# Patient Record
Sex: FEMALE | Race: WHITE | NOT HISPANIC OR LATINO | Employment: OTHER | URBAN - METROPOLITAN AREA
[De-identification: names, ages, dates, MRNs, and addresses within clinical notes are randomized per-mention and may not be internally consistent; named-entity substitution may affect disease eponyms.]

---

## 2017-01-01 ENCOUNTER — RESULTS ENCOUNTER (OUTPATIENT)
Dept: FAMILY MEDICINE CLINIC | Facility: CLINIC | Age: 82
End: 2017-01-01

## 2017-01-01 DIAGNOSIS — E03.9 HYPOTHYROIDISM, ACQUIRED: ICD-10-CM

## 2017-01-26 LAB
FT4I SERPL CALC-MCNC: 2.2 (ref 1.2–4.9)
T3RU NFR SERPL: 33 % (ref 24–39)
T4 SERPL-MCNC: 6.8 UG/DL (ref 4.5–12)
TSH SERPL DL<=0.005 MIU/L-ACNC: 1.04 UIU/ML (ref 0.45–4.5)

## 2017-01-30 ENCOUNTER — OFFICE VISIT (OUTPATIENT)
Dept: FAMILY MEDICINE CLINIC | Facility: CLINIC | Age: 82
End: 2017-01-30

## 2017-01-30 VITALS
SYSTOLIC BLOOD PRESSURE: 150 MMHG | DIASTOLIC BLOOD PRESSURE: 68 MMHG | HEIGHT: 62 IN | WEIGHT: 186 LBS | HEART RATE: 76 BPM | TEMPERATURE: 97.2 F | RESPIRATION RATE: 16 BRPM | OXYGEN SATURATION: 95 % | BODY MASS INDEX: 34.23 KG/M2

## 2017-01-30 DIAGNOSIS — I10 ESSENTIAL HYPERTENSION: ICD-10-CM

## 2017-01-30 DIAGNOSIS — E03.9 HYPOTHYROIDISM, ACQUIRED: Primary | ICD-10-CM

## 2017-01-30 PROCEDURE — 99214 OFFICE O/P EST MOD 30 MIN: CPT | Performed by: FAMILY MEDICINE

## 2017-01-30 RX ORDER — LOSARTAN POTASSIUM AND HYDROCHLOROTHIAZIDE 25; 100 MG/1; MG/1
1 TABLET ORAL DAILY
Qty: 90 TABLET | Refills: 0 | Status: SHIPPED | OUTPATIENT
Start: 2017-01-30 | End: 2017-05-31 | Stop reason: SDUPTHER

## 2017-01-30 RX ORDER — LEVOTHYROXINE SODIUM 88 UG/1
88 TABLET ORAL DAILY
Qty: 90 TABLET | Refills: 1 | Status: SHIPPED | OUTPATIENT
Start: 2017-01-30 | End: 2017-05-31 | Stop reason: SDUPTHER

## 2017-01-30 NOTE — MR AVS SNAPSHOT
Roland Russell   1/30/2017 11:00 AM   Office Visit    Provider:  Davon Marie MD   Department:  [facility]   Dept Phone:  [phone]                Your Full Care Plan              Where to Get Your Medications      These medications were sent to [facility] - [place], [place] - [address] - [phone]  - [phone] FX  [address], [place] [address]     Phone:  [phone]     levothyroxine 88 MCG tablet    losartan-hydrochlorothiazide 100-25 MG per tablet            Your Updated Medication List          This list is accurate as of: 1/30/17 12:15 PM.  Always use your most recent med list.                CALCIUM SOFT CHEWS PO       Insulin Lispro Prot & Lispro (50-50) 100 UNIT/ML suspension pen-injector       insulin  UNIT/ML injection   Commonly known as:  humuLIN N,novoLIN N       levothyroxine 88 MCG tablet   Commonly known as:  SYNTHROID, LEVOTHROID   Take 1 tablet by mouth Daily.       losartan-hydrochlorothiazide 100-25 MG per tablet   Commonly known as:  HYZAAR   Take 1 tablet by mouth Daily.       Vitamin D (Cholecalciferol) 400 UNITS capsule               You Were Diagnosed With        Codes Comments    Hypothyroidism, acquired    -  Primary ICD-10-CM: E03.9  ICD-9-CM: 244.9     Essential hypertension     ICD-10-CM: I10  ICD-9-CM: 401.9       Medications to be Given to You by a Medical Professional     Due       Frequency    5/17/2016 denosumab (PROLIA) syringe 60 mg  Once      Instructions    Exercise 30 minutes most days of the week  Sleep 6-8 hours each night if possible  Low fat, low cholesterol diet   we discussed prescribed medications and how to take them   make sure you get results of any labs/studies ordered today  Low glycemic index diet   See me 6 months         Patient Instructions History      MyChart Signup     [facility] allows you to send messages to your doctor, view your test  "results, renew your prescriptions, schedule appointments, and more. To sign up, go to [employer] and click on the Sign Up Now link in the New User? box. Enter your [email or web address] Activation Code exactly as it appears below along with the last four digits of your Social Security Number and your Date of Birth () to complete the sign-up process. If you do not sign up before the expiration date, you must request a new code.    [email or web address] Activation Code: [email or web address]  Expires: 2017 12:13 PM    If you have questions, you can email [email or web address] or call [phone] to talk to our [email or web address] staff. Remember, [email or web address] is NOT to be used for urgent needs. For medical emergencies, dial 911.               Other Info from Your Visit           Your Appointments     2017 10:30 AM EST   Injection with ROOM 7 [facility])    [address] [address] [phone]            2017  9:00 AM EDT   Office Visit with Davon Marie MD   [facility] (--)    [address] [place] [id number] [phone]           Arrive 15 minutes prior to appointment.              Allergies     Latex      Propoxyphene  Itching      Reason for Visit     Hypothyroidism     Hypertension           Vital Signs     Blood Pressure Pulse Temperature Respirations Height Weight    150/68 76 97.2 °F (36.2 °C) (Oral) 16 61.5\" (156.2 cm) 186 lb (84.4 kg)    Oxygen Saturation Body Mass Index Smoking Status             95% 34.58 kg/m2 Never Smoker         Problems and Diagnoses Noted     High blood pressure    Acquired underactive thyroid      "

## 2017-01-30 NOTE — PATIENT INSTRUCTIONS
Exercise 30 minutes most days of the week  Sleep 6-8 hours each night if possible  Low fat, low cholesterol diet   we discussed prescribed medications and how to take them   make sure you get results of any labs/studies ordered today  Low glycemic index diet   See me 6 months

## 2017-01-30 NOTE — PROGRESS NOTES
"Subjective   Roland Russell is a 82 y.o. female.     History of Present Illness   Chief Complaint:   Chief Complaint   Patient presents with   • Diabetes   • Hyperlipidemia   • Hypertension   • Hypothyroidism       Roland Russell 82 y.o. female who presents today for a 3 month follow up for hypothyroidism and hypertension. I refilled her thyroid medication. I reviewed her lab results. Her blood pressure was elevated this morning. Ranges were 191/73 and 189/75. bp now 150/68.   she has a history of   Patient Active Problem List   Diagnosis   • Legal blindness   • Type 2 diabetes mellitus, uncontrolled   • GERD (gastroesophageal reflux disease)   • HLD (hyperlipidemia)   • Hypothyroidism, acquired   • Osteoporosis   • Osteoarthritis, multiple sites   • Essential hypertension   • Senile osteoporosis   .  Since the last visit, she has overall felt well.  she has been compliant with   Current Outpatient Prescriptions:   •  Calcium-Vitamin D-Vitamin K (CALCIUM SOFT CHEWS PO), Take 1,000 mg by mouth daily., Disp: , Rfl:   •  Insulin Lispro Prot & Lispro (50-50) 100 UNIT/ML suspension pen-injector, Inject 20 Units under the skin nightly., Disp: , Rfl:   •  insulin NPH (HumuLIN,NovoLIN) 100 UNIT/ML injection, Inject under the skin 2 (two) times a day before meals., Disp: , Rfl:   •  levothyroxine (SYNTHROID, LEVOTHROID) 88 MCG tablet, Take 1 tablet by mouth Daily., Disp: 90 tablet, Rfl: 0  •  losartan-hydrochlorothiazide (HYZAAR) 100-25 MG per tablet, Take 1 tablet by mouth Daily., Disp: 90 tablet, Rfl: 1  •  Vitamin D, Cholecalciferol, 400 UNITS capsule, Take  by mouth., Disp: , Rfl:     Current Facility-Administered Medications:   •  denosumab (PROLIA) syringe 60 mg, 60 mg, Subcutaneous, Once, Davon Marie MD.  she denies medication side effects.    All of the chronic condition(s) listed above are stable w/o issues.    Visit Vitals   • BP (!) 185/75   • Pulse 76   • Temp 97.2 °F (36.2 °C) (Oral)   • Resp 16   • Ht 61.5\" " (156.2 cm)   • Wt 186 lb (84.4 kg)   • SpO2 95%   • BMI 34.58 kg/m2       Results for orders placed or performed in visit on 01/01/17   Thyroid Panel With TSH   Result Value Ref Range    TSH 1.040 0.450 - 4.500 uIU/mL    T4, Total 6.8 4.5 - 12.0 ug/dL    T3 Uptake 33 24 - 39 %    Free Thyroxine Index 2.2 1.2 - 4.9         The following portions of the patient's history were reviewed and updated as appropriate: allergies, current medications, past family history, past medical history, past social history, past surgical history and problem list.    Review of Systems   Constitutional: Negative for activity change, appetite change and unexpected weight change.   Eyes: Negative for visual disturbance.   Respiratory: Negative for chest tightness and shortness of breath.    Cardiovascular: Negative for chest pain and palpitations.   Skin: Negative for color change.   Neurological: Negative for syncope and speech difficulty.   Psychiatric/Behavioral: Negative for confusion and decreased concentration.       Objective   Physical Exam   Constitutional: She appears well-developed and well-nourished.   HENT:   Head: Normocephalic.   Neck: No thyromegaly present.   Cardiovascular: Normal rate and regular rhythm.    Pulmonary/Chest: Effort normal and breath sounds normal.   Lymphadenopathy:     She has no cervical adenopathy.   Neurological: She is alert.   Psychiatric: She has a normal mood and affect. Her behavior is normal.   Nursing note and vitals reviewed.      Assessment/Plan   Roland was seen today for hypothyroidism and hypertension.    Diagnoses and all orders for this visit:    Hypothyroidism, acquired  -     levothyroxine (SYNTHROID, LEVOTHROID) 88 MCG tablet; Take 1 tablet by mouth Daily.    Essential hypertension  -     losartan-hydrochlorothiazide (HYZAAR) 100-25 MG per tablet; Take 1 tablet by mouth Daily.

## 2017-02-01 LAB
BUN SERPL-MCNC: 30 MG/DL (ref 8–27)
BUN/CREAT SERPL: 28 (ref 11–26)
CALCIUM SERPL-MCNC: 9.2 MG/DL (ref 8.7–10.3)
CHLORIDE SERPL-SCNC: 99 MMOL/L (ref 96–106)
CO2 SERPL-SCNC: 23 MMOL/L (ref 18–29)
CREAT SERPL-MCNC: 1.08 MG/DL (ref 0.57–1)
GLUCOSE SERPL-MCNC: 181 MG/DL (ref 65–99)
POTASSIUM SERPL-SCNC: 4.6 MMOL/L (ref 3.5–5.2)
SODIUM SERPL-SCNC: 143 MMOL/L (ref 134–144)
WRITTEN AUTHORIZATION: NORMAL

## 2017-02-10 DIAGNOSIS — M81.0 OSTEOPOROSIS: Primary | ICD-10-CM

## 2017-02-13 ENCOUNTER — HOSPITAL ENCOUNTER (OUTPATIENT)
Dept: INFUSION THERAPY | Facility: HOSPITAL | Age: 82
Discharge: HOME | End: 2017-02-13
Admitting: FAMILY MEDICINE

## 2017-02-13 VITALS
OXYGEN SATURATION: 97 % | HEIGHT: 61 IN | SYSTOLIC BLOOD PRESSURE: 186 MMHG | HEART RATE: 69 BPM | DIASTOLIC BLOOD PRESSURE: 83 MMHG | RESPIRATION RATE: 20 BRPM | BODY MASS INDEX: 35.12 KG/M2 | TEMPERATURE: 97.8 F | WEIGHT: 186 LBS

## 2017-02-13 DIAGNOSIS — M81.0 SENILE OSTEOPOROSIS: ICD-10-CM

## 2017-02-13 PROCEDURE — 25010000002 DENOSUMAB 60 MG/ML SOLUTION: Performed by: FAMILY MEDICINE

## 2017-02-13 PROCEDURE — 96401 CHEMO ANTI-NEOPL SQ/IM: CPT

## 2017-02-13 RX ADMIN — DENOSUMAB 60 MG: 60 INJECTION SUBCUTANEOUS at 10:47

## 2017-04-24 ENCOUNTER — CLINICAL SUPPORT (OUTPATIENT)
Dept: FAMILY MEDICINE CLINIC | Facility: CLINIC | Age: 82
End: 2017-04-24

## 2017-04-24 DIAGNOSIS — M81.0 OSTEOPOROSIS: Primary | ICD-10-CM

## 2017-04-24 DIAGNOSIS — Z78.0 POST-MENOPAUSAL: ICD-10-CM

## 2017-04-24 PROCEDURE — 77080 DXA BONE DENSITY AXIAL: CPT | Performed by: FAMILY MEDICINE

## 2017-05-03 ENCOUNTER — OFFICE VISIT (OUTPATIENT)
Dept: FAMILY MEDICINE CLINIC | Facility: CLINIC | Age: 82
End: 2017-05-03

## 2017-05-03 VITALS
SYSTOLIC BLOOD PRESSURE: 128 MMHG | HEART RATE: 78 BPM | RESPIRATION RATE: 16 BRPM | OXYGEN SATURATION: 95 % | WEIGHT: 183 LBS | DIASTOLIC BLOOD PRESSURE: 60 MMHG | HEIGHT: 61 IN | TEMPERATURE: 97.6 F | BODY MASS INDEX: 34.55 KG/M2

## 2017-05-03 DIAGNOSIS — I10 ESSENTIAL HYPERTENSION: ICD-10-CM

## 2017-05-03 DIAGNOSIS — E03.9 HYPOTHYROIDISM, ACQUIRED: ICD-10-CM

## 2017-05-03 DIAGNOSIS — M79.10 MYALGIA: ICD-10-CM

## 2017-05-03 DIAGNOSIS — E78.2 MIXED HYPERLIPIDEMIA: ICD-10-CM

## 2017-05-03 DIAGNOSIS — IMO0001 UNCONTROLLED TYPE 2 DIABETES MELLITUS WITHOUT COMPLICATION, WITH LONG-TERM CURRENT USE OF INSULIN: ICD-10-CM

## 2017-05-03 DIAGNOSIS — M81.0 OSTEOPOROSIS: Primary | ICD-10-CM

## 2017-05-03 PROCEDURE — 99214 OFFICE O/P EST MOD 30 MIN: CPT | Performed by: FAMILY MEDICINE

## 2017-05-04 LAB
ALBUMIN SERPL-MCNC: 4.1 G/DL (ref 3.5–5.2)
ALBUMIN/GLOB SERPL: 1.2 G/DL
ALP SERPL-CCNC: 109 U/L (ref 39–117)
ALT SERPL-CCNC: 16 U/L (ref 1–33)
AST SERPL-CCNC: 17 U/L (ref 1–32)
BASOPHILS # BLD AUTO: 0.03 10*3/MM3 (ref 0–0.2)
BASOPHILS NFR BLD AUTO: 0.3 % (ref 0–1.5)
BILIRUB SERPL-MCNC: 0.5 MG/DL (ref 0.1–1.2)
BUN SERPL-MCNC: 30 MG/DL (ref 8–23)
BUN/CREAT SERPL: 27.5 (ref 7–25)
CALCIUM SERPL-MCNC: 10.8 MG/DL (ref 8.6–10.5)
CHLORIDE SERPL-SCNC: 96 MMOL/L (ref 98–107)
CHOLEST SERPL-MCNC: 222 MG/DL (ref 0–200)
CK SERPL-CCNC: 165 U/L (ref 20–180)
CO2 SERPL-SCNC: 26.9 MMOL/L (ref 22–29)
CREAT SERPL-MCNC: 1.09 MG/DL (ref 0.57–1)
EOSINOPHIL # BLD AUTO: 0.16 10*3/MM3 (ref 0–0.7)
EOSINOPHIL NFR BLD AUTO: 1.5 % (ref 0.3–6.2)
ERYTHROCYTE [DISTWIDTH] IN BLOOD BY AUTOMATED COUNT: 14.4 % (ref 11.7–13)
ERYTHROCYTE [SEDIMENTATION RATE] IN BLOOD BY WESTERGREN METHOD: 8 MM/HR (ref 0–30)
GLOBULIN SER CALC-MCNC: 3.3 GM/DL
GLUCOSE SERPL-MCNC: 273 MG/DL (ref 65–99)
HBA1C MFR BLD: 8.2 % (ref 4.8–5.6)
HCT VFR BLD AUTO: 47.2 % (ref 35.6–45.5)
HDLC SERPL-MCNC: 41 MG/DL (ref 40–60)
HGB BLD-MCNC: 15.1 G/DL (ref 11.9–15.5)
IMM GRANULOCYTES # BLD: 0 10*3/MM3 (ref 0–0.03)
IMM GRANULOCYTES NFR BLD: 0 % (ref 0–0.5)
LDLC SERPL CALC-MCNC: 129 MG/DL (ref 0–100)
LYMPHOCYTES # BLD AUTO: 3.23 10*3/MM3 (ref 0.9–4.8)
LYMPHOCYTES NFR BLD AUTO: 30.2 % (ref 19.6–45.3)
MCH RBC QN AUTO: 28.5 PG (ref 26.9–32)
MCHC RBC AUTO-ENTMCNC: 32 G/DL (ref 32.4–36.3)
MCV RBC AUTO: 89.1 FL (ref 80.5–98.2)
MONOCYTES # BLD AUTO: 0.52 10*3/MM3 (ref 0.2–1.2)
MONOCYTES NFR BLD AUTO: 4.9 % (ref 5–12)
NEUTROPHILS # BLD AUTO: 6.77 10*3/MM3 (ref 1.9–8.1)
NEUTROPHILS NFR BLD AUTO: 63.1 % (ref 42.7–76)
PLATELET # BLD AUTO: 226 10*3/MM3 (ref 140–500)
POTASSIUM SERPL-SCNC: 4.5 MMOL/L (ref 3.5–5.2)
PROT SERPL-MCNC: 7.4 G/DL (ref 6–8.5)
RBC # BLD AUTO: 5.3 10*6/MM3 (ref 3.9–5.2)
SODIUM SERPL-SCNC: 139 MMOL/L (ref 136–145)
TRIGL SERPL-MCNC: 261 MG/DL (ref 0–150)
TSH SERPL DL<=0.005 MIU/L-ACNC: 1.23 MIU/ML (ref 0.27–4.2)
VLDLC SERPL CALC-MCNC: 52.2 MG/DL (ref 5–40)
WBC # BLD AUTO: 10.71 10*3/MM3 (ref 4.5–10.7)

## 2017-05-31 ENCOUNTER — OFFICE VISIT (OUTPATIENT)
Dept: FAMILY MEDICINE CLINIC | Facility: CLINIC | Age: 82
End: 2017-05-31

## 2017-05-31 VITALS
DIASTOLIC BLOOD PRESSURE: 60 MMHG | SYSTOLIC BLOOD PRESSURE: 136 MMHG | RESPIRATION RATE: 16 BRPM | TEMPERATURE: 98.4 F | BODY MASS INDEX: 34.55 KG/M2 | WEIGHT: 183 LBS | HEIGHT: 61 IN | HEART RATE: 80 BPM | OXYGEN SATURATION: 93 %

## 2017-05-31 DIAGNOSIS — E03.9 HYPOTHYROIDISM, ACQUIRED: ICD-10-CM

## 2017-05-31 DIAGNOSIS — H54.8 LEGAL BLINDNESS: ICD-10-CM

## 2017-05-31 DIAGNOSIS — E78.2 MIXED HYPERLIPIDEMIA: Primary | ICD-10-CM

## 2017-05-31 DIAGNOSIS — M79.18 MUSCLE ACHE OF EXTREMITY: ICD-10-CM

## 2017-05-31 DIAGNOSIS — I10 ESSENTIAL HYPERTENSION: ICD-10-CM

## 2017-05-31 DIAGNOSIS — M15.9 OSTEOARTHRITIS OF MULTIPLE JOINTS, UNSPECIFIED OSTEOARTHRITIS TYPE: ICD-10-CM

## 2017-05-31 PROCEDURE — 99214 OFFICE O/P EST MOD 30 MIN: CPT | Performed by: FAMILY MEDICINE

## 2017-05-31 RX ORDER — LOSARTAN POTASSIUM AND HYDROCHLOROTHIAZIDE 25; 100 MG/1; MG/1
1 TABLET ORAL DAILY
Qty: 90 TABLET | Refills: 1 | Status: SHIPPED | OUTPATIENT
Start: 2017-05-31 | End: 2017-07-25 | Stop reason: SDUPTHER

## 2017-05-31 RX ORDER — LEVOTHYROXINE SODIUM 88 UG/1
88 TABLET ORAL DAILY
Qty: 90 TABLET | Refills: 1 | Status: SHIPPED | OUTPATIENT
Start: 2017-05-31 | End: 2017-07-25 | Stop reason: SDUPTHER

## 2017-07-25 ENCOUNTER — OFFICE VISIT (OUTPATIENT)
Dept: FAMILY MEDICINE CLINIC | Facility: CLINIC | Age: 82
End: 2017-07-25

## 2017-07-25 VITALS
HEART RATE: 76 BPM | WEIGHT: 188 LBS | DIASTOLIC BLOOD PRESSURE: 64 MMHG | RESPIRATION RATE: 16 BRPM | HEIGHT: 61 IN | BODY MASS INDEX: 35.5 KG/M2 | SYSTOLIC BLOOD PRESSURE: 131 MMHG | TEMPERATURE: 97.3 F | OXYGEN SATURATION: 94 %

## 2017-07-25 DIAGNOSIS — I10 ESSENTIAL HYPERTENSION: ICD-10-CM

## 2017-07-25 DIAGNOSIS — M19.90 ARTHRITIS: ICD-10-CM

## 2017-07-25 DIAGNOSIS — IMO0001 UNCONTROLLED TYPE 2 DIABETES MELLITUS WITHOUT COMPLICATION, WITH LONG-TERM CURRENT USE OF INSULIN: Primary | ICD-10-CM

## 2017-07-25 DIAGNOSIS — E03.9 HYPOTHYROIDISM, ACQUIRED: ICD-10-CM

## 2017-07-25 PROCEDURE — 99214 OFFICE O/P EST MOD 30 MIN: CPT | Performed by: FAMILY MEDICINE

## 2017-07-25 RX ORDER — LOSARTAN POTASSIUM AND HYDROCHLOROTHIAZIDE 25; 100 MG/1; MG/1
1 TABLET ORAL DAILY
Qty: 90 TABLET | Refills: 1 | Status: SHIPPED | OUTPATIENT
Start: 2017-07-25 | End: 2017-08-29 | Stop reason: SDUPTHER

## 2017-07-25 RX ORDER — LEVOTHYROXINE SODIUM 88 UG/1
88 TABLET ORAL DAILY
Qty: 90 TABLET | Refills: 1 | Status: SHIPPED | OUTPATIENT
Start: 2017-07-25 | End: 2017-08-29 | Stop reason: SDUPTHER

## 2017-07-25 NOTE — PATIENT INSTRUCTIONS
Exercise 30 minutes most days of the week  Sleep 6-8 hours each night if possible  Low fat, low cholesterol diet   we discussed prescribed medications and how to take them   make sure you get results of any labs/studies ordered today  Low glycemic index diet     See dr antoine and dr torres

## 2017-07-25 NOTE — PROGRESS NOTES
"Subjective   Roland Russell is a 82 y.o. female.     History of Present Illness   Chief Complaint:   Chief Complaint   Patient presents with   • Hypertension   • Hyperlipidemia   • Diabetes       Roland Russell 82 y.o. female who presents today for Medical Management of the below listed issues and medication refills. She needs to see dr antoine because she is losing her diabetic doctor, see labs in may. Bone densometer showed osteoporosis and she had a fx this year and  Had to stop prolia because of muscle aches. ? Go to fosomax weekly.?????  she has a history of   Patient Active Problem List   Diagnosis   • Legal blindness   • Type 2 diabetes mellitus, uncontrolled   • GERD (gastroesophageal reflux disease)   • HLD (hyperlipidemia)   • Hypothyroidism, acquired   • Osteoporosis   • Osteoarthritis, multiple sites   • Essential hypertension   • Senile osteoporosis   .  Since the last visit, she has overall felt well.  she has been compliant with   Current Outpatient Prescriptions:   •  Calcium-Vitamin D-Vitamin K (CALCIUM SOFT CHEWS PO), Take 1,000 mg by mouth daily., Disp: , Rfl:   •  Insulin Lispro Prot & Lispro (50-50) 100 UNIT/ML suspension pen-injector, Inject 40 Units under the skin 3 (Three) Times a Day. 40 WITH BREAKFAST 20 WITH LUNCH AND 40 WITH SUPPER, Disp: , Rfl:   •  insulin NPH (HumuLIN,NovoLIN) 100 UNIT/ML injection, Inject 25 Units under the skin Every Night., Disp: , Rfl:   •  levothyroxine (SYNTHROID, LEVOTHROID) 88 MCG tablet, Take 1 tablet by mouth Daily., Disp: 90 tablet, Rfl: 1  •  losartan-hydrochlorothiazide (HYZAAR) 100-25 MG per tablet, Take 1 tablet by mouth Daily., Disp: 90 tablet, Rfl: 1  •  Vitamin D, Cholecalciferol, 400 UNITS capsule, Take 1,000 mg by mouth., Disp: , Rfl: .  she denies medication side effects.    All of the chronic condition(s) listed above are stable w/o issues.    /64  Pulse 76  Temp 97.3 °F (36.3 °C) (Oral)   Resp 16  Ht 61\" (154.9 cm)  Wt 188 lb (85.3 kg)  " SpO2 94%  BMI 35.52 kg/m2    The following portions of the patient's history were reviewed and updated as appropriate: allergies, current medications, past family history, past medical history, past social history, past surgical history and problem list.    Review of Systems   Constitutional: Negative for activity change, appetite change and unexpected weight change.   Eyes: Positive for visual disturbance.   Respiratory: Negative for chest tightness and shortness of breath.    Cardiovascular: Negative for chest pain and palpitations.   Skin: Negative for color change.   Neurological: Negative for syncope and speech difficulty.   Psychiatric/Behavioral: Negative for confusion and decreased concentration.       Objective   Physical Exam   Constitutional: She is oriented to person, place, and time. She appears well-developed and well-nourished.   Eyes: Pupils are equal, round, and reactive to light.   Neck: Normal range of motion. Neck supple.   Cardiovascular: Normal rate.    Pulmonary/Chest: Effort normal and breath sounds normal.   Abdominal: Soft.    Roland had a diabetic foot exam performed today.   During the foot exam she had a monofilament test performed.  Lymphadenopathy:     She has no cervical adenopathy.   Neurological: She is alert and oriented to person, place, and time.   Legally blind   Skin: Skin is warm and dry.   Psychiatric: She has a normal mood and affect. Her behavior is normal.   Nursing note and vitals reviewed.      Assessment/Plan   Roland was seen today for hypertension, hyperlipidemia and diabetes.    Diagnoses and all orders for this visit:    Uncontrolled type 2 diabetes mellitus without complication, with long-term current use of insulin  -     Ambulatory Referral to Endocrinology    Hypothyroidism, acquired  -     levothyroxine (SYNTHROID, LEVOTHROID) 88 MCG tablet; Take 1 tablet by mouth Daily.    Essential hypertension  -     losartan-hydrochlorothiazide (HYZAAR) 100-25 MG per tablet;  Take 1 tablet by mouth Daily.    Arthritis  -     Ambulatory Referral to Rheumatology    Other orders  -     Cancel: Insulin Lispro Prot & Lispro (50-50) 100 UNIT/ML suspension pen-injector; Inject 40 Units under the skin 3 (Three) Times a Day. 40 WITH BREAKFAST 20 WITH LUNCH AND 40 WITH SUPPER  -     Cancel: insulin NPH (humuLIN N,novoLIN N) 100 UNIT/ML injection; Inject 25 Units under the skin Every Night.

## 2017-08-29 ENCOUNTER — OFFICE VISIT (OUTPATIENT)
Dept: ENDOCRINOLOGY | Age: 82
End: 2017-08-29

## 2017-08-29 VITALS
BODY MASS INDEX: 34.66 KG/M2 | WEIGHT: 183.6 LBS | DIASTOLIC BLOOD PRESSURE: 66 MMHG | SYSTOLIC BLOOD PRESSURE: 132 MMHG | HEIGHT: 61 IN

## 2017-08-29 DIAGNOSIS — IMO0002 UNCONTROLLED TYPE 2 DIABETES MELLITUS WITH COMPLICATION, WITH LONG-TERM CURRENT USE OF INSULIN: Primary | ICD-10-CM

## 2017-08-29 DIAGNOSIS — I10 ESSENTIAL HYPERTENSION: ICD-10-CM

## 2017-08-29 DIAGNOSIS — E55.9 VITAMIN D DEFICIENCY: ICD-10-CM

## 2017-08-29 DIAGNOSIS — E78.2 MIXED HYPERLIPIDEMIA: ICD-10-CM

## 2017-08-29 DIAGNOSIS — E03.9 HYPOTHYROIDISM, ACQUIRED: ICD-10-CM

## 2017-08-29 DIAGNOSIS — N28.9 RENAL INSUFFICIENCY: ICD-10-CM

## 2017-08-29 PROCEDURE — 99214 OFFICE O/P EST MOD 30 MIN: CPT | Performed by: NURSE PRACTITIONER

## 2017-08-29 RX ORDER — LEVOTHYROXINE SODIUM 88 UG/1
88 TABLET ORAL DAILY
Qty: 90 TABLET | Refills: 1 | Status: SHIPPED | OUTPATIENT
Start: 2017-08-29 | End: 2017-08-29 | Stop reason: SDUPTHER

## 2017-08-29 RX ORDER — LEVOTHYROXINE SODIUM 88 UG/1
88 TABLET ORAL DAILY
Qty: 90 TABLET | Refills: 1 | Status: SHIPPED | OUTPATIENT
Start: 2017-08-29 | End: 2018-01-04 | Stop reason: SDUPTHER

## 2017-08-29 RX ORDER — BLOOD-GLUCOSE METER
EACH MISCELLANEOUS
Qty: 1 EACH | Refills: 1 | Status: SHIPPED | OUTPATIENT
Start: 2017-08-29 | End: 2017-09-05 | Stop reason: SDUPTHER

## 2017-08-29 RX ORDER — LOSARTAN POTASSIUM AND HYDROCHLOROTHIAZIDE 25; 100 MG/1; MG/1
1 TABLET ORAL DAILY
Qty: 90 TABLET | Refills: 1 | Status: SHIPPED | OUTPATIENT
Start: 2017-08-29 | End: 2018-01-04 | Stop reason: SDUPTHER

## 2017-08-29 RX ORDER — BLOOD-GLUCOSE METER
EACH MISCELLANEOUS
Qty: 1 EACH | Refills: 1 | OUTPATIENT
Start: 2017-08-29 | End: 2017-08-29 | Stop reason: SDUPTHER

## 2017-08-29 NOTE — PROGRESS NOTES
"Terri Russell is a 82 y.o. female is being seen for consultation today at the request of Davon Marie MD  Chief Complaint   Patient presents with   • Diabetes     NPP, pt is testing BG 4 times daily, pt brought meter     /66  Ht 61\" (154.9 cm)  Wt 183 lb 9.6 oz (83.3 kg)  BMI 34.69 kg/m2  Current Outpatient Prescriptions on File Prior to Visit   Medication Sig   • Calcium-Vitamin D-Vitamin K (CALCIUM SOFT CHEWS PO) Take 1,000 mg by mouth daily.   • Insulin Lispro Prot & Lispro (50-50) 100 UNIT/ML suspension pen-injector Inject 40 Units under the skin 3 (Three) Times a Day. 40 WITH BREAKFAST 20 WITH LUNCH AND 40 WITH SUPPER   • insulin NPH (HumuLIN,NovoLIN) 100 UNIT/ML injection Inject 25 Units under the skin Every Night.   • levothyroxine (SYNTHROID, LEVOTHROID) 88 MCG tablet Take 1 tablet by mouth Daily.   • losartan-hydrochlorothiazide (HYZAAR) 100-25 MG per tablet Take 1 tablet by mouth Daily.   • Vitamin D, Cholecalciferol, 400 UNITS capsule Take 1,000 mg by mouth.     No current facility-administered medications on file prior to visit.      Family History   Problem Relation Age of Onset   • Cancer Father    • Glaucoma Father      angular glycoma   • Heart disease Father    • Asthma Sister    • Stroke Maternal Grandmother    • Heart failure Other    • Diabetes Other    • Hypertension Other    • Stroke Other      aunt   • Thyroid disease Other      Social History   Substance Use Topics   • Smoking status: Never Smoker   • Smokeless tobacco: None   • Alcohol use No     Allergies   Allergen Reactions   • Latex    • Propoxyphene Itching         History of Present Illness   Encounter Diagnoses   Name Primary?   • Uncontrolled type 2 diabetes mellitus with complication, with long-term current use of insulin Yes   • Mixed hyperlipidemia    • Essential hypertension    • Hypothyroidism, acquired    This is an 82-year-old female patient here today as a new patient referral for management of type 2 " diabetes.  She states she was diagnosed with diabetes in .  She is accompanied today by her daughter.  She has vision problems and can only see peripherally.  She states her   last year and she has recently moved back to [place] from [facility].  She does live alone but she does have family checks on her.  She states she has had 2 low blood sugars this past week that was so low that she cannot even check them.  She states as a result of sometimes being afraid of going to low she will withhold her evening dose of insulin.  She also does adjust her mealtime insulin based on blood sugar readings.  She has problems seeing and the blood glucose meter and states her insurance company is wanting 200 dollars for her to get a prodigy meter which is audio.  She has an appointment to see Dr. huang in the near future for problems with osteoporosis and arthritis.  She does feels that she has noted that she is getting more exercise which could also contribute to her low blood sugars.  We discussed the risk of hypoglycemia and how to prevent this from occurring.  We also discussed adjusting insulin based on blood sugar patterns.  She does not want to attend any diabetes classes at this time.  She used to be on oral medications for her diabetes and was eventually changed to insulin altogether.        The following portions of the patient's history were reviewed and updated as appropriate: allergies, current medications, past family history, past medical history, past social history, past surgical history and problem list.    Review of Systems   Constitutional: Negative for fatigue.   HENT: Negative for trouble swallowing.    Eyes: Negative for visual disturbance.   Respiratory: Negative for shortness of breath.    Cardiovascular: Negative for leg swelling.   Endocrine: Negative for polyuria.   Skin: Negative for wound.   Neurological: Negative for numbness.       Objective   Physical Exam   Constitutional:  She is oriented to person, place, and time. She appears well-developed and well-nourished. No distress.   HENT:   Head: Normocephalic and atraumatic.   Right Ear: External ear normal.   Left Ear: External ear normal.   Nose: Nose normal.   Mouth/Throat: Oropharynx is clear and moist. No oropharyngeal exudate.   Eyes: EOM are normal. Pupils are equal, round, and reactive to light. Right eye exhibits no discharge. Left eye exhibits no discharge.   Neck: Trachea normal, normal range of motion and full passive range of motion without pain. Neck supple. No tracheal tenderness present. Carotid bruit is not present. No tracheal deviation, no edema and no erythema present. No thyroid mass and no thyromegaly present.   Cardiovascular: Normal rate, regular rhythm, normal heart sounds and intact distal pulses.  Exam reveals no gallop and no friction rub.    No murmur heard.  Pulmonary/Chest: Effort normal and breath sounds normal. No stridor. No respiratory distress. She has no wheezes. She has no rales.   Abdominal: Soft. Bowel sounds are normal. She exhibits no distension.   Musculoskeletal: Normal range of motion. She exhibits no edema or deformity.   Lymphadenopathy:     She has no cervical adenopathy.   Neurological: She is alert and oriented to person, place, and time.   Skin: Skin is warm and dry. No rash noted. She is not diaphoretic. No erythema. No pallor.   Psychiatric: She has a normal mood and affect. Her behavior is normal. Judgment and thought content normal.   Nursing note and vitals reviewed.    Office Visit on 05/03/2017   Component Date Value Ref Range Status   • Glucose 05/03/2017 273* 65 - 99 mg/dL Final   • BUN 05/03/2017 30* 8 - 23 mg/dL Final   • Creatinine 05/03/2017 1.09* 0.57 - 1.00 mg/dL Final   • eGFR Non African Am 05/03/2017 48* >60 mL/min/1.73 Final    Comment: The MDRD GFR formula is only valid for adults with stable  renal function between ages 18 and 70.     • eGFR  Am 05/03/2017 58*  >60 mL/min/1.73 Final   • BUN/Creatinine Ratio 05/03/2017 27.5* 7.0 - 25.0 Final   • Sodium 05/03/2017 139  136 - 145 mmol/L Final   • Potassium 05/03/2017 4.5  3.5 - 5.2 mmol/L Final   • Chloride 05/03/2017 96* 98 - 107 mmol/L Final   • Total CO2 05/03/2017 26.9  22.0 - 29.0 mmol/L Final   • Calcium 05/03/2017 10.8* 8.6 - 10.5 mg/dL Final   • Total Protein 05/03/2017 7.4  6.0 - 8.5 g/dL Final   • Albumin 05/03/2017 4.10  3.50 - 5.20 g/dL Final   • Globulin 05/03/2017 3.3  gm/dL Final   • A/G Ratio 05/03/2017 1.2  g/dL Final   • Total Bilirubin 05/03/2017 0.5  0.1 - 1.2 mg/dL Final   • Alkaline Phosphatase 05/03/2017 109  39 - 117 U/L Final   • AST (SGOT) 05/03/2017 17  1 - 32 U/L Final   • ALT (SGPT) 05/03/2017 16  1 - 33 U/L Final   • Total Cholesterol 05/03/2017 222* 0 - 200 mg/dL Final   • Triglycerides 05/03/2017 261* 0 - 150 mg/dL Final   • HDL Cholesterol 05/03/2017 41  40 - 60 mg/dL Final   • VLDL Cholesterol 05/03/2017 52.2* 5 - 40 mg/dL Final   • LDL Cholesterol  05/03/2017 129* 0 - 100 mg/dL Final   • WBC 05/03/2017 10.71* 4.50 - 10.70 10*3/mm3 Final   • RBC 05/03/2017 5.30* 3.90 - 5.20 10*6/mm3 Final   • Hemoglobin 05/03/2017 15.1  11.9 - 15.5 g/dL Final   • Hematocrit 05/03/2017 47.2* 35.6 - 45.5 % Final   • MCV 05/03/2017 89.1  80.5 - 98.2 fL Final   • MCH 05/03/2017 28.5  26.9 - 32.0 pg Final   • MCHC 05/03/2017 32.0* 32.4 - 36.3 g/dL Final   • RDW 05/03/2017 14.4* 11.7 - 13.0 % Final   • Platelets 05/03/2017 226  140 - 500 10*3/mm3 Final   • Neutrophil Rel % 05/03/2017 63.1  42.7 - 76.0 % Final   • Lymphocyte Rel % 05/03/2017 30.2  19.6 - 45.3 % Final   • Monocyte Rel % 05/03/2017 4.9* 5.0 - 12.0 % Final   • Eosinophil Rel % 05/03/2017 1.5  0.3 - 6.2 % Final   • Basophil Rel % 05/03/2017 0.3  0.0 - 1.5 % Final   • Neutrophils Absolute 05/03/2017 6.77  1.90 - 8.10 10*3/mm3 Final   • Lymphocytes Absolute 05/03/2017 3.23  0.90 - 4.80 10*3/mm3 Final   • Monocytes Absolute 05/03/2017 0.52  0.20 - 1.20  10*3/mm3 Final   • Eosinophils Absolute 05/03/2017 0.16  0.00 - 0.70 10*3/mm3 Final   • Basophils Absolute 05/03/2017 0.03  0.00 - 0.20 10*3/mm3 Final   • Immature Granulocyte Rel % 05/03/2017 0.0  0.0 - 0.5 % Final   • Immature Grans Absolute 05/03/2017 0.00  0.00 - 0.03 10*3/mm3 Final   • TSH 05/03/2017 1.230  0.270 - 4.200 mIU/mL Final   • Hemoglobin A1C 05/03/2017 8.20* 4.80 - 5.60 % Final    Comment: Hemoglobin A1C Ranges:  Increased Risk for Diabetes  5.7% to 6.4%  Diabetes                     >= 6.5%  Diabetic Goal                < 7.0%     • Creatine Kinase 05/03/2017 165  20 - 180 U/L Final   • Sed Rate 05/03/2017 8  0 - 30 mm/hr Final         Assessment/Plan   Problems Addressed this Visit        Cardiovascular and Mediastinum    HLD (hyperlipidemia)    Essential hypertension       Endocrine    Type 2 diabetes mellitus, uncontrolled - Primary    Hypothyroidism, acquired          In summary, patient was seen and examined.  She will have extensive laboratory evaluation done at today's visit will be notified of the results along with any further recommendations.  Her last labs were reviewed.  Her hemoglobin A1c of 8.2 according to patient was in good range for her.  She states typically she is closer to a 0.9 on her A1c.  Her blood pressure is satisfactory.  Her prescriptions were provided for her per her request at today's visit.  We will try to obtain her a prodigy meter due to her vision problems.  She is reluctant to change any medications.  We did discuss today how to adjust insulin based on patterns and prevention of hypoglycemia.  She seems very content with what she is currently doing.  We discussed her goal for her A1c to be between 7.5 and 8.  She is to follow-up in 4 months with labs.

## 2017-09-05 LAB
25(OH)D3+25(OH)D2 SERPL-MCNC: 36.6 NG/ML (ref 30–100)
ALBUMIN SERPL-MCNC: 4.4 G/DL (ref 3.5–5.2)
ALBUMIN/GLOB SERPL: 1.4 G/DL
ALP SERPL-CCNC: 106 U/L (ref 39–117)
ALT SERPL-CCNC: 14 U/L (ref 1–33)
AST SERPL-CCNC: 18 U/L (ref 1–32)
BILIRUB SERPL-MCNC: 0.5 MG/DL (ref 0.1–1.2)
BUN SERPL-MCNC: 21 MG/DL (ref 8–23)
BUN/CREAT SERPL: 21.4 (ref 7–25)
C PEPTIDE SERPL-MCNC: 0.3 NG/ML (ref 1.1–4.4)
CALCIUM SERPL-MCNC: 10.3 MG/DL (ref 8.6–10.5)
CHLORIDE SERPL-SCNC: 98 MMOL/L (ref 98–107)
CHOLEST SERPL-MCNC: 183 MG/DL (ref 0–200)
CO2 SERPL-SCNC: 28.9 MMOL/L (ref 22–29)
CREAT SERPL-MCNC: 0.98 MG/DL (ref 0.57–1)
FT4I SERPL CALC-MCNC: 2.3 (ref 1.2–4.9)
GLOBULIN SER CALC-MCNC: 3.1 GM/DL
GLUCOSE SERPL-MCNC: 75 MG/DL (ref 65–99)
HBA1C MFR BLD: 8.08 % (ref 4.8–5.6)
HDLC SERPL-MCNC: 39 MG/DL (ref 40–60)
LDLC SERPL CALC-MCNC: 105 MG/DL (ref 0–100)
POTASSIUM SERPL-SCNC: 3.9 MMOL/L (ref 3.5–5.2)
PROT SERPL-MCNC: 7.5 G/DL (ref 6–8.5)
SODIUM SERPL-SCNC: 142 MMOL/L (ref 136–145)
T3FREE SERPL-MCNC: 2.5 PG/ML (ref 2–4.4)
T3RU NFR SERPL: 31 % (ref 24–39)
T4 FREE SERPL-MCNC: 1.44 NG/DL (ref 0.93–1.7)
T4 SERPL-MCNC: 7.4 UG/DL (ref 4.5–12)
THYROGLOB AB SERPL-ACNC: 8.5 IU/ML
THYROGLOB SERPL-MCNC: 13 NG/ML
THYROGLOB SERPL-MCNC: ABNORMAL NG/ML
TRIGL SERPL-MCNC: 197 MG/DL (ref 0–150)
TSH SERPL DL<=0.005 MIU/L-ACNC: 1.02 UIU/ML (ref 0.45–4.5)
UNABLE TO VOID: NORMAL
VLDLC SERPL CALC-MCNC: 39.4 MG/DL (ref 5–40)

## 2017-09-05 RX ORDER — BLOOD-GLUCOSE METER
EACH MISCELLANEOUS
Qty: 1 EACH | Refills: 1 | Status: SHIPPED | OUTPATIENT
Start: 2017-09-05 | End: 2017-12-26 | Stop reason: SDUPTHER

## 2017-09-26 ENCOUNTER — HOSPITAL ENCOUNTER (OUTPATIENT)
Dept: GENERAL RADIOLOGY | Facility: HOSPITAL | Age: 82
Discharge: HOME | End: 2017-09-26
Attending: INTERNAL MEDICINE | Admitting: INTERNAL MEDICINE

## 2017-09-26 DIAGNOSIS — M54.2 CERVICALGIA: ICD-10-CM

## 2017-09-26 PROCEDURE — 72052 X-RAY EXAM NECK SPINE 6/>VWS: CPT

## 2017-12-19 ENCOUNTER — LAB (OUTPATIENT)
Dept: ENDOCRINOLOGY | Age: 82
End: 2017-12-19

## 2017-12-19 DIAGNOSIS — E03.9 HYPOTHYROIDISM, ACQUIRED: ICD-10-CM

## 2017-12-19 DIAGNOSIS — E55.9 VITAMIN D DEFICIENCY: ICD-10-CM

## 2017-12-19 DIAGNOSIS — IMO0002 UNCONTROLLED TYPE 2 DIABETES MELLITUS WITH COMPLICATION, WITH LONG-TERM CURRENT USE OF INSULIN: Primary | ICD-10-CM

## 2017-12-19 DIAGNOSIS — IMO0002 UNCONTROLLED TYPE 2 DIABETES MELLITUS WITH COMPLICATION, WITH LONG-TERM CURRENT USE OF INSULIN: ICD-10-CM

## 2017-12-26 ENCOUNTER — OFFICE VISIT (OUTPATIENT)
Dept: ENDOCRINOLOGY | Age: 82
End: 2017-12-26

## 2017-12-26 VITALS
HEIGHT: 61 IN | SYSTOLIC BLOOD PRESSURE: 140 MMHG | DIASTOLIC BLOOD PRESSURE: 84 MMHG | BODY MASS INDEX: 35.02 KG/M2 | WEIGHT: 185.5 LBS

## 2017-12-26 DIAGNOSIS — E03.9 HYPOTHYROIDISM, ACQUIRED: ICD-10-CM

## 2017-12-26 DIAGNOSIS — E78.2 MIXED HYPERLIPIDEMIA: ICD-10-CM

## 2017-12-26 DIAGNOSIS — IMO0002 UNCONTROLLED TYPE 2 DIABETES MELLITUS WITH COMPLICATION, WITH LONG-TERM CURRENT USE OF INSULIN: Primary | ICD-10-CM

## 2017-12-26 DIAGNOSIS — N28.9 RENAL INSUFFICIENCY: ICD-10-CM

## 2017-12-26 DIAGNOSIS — I10 ESSENTIAL HYPERTENSION: ICD-10-CM

## 2017-12-26 LAB
25(OH)D3+25(OH)D2 SERPL-MCNC: 39.9 NG/ML (ref 30–100)
ALBUMIN SERPL-MCNC: 4.1 G/DL (ref 3.5–5.2)
ALBUMIN/GLOB SERPL: 1.4 G/DL
ALP SERPL-CCNC: 110 U/L (ref 39–117)
ALT SERPL-CCNC: 13 U/L (ref 1–33)
AST SERPL-CCNC: 14 U/L (ref 1–32)
BILIRUB SERPL-MCNC: 0.6 MG/DL (ref 0.1–1.2)
BUN SERPL-MCNC: 26 MG/DL (ref 8–23)
BUN/CREAT SERPL: 25.2 (ref 7–25)
C PEPTIDE SERPL-MCNC: 0.4 NG/ML (ref 1.1–4.4)
CALCIUM SERPL-MCNC: 9.4 MG/DL (ref 8.6–10.5)
CHLORIDE SERPL-SCNC: 101 MMOL/L (ref 98–107)
CHOLEST SERPL-MCNC: 187 MG/DL (ref 0–200)
CO2 SERPL-SCNC: 30.3 MMOL/L (ref 22–29)
CREAT SERPL-MCNC: 1.03 MG/DL (ref 0.57–1)
FT4I SERPL CALC-MCNC: 2.2 (ref 1.2–4.9)
GLOBULIN SER CALC-MCNC: 2.9 GM/DL
GLUCOSE SERPL-MCNC: 114 MG/DL (ref 65–99)
HBA1C MFR BLD: 7.41 % (ref 4.8–5.6)
HDLC SERPL-MCNC: 42 MG/DL (ref 40–60)
INTERPRETATION: NORMAL
LDLC SERPL CALC-MCNC: 114 MG/DL (ref 0–100)
Lab: NORMAL
MICROALBUMIN UR-MCNC: 5.7 UG/ML
POTASSIUM SERPL-SCNC: 3.9 MMOL/L (ref 3.5–5.2)
PROT SERPL-MCNC: 7 G/DL (ref 6–8.5)
SODIUM SERPL-SCNC: 143 MMOL/L (ref 136–145)
T3FREE SERPL-MCNC: 2.5 PG/ML (ref 2–4.4)
T3RU NFR SERPL: 31 % (ref 24–39)
T4 FREE SERPL-MCNC: 1.37 NG/DL (ref 0.93–1.7)
T4 SERPL-MCNC: 7 UG/DL (ref 4.5–12)
THYROGLOB AB SERPL-ACNC: 6.2 IU/ML
THYROGLOB SERPL-MCNC: 15 NG/ML
THYROGLOB SERPL-MCNC: ABNORMAL NG/ML
TRIGL SERPL-MCNC: 154 MG/DL (ref 0–150)
TSH SERPL DL<=0.005 MIU/L-ACNC: 1.8 UIU/ML (ref 0.45–4.5)
VLDLC SERPL CALC-MCNC: 30.8 MG/DL (ref 5–40)

## 2017-12-26 PROCEDURE — 99214 OFFICE O/P EST MOD 30 MIN: CPT | Performed by: NURSE PRACTITIONER

## 2017-12-26 RX ORDER — BLOOD-GLUCOSE METER
EACH MISCELLANEOUS
Qty: 1 EACH | Refills: 1 | Status: SHIPPED | OUTPATIENT
Start: 2017-12-26 | End: 2019-03-28 | Stop reason: SDUPTHER

## 2017-12-26 NOTE — PROGRESS NOTES
"Subjective   Roland Russell is a 82 y.o. female is here today for follow-up.  Chief Complaint   Patient presents with   • Diabetes     recent labs, pt test 4x daily, pt brought meter   • Hyperlipidemia     pt is concerned about pain in arms and legs starting about 2 weeks ago   • Hypertension   • Hypothyroidism   • Vitamin D Deficiency   • renal insufficiency     /84  Ht 154.9 cm (61\")  Wt 84.1 kg (185 lb 8 oz)  BMI 35.05 kg/m2  Current Outpatient Prescriptions on File Prior to Visit   Medication Sig   • Blood Glucose Monitoring Suppl (PRODIGY AUTOCODE BLOOD GLUCOSE) w/Device kit Use as directed  Indications: Blindness   • Calcium-Vitamin D-Vitamin K (CALCIUM SOFT CHEWS PO) Take 1,000 mg by mouth daily.   • Insulin Lispro Prot & Lispro (HUMALOG MIX 50/50 KWIKPEN) (50-50) 100 UNIT/ML suspension pen-injector Inject 40 ac breakfast 20 ac lunch and 40 ac dinner   • Insulin NPH, Human,, Isophane, 100 UNIT/ML suspension pen-injector Inject 25 Units under the skin Daily.   • Insulin Pen Needle (BD PEN NEEDLE SYED U/F) 32G X 4 MM misc Use as directed   • levothyroxine (SYNTHROID, LEVOTHROID) 88 MCG tablet Take 1 tablet by mouth Daily.   • losartan-hydrochlorothiazide (HYZAAR) 100-25 MG per tablet Take 1 tablet by mouth Daily.   • Vitamin D, Cholecalciferol, 400 UNITS capsule Take 1,000 mg by mouth.   • [DISCONTINUED] Insulin Lispro Prot & Lispro (HUMALOG MIX 50/50 KWIKPEN SC) Inject 40 units ac breakfast 20 units ac lunch and 40 units ac dinner      No current facility-administered medications on file prior to visit.      Family History   Problem Relation Age of Onset   • Cancer Father    • Glaucoma Father      angular glycoma   • Heart disease Father    • Asthma Sister    • Stroke Maternal Grandmother    • Heart failure Other    • Diabetes Other    • Hypertension Other    • Stroke Other      aunt   • Thyroid disease Other      Social History   Substance Use Topics   • Smoking status: Never Smoker   • Smokeless " tobacco: None   • Alcohol use No     Allergies   Allergen Reactions   • Latex    • Propoxyphene Itching         History of Present Illness  Encounter Diagnoses   Name Primary?   • Uncontrolled type 2 diabetes mellitus with complication, with long-term current use of insulin Yes   • Essential hypertension    • Mixed hyperlipidemia    • Hypothyroidism, acquired    • Renal insufficiency    82-year-old male patient here today for routine follow-up visit for the above-mentioned problems.  She is accompanied by her daughter.  She states her lowest blood sugar reading has been 48 at her highest has been 490 mg/dL.  She does not know what caused each of her blood sugars to be so high but she suspects that she was more active than usual for the low blood sugar and went out to eat and splurged what her blood sugar was over 400.  She is a poor historian regarding her medications.  She is complaining of leg and arm pain and states she has trouble getting up and initiating walking.  She denies numbness and tingling and does not want any medication for neuropathy.  She will follow-up with her primary care provider for further evaluation and treatment if needed.  She also sees Dr. Rowley rheumatology and has an appointment for follow-up.  She does not know if she needs any medication refills at today's visit.  She had recent labs which were reviewed.  She has recently moved from her home in [place] to [place] and states has been more active than usual as result of packing    The following portions of the patient's history were reviewed and updated as appropriate: allergies, current medications, past family history, past medical history, past social history, past surgical history and problem list.    Review of Systems   Constitutional: Negative for fatigue.   HENT: Negative for trouble swallowing.    Eyes: Negative for visual disturbance.   Respiratory: Negative for shortness of breath.    Cardiovascular: Negative for leg  swelling.   Endocrine: Negative for polyuria.   Skin: Negative for wound.   Neurological: Positive for numbness ( in arms).       Objective   Physical Exam   Constitutional: She is oriented to person, place, and time. She appears well-developed and well-nourished. No distress.   HENT:   Head: Normocephalic and atraumatic.   Right Ear: External ear normal.   Left Ear: External ear normal.   Nose: Nose normal.   Mouth/Throat: Oropharynx is clear and moist. No oropharyngeal exudate.   Eyes: EOM are normal. Pupils are equal, round, and reactive to light. Right eye exhibits no discharge. Left eye exhibits no discharge.   Neck: Trachea normal, normal range of motion and full passive range of motion without pain. Neck supple. No tracheal tenderness present. Carotid bruit is not present. No tracheal deviation, no edema and no erythema present. No thyroid mass and no thyromegaly present.   Cardiovascular: Normal rate, regular rhythm, normal heart sounds and intact distal pulses.  Exam reveals no gallop and no friction rub.    No murmur heard.  Pulmonary/Chest: Effort normal and breath sounds normal. No stridor. No respiratory distress. She has no wheezes. She has no rales.   Abdominal: Soft. Bowel sounds are normal. She exhibits no distension.   Musculoskeletal: Normal range of motion. She exhibits no edema or deformity.   Lymphadenopathy:     She has no cervical adenopathy.   Neurological: She is alert and oriented to person, place, and time.   Skin: Skin is warm and dry. No rash noted. She is not diaphoretic. No erythema. No pallor.   Psychiatric: She has a normal mood and affect. Her behavior is normal. Judgment and thought content normal.   Nursing note and vitals reviewed.    Results for orders placed or performed in visit on 12/19/17   Comprehensive Metabolic Panel   Result Value Ref Range    Glucose 114 (H) 65 - 99 mg/dL    BUN 26 (H) 8 - 23 mg/dL    Creatinine 1.03 (H) 0.57 - 1.00 mg/dL    eGFR Non African Am 51 (L)  >60 mL/min/1.73    eGFR African Am 62 >60 mL/min/1.73    BUN/Creatinine Ratio 25.2 (H) 7.0 - 25.0    Sodium 143 136 - 145 mmol/L    Potassium 3.9 3.5 - 5.2 mmol/L    Chloride 101 98 - 107 mmol/L    Total CO2 30.3 (H) 22.0 - 29.0 mmol/L    Calcium 9.4 8.6 - 10.5 mg/dL    Total Protein 7.0 6.0 - 8.5 g/dL    Albumin 4.10 3.50 - 5.20 g/dL    Globulin 2.9 gm/dL    A/G Ratio 1.4 g/dL    Total Bilirubin 0.6 0.1 - 1.2 mg/dL    Alkaline Phosphatase 110 39 - 117 U/L    AST (SGOT) 14 1 - 32 U/L    ALT (SGPT) 13 1 - 33 U/L   Comprehensive Thyroglobulin   Result Value Ref Range    Thyroglobulin Ab 6.2 (H) IU/mL    Thyroglobulin Comment ng/mL    Thyroglobulin (TG-MARNIE) 15 ng/mL   C-Peptide   Result Value Ref Range    C-Peptide 0.4 (L) 1.1 - 4.4 ng/mL   Hemoglobin A1c   Result Value Ref Range    Hemoglobin A1C 7.41 (H) 4.80 - 5.60 %   Lipid Panel   Result Value Ref Range    Total Cholesterol 187 0 - 200 mg/dL    Triglycerides 154 (H) 0 - 150 mg/dL    HDL Cholesterol 42 40 - 60 mg/dL    VLDL Cholesterol 30.8 5 - 40 mg/dL    LDL Cholesterol  114 (H) 0 - 100 mg/dL   T3, Free   Result Value Ref Range    T3, Free 2.5 2.0 - 4.4 pg/mL   T4, Free   Result Value Ref Range    Free T4 1.37 0.93 - 1.70 ng/dL   Thyroid Panel With TSH   Result Value Ref Range    TSH 1.800 0.450 - 4.500 uIU/mL    T4, Total 7.0 4.5 - 12.0 ug/dL    T3 Uptake 31 24 - 39 %    Free Thyroxine Index 2.2 1.2 - 4.9   Vitamin D 25 Hydroxy   Result Value Ref Range    25 Hydroxy, Vitamin D 39.9 30.0 - 100.0 ng/mL   MicroAlbumin, Urine, Random   Result Value Ref Range    Microalbumin, Urine 5.7 Not Estab. ug/mL   Cardiovascular Risk Assessment   Result Value Ref Range    Interpretation Note    Diabetes Patient Education   Result Value Ref Range    PDF Image Not applicable          Assessment/Plan   Problems Addressed this Visit        Cardiovascular and Mediastinum    HLD (hyperlipidemia)    Essential hypertension       Endocrine    Type 2 diabetes mellitus, uncontrolled -  Primary    Hypothyroidism, acquired       Other    Renal insufficiency        In summary, patient was seen and examined.  She will continue all her current medications as prescribed.  She is varying her doses of insulin and states if she takes more insulin at lunch time she will take less at dinner.  Her blood sugars do      fluctuate.  It is difficult trying to determine what her high blood sugars are result of an what is causing her low blood sugars.  Patient is a poor historian and is varying the amount of insulin that she takes.  She is needing a new prodigy meter and is visually impaired.  A prescription will be sent again to [facility] we will also call and follow up why she did not receive one when last ordered in September. She will follow up with dr antoine her next visit. Her a1c has improved. She has been cautioned to monitor blood sugars closely.  Her daughter states also that she does not feel that she eats well enough and will consider tomatoes a meal.  Patient has been advised that she needs to eat more carbohydrates with her meals

## 2018-01-03 DIAGNOSIS — E03.9 HYPOTHYROIDISM, ACQUIRED: ICD-10-CM

## 2018-01-04 DIAGNOSIS — E03.9 HYPOTHYROIDISM, ACQUIRED: ICD-10-CM

## 2018-01-04 DIAGNOSIS — I10 ESSENTIAL HYPERTENSION: ICD-10-CM

## 2018-01-04 RX ORDER — LEVOTHYROXINE SODIUM 88 UG/1
88 TABLET ORAL DAILY
Qty: 90 TABLET | Refills: 1 | Status: SHIPPED | OUTPATIENT
Start: 2018-01-04 | End: 2018-01-04 | Stop reason: SDUPTHER

## 2018-01-04 RX ORDER — LOSARTAN POTASSIUM AND HYDROCHLOROTHIAZIDE 25; 100 MG/1; MG/1
1 TABLET ORAL DAILY
Qty: 90 TABLET | Refills: 1 | Status: SHIPPED | OUTPATIENT
Start: 2018-01-04 | End: 2018-01-04 | Stop reason: SDUPTHER

## 2018-01-04 RX ORDER — LOSARTAN POTASSIUM AND HYDROCHLOROTHIAZIDE 25; 100 MG/1; MG/1
1 TABLET ORAL DAILY
Qty: 30 TABLET | Refills: 0 | Status: SHIPPED | OUTPATIENT
Start: 2018-01-04 | End: 2018-01-04 | Stop reason: SDUPTHER

## 2018-01-04 RX ORDER — LOSARTAN POTASSIUM AND HYDROCHLOROTHIAZIDE 25; 100 MG/1; MG/1
1 TABLET ORAL DAILY
Qty: 30 TABLET | Refills: 0 | Status: SHIPPED | OUTPATIENT
Start: 2018-01-04 | End: 2018-02-04 | Stop reason: SDUPTHER

## 2018-01-04 RX ORDER — LEVOTHYROXINE SODIUM 88 UG/1
88 TABLET ORAL DAILY
Qty: 30 TABLET | Refills: 0 | Status: SHIPPED | OUTPATIENT
Start: 2018-01-04 | End: 2018-01-31 | Stop reason: SDUPTHER

## 2018-01-04 RX ORDER — LEVOTHYROXINE SODIUM 88 UG/1
TABLET ORAL
Qty: 90 TABLET | Refills: 0 | OUTPATIENT
Start: 2018-01-04

## 2018-01-04 RX ORDER — LEVOTHYROXINE SODIUM 88 UG/1
88 TABLET ORAL DAILY
Qty: 30 TABLET | Refills: 0 | Status: SHIPPED | OUTPATIENT
Start: 2018-01-04 | End: 2018-01-04 | Stop reason: SDUPTHER

## 2018-01-17 ENCOUNTER — TELEPHONE (OUTPATIENT)
Dept: FAMILY MEDICINE CLINIC | Facility: CLINIC | Age: 83
End: 2018-01-17

## 2018-01-17 NOTE — TELEPHONE ENCOUNTER
Pt is needing a letter stating that she is legal blind so she can take it to the post office. The post office has to have a letter to deliver her mail to her door.

## 2018-01-24 ENCOUNTER — OFFICE VISIT (OUTPATIENT)
Dept: FAMILY MEDICINE CLINIC | Facility: CLINIC | Age: 83
End: 2018-01-24

## 2018-01-24 VITALS
HEIGHT: 61 IN | HEART RATE: 72 BPM | WEIGHT: 185 LBS | RESPIRATION RATE: 16 BRPM | OXYGEN SATURATION: 100 % | BODY MASS INDEX: 34.93 KG/M2 | DIASTOLIC BLOOD PRESSURE: 80 MMHG | TEMPERATURE: 97.6 F | SYSTOLIC BLOOD PRESSURE: 150 MMHG

## 2018-01-24 DIAGNOSIS — K21.9 GASTROESOPHAGEAL REFLUX DISEASE, ESOPHAGITIS PRESENCE NOT SPECIFIED: ICD-10-CM

## 2018-01-24 DIAGNOSIS — M54.41 RIGHT-SIDED LOW BACK PAIN WITH RIGHT-SIDED SCIATICA, UNSPECIFIED CHRONICITY: Primary | ICD-10-CM

## 2018-01-24 DIAGNOSIS — R20.0 LEFT LEG NUMBNESS: ICD-10-CM

## 2018-01-24 DIAGNOSIS — IMO0002 UNCONTROLLED TYPE 2 DIABETES MELLITUS WITH COMPLICATION, WITH LONG-TERM CURRENT USE OF INSULIN: ICD-10-CM

## 2018-01-24 DIAGNOSIS — H54.8 LEGAL BLINDNESS: ICD-10-CM

## 2018-01-24 DIAGNOSIS — I10 ESSENTIAL HYPERTENSION: ICD-10-CM

## 2018-01-24 PROCEDURE — 99213 OFFICE O/P EST LOW 20 MIN: CPT | Performed by: FAMILY MEDICINE

## 2018-01-24 RX ORDER — LEVOTHYROXINE SODIUM 88 UG/1
88 TABLET ORAL DAILY
Qty: 90 TABLET | Refills: 1 | Status: CANCELLED | OUTPATIENT
Start: 2018-01-24

## 2018-01-24 RX ORDER — LOSARTAN POTASSIUM AND HYDROCHLOROTHIAZIDE 25; 100 MG/1; MG/1
1 TABLET ORAL DAILY
Qty: 90 TABLET | Refills: 1 | Status: CANCELLED | OUTPATIENT
Start: 2018-01-24

## 2018-01-24 NOTE — PROGRESS NOTES
Subjective   Roland Russell is a 83 y.o. female.     History of Present Illness   Chief Complaint:   Chief Complaint   Patient presents with   • Sciatica   • numbness of left leg   • Back Pain       Roland Russell 83 y.o. female who presents today for Medical Management of the below listed issues She complains of back pain and sciatic pain of the right side. She also stated that she had an episode were she was unable to feel her left leg.   Area left femur  And lasted 1 day. I reviewed her lab results.   Pain from arthritis right hip  I reviewed labs.  she has a problem list of   Patient Active Problem List   Diagnosis   • Legal blindness   • Type 2 diabetes mellitus, uncontrolled   • GERD (gastroesophageal reflux disease)   • HLD (hyperlipidemia)   • Hypothyroidism, acquired   • Osteoporosis   • Osteoarthritis, multiple sites   • Essential hypertension   • Senile osteoporosis   • Renal insufficiency   .  Since the last visit, she has overall felt well.  she has been compliant with   Current Outpatient Prescriptions:   •  Blood Glucose Monitoring Suppl (PRODIGY AUTOCODE BLOOD GLUCOSE) w/Device kit, Use as directed  Indications: Blindness, Disp: 1 each, Rfl: 1  •  CALCIUM PO, Take 1,000 Units by mouth Daily., Disp: , Rfl:   •  Calcium-Vitamin D-Vitamin K (CALCIUM SOFT CHEWS PO), Take 1,000 mg by mouth daily., Disp: , Rfl:   •  denosumab (PROLIA) 60 MG/ML solution syringe, Inject 60 mg under the skin., Disp: , Rfl:   •  Insulin Lispro Prot & Lispro (HUMALOG MIX 50/50 KWIKPEN) (50-50) 100 UNIT/ML suspension pen-injector, Inject 40 ac breakfast 20 ac lunch and 40 ac dinner, Disp: 90 mL, Rfl: 1  •  Insulin NPH, Human,, Isophane, 100 UNIT/ML suspension pen-injector, Inject 25 Units under the skin Every Night., Disp: 30 mL, Rfl: 1  •  Insulin Pen Needle (BD PEN NEEDLE SYED U/F) 32G X 4 MM misc, Use to inject insulin 4 times daily, Disp: 400 each, Rfl: 1  •  levothyroxine (SYNTHROID, LEVOTHROID) 88 MCG tablet, Take 1 tablet  "by mouth Daily., Disp: 30 tablet, Rfl: 0  •  losartan-hydrochlorothiazide (HYZAAR) 100-25 MG per tablet, Take 1 tablet by mouth Daily., Disp: 30 tablet, Rfl: 0  •  Vitamin D, Cholecalciferol, 400 UNITS capsule, Take 1,000 mg by mouth., Disp: , Rfl: .  she denies medication side effects.    All of the chronic condition(s) listed above are stable w/o issues.    /80  Pulse 72  Temp 97.6 °F (36.4 °C) (Oral)   Resp 16  Ht 154.9 cm (61\")  Wt 83.9 kg (185 lb)  SpO2 100%  BMI 34.96 kg/m2    Results for orders placed or performed in visit on 12/19/17   Comprehensive Metabolic Panel   Result Value Ref Range    Glucose 114 (H) 65 - 99 mg/dL    BUN 26 (H) 8 - 23 mg/dL    Creatinine 1.03 (H) 0.57 - 1.00 mg/dL    eGFR Non African Am 51 (L) >60 mL/min/1.73    eGFR African Am 62 >60 mL/min/1.73    BUN/Creatinine Ratio 25.2 (H) 7.0 - 25.0    Sodium 143 136 - 145 mmol/L    Potassium 3.9 3.5 - 5.2 mmol/L    Chloride 101 98 - 107 mmol/L    Total CO2 30.3 (H) 22.0 - 29.0 mmol/L    Calcium 9.4 8.6 - 10.5 mg/dL    Total Protein 7.0 6.0 - 8.5 g/dL    Albumin 4.10 3.50 - 5.20 g/dL    Globulin 2.9 gm/dL    A/G Ratio 1.4 g/dL    Total Bilirubin 0.6 0.1 - 1.2 mg/dL    Alkaline Phosphatase 110 39 - 117 U/L    AST (SGOT) 14 1 - 32 U/L    ALT (SGPT) 13 1 - 33 U/L   Comprehensive Thyroglobulin   Result Value Ref Range    Thyroglobulin Ab 6.2 (H) IU/mL    Thyroglobulin Comment ng/mL    Thyroglobulin (TG-MARNIE) 15 ng/mL   C-Peptide   Result Value Ref Range    C-Peptide 0.4 (L) 1.1 - 4.4 ng/mL   Hemoglobin A1c   Result Value Ref Range    Hemoglobin A1C 7.41 (H) 4.80 - 5.60 %   Lipid Panel   Result Value Ref Range    Total Cholesterol 187 0 - 200 mg/dL    Triglycerides 154 (H) 0 - 150 mg/dL    HDL Cholesterol 42 40 - 60 mg/dL    VLDL Cholesterol 30.8 5 - 40 mg/dL    LDL Cholesterol  114 (H) 0 - 100 mg/dL   T3, Free   Result Value Ref Range    T3, Free 2.5 2.0 - 4.4 pg/mL   T4, Free   Result Value Ref Range    Free T4 1.37 0.93 - 1.70 ng/dL "   Thyroid Panel With TSH   Result Value Ref Range    TSH 1.800 0.450 - 4.500 uIU/mL    T4, Total 7.0 4.5 - 12.0 ug/dL    T3 Uptake 31 24 - 39 %    Free Thyroxine Index 2.2 1.2 - 4.9   Vitamin D 25 Hydroxy   Result Value Ref Range    25 Hydroxy, Vitamin D 39.9 30.0 - 100.0 ng/mL   MicroAlbumin, Urine, Random   Result Value Ref Range    Microalbumin, Urine 5.7 Not Estab. ug/mL   Cardiovascular Risk Assessment   Result Value Ref Range    Interpretation Note    Diabetes Patient Education   Result Value Ref Range    PDF Image Not applicable            The following portions of the patient's history were reviewed and updated as appropriate: allergies, current medications, past family history, past medical history, past social history, past surgical history and problem list.    Review of Systems   Constitutional: Negative for activity change, appetite change, chills, fatigue, fever and unexpected weight change.   HENT: Negative for congestion, ear pain, hearing loss, nosebleeds, rhinorrhea and sore throat.    Eyes: Positive for visual disturbance. Negative for pain and redness.        Blind   Respiratory: Negative for cough, shortness of breath and wheezing.    Cardiovascular: Negative for chest pain, palpitations and leg swelling.   Gastrointestinal: Positive for diarrhea. Negative for abdominal pain, blood in stool, constipation, nausea and vomiting.   Endocrine: Negative for cold intolerance and heat intolerance.   Genitourinary: Negative for difficulty urinating, dysuria, frequency, hematuria, pelvic pain, urgency and vaginal discharge.   Musculoskeletal: Negative for arthralgias, back pain and joint swelling.   Skin: Negative for rash and wound.   Neurological: Negative for dizziness, weakness, numbness and headaches.   Hematological: Does not bruise/bleed easily.   Psychiatric/Behavioral: Negative for dysphoric mood, sleep disturbance and suicidal ideas. The patient is not nervous/anxious.        Objective   Physical  Exam   Eyes:   Legally blind   Neck:   arthritis   Cardiovascular: Normal rate and regular rhythm.    Pulmonary/Chest: Effort normal and breath sounds normal.   Abdominal: Soft.   Musculoskeletal:   arthritis   Neurological: She is alert.   Psychiatric: She has a normal mood and affect. Her behavior is normal.       Assessment/Plan   Roland was seen today for sciatica, numbness of left leg and back pain.    Diagnoses and all orders for this visit:    Right-sided low back pain with right-sided sciatica, unspecified chronicity    Left leg numbness    Essential hypertension    Gastroesophageal reflux disease, esophagitis presence not specified    Legal blindness    Uncontrolled type 2 diabetes mellitus with complication, with long-term current use of insulin    Other orders  -     Cancel: levothyroxine (SYNTHROID, LEVOTHROID) 88 MCG tablet; Take 1 tablet by mouth Daily.  -     Cancel: losartan-hydrochlorothiazide (HYZAAR) 100-25 MG per tablet; Take 1 tablet by mouth Daily.

## 2018-01-31 DIAGNOSIS — E03.9 HYPOTHYROIDISM, ACQUIRED: ICD-10-CM

## 2018-01-31 RX ORDER — LEVOTHYROXINE SODIUM 88 UG/1
TABLET ORAL
Qty: 30 TABLET | Refills: 2 | Status: SHIPPED | OUTPATIENT
Start: 2018-01-31 | End: 2018-05-07 | Stop reason: SDUPTHER

## 2018-02-04 DIAGNOSIS — I10 ESSENTIAL HYPERTENSION: ICD-10-CM

## 2018-02-05 RX ORDER — LOSARTAN POTASSIUM AND HYDROCHLOROTHIAZIDE 25; 100 MG/1; MG/1
TABLET ORAL
Qty: 30 TABLET | Refills: 2 | Status: SHIPPED | OUTPATIENT
Start: 2018-02-05 | End: 2018-05-07 | Stop reason: SDUPTHER

## 2018-04-11 DIAGNOSIS — IMO0002 UNCONTROLLED TYPE 2 DIABETES MELLITUS WITH COMPLICATION, WITH LONG-TERM CURRENT USE OF INSULIN: ICD-10-CM

## 2018-04-11 DIAGNOSIS — E78.2 MIXED HYPERLIPIDEMIA: ICD-10-CM

## 2018-04-11 DIAGNOSIS — E55.9 VITAMIN D DEFICIENCY: ICD-10-CM

## 2018-04-11 DIAGNOSIS — E03.9 HYPOTHYROIDISM, ACQUIRED: Primary | ICD-10-CM

## 2018-04-23 ENCOUNTER — LAB (OUTPATIENT)
Dept: ENDOCRINOLOGY | Age: 83
End: 2018-04-23

## 2018-04-23 DIAGNOSIS — E03.9 HYPOTHYROIDISM, ACQUIRED: ICD-10-CM

## 2018-04-23 DIAGNOSIS — E78.2 MIXED HYPERLIPIDEMIA: ICD-10-CM

## 2018-04-23 DIAGNOSIS — IMO0002 UNCONTROLLED TYPE 2 DIABETES MELLITUS WITH COMPLICATION, WITH LONG-TERM CURRENT USE OF INSULIN: ICD-10-CM

## 2018-04-23 DIAGNOSIS — E55.9 VITAMIN D DEFICIENCY: ICD-10-CM

## 2018-04-30 LAB
25(OH)D3+25(OH)D2 SERPL-MCNC: 54.1 NG/ML (ref 30–100)
ALBUMIN SERPL-MCNC: 4 G/DL (ref 3.5–5.2)
ALBUMIN/GLOB SERPL: 1.5 G/DL
ALP SERPL-CCNC: 99 U/L (ref 39–117)
ALT SERPL-CCNC: 13 U/L (ref 1–33)
AST SERPL-CCNC: 15 U/L (ref 1–32)
BASOPHILS # BLD AUTO: 0.04 10*3/MM3 (ref 0–0.2)
BASOPHILS NFR BLD AUTO: 0.5 % (ref 0–1.5)
BILIRUB SERPL-MCNC: 0.7 MG/DL (ref 0.1–1.2)
BUN SERPL-MCNC: 21 MG/DL (ref 8–23)
BUN/CREAT SERPL: 22.8 (ref 7–25)
C PEPTIDE SERPL-MCNC: 0.9 NG/ML (ref 1.1–4.4)
CALCIUM SERPL-MCNC: 9.9 MG/DL (ref 8.6–10.5)
CHLORIDE SERPL-SCNC: 99 MMOL/L (ref 98–107)
CHOLEST SERPL-MCNC: 192 MG/DL (ref 0–200)
CO2 SERPL-SCNC: 28.5 MMOL/L (ref 22–29)
CREAT SERPL-MCNC: 0.92 MG/DL (ref 0.57–1)
EOSINOPHIL # BLD AUTO: 0.18 10*3/MM3 (ref 0–0.7)
EOSINOPHIL NFR BLD AUTO: 2.1 % (ref 0.3–6.2)
ERYTHROCYTE [DISTWIDTH] IN BLOOD BY AUTOMATED COUNT: 14.3 % (ref 11.7–13)
GFR SERPLBLD CREATININE-BSD FMLA CKD-EPI: 58 ML/MIN/1.73
GFR SERPLBLD CREATININE-BSD FMLA CKD-EPI: 71 ML/MIN/1.73
GLOBULIN SER CALC-MCNC: 2.7 GM/DL
GLUCOSE SERPL-MCNC: 229 MG/DL (ref 65–99)
HBA1C MFR BLD: 7.9 % (ref 4.8–5.6)
HCT VFR BLD AUTO: 45.5 % (ref 35.6–45.5)
HDLC SERPL-MCNC: 39 MG/DL (ref 40–60)
HGB BLD-MCNC: 14.6 G/DL (ref 11.9–15.5)
IMM GRANULOCYTES # BLD: 0.01 10*3/MM3 (ref 0–0.03)
IMM GRANULOCYTES NFR BLD: 0.1 % (ref 0–0.5)
INTERPRETATION: NORMAL
LDLC SERPL CALC-MCNC: 116 MG/DL (ref 0–100)
LYMPHOCYTES # BLD AUTO: 2.54 10*3/MM3 (ref 0.9–4.8)
LYMPHOCYTES NFR BLD AUTO: 29.6 % (ref 19.6–45.3)
Lab: NORMAL
MCH RBC QN AUTO: 28.9 PG (ref 26.9–32)
MCHC RBC AUTO-ENTMCNC: 32.1 G/DL (ref 32.4–36.3)
MCV RBC AUTO: 89.9 FL (ref 80.5–98.2)
MICROALBUMIN UR-MCNC: 11.8 UG/ML
MONOCYTES # BLD AUTO: 0.53 10*3/MM3 (ref 0.2–1.2)
MONOCYTES NFR BLD AUTO: 6.2 % (ref 5–12)
NEUTROPHILS # BLD AUTO: 5.3 10*3/MM3 (ref 1.9–8.1)
NEUTROPHILS NFR BLD AUTO: 61.6 % (ref 42.7–76)
PLATELET # BLD AUTO: 235 10*3/MM3 (ref 140–500)
POTASSIUM SERPL-SCNC: 4.3 MMOL/L (ref 3.5–5.2)
PROT SERPL-MCNC: 6.7 G/DL (ref 6–8.5)
RBC # BLD AUTO: 5.06 10*6/MM3 (ref 3.9–5.2)
SODIUM SERPL-SCNC: 142 MMOL/L (ref 136–145)
T3FREE SERPL-MCNC: 2.6 PG/ML (ref 2–4.4)
T4 FREE SERPL-MCNC: 1.68 NG/DL (ref 0.93–1.7)
T4 SERPL-MCNC: 9.99 MCG/DL (ref 4.5–11.7)
THYROGLOB AB SERPL-ACNC: 5.4 IU/ML
THYROGLOB SERPL-MCNC: 11 NG/ML
THYROGLOB SERPL-MCNC: ABNORMAL NG/ML
TRIGL SERPL-MCNC: 187 MG/DL (ref 0–150)
TSH SERPL DL<=0.005 MIU/L-ACNC: 0.74 MIU/ML (ref 0.27–4.2)
URATE SERPL-MCNC: 3.9 MG/DL (ref 2.4–5.7)
VLDLC SERPL CALC-MCNC: 37.4 MG/DL (ref 5–40)
WBC # BLD AUTO: 8.59 10*3/MM3 (ref 4.5–10.7)

## 2018-05-07 ENCOUNTER — OFFICE VISIT (OUTPATIENT)
Dept: ENDOCRINOLOGY | Age: 83
End: 2018-05-07

## 2018-05-07 VITALS
SYSTOLIC BLOOD PRESSURE: 136 MMHG | WEIGHT: 180 LBS | HEIGHT: 61 IN | DIASTOLIC BLOOD PRESSURE: 60 MMHG | RESPIRATION RATE: 16 BRPM | BODY MASS INDEX: 33.99 KG/M2

## 2018-05-07 DIAGNOSIS — E03.9 HYPOTHYROIDISM, ACQUIRED: ICD-10-CM

## 2018-05-07 DIAGNOSIS — E78.2 MIXED HYPERLIPIDEMIA: ICD-10-CM

## 2018-05-07 DIAGNOSIS — IMO0002 UNCONTROLLED TYPE 2 DIABETES MELLITUS WITH COMPLICATION, WITH LONG-TERM CURRENT USE OF INSULIN: Primary | ICD-10-CM

## 2018-05-07 DIAGNOSIS — E55.9 VITAMIN D DEFICIENCY: ICD-10-CM

## 2018-05-07 DIAGNOSIS — M81.0 OSTEOPOROSIS, UNSPECIFIED OSTEOPOROSIS TYPE, UNSPECIFIED PATHOLOGICAL FRACTURE PRESENCE: ICD-10-CM

## 2018-05-07 DIAGNOSIS — I10 ESSENTIAL HYPERTENSION: ICD-10-CM

## 2018-05-07 PROCEDURE — 99214 OFFICE O/P EST MOD 30 MIN: CPT | Performed by: INTERNAL MEDICINE

## 2018-05-07 RX ORDER — LEVOTHYROXINE SODIUM 88 UG/1
88 TABLET ORAL DAILY
Qty: 90 TABLET | Refills: 3 | Status: SHIPPED | OUTPATIENT
Start: 2018-05-07 | End: 2019-06-19 | Stop reason: SDUPTHER

## 2018-05-07 RX ORDER — LOSARTAN POTASSIUM AND HYDROCHLOROTHIAZIDE 25; 100 MG/1; MG/1
1 TABLET ORAL DAILY
Qty: 90 TABLET | Refills: 3 | Status: SHIPPED | OUTPATIENT
Start: 2018-05-07 | End: 2019-01-15 | Stop reason: SDUPTHER

## 2018-05-07 NOTE — PROGRESS NOTES
"Subjective   Roland Russell is a 83 y.o. female seen for follow up for DM2, hyperlipidemia, hypothyroidism, osteoporosis, lab review. Patient is checking BG 4 times a day. She had Prolia injection last week. She is having aches and pains in her shoulder and states that her vision is worse.     History of Present Illness is an 83-year-old female known patient with type II diabetes hypertension and dyslipidemia as well as hypothyroidism and vitamin D deficiency with osteoporosis.  Over the course of last 6 months she has had no significant health problems for which to go to the emergency room or hospital.    /60   Resp 16   Ht 154.9 cm (61\")   Wt 81.6 kg (180 lb)   BMI 34.01 kg/m²      Allergies   Allergen Reactions   • Latex    • Propoxyphene Itching       Current Outpatient Prescriptions:   •  Blood Glucose Monitoring Suppl (PRODIGY AUTOCODE BLOOD GLUCOSE) w/Device kit, Use as directed  Indications: Blindness, Disp: 1 each, Rfl: 1  •  CALCIUM PO, Take 1,000 Units by mouth Daily., Disp: , Rfl:   •  Calcium-Vitamin D-Vitamin K (CALCIUM SOFT CHEWS PO), Take 1,000 mg by mouth daily., Disp: , Rfl:   •  denosumab (PROLIA) 60 MG/ML solution syringe, Inject 60 mg under the skin., Disp: , Rfl:   •  Insulin Lispro Prot & Lispro (HUMALOG MIX 50/50 KWIKPEN) (50-50) 100 UNIT/ML suspension pen-injector, Inject 40 ac breakfast 20 ac lunch and 40 ac dinner, Disp: 90 mL, Rfl: 1  •  Insulin NPH, Human,, Isophane, 100 UNIT/ML suspension pen-injector, Inject 25 Units under the skin Every Night., Disp: 30 mL, Rfl: 1  •  Insulin Pen Needle (BD PEN NEEDLE SYED U/F) 32G X 4 MM misc, Use to inject insulin 4 times daily, Disp: 400 each, Rfl: 1  •  levothyroxine (SYNTHROID, LEVOTHROID) 88 MCG tablet, TAKE 1 TABLET BY MOUTH DAILY, Disp: 30 tablet, Rfl: 2  •  losartan-hydrochlorothiazide (HYZAAR) 100-25 MG per tablet, TAKE 1 TABLET BY MOUTH DAILY, Disp: 30 tablet, Rfl: 2  •  Vitamin D, Cholecalciferol, 400 UNITS capsule, Take 1,000 mg " by mouth., Disp: , Rfl:       The following portions of the patient's history were reviewed and updated as appropriate: allergies, current medications, past family history, past medical history, past social history, past surgical history and problem list.    Review of Systems   Constitutional: Negative.    HENT: Negative.    Eyes: Positive for visual disturbance.   Respiratory: Negative.    Cardiovascular: Negative.    Gastrointestinal: Negative.    Endocrine: Negative.    Genitourinary: Negative.    Musculoskeletal: Negative.    Skin: Negative.    Allergic/Immunologic: Negative.    Neurological: Negative.    Hematological: Negative.    Psychiatric/Behavioral: Negative.        Objective   Physical Exam   Constitutional: She is oriented to person, place, and time. She appears well-developed and well-nourished. No distress.   HENT:   Head: Normocephalic and atraumatic.   Right Ear: External ear normal.   Left Ear: External ear normal.   Nose: Nose normal.   Mouth/Throat: Oropharynx is clear and moist. No oropharyngeal exudate.   Eyes: Conjunctivae and EOM are normal. Pupils are equal, round, and reactive to light. Right eye exhibits no discharge. Left eye exhibits no discharge. No scleral icterus.   Neck: Trachea normal, normal range of motion and full passive range of motion without pain. Neck supple. No JVD present. No tracheal tenderness present. Carotid bruit is not present. No tracheal deviation, no edema and no erythema present. No thyroid mass and no thyromegaly present.   Cardiovascular: Normal rate, regular rhythm, normal heart sounds and intact distal pulses.  Exam reveals no gallop and no friction rub.    No murmur heard.  Pulmonary/Chest: Effort normal and breath sounds normal. No stridor. No respiratory distress. She has no wheezes. She has no rales. She exhibits no tenderness.   Abdominal: Soft. Bowel sounds are normal. She exhibits no distension and no mass. There is no tenderness. There is no rebound  and no guarding. No hernia.   Musculoskeletal: Normal range of motion. She exhibits no edema, tenderness or deformity.   Lymphadenopathy:     She has no cervical adenopathy.   Neurological: She is alert and oriented to person, place, and time. She has normal reflexes. She displays normal reflexes. No cranial nerve deficit. She exhibits normal muscle tone. Coordination normal.   Skin: Skin is warm and dry. No rash noted. She is not diaphoretic. No erythema. No pallor.   Psychiatric: She has a normal mood and affect. Her behavior is normal. Judgment and thought content normal.   Nursing note and vitals reviewed.    Results for orders placed or performed in visit on 04/23/18   Comprehensive Metabolic Panel   Result Value Ref Range    Glucose 229 (H) 65 - 99 mg/dL    BUN 21 8 - 23 mg/dL    Creatinine 0.92 0.57 - 1.00 mg/dL    eGFR Non African Am 58 (L) >60 mL/min/1.73    eGFR African Am 71 >60 mL/min/1.73    BUN/Creatinine Ratio 22.8 7.0 - 25.0    Sodium 142 136 - 145 mmol/L    Potassium 4.3 3.5 - 5.2 mmol/L    Chloride 99 98 - 107 mmol/L    Total CO2 28.5 22.0 - 29.0 mmol/L    Calcium 9.9 8.6 - 10.5 mg/dL    Total Protein 6.7 6.0 - 8.5 g/dL    Albumin 4.00 3.50 - 5.20 g/dL    Globulin 2.7 gm/dL    A/G Ratio 1.5 g/dL    Total Bilirubin 0.7 0.1 - 1.2 mg/dL    Alkaline Phosphatase 99 39 - 117 U/L    AST (SGOT) 15 1 - 32 U/L    ALT (SGPT) 13 1 - 33 U/L   Comprehensive Thyroglobulin   Result Value Ref Range    Thyroglobulin Ab 5.4 (H) IU/mL    Thyroglobulin Comment ng/mL    Thyroglobulin (TG-MARNIE) 11 ng/mL   C-Peptide   Result Value Ref Range    C-Peptide 0.9 (L) 1.1 - 4.4 ng/mL   Hemoglobin A1c   Result Value Ref Range    Hemoglobin A1C 7.90 (H) 4.80 - 5.60 %   Lipid Panel   Result Value Ref Range    Total Cholesterol 192 0 - 200 mg/dL    Triglycerides 187 (H) 0 - 150 mg/dL    HDL Cholesterol 39 (L) 40 - 60 mg/dL    VLDL Cholesterol 37.4 5 - 40 mg/dL    LDL Cholesterol  116 (H) 0 - 100 mg/dL   T3, Free   Result Value Ref  Range    T3, Free 2.6 2.0 - 4.4 pg/mL   T4 & TSH ([facility])   Result Value Ref Range    TSH 0.742 0.270 - 4.200 mIU/mL    T4, Total 9.99 4.50 - 11.70 mcg/dL   T4, Free   Result Value Ref Range    Free T4 1.68 0.93 - 1.70 ng/dL   Uric Acid   Result Value Ref Range    Uric Acid 3.9 2.4 - 5.7 mg/dL   Vitamin D 25 Hydroxy   Result Value Ref Range    25 Hydroxy, Vitamin D 54.1 30.0 - 100.0 ng/ml   MicroAlbumin, Urine, Random   Result Value Ref Range    Microalbumin, Urine 11.8 Not Estab. ug/mL   Cardiovascular Risk Assessment   Result Value Ref Range    Interpretation Note    Diabetes Patient Education   Result Value Ref Range    PDF Image Not applicable    CBC & Differential   Result Value Ref Range    WBC 8.59 4.50 - 10.70 10*3/mm3    RBC 5.06 3.90 - 5.20 10*6/mm3    Hemoglobin 14.6 11.9 - 15.5 g/dL    Hematocrit 45.5 35.6 - 45.5 %    MCV 89.9 80.5 - 98.2 fL    MCH 28.9 26.9 - 32.0 pg    MCHC 32.1 (L) 32.4 - 36.3 g/dL    RDW 14.3 (H) 11.7 - 13.0 %    Platelets 235 140 - 500 10*3/mm3    Neutrophil Rel % 61.6 42.7 - 76.0 %    Lymphocyte Rel % 29.6 19.6 - 45.3 %    Monocyte Rel % 6.2 5.0 - 12.0 %    Eosinophil Rel % 2.1 0.3 - 6.2 %    Basophil Rel % 0.5 0.0 - 1.5 %    Neutrophils Absolute 5.30 1.90 - 8.10 10*3/mm3    Lymphocytes Absolute 2.54 0.90 - 4.80 10*3/mm3    Monocytes Absolute 0.53 0.20 - 1.20 10*3/mm3    Eosinophils Absolute 0.18 0.00 - 0.70 10*3/mm3    Basophils Absolute 0.04 0.00 - 0.20 10*3/mm3    Immature Granulocyte Rel % 0.1 0.0 - 0.5 %    Immature Grans Absolute 0.01 0.00 - 0.03 10*3/mm3         Assessment/Plan   Diagnoses and all orders for this visit:    Uncontrolled type 2 diabetes mellitus with complication, with long-term current use of insulin  -     T4 & TSH ([facility]); Future  -     T3, Free; Future  -     T4, Free; Future  -     Uric Acid; Future  -     Vitamin D 25 Hydroxy; Future  -     Comprehensive Metabolic Panel; Future  -     C-Peptide; Future  -     Hemoglobin A1c; Future  -     Lipid Panel;  Future  -     MicroAlbumin, Urine, Random - Urine, Clean Catch; Future    Mixed hyperlipidemia  -     T4 & TSH ([facility]); Future  -     T3, Free; Future  -     T4, Free; Future  -     Uric Acid; Future  -     Vitamin D 25 Hydroxy; Future  -     Comprehensive Metabolic Panel; Future  -     C-Peptide; Future  -     Hemoglobin A1c; Future  -     Lipid Panel; Future  -     MicroAlbumin, Urine, Random - Urine, Clean Catch; Future    Essential hypertension  -     T4 & TSH ([facility]); Future  -     T3, Free; Future  -     T4, Free; Future  -     Uric Acid; Future  -     Vitamin D 25 Hydroxy; Future  -     Comprehensive Metabolic Panel; Future  -     C-Peptide; Future  -     Hemoglobin A1c; Future  -     Lipid Panel; Future  -     MicroAlbumin, Urine, Random - Urine, Clean Catch; Future  -     losartan-hydrochlorothiazide (HYZAAR) 100-25 MG per tablet; Take 1 tablet by mouth Daily.    Hypothyroidism, acquired  -     T4 & TSH ([facility]); Future  -     T3, Free; Future  -     T4, Free; Future  -     Uric Acid; Future  -     Vitamin D 25 Hydroxy; Future  -     Comprehensive Metabolic Panel; Future  -     C-Peptide; Future  -     Hemoglobin A1c; Future  -     Lipid Panel; Future  -     MicroAlbumin, Urine, Random - Urine, Clean Catch; Future  -     levothyroxine (SYNTHROID, LEVOTHROID) 88 MCG tablet; Take 1 tablet by mouth Daily.    Vitamin D deficiency  -     T4 & TSH ([facility]); Future  -     T3, Free; Future  -     T4, Free; Future  -     Uric Acid; Future  -     Vitamin D 25 Hydroxy; Future  -     Comprehensive Metabolic Panel; Future  -     C-Peptide; Future  -     Hemoglobin A1c; Future  -     Lipid Panel; Future  -     MicroAlbumin, Urine, Random - Urine, Clean Catch; Future    Osteoporosis, unspecified osteoporosis type, unspecified pathological fracture presence  -     T4 & TSH ([facility]); Future  -     T3, Free; Future  -     T4, Free; Future  -     Uric Acid; Future  -     Vitamin D 25 Hydroxy; Future  -      Comprehensive Metabolic Panel; Future  -     C-Peptide; Future  -     Hemoglobin A1c; Future  -     Lipid Panel; Future  -     MicroAlbumin, Urine, Random - Urine, Clean Catch; Future    Other orders  -     Insulin NPH, Human,, Isophane, 100 UNIT/ML suspension pen-injector; Inject 25 Units under the skin Every Night.  -     Insulin Lispro Prot & Lispro (HUMALOG MIX 50/50 KWIKPEN) (50-50) 100 UNIT/ML suspension pen-injector; Inject 40 ac breakfast 20 ac lunch and 40 ac dinner  -     denosumab (PROLIA) 60 MG/ML solution syringe; Inject 1 mL under the skin 1 (One) Time for 1 dose.               In summary I saw and examined this 83-year-old female for above-mentioned problems.  I reviewed her laboratory evaluation of 04/23/2018 and provided herand her daughter who was present during this office visit with a hard copy of it.  Overall she is clinically and metabolically stable and therefore we will go ahead and continue all current prescriptions.  She will see Ms. Anel Dubon in 6 months or sooner if needed with laboratory evaluation prior to each office visit.

## 2018-08-01 ENCOUNTER — OFFICE VISIT (OUTPATIENT)
Dept: FAMILY MEDICINE CLINIC | Facility: CLINIC | Age: 83
End: 2018-08-01

## 2018-08-01 VITALS
HEART RATE: 70 BPM | WEIGHT: 178 LBS | BODY MASS INDEX: 33.61 KG/M2 | TEMPERATURE: 98 F | HEIGHT: 61 IN | OXYGEN SATURATION: 92 % | DIASTOLIC BLOOD PRESSURE: 60 MMHG | SYSTOLIC BLOOD PRESSURE: 126 MMHG

## 2018-08-01 DIAGNOSIS — I10 ESSENTIAL HYPERTENSION: Primary | ICD-10-CM

## 2018-08-01 DIAGNOSIS — IMO0002 UNCONTROLLED TYPE 2 DIABETES MELLITUS WITH COMPLICATION, WITH LONG-TERM CURRENT USE OF INSULIN: ICD-10-CM

## 2018-08-01 DIAGNOSIS — E78.2 MIXED HYPERLIPIDEMIA: ICD-10-CM

## 2018-08-01 DIAGNOSIS — H54.8 LEGAL BLINDNESS: ICD-10-CM

## 2018-08-01 PROCEDURE — 99214 OFFICE O/P EST MOD 30 MIN: CPT | Performed by: FAMILY MEDICINE

## 2018-08-01 NOTE — PROGRESS NOTES
Subjective   Chief Complaint:   Chief Complaint   Patient presents with   • Hypothyroidism   • Hypertension   • Generalized Body Aches   • Nausea         History of Present Illness             Roland Russell 83 y.o. female who presents today follow up on hypertension, cholesterol and complains of body aches and nausea.  Reviewed labs   Discussed   prolia shot and she got  Aches with prolia shot and I told her to not get another prolia shot until she talks with leonel patel. I reviewed labs. See meds  bp 126/60       she has a problem list of   Patient Active Problem List   Diagnosis   • Legal blindness   • Type 2 diabetes mellitus, uncontrolled (CMS/[facility])   • GERD (gastroesophageal reflux disease)   • HLD (hyperlipidemia)   • Hypothyroidism, acquired   • Osteoporosis   • Osteoarthritis, multiple sites   • Essential hypertension   • Senile osteoporosis   • Renal insufficiency   .  Since the last visit, she has overall felt well.  she has been compliant with   Current Outpatient Prescriptions:   •  Blood Glucose Monitoring Suppl (PRODIGY AUTOCODE BLOOD GLUCOSE) w/Device kit, Use as directed  Indications: Blindness, Disp: 1 each, Rfl: 1  •  CALCIUM PO, Take 1,000 Units by mouth Daily., Disp: , Rfl:   •  Calcium-Vitamin D-Vitamin K (CALCIUM SOFT CHEWS PO), Take 1,000 mg by mouth daily., Disp: , Rfl:   •  Insulin Lispro Prot & Lispro (HUMALOG MIX 50/50 KWIKPEN) (50-50) 100 UNIT/ML suspension pen-injector, Inject 40 ac breakfast 20 ac lunch and 40 ac dinner, Disp: 90 mL, Rfl: 1  •  Insulin NPH, Human,, Isophane, 100 UNIT/ML suspension pen-injector, Inject 25 Units under the skin Every Night., Disp: 30 mL, Rfl: 1  •  Insulin Pen Needle (BD PEN NEEDLE SYED U/F) 32G X 4 MM misc, Use to inject insulin 4 times daily, Disp: 400 each, Rfl: 1  •  levothyroxine (SYNTHROID, LEVOTHROID) 88 MCG tablet, Take 1 tablet by mouth Daily., Disp: 90 tablet, Rfl: 3  •  losartan-hydrochlorothiazide (HYZAAR) 100-25 MG per tablet, Take 1 tablet  "by mouth Daily., Disp: 90 tablet, Rfl: 3  •  Vitamin D, Cholecalciferol, 400 UNITS capsule, Take 1,000 mg by mouth., Disp: , Rfl: .  she denies medication side effects.    All of the chronic condition(s) listed above are stable w/o issues.    /60 (BP Location: Left arm, Patient Position: Sitting, Cuff Size: Adult)   Pulse 70   Temp 98 °F (36.7 °C) (Oral)   Ht 154.9 cm (61\")   Wt 80.7 kg (178 lb)   SpO2 92%   BMI 33.63 kg/m²     Results for orders placed or performed in visit on 04/23/18   Comprehensive Metabolic Panel   Result Value Ref Range    Glucose 229 (H) 65 - 99 mg/dL    BUN 21 8 - 23 mg/dL    Creatinine 0.92 0.57 - 1.00 mg/dL    eGFR Non African Am 58 (L) >60 mL/min/1.73    eGFR African Am 71 >60 mL/min/1.73    BUN/Creatinine Ratio 22.8 7.0 - 25.0    Sodium 142 136 - 145 mmol/L    Potassium 4.3 3.5 - 5.2 mmol/L    Chloride 99 98 - 107 mmol/L    Total CO2 28.5 22.0 - 29.0 mmol/L    Calcium 9.9 8.6 - 10.5 mg/dL    Total Protein 6.7 6.0 - 8.5 g/dL    Albumin 4.00 3.50 - 5.20 g/dL    Globulin 2.7 gm/dL    A/G Ratio 1.5 g/dL    Total Bilirubin 0.7 0.1 - 1.2 mg/dL    Alkaline Phosphatase 99 39 - 117 U/L    AST (SGOT) 15 1 - 32 U/L    ALT (SGPT) 13 1 - 33 U/L   Comprehensive Thyroglobulin   Result Value Ref Range    Thyroglobulin Ab 5.4 (H) IU/mL    Thyroglobulin Comment ng/mL    Thyroglobulin (TG-MARNIE) 11 ng/mL   C-Peptide   Result Value Ref Range    C-Peptide 0.9 (L) 1.1 - 4.4 ng/mL   Hemoglobin A1c   Result Value Ref Range    Hemoglobin A1C 7.90 (H) 4.80 - 5.60 %   Lipid Panel   Result Value Ref Range    Total Cholesterol 192 0 - 200 mg/dL    Triglycerides 187 (H) 0 - 150 mg/dL    HDL Cholesterol 39 (L) 40 - 60 mg/dL    VLDL Cholesterol 37.4 5 - 40 mg/dL    LDL Cholesterol  116 (H) 0 - 100 mg/dL   T3, Free   Result Value Ref Range    T3, Free 2.6 2.0 - 4.4 pg/mL   T4 & TSH ([facility])   Result Value Ref Range    TSH 0.742 0.270 - 4.200 mIU/mL    T4, Total 9.99 4.50 - 11.70 mcg/dL   T4, Free   Result " Value Ref Range    Free T4 1.68 0.93 - 1.70 ng/dL   Uric Acid   Result Value Ref Range    Uric Acid 3.9 2.4 - 5.7 mg/dL   Vitamin D 25 Hydroxy   Result Value Ref Range    25 Hydroxy, Vitamin D 54.1 30.0 - 100.0 ng/ml   MicroAlbumin, Urine, Random   Result Value Ref Range    Microalbumin, Urine 11.8 Not Estab. ug/mL   Cardiovascular Risk Assessment   Result Value Ref Range    Interpretation Note    Diabetes Patient Education   Result Value Ref Range    PDF Image Not applicable    CBC & Differential   Result Value Ref Range    WBC 8.59 4.50 - 10.70 10*3/mm3    RBC 5.06 3.90 - 5.20 10*6/mm3    Hemoglobin 14.6 11.9 - 15.5 g/dL    Hematocrit 45.5 35.6 - 45.5 %    MCV 89.9 80.5 - 98.2 fL    MCH 28.9 26.9 - 32.0 pg    MCHC 32.1 (L) 32.4 - 36.3 g/dL    RDW 14.3 (H) 11.7 - 13.0 %    Platelets 235 140 - 500 10*3/mm3    Neutrophil Rel % 61.6 42.7 - 76.0 %    Lymphocyte Rel % 29.6 19.6 - 45.3 %    Monocyte Rel % 6.2 5.0 - 12.0 %    Eosinophil Rel % 2.1 0.3 - 6.2 %    Basophil Rel % 0.5 0.0 - 1.5 %    Neutrophils Absolute 5.30 1.90 - 8.10 10*3/mm3    Lymphocytes Absolute 2.54 0.90 - 4.80 10*3/mm3    Monocytes Absolute 0.53 0.20 - 1.20 10*3/mm3    Eosinophils Absolute 0.18 0.00 - 0.70 10*3/mm3    Basophils Absolute 0.04 0.00 - 0.20 10*3/mm3    Immature Granulocyte Rel % 0.1 0.0 - 0.5 %    Immature Grans Absolute 0.01 0.00 - 0.03 10*3/mm3             The following portions of the patient's history were reviewed and updated as appropriate: allergies, current medications, past family history, past medical history, past social history, past surgical history and problem list.    Review of Systems   Constitutional: Negative for fatigue and unexpected weight change.   HENT: Negative for sinus pain.    Eyes:        Blindness both eyes   Respiratory: Negative for wheezing.    Cardiovascular: Negative for chest pain.   Gastrointestinal: Negative for abdominal pain.   Genitourinary: Negative for difficulty urinating.   Neurological:  Negative for headaches.   Psychiatric/Behavioral: Negative for confusion.       Objective   Physical Exam   Constitutional: She is oriented to person, place, and time. She appears well-developed and well-nourished.   HENT:   Right Ear: External ear normal.   Left Ear: External ear normal.   Eyes: Pupils are equal, round, and reactive to light. EOM are normal.   Legally blind   Neck: Normal range of motion.   Cardiovascular: Normal rate and regular rhythm.    No murmur heard.  Pulmonary/Chest: Effort normal and breath sounds normal.   Abdominal: Soft. Bowel sounds are normal.   Musculoskeletal: Normal range of motion.   arthritis   Lymphadenopathy:     She has no cervical adenopathy.   Neurological: She is alert and oriented to person, place, and time.   Skin: No rash noted.   Psychiatric: She has a normal mood and affect. Her behavior is normal.   Nursing note and vitals reviewed.      Assessment/Plan   There are no diagnoses linked to this encounter.

## 2018-08-01 NOTE — PATIENT INSTRUCTIONS
Exercise 30 minutes most days of the week  Sleep 6-8 hours each night if possible  Low fat, low cholesterol diet   we discussed prescribed medications and how to take them   make sure you get results of any labs/studies ordered today  Low glycemic index diet   Do not do prolia until discuss with leonel patel

## 2018-09-17 ENCOUNTER — TELEPHONE (OUTPATIENT)
Dept: ENDOCRINOLOGY | Age: 83
End: 2018-09-17

## 2018-09-17 RX ORDER — LANCETS 26 GAUGE
EACH MISCELLANEOUS
Qty: 400 EACH | Refills: 0 | Status: SHIPPED | OUTPATIENT
Start: 2018-09-17 | End: 2019-03-28 | Stop reason: SDUPTHER

## 2018-09-17 NOTE — TELEPHONE ENCOUNTER
----- Message from Mireya Curranan sent at 9/17/2018  9:31 AM EDT -----  Contact: PATIENT   PATIENT   REQUEST TO REFILL A MEDICATION:    MEDICATION:  TEST STRIP  AND LANCETS FOR PRODIGY METER ;   Insulin Pen Needle (BD PEN NEEDLE SYED U/F) 32G X 4 MM misc   MESSAGE:  PATIENT HAS CALLED IN REGARDS TO GETTING THE THREE ABOVE MEDICATION REFILLED.  PATIENT IS CURRENTLY  TESTING 4 PLUS TIME A DAY. PATIENT IS CURRENTLY OUT OF TEST STRIP AND NEEDLES.     PLEASE SEND THE TEST STRIP AND LANCETS TO THE FOLLOWING PHARMACY   [facility] - [place], [place] - [address]  - [phone] [facility] [phone] FX    PLEASE SEND THE PEN NEEDLES TO THE FOLLOWING PHARMACY  [facility] - [place], [place] - [address] AT [address] [facility] - [phone] [facility] [phone] FX    ANY QUESTIONS PLEASE CALL THE PATIENT DIRECTLY.    PHONE NUMBER: [phone]            Refills have been sent into [facility]

## 2018-10-12 DIAGNOSIS — E03.9 HYPOTHYROIDISM, ACQUIRED: ICD-10-CM

## 2018-10-12 DIAGNOSIS — E78.2 MIXED HYPERLIPIDEMIA: ICD-10-CM

## 2018-10-12 DIAGNOSIS — E11.649 UNCONTROLLED TYPE 2 DIABETES MELLITUS WITH HYPOGLYCEMIA WITHOUT COMA (HCC): Primary | ICD-10-CM

## 2018-10-12 DIAGNOSIS — E55.9 VITAMIN D DEFICIENCY: ICD-10-CM

## 2018-10-15 RX ORDER — INSULIN LISPRO 100 [IU]/ML
INJECTION, SUSPENSION SUBCUTANEOUS
Qty: 90 ML | Refills: 1 | Status: SHIPPED | OUTPATIENT
Start: 2018-10-15 | End: 2018-11-07 | Stop reason: SDUPTHER

## 2018-10-15 RX ORDER — INSULIN HUMAN 100 [IU]/ML
INJECTION, SUSPENSION SUBCUTANEOUS
Qty: 30 ML | Refills: 1 | Status: SHIPPED | OUTPATIENT
Start: 2018-10-15 | End: 2018-11-07 | Stop reason: SDUPTHER

## 2018-10-24 ENCOUNTER — LAB (OUTPATIENT)
Dept: ENDOCRINOLOGY | Age: 83
End: 2018-10-24

## 2018-10-24 DIAGNOSIS — E78.2 MIXED HYPERLIPIDEMIA: ICD-10-CM

## 2018-10-24 DIAGNOSIS — E03.9 HYPOTHYROIDISM, ACQUIRED: ICD-10-CM

## 2018-10-24 DIAGNOSIS — E11.649 UNCONTROLLED TYPE 2 DIABETES MELLITUS WITH HYPOGLYCEMIA WITHOUT COMA (HCC): ICD-10-CM

## 2018-10-24 DIAGNOSIS — E55.9 VITAMIN D DEFICIENCY: ICD-10-CM

## 2018-10-26 ENCOUNTER — RESULTS ENCOUNTER (OUTPATIENT)
Dept: ENDOCRINOLOGY | Age: 83
End: 2018-10-26

## 2018-10-26 DIAGNOSIS — IMO0002 UNCONTROLLED TYPE 2 DIABETES MELLITUS WITH COMPLICATION, WITH LONG-TERM CURRENT USE OF INSULIN: ICD-10-CM

## 2018-10-26 DIAGNOSIS — E55.9 VITAMIN D DEFICIENCY: ICD-10-CM

## 2018-10-26 DIAGNOSIS — E03.9 HYPOTHYROIDISM, ACQUIRED: ICD-10-CM

## 2018-10-26 DIAGNOSIS — E78.2 MIXED HYPERLIPIDEMIA: ICD-10-CM

## 2018-10-26 DIAGNOSIS — I10 ESSENTIAL HYPERTENSION: ICD-10-CM

## 2018-10-26 DIAGNOSIS — M81.0 OSTEOPOROSIS, UNSPECIFIED OSTEOPOROSIS TYPE, UNSPECIFIED PATHOLOGICAL FRACTURE PRESENCE: ICD-10-CM

## 2018-10-30 LAB
25(OH)D3+25(OH)D2 SERPL-MCNC: 75.1 NG/ML (ref 30–100)
ALBUMIN SERPL-MCNC: 4.1 G/DL (ref 3.5–5.2)
ALBUMIN/GLOB SERPL: 1.4 G/DL
ALP SERPL-CCNC: 97 U/L (ref 39–117)
ALT SERPL-CCNC: 13 U/L (ref 1–33)
AST SERPL-CCNC: 14 U/L (ref 1–32)
BILIRUB SERPL-MCNC: 0.6 MG/DL (ref 0.1–1.2)
BUN SERPL-MCNC: 29 MG/DL (ref 8–23)
BUN/CREAT SERPL: 27.4 (ref 7–25)
C PEPTIDE SERPL-MCNC: 0.9 NG/ML (ref 1.1–4.4)
CALCIUM SERPL-MCNC: 10.7 MG/DL (ref 8.6–10.5)
CHLORIDE SERPL-SCNC: 100 MMOL/L (ref 98–107)
CHOLEST SERPL-MCNC: 201 MG/DL (ref 0–200)
CO2 SERPL-SCNC: 27.5 MMOL/L (ref 22–29)
CREAT SERPL-MCNC: 1.06 MG/DL (ref 0.57–1)
FT4I SERPL CALC-MCNC: 2.2 (ref 1.2–4.9)
GLOBULIN SER CALC-MCNC: 2.9 GM/DL
GLUCOSE SERPL-MCNC: 221 MG/DL (ref 65–99)
HBA1C MFR BLD: 7.36 % (ref 4.8–5.6)
HDLC SERPL-MCNC: 44 MG/DL (ref 40–60)
INTERPRETATION: NORMAL
LDLC SERPL CALC-MCNC: 123 MG/DL (ref 0–100)
Lab: NORMAL
MICROALBUMIN UR-MCNC: 5.4 UG/ML
POTASSIUM SERPL-SCNC: 4.5 MMOL/L (ref 3.5–5.2)
PROT SERPL-MCNC: 7 G/DL (ref 6–8.5)
SODIUM SERPL-SCNC: 142 MMOL/L (ref 136–145)
T3FREE SERPL-MCNC: 2.2 PG/ML (ref 2–4.4)
T3RU NFR SERPL: 29 % (ref 24–39)
T4 FREE SERPL-MCNC: 1.61 NG/DL (ref 0.93–1.7)
T4 SERPL-MCNC: 7.5 UG/DL (ref 4.5–12)
THYROGLOB AB SERPL-ACNC: 3.4 IU/ML
THYROGLOB SERPL-MCNC: 5.1 NG/ML
THYROGLOB SERPL-MCNC: ABNORMAL NG/ML
TRIGL SERPL-MCNC: 170 MG/DL (ref 0–150)
TSH SERPL DL<=0.005 MIU/L-ACNC: 1.34 UIU/ML (ref 0.45–4.5)
VLDLC SERPL CALC-MCNC: 34 MG/DL (ref 5–40)

## 2018-11-07 ENCOUNTER — OFFICE VISIT (OUTPATIENT)
Dept: ENDOCRINOLOGY | Age: 83
End: 2018-11-07

## 2018-11-07 VITALS
SYSTOLIC BLOOD PRESSURE: 144 MMHG | DIASTOLIC BLOOD PRESSURE: 96 MMHG | HEIGHT: 61 IN | WEIGHT: 178 LBS | BODY MASS INDEX: 33.61 KG/M2

## 2018-11-07 DIAGNOSIS — E11.649 UNCONTROLLED TYPE 2 DIABETES MELLITUS WITH HYPOGLYCEMIA WITHOUT COMA (HCC): Primary | ICD-10-CM

## 2018-11-07 DIAGNOSIS — N28.9 RENAL INSUFFICIENCY: ICD-10-CM

## 2018-11-07 DIAGNOSIS — E03.9 HYPOTHYROIDISM, ACQUIRED: ICD-10-CM

## 2018-11-07 DIAGNOSIS — I10 ESSENTIAL HYPERTENSION: ICD-10-CM

## 2018-11-07 DIAGNOSIS — E78.2 MIXED HYPERLIPIDEMIA: ICD-10-CM

## 2018-11-07 PROBLEM — S22.080A T12 COMPRESSION FRACTURE (HCC): Status: ACTIVE | Noted: 2018-09-27

## 2018-11-07 PROCEDURE — 99214 OFFICE O/P EST MOD 30 MIN: CPT | Performed by: NURSE PRACTITIONER

## 2018-11-07 NOTE — PATIENT INSTRUCTIONS
Stop extra calcium and vit d extra pills  continue the vit d and calcium soft chew  Continue insulin the same but record what you are taking

## 2018-11-07 NOTE — PROGRESS NOTES
"Subjective   Roland Russell is a 83 y.o. female is here today for follow-up.  Chief Complaint   Patient presents with   • Diabetes     recent labs, pt tests BG 4x daily, pt brought meter   • Hypothyroidism     pt concerned about low blood sugars.    • Hypertension   • Hyperlipidemia   • Vitamin D Deficiency   • Osteoporosis     /96   Ht 154.9 cm (61\")   Wt 80.7 kg (178 lb)   BMI 33.63 kg/m²   Current Outpatient Prescriptions on File Prior to Visit   Medication Sig   • Blood Glucose Monitoring Suppl (PRODIGY AUTOCODE BLOOD GLUCOSE) w/Device kit Use as directed  Indications: Blindness   • Calcium-Vitamin D-Vitamin K (CALCIUM SOFT CHEWS PO) Take 1,000 mg by mouth daily.   • glucose blood (PRODIGY NO CODING BLOOD GLUC) test strip Use to test BG 4x daily   • Insulin Pen Needle (BD PEN NEEDLE SYED U/F) 32G X 4 MM misc Use to inject insulin 4 times daily   • levothyroxine (SYNTHROID, LEVOTHROID) 88 MCG tablet Take 1 tablet by mouth Daily.   • losartan-hydrochlorothiazide (HYZAAR) 100-25 MG per tablet Take 1 tablet by mouth Daily.   • [email or web address] SAFETY LANCETS 26G misc Use to test BG 4x daily   • [DISCONTINUED] CALCIUM PO Take 1,000 Units by mouth Daily.   • [DISCONTINUED] HUMALOG MIX 50/50 KWIKPEN (50-50) 100 UNIT/ML suspension pen-injector INJECT 40 UNITS BEFORE     BREAKFAST, 20 UNITS BEFORE LUNCH AND 40 UNITS BEFORE  DINNER   • [DISCONTINUED] HUMULIN N KWIKPEN 100 UNIT/ML suspension pen-injector INJECT 25 UNITS            SUBCUTANEOUSLY EVERY NIGHT   • [DISCONTINUED] Vitamin D, Cholecalciferol, 400 UNITS capsule Take 1,000 mg by mouth.   • [DISCONTINUED] Insulin Lispro Prot & Lispro (HUMALOG MIX 50/50 KWIKPEN) (50-50) 100 UNIT/ML suspension pen-injector Inject 40 ac breakfast 20 ac lunch and 40 ac dinner   • [DISCONTINUED] Insulin NPH, Human,, Isophane, 100 UNIT/ML suspension pen-injector Inject 25 Units under the skin Every Night.     No current facility-administered medications on file prior to visit.      Family " History   Problem Relation Age of Onset   • Cancer Father    • Glaucoma Father         angular glycoma   • Heart disease Father    • Asthma Sister    • Stroke Maternal Grandmother    • Heart failure Other    • Diabetes Other    • Hypertension Other    • Stroke Other         aunt   • Thyroid disease Other      Social History   Substance Use Topics   • Smoking status: Never Smoker   • Smokeless tobacco: Not on file   • Alcohol use No     Allergies   Allergen Reactions   • Latex    • Propoxyphene Itching         History of Present Illness  Encounter Diagnoses   Name Primary?   • Essential hypertension Yes   • Mixed hyperlipidemia    • Hypothyroidism, acquired    • Uncontrolled type 2 diabetes mellitus with hypoglycemia without coma (CMS/[facility])    • Renal insufficiency      83-year-old female patient here today for routine follow-up visit.  She is accompanied by her daughter.  Her blood sugar records were reviewed.  She is checking her blood sugars approximately 4 times daily.  She is had some low blood sugars in the 60 range.  She is having some high blood sugars in the 200 range in the mornings.  She states she is not taking her insulin as prescribed.  Sometimes she is not taking it at all depending on her blood sugars.  She states if her blood sugars is low at night she will not take her NPH insulin. She also states that if she is more active than usual she was having more lows.  We discussed increasing food on day she is more active however she states she has very little appetite.  Her blood pressure is slightly elevated today by her daughter states it typically is when she is at a provider's office.  Her daughter trimmed her toenails.  We discussed going to podiatrist for routine foot care.  Patient has seen Dr. Tanner in the past  The following portions of the patient's history were reviewed and updated as appropriate: allergies, current medications, past family history, past medical history, past social history,  past surgical history and problem list.    Review of Systems   Constitutional: Negative for fatigue.   HENT: Negative for trouble swallowing.    Eyes: Negative for visual disturbance.   Respiratory: Negative for shortness of breath.    Cardiovascular: Negative for leg swelling.   Endocrine: Negative for polyuria.   Skin: Negative for wound.   Neurological: Negative for numbness.       Objective   Physical Exam   Constitutional: She is oriented to person, place, and time. She appears well-developed and well-nourished. No distress.   HENT:   Head: Normocephalic and atraumatic.   Right Ear: External ear normal.   Left Ear: External ear normal.   Nose: Nose normal.   Mouth/Throat: Oropharynx is clear and moist. No oropharyngeal exudate.   Eyes: Pupils are equal, round, and reactive to light. EOM are normal. Right eye exhibits no discharge. Left eye exhibits no discharge.   Neck: Trachea normal, normal range of motion and full passive range of motion without pain. Neck supple. No tracheal tenderness present. Carotid bruit is not present. No tracheal deviation, no edema and no erythema present. No thyroid mass and no thyromegaly present.   Cardiovascular: Normal rate, regular rhythm, normal heart sounds and intact distal pulses.  Exam reveals no gallop and no friction rub.    No murmur heard.  Pulmonary/Chest: Effort normal and breath sounds normal. No stridor. No respiratory distress. She has no wheezes. She has no rales.   Abdominal: Soft. Bowel sounds are normal. She exhibits no distension.   Musculoskeletal: Normal range of motion. She exhibits no edema or deformity.    Diabetic foot exam performed: slight redness on left great toe. ingrown nail.   During the foot exam she had a monofilament test not performed.  Vascular Status -  Her right foot exhibits normal foot vasculature  and no edema. Her left foot exhibits normal foot vasculature  and no edema.  Skin Integrity  -  Her right foot skin is intact.Her left foot  skin is intact..  Lymphadenopathy:     She has no cervical adenopathy.   Neurological: She is alert and oriented to person, place, and time.   Skin: Skin is warm and dry. No rash noted. She is not diaphoretic. No erythema. No pallor.   Psychiatric: She has a normal mood and affect. Her behavior is normal. Judgment and thought content normal.   Nursing note and vitals reviewed.    Results for orders placed or performed in visit on 10/24/18   Comprehensive Metabolic Panel   Result Value Ref Range    Glucose 221 (H) 65 - 99 mg/dL    BUN 29 (H) 8 - 23 mg/dL    Creatinine 1.06 (H) 0.57 - 1.00 mg/dL    eGFR Non African Am 50 (L) >60 mL/min/1.73    eGFR African Am 60 (L) >60 mL/min/1.73    BUN/Creatinine Ratio 27.4 (H) 7.0 - 25.0    Sodium 142 136 - 145 mmol/L    Potassium 4.5 3.5 - 5.2 mmol/L    Chloride 100 98 - 107 mmol/L    Total CO2 27.5 22.0 - 29.0 mmol/L    Calcium 10.7 (H) 8.6 - 10.5 mg/dL    Total Protein 7.0 6.0 - 8.5 g/dL    Albumin 4.10 3.50 - 5.20 g/dL    Globulin 2.9 gm/dL    A/G Ratio 1.4 g/dL    Total Bilirubin 0.6 0.1 - 1.2 mg/dL    Alkaline Phosphatase 97 39 - 117 U/L    AST (SGOT) 14 1 - 32 U/L    ALT (SGPT) 13 1 - 33 U/L   Lipid Panel   Result Value Ref Range    Total Cholesterol 201 (H) 0 - 200 mg/dL    Triglycerides 170 (H) 0 - 150 mg/dL    HDL Cholesterol 44 40 - 60 mg/dL    VLDL Cholesterol 34 5 - 40 mg/dL    LDL Cholesterol  123 (H) 0 - 100 mg/dL   Vitamin D 25 Hydroxy   Result Value Ref Range    25 Hydroxy, Vitamin D 75.1 30.0 - 100.0 ng/ml   Hemoglobin A1c   Result Value Ref Range    Hemoglobin A1C 7.36 (H) 4.80 - 5.60 %   C-Peptide   Result Value Ref Range    C-Peptide 0.9 (L) 1.1 - 4.4 ng/mL   T3, Free   Result Value Ref Range    T3, Free 2.2 2.0 - 4.4 pg/mL   T4, Free   Result Value Ref Range    Free T4 1.61 0.93 - 1.70 ng/dL   Thyroid Panel With TSH   Result Value Ref Range    TSH 1.340 0.450 - 4.500 uIU/mL    T4, Total 7.5 4.5 - 12.0 ug/dL    T3 Uptake 29 24 - 39 %    Free Thyroxine Index  2.2 1.2 - 4.9   Comprehensive Thyroglobulin   Result Value Ref Range    Thyroglobulin Ab 3.4 (H) IU/mL    Thyroglobulin Comment ng/mL    Thyroglobulin (TG-MARNIE) 5.1 ng/mL   MicroAlbumin, Urine, Random   Result Value Ref Range    Microalbumin, Urine 5.4 Not Estab. ug/mL   Cardiovascular Risk Assessment   Result Value Ref Range    Interpretation Note    Diabetes Patient Education   Result Value Ref Range    PDF Image Not applicable          Assessment/Plan   Problems Addressed this Visit        Cardiovascular and Mediastinum    HLD (hyperlipidemia)    Essential hypertension - Primary       Endocrine    Type 2 diabetes mellitus, uncontrolled (CMS/[facility])    Relevant Medications    Insulin Lispro Prot & Lispro (HUMALOG MIX 50/50 KWIKPEN) (50-50) 100 UNIT/ML suspension pen-injector    Insulin NPH, Human,, Isophane, (HUMULIN N KWIKPEN) 100 UNIT/ML suspension pen-injector    Hypothyroidism, acquired       Genitourinary    Renal insufficiency        In summary, patient was seen and examined.  Metabolically she is stable.  Her A1c is in good range.  Her labs were reviewed and she was provided a copy.  She is varying her dose of insulin without change in her chart to reflect that she is not taking prescribed amounts of insulin.  Sometimes she is skipping insulin altogether depending on her blood sugars.  We discussed be more proactive with prevention of low blood sugars.  Her calcium level is too high and currently she is taking a calcium supplement and a vitamin D supplement and a combination calcium and vitamin D and vitamin K supplement.  I've asked that she quit taking them calcium supplement and vitamin D supplement to continue the calcium soft chew combination.  She will follow-up with Dr. Elizalde in 6 months with labs. I have  Encouraged her daughter to recheck to the office should she have any questions or concerns or if she has any issues with her blood sugars.  I've asked that she record her insulin she is taking with  each blood sugar so that we could be more proactive in preventing hypoglycemic events.  Based on her age her A1c of 7.3 is in satisfactory range.

## 2018-12-26 DIAGNOSIS — E03.9 HYPOTHYROIDISM, ACQUIRED: ICD-10-CM

## 2018-12-26 RX ORDER — LEVOTHYROXINE SODIUM 88 MCG
TABLET ORAL
Qty: 90 TABLET | Refills: 1 | Status: SHIPPED | OUTPATIENT
Start: 2018-12-26 | End: 2019-03-04 | Stop reason: SDUPTHER

## 2019-01-15 DIAGNOSIS — I10 ESSENTIAL HYPERTENSION: ICD-10-CM

## 2019-01-15 RX ORDER — LOSARTAN POTASSIUM AND HYDROCHLOROTHIAZIDE 25; 100 MG/1; MG/1
1 TABLET ORAL DAILY
Qty: 90 TABLET | Refills: 0 | Status: SHIPPED | OUTPATIENT
Start: 2019-01-15 | End: 2019-03-04 | Stop reason: SDUPTHER

## 2019-02-05 ENCOUNTER — OFFICE VISIT (OUTPATIENT)
Dept: FAMILY MEDICINE CLINIC | Facility: CLINIC | Age: 84
End: 2019-02-05

## 2019-02-05 VITALS
DIASTOLIC BLOOD PRESSURE: 73 MMHG | SYSTOLIC BLOOD PRESSURE: 173 MMHG | OXYGEN SATURATION: 98 % | HEIGHT: 61 IN | RESPIRATION RATE: 16 BRPM | HEART RATE: 66 BPM | WEIGHT: 180 LBS | BODY MASS INDEX: 33.99 KG/M2 | TEMPERATURE: 97.6 F

## 2019-02-05 DIAGNOSIS — I10 ESSENTIAL HYPERTENSION: ICD-10-CM

## 2019-02-05 DIAGNOSIS — E11.649 UNCONTROLLED TYPE 2 DIABETES MELLITUS WITH HYPOGLYCEMIA WITHOUT COMA (HCC): ICD-10-CM

## 2019-02-05 DIAGNOSIS — R51.9 NONINTRACTABLE HEADACHE, UNSPECIFIED CHRONICITY PATTERN, UNSPECIFIED HEADACHE TYPE: ICD-10-CM

## 2019-02-05 DIAGNOSIS — H54.8 LEGAL BLINDNESS: Primary | ICD-10-CM

## 2019-02-05 PROCEDURE — 99214 OFFICE O/P EST MOD 30 MIN: CPT | Performed by: FAMILY MEDICINE

## 2019-02-05 RX ORDER — AMLODIPINE BESYLATE 2.5 MG/1
2.5 TABLET ORAL DAILY
Qty: 90 TABLET | Refills: 1 | Status: SHIPPED | OUTPATIENT
Start: 2019-02-05 | End: 2019-03-04 | Stop reason: SDUPTHER

## 2019-02-05 NOTE — PROGRESS NOTES
Subjective   Chief Complaint:   Chief Complaint   Patient presents with   • Hypertension   • Diabetes   • Hypothyroidism         History of Present Illness reviewed her labs from Dr. Elizalde's office.  Her a.m. blood sugar was 140.  Hemoglobin A1c was 7.36.  TSH was normal.  She has some low blood sugars at night but that's probably because she is only eating a salad at night.  Review all the labs from Dr. Elizalde's office.  Her Tonio refills her medications I reviewed the meds.  SHE'S had some 50s and 60s but rare.  Low blood sugars. /70.   RE DO BP  160/70.  She is alert and oriented and I don't see any neurological deficits minute treat her with Norvasc 2.5 mg and add that to her other blood pressure medication and recheck her back in 2 weeks.  Asked headache she had was a week ago.  And she is alert and oriented and gives me a good history.  He has no headache at present            Roland Russell 84 y.o. female who presents today for Medical Management of the below listed issues and medication refills.    ICD-10-CM ICD-9-CM   1. Legal blindness H54.8 369.4   2. Uncontrolled type 2 diabetes mellitus with hypoglycemia without coma (CMS/HCC) E11.649 250.82     251.2   3. Essential hypertension I10 401.9   4. Nonintractable headache, unspecified chronicity pattern, unspecified headache type R51 784.0        she has a problem list of   Patient Active Problem List   Diagnosis   • Legal blindness   • Uncontrolled type 2 diabetes mellitus with hypoglycemia without coma (CMS/HCC)   • GERD (gastroesophageal reflux disease)   • HLD (hyperlipidemia)   • Hypothyroidism, acquired   • Osteoporosis   • Osteoarthritis, multiple sites   • Essential hypertension   • Senile osteoporosis   • Renal insufficiency   • T12 compression fracture (CMS/HCC)   .  Since the last visit, she has overall felt well.  she has been compliant with   Current Outpatient Medications:   •  Blood Glucose Monitoring Suppl (PRODIGY AUTOCODE BLOOD GLUCOSE)  "w/Device kit, Use as directed  Indications: Blindness, Disp: 1 each, Rfl: 1  •  Calcium-Vitamin D-Vitamin K (CALCIUM SOFT CHEWS PO), Take 1,000 mg by mouth daily., Disp: , Rfl:   •  glucose blood (PRODIGY NO CODING BLOOD GLUC) test strip, Use to test BG 4x daily, Disp: 400 each, Rfl: 0  •  Insulin Lispro Prot & Lispro (HUMALOG MIX 50/50 KWIKPEN) (50-50) 100 UNIT/ML suspension pen-injector, Up to 100 units daily dose varies, Disp: 90 mL, Rfl: 1  •  Insulin NPH, Human,, Isophane, (HUMULIN N KWIKPEN) 100 UNIT/ML suspension pen-injector, Inject up to 25 units daily for multiple daily injections, dose based on patient, Disp: 30 mL, Rfl: 1  •  Insulin Pen Needle (BD PEN NEEDLE SYED U/F) 32G X 4 MM misc, Use to inject insulin 4 times daily, Disp: 400 each, Rfl: 1  •  levothyroxine (SYNTHROID, LEVOTHROID) 88 MCG tablet, Take 1 tablet by mouth Daily., Disp: 90 tablet, Rfl: 3  •  losartan-hydrochlorothiazide (HYZAAR) 100-25 MG per tablet, Take 1 tablet by mouth Daily., Disp: 90 tablet, Rfl: 0  •  PRODIGY SAFETY LANCETS 26G misc, Use to test BG 4x daily, Disp: 400 each, Rfl: 0  •  SYNTHROID 88 MCG tablet, TAKE 1 TABLET DAILY, Disp: 90 tablet, Rfl: 1  •  amLODIPine (NORVASC) 2.5 MG tablet, Take 1 tablet by mouth Daily., Disp: 90 tablet, Rfl: 1.  she denies medication side effects.    All of the chronic condition(s) listed above are stable w/o issues.    /73   Pulse 66   Temp 97.6 °F (36.4 °C)   Resp 16   Ht 154.9 cm (61\")   Wt 81.6 kg (180 lb)   SpO2 98%   BMI 34.01 kg/m²     Results for orders placed or performed in visit on 10/24/18   Comprehensive Metabolic Panel   Result Value Ref Range    Glucose 221 (H) 65 - 99 mg/dL    BUN 29 (H) 8 - 23 mg/dL    Creatinine 1.06 (H) 0.57 - 1.00 mg/dL    eGFR Non African Am 50 (L) >60 mL/min/1.73    eGFR African Am 60 (L) >60 mL/min/1.73    BUN/Creatinine Ratio 27.4 (H) 7.0 - 25.0    Sodium 142 136 - 145 mmol/L    Potassium 4.5 3.5 - 5.2 mmol/L    Chloride 100 98 - 107 mmol/L "    Total CO2 27.5 22.0 - 29.0 mmol/L    Calcium 10.7 (H) 8.6 - 10.5 mg/dL    Total Protein 7.0 6.0 - 8.5 g/dL    Albumin 4.10 3.50 - 5.20 g/dL    Globulin 2.9 gm/dL    A/G Ratio 1.4 g/dL    Total Bilirubin 0.6 0.1 - 1.2 mg/dL    Alkaline Phosphatase 97 39 - 117 U/L    AST (SGOT) 14 1 - 32 U/L    ALT (SGPT) 13 1 - 33 U/L   Lipid Panel   Result Value Ref Range    Total Cholesterol 201 (H) 0 - 200 mg/dL    Triglycerides 170 (H) 0 - 150 mg/dL    HDL Cholesterol 44 40 - 60 mg/dL    VLDL Cholesterol 34 5 - 40 mg/dL    LDL Cholesterol  123 (H) 0 - 100 mg/dL   Vitamin D 25 Hydroxy   Result Value Ref Range    25 Hydroxy, Vitamin D 75.1 30.0 - 100.0 ng/ml   Hemoglobin A1c   Result Value Ref Range    Hemoglobin A1C 7.36 (H) 4.80 - 5.60 %   C-Peptide   Result Value Ref Range    C-Peptide 0.9 (L) 1.1 - 4.4 ng/mL   T3, Free   Result Value Ref Range    T3, Free 2.2 2.0 - 4.4 pg/mL   T4, Free   Result Value Ref Range    Free T4 1.61 0.93 - 1.70 ng/dL   Thyroid Panel With TSH   Result Value Ref Range    TSH 1.340 0.450 - 4.500 uIU/mL    T4, Total 7.5 4.5 - 12.0 ug/dL    T3 Uptake 29 24 - 39 %    Free Thyroxine Index 2.2 1.2 - 4.9   Comprehensive Thyroglobulin   Result Value Ref Range    Thyroglobulin Ab 3.4 (H) IU/mL    Thyroglobulin Comment ng/mL    Thyroglobulin (TG-MARNIE) 5.1 ng/mL   MicroAlbumin, Urine, Random   Result Value Ref Range    Microalbumin, Urine 5.4 Not Estab. ug/mL   Cardiovascular Risk Assessment   Result Value Ref Range    Interpretation Note    Diabetes Patient Education   Result Value Ref Range    PDF Image Not applicable              The following portions of the patient's history were reviewed and updated as appropriate: allergies, current medications, past family history, past medical history, past social history, past surgical history and problem list.    Review of Systems   Constitutional: Negative for activity change, appetite change and unexpected weight change.   Eyes: Negative for visual disturbance.    Respiratory: Negative for chest tightness and shortness of breath.    Cardiovascular: Negative for chest pain and palpitations.   Skin: Negative for color change.   Neurological: Negative for syncope and speech difficulty.   Psychiatric/Behavioral: Negative for confusion and decreased concentration.       Objective   Physical Exam   Constitutional: She is oriented to person, place, and time. She appears well-developed and well-nourished.   HENT:   Mouth/Throat: Oropharynx is clear and moist.   Eyes: Pupils are equal, round, and reactive to light.   Cardiovascular: Normal rate and regular rhythm.   Pulmonary/Chest: Effort normal and breath sounds normal.   Abdominal: Soft. Bowel sounds are normal.   Neurological: She is alert and oriented to person, place, and time.   And is legally blind but she can see some out of the right eye and that's not changed   Psychiatric: She has a normal mood and affect. Her behavior is normal.   Nursing note and vitals reviewed.      Assessment/Plan   Roland was seen today for hypertension, diabetes and hypothyroidism.    Diagnoses and all orders for this visit:    Legal blindness    Uncontrolled type 2 diabetes mellitus with hypoglycemia without coma (CMS/[facility])    Essential hypertension    Nonintractable headache, unspecified chronicity pattern, unspecified headache type    Other orders  -     amLODIPine (NORVASC) 2.5 MG tablet; Take 1 tablet by mouth Daily.

## 2019-03-04 ENCOUNTER — OFFICE VISIT (OUTPATIENT)
Dept: FAMILY MEDICINE CLINIC | Facility: CLINIC | Age: 84
End: 2019-03-04

## 2019-03-04 VITALS
HEART RATE: 72 BPM | RESPIRATION RATE: 16 BRPM | DIASTOLIC BLOOD PRESSURE: 76 MMHG | SYSTOLIC BLOOD PRESSURE: 142 MMHG | HEIGHT: 61 IN | OXYGEN SATURATION: 98 % | TEMPERATURE: 98.5 F | BODY MASS INDEX: 34.17 KG/M2 | WEIGHT: 181 LBS

## 2019-03-04 DIAGNOSIS — E03.9 HYPOTHYROIDISM, ACQUIRED: ICD-10-CM

## 2019-03-04 DIAGNOSIS — Z00.00 ROUTINE ADULT HEALTH MAINTENANCE: ICD-10-CM

## 2019-03-04 DIAGNOSIS — M81.0 OSTEOPOROSIS, UNSPECIFIED OSTEOPOROSIS TYPE, UNSPECIFIED PATHOLOGICAL FRACTURE PRESENCE: Primary | ICD-10-CM

## 2019-03-04 DIAGNOSIS — I10 ESSENTIAL HYPERTENSION: ICD-10-CM

## 2019-03-04 DIAGNOSIS — Z00.00 INITIAL MEDICARE ANNUAL WELLNESS VISIT: ICD-10-CM

## 2019-03-04 PROCEDURE — G0438 PPPS, INITIAL VISIT: HCPCS | Performed by: FAMILY MEDICINE

## 2019-03-04 RX ORDER — AMLODIPINE BESYLATE 2.5 MG/1
2.5 TABLET ORAL DAILY
Qty: 90 TABLET | Refills: 1 | Status: SHIPPED | OUTPATIENT
Start: 2019-03-04 | End: 2019-03-05 | Stop reason: SDUPTHER

## 2019-03-04 RX ORDER — AMLODIPINE BESYLATE 2.5 MG/1
2.5 TABLET ORAL DAILY
Qty: 90 TABLET | Refills: 1 | Status: SHIPPED | OUTPATIENT
Start: 2019-03-04 | End: 2019-03-04

## 2019-03-04 NOTE — PROGRESS NOTES
QUICK REFERENCE INFORMATION:  The ABCs of the Annual Wellness Visit    Initial Medicare Wellness Visit    HEALTH RISK ASSESSMENT    1934    Recent Hospitalizations:  none        Current Medical Providers:  Patient Care Team:  Davon Marie MD as PCP - General  Davon Marie MD as PCP - Family Medicine        Smoking Status:  Social History     Tobacco Use   Smoking Status Never Smoker   Smokeless Tobacco Never Used       Alcohol Consumption:  Social History     Substance and Sexual Activity   Alcohol Use No       Depression Screen:   PHQ-2/PHQ-9 Depression Screening 3/4/2019   Little interest or pleasure in doing things 0   Feeling down, depressed, or hopeless 0   Trouble falling or staying asleep, or sleeping too much 0   Feeling tired or having little energy 0   Poor appetite or overeating 0   Feeling bad about yourself - or that you are a failure or have let yourself or your family down 0   Trouble concentrating on things, such as reading the newspaper or watching television 0   Moving or speaking so slowly that other people could have noticed. Or the opposite - being so fidgety or restless that you have been moving around a lot more than usual 0   Thoughts that you would be better off dead, or of hurting yourself in some way 0   Total Score 0   If you checked off any problems, how difficult have these problems made it for you to do your work, take care of things at home, or get along with other people? Not difficult at all       Health Habits and Functional and Cognitive Screening:  Functional & Cognitive Status 3/4/2019   Do you have difficulty preparing food and eating? No   Do you have difficulty bathing yourself, getting dressed or grooming yourself? No   Do you have difficulty using the toilet? No   Do you have difficulty moving around from place to place? No   Do you have trouble with steps or getting out of a bed or a chair? No   In the past year have you fallen or experienced a near fall? No    Current Diet Well Balanced Diet   Dental Exam Up to date   Eye Exam Up to date   Exercise (times per week) 2 times per week   Current Exercise Activities Include Housecleaning   Do you need help using the phone?  No   Are you deaf or do you have serious difficulty hearing?  No   Do you need help with transportation? Yes   Do you need help shopping? Yes   Do you need help preparing meals?  No   Do you need help with housework?  No   Do you need help with laundry? No   Do you need help taking your medications? No   Do you need help managing money? No   Do you ever drive or ride in a car without wearing a seat belt? No   Have you felt unusual stress, anger or loneliness in the last month? No   Who do you live with? Alone   If you need help, do you have trouble finding someone available to you? No   Have you been bothered in the last four weeks by sexual problems? No   Do you have difficulty concentrating, remembering or making decisions? No           Does the patient have evidence of cognitive impairment? no    Asiprin use counseling: patient chose not to take asa      Recent Lab Results:    Visual Acuity:  Legally blind    Age-appropriate Screening Schedule:  Refer to the list below for future screening recommendations based on patient's age, sex and/or medical conditions. Orders for these recommended tests are listed in the plan section. The patient has been provided with a written plan.    Health Maintenance   Topic Date Due   • MAMMOGRAM  01/22/2017   • HEMOGLOBIN A1C  04/24/2019   • DXA SCAN  04/26/2019   • LIPID PANEL  10/24/2019   • URINE MICROALBUMIN  10/24/2019   • TDAP/TD VACCINES (2 - Td) 05/03/2027   • INFLUENZA VACCINE  Discontinued   • PNEUMOCOCCAL VACCINES (65+ LOW/MEDIUM RISK)  Discontinued   • ZOSTER VACCINE  Discontinued        Subjective   History of Present Illness    Roland Russell is a 84 y.o. female who presents for an Annual Wellness Visit.    The following portions of the patient's history  were reviewed and updated as appropriate:     Outpatient Medications Prior to Visit   Medication Sig Dispense Refill   • amLODIPine (NORVASC) 2.5 MG tablet Take 1 tablet by mouth Daily. 90 tablet 1   • Blood Glucose Monitoring Suppl (PRODIGY AUTOCODE BLOOD GLUCOSE) w/Device kit Use as directed  Indications: Blindness 1 each 1   • Calcium-Vitamin D-Vitamin K (CALCIUM SOFT CHEWS PO) Take 1,000 mg by mouth daily.     • glucose blood (PRODIGY NO CODING BLOOD GLUC) test strip Use to test BG 4x daily 400 each 0   • Insulin Lispro Prot & Lispro (HUMALOG MIX 50/50 KWIKPEN) (50-50) 100 UNIT/ML suspension pen-injector Up to 100 units daily dose varies 90 mL 1   • Insulin NPH, Human,, Isophane, (HUMULIN N KWIKPEN) 100 UNIT/ML suspension pen-injector Inject up to 25 units daily for multiple daily injections, dose based on patient 30 mL 1   • Insulin Pen Needle (BD PEN NEEDLE SYED U/F) 32G X 4 MM misc Use to inject insulin 4 times daily 400 each 1   • levothyroxine (SYNTHROID, LEVOTHROID) 88 MCG tablet Take 1 tablet by mouth Daily. 90 tablet 3   • losartan-hydrochlorothiazide (HYZAAR) 100-25 MG per tablet Take 1 tablet by mouth Daily. 90 tablet 0   • PRODIGY SAFETY LANCETS 26G misc Use to test BG 4x daily 400 each 0   • SYNTHROID 88 MCG tablet TAKE 1 TABLET DAILY 90 tablet 1     No facility-administered medications prior to visit.        Patient Active Problem List   Diagnosis   • Legal blindness   • Uncontrolled type 2 diabetes mellitus with hypoglycemia without coma (CMS/HCC)   • GERD (gastroesophageal reflux disease)   • HLD (hyperlipidemia)   • Hypothyroidism, acquired   • Osteoporosis   • Osteoarthritis, multiple sites   • Essential hypertension   • Senile osteoporosis   • Renal insufficiency   • T12 compression fracture (CMS/HCC)       Advance Care Planning:healthcare form with family   On file with family  Identification of Risk Factors:  Risk factors include: chronic pain, depression and vision limitations    Review of  "Systems    Compared to one year ago, the patient feels her physical health is better.  Compared to one year ago, the patient feels her mental health is better.    Objective     Physical Exam    Vitals:    03/04/19 1412   BP: 142/76   Pulse: 72   Resp: 16   Temp: 98.5 °F (36.9 °C)   SpO2: 98%   Weight: 82.1 kg (181 lb)   Height: 154.9 cm (61\")   PainSc:   2       Patient's Body mass index is 34.2 kg/m². BMI is above normal parameters. Recommendations include: none (medical contraindication).      Assessment/Plan   Patient Self-Management and Personalized Health Advice  The patient has been provided with information about: diet, exercise, weight management, prevention of cardiac or vascular disease, the relationship between weight and GERD, fall prevention, designing advance directives, supplements and mental health concerns and preventive services including:   · Bone densitometry screening, Screening for AAA, referral for ultrasound placed, Screening mammography, referral placed.us carotids,  pad    Visit Diagnoses:  No diagnosis found.    No orders of the defined types were placed in this encounter.      Outpatient Encounter Medications as of 3/4/2019   Medication Sig Dispense Refill   • amLODIPine (NORVASC) 2.5 MG tablet Take 1 tablet by mouth Daily. 90 tablet 1   • Blood Glucose Monitoring Suppl (PRODIGY AUTOCODE BLOOD GLUCOSE) w/Device kit Use as directed  Indications: Blindness 1 each 1   • Calcium-Vitamin D-Vitamin K (CALCIUM SOFT CHEWS PO) Take 1,000 mg by mouth daily.     • glucose blood (PRODIGY NO CODING BLOOD GLUC) test strip Use to test BG 4x daily 400 each 0   • Insulin Lispro Prot & Lispro (HUMALOG MIX 50/50 KWIKPEN) (50-50) 100 UNIT/ML suspension pen-injector Up to 100 units daily dose varies 90 mL 1   • Insulin NPH, Human,, Isophane, (HUMULIN N KWIKPEN) 100 UNIT/ML suspension pen-injector Inject up to 25 units daily for multiple daily injections, dose based on patient 30 mL 1   • Insulin Pen Needle " (BD PEN NEEDLE SYED U/F) 32G X 4 MM misc Use to inject insulin 4 times daily 400 each 1   • levothyroxine (SYNTHROID, LEVOTHROID) 88 MCG tablet Take 1 tablet by mouth Daily. 90 tablet 3   • losartan-hydrochlorothiazide (HYZAAR) 100-25 MG per tablet Take 1 tablet by mouth Daily. 90 tablet 0   • PRODIGY SAFETY LANCETS 26G misc Use to test BG 4x daily 400 each 0   • [DISCONTINUED] SYNTHROID 88 MCG tablet TAKE 1 TABLET DAILY 90 tablet 1     No facility-administered encounter medications on file as of 3/4/2019.        Reviewed use of high risk medication in the elderly: not applicable  Reviewed for potential of harmful drug interactions in the elderly: not applicable    Follow Up:  No Follow-up on file.     An After Visit Summary and PPPS with all of these plans were given to the patient.

## 2019-03-05 RX ORDER — INSULIN GLARGINE 300 U/ML
90 INJECTION, SOLUTION SUBCUTANEOUS
Qty: 9 PEN | Refills: 3 | Status: SHIPPED | OUTPATIENT
Start: 2019-03-05 | End: 2019-03-07 | Stop reason: SDUPTHER

## 2019-03-05 RX ORDER — INSULIN ASPART 100 [IU]/ML
INJECTION, SOLUTION INTRAVENOUS; SUBCUTANEOUS
Qty: 15 PEN | Refills: 3 | Status: SHIPPED | OUTPATIENT
Start: 2019-03-05 | End: 2019-03-07 | Stop reason: SDUPTHER

## 2019-03-07 DIAGNOSIS — E03.9 HYPOTHYROIDISM, ACQUIRED: ICD-10-CM

## 2019-03-07 RX ORDER — INSULIN GLARGINE 300 U/ML
90 INJECTION, SOLUTION SUBCUTANEOUS
Qty: 9 PEN | Refills: 3 | Status: SHIPPED | OUTPATIENT
Start: 2019-03-07 | End: 2019-06-19 | Stop reason: CLARIF

## 2019-03-07 RX ORDER — INSULIN ASPART 100 [IU]/ML
INJECTION, SOLUTION INTRAVENOUS; SUBCUTANEOUS
Qty: 15 PEN | Refills: 3 | Status: SHIPPED | OUTPATIENT
Start: 2019-03-07 | End: 2019-03-15

## 2019-03-14 ENCOUNTER — TRANSCRIBE ORDERS (OUTPATIENT)
Dept: ADMINISTRATIVE | Facility: HOSPITAL | Age: 84
End: 2019-03-14

## 2019-03-14 DIAGNOSIS — Z13.6 ENCOUNTER FOR SCREENING FOR VASCULAR DISEASE: Primary | ICD-10-CM

## 2019-03-15 RX ORDER — INSULIN LISPRO 100 [IU]/ML
INJECTION, SOLUTION INTRAVENOUS; SUBCUTANEOUS
Qty: 15 PEN | Refills: 3 | Status: SHIPPED | OUTPATIENT
Start: 2019-03-15 | End: 2019-06-19 | Stop reason: CLARIF

## 2019-03-27 ENCOUNTER — HOSPITAL ENCOUNTER (OUTPATIENT)
Dept: CARDIOLOGY | Facility: HOSPITAL | Age: 84
Discharge: HOME | End: 2019-03-27
Admitting: FAMILY MEDICINE

## 2019-03-27 VITALS
HEIGHT: 61 IN | SYSTOLIC BLOOD PRESSURE: 180 MMHG | BODY MASS INDEX: 34.17 KG/M2 | DIASTOLIC BLOOD PRESSURE: 70 MMHG | HEART RATE: 68 BPM | WEIGHT: 181 LBS

## 2019-03-27 DIAGNOSIS — Z13.6 ENCOUNTER FOR SCREENING FOR VASCULAR DISEASE: ICD-10-CM

## 2019-03-27 LAB
BH CV ECHO MEAS - DIST AO DIAM: 1.24 CM
BH CV VAS BP LEFT ARM: NORMAL MMHG
BH CV VAS BP RIGHT ARM: NORMAL MMHG
BH CV XLRA MEAS - MID AO DIAM: 1.48 CM
BH CV XLRA MEAS - PAD LEFT ABI DP: 1.05
BH CV XLRA MEAS - PAD LEFT ABI PT: 1.05
BH CV XLRA MEAS - PAD LEFT ARM: 180 MMHG
BH CV XLRA MEAS - PAD LEFT LEG DP: 190 MMHG
BH CV XLRA MEAS - PAD LEFT LEG PT: 190 MMHG
BH CV XLRA MEAS - PAD RIGHT ABI DP: 0.88
BH CV XLRA MEAS - PAD RIGHT ABI PT: 0.88
BH CV XLRA MEAS - PAD RIGHT ARM: 180 MMHG
BH CV XLRA MEAS - PAD RIGHT LEG DP: 160 MMHG
BH CV XLRA MEAS - PAD RIGHT LEG PT: 160 MMHG
BH CV XLRA MEAS - PROX AO DIAM: 1.78 CM
BH CV XLRA MEAS LEFT ICA/CCA RATIO: 0.98
BH CV XLRA MEAS LEFT MID CCA PSV: NORMAL CM/SEC
BH CV XLRA MEAS LEFT MID ICA PSV: NORMAL CM/SEC
BH CV XLRA MEAS LEFT PROX ECA PSV: NORMAL CM/SEC
BH CV XLRA MEAS RIGHT ICA/CCA RATIO: 1.54
BH CV XLRA MEAS RIGHT MID CCA PSV: NORMAL CM/SEC
BH CV XLRA MEAS RIGHT MID ICA PSV: NORMAL CM/SEC
BH CV XLRA MEAS RIGHT PROX ECA PSV: NORMAL CM/SEC

## 2019-03-27 PROCEDURE — 93799 UNLISTED CV SVC/PROCEDURE: CPT

## 2019-03-28 ENCOUNTER — TELEPHONE (OUTPATIENT)
Dept: ENDOCRINOLOGY | Age: 84
End: 2019-03-28

## 2019-03-28 RX ORDER — BLOOD-GLUCOSE METER
EACH MISCELLANEOUS
Qty: 1 EACH | Refills: 0 | Status: SHIPPED | OUTPATIENT
Start: 2019-03-28 | End: 2019-06-19 | Stop reason: CLARIF

## 2019-03-28 RX ORDER — LANCETS 26 GAUGE
EACH MISCELLANEOUS
Qty: 400 EACH | Refills: 0 | Status: SHIPPED | OUTPATIENT
Start: 2019-03-28 | End: 2019-05-08 | Stop reason: SDUPTHER

## 2019-03-28 NOTE — TELEPHONE ENCOUNTER
rx was sent    ----- Message from NORI Workman sent at 3/28/2019 10:29 AM EDT -----  Contact: patient  Call in g2Oneigy meter and strips. Not sure if anna is option but maybe if you have iphone of 7 or above  ----- Message -----  From: Jessica Perez MA  Sent: 3/28/2019   9:48 AM  To: NORI Workman        ----- Message -----  From: Laney Lyons  Sent: 3/28/2019   9:22 AM  To: Jessica Perez MA    Patient said Zoey has discontinued her  meter and strips. She said she thinks it was an enhanced talking meter. She said [facility] called around and they were not able to find it.  She said she is legally blind.   She said she has 9 days supply left and test 4xday.     She is requesting a new meter and test strips, and lancets and asking if the arm patch is something she could use. She said she does not know if she would be able to reach the results.    She is concerned about not being able to test her blood sugars saying she cannot take her insulin unless she tests.   She said Zoey always mailed her lancets, test strips , 90 day supply   She said if she needs a pharmacy then it will be [facility] in [address] [phone]

## 2019-04-10 DIAGNOSIS — I10 ESSENTIAL HYPERTENSION: ICD-10-CM

## 2019-04-10 RX ORDER — LOSARTAN POTASSIUM AND HYDROCHLOROTHIAZIDE 25; 100 MG/1; MG/1
TABLET ORAL
Qty: 90 TABLET | Refills: 1 | Status: SHIPPED | OUTPATIENT
Start: 2019-04-10 | End: 2019-06-19 | Stop reason: SDUPTHER

## 2019-05-08 RX ORDER — LANCETS 26 GAUGE
EACH MISCELLANEOUS
Qty: 400 EACH | Refills: 3 | Status: SHIPPED | OUTPATIENT
Start: 2019-05-08 | End: 2019-06-19 | Stop reason: CLARIF

## 2019-05-08 RX ORDER — LANCETS
EACH MISCELLANEOUS
Qty: 60 EACH | Refills: 5 | Status: SHIPPED | OUTPATIENT
Start: 2019-05-08 | End: 2019-06-19 | Stop reason: CLARIF

## 2019-06-05 ENCOUNTER — LAB (OUTPATIENT)
Dept: ENDOCRINOLOGY | Age: 84
End: 2019-06-05

## 2019-06-05 DIAGNOSIS — E03.9 HYPOTHYROIDISM, ACQUIRED: ICD-10-CM

## 2019-06-05 DIAGNOSIS — M81.0 OSTEOPOROSIS, UNSPECIFIED OSTEOPOROSIS TYPE, UNSPECIFIED PATHOLOGICAL FRACTURE PRESENCE: ICD-10-CM

## 2019-06-05 DIAGNOSIS — I10 ESSENTIAL HYPERTENSION: ICD-10-CM

## 2019-06-05 DIAGNOSIS — IMO0002 UNCONTROLLED TYPE 2 DIABETES MELLITUS WITH COMPLICATION, WITH LONG-TERM CURRENT USE OF INSULIN: ICD-10-CM

## 2019-06-05 DIAGNOSIS — E78.2 MIXED HYPERLIPIDEMIA: ICD-10-CM

## 2019-06-05 DIAGNOSIS — E55.9 VITAMIN D DEFICIENCY: ICD-10-CM

## 2019-06-06 LAB
25(OH)D3+25(OH)D2 SERPL-MCNC: 49.9 NG/ML (ref 30–100)
ALBUMIN SERPL-MCNC: 4 G/DL (ref 3.5–5.2)
ALBUMIN/GLOB SERPL: 1.5 G/DL
ALP SERPL-CCNC: 128 U/L (ref 39–117)
ALT SERPL-CCNC: 11 U/L (ref 1–33)
AST SERPL-CCNC: 12 U/L (ref 1–32)
BILIRUB SERPL-MCNC: 0.6 MG/DL (ref 0.2–1.2)
BUN SERPL-MCNC: 24 MG/DL (ref 8–23)
BUN/CREAT SERPL: 25.3 (ref 7–25)
C PEPTIDE SERPL-MCNC: 0.8 NG/ML (ref 1.1–4.4)
CALCIUM SERPL-MCNC: 10.8 MG/DL (ref 8.6–10.5)
CHLORIDE SERPL-SCNC: 100 MMOL/L (ref 98–107)
CHOLEST SERPL-MCNC: 179 MG/DL (ref 0–200)
CO2 SERPL-SCNC: 27.8 MMOL/L (ref 22–29)
CREAT SERPL-MCNC: 0.95 MG/DL (ref 0.57–1)
GLOBULIN SER CALC-MCNC: 2.6 GM/DL
GLUCOSE SERPL-MCNC: 182 MG/DL (ref 65–99)
HBA1C MFR BLD: 7.8 % (ref 4.8–5.6)
HDLC SERPL-MCNC: 42 MG/DL (ref 40–60)
INTERPRETATION: NORMAL
LDLC SERPL CALC-MCNC: 104 MG/DL (ref 0–100)
Lab: NORMAL
MICROALBUMIN UR-MCNC: 27.7 UG/ML
POTASSIUM SERPL-SCNC: 4.2 MMOL/L (ref 3.5–5.2)
PROT SERPL-MCNC: 6.6 G/DL (ref 6–8.5)
SODIUM SERPL-SCNC: 141 MMOL/L (ref 136–145)
T3FREE SERPL-MCNC: 2.9 PG/ML (ref 2–4.4)
T4 FREE SERPL-MCNC: 1.72 NG/DL (ref 0.93–1.7)
T4 SERPL-MCNC: 7.98 MCG/DL (ref 4.5–11.7)
TRIGL SERPL-MCNC: 163 MG/DL (ref 0–150)
TSH SERPL DL<=0.005 MIU/L-ACNC: 0.06 MIU/ML (ref 0.27–4.2)
URATE SERPL-MCNC: 4 MG/DL (ref 2.4–5.7)
VLDLC SERPL CALC-MCNC: 32.6 MG/DL

## 2019-06-19 ENCOUNTER — OFFICE VISIT (OUTPATIENT)
Dept: ENDOCRINOLOGY | Age: 84
End: 2019-06-19

## 2019-06-19 VITALS
BODY MASS INDEX: 34.36 KG/M2 | SYSTOLIC BLOOD PRESSURE: 132 MMHG | WEIGHT: 182 LBS | DIASTOLIC BLOOD PRESSURE: 78 MMHG | RESPIRATION RATE: 16 BRPM | HEIGHT: 61 IN

## 2019-06-19 DIAGNOSIS — E78.2 MIXED HYPERLIPIDEMIA: ICD-10-CM

## 2019-06-19 DIAGNOSIS — E03.9 HYPOTHYROIDISM, ACQUIRED: ICD-10-CM

## 2019-06-19 DIAGNOSIS — I10 ESSENTIAL HYPERTENSION: ICD-10-CM

## 2019-06-19 DIAGNOSIS — E11.649 UNCONTROLLED TYPE 2 DIABETES MELLITUS WITH HYPOGLYCEMIA WITHOUT COMA (HCC): Primary | ICD-10-CM

## 2019-06-19 DIAGNOSIS — E55.9 VITAMIN D DEFICIENCY: ICD-10-CM

## 2019-06-19 PROCEDURE — 99215 OFFICE O/P EST HI 40 MIN: CPT | Performed by: INTERNAL MEDICINE

## 2019-06-19 RX ORDER — BLOOD-GLUCOSE METER
1 EACH MISCELLANEOUS AS NEEDED
COMMUNITY
End: 2019-06-19

## 2019-06-19 RX ORDER — AMPICILLIN TRIHYDRATE 250 MG
500 CAPSULE ORAL DAILY
COMMUNITY
End: 2019-11-22

## 2019-06-19 RX ORDER — LEVOTHYROXINE SODIUM 88 UG/1
88 TABLET ORAL DAILY
Qty: 90 TABLET | Refills: 3 | Status: SHIPPED | OUTPATIENT
Start: 2019-06-19 | End: 2019-06-19 | Stop reason: SDUPTHER

## 2019-06-19 RX ORDER — AMLODIPINE BESYLATE 2.5 MG/1
2.5 TABLET ORAL DAILY
COMMUNITY
End: 2019-06-19 | Stop reason: SDUPTHER

## 2019-06-19 RX ORDER — LEVOTHYROXINE SODIUM 88 UG/1
88 TABLET ORAL DAILY
Qty: 90 TABLET | Refills: 3 | Status: SHIPPED | OUTPATIENT
Start: 2019-06-19 | End: 2020-06-26 | Stop reason: SDUPTHER

## 2019-06-19 RX ORDER — AMLODIPINE BESYLATE 2.5 MG/1
2.5 TABLET ORAL DAILY
Qty: 90 TABLET | Refills: 3 | Status: SHIPPED | OUTPATIENT
Start: 2019-06-19 | End: 2020-06-26 | Stop reason: SDUPTHER

## 2019-06-19 RX ORDER — BLOOD-GLUCOSE METER
EACH MISCELLANEOUS
Qty: 500 EACH | Refills: 3 | Status: SHIPPED | OUTPATIENT
Start: 2019-06-19 | End: 2019-11-22

## 2019-06-19 RX ORDER — LOSARTAN POTASSIUM AND HYDROCHLOROTHIAZIDE 25; 100 MG/1; MG/1
1 TABLET ORAL DAILY
Qty: 90 TABLET | Refills: 3 | Status: SHIPPED | OUTPATIENT
Start: 2019-06-19 | End: 2020-06-26 | Stop reason: SDUPTHER

## 2019-06-19 RX ORDER — BLOOD-GLUCOSE METER
EACH MISCELLANEOUS
COMMUNITY
End: 2019-11-22

## 2019-06-19 RX ORDER — LANCETS
EACH MISCELLANEOUS
Qty: 500 EACH | Refills: 3 | Status: SHIPPED | OUTPATIENT
Start: 2019-06-19 | End: 2019-11-22

## 2019-06-19 NOTE — PROGRESS NOTES
"Subjective   Roland Russell is a 84 y.o. female seen for follow up for DM2, hyperlipidemia, hypothyroidism, lab review. Patient was changed to Toujeo and Humalog due to insurance coverage but she had a PA for 50/50 and Humulin N and it was approved. She has been on these medications for awhile and they work well and she wants to continue. She was changed to a Prodigy meter but does not know why. She was able to get strips for her talking meter and wants to continue use of this as well. She is checking BG 4 times a day.   History of Present Illness this is an 84-year-old female known patient with type 2 diabetes hypertension and dyslipidemia as well as hypothyroidism and vitamin D deficiency.  Over the course of last 6 months she has had no significant health problem for which to go to the ER or hospital.  She is checking her blood glucose 4 times daily before meals and at bedtime.  She is on Humalog 50/50 40 units in the morning 20 units at lunch and 40 units at supper with 20 units of Humulin and at bedtime.  She has occasions of low blood sugar as low as 59 which is recorded in her records of self blood glucose monitoring and blood sugars as high as 393.  She is very afraid of taking Toujeo worrying that this may cause her blood sugar to drop quickly.    /78   Resp 16   Ht 154.9 cm (61\")   Wt 82.6 kg (182 lb)   BMI 34.39 kg/m²      Allergies   Allergen Reactions   • Latex    • Propoxyphene Itching       Current Outpatient Medications:   •  amLODIPine (NORVASC) 2.5 MG tablet, Take 2.5 mg by mouth Daily., Disp: , Rfl:   •  Blood Glucose Monitoring Suppl ([employer] TALK MONITORING SYSTEM) w/Device kit, , Disp: , Rfl:   •  Calcium-Vitamin D-Vitamin K (CALCIUM SOFT CHEWS PO), Take 1,000 mg by mouth daily., Disp: , Rfl:   •  Cinnamon 500 MG capsule, Take 500 mg by mouth Daily., Disp: , Rfl:   •  EMBRACE LANCETS ULTRA THIN 30G, 1 each by Other route As Needed. Use as instructed, Disp: , Rfl:   •  glucose blood " ([facility]) test strip, 1 each by Other route As Needed. Use as instructed, Disp: , Rfl:   •  insulin lispro protamine-insulin lispro (humaLOG 50-50) (50-50) 100 UNIT/ML suspension injection, Inject  under the skin into the appropriate area as directed 2 (Two) Times a Day With Meals. 40, 30, 20, Disp: , Rfl:   •  insulin NPH (HUMULIN N) 100 UNIT/ML injection, Inject  under the skin into the appropriate area as directed 2 (Two) Times a Day Before Meals. As needed, Disp: , Rfl:   •  Insulin Pen Needle (BD PEN NEEDLE SYED U/F) 32G X 4 MM misc, Use to inject insulin 4 times daily, Disp: 400 each, Rfl: 3  •  levothyroxine (SYNTHROID, LEVOTHROID) 88 MCG tablet, Take 1 tablet by mouth Daily., Disp: 90 tablet, Rfl: 3  •  losartan-hydrochlorothiazide (HYZAAR) 100-25 MG per tablet, TAKE 1 TABLET DAILY, Disp: 90 tablet, Rfl: 1      The following portions of the patient's history were reviewed and updated as appropriate: allergies, current medications, past family history, past medical history, past social history, past surgical history and problem list.    Review of Systems   Constitutional: Negative.    HENT: Negative.    Eyes: Negative.    Respiratory: Negative.    Cardiovascular: Negative.    Gastrointestinal: Negative.    Endocrine: Negative.    Genitourinary: Negative.    Musculoskeletal: Negative.    Skin: Negative.    Allergic/Immunologic: Negative.    Neurological: Negative.    Hematological: Negative.    Psychiatric/Behavioral: Negative.        Objective   Physical Exam   Constitutional: She is oriented to person, place, and time. She appears well-developed and well-nourished. No distress.   HENT:   Head: Normocephalic and atraumatic.   Right Ear: External ear normal.   Left Ear: External ear normal.   Nose: Nose normal.   Mouth/Throat: Oropharynx is clear and moist. No oropharyngeal exudate.   Eyes: Conjunctivae and EOM are normal. Pupils are equal, round, and reactive to light. Right eye exhibits no  discharge. Left eye exhibits no discharge. No scleral icterus.   Neck: Trachea normal, normal range of motion and full passive range of motion without pain. Neck supple. No JVD present. No tracheal tenderness present. Carotid bruit is not present. No tracheal deviation, no edema and no erythema present. No thyroid mass and no thyromegaly present.   Cardiovascular: Normal rate, regular rhythm, normal heart sounds and intact distal pulses. Exam reveals no gallop and no friction rub.   No murmur heard.  Pulmonary/Chest: Effort normal and breath sounds normal. No stridor. No respiratory distress. She has no wheezes. She has no rales. She exhibits no tenderness.   Abdominal: Soft. Bowel sounds are normal. She exhibits no distension and no mass. There is no tenderness. There is no rebound and no guarding. No hernia.   Musculoskeletal: Normal range of motion. She exhibits no edema, tenderness or deformity.   Lymphadenopathy:     She has no cervical adenopathy.   Neurological: She is alert and oriented to person, place, and time. She has normal reflexes. She displays normal reflexes. No cranial nerve deficit or sensory deficit. She exhibits normal muscle tone. Coordination normal.   Skin: Skin is warm and dry. No rash noted. She is not diaphoretic. No erythema. No pallor.   Psychiatric: She has a normal mood and affect. Her behavior is normal. Judgment and thought content normal.   Nursing note and vitals reviewed.       Lab Results   Component Value Date    GLUCOSE 122 (H) 08/11/2016    BUN 24 (H) 06/05/2019    CREATININE 0.95 06/05/2019    EGFRIFNONA 56 (L) 06/05/2019    EGFRIFAFRI 68 06/05/2019    BCR 25.3 (H) 06/05/2019    K 4.2 06/05/2019    CO2 27.8 06/05/2019    CALCIUM 10.8 (H) 06/05/2019    PROTENTOTREF 6.6 06/05/2019    ALBUMIN 4.00 06/05/2019    LABIL2 1.5 06/05/2019    AST 12 06/05/2019    ALT 11 06/05/2019     Lab Results   Component Value Date    HGBA1C 7.80 (H) 06/05/2019     Lab Results   Component Value  Date    CHLPL 179 06/05/2019    CHLPL 201 (H) 10/24/2018    CHLPL 192 04/23/2018     Lab Results   Component Value Date    TRIG 163 (H) 06/05/2019    TRIG 170 (H) 10/24/2018    TRIG 187 (H) 04/23/2018     Lab Results   Component Value Date    HDL 42 06/05/2019    HDL 44 10/24/2018    HDL 39 (L) 04/23/2018     Lab Results   Component Value Date     (H) 06/05/2019     (H) 10/24/2018     (H) 04/23/2018     Lab Results   Component Value Date    TSH 0.056 (L) 06/05/2019         Assessment/Plan   Diagnoses and all orders for this visit:    Uncontrolled type 2 diabetes mellitus with hypoglycemia without coma (CMS/HCC)  -     Discontinue: levothyroxine (SYNTHROID, LEVOTHROID) 88 MCG tablet; Take 1 tablet by mouth Daily.  -     T3, Free; Future  -     T4 & TSH ([facility]); Future  -     T4, Free; Future  -     Thyroglobulin With Anti-TG; Future  -     Uric Acid; Future  -     Vitamin D 25 Hydroxy; Future  -     Comprehensive Metabolic Panel; Future  -     C-Peptide; Future  -     Hemoglobin A1c; Future  -     Lipid Panel; Future  -     MicroAlbumin, Urine, Random - Urine, Clean Catch; Future  -     levothyroxine (SYNTHROID, LEVOTHROID) 88 MCG tablet; Take 1 tablet by mouth Daily.    Mixed hyperlipidemia  -     Discontinue: levothyroxine (SYNTHROID, LEVOTHROID) 88 MCG tablet; Take 1 tablet by mouth Daily.  -     T3, Free; Future  -     T4 & TSH ([facility]); Future  -     T4, Free; Future  -     Thyroglobulin With Anti-TG; Future  -     Uric Acid; Future  -     Vitamin D 25 Hydroxy; Future  -     Comprehensive Metabolic Panel; Future  -     C-Peptide; Future  -     Hemoglobin A1c; Future  -     Lipid Panel; Future  -     MicroAlbumin, Urine, Random - Urine, Clean Catch; Future  -     levothyroxine (SYNTHROID, LEVOTHROID) 88 MCG tablet; Take 1 tablet by mouth Daily.    Essential hypertension  -     Discontinue: levothyroxine (SYNTHROID, LEVOTHROID) 88 MCG tablet; Take 1 tablet by mouth Daily.  -     T3, Free;  Future  -     T4 & TSH ([facility]); Future  -     T4, Free; Future  -     Thyroglobulin With Anti-TG; Future  -     Uric Acid; Future  -     Vitamin D 25 Hydroxy; Future  -     Comprehensive Metabolic Panel; Future  -     C-Peptide; Future  -     Hemoglobin A1c; Future  -     Lipid Panel; Future  -     MicroAlbumin, Urine, Random - Urine, Clean Catch; Future  -     levothyroxine (SYNTHROID, LEVOTHROID) 88 MCG tablet; Take 1 tablet by mouth Daily.  -     losartan-hydrochlorothiazide (HYZAAR) 100-25 MG per tablet; Take 1 tablet by mouth Daily.    Hypothyroidism, acquired  -     Discontinue: levothyroxine (SYNTHROID, LEVOTHROID) 88 MCG tablet; Take 1 tablet by mouth Daily.  -     T3, Free; Future  -     T4 & TSH ([facility]); Future  -     T4, Free; Future  -     Thyroglobulin With Anti-TG; Future  -     Uric Acid; Future  -     Vitamin D 25 Hydroxy; Future  -     Comprehensive Metabolic Panel; Future  -     C-Peptide; Future  -     Hemoglobin A1c; Future  -     Lipid Panel; Future  -     MicroAlbumin, Urine, Random - Urine, Clean Catch; Future  -     levothyroxine (SYNTHROID, LEVOTHROID) 88 MCG tablet; Take 1 tablet by mouth Daily.    Vitamin D deficiency  -     Discontinue: levothyroxine (SYNTHROID, LEVOTHROID) 88 MCG tablet; Take 1 tablet by mouth Daily.  -     T3, Free; Future  -     T4 & TSH ([facility]); Future  -     T4, Free; Future  -     Thyroglobulin With Anti-TG; Future  -     Uric Acid; Future  -     Vitamin D 25 Hydroxy; Future  -     Comprehensive Metabolic Panel; Future  -     C-Peptide; Future  -     Hemoglobin A1c; Future  -     Lipid Panel; Future  -     MicroAlbumin, Urine, Random - Urine, Clean Catch; Future  -     levothyroxine (SYNTHROID, LEVOTHROID) 88 MCG tablet; Take 1 tablet by mouth Daily.    Other orders  -     Lancets (ONETOUCH ULTRASOFT) lancets; Check blood glucose before meals and H&P RN  -     Insulin Pen Needle (BD PEN NEEDLE SYED U/F) 32G X 4 MM misc; Use to inject insulin 4 times daily  -      insulin NPH (HUMULIN N) 100 UNIT/ML injection; 20 units at bedtime as needed for blood glucose greater than 200  -     EMBRACE TALK GLUCOSE TEST test strip; Check blood glucose before meals and at bedtime and as needed use as instructed  -     amLODIPine (NORVASC) 2.5 MG tablet; Take 1 tablet by mouth Daily.  -     insulin lispro protamine-insulin lispro (humaLOG 50-50) (50-50) 100 UNIT/ML suspension injection; 40, 20,40      Is summary I saw and examined this 84-year-old female for above-mentioned problems.  I reviewed her laboratory evaluation of June 5, 2019 and provided her and her daughter who was present during this office visit with a hard copy of it.  I also reviewed her voluminous record of self blood glucose monitoring and because of occasional hypoglycemia as I asked her to make sure to have a snack before going to bed.  Overall she is clinically and metabolically stable and therefore we will go ahead and continue all her current prescriptions.  This office visit lasted 40 minutes of which 25 minutes was a spent on face-to-face patient counseling and education of patient and her daughter and organizing her plan of care moving forward.  She will see Ms. Anelabilio Dubon in 6 months or sooner if needed with laboratory evaluation prior to each office visit.

## 2019-07-05 ENCOUNTER — TELEPHONE (OUTPATIENT)
Dept: FAMILY MEDICINE CLINIC | Facility: CLINIC | Age: 84
End: 2019-07-05

## 2019-07-05 NOTE — TELEPHONE ENCOUNTER
Pt complains about head and neck pain on the left side since sami. She states she has the pain daily and needs to know if she should see you regarding this or another Dr. Please advise

## 2019-07-06 NOTE — TELEPHONE ENCOUNTER
I talked to this patient today  She has a daily headache  Since x mas   Varies in intensity, off and on, saw endo 2 weeks ago and bp ok   No headache at present, needs appointment  So I offered her to go to er now or see me this week and I will get ctscan head after I see her,  She saw opthamologist,   She choose to see me this week. But if headache returns and severe  Go to er.

## 2019-07-08 ENCOUNTER — OFFICE VISIT (OUTPATIENT)
Dept: FAMILY MEDICINE CLINIC | Facility: CLINIC | Age: 84
End: 2019-07-08

## 2019-07-08 VITALS
SYSTOLIC BLOOD PRESSURE: 169 MMHG | DIASTOLIC BLOOD PRESSURE: 69 MMHG | BODY MASS INDEX: 34.55 KG/M2 | HEART RATE: 85 BPM | OXYGEN SATURATION: 97 % | TEMPERATURE: 97.8 F | WEIGHT: 183 LBS | HEIGHT: 61 IN | RESPIRATION RATE: 16 BRPM

## 2019-07-08 DIAGNOSIS — R51.9 SEVERE HEADACHE: Primary | ICD-10-CM

## 2019-07-08 DIAGNOSIS — I10 ESSENTIAL HYPERTENSION: ICD-10-CM

## 2019-07-08 DIAGNOSIS — E11.649 UNCONTROLLED TYPE 2 DIABETES MELLITUS WITH HYPOGLYCEMIA WITHOUT COMA (HCC): ICD-10-CM

## 2019-07-08 DIAGNOSIS — H54.8 LEGAL BLINDNESS: ICD-10-CM

## 2019-07-08 PROCEDURE — 99214 OFFICE O/P EST MOD 30 MIN: CPT | Performed by: FAMILY MEDICINE

## 2019-07-08 NOTE — PROGRESS NOTES
Subjective   Chief Complaint:   Chief Complaint   Patient presents with   • Headache         History of Present Illness comes to the office today with headache for the past 6 months is been there for 6 months it slightly worse today.  She is to had no vomiting she has legally blind.  Her blood pressure is 140/60 I reviewed labs from Dr. Elizalde the only thing I do not have is a CBC and they all look pretty good really somata order a CAT scan of her head without contrast today and we will order that not stat.  Because she has had this for 6 months.  Me after she gets a CAT scan of her head.  And will do a CBC today other labs reviewed and they look okay no vomiting with this headache and her blood pressure is  Alert and answers all my questions today.  By exam she looks about the same.  Her neck is supple she is legally blind she is alert and talking and answers all questions        Roland Russell 84 y.o. female who presents today for Medical Management of the below listed issues and medication refills.    ICD-10-CM ICD-9-CM   1. Severe headache R51 784.0   2. Essential hypertension I10 401.9   3. Legal blindness H54.8 369.4   4. Uncontrolled type 2 diabetes mellitus with hypoglycemia without coma (CMS/HCC) E11.649 250.82     251.2        she has a problem list of   Patient Active Problem List   Diagnosis   • Legal blindness   • Uncontrolled type 2 diabetes mellitus with hypoglycemia without coma (CMS/HCC)   • GERD (gastroesophageal reflux disease)   • HLD (hyperlipidemia)   • Hypothyroidism, acquired   • Osteoporosis   • Osteoarthritis, multiple sites   • Essential hypertension   • Senile osteoporosis   • Renal insufficiency   • T12 compression fracture (CMS/HCC)   • Vitamin D deficiency   .  Since the last visit, she has overall felt well.  she has been compliant with   Current Outpatient Medications:   •  amLODIPine (NORVASC) 2.5 MG tablet, Take 1 tablet by mouth Daily., Disp: 90 tablet, Rfl: 3  •  Blood Glucose  "Monitoring Suppl ([employer] MONITORING SYSTEM) w/Device kit, , Disp: , Rfl:   •  Calcium-Vitamin D-Vitamin K (CALCIUM SOFT CHEWS PO), Take 1,000 mg by mouth daily., Disp: , Rfl:   •  Cinnamon 500 MG capsule, Take 500 mg by mouth Daily., Disp: , Rfl:   •  [employer] TALK GLUCOSE TEST test strip, Check blood glucose before meals and at bedtime and as needed use as instructed, Disp: 500 each, Rfl: 3  •  insulin lispro protamine-insulin lispro (humaLOG 50-50) (50-50) 100 UNIT/ML suspension injection, 40, 20,40, Disp: 90 mL, Rfl: 3  •  insulin NPH (HUMULIN N) 100 UNIT/ML injection, 20 units at bedtime as needed for blood glucose greater than 200, Disp: 5 each, Rfl: 5  •  Insulin Pen Needle (BD PEN NEEDLE SYED U/F) 32G X 4 MM misc, Use to inject insulin 4 times daily, Disp: 400 each, Rfl: 3  •  Lancets (ONETOUCH ULTRASOFT) lancets, Check blood glucose before meals and H&P RN, Disp: 500 each, Rfl: 3  •  levothyroxine (SYNTHROID, LEVOTHROID) 88 MCG tablet, Take 1 tablet by mouth Daily., Disp: 90 tablet, Rfl: 3  •  losartan-hydrochlorothiazide (HYZAAR) 100-25 MG per tablet, Take 1 tablet by mouth Daily., Disp: 90 tablet, Rfl: 3.  she denies medication side effects.    All of the chronic condition(s) listed above are stable w/o issues.    /69   Pulse 85   Temp 97.8 °F (36.6 °C)   Resp 16   Ht 154.9 cm (61\")   Wt 83 kg (183 lb)   SpO2 97%   BMI 34.58 kg/m²     Results for orders placed or performed during the hospital encounter of 03/27/19   Vascular screening (bundle) CAR   Result Value Ref Range    BH CV VAS BP RIGHT /70 mmHg    BH CV VAS BP LEFT /66 mmHg    Right Mid CCA PSV 44/12 cm/sec    Prox ECA PSV 74/12 cm/sec    Mid ICA PSV 68/15 cm/sec    ICA/CCA ratio 1.54     left Mid CCA PSV 62/10 cm/sec    Prox ECA /38 cm/sec    Mid ICA PSV 61/17 cm/sec    ICA/CCA ratio 0.98     Prox Ao Diam 1.78 cm    Mid Ao Diam 1.48 cm    PAD Right Arm 180 mmHg    PAD Right Leg  mmHg    PAD Right Leg DP " 160 mmHg    PAD Right VIC PT 0.88     PAD Right VIC DP 0.88     PAD Left Arm 180 mmHg    PAD Left Leg  mmHg    PAD Left Leg  mmHg    PAD Left VIC PT 1.05     PAD Left VIC DP 1.05     Dist Ao Diam 1.24 cm             The following portions of the patient's history were reviewed and updated as appropriate: allergies, current medications, past family history, past medical history, past social history, past surgical history and problem list.    Review of Systems   Constitutional: Negative for activity change, appetite change, fatigue, fever and unexpected weight change.   HENT: Negative for congestion, ear pain, hearing loss, sinus pressure, tinnitus, trouble swallowing and voice change.    Eyes: Negative for photophobia, pain and visual disturbance.        Is legally blind   Respiratory: Negative for cough, chest tightness, shortness of breath and wheezing.    Cardiovascular: Negative for chest pain and palpitations.   Gastrointestinal: Negative for abdominal pain and nausea.   Skin: Negative for color change.   Neurological: Positive for headaches. Negative for dizziness, seizures, syncope and speech difficulty.        Answers all questions   Psychiatric/Behavioral: Negative for confusion and decreased concentration.       Objective   Physical Exam   Constitutional: She is oriented to person, place, and time. She appears well-developed and well-nourished.   HENT:   Right Ear: External ear normal.   Left Ear: External ear normal.   Mouth/Throat: Oropharynx is clear and moist.   Is legally blind   Eyes: Pupils are equal, round, and reactive to light.   Cardiovascular: Normal rate and regular rhythm.   Pulmonary/Chest: Effort normal and breath sounds normal.   Abdominal: Soft. Bowel sounds are normal.   Neurological: She is alert and oriented to person, place, and time.   Psychiatric: She has a normal mood and affect. Her behavior is normal.   Nursing note and vitals reviewed.      Assessment/Plan   Roalnd was  seen today for headache.    Diagnoses and all orders for this visit:    Severe headache    Essential hypertension    Legal blindness    Uncontrolled type 2 diabetes mellitus with hypoglycemia without coma (CMS/[facility])

## 2019-07-12 ENCOUNTER — APPOINTMENT (OUTPATIENT)
Dept: LAB | Facility: HOSPITAL | Age: 84
End: 2019-07-12

## 2019-07-12 ENCOUNTER — HOSPITAL ENCOUNTER (OUTPATIENT)
Dept: CT IMAGING | Facility: HOSPITAL | Age: 84
Discharge: HOME | End: 2019-07-12
Admitting: FAMILY MEDICINE

## 2019-07-12 DIAGNOSIS — R51.9 SEVERE HEADACHE: ICD-10-CM

## 2019-07-12 LAB
BASOPHILS # BLD AUTO: 0.07 10*3/MM3 (ref 0–0.2)
BASOPHILS NFR BLD AUTO: 0.7 % (ref 0–1.5)
DEPRECATED RDW RBC AUTO: 44.5 FL (ref 37–54)
EOSINOPHIL # BLD AUTO: 0.2 10*3/MM3 (ref 0–0.4)
EOSINOPHIL NFR BLD AUTO: 2.1 % (ref 0.3–6.2)
ERYTHROCYTE [DISTWIDTH] IN BLOOD BY AUTOMATED COUNT: 13.6 % (ref 12.3–15.4)
HCT VFR BLD AUTO: 42.7 % (ref 34–46.6)
HGB BLD-MCNC: 13.3 G/DL (ref 12–15.9)
IMM GRANULOCYTES # BLD AUTO: 0.02 10*3/MM3 (ref 0–0.05)
IMM GRANULOCYTES NFR BLD AUTO: 0.2 % (ref 0–0.5)
LYMPHOCYTES # BLD AUTO: 3.09 10*3/MM3 (ref 0.7–3.1)
LYMPHOCYTES NFR BLD AUTO: 31.9 % (ref 19.6–45.3)
MCH RBC QN AUTO: 27.7 PG (ref 26.6–33)
MCHC RBC AUTO-ENTMCNC: 31.1 G/DL (ref 31.5–35.7)
MCV RBC AUTO: 89 FL (ref 79–97)
MONOCYTES # BLD AUTO: 0.65 10*3/MM3 (ref 0.1–0.9)
MONOCYTES NFR BLD AUTO: 6.7 % (ref 5–12)
NEUTROPHILS # BLD AUTO: 5.65 10*3/MM3 (ref 1.7–7)
NEUTROPHILS NFR BLD AUTO: 58.4 % (ref 42.7–76)
NRBC BLD AUTO-RTO: 0 /100 WBC (ref 0–0.2)
PLATELET # BLD AUTO: 225 10*3/MM3 (ref 140–450)
PMV BLD AUTO: 10.7 FL (ref 6–12)
RBC # BLD AUTO: 4.8 10*6/MM3 (ref 3.77–5.28)
WBC NRBC COR # BLD: 9.68 10*3/MM3 (ref 3.4–10.8)

## 2019-07-12 PROCEDURE — 85025 COMPLETE CBC W/AUTO DIFF WBC: CPT | Performed by: FAMILY MEDICINE

## 2019-07-12 PROCEDURE — 70450 CT HEAD/BRAIN W/O DYE: CPT

## 2019-07-12 PROCEDURE — 36415 COLL VENOUS BLD VENIPUNCTURE: CPT | Performed by: FAMILY MEDICINE

## 2019-07-23 ENCOUNTER — OFFICE VISIT (OUTPATIENT)
Dept: FAMILY MEDICINE CLINIC | Facility: CLINIC | Age: 84
End: 2019-07-23

## 2019-07-23 VITALS
HEIGHT: 61 IN | WEIGHT: 182 LBS | DIASTOLIC BLOOD PRESSURE: 70 MMHG | SYSTOLIC BLOOD PRESSURE: 169 MMHG | OXYGEN SATURATION: 93 % | TEMPERATURE: 98.1 F | RESPIRATION RATE: 16 BRPM | BODY MASS INDEX: 34.36 KG/M2

## 2019-07-23 DIAGNOSIS — R51.9 SEVERE HEADACHE: Primary | ICD-10-CM

## 2019-07-23 DIAGNOSIS — M81.0 SENILE OSTEOPOROSIS: ICD-10-CM

## 2019-07-23 DIAGNOSIS — I10 ESSENTIAL HYPERTENSION: ICD-10-CM

## 2019-07-23 DIAGNOSIS — H54.8 LEGAL BLINDNESS: ICD-10-CM

## 2019-07-23 DIAGNOSIS — E11.649 UNCONTROLLED TYPE 2 DIABETES MELLITUS WITH HYPOGLYCEMIA WITHOUT COMA (HCC): ICD-10-CM

## 2019-07-23 PROCEDURE — 99214 OFFICE O/P EST MOD 30 MIN: CPT | Performed by: FAMILY MEDICINE

## 2019-07-23 NOTE — PROGRESS NOTES
Subjective   Chief Complaint:   Chief Complaint   Patient presents with   • Headache     Review CT Scan         History of Present Illness left-sided headaches there off and on she also has some kaleidoscope vision the headache started in late December 2019.  The headaches are not every day but they are frequent.  And the headaches are classified as severe they are only on the left side.  X are all left-sided and down the left side of her neck and a constant pain when she gets some the headaches could last days when she gets some we did a CT scan of her head and it did show a old lacunar infarct but nothing acute.  CT scan I talked to the radiologist and he suggested a scan with and without but she has a pacemaker working to try to find out from the cardiologist if she can have a scan of her brain.  She is alert and oriented and talking and no headache today have a little bit of headache today again working to check with the cardiologist and see if we can do a an MRI of her brain with and without if she has this pacemaker.              Roland Russell 84 y.o. female who presents today for Medical Management of the below listed issues and medication refills.    ICD-10-CM ICD-9-CM   1. Severe headache R51 784.0   2. Uncontrolled type 2 diabetes mellitus with hypoglycemia without coma (CMS/HCC) E11.649 250.82     251.2   3. Senile osteoporosis M81.0 733.01   4. Essential hypertension I10 401.9   5. Legal blindness H54.8 369.4        she has a problem list of   Patient Active Problem List   Diagnosis   • Legal blindness   • Uncontrolled type 2 diabetes mellitus with hypoglycemia without coma (CMS/HCC)   • GERD (gastroesophageal reflux disease)   • HLD (hyperlipidemia)   • Hypothyroidism, acquired   • Osteoporosis   • Osteoarthritis, multiple sites   • Essential hypertension   • Senile osteoporosis   • Renal insufficiency   • T12 compression fracture (CMS/HCC)   • Vitamin D deficiency   • Severe headache   .  Since the  "last visit, she has overall felt well.  she has been compliant with   Current Outpatient Medications:   •  amLODIPine (NORVASC) 2.5 MG tablet, Take 1 tablet by mouth Daily., Disp: 90 tablet, Rfl: 3  •  Blood Glucose Monitoring Suppl ([employer] MONITORING SYSTEM) w/Device kit, , Disp: , Rfl:   •  Calcium-Vitamin D-Vitamin K (CALCIUM SOFT CHEWS PO), Take 1,000 mg by mouth daily., Disp: , Rfl:   •  Cinnamon 500 MG capsule, Take 500 mg by mouth Daily., Disp: , Rfl:   •  [employer] TALK GLUCOSE TEST test strip, Check blood glucose before meals and at bedtime and as needed use as instructed, Disp: 500 each, Rfl: 3  •  insulin lispro protamine-insulin lispro (humaLOG 50-50) (50-50) 100 UNIT/ML suspension injection, 40, 20,40, Disp: 90 mL, Rfl: 3  •  insulin NPH (HUMULIN N) 100 UNIT/ML injection, 20 units at bedtime as needed for blood glucose greater than 200, Disp: 5 each, Rfl: 5  •  Insulin Pen Needle (BD PEN NEEDLE SYED U/F) 32G X 4 MM misc, Use to inject insulin 4 times daily, Disp: 400 each, Rfl: 3  •  Lancets (ONETOUCH ULTRASOFT) lancets, Check blood glucose before meals and H&P RN, Disp: 500 each, Rfl: 3  •  levothyroxine (SYNTHROID, LEVOTHROID) 88 MCG tablet, Take 1 tablet by mouth Daily., Disp: 90 tablet, Rfl: 3  •  losartan-hydrochlorothiazide (HYZAAR) 100-25 MG per tablet, Take 1 tablet by mouth Daily., Disp: 90 tablet, Rfl: 3.  she denies medication side effects.    All of the chronic condition(s) listed above are stable w/o issues.    /70   Temp 98.1 °F (36.7 °C)   Resp 16   Ht 154.9 cm (61\")   Wt 82.6 kg (182 lb)   SpO2 93%   BMI 34.39 kg/m²     Results for orders placed or performed in visit on 07/08/19   CBC Auto Differential   Result Value Ref Range    WBC 9.68 3.40 - 10.80 10*3/mm3    RBC 4.80 3.77 - 5.28 10*6/mm3    Hemoglobin 13.3 12.0 - 15.9 g/dL    Hematocrit 42.7 34.0 - 46.6 %    MCV 89.0 79.0 - 97.0 fL    MCH 27.7 26.6 - 33.0 pg    MCHC 31.1 (L) 31.5 - 35.7 g/dL    RDW 13.6 12.3 - 15.4 % "    RDW-SD 44.5 37.0 - 54.0 fl    MPV 10.7 6.0 - 12.0 fL    Platelets 225 140 - 450 10*3/mm3    Neutrophil % 58.4 42.7 - 76.0 %    Lymphocyte % 31.9 19.6 - 45.3 %    Monocyte % 6.7 5.0 - 12.0 %    Eosinophil % 2.1 0.3 - 6.2 %    Basophil % 0.7 0.0 - 1.5 %    Immature Grans % 0.2 0.0 - 0.5 %    Neutrophils, Absolute 5.65 1.70 - 7.00 10*3/mm3    Lymphocytes, Absolute 3.09 0.70 - 3.10 10*3/mm3    Monocytes, Absolute 0.65 0.10 - 0.90 10*3/mm3    Eosinophils, Absolute 0.20 0.00 - 0.40 10*3/mm3    Basophils, Absolute 0.07 0.00 - 0.20 10*3/mm3    Immature Grans, Absolute 0.02 0.00 - 0.05 10*3/mm3    nRBC 0.0 0.0 - 0.2 /100 WBC             The following portions of the patient's history were reviewed and updated as appropriate: allergies, current medications, past family history, past medical history, past social history, past surgical history and problem list.    Review of Systems   Constitutional: Negative for activity change, appetite change and unexpected weight change.   Eyes: Negative for visual disturbance.        She is legally blind   Alert and oriented    Moves all extremities   Respiratory: Negative for chest tightness and shortness of breath.    Cardiovascular: Negative for chest pain and palpitations.   Gastrointestinal: Negative for abdominal pain.   Genitourinary: Negative for difficulty urinating.   Musculoskeletal: Negative for back pain.   Skin: Negative for color change.   Neurological: Positive for dizziness and headaches. Negative for tremors, syncope, speech difficulty and weakness.   Psychiatric/Behavioral: Negative for confusion and decreased concentration.       Objective   Physical Exam   Constitutional: She is oriented to person, place, and time. She appears well-developed and well-nourished.   HENT:   Right Ear: External ear normal.   Left Ear: External ear normal.   Mouth/Throat: Oropharynx is clear and moist.   Eyes: Pupils are equal, round, and reactive to light.   Legally blind   Neck: Normal  range of motion.   Cardiovascular: Normal rate and regular rhythm.   Pulmonary/Chest: Effort normal and breath sounds normal.   Abdominal: Soft. Bowel sounds are normal.   Musculoskeletal: Normal range of motion. She exhibits no edema.   Neurological: She is alert and oriented to person, place, and time.   Psychiatric: She has a normal mood and affect. Her behavior is normal.   Nursing note and vitals reviewed.      Assessment/Plan   Roland was seen today for headache.    Diagnoses and all orders for this visit:    Severe headache  Comments:    left side  Orders:  -     Ambulatory Referral to Neurology    Uncontrolled type 2 diabetes mellitus with hypoglycemia without coma (CMS/[facility])  -     Ambulatory Referral to Neurology    Senile osteoporosis  -     Ambulatory Referral to Neurology    Essential hypertension  -     Ambulatory Referral to Neurology    Legal blindness  -     Ambulatory Referral to Neurology

## 2019-08-13 ENCOUNTER — TELEPHONE (OUTPATIENT)
Dept: FAMILY MEDICINE CLINIC | Facility: CLINIC | Age: 84
End: 2019-08-13

## 2019-08-13 DIAGNOSIS — R51.9 SEVERE HEADACHE: Primary | ICD-10-CM

## 2019-08-13 NOTE — TELEPHONE ENCOUNTER
"Pt's daughter called asking about MRI for headaches.  Pt has pacemaker.  Please advise.    OV 07/23/19 \"CT scan I talked to the radiologist and he suggested a scan with and without but she has a pacemaker working to try to find out from the cardiologist if she can have a scan of her brain.  She is alert and oriented and talking and no headache today have a little bit of headache today again working to check with the cardiologist and see if we can do a an MRI of her brain with and without if she has this pacemaker.\"  "

## 2019-08-20 ENCOUNTER — TELEPHONE (OUTPATIENT)
Dept: FAMILY MEDICINE CLINIC | Facility: CLINIC | Age: 84
End: 2019-08-20

## 2019-08-20 NOTE — TELEPHONE ENCOUNTER
Called patient to inform that she cannot have MRI per Cardiologist due to having pacemaker.  Gave her phone number to Neurologist, so that she can call and schedule an appointment

## 2019-08-20 NOTE — TELEPHONE ENCOUNTER
FIND OUT NAME OF CARDIOLOGIST    AND SEE IF YOU CAN GET ME HIM ON PHONE OR HIS NURSE???     AND I CAN ASK HIM ABOUT MRI SCAN AND HER PACEMAKER???

## 2019-08-26 ENCOUNTER — TELEPHONE (OUTPATIENT)
Dept: FAMILY MEDICINE CLINIC | Facility: CLINIC | Age: 84
End: 2019-08-26

## 2019-08-26 NOTE — TELEPHONE ENCOUNTER
Pt's daughter called asking for records to be faxed to Dr. Dunn at [phone] - fax # [phone].  Sent CT Scan and last ON.

## 2019-10-03 ENCOUNTER — OFFICE VISIT (OUTPATIENT)
Dept: NEUROLOGY | Facility: CLINIC | Age: 84
End: 2019-10-03

## 2019-10-03 ENCOUNTER — LAB (OUTPATIENT)
Dept: LAB | Facility: HOSPITAL | Age: 84
End: 2019-10-03

## 2019-10-03 VITALS
OXYGEN SATURATION: 95 % | SYSTOLIC BLOOD PRESSURE: 100 MMHG | HEIGHT: 61 IN | HEART RATE: 71 BPM | BODY MASS INDEX: 34.55 KG/M2 | WEIGHT: 183 LBS | DIASTOLIC BLOOD PRESSURE: 74 MMHG

## 2019-10-03 DIAGNOSIS — M54.81 OCCIPITAL NEURALGIA OF LEFT SIDE: ICD-10-CM

## 2019-10-03 DIAGNOSIS — G44.52 NEW DAILY PERSISTENT HEADACHE: ICD-10-CM

## 2019-10-03 DIAGNOSIS — R41.89 SPELL OF ALTERED COGNITION: Primary | ICD-10-CM

## 2019-10-03 DIAGNOSIS — M54.2 CERVICALGIA: ICD-10-CM

## 2019-10-03 DIAGNOSIS — E03.9 ACQUIRED HYPOTHYROIDISM: ICD-10-CM

## 2019-10-03 DIAGNOSIS — R41.89 SPELL OF ALTERED COGNITION: ICD-10-CM

## 2019-10-03 LAB
ALBUMIN SERPL-MCNC: 4.1 G/DL (ref 3.5–5.2)
ALBUMIN/GLOB SERPL: 1.2 G/DL
ALP SERPL-CCNC: 148 U/L (ref 39–117)
ALT SERPL W P-5'-P-CCNC: 18 U/L (ref 1–33)
ANION GAP SERPL CALCULATED.3IONS-SCNC: 9.9 MMOL/L (ref 5–15)
AST SERPL-CCNC: 16 U/L (ref 1–32)
BASOPHILS # BLD AUTO: 0.05 10*3/MM3 (ref 0–0.2)
BASOPHILS NFR BLD AUTO: 0.5 % (ref 0–1.5)
BILIRUB SERPL-MCNC: 0.4 MG/DL (ref 0.1–1.2)
BUN BLD-MCNC: 24 MG/DL (ref 8–23)
BUN/CREAT SERPL: 24.5 (ref 7–25)
CALCIUM SPEC-SCNC: 10.4 MG/DL (ref 8.6–10.5)
CHLORIDE SERPL-SCNC: 101 MMOL/L (ref 98–107)
CO2 SERPL-SCNC: 29.1 MMOL/L (ref 22–29)
CREAT BLD-MCNC: 0.98 MG/DL (ref 0.57–1)
CRP SERPL-MCNC: 0.95 MG/DL (ref 0–0.5)
DEPRECATED RDW RBC AUTO: 45.6 FL (ref 37–54)
EOSINOPHIL # BLD AUTO: 0.19 10*3/MM3 (ref 0–0.4)
EOSINOPHIL NFR BLD AUTO: 2 % (ref 0.3–6.2)
ERYTHROCYTE [DISTWIDTH] IN BLOOD BY AUTOMATED COUNT: 14.2 % (ref 12.3–15.4)
ERYTHROCYTE [SEDIMENTATION RATE] IN BLOOD: 16 MM/HR (ref 0–30)
GFR SERPL CREATININE-BSD FRML MDRD: 54 ML/MIN/1.73
GLOBULIN UR ELPH-MCNC: 3.3 GM/DL
GLUCOSE BLD-MCNC: 171 MG/DL (ref 65–99)
HCT VFR BLD AUTO: 43.2 % (ref 34–46.6)
HGB BLD-MCNC: 13.8 G/DL (ref 12–15.9)
IMM GRANULOCYTES # BLD AUTO: 0.05 10*3/MM3 (ref 0–0.05)
IMM GRANULOCYTES NFR BLD AUTO: 0.5 % (ref 0–0.5)
LYMPHOCYTES # BLD AUTO: 2.62 10*3/MM3 (ref 0.7–3.1)
LYMPHOCYTES NFR BLD AUTO: 27.7 % (ref 19.6–45.3)
MCH RBC QN AUTO: 28 PG (ref 26.6–33)
MCHC RBC AUTO-ENTMCNC: 31.9 G/DL (ref 31.5–35.7)
MCV RBC AUTO: 87.8 FL (ref 79–97)
MONOCYTES # BLD AUTO: 0.63 10*3/MM3 (ref 0.1–0.9)
MONOCYTES NFR BLD AUTO: 6.7 % (ref 5–12)
NEUTROPHILS # BLD AUTO: 5.91 10*3/MM3 (ref 1.7–7)
NEUTROPHILS NFR BLD AUTO: 62.6 % (ref 42.7–76)
NRBC BLD AUTO-RTO: 0 /100 WBC (ref 0–0.2)
PLATELET # BLD AUTO: 220 10*3/MM3 (ref 140–450)
PMV BLD AUTO: 10.8 FL (ref 6–12)
POTASSIUM BLD-SCNC: 3.7 MMOL/L (ref 3.5–5.2)
PROT SERPL-MCNC: 7.4 G/DL (ref 6–8.5)
RBC # BLD AUTO: 4.92 10*6/MM3 (ref 3.77–5.28)
SODIUM BLD-SCNC: 140 MMOL/L (ref 136–145)
T4 FREE SERPL-MCNC: 1.39 NG/DL (ref 0.93–1.7)
TSH SERPL DL<=0.05 MIU/L-ACNC: 1.54 UIU/ML (ref 0.27–4.2)
VIT B12 BLD-MCNC: 310 PG/ML (ref 211–946)
WBC NRBC COR # BLD: 9.45 10*3/MM3 (ref 3.4–10.8)

## 2019-10-03 PROCEDURE — 84443 ASSAY THYROID STIM HORMONE: CPT

## 2019-10-03 PROCEDURE — 36415 COLL VENOUS BLD VENIPUNCTURE: CPT | Performed by: NURSE PRACTITIONER

## 2019-10-03 PROCEDURE — 80053 COMPREHEN METABOLIC PANEL: CPT | Performed by: NURSE PRACTITIONER

## 2019-10-03 PROCEDURE — 86140 C-REACTIVE PROTEIN: CPT | Performed by: NURSE PRACTITIONER

## 2019-10-03 PROCEDURE — 85025 COMPLETE CBC W/AUTO DIFF WBC: CPT

## 2019-10-03 PROCEDURE — 82607 VITAMIN B-12: CPT | Performed by: NURSE PRACTITIONER

## 2019-10-03 PROCEDURE — 99214 OFFICE O/P EST MOD 30 MIN: CPT | Performed by: NURSE PRACTITIONER

## 2019-10-03 PROCEDURE — 85651 RBC SED RATE NONAUTOMATED: CPT

## 2019-10-03 PROCEDURE — 84439 ASSAY OF FREE THYROXINE: CPT | Performed by: NURSE PRACTITIONER

## 2019-10-03 NOTE — PROGRESS NOTES
"Subjective:     Patient ID: Roland Russell is a 84 y.o. female presenting for evaluation. She has a history of uncontrolled type 2 DM on insulin, hypertension, and hypothyroidism. She presents today with her daughter.    She had a spell on December 24, 2018 that she describes as \"not being able to function\" for 15 minutes, max 30 minutes. Her family was with her and says she was standing at the sink and had to have help to sit down. She could not speak, could not follow commands. She developed an intense headache to the left side of her head down into her neck and also \"kaleidoscope\" vision. Her daughter states afterwards she returned to normal and was acting herself so they did not take to her the ER. She continued to have a dull headache to the left side of her head and neck along with the kaleidoscope vision. This lasted for several months and gradually improved.     She had a second spell in early September that is described exactly as the one in December. She has had a constant headache as well as kaleidoscope vision again since that spell. She had a third spell a few weeks ago described the same as the others.     She had a CT of her head in July 2019 that showed an old lacunar infarct but was otherwise negative. She has not had a repeat CT since the second and third spell to see if there have been any changes.     She does have tenderness to the left temporal area as well as left occipital. She complains of quite a bit of neck pain.     She denies a known history of hyperlipidemia. She says her blood sugars are all over the place. She has a history of hypertension. 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 Her pacemaker is not MRI compatible.     History of Present Illness  The following portions of the patient's history were reviewed and updated as appropriate: allergies, current medications, past family history, past medical history, past social history, past surgical history and problem " list.    Review of Systems   Constitutional: Positive for fatigue. Negative for activity change and appetite change.   HENT: Positive for ear discharge and trouble swallowing. Negative for facial swelling and voice change.    Eyes: Positive for photophobia and visual disturbance. Negative for pain.   Respiratory: Positive for apnea and choking. Negative for chest tightness, shortness of breath and wheezing.    Cardiovascular: Negative for chest pain, palpitations and leg swelling.   Gastrointestinal: Positive for anal bleeding.   Endocrine: Negative for polydipsia and polyphagia.   Musculoskeletal: Positive for arthralgias, back pain, neck pain and neck stiffness. Negative for gait problem.   Skin: Negative for rash and wound.   Allergic/Immunologic: Negative for environmental allergies.   Neurological: Negative for dizziness, tremors, seizures, syncope, facial asymmetry, speech difficulty, weakness, light-headedness, numbness and headaches.   Hematological: Does not bruise/bleed easily.   Psychiatric/Behavioral: Negative for agitation, behavioral problems, confusion, decreased concentration, dysphoric mood, hallucinations, self-injury, sleep disturbance and suicidal ideas. The patient is not nervous/anxious and is not hyperactive.         Objective:    Neurologic Exam  Mental Status:  Alert and oriented. Speech is fluent. Comprehension is intact.   Cranial Nerves II-XII: Pupils equal, round, reactive to light. Extraocular movements are full and conjugate in all directions. Pursuit movements do not provoke any apparent dizziness or discomfort.  No nystagmus noted. She has loss of vision in all visual fields in the left eye. Visual fields are full in the right eye.   Hearing is intact to voice. Facial strength and sensation are preserved and symmetric. Tongue and palate midline. Voice non-hoarse, non-dysarthric.   Motor: Normal bulk and tone of bilateral upper and lower extremities. Strength is 5/5 in all 4  extremities both proximally and distally. There are no abnormal or involuntary movements noted.  Sensation: Intact to light touch throughout. Romberg was negative with no significant sway. There is tenderness to palpation over the left temporal area as well as left occipital.   Coordination: Fully intact. Finger-to-nose performed accurately bilaterally.  Reflexes: Absent reflexes in the upper and lower extremities.   Gait: Antalgic    Physical Exam  General: Well nourished, well developed, and in no acute distress.  HEENT: Normocephalic/atraumatic. Mucous membranes moist. Sclerae anicteric. She has limited cervical ROM to both sides.   Heart: Regular rate and rhythm. No murmurs, rubs or gallops.  Lungs: Clear to auscultation bilaterally.  Skin: No notable rashes or lesions on the visible surfaces.   Extremities: No clubbing, cyanosis or significant edema.   Psychiatric: Pleasant, cooperative, and appropriate.   Assessment/Plan:     Roland was seen today for headache.    Diagnoses and all orders for this visit:    Spell of altered cognition  -     CT Head Without Contrast; Future  -     CT Angiogram Head With & Without Contrast; Future  -     CT cervical spine w wo contrast; Future  -     CT Angiogram Neck With & Without Contrast; Future  -     TSH; Future  -     T4, free; Future  -     Vitamin B12  -     CBC & Differential; Future  -     Comprehensive Metabolic Panel  -     Sedimentation Rate; Future  -     C-reactive Protein; Future    New daily persistent headache  -     CT Head Without Contrast; Future  -     CT Angiogram Head With & Without Contrast; Future  -     CT cervical spine w wo contrast; Future  -     CT Angiogram Neck With & Without Contrast; Future  -     TSH; Future  -     T4, free; Future  -     Vitamin B12  -     CBC & Differential; Future  -     Comprehensive Metabolic Panel  -     Sedimentation Rate; Future  -     C-reactive Protein; Future    Occipital neuralgia of left side  -     CT Head  "Without Contrast; Future  -     CT Angiogram Head With & Without Contrast; Future  -     CT cervical spine w wo contrast; Future  -     CT Angiogram Neck With & Without Contrast; Future  -     TSH; Future  -     T4, free; Future  -     Vitamin B12  -     CBC & Differential; Future  -     Comprehensive Metabolic Panel  -     Sedimentation Rate; Future  -     C-reactive Protein; Future    Cervicalgia  -     CT Head Without Contrast; Future  -     CT Angiogram Head With & Without Contrast; Future  -     CT cervical spine w wo contrast; Future  -     CT Angiogram Neck With & Without Contrast; Future  -     TSH; Future  -     T4, free; Future  -     Vitamin B12  -     CBC & Differential; Future  -     Comprehensive Metabolic Panel  -     Sedimentation Rate; Future  -     C-reactive Protein; Future    Acquired hypothyroidism  -     TSH; Future  -     T4, free; Future         The patient presents today after 3 spells over the last 9 months. Each spell involved her \"blanking out\" for several minutes, followed by a left sided headache and persistent kaleidoscope vision. She had a CT of her head in July showing an old lacunar infarct. Given that she has had 2 further spells since that CT I am going to check a repeat CT of the head to see if there has been any changes. I am also going to check a CT of her cervical spine given the significant neck pain and headaches she is having. I think she is having some occipital neuralgia as well. She does have occipital tenderness on exam. I am going to check a ESR and CRP today given the left temporal tenderness. CTA of the head and neck ordered as well to rule out atherosclerotic disease. We will call her with test results and determine plan for follow up at that time. Discussed S/S of stroke and to call 911. Discussed the importance of stroke risk factor control, including controlling blood sugars, blood pressure, and cholesterol. She and her daughter voiced understanding of this. "

## 2019-10-11 ENCOUNTER — TELEPHONE (OUTPATIENT)
Dept: NEUROLOGY | Facility: CLINIC | Age: 84
End: 2019-10-11

## 2019-10-11 NOTE — TELEPHONE ENCOUNTER
----- Message from NORI Rico sent at 10/8/2019  9:57 AM EDT -----  Can you notify the patient's family that her lab work was normal. Will await CT head results and go from there. Thanks

## 2019-10-17 ENCOUNTER — HOSPITAL ENCOUNTER (OUTPATIENT)
Dept: CT IMAGING | Facility: HOSPITAL | Age: 84
Discharge: HOME | End: 2019-10-17
Admitting: NURSE PRACTITIONER

## 2019-10-17 DIAGNOSIS — M54.81 OCCIPITAL NEURALGIA OF LEFT SIDE: ICD-10-CM

## 2019-10-17 DIAGNOSIS — G44.52 NEW DAILY PERSISTENT HEADACHE: ICD-10-CM

## 2019-10-17 DIAGNOSIS — M54.2 CERVICALGIA: ICD-10-CM

## 2019-10-17 DIAGNOSIS — R41.89 SPELL OF ALTERED COGNITION: ICD-10-CM

## 2019-10-17 LAB — CREAT BLDA-MCNC: 1 MG/DL (ref 0.6–1.3)

## 2019-10-17 PROCEDURE — 70496 CT ANGIOGRAPHY HEAD: CPT

## 2019-10-17 PROCEDURE — 25010000002 IOPAMIDOL 61 % SOLUTION: Performed by: NURSE PRACTITIONER

## 2019-10-17 PROCEDURE — 70498 CT ANGIOGRAPHY NECK: CPT

## 2019-10-17 PROCEDURE — 82565 ASSAY OF CREATININE: CPT

## 2019-10-17 RX ADMIN — IOPAMIDOL 95 ML: 612 INJECTION, SOLUTION INTRAVENOUS at 06:23

## 2019-10-18 ENCOUNTER — HOSPITAL ENCOUNTER (OUTPATIENT)
Dept: CARDIOLOGY | Facility: HOSPITAL | Age: 84
Discharge: HOME | End: 2019-10-18
Admitting: NURSE PRACTITIONER

## 2019-10-18 DIAGNOSIS — I65.9 OCCLUSION AND STENOSIS OF UNSPECIFIED PRECEREBRAL ARTERY: ICD-10-CM

## 2019-10-18 DIAGNOSIS — R93.89 ABNORMAL CT SCAN, NECK: ICD-10-CM

## 2019-10-18 DIAGNOSIS — R93.89 ABNORMAL CT SCAN, NECK: Primary | ICD-10-CM

## 2019-10-18 LAB
BH CV XLRA MEAS LEFT DIST CCA EDV: -12.7 CM/SEC
BH CV XLRA MEAS LEFT DIST CCA PSV: -83.5 CM/SEC
BH CV XLRA MEAS LEFT DIST ICA EDV: -11.4 CM/SEC
BH CV XLRA MEAS LEFT DIST ICA PSV: -61.2 CM/SEC
BH CV XLRA MEAS LEFT ICA/CCA RATIO: 0.86
BH CV XLRA MEAS LEFT MID ICA EDV: -12.8 CM/SEC
BH CV XLRA MEAS LEFT MID ICA PSV: -57.4 CM/SEC
BH CV XLRA MEAS LEFT PROX CCA EDV: 10.6 CM/SEC
BH CV XLRA MEAS LEFT PROX CCA PSV: 120 CM/SEC
BH CV XLRA MEAS LEFT PROX ECA EDV: 6.4 CM/SEC
BH CV XLRA MEAS LEFT PROX ECA PSV: 78.5 CM/SEC
BH CV XLRA MEAS LEFT PROX ICA EDV: -12.7 CM/SEC
BH CV XLRA MEAS LEFT PROX ICA PSV: -71.5 CM/SEC
BH CV XLRA MEAS LEFT PROX SCLA PSV: 158 CM/SEC
BH CV XLRA MEAS LEFT VERTEBRAL A EDV: 9.9 CM/SEC
BH CV XLRA MEAS LEFT VERTEBRAL A PSV: 82.1 CM/SEC
BH CV XLRA MEAS RIGHT CCA RATIO VEL: 54.1 CM/SEC
BH CV XLRA MEAS RIGHT DIST CCA EDV: 7.6 CM/SEC
BH CV XLRA MEAS RIGHT DIST CCA PSV: 54.1 CM/SEC
BH CV XLRA MEAS RIGHT DIST ICA EDV: -11.4 CM/SEC
BH CV XLRA MEAS RIGHT DIST ICA PSV: -58.8 CM/SEC
BH CV XLRA MEAS RIGHT ICA RATIO VEL: -80.6 CM/SEC
BH CV XLRA MEAS RIGHT ICA/CCA RATIO: -1.5
BH CV XLRA MEAS RIGHT MID ICA EDV: -11.4 CM/SEC
BH CV XLRA MEAS RIGHT MID ICA PSV: -55 CM/SEC
BH CV XLRA MEAS RIGHT PROX CCA EDV: 10.4 CM/SEC
BH CV XLRA MEAS RIGHT PROX CCA PSV: 71.1 CM/SEC
BH CV XLRA MEAS RIGHT PROX ECA EDV: 7.1 CM/SEC
BH CV XLRA MEAS RIGHT PROX ECA PSV: 134 CM/SEC
BH CV XLRA MEAS RIGHT PROX ICA EDV: -14.2 CM/SEC
BH CV XLRA MEAS RIGHT PROX ICA PSV: -80.6 CM/SEC
BH CV XLRA MEAS RIGHT PROX SCLA PSV: 90.1 CM/SEC
BH CV XLRA MEAS RIGHT VERTEBRAL A EDV: 8.5 CM/SEC
BH CV XLRA MEAS RIGHT VERTEBRAL A PSV: 60.7 CM/SEC
LEFT ARM BP: NORMAL MMHG
RIGHT ARM BP: NORMAL MMHG

## 2019-10-18 PROCEDURE — 93880 EXTRACRANIAL BILAT STUDY: CPT

## 2019-10-22 DIAGNOSIS — I82.C12 ACUTE EMBOLISM AND THROMBOSIS OF LEFT INTERNAL JUGULAR VEIN (HCC): Primary | ICD-10-CM

## 2019-10-22 DIAGNOSIS — I65.22 OCCLUSION AND STENOSIS OF LEFT CAROTID ARTERY: ICD-10-CM

## 2019-10-25 ENCOUNTER — HOSPITAL ENCOUNTER (OUTPATIENT)
Dept: CARDIOLOGY | Facility: HOSPITAL | Age: 84
Discharge: HOME | End: 2019-10-25
Admitting: NURSE PRACTITIONER

## 2019-10-25 DIAGNOSIS — I82.C12 ACUTE EMBOLISM AND THROMBOSIS OF LEFT INTERNAL JUGULAR VEIN (HCC): ICD-10-CM

## 2019-10-25 DIAGNOSIS — I65.22 OCCLUSION AND STENOSIS OF LEFT CAROTID ARTERY: ICD-10-CM

## 2019-10-25 DIAGNOSIS — I65.22 OCCLUSION AND STENOSIS OF LEFT CAROTID ARTERY: Primary | ICD-10-CM

## 2019-10-25 LAB
BH CV UPPER VENOUS LEFT AXILLARY AUGMENT: NORMAL
BH CV UPPER VENOUS LEFT AXILLARY COMPETENT: NORMAL
BH CV UPPER VENOUS LEFT AXILLARY COMPRESS: NORMAL
BH CV UPPER VENOUS LEFT AXILLARY PHASIC: NORMAL
BH CV UPPER VENOUS LEFT AXILLARY SPONT: NORMAL
BH CV UPPER VENOUS LEFT BASILIC FOREARM COMPRESS: NORMAL
BH CV UPPER VENOUS LEFT BASILIC UPPER COMPRESS: NORMAL
BH CV UPPER VENOUS LEFT BRACHIAL COMPRESS: NORMAL
BH CV UPPER VENOUS LEFT CEPHALIC FOREARM COMPRESS: NORMAL
BH CV UPPER VENOUS LEFT CEPHALIC UPPER COMPRESS: NORMAL
BH CV UPPER VENOUS LEFT INTERNAL JUGULAR AUGMENT: NORMAL
BH CV UPPER VENOUS LEFT INTERNAL JUGULAR COMPRESS: NORMAL
BH CV UPPER VENOUS LEFT INTERNAL JUGULAR PHASIC: NORMAL
BH CV UPPER VENOUS LEFT INTERNAL JUGULAR SPONT: NORMAL
BH CV UPPER VENOUS LEFT RADIAL COMPRESS: NORMAL
BH CV UPPER VENOUS LEFT SUBCLAVIAN AUGMENT: NORMAL
BH CV UPPER VENOUS LEFT SUBCLAVIAN COMPETENT: NORMAL
BH CV UPPER VENOUS LEFT SUBCLAVIAN PHASIC: NORMAL
BH CV UPPER VENOUS LEFT SUBCLAVIAN SPONT: NORMAL
BH CV UPPER VENOUS LEFT ULNAR COMPRESS: NORMAL
BH CV UPPER VENOUS RIGHT INTERNAL JUGULAR AUGMENT: NORMAL
BH CV UPPER VENOUS RIGHT INTERNAL JUGULAR COMPRESS: NORMAL
BH CV UPPER VENOUS RIGHT INTERNAL JUGULAR PHASIC: NORMAL
BH CV UPPER VENOUS RIGHT INTERNAL JUGULAR SPONT: NORMAL

## 2019-10-25 PROCEDURE — 93971 EXTREMITY STUDY: CPT

## 2019-11-22 ENCOUNTER — APPOINTMENT (OUTPATIENT)
Dept: PREADMISSION TESTING | Facility: HOSPITAL | Age: 84
End: 2019-11-22

## 2019-11-22 VITALS
DIASTOLIC BLOOD PRESSURE: 79 MMHG | SYSTOLIC BLOOD PRESSURE: 184 MMHG | HEART RATE: 73 BPM | HEIGHT: 61 IN | WEIGHT: 181.9 LBS | RESPIRATION RATE: 18 BRPM | TEMPERATURE: 97.7 F | BODY MASS INDEX: 34.34 KG/M2 | OXYGEN SATURATION: 96 %

## 2019-11-22 LAB
ANION GAP SERPL CALCULATED.3IONS-SCNC: 12.6 MMOL/L (ref 5–15)
BUN BLD-MCNC: 23 MG/DL (ref 8–23)
BUN/CREAT SERPL: 26.4 (ref 7–25)
CALCIUM SPEC-SCNC: 10 MG/DL (ref 8.6–10.5)
CHLORIDE SERPL-SCNC: 99 MMOL/L (ref 98–107)
CO2 SERPL-SCNC: 27.4 MMOL/L (ref 22–29)
CREAT BLD-MCNC: 0.87 MG/DL (ref 0.57–1)
DEPRECATED RDW RBC AUTO: 41.5 FL (ref 37–54)
ERYTHROCYTE [DISTWIDTH] IN BLOOD BY AUTOMATED COUNT: 12.9 % (ref 12.3–15.4)
GFR SERPL CREATININE-BSD FRML MDRD: 62 ML/MIN/1.73
GLUCOSE BLD-MCNC: 295 MG/DL (ref 65–99)
HCT VFR BLD AUTO: 41.9 % (ref 34–46.6)
HGB BLD-MCNC: 13.6 G/DL (ref 12–15.9)
MCH RBC QN AUTO: 28.8 PG (ref 26.6–33)
MCHC RBC AUTO-ENTMCNC: 32.5 G/DL (ref 31.5–35.7)
MCV RBC AUTO: 88.6 FL (ref 79–97)
PLATELET # BLD AUTO: 215 10*3/MM3 (ref 140–450)
PMV BLD AUTO: 10.8 FL (ref 6–12)
POTASSIUM BLD-SCNC: 3.8 MMOL/L (ref 3.5–5.2)
RBC # BLD AUTO: 4.73 10*6/MM3 (ref 3.77–5.28)
SODIUM BLD-SCNC: 139 MMOL/L (ref 136–145)
WBC NRBC COR # BLD: 8.18 10*3/MM3 (ref 3.4–10.8)

## 2019-11-22 PROCEDURE — 93010 ELECTROCARDIOGRAM REPORT: CPT | Performed by: INTERNAL MEDICINE

## 2019-11-22 PROCEDURE — 80048 BASIC METABOLIC PNL TOTAL CA: CPT | Performed by: SURGERY

## 2019-11-22 PROCEDURE — 36415 COLL VENOUS BLD VENIPUNCTURE: CPT

## 2019-11-22 PROCEDURE — 85027 COMPLETE CBC AUTOMATED: CPT | Performed by: SURGERY

## 2019-11-22 PROCEDURE — 93005 ELECTROCARDIOGRAM TRACING: CPT

## 2019-11-22 NOTE — DISCHARGE INSTRUCTIONS
Take the following medications the morning of surgery with a small sip of water:  NONE    Arrive to hospital on your day of surgery at 5:30 AM.    General Instructions:  • Do not eat solid food after midnight the night before surgery.  • You may drink clear liquids day of surgery but must stop at least one hour before your hospital arrival time.  • It is beneficial for you to have a clear drink that contains carbohydrates the day of surgery.  We suggest a 12 to 20 ounce bottle of Gatorade or Powerade for non-diabetic patients or a 12 to 20 ounce bottle of G2 or Powerade Zero for diabetic patients. (Pediatric patients, are not advised to drink a 12 to 20 ounce carbohydrate drink)    Clear liquids are liquids you can see through.  Nothing red in color.     Plain water                               Sports drinks  Sodas                                   Gelatin (Jell-O)  Fruit juices without pulp such as white grape juice and apple juice  Popsicles that contain no fruit or yogurt  Tea or coffee (no cream or milk added)  Gatorade / Powerade  G2 / Powerade Zero    • Infants may have breast milk up to four hours before surgery.  • Infants drinking formula may drink formula up to six hours before surgery.   • Patients who avoid smoking, chewing tobacco and alcohol for 4 weeks prior to surgery have a reduced risk of post-operative complications.  Quit smoking as many days before surgery as you can.  • Do not smoke, use chewing tobacco or drink alcohol the day of surgery.   • If applicable bring your C-PAP/ BI-PAP machine.  • Bring any papers given to you in the doctor’s office.  • Wear clean comfortable clothes.  • Do not wear contact lenses, false eyelashes or make-up.  Bring a case for your glasses.   • Bring crutches or walker if applicable.  • Remove all piercings.  Leave jewelry and any other valuables at home.  • Hair extensions with metal clips must be removed prior to surgery.  • The Pre-Admission Testing nurse will  instruct you to bring medications if unable to obtain an accurate list in Pre-Admission Testing.        If you were given a blood bank ID arm band remember to bring it with you the day of surgery.    Preventing a Surgical Site Infection:  • For 2 to 3 days before surgery, avoid shaving with a razor because the razor can irritate skin and make it easier to develop an infection.    • Any areas of open skin can increase the risk of a post-operative wound infection by allowing bacteria to enter and travel throughout the body.  Notify your surgeon if you have any skin wounds / rashes even if it is not near the expected surgical site.  The area will need assessed to determine if surgery should be delayed until it is healed.  • The night prior to surgery sleep in a clean bed with clean clothing.  Do not allow pets to sleep with you.  • Shower on the morning of surgery using a fresh bar of anti-bacterial soap (such as Dial) and clean washcloth.  Dry with a clean towel and dress in clean clothing.  • Ask your surgeon if you will be receiving antibiotics prior to surgery.  • Make sure you, your family, and all healthcare providers clean their hands with soap and water or an alcohol based hand  before caring for you or your wound.    Day of surgery:  Your arrival time is approximately two hours before your scheduled surgery time.  Upon arrival, a Pre-op nurse and Anesthesiologist will review your health history, obtain vital signs, and answer questions you may have.  The only belongings needed at this time will be a list of your home medications and if applicable your C-PAP/BI-PAP machine.  If you are staying overnight your family can leave the rest of your belongings in the car and bring them to your room later.  A Pre-op nurse will start an IV and you may receive medication in preparation for surgery, including something to help you relax.  Your family will be able to see you in the Pre-op area.  Two visitors at a  time will be allowed in the Pre-op room.  While you are in surgery your family should notify the waiting room  if they leave the waiting room area and provide a contact phone number.    Please be aware that surgery does come with discomfort.  We want to make every effort to control your discomfort so please discuss any uncontrolled symptoms with your nurse.   Your doctor will most likely have prescribed pain medications.      If you are going home after surgery you will receive individualized written care instructions before being discharged.  A responsible adult must drive you to and from the hospital on the day of your surgery and stay with you for 24 hours.    If you are staying overnight following surgery, you will be transported to your hospital room following the recovery period.  [facility] has all private rooms.    You have received a list of surgical assistants for your reference.  If you have any questions please call Pre-Admission Testing at [phone].  Deductibles and co-payments are collected on the day of service. Please be prepared to pay the required co-pay, deductible or deposit on the day of service as defined by your plan.

## 2019-12-05 ENCOUNTER — RESULTS ENCOUNTER (OUTPATIENT)
Dept: ENDOCRINOLOGY | Age: 84
End: 2019-12-05

## 2019-12-05 DIAGNOSIS — E55.9 VITAMIN D DEFICIENCY: ICD-10-CM

## 2019-12-05 DIAGNOSIS — I10 ESSENTIAL HYPERTENSION: ICD-10-CM

## 2019-12-05 DIAGNOSIS — E11.649 UNCONTROLLED TYPE 2 DIABETES MELLITUS WITH HYPOGLYCEMIA WITHOUT COMA (HCC): ICD-10-CM

## 2019-12-05 DIAGNOSIS — E78.2 MIXED HYPERLIPIDEMIA: ICD-10-CM

## 2019-12-05 DIAGNOSIS — E03.9 HYPOTHYROIDISM, ACQUIRED: ICD-10-CM

## 2019-12-06 ENCOUNTER — APPOINTMENT (OUTPATIENT)
Dept: GENERAL RADIOLOGY | Facility: HOSPITAL | Age: 84
End: 2019-12-06

## 2019-12-06 ENCOUNTER — ANESTHESIA EVENT (OUTPATIENT)
Dept: PERIOP | Facility: HOSPITAL | Age: 84
End: 2019-12-06

## 2019-12-06 ENCOUNTER — ANESTHESIA (OUTPATIENT)
Dept: PERIOP | Facility: HOSPITAL | Age: 84
End: 2019-12-06

## 2019-12-06 ENCOUNTER — HOSPITAL ENCOUNTER (OUTPATIENT)
Facility: HOSPITAL | Age: 84
Setting detail: HOSPITAL OUTPATIENT SURGERY
Discharge: HOME | End: 2019-12-06
Attending: SURGERY | Admitting: SURGERY

## 2019-12-06 VITALS
OXYGEN SATURATION: 94 % | HEART RATE: 60 BPM | RESPIRATION RATE: 16 BRPM | TEMPERATURE: 98 F | BODY MASS INDEX: 34.46 KG/M2 | WEIGHT: 182.54 LBS | HEIGHT: 61 IN | SYSTOLIC BLOOD PRESSURE: 166 MMHG | DIASTOLIC BLOOD PRESSURE: 76 MMHG

## 2019-12-06 PROBLEM — D44.7 GLOMUS JUGULARE TUMOR: Status: ACTIVE | Noted: 2019-12-06

## 2019-12-06 PROBLEM — D44.6 CAROTID BODY TUMOR (HCC): Status: ACTIVE | Noted: 2019-12-06

## 2019-12-06 LAB — GLUCOSE BLDC GLUCOMTR-MCNC: 146 MG/DL (ref 70–130)

## 2019-12-06 PROCEDURE — 82962 GLUCOSE BLOOD TEST: CPT

## 2019-12-06 PROCEDURE — 25010000003 LIDOCAINE 1 % SOLUTION 20 ML VIAL: Performed by: SURGERY

## 2019-12-06 PROCEDURE — 25010000002 FENTANYL CITRATE (PF) 100 MCG/2ML SOLUTION: Performed by: ANESTHESIOLOGY

## 2019-12-06 PROCEDURE — 25010000002 HEPARIN (PORCINE) PER 1000 UNITS: Performed by: SURGERY

## 2019-12-06 PROCEDURE — C1887 CATHETER, GUIDING: HCPCS | Performed by: SURGERY

## 2019-12-06 PROCEDURE — C1769 GUIDE WIRE: HCPCS | Performed by: SURGERY

## 2019-12-06 PROCEDURE — 0 IOPAMIDOL PER 1 ML: Performed by: SURGERY

## 2019-12-06 PROCEDURE — 25010000002 MIDAZOLAM PER 1 MG: Performed by: ANESTHESIOLOGY

## 2019-12-06 PROCEDURE — C1894 INTRO/SHEATH, NON-LASER: HCPCS | Performed by: SURGERY

## 2019-12-06 PROCEDURE — 25010000003 CEFAZOLIN IN DEXTROSE 2-4 GM/100ML-% SOLUTION: Performed by: SURGERY

## 2019-12-06 PROCEDURE — 25010000002 HEPARIN (PORCINE) PER 1000 UNITS: Performed by: NURSE ANESTHETIST, CERTIFIED REGISTERED

## 2019-12-06 RX ORDER — FENTANYL CITRATE 50 UG/ML
50 INJECTION, SOLUTION INTRAMUSCULAR; INTRAVENOUS
Status: DISCONTINUED | OUTPATIENT
Start: 2019-12-06 | End: 2019-12-06 | Stop reason: HOSPADM

## 2019-12-06 RX ORDER — PROMETHAZINE HYDROCHLORIDE 25 MG/ML
12.5 INJECTION, SOLUTION INTRAMUSCULAR; INTRAVENOUS ONCE AS NEEDED
Status: DISCONTINUED | OUTPATIENT
Start: 2019-12-06 | End: 2019-12-06 | Stop reason: HOSPADM

## 2019-12-06 RX ORDER — VITAMIN B COMPLEX
1000 TABLET ORAL 2 TIMES DAILY
COMMUNITY
End: 2021-02-16

## 2019-12-06 RX ORDER — HYDROCODONE BITARTRATE AND ACETAMINOPHEN 5; 325 MG/1; MG/1
1 TABLET ORAL ONCE AS NEEDED
Status: DISCONTINUED | OUTPATIENT
Start: 2019-12-06 | End: 2019-12-06 | Stop reason: HOSPADM

## 2019-12-06 RX ORDER — FAMOTIDINE 10 MG/ML
20 INJECTION, SOLUTION INTRAVENOUS ONCE
Status: COMPLETED | OUTPATIENT
Start: 2019-12-06 | End: 2019-12-06

## 2019-12-06 RX ORDER — MIDAZOLAM HYDROCHLORIDE 1 MG/ML
1 INJECTION INTRAMUSCULAR; INTRAVENOUS
Status: DISCONTINUED | OUTPATIENT
Start: 2019-12-06 | End: 2019-12-06 | Stop reason: HOSPADM

## 2019-12-06 RX ORDER — CEFAZOLIN SODIUM 2 G/100ML
2 INJECTION, SOLUTION INTRAVENOUS ONCE
Status: COMPLETED | OUTPATIENT
Start: 2019-12-06 | End: 2019-12-06

## 2019-12-06 RX ORDER — LIDOCAINE HYDROCHLORIDE 10 MG/ML
0.5 INJECTION, SOLUTION EPIDURAL; INFILTRATION; INTRACAUDAL; PERINEURAL ONCE AS NEEDED
Status: DISCONTINUED | OUTPATIENT
Start: 2019-12-06 | End: 2019-12-06 | Stop reason: HOSPADM

## 2019-12-06 RX ORDER — HEPARIN SODIUM 1000 [USP'U]/ML
INJECTION, SOLUTION INTRAVENOUS; SUBCUTANEOUS AS NEEDED
Status: DISCONTINUED | OUTPATIENT
Start: 2019-12-06 | End: 2019-12-06 | Stop reason: SURG

## 2019-12-06 RX ORDER — PROMETHAZINE HYDROCHLORIDE 25 MG/1
25 SUPPOSITORY RECTAL ONCE AS NEEDED
Status: DISCONTINUED | OUTPATIENT
Start: 2019-12-06 | End: 2019-12-06 | Stop reason: HOSPADM

## 2019-12-06 RX ORDER — SODIUM CHLORIDE 0.9 % (FLUSH) 0.9 %
3-10 SYRINGE (ML) INJECTION AS NEEDED
Status: DISCONTINUED | OUTPATIENT
Start: 2019-12-06 | End: 2019-12-06 | Stop reason: HOSPADM

## 2019-12-06 RX ORDER — PROMETHAZINE HYDROCHLORIDE 25 MG/1
25 TABLET ORAL ONCE AS NEEDED
Status: DISCONTINUED | OUTPATIENT
Start: 2019-12-06 | End: 2019-12-06 | Stop reason: HOSPADM

## 2019-12-06 RX ORDER — HYDROCODONE BITARTRATE AND ACETAMINOPHEN 5; 325 MG/1; MG/1
1-2 TABLET ORAL EVERY 6 HOURS PRN
Qty: 20 TABLET | Refills: 0 | Status: SHIPPED | OUTPATIENT
Start: 2019-12-06 | End: 2020-01-16

## 2019-12-06 RX ORDER — SODIUM CHLORIDE 0.9 % (FLUSH) 0.9 %
3 SYRINGE (ML) INJECTION EVERY 12 HOURS SCHEDULED
Status: DISCONTINUED | OUTPATIENT
Start: 2019-12-06 | End: 2019-12-06 | Stop reason: HOSPADM

## 2019-12-06 RX ORDER — SODIUM CHLORIDE, SODIUM LACTATE, POTASSIUM CHLORIDE, CALCIUM CHLORIDE 600; 310; 30; 20 MG/100ML; MG/100ML; MG/100ML; MG/100ML
9 INJECTION, SOLUTION INTRAVENOUS CONTINUOUS
Status: DISCONTINUED | OUTPATIENT
Start: 2019-12-06 | End: 2019-12-06 | Stop reason: HOSPADM

## 2019-12-06 RX ORDER — MIDAZOLAM HYDROCHLORIDE 1 MG/ML
2 INJECTION INTRAMUSCULAR; INTRAVENOUS
Status: DISCONTINUED | OUTPATIENT
Start: 2019-12-06 | End: 2019-12-06 | Stop reason: HOSPADM

## 2019-12-06 RX ADMIN — MIDAZOLAM 1 MG: 1 INJECTION INTRAMUSCULAR; INTRAVENOUS at 07:57

## 2019-12-06 RX ADMIN — CEFAZOLIN SODIUM 2 G: 2 INJECTION, SOLUTION INTRAVENOUS at 08:01

## 2019-12-06 RX ADMIN — FENTANYL CITRATE 25 MCG: 50 INJECTION INTRAMUSCULAR; INTRAVENOUS at 08:00

## 2019-12-06 RX ADMIN — FENTANYL CITRATE 25 MCG: 50 INJECTION INTRAMUSCULAR; INTRAVENOUS at 08:14

## 2019-12-06 RX ADMIN — FENTANYL CITRATE 25 MCG: 50 INJECTION INTRAMUSCULAR; INTRAVENOUS at 08:42

## 2019-12-06 RX ADMIN — MIDAZOLAM 0.5 MG: 1 INJECTION INTRAMUSCULAR; INTRAVENOUS at 08:35

## 2019-12-06 RX ADMIN — SODIUM CHLORIDE, POTASSIUM CHLORIDE, SODIUM LACTATE AND CALCIUM CHLORIDE 9 ML/HR: 600; 310; 30; 20 INJECTION, SOLUTION INTRAVENOUS at 07:01

## 2019-12-06 RX ADMIN — FENTANYL CITRATE 25 MCG: 50 INJECTION INTRAMUSCULAR; INTRAVENOUS at 08:18

## 2019-12-06 RX ADMIN — MIDAZOLAM 0.5 MG: 1 INJECTION INTRAMUSCULAR; INTRAVENOUS at 08:05

## 2019-12-06 RX ADMIN — IOPAMIDOL 170.3 ML: 510 INJECTION, SOLUTION INTRAVASCULAR at 08:30

## 2019-12-06 RX ADMIN — SODIUM CHLORIDE, PRESERVATIVE FREE 10 ML: 5 INJECTION INTRAVENOUS at 07:04

## 2019-12-06 RX ADMIN — FAMOTIDINE 20 MG: 10 INJECTION INTRAVENOUS at 07:05

## 2019-12-06 RX ADMIN — HEPARIN SODIUM 3000 UNITS: 1000 INJECTION, SOLUTION INTRAVENOUS; SUBCUTANEOUS at 08:17

## 2019-12-06 NOTE — BRIEF OP NOTE
CEREBRAL ANGIOGRAM  Progress Note    Roland Russell  12/6/2019    Pre-op Diagnosis:   Carotid body tumor       Post-Op Diagnosis Codes:  Same    Procedure/CPT® Codes:      Procedure(s):  Arch arteriogram with CAROTID ARTERIOGRAM BILATERAL, RIGHT WRIST APPROACH, duplex directed access    Surgeon(s):  Bryan Severino MD    Anesthesia: Monitored Anesthesia Care    Staff:   Circulator: Amie Bonilla RN; Jacklyn Bautista RN  Scrub Person: Bassem Baker  Assistant: Mireya Rowe CSA  Vascular Radiology Technician: Carmela Dailey    Estimated Blood Loss: minimal    Urine Voided: * No values recorded between 12/6/2019  7:32 AM and 12/6/2019  9:26 AM *    Specimens:                None          Drains:      Findings: See dictation    Complications: None      Bryan Severino MD     Date: 12/6/2019  Time: 9:28 AM

## 2019-12-06 NOTE — ANESTHESIA POSTPROCEDURE EVALUATION
"Patient: oRland Russell    Procedure Summary     Date:  12/06/19 Room / Location:  [facility] OR 19 INV / [facility] HYBRID OR 18/19    Anesthesia Start:  0756 Anesthesia Stop:  0933    Procedure:  CAROTID ARTERIOGRAM BILATERAL, RIGHT WRIST APPROACH (Bilateral ) Diagnosis:      Surgeon:  Bryan Severino MD Provider:  Frederick Romero MD    Anesthesia Type:  MAC ASA Status:  3          Anesthesia Type: MAC  Last vitals  BP   137/61 (12/06/19 0950)   Temp   36.7 °C (98 °F) (12/06/19 0930)   Pulse   60 (12/06/19 0950)   Resp   18 (12/06/19 0950)     SpO2   97 % (12/06/19 0950)     Post Anesthesia Care and Evaluation    Patient location during evaluation: PHASE II  Patient participation: complete - patient participated  Level of consciousness: awake and alert  Pain management: adequate  Airway patency: patent  Anesthetic complications: No anesthetic complications    Cardiovascular status: acceptable  Respiratory status: acceptable  Hydration status: acceptable    Comments: /61   Pulse 60   Temp 36.7 °C (98 °F) (Oral)   Resp 18   Ht 154.9 cm (61\")   Wt 82.8 kg (182 lb 8.7 oz)   SpO2 97%   BMI 34.49 kg/m²         "
No

## 2019-12-06 NOTE — OP NOTE
Operative Note  Date of Admission:  12/6/2019  OR Date: 12/6/2019    Pre-op Diagnosis:   Carotid body tumor    Post-Op Diagnosis Codes:  Same    Procedure:   1) duplex directed right radial access, arch arteriogram, right innominate vein selective angiogram, bilateral carotid arteriograms    Surgeon: Renzo Severino MD    Assistant: Mireya GARAY CSA and they provided critical assistance during the case including suctioning, exposure, retraction, and reduction of blood loss.    Anesthesia: Monitored Anesthesia Care    Staff:   Circulator: Amie Bonilla RN; Jacklyn Bautista RN  Scrub Person: Bassem Baker  Assistant: Mireya Rowe CSA  Vascular Radiology Technician: Carmela Dailey    Estimated Blood Loss: minimal    Specimens: * No orders in the log *     Complications: none    Findings: see dictation    Indications:    The patient is an 84 y.o. female seen for evaluation of known left carotid body tumor.  The determination of this this being a true carotid body tumor versus a vagus based glomus tumor is the real concern.  Patient is unable to get an MRI.  CT scan is unable to determine involvement of the vagus nerve.  Angiogram is indicated to try to help determine if the mass is well-circumscribed and vascular.  Patient and family understand risk benefits complications of the procedure and consent to the procedure.       Procedure:    The patient was prepped and draped sterilely. Then using duplex directionally accessed the right radial artery.  Using radial artery cocktail of heparin nitroglycerin and verapamil we are able to pass a 4 5 slender sheath.  Wire was manipulated into the arch of the aorta where pigtail catheter was used to perform arch arteriogram selective angiogram within the innominate artery right is also performed.  Multiple catheters including a Borges and a Sos Omni were used to attempt to select the left common carotid artery from the right side.  Despite selective wire  placement and even proximal catheterization of these artery were very were unable to get catheters to see it well enough into the common carotid artery to get selective angiograms on the side.  Angiograms from the arch did allow us to get good views of the left carotid bifurcation and this was adequate for diagnosis.  Catheters were removed and a TR band used for hemostasis on the right radial site.  Patient was sent to the procedure recovery stable and neurologically intact.    Radiographic Findings:  Angiographic findings include normal arch anatomy with no significant atherosclerotic load or stenosis to the end of the great vessels off the arch.  The innominate artery on the right side is quite long.  Selective angiogram within the innominate artery shows wide patency to the right subclavian artery right common carotid artery right carotid bifurcation with some plaquing but no significant flow-limiting stenosis noted.  There is wide patency of the internal carotid and external carotid artery with flow intracranially without evidence of occlusion.  No tumor blush is noted on the right.  Right vertebral is dominant.  Left carotid angiogram shows wide patency of the common carotid.  The internal carotid and external carotid origins are splayed wide open there is late filling of a discrete hypervascular mass oval in size approximating 2 cm at the level of the bifurcation.  Left bifurcation approximates C 3 level.  Cranial views are limited but appears to have no significant pathology noted.      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      Bryan Severino MD     Date: 12/6/2019  Time: 2:59 PM

## 2019-12-06 NOTE — ANESTHESIA PREPROCEDURE EVALUATION
Anesthesia Evaluation     Patient summary reviewed and Nursing notes reviewed   NPO Solid Status: > 8 hours  NPO Liquid Status: > 8 hours           Airway   Mallampati: III  Difficult intubation highly probable  Dental      Pulmonary     breath sounds clear to auscultation  (+) sleep apnea,   Cardiovascular     Rhythm: regular    (+) pacemaker pacemaker, hypertension, dysrhythmias, hyperlipidemia,       Neuro/Psych  (+) TIA, headaches,     GI/Hepatic/Renal/Endo    (+) obesity,  GERD, GI bleeding , renal disease, diabetes mellitus,     Musculoskeletal     Abdominal   (+) obese,    Substance History      OB/GYN          Other   arthritis,                      Anesthesia Plan    ASA 3     MAC     intravenous induction     Anesthetic plan, all risks, benefits, and alternatives have been provided, discussed and informed consent has been obtained with: patient.

## 2019-12-06 NOTE — DISCHARGE INSTRUCTIONS
[facility]  Feliberto Stuart, Dipak Severino Rachel, Scherrer, Thomas  [address]  [place] [address]  [phone]      Discharge Instructions for Angiogram    1. Go home, rest and take it easy today.      2. You may experience some dizziness or memory loss from the anesthesia.  This may last for the next 24 hours.  Someone should plan on staying with you for the first 24 hours for your safety.    3. Do not make any important legal decisions or sign any legal papers for the next 24 hours.      4. Eat and drink lightly today.  Start off with liquids, jello, soup, crackers or other bland foods at first. You may advance your diet tomorrow as tolerated as long as you do not experience any nausea or vomiting.    5. You may resume routine medications including blood thinners. However, Glucophage should be not be started for 72 hours after the dye is given.      6. No lifting over 10 pounds and no strenuous activity for the next 2-3 days.    7. Try not to strain or bear down when your bowels move or when you empty your bladder.    8. No driving for the remainder of the day.  You may resume limited driving tomorrow if necessary.     9.  You may shower tomorrow.  No bath or hot tubs for at least 2 days after the procedure.          10. Leave the pressure dressing on until tomorrow morning.  You may then take this off, as well as the small see through dressing with gauze tomorrow.  If it doesn’t come off easily, do not pull this off.  If it is stuck, shower and let the warm water loosen it before removal.       11. Check your wrist dressing regularly for bleeding through the dressing (under the pressure dressing).  A small amount of blood contained by the gauze is normal; the whole dressing should not be filled with blood or leaking out under the sides.     12. If you experience bleeding through the gauze/clear sticky dressing, sit down and have someone apply direct pressure for 15  minutes.  If bleeding has stopped after this, you may put a clean gauze and tape over the area.  Continue to lie flat for 1-2 hours.  If bleeding continues after 15 minutes of pressure, call us at the number listed above.  There is an MD available after hours.      13. If you experience heavy bleeding or large swelling, continue to hold firm pressure and              call 911.  Do not call the MD on call.     14.  If you experience pain or discomfort you may take Tylenol or Ibuprofen, whichever you normally use for minor discomfort, unless otherwise instructed.       15.  If the MD gives you a prescription for narcotic pain pills (Tylenol #3, Vicodin, Hydrocodone, Oxycodone or Percocet), you cannot drive a vehicle or operate machinery while taking these.    16.  Severe pain is not expected after this procedure.  If you experience severe pain, please call our office at [phone].    17.  Remember to contact our office for any of the following:    • Fever > 101 degrees  • Severe pain that cannot be controlled by taking your pain pills  • Severe nausea or vomiting   • Significant bleeding of your incisions  • Drainage that has a bad smell or is yellow or green in appearance  • Any other questions or concerns

## 2019-12-06 NOTE — H&P
"      Patient Care Team:  Davon Marie MD as PCP - General  Davon Marie MD as PCP - Family Medicine  Tevin Albrecht MD as Consulting Physician (Cardiac Electrophysiology)    Chief complaint carotid  body tumor versus vagus base glomus tumor left side    Subjective     History of Present Illness 18 mm carotid body tumor versus glomus tumor of the vagus.  Unable to get MRI due to pacemaker.  Attempts at carotid arteriogram are being made to determine if vascularity tends towards carotid body tumor.    Review of Systems unable to lie flat due to abdominal complaints    Past Medical History:   Diagnosis Date   • Abnormal vision     SEES \"KALEIDOSCOPE\"   • Allergy to adhesive tape    • Arthralgia    • AVB (atrioventricular block)    • Balance problem    • Carotid body tumor (CMS/HCC)     LEFT   • Cough    • Difficulty walking    • H/O complete eye exam 10/2016   • Headache     OFF AND ON BACK OF HEAD, DOWN NECK TO SHOULDER   • History of glaucoma    • History of poliomyelitis     AGE 9   • History of surgery on arm     left arm   • HLD (hyperlipidemia)    • Hypertension    • Hypothyroidism, acquired    • Legal blindness     SOME PERIPHERAL VISION ON LEFT, SOME VISION ON RIGHT   • Memory loss     SOME SHORT TERM   • Migraine    • Osteoarthritis, multiple sites    • Osteoporosis    • Pacemaker    • Rectal bleeding     X1, WITH BM   • Sleep apnea    • TIA (transient ischemic attack) 12/24/2018    ?, HAD SPELL UNABLE TO TALK   • Type 2 diabetes mellitus (CMS/HCC)    • Type 2 diabetes mellitus, uncontrolled (CMS/HCC)      Past Surgical History:   Procedure Laterality Date   • APPENDECTOMY  1989   • CARDIAC PACEMAKER PLACEMENT  11/25/2004   • CATARACT EXTRACTION Bilateral 1997   • CHOLECYSTECTOMY  1989   • COLONOSCOPY  2013    dr johnson   • ELBOW PROCEDURE Left     FRACTURE- PLATE PLACED AND REMOVED   • ENDOSCOPY  12/06/2012    Dr. Johnson; food in stomach, gastritis   • EXCISION LESION  1994    BACK CYST   • EYE " SURGERY  12/2012   • GLAUCOMA SURGERY Bilateral 1995    laser surgery   • HYSTERECTOMY  1986   • PACEMAKER IMPLANTATION     • PACEMAKER REPLACEMENT  06/2013     Family History   Problem Relation Age of Onset   • Cancer Father    • Glaucoma Father         angular glycoma   • Heart disease Father    • Arthritis Father    • Thyroid disease Father    • Stroke Father    • Asthma Sister    • Arthritis Sister    • Cancer Sister    • Stroke Maternal Grandmother    • Heart failure Other    • Diabetes Other    • Hypertension Other    • Stroke Other         aunt   • Thyroid disease Other    • Arthritis Mother    • Diabetes Mother    • Thyroid disease Mother    • Malig Hyperthermia Neg Hx      Social History     Tobacco Use   • Smoking status: Never Smoker   • Smokeless tobacco: Never Used   Substance Use Topics   • Alcohol use: No   • Drug use: No     Medications Prior to Admission   Medication Sig Dispense Refill Last Dose   • amLODIPine (NORVASC) 2.5 MG tablet Take 1 tablet by mouth Daily. (Patient taking differently: Take 2.5 mg by mouth Daily. PT TAKES AT LUNCH) 90 tablet 3 12/5/2019 at 0900   • Calcium Carb-Cholecalciferol (CALCIUM 600 + D PO) Take 1 tablet by mouth 2 (Two) Times a Day.   12/4/2019   • Cholecalciferol (VITAMIN D) 25 MCG (1000 UT) tablet Take 1,000 Units by mouth 2 (Two) Times a Day.   12/4/2019   • insulin lispro protamine-insulin lispro (humaLOG 50-50) (50-50) 100 UNIT/ML suspension injection 40, 20,40 (Patient taking differently: Inject 20-40 Units under the skin into the appropriate area as directed 3 (Three) Times a Day With Meals. 40 UNITS WITH BREAKFAST, 20 UNITS WITH LUNCH, 40 UNITS WITH DINNER  MAKES ADJUSTMENTS BASED ON BS) 90 mL 3 12/5/2019 at 1800   • insulin NPH (HUMULIN N) 100 UNIT/ML injection 20 units at bedtime as needed for blood glucose greater than 200 (Patient taking differently: Inject 20 Units under the skin into the appropriate area as directed Every Night. 20 units at bedtime as  needed for blood glucose greater than 150) 5 each 5 12/4/2019 at 2100   • levothyroxine (SYNTHROID, LEVOTHROID) 88 MCG tablet Take 1 tablet by mouth Daily. 90 tablet 3 12/5/2019 at 0900   • losartan-hydrochlorothiazide (HYZAAR) 100-25 MG per tablet Take 1 tablet by mouth Daily. (Patient taking differently: Take 1 tablet by mouth Daily. PT TAKES AT LUNCH) 90 tablet 3 12/5/2019 at 0900     Allergies:  Adhesive tape; Latex; and Propoxyphene    Objective      Vital Signs  Temp:  [98.4 °F (36.9 °C)] 98.4 °F (36.9 °C)  Heart Rate:  [63] 63  Resp:  [18] 18  BP: (180)/(78) 180/78    Physical Exam  Lungs are clear and equal   heart is regular rate and rhythm   abdomen is distended mildly uncomfortable and nontender.    Groins are excoriated.    Radial and ulnar pulses are +2 on both sides with normal Job test.    Results Review:   I reviewed the patient's new clinical results.  I reviewed the patient's new imaging results and agree with the interpretation.  I reviewed the patient's other test results and agree with the interpretation      Assessment/Plan       Carotid body tumor (CMS/HCC)    Glomus jugulare tumor (CMS/HCC)      Assessment & Plan  Plans for arteriogram now through radial approach.  We will approach the arch and arteriograms through the right radial artery if possible.  This will make it easier for her to recover without having to lay flat which is becoming a major problem for her.  The other issues at hand will be avoiding the excoriated groins that will be difficult to access with her abdomen distended.  When we try to push the abdomen out of the way it becomes hard for her to breathe.  We will try to do this in a mild incline as much as the table can permit.    I discussed the patients findings and my recommendations with patient, family and nursing staff    Bryan Severino MD  12/06/19  7:31 AM

## 2019-12-06 NOTE — PERIOPERATIVE NURSING NOTE
"Called Dr. Severino and notified pt reports unable to lay flat, gets \"choked\". Dr. Romero notified, discussed with patient and daughter/POA. Dr. Severino also notified of excoriated abdominal fold and bilateral groins, abdomen large rounded and needs to be lifted to access groins. MD to come see patient.  "

## 2019-12-09 LAB — GLUCOSE BLDC GLUCOMTR-MCNC: 190 MG/DL (ref 70–130)

## 2019-12-11 ENCOUNTER — APPOINTMENT (OUTPATIENT)
Dept: PREADMISSION TESTING | Facility: HOSPITAL | Age: 84
End: 2019-12-11

## 2019-12-11 VITALS
RESPIRATION RATE: 16 BRPM | OXYGEN SATURATION: 98 % | HEIGHT: 61 IN | BODY MASS INDEX: 32.7 KG/M2 | SYSTOLIC BLOOD PRESSURE: 130 MMHG | WEIGHT: 173.19 LBS | TEMPERATURE: 97.6 F | HEART RATE: 69 BPM | DIASTOLIC BLOOD PRESSURE: 70 MMHG

## 2019-12-11 LAB
ANION GAP SERPL CALCULATED.3IONS-SCNC: 11.5 MMOL/L (ref 5–15)
BUN BLD-MCNC: 27 MG/DL (ref 8–23)
BUN/CREAT SERPL: 24.8 (ref 7–25)
CALCIUM SPEC-SCNC: 10 MG/DL (ref 8.6–10.5)
CHLORIDE SERPL-SCNC: 101 MMOL/L (ref 98–107)
CO2 SERPL-SCNC: 28.5 MMOL/L (ref 22–29)
CREAT BLD-MCNC: 1.09 MG/DL (ref 0.57–1)
DEPRECATED RDW RBC AUTO: 40.3 FL (ref 37–54)
ERYTHROCYTE [DISTWIDTH] IN BLOOD BY AUTOMATED COUNT: 12.9 % (ref 12.3–15.4)
GFR SERPL CREATININE-BSD FRML MDRD: 48 ML/MIN/1.73
GLUCOSE BLD-MCNC: 173 MG/DL (ref 65–99)
HCT VFR BLD AUTO: 41.6 % (ref 34–46.6)
HGB BLD-MCNC: 13.3 G/DL (ref 12–15.9)
MCH RBC QN AUTO: 27.7 PG (ref 26.6–33)
MCHC RBC AUTO-ENTMCNC: 32 G/DL (ref 31.5–35.7)
MCV RBC AUTO: 86.7 FL (ref 79–97)
PLATELET # BLD AUTO: 232 10*3/MM3 (ref 140–450)
PMV BLD AUTO: 11.3 FL (ref 6–12)
POTASSIUM BLD-SCNC: 4 MMOL/L (ref 3.5–5.2)
RBC # BLD AUTO: 4.8 10*6/MM3 (ref 3.77–5.28)
SODIUM BLD-SCNC: 141 MMOL/L (ref 136–145)
WBC NRBC COR # BLD: 8.53 10*3/MM3 (ref 3.4–10.8)

## 2019-12-11 PROCEDURE — 85027 COMPLETE CBC AUTOMATED: CPT | Performed by: SURGERY

## 2019-12-11 PROCEDURE — 80048 BASIC METABOLIC PNL TOTAL CA: CPT | Performed by: SURGERY

## 2019-12-11 PROCEDURE — 36415 COLL VENOUS BLD VENIPUNCTURE: CPT

## 2019-12-11 NOTE — DISCHARGE INSTRUCTIONS
Take the following medications the morning of surgery with a small sip of water:    LOSARTAN/HCTZ  AMLODIPINE     General Instructions:  • Do not eat solid food after midnight the night before surgery.  • You may drink clear liquids day of surgery but must stop at least one hour before your hospital arrival time @ 0600 AM STOP DRINKING!!  • It is beneficial for you to have a clear drink that contains carbohydrates the day of surgery.  We suggest  a 12 to 20 ounce bottle of G2 or Powerade Zero for diabetic patients.     Clear liquids are liquids you can see through.  Nothing red in color.     Plain water                               Sports drinks  Sodas                                   Gelatin (Jell-O)  Fruit juices without pulp such as white grape juice and apple juice  Popsicles that contain no fruit or yogurt  Tea or coffee (no cream or milk added)  G2 / Powerade Zero    • Patients who avoid smoking, chewing tobacco and alcohol for 4 weeks prior to surgery have a reduced risk of post-operative complications.  Quit smoking as many days before surgery as you can.  • Do not smoke, use chewing tobacco or drink alcohol the day of surgery.   • If applicable bring your C-PAP/ BI-PAP machine.  • Bring any papers given to you in the doctor’s office.  • Wear clean comfortable clothes.  • Do not wear contact lenses, false eyelashes or make-up.  Bring a case for your glasses.   • Bring crutches or walker if applicable.  • Remove all piercings.  Leave jewelry and any other valuables at home.  • Hair extensions with metal clips must be removed prior to surgery.  • The Pre-Admission Testing nurse will instruct you to bring medications if unable to obtain an accurate list in Pre-Admission Testing.      Preventing a Surgical Site Infection:  • For 2 to 3 days before surgery, avoid shaving with a razor because the razor can irritate skin and make it easier to develop an infection.    • Any areas of open skin can increase the risk  of a post-operative wound infection by allowing bacteria to enter and travel throughout the body.  Notify your surgeon if you have any skin wounds / rashes even if it is not near the expected surgical site.  The area will need assessed to determine if surgery should be delayed until it is healed.  • The night prior to surgery sleep in a clean bed with clean clothing.  Do not allow pets to sleep with you.  • Shower on the morning of surgery using a fresh bar of anti-bacterial soap (such as Dial) and clean washcloth.  Dry with a clean towel and dress in clean clothing.  • Ask your surgeon if you will be receiving antibiotics prior to surgery.  • Make sure you, your family, and all healthcare providers clean their hands with soap and water or an alcohol based hand  before caring for you or your wound.    Day of surgery: 12- ARRIVE @ 0700 AM REPORT TO THE MAIN OR   Your arrival time is approximately two hours before your scheduled surgery time.  Upon arrival, a Pre-op nurse and Anesthesiologist will review your health history, obtain vital signs, and answer questions you may have.  The only belongings needed at this time will be a list of your home medications and if applicable your C-PAP/BI-PAP machine.  If you are staying overnight your family can leave the rest of your belongings in the car and bring them to your room later.  A Pre-op nurse will start an IV and you may receive medication in preparation for surgery, including something to help you relax.  Your family will be able to see you in the Pre-op area.  Two visitors at a time will be allowed in the Pre-op room.  While you are in surgery your family should notify the waiting room  if they leave the waiting room area and provide a contact phone number.    Please be aware that surgery does come with discomfort.  We want to make every effort to control your discomfort so please discuss any uncontrolled symptoms with your nurse.   Your  doctor will most likely have prescribed pain medications.      If you are going home after surgery you will receive individualized written care instructions before being discharged.  A responsible adult must drive you to and from the hospital on the day of your surgery and stay with you for 24 hours.    If you are staying overnight following surgery, you will be transported to your hospital room following the recovery period.  [facility] has all private rooms.    You have received a list of surgical assistants for your reference.  If you have any questions please call Pre-Admission Testing at [phone].  Deductibles and co-payments are collected on the day of service. Please be prepared to pay the required co-pay, deductible or deposit on the day of service as defined by your plan.

## 2019-12-13 ENCOUNTER — APPOINTMENT (OUTPATIENT)
Dept: GENERAL RADIOLOGY | Facility: HOSPITAL | Age: 84
End: 2019-12-13

## 2019-12-13 ENCOUNTER — HOSPITAL ENCOUNTER (INPATIENT)
Facility: HOSPITAL | Age: 84
LOS: 3 days | Discharge: HOME HEALTH | End: 2019-12-16
Attending: SURGERY | Admitting: SURGERY

## 2019-12-13 ENCOUNTER — ANESTHESIA (OUTPATIENT)
Dept: PERIOP | Facility: HOSPITAL | Age: 84
End: 2019-12-13

## 2019-12-13 ENCOUNTER — ANESTHESIA EVENT (OUTPATIENT)
Dept: PERIOP | Facility: HOSPITAL | Age: 84
End: 2019-12-13

## 2019-12-13 DIAGNOSIS — G47.33 SLEEP APNEA, OBSTRUCTIVE: ICD-10-CM

## 2019-12-13 DIAGNOSIS — Z74.09 DECREASED MOBILITY AND ENDURANCE: Primary | ICD-10-CM

## 2019-12-13 DIAGNOSIS — D49.9 TUMOR: ICD-10-CM

## 2019-12-13 LAB
ABO GROUP BLD: NORMAL
ARTERIAL PATENCY WRIST A: ABNORMAL
ATMOSPHERIC PRESS: 746.2 MMHG
BASE EXCESS BLDA CALC-SCNC: 1.1 MMOL/L (ref 0–2)
BDY SITE: ABNORMAL
BLD GP AB SCN SERPL QL: NEGATIVE
GLUCOSE BLDC GLUCOMTR-MCNC: 223 MG/DL (ref 70–130)
GLUCOSE BLDC GLUCOMTR-MCNC: 299 MG/DL (ref 70–130)
GLUCOSE BLDC GLUCOMTR-MCNC: 308 MG/DL (ref 70–130)
GLUCOSE BLDC GLUCOMTR-MCNC: 336 MG/DL (ref 70–130)
GLUCOSE BLDC GLUCOMTR-MCNC: 352 MG/DL (ref 70–130)
HCO3 BLDA-SCNC: 27.9 MMOL/L (ref 22–28)
HOROWITZ INDEX BLD+IHG-RTO: 100 %
MODALITY: ABNORMAL
O2 A-A PPRESDIFF RESPIRATORY: 0.2 MMHG
PCO2 BLDA: 52 MM HG (ref 35–45)
PH BLDA: 7.34 PH UNITS (ref 7.35–7.45)
PO2 BLDA: 153.9 MM HG (ref 80–100)
RH BLD: POSITIVE
SAO2 % BLDCOA: 99.2 % (ref 92–99)
SET MECH RESP RATE: 18
T&S EXPIRATION DATE: NORMAL

## 2019-12-13 PROCEDURE — 25010000002 FENTANYL CITRATE (PF) 100 MCG/2ML SOLUTION: Performed by: ANESTHESIOLOGY

## 2019-12-13 PROCEDURE — 25010000002 HEPARIN (PORCINE) PER 1000 UNITS: Performed by: NURSE ANESTHETIST, CERTIFIED REGISTERED

## 2019-12-13 PROCEDURE — 86901 BLOOD TYPING SEROLOGIC RH(D): CPT | Performed by: ANESTHESIOLOGY

## 2019-12-13 PROCEDURE — 25010000002 MIDAZOLAM PER 1 MG: Performed by: ANESTHESIOLOGY

## 2019-12-13 PROCEDURE — 82803 BLOOD GASES ANY COMBINATION: CPT

## 2019-12-13 PROCEDURE — 25010000002 VANCOMYCIN 1 G RECONSTITUTED SOLUTION

## 2019-12-13 PROCEDURE — 25010000002 NALOXONE PER 1 MG: Performed by: NURSE ANESTHETIST, CERTIFIED REGISTERED

## 2019-12-13 PROCEDURE — 71045 X-RAY EXAM CHEST 1 VIEW: CPT

## 2019-12-13 PROCEDURE — 82962 GLUCOSE BLOOD TEST: CPT

## 2019-12-13 PROCEDURE — 25010000002 PROPOFOL 10 MG/ML EMULSION: Performed by: NURSE ANESTHETIST, CERTIFIED REGISTERED

## 2019-12-13 PROCEDURE — 63710000001 INSULIN LISPRO (HUMAN) PER 5 UNITS: Performed by: INTERNAL MEDICINE

## 2019-12-13 PROCEDURE — 0GB60ZZ EXCISION OF LEFT CAROTID BODY, OPEN APPROACH: ICD-10-PCS | Performed by: SURGERY

## 2019-12-13 PROCEDURE — 99221 1ST HOSP IP/OBS SF/LOW 40: CPT | Performed by: INTERNAL MEDICINE

## 2019-12-13 PROCEDURE — 25010000002 FENTANYL CITRATE (PF) 100 MCG/2ML SOLUTION: Performed by: NURSE ANESTHETIST, CERTIFIED REGISTERED

## 2019-12-13 PROCEDURE — 88309 TISSUE EXAM BY PATHOLOGIST: CPT | Performed by: SURGERY

## 2019-12-13 PROCEDURE — 63710000001 INSULIN GLARGINE PER 5 UNITS: Performed by: INTERNAL MEDICINE

## 2019-12-13 PROCEDURE — 94799 UNLISTED PULMONARY SVC/PX: CPT

## 2019-12-13 PROCEDURE — 25010000002 ONDANSETRON PER 1 MG: Performed by: INTERNAL MEDICINE

## 2019-12-13 PROCEDURE — 25010000003 CEFAZOLIN PER 500 MG: Performed by: SURGERY

## 2019-12-13 PROCEDURE — 25010000002 DEXAMETHASONE PER 1 MG: Performed by: NURSE ANESTHETIST, CERTIFIED REGISTERED

## 2019-12-13 PROCEDURE — 25010000002 HYDROMORPHONE PER 4 MG: Performed by: NURSE ANESTHETIST, CERTIFIED REGISTERED

## 2019-12-13 PROCEDURE — 86900 BLOOD TYPING SEROLOGIC ABO: CPT | Performed by: ANESTHESIOLOGY

## 2019-12-13 PROCEDURE — 25010000002 PROTAMINE SULFATE PER 10 MG: Performed by: NURSE ANESTHETIST, CERTIFIED REGISTERED

## 2019-12-13 PROCEDURE — 88342 IMHCHEM/IMCYTCHM 1ST ANTB: CPT | Performed by: SURGERY

## 2019-12-13 PROCEDURE — 86850 RBC ANTIBODY SCREEN: CPT | Performed by: ANESTHESIOLOGY

## 2019-12-13 PROCEDURE — 25010000002 HEPARIN (PORCINE) PER 1000 UNITS: Performed by: SURGERY

## 2019-12-13 PROCEDURE — 36600 WITHDRAWAL OF ARTERIAL BLOOD: CPT

## 2019-12-13 PROCEDURE — 25010000002 MAGNESIUM SULFATE PER 500 MG OF MAGNESIUM: Performed by: NURSE ANESTHETIST, CERTIFIED REGISTERED

## 2019-12-13 PROCEDURE — 85347 COAGULATION TIME ACTIVATED: CPT

## 2019-12-13 PROCEDURE — 25010000002 HYDRALAZINE PER 20 MG: Performed by: NURSE ANESTHETIST, CERTIFIED REGISTERED

## 2019-12-13 PROCEDURE — 25010000003 CEFAZOLIN IN DEXTROSE 2-4 GM/100ML-% SOLUTION: Performed by: SURGERY

## 2019-12-13 PROCEDURE — 88341 IMHCHEM/IMCYTCHM EA ADD ANTB: CPT | Performed by: SURGERY

## 2019-12-13 RX ORDER — SODIUM CHLORIDE, SODIUM LACTATE, POTASSIUM CHLORIDE, CALCIUM CHLORIDE 600; 310; 30; 20 MG/100ML; MG/100ML; MG/100ML; MG/100ML
9 INJECTION, SOLUTION INTRAVENOUS CONTINUOUS
Status: DISCONTINUED | OUTPATIENT
Start: 2019-12-13 | End: 2019-12-16 | Stop reason: HOSPADM

## 2019-12-13 RX ORDER — HEPARIN SODIUM 1000 [USP'U]/ML
INJECTION, SOLUTION INTRAVENOUS; SUBCUTANEOUS AS NEEDED
Status: DISCONTINUED | OUTPATIENT
Start: 2019-12-13 | End: 2019-12-13 | Stop reason: SURG

## 2019-12-13 RX ORDER — ROCURONIUM BROMIDE 10 MG/ML
INJECTION, SOLUTION INTRAVENOUS AS NEEDED
Status: DISCONTINUED | OUTPATIENT
Start: 2019-12-13 | End: 2019-12-13 | Stop reason: SURG

## 2019-12-13 RX ORDER — IPRATROPIUM BROMIDE AND ALBUTEROL SULFATE 2.5; .5 MG/3ML; MG/3ML
3 SOLUTION RESPIRATORY (INHALATION)
Status: ACTIVE | OUTPATIENT
Start: 2019-12-13 | End: 2019-12-15

## 2019-12-13 RX ORDER — PROPOFOL 10 MG/ML
VIAL (ML) INTRAVENOUS AS NEEDED
Status: DISCONTINUED | OUTPATIENT
Start: 2019-12-13 | End: 2019-12-13 | Stop reason: SURG

## 2019-12-13 RX ORDER — ASPIRIN 81 MG/1
81 TABLET ORAL DAILY
Status: DISCONTINUED | OUTPATIENT
Start: 2019-12-14 | End: 2019-12-16 | Stop reason: HOSPADM

## 2019-12-13 RX ORDER — HYDROCODONE BITARTRATE AND ACETAMINOPHEN 7.5; 325 MG/1; MG/1
1 TABLET ORAL EVERY 4 HOURS PRN
Status: DISCONTINUED | OUTPATIENT
Start: 2019-12-13 | End: 2019-12-16 | Stop reason: HOSPADM

## 2019-12-13 RX ORDER — KETOROLAC TROMETHAMINE 30 MG/ML
15 INJECTION, SOLUTION INTRAMUSCULAR; INTRAVENOUS EVERY 6 HOURS PRN
Status: DISCONTINUED | OUTPATIENT
Start: 2019-12-13 | End: 2019-12-16 | Stop reason: HOSPADM

## 2019-12-13 RX ORDER — EPHEDRINE SULFATE 50 MG/ML
INJECTION, SOLUTION INTRAVENOUS AS NEEDED
Status: DISCONTINUED | OUTPATIENT
Start: 2019-12-13 | End: 2019-12-13 | Stop reason: SURG

## 2019-12-13 RX ORDER — DIPHENHYDRAMINE HCL 25 MG
25 CAPSULE ORAL
Status: DISCONTINUED | OUTPATIENT
Start: 2019-12-13 | End: 2019-12-13 | Stop reason: HOSPADM

## 2019-12-13 RX ORDER — OXYCODONE AND ACETAMINOPHEN 7.5; 325 MG/1; MG/1
1 TABLET ORAL ONCE AS NEEDED
Status: DISCONTINUED | OUTPATIENT
Start: 2019-12-13 | End: 2019-12-13 | Stop reason: HOSPADM

## 2019-12-13 RX ORDER — NALOXONE HCL 0.4 MG/ML
0.4 VIAL (ML) INJECTION
Status: DISCONTINUED | OUTPATIENT
Start: 2019-12-13 | End: 2019-12-16 | Stop reason: HOSPADM

## 2019-12-13 RX ORDER — NICOTINE POLACRILEX 4 MG
15 LOZENGE BUCCAL
Status: DISCONTINUED | OUTPATIENT
Start: 2019-12-13 | End: 2019-12-16 | Stop reason: HOSPADM

## 2019-12-13 RX ORDER — HYDROMORPHONE HYDROCHLORIDE 1 MG/ML
0.5 INJECTION, SOLUTION INTRAMUSCULAR; INTRAVENOUS; SUBCUTANEOUS
Status: DISCONTINUED | OUTPATIENT
Start: 2019-12-13 | End: 2019-12-13

## 2019-12-13 RX ORDER — FAMOTIDINE 10 MG/ML
20 INJECTION, SOLUTION INTRAVENOUS ONCE
Status: COMPLETED | OUTPATIENT
Start: 2019-12-13 | End: 2019-12-13

## 2019-12-13 RX ORDER — DEXTROSE MONOHYDRATE 25 G/50ML
25 INJECTION, SOLUTION INTRAVENOUS
Status: DISCONTINUED | OUTPATIENT
Start: 2019-12-13 | End: 2019-12-16 | Stop reason: HOSPADM

## 2019-12-13 RX ORDER — LIDOCAINE HYDROCHLORIDE 10 MG/ML
0.5 INJECTION, SOLUTION EPIDURAL; INFILTRATION; INTRACAUDAL; PERINEURAL ONCE AS NEEDED
Status: DISCONTINUED | OUTPATIENT
Start: 2019-12-13 | End: 2019-12-13 | Stop reason: HOSPADM

## 2019-12-13 RX ORDER — HYDROMORPHONE HCL 110MG/55ML
PATIENT CONTROLLED ANALGESIA SYRINGE INTRAVENOUS AS NEEDED
Status: DISCONTINUED | OUTPATIENT
Start: 2019-12-13 | End: 2019-12-13 | Stop reason: SURG

## 2019-12-13 RX ORDER — MIDAZOLAM HYDROCHLORIDE 1 MG/ML
2 INJECTION INTRAMUSCULAR; INTRAVENOUS ONCE
Status: DISCONTINUED | OUTPATIENT
Start: 2019-12-13 | End: 2019-12-13 | Stop reason: HOSPADM

## 2019-12-13 RX ORDER — LEVOTHYROXINE SODIUM 88 UG/1
88 TABLET ORAL DAILY
Status: DISCONTINUED | OUTPATIENT
Start: 2019-12-13 | End: 2019-12-16 | Stop reason: HOSPADM

## 2019-12-13 RX ORDER — ONDANSETRON 4 MG/1
4 TABLET, FILM COATED ORAL EVERY 6 HOURS PRN
Status: DISCONTINUED | OUTPATIENT
Start: 2019-12-13 | End: 2019-12-16 | Stop reason: HOSPADM

## 2019-12-13 RX ORDER — INSULIN GLARGINE 100 [IU]/ML
20 INJECTION, SOLUTION SUBCUTANEOUS EVERY 12 HOURS SCHEDULED
Status: DISCONTINUED | OUTPATIENT
Start: 2019-12-13 | End: 2019-12-16 | Stop reason: HOSPADM

## 2019-12-13 RX ORDER — MIDAZOLAM HYDROCHLORIDE 1 MG/ML
2 INJECTION INTRAMUSCULAR; INTRAVENOUS
Status: DISCONTINUED | OUTPATIENT
Start: 2019-12-13 | End: 2019-12-13 | Stop reason: HOSPADM

## 2019-12-13 RX ORDER — HYDROMORPHONE HYDROCHLORIDE 1 MG/ML
0.5 INJECTION, SOLUTION INTRAMUSCULAR; INTRAVENOUS; SUBCUTANEOUS
Status: DISCONTINUED | OUTPATIENT
Start: 2019-12-13 | End: 2019-12-16 | Stop reason: HOSPADM

## 2019-12-13 RX ORDER — PANTOPRAZOLE SODIUM 40 MG/1
40 TABLET, DELAYED RELEASE ORAL
Status: DISCONTINUED | OUTPATIENT
Start: 2019-12-14 | End: 2019-12-16 | Stop reason: HOSPADM

## 2019-12-13 RX ORDER — SODIUM CHLORIDE 0.9 % (FLUSH) 0.9 %
3 SYRINGE (ML) INJECTION EVERY 12 HOURS SCHEDULED
Status: DISCONTINUED | OUTPATIENT
Start: 2019-12-13 | End: 2019-12-13 | Stop reason: HOSPADM

## 2019-12-13 RX ORDER — PROMETHAZINE HYDROCHLORIDE 25 MG/ML
12.5 INJECTION, SOLUTION INTRAMUSCULAR; INTRAVENOUS ONCE AS NEEDED
Status: DISCONTINUED | OUTPATIENT
Start: 2019-12-13 | End: 2019-12-13 | Stop reason: HOSPADM

## 2019-12-13 RX ORDER — DEXAMETHASONE SODIUM PHOSPHATE 10 MG/ML
INJECTION INTRAMUSCULAR; INTRAVENOUS AS NEEDED
Status: DISCONTINUED | OUTPATIENT
Start: 2019-12-13 | End: 2019-12-13 | Stop reason: SURG

## 2019-12-13 RX ORDER — CEFAZOLIN SODIUM 2 G/100ML
2 INJECTION, SOLUTION INTRAVENOUS ONCE
Status: COMPLETED | OUTPATIENT
Start: 2019-12-13 | End: 2019-12-13

## 2019-12-13 RX ORDER — FLUMAZENIL 0.1 MG/ML
0.2 INJECTION INTRAVENOUS AS NEEDED
Status: DISCONTINUED | OUTPATIENT
Start: 2019-12-13 | End: 2019-12-13 | Stop reason: HOSPADM

## 2019-12-13 RX ORDER — ESMOLOL HYDROCHLORIDE 10 MG/ML
INJECTION INTRAVENOUS AS NEEDED
Status: DISCONTINUED | OUTPATIENT
Start: 2019-12-13 | End: 2019-12-13 | Stop reason: SURG

## 2019-12-13 RX ORDER — LIDOCAINE HYDROCHLORIDE 20 MG/ML
INJECTION, SOLUTION INFILTRATION; PERINEURAL AS NEEDED
Status: DISCONTINUED | OUTPATIENT
Start: 2019-12-13 | End: 2019-12-13 | Stop reason: SURG

## 2019-12-13 RX ORDER — AMLODIPINE BESYLATE 2.5 MG/1
2.5 TABLET ORAL DAILY
Status: DISCONTINUED | OUTPATIENT
Start: 2019-12-13 | End: 2019-12-16 | Stop reason: HOSPADM

## 2019-12-13 RX ORDER — PROMETHAZINE HYDROCHLORIDE 25 MG/1
25 SUPPOSITORY RECTAL ONCE AS NEEDED
Status: DISCONTINUED | OUTPATIENT
Start: 2019-12-13 | End: 2019-12-13 | Stop reason: HOSPADM

## 2019-12-13 RX ORDER — MAGNESIUM SULFATE HEPTAHYDRATE 500 MG/ML
INJECTION, SOLUTION INTRAMUSCULAR; INTRAVENOUS AS NEEDED
Status: DISCONTINUED | OUTPATIENT
Start: 2019-12-13 | End: 2019-12-13 | Stop reason: SURG

## 2019-12-13 RX ORDER — PROTAMINE SULFATE 10 MG/ML
INJECTION, SOLUTION INTRAVENOUS AS NEEDED
Status: DISCONTINUED | OUTPATIENT
Start: 2019-12-13 | End: 2019-12-13 | Stop reason: SURG

## 2019-12-13 RX ORDER — KETAMINE HYDROCHLORIDE 10 MG/ML
INJECTION INTRAMUSCULAR; INTRAVENOUS AS NEEDED
Status: DISCONTINUED | OUTPATIENT
Start: 2019-12-13 | End: 2019-12-13 | Stop reason: SURG

## 2019-12-13 RX ORDER — FENTANYL CITRATE 50 UG/ML
50 INJECTION, SOLUTION INTRAMUSCULAR; INTRAVENOUS
Status: DISCONTINUED | OUTPATIENT
Start: 2019-12-13 | End: 2019-12-13

## 2019-12-13 RX ORDER — EPHEDRINE SULFATE 50 MG/ML
5 INJECTION, SOLUTION INTRAVENOUS ONCE AS NEEDED
Status: DISCONTINUED | OUTPATIENT
Start: 2019-12-13 | End: 2019-12-13 | Stop reason: HOSPADM

## 2019-12-13 RX ORDER — ONDANSETRON 2 MG/ML
4 INJECTION INTRAMUSCULAR; INTRAVENOUS EVERY 6 HOURS PRN
Status: DISCONTINUED | OUTPATIENT
Start: 2019-12-13 | End: 2019-12-16 | Stop reason: HOSPADM

## 2019-12-13 RX ORDER — SODIUM CHLORIDE 0.9 % (FLUSH) 0.9 %
3-10 SYRINGE (ML) INJECTION AS NEEDED
Status: DISCONTINUED | OUTPATIENT
Start: 2019-12-13 | End: 2019-12-13 | Stop reason: HOSPADM

## 2019-12-13 RX ORDER — HYDROCODONE BITARTRATE AND ACETAMINOPHEN 7.5; 325 MG/1; MG/1
1 TABLET ORAL ONCE AS NEEDED
Status: DISCONTINUED | OUTPATIENT
Start: 2019-12-13 | End: 2019-12-13

## 2019-12-13 RX ORDER — NALOXONE HCL 0.4 MG/ML
0.2 VIAL (ML) INJECTION AS NEEDED
Status: DISCONTINUED | OUTPATIENT
Start: 2019-12-13 | End: 2019-12-13 | Stop reason: HOSPADM

## 2019-12-13 RX ORDER — PROMETHAZINE HYDROCHLORIDE 25 MG/1
25 TABLET ORAL ONCE AS NEEDED
Status: DISCONTINUED | OUTPATIENT
Start: 2019-12-13 | End: 2019-12-13 | Stop reason: HOSPADM

## 2019-12-13 RX ORDER — ACETAMINOPHEN 325 MG/1
650 TABLET ORAL ONCE AS NEEDED
Status: DISCONTINUED | OUTPATIENT
Start: 2019-12-13 | End: 2019-12-13 | Stop reason: HOSPADM

## 2019-12-13 RX ORDER — MIDAZOLAM HYDROCHLORIDE 1 MG/ML
1 INJECTION INTRAMUSCULAR; INTRAVENOUS
Status: DISCONTINUED | OUTPATIENT
Start: 2019-12-13 | End: 2019-12-13 | Stop reason: HOSPADM

## 2019-12-13 RX ORDER — HYDRALAZINE HYDROCHLORIDE 20 MG/ML
5 INJECTION INTRAMUSCULAR; INTRAVENOUS
Status: DISCONTINUED | OUTPATIENT
Start: 2019-12-13 | End: 2019-12-13 | Stop reason: HOSPADM

## 2019-12-13 RX ORDER — SODIUM CHLORIDE 9 MG/ML
50 INJECTION, SOLUTION INTRAVENOUS CONTINUOUS
Status: DISCONTINUED | OUTPATIENT
Start: 2019-12-13 | End: 2019-12-14

## 2019-12-13 RX ORDER — DIPHENHYDRAMINE HYDROCHLORIDE 50 MG/ML
12.5 INJECTION INTRAMUSCULAR; INTRAVENOUS
Status: DISCONTINUED | OUTPATIENT
Start: 2019-12-13 | End: 2019-12-13 | Stop reason: HOSPADM

## 2019-12-13 RX ORDER — ONDANSETRON 2 MG/ML
4 INJECTION INTRAMUSCULAR; INTRAVENOUS ONCE AS NEEDED
Status: DISCONTINUED | OUTPATIENT
Start: 2019-12-13 | End: 2019-12-13 | Stop reason: HOSPADM

## 2019-12-13 RX ORDER — ACETAMINOPHEN 325 MG/1
650 TABLET ORAL EVERY 6 HOURS PRN
Status: DISCONTINUED | OUTPATIENT
Start: 2019-12-13 | End: 2019-12-16 | Stop reason: HOSPADM

## 2019-12-13 RX ORDER — FENTANYL CITRATE 50 UG/ML
INJECTION, SOLUTION INTRAMUSCULAR; INTRAVENOUS AS NEEDED
Status: DISCONTINUED | OUTPATIENT
Start: 2019-12-13 | End: 2019-12-13 | Stop reason: SURG

## 2019-12-13 RX ORDER — LIDOCAINE HYDROCHLORIDE 10 MG/ML
INJECTION, SOLUTION EPIDURAL; INFILTRATION; INTRACAUDAL; PERINEURAL AS NEEDED
Status: DISCONTINUED | OUTPATIENT
Start: 2019-12-13 | End: 2019-12-13 | Stop reason: HOSPADM

## 2019-12-13 RX ORDER — LABETALOL HYDROCHLORIDE 5 MG/ML
5 INJECTION, SOLUTION INTRAVENOUS
Status: DISCONTINUED | OUTPATIENT
Start: 2019-12-13 | End: 2019-12-13 | Stop reason: HOSPADM

## 2019-12-13 RX ORDER — NITROGLYCERIN 0.4 MG/1
0.4 TABLET SUBLINGUAL
Status: DISCONTINUED | OUTPATIENT
Start: 2019-12-13 | End: 2019-12-16 | Stop reason: HOSPADM

## 2019-12-13 RX ORDER — FENTANYL CITRATE 50 UG/ML
50 INJECTION, SOLUTION INTRAMUSCULAR; INTRAVENOUS
Status: DISCONTINUED | OUTPATIENT
Start: 2019-12-13 | End: 2019-12-13 | Stop reason: HOSPADM

## 2019-12-13 RX ORDER — NALOXONE HYDROCHLORIDE 0.4 MG/ML
INJECTION, SOLUTION INTRAMUSCULAR; INTRAVENOUS; SUBCUTANEOUS AS NEEDED
Status: DISCONTINUED | OUTPATIENT
Start: 2019-12-13 | End: 2019-12-13 | Stop reason: SURG

## 2019-12-13 RX ORDER — PROMETHAZINE HYDROCHLORIDE 25 MG/ML
6.25 INJECTION, SOLUTION INTRAMUSCULAR; INTRAVENOUS
Status: DISCONTINUED | OUTPATIENT
Start: 2019-12-13 | End: 2019-12-13 | Stop reason: HOSPADM

## 2019-12-13 RX ADMIN — ROCURONIUM BROMIDE 50 MG: 10 INJECTION INTRAVENOUS at 09:32

## 2019-12-13 RX ADMIN — MIDAZOLAM 2 MG: 1 INJECTION INTRAMUSCULAR; INTRAVENOUS at 08:39

## 2019-12-13 RX ADMIN — LIDOCAINE HYDROCHLORIDE 80 MG: 20 INJECTION, SOLUTION INFILTRATION; PERINEURAL at 09:32

## 2019-12-13 RX ADMIN — PROTAMINE SULFATE 20 MG: 10 INJECTION, SOLUTION INTRAVENOUS at 11:33

## 2019-12-13 RX ADMIN — FENTANYL CITRATE 50 MCG: 50 INJECTION, SOLUTION INTRAMUSCULAR; INTRAVENOUS at 08:39

## 2019-12-13 RX ADMIN — FENTANYL CITRATE 50 MCG: 50 INJECTION INTRAMUSCULAR; INTRAVENOUS at 09:22

## 2019-12-13 RX ADMIN — SODIUM CHLORIDE 50 ML/HR: 9 INJECTION, SOLUTION INTRAVENOUS at 16:14

## 2019-12-13 RX ADMIN — HEPARIN SODIUM 3000 UNITS: 1000 INJECTION, SOLUTION INTRAVENOUS; SUBCUTANEOUS at 10:34

## 2019-12-13 RX ADMIN — SODIUM CHLORIDE, POTASSIUM CHLORIDE, SODIUM LACTATE AND CALCIUM CHLORIDE 9 ML/HR: 600; 310; 30; 20 INJECTION, SOLUTION INTRAVENOUS at 08:16

## 2019-12-13 RX ADMIN — EPHEDRINE SULFATE 5 MG: 50 INJECTION INTRAVENOUS at 10:37

## 2019-12-13 RX ADMIN — INSULIN LISPRO 16 UNITS: 100 INJECTION, SOLUTION INTRAVENOUS; SUBCUTANEOUS at 18:16

## 2019-12-13 RX ADMIN — KETAMINE HYDROCHLORIDE 10 MG: 10 INJECTION INTRAMUSCULAR; INTRAVENOUS at 10:55

## 2019-12-13 RX ADMIN — PROPOFOL 50 MG: 10 INJECTION, EMULSION INTRAVENOUS at 09:32

## 2019-12-13 RX ADMIN — ACETAMINOPHEN 650 MG: 325 TABLET, FILM COATED ORAL at 17:19

## 2019-12-13 RX ADMIN — INSULIN LISPRO 8 UNITS: 100 INJECTION, SOLUTION INTRAVENOUS; SUBCUTANEOUS at 23:41

## 2019-12-13 RX ADMIN — DEXAMETHASONE SODIUM PHOSPHATE 8 MG: 10 INJECTION INTRAMUSCULAR; INTRAVENOUS at 11:00

## 2019-12-13 RX ADMIN — HYDROMORPHONE HYDROCHLORIDE 0.25 MG: 2 INJECTION, SOLUTION INTRAMUSCULAR; INTRAVENOUS; SUBCUTANEOUS at 10:09

## 2019-12-13 RX ADMIN — HYDROMORPHONE HYDROCHLORIDE 0.5 MG: 2 INJECTION, SOLUTION INTRAMUSCULAR; INTRAVENOUS; SUBCUTANEOUS at 11:04

## 2019-12-13 RX ADMIN — MAGNESIUM SULFATE HEPTAHYDRATE 1 G: 500 INJECTION, SOLUTION INTRAMUSCULAR; INTRAVENOUS at 09:57

## 2019-12-13 RX ADMIN — HYDROCODONE BITARTRATE AND ACETAMINOPHEN 1 TABLET: 7.5; 325 TABLET ORAL at 20:32

## 2019-12-13 RX ADMIN — ESMOLOL HYDROCHLORIDE 20 MG: 10 INJECTION, SOLUTION INTRAVENOUS at 12:00

## 2019-12-13 RX ADMIN — ESMOLOL HYDROCHLORIDE 10 MG: 10 INJECTION, SOLUTION INTRAVENOUS at 12:09

## 2019-12-13 RX ADMIN — ESMOLOL HYDROCHLORIDE 20 MG: 10 INJECTION, SOLUTION INTRAVENOUS at 12:05

## 2019-12-13 RX ADMIN — HYDROMORPHONE HYDROCHLORIDE 0.25 MG: 2 INJECTION, SOLUTION INTRAMUSCULAR; INTRAVENOUS; SUBCUTANEOUS at 10:25

## 2019-12-13 RX ADMIN — FAMOTIDINE 20 MG: 10 INJECTION INTRAVENOUS at 08:16

## 2019-12-13 RX ADMIN — HYDRALAZINE HYDROCHLORIDE 10 MG: 20 INJECTION INTRAMUSCULAR; INTRAVENOUS at 12:34

## 2019-12-13 RX ADMIN — INSULIN LISPRO 20 UNITS: 100 INJECTION, SOLUTION INTRAVENOUS; SUBCUTANEOUS at 20:32

## 2019-12-13 RX ADMIN — ROCURONIUM BROMIDE 20 MG: 10 INJECTION INTRAVENOUS at 11:23

## 2019-12-13 RX ADMIN — CEFAZOLIN SODIUM 2 G: 2 INJECTION, SOLUTION INTRAVENOUS at 09:30

## 2019-12-13 RX ADMIN — SODIUM CHLORIDE, POTASSIUM CHLORIDE, SODIUM LACTATE AND CALCIUM CHLORIDE: 600; 310; 30; 20 INJECTION, SOLUTION INTRAVENOUS at 12:11

## 2019-12-13 RX ADMIN — SUGAMMADEX 200 MG: 100 INJECTION, SOLUTION INTRAVENOUS at 11:43

## 2019-12-13 RX ADMIN — KETAMINE HYDROCHLORIDE 30 MG: 10 INJECTION INTRAMUSCULAR; INTRAVENOUS at 09:55

## 2019-12-13 RX ADMIN — ONDANSETRON HYDROCHLORIDE 4 MG: 2 SOLUTION INTRAMUSCULAR; INTRAVENOUS at 15:55

## 2019-12-13 RX ADMIN — MAGNESIUM SULFATE HEPTAHYDRATE 1 G: 500 INJECTION, SOLUTION INTRAMUSCULAR; INTRAVENOUS at 09:42

## 2019-12-13 RX ADMIN — NALOXONE HYDROCHLORIDE 0.01 MG: 0.4 INJECTION, SOLUTION INTRAMUSCULAR; INTRAVENOUS; SUBCUTANEOUS at 12:00

## 2019-12-13 RX ADMIN — FENTANYL CITRATE 50 MCG: 50 INJECTION INTRAMUSCULAR; INTRAVENOUS at 09:26

## 2019-12-13 RX ADMIN — INSULIN GLARGINE 20 UNITS: 100 INJECTION, SOLUTION SUBCUTANEOUS at 20:32

## 2019-12-13 NOTE — PROGRESS NOTES
Issues with poor oxygenation in the recovery room likely due to the lingering effects of anesthetic compounded by baseline sleep apnea which the patient has refused to have treated.  Because of these concerns we will place her in the ICU overnight.  She has a lot of bilious vomiting and apparently at home has a lot of GI issues.  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  Will likely need to be in hospital over the weekend to stabilize her pulmonary status.  Will check chest x-ray in the a.m. to make sure that no aspiration is occurred.  Chest x-ray in recovery room looks very clear.  Await pulmonary consult and management while in the  ICU

## 2019-12-13 NOTE — ANESTHESIA PROCEDURE NOTES
Airway  Urgency: elective    Date/Time: 12/13/2019 9:35 AM  Airway not difficult    General Information and Staff    Patient location during procedure: OR  Anesthesiologist: Marlo Mclaughlin MD  CRNA: Karlee Harper CRNA    Indications and Patient Condition  Indications for airway management: airway protection    Preoxygenated: yes  Mask difficulty assessment: 2 - vent by mask + OA or adjuvant +/- NMBA    Final Airway Details  Final airway type: endotracheal airway      Successful airway: ETT  Cuffed: yes   Successful intubation technique: video laryngoscopy  Facilitating devices/methods: intubating stylet, cricoid pressure and anterior pressure/BURP  Endotracheal tube insertion site: oral  Blade: CMAC  Blade size: D  ETT size (mm): 7.0  Cormack-Lehane Classification: grade I - full view of glottis  Placement verified by: chest auscultation and capnometry   Cuff volume (mL): 6  Measured from: teeth  ETT/EBT  to teeth (cm): 20  Number of attempts at approach: 1  Assessment: lips, teeth, and gum same as pre-op and atraumatic intubation    Additional Comments  Poor dentition with chipped upper front teeth. Stiff neck, small mouth opening and small chin. Used CMAC on first attempt. On first look, bile noted in back of oropharynx. Suctioned.  Airway secured. In line suction used to suction through ETT. No secretions noted. Breath sounds clear. MD mclaughlin present and MD Severino notified. Will get chest xray post operatively.

## 2019-12-13 NOTE — CONSULTS
"[facility], [facility]  Initial Inpatient Consult Note    Referring Provider: Dr. RASHEEDA Nazario    Reason for Consultation: Nonbloody vomiting after having left neck surgery today.     Subjective     History of present illness:    84 y.o. female who had left neck surgery by Dr. Severino today to remove a left neck mass that was near her left carotid artery.  As the patient was awakening she had nonbloody vomiting once.  She normally does not vomit and according to the family does not usually have heartburn or indigestion.  She does take Advil as needed.  She is a non-smoker and the family denies alcohol use.  Patient does state that she has epigastric pain on and off for a number of months.  She does not know what makes it worse or better.  The patient also usually gets diarrhea and stool incontinence with some bright red blood per rectum according to the family.  The patient has seen a Gastroenterologist, Dr. Johnson, for this but has never had a complete evaluation for this.  Her last colonoscopy was by Dr. patrick muhammad several years ago.  She does not know if she is ever had an EGD.  Prior to surgery she felt like someone was choking her and she would become short of breath with this.  The question was whether this left neck mass was causing this.  One daughter feels that the morphine sulfate may have caused her to vomit.    Past Medical History:  Past Medical History:   Diagnosis Date   • Abnormal vision     SEES \"KALEIDOSCOPE\"   • Allergy to adhesive tape    • Arthralgia    • AVB (atrioventricular block)    • Balance problem    • Carotid body tumor (CMS/HCC)     LEFT   • Difficulty walking    • H/O complete eye exam 10/2016   • Headache     OFF AND ON BACK OF HEAD, DOWN NECK TO SHOULDER   • History of glaucoma    • History of poliomyelitis     AGE 9   • History of surgery on arm     left arm   • HLD (hyperlipidemia)    • Hypertension    • Hypothyroidism, acquired    • Legal blindness     SOME PERIPHERAL VISION ON " LEFT, SOME VISION ON RIGHT   • Memory loss     SOME SHORT TERM   • Migraine    • Numbness     LEFT LEG    • Osteoarthritis, multiple sites    • Osteoporosis    • Pacemaker    • Rectal bleeding     X1, WITH BM   • Sleep apnea     DOES NOT USE A MACHINE   • TIA (transient ischemic attack) 12/24/2018    ?, HAD SPELL UNABLE TO TALK   • Type 2 diabetes mellitus (CMS/HCC)    • Type 2 diabetes mellitus, uncontrolled (CMS/HCC)      Past Surgical History:  Past Surgical History:   Procedure Laterality Date   • APPENDECTOMY  1989   • CARDIAC PACEMAKER PLACEMENT  11/25/2004   • CATARACT EXTRACTION Bilateral 1997   • CEREBRAL ANGIOGRAM Bilateral 12/6/2019    Procedure: CAROTID ARTERIOGRAM BILATERAL, RIGHT WRIST APPROACH;  Surgeon: Bryan Severino MD;  Location: [facility] 18/19;  Service: Vascular   • CHOLECYSTECTOMY  1989   • COLONOSCOPY  2013    dr montes   • ENDOSCOPY  12/06/2012    Dr. Montes; food in stomach, gastritis   • EXCISION LESION  1994    BACK CYST BENGIN   • EYE SURGERY  12/2012   • GLAUCOMA SURGERY Bilateral 1995    laser surgery   • HARDWARE REMOVAL Left     ELBOW   • HYSTERECTOMY  1986   • ORIF ELBOW FRACTURE Left    • PACEMAKER IMPLANTATION     • PACEMAKER REPLACEMENT  06/2013      Social History:   Social History     Tobacco Use   • Smoking status: Never Smoker   • Smokeless tobacco: Never Used   Substance Use Topics   • Alcohol use: No      Family History:  Family History   Problem Relation Age of Onset   • Cancer Father    • Glaucoma Father         angular glycoma   • Heart disease Father    • Arthritis Father    • Thyroid disease Father    • Stroke Father    • Asthma Sister    • Arthritis Sister    • Cancer Sister    • Stroke Maternal Grandmother    • Heart failure Other    • Diabetes Other    • Hypertension Other    • Stroke Other         aunt   • Thyroid disease Other    • Arthritis Mother    • Diabetes Mother    • Thyroid disease Mother    • Malig Hyperthermia Neg Hx        Home Meds:  Medications  Prior to Admission   Medication Sig Dispense Refill Last Dose   • amLODIPine (NORVASC) 2.5 MG tablet Take 1 tablet by mouth Daily. (Patient taking differently: Take 2.5 mg by mouth Daily. PT TAKES AT LUNCH) 90 tablet 3 12/12/2019 at 1300   • insulin lispro protamine-insulin lispro (humaLOG 50-50) (50-50) 100 UNIT/ML suspension injection 40, 20,40 (Patient taking differently: Inject 20-40 Units under the skin into the appropriate area as directed 3 (Three) Times a Day With Meals. 40 UNITS WITH BREAKFAST, 20 UNITS WITH LUNCH, 40 UNITS WITH DINNER  MAKES ADJUSTMENTS BASED ON BS) 90 mL 3 12/12/2019 at 1325   • insulin NPH (HUMULIN N) 100 UNIT/ML injection 20 units at bedtime as needed for blood glucose greater than 200 (Patient taking differently: Inject 20 Units under the skin into the appropriate area as directed Every Night. 20 units at bedtime as needed for blood glucose greater than 150) 5 each 5 12/12/2019 at 2129   • levothyroxine (SYNTHROID, LEVOTHROID) 88 MCG tablet Take 1 tablet by mouth Daily. 90 tablet 3 12/12/2019 at 0900   • losartan-hydrochlorothiazide (HYZAAR) 100-25 MG per tablet Take 1 tablet by mouth Daily. (Patient taking differently: Take 1 tablet by mouth Daily. PT TAKES AT LUNCH) 90 tablet 3 12/12/2019 at 1300   • Calcium Carb-Cholecalciferol (CALCIUM 600 + D PO) Take 1 tablet by mouth 2 (Two) Times a Day. HOLD PRIOR TO SURGERY   12/11/2019   • Cholecalciferol (VITAMIN D) 25 MCG (1000 UT) tablet Take 1,000 Units by mouth 2 (Two) Times a Day. HOLD PRIOR TO SURGERY   12/11/2019   • HYDROcodone-acetaminophen (NORCO) 5-325 MG per tablet Take 1-2 tablets by mouth Every 6 (Six) Hours As Needed (Pain). 20 tablet 0 HASN'T STARTED     Current Meds:     amLODIPine 2.5 mg Oral Daily   [START ON 12/14/2019] aspirin 81 mg Oral Daily   insulin lispro protamine-insulin lispro 20 Units Subcutaneous TID With Meals   insulin NPH 20 Units Subcutaneous Nightly   ipratropium-albuterol 3 mL Nebulization Q8H - RT    levothyroxine 88 mcg Oral Daily   losartan-HCTZ (HYZAAR) 100-25 combo dose  Oral Daily   [START ON 12/14/2019] pantoprazole 40 mg Oral Q AM     Allergies:  Allergies   Allergen Reactions   • Adhesive Tape Other (See Comments)     REDNESS, SKIN BURN   • Latex Other (See Comments)     REDNESS, SKIN BURN   • Propoxyphene Itching     Review of Systems  The following systems were reviewed and negative;  respiratory, cardiovascular, musculoskeletal and neurological     Objective     Vital Signs  Temp:  [97.8 °F (36.6 °C)-98.6 °F (37 °C)] 98.5 °F (36.9 °C)  Heart Rate:  [67-95] 95  Resp:  [8-20] 14  BP: (128-244)/() 128/56  Physical Exam:  General Appearance:    Alert, cooperative, in no acute distress   Head:    Normocephalic, without obvious abnormality, atraumatic   Eyes:          conjunctivae and sclerae normal, no   icterus   Throat:   no thrush, oral mucosa moist. Left neck surgical wound.    Neck:   Supple, no adenopathy   Lungs:     Clear to auscultation bilaterally    Heart:    Regular rhythm and normal rate    Chest Wall:    No abnormalities observed   Abdomen:     Soft, nondistended, nontender; normal bowel sounds   Extremities:   no edema, no redness   Skin:   No bruising or rash   Psychiatric:  normal mood and insight     Results Review:   I reviewed the patient's new clinical results.    Results from last 7 days   Lab Units 12/11/19  1224   WBC 10*3/mm3 8.53   HEMOGLOBIN g/dL 13.3   HEMATOCRIT % 41.6   PLATELETS 10*3/mm3 232     Results from last 7 days   Lab Units 12/11/19  1224   SODIUM mmol/L 141   POTASSIUM mmol/L 4.0   CHLORIDE mmol/L 101   CO2 mmol/L 28.5   BUN mg/dL 27*   CREATININE mg/dL 1.09*   CALCIUM mg/dL 10.0   GLUCOSE mg/dL 173*         No results found for: LIPASE    Radiology:  XR Chest 1 View   Final Result      XR Chest 1 View    (Results Pending)       Assessment/Plan   Patient Active Problem List   Diagnosis   • Legal blindness   • Uncontrolled type 2 diabetes mellitus with  hypoglycemia without coma (CMS/HCC)   • GERD (gastroesophageal reflux disease)   • HLD (hyperlipidemia)   • Hypothyroidism, acquired   • Osteoporosis   • Osteoarthritis, multiple sites   • Essential hypertension   • Senile osteoporosis   • Renal insufficiency   • T12 compression fracture (CMS/HCC)   • Vitamin D deficiency   • Severe headache   • Carotid body tumor (CMS/HCC)   • Glomus jugulare tumor (CMS/HCC)       Assessment:  1.  This 84-year-old female had nonbloody vomiting once after having left neck surgery today to remove a mass near her left carotid artery.  Could this is been from medication that she got either during the surgery or after the surgery?.  2.  The patient has chronic intermittent diarrhea with some rectal bleeding.  The patient is being evaluated by Dr. Johnson for this.    Recommendations:  1.  I will put her on Protonix 40 mg p.o. daily.  2.  I would advance her diet as tolerated.  3.  I will check routine lab work including a CBC, comprehensive metabolic profile, amylase and lipase.  I will also check a thyroid-stimulating hormone and celiac sprue antibody panel because of her chronic diarrhea.  4.  I will let the patient follow-up with Dr. Johnson to evaluate her chronic intermittent diarrhea and the rectal bleeding.    I discussed the patients findings and my recommendations with patient, family and nursing staff.    Tevin Bentley MD

## 2019-12-13 NOTE — ANESTHESIA POSTPROCEDURE EVALUATION
"Patient: Roland Russell    Procedure Summary     Date:  12/13/19 Room / Location:  [facility] OR [address] [place] [address] MAIN OR    Anesthesia Start:  0922 Anesthesia Stop:  1228    Procedure:  LEFT CAROTID BODY TUMOR RESECTION (Left Neck) Diagnosis:      Surgeon:  Bryan Severino MD Provider:      Anesthesia Type:  general ASA Status:  4          Anesthesia Type: general    Vitals  Vitals Value Taken Time   /60 12/13/2019  2:50 PM   Temp 37 °C (98.6 °F) 12/13/2019 12:18 PM   Pulse 88 12/13/2019  2:55 PM   Resp 14 12/13/2019  2:35 PM   SpO2 96 % 12/13/2019  2:55 PM   Vitals shown include unvalidated device data.        Post Anesthesia Care and Evaluation    Patient location during evaluation: bedside  Patient participation: complete - patient participated  Level of consciousness: confused and awake  Pain management: adequate  Airway patency: patent  Anesthetic complications: No anesthetic complications  PONV Status: inadequately controlled  Cardiovascular status: acceptable  Respiratory status: acceptable and face mask  Hydration status: acceptable    Comments: /56   Pulse 86   Temp 37 °C (98.6 °F) (Oral)   Resp 14   Ht 152.4 cm (60\")   Wt 83 kg (183 lb 1 oz)   SpO2 95%   BMI 35.75 kg/m²       "

## 2019-12-13 NOTE — ANESTHESIA PREPROCEDURE EVALUATION
Anesthesia Evaluation                  Airway   Mallampati: III  TM distance: <3 FB  Neck ROM: limited  Possible difficult intubation  Dental - normal exam     Pulmonary - normal exam   (+) sleep apnea,   Cardiovascular - normal exam    (+) pacemaker pacemaker, hypertension, dysrhythmias, hyperlipidemia,       Neuro/Psych  (+) TIA, headaches, numbness,     GI/Hepatic/Renal/Endo    (+)  GERD, GI bleeding , renal disease CRI, diabetes mellitus type 2 using insulin,     Musculoskeletal     Abdominal    Substance History      OB/GYN          Other   arthritis,                      Anesthesia Plan    ASA 4     general     intravenous induction     Anesthetic plan, all risks, benefits, and alternatives have been provided, discussed and informed consent has been obtained with: patient.

## 2019-12-13 NOTE — BRIEF OP NOTE
CAROTID ENDARTERECTOMY  Progress Note    Roland Russell  12/13/2019    Pre-op Diagnosis:   Left carotid body tumor       Post-Op Diagnosis Codes:   same    Procedure/CPT® Codes:      Procedure(s):  LEFT CAROTID BODY TUMOR RESECTION    Surgeon(s):  Bryan Severino MD Lipski, David Anthony, MD    Anesthesia: General    Staff:   Cell Saver : Davon Perez  Circulator: Rashida Alfaro RN; Meri Calle RN  Perfusionist: Rocco Stephen  Scrub Person: Victor Hugo Campbell; Natalia Kelly  Assistant: Clive Garcia    Estimated Blood Loss: 100ml    Urine Voided: * No values recorded between 12/13/2019  9:19 AM and 12/13/2019 12:15 PM *    Specimens:                Specimens     ID Source Type Tests Collected By Collected At Frozen?      A Carotid Artery Tissue · TISSUE PATHOLOGY EXAM   Bryan Severino MD 12/13/19 1131      Description: Left Carotid Tumor                Drains: * No LDAs found *    Findings: See dictation    Complications: None      Bryan Severino MD     Date: 12/13/2019  Time: 12:18 PM

## 2019-12-13 NOTE — CONSULTS
"           CONSULT NOTE    Patient Identification:  Roland Russell  84 y.o.  female  1934  [id number]            Requesting physician: Dr. Renzo Severino    Reason for Consultation: DARI hypoxia    CC: Postoperative    History of Present Illness:  Patient is a 84-year-old female who has a previous medical history of diabetes type 2 as well as a history of obstructive sleep apnea intolerant of any CPAP.  She says that in the past they tried an overnight sleep study and she was not able to tolerate noninvasive ventilation for 1 second and they actually stopped a sleep study and she had her  come pick her up.  She does not feel comfortable trying noninvasive ventilation she says she is unable to tolerate it.   She at present time complains of being nauseous and is experiencing emesis intermittently.  She is having active emesis at present and has some in the PACU.  Secondary to this she is not can be able to tolerate CPAP here.  We will go ahead and give oxygen through nasal cannula to help keep her oxygen saturation as high as possible while she sleeps.    Otherwise she denies any shortness of breath she does describe his generalized pain and achiness.  Review of Systems:  CONSTITUTIONAL:  Denies fevers or chills  EYE:  No new vision changes  EAR:  No change in hearing  CARDIAC:  No chest pain  PULMONARY:  No productive cough or shortness of breath  GI:  No diarrhea, hematemesis or hematochezia,  RENAL:  No dysuria or urinary frequency  MUSCULOSKELETAL: As above  ENDOCRINE:  No heat or cold intolerance  INTEGUMENTARY: No skin rashes  NEUROLOGICAL:  No dizziness or confusion.  No seizure activity  PSYCHIATRIC:  No new anxiety or depression  12 system review of systems performed and all else negative    Past Medical History:   Diagnosis Date   • Abnormal vision     SEES \"KALEIDOSCOPE\"   • Allergy to adhesive tape    • Arthralgia    • AVB (atrioventricular block)    • Balance problem    • Carotid body tumor " (CMS/HCC)     LEFT   • Difficulty walking    • H/O complete eye exam 10/2016   • Headache     OFF AND ON BACK OF HEAD, DOWN NECK TO SHOULDER   • History of glaucoma    • History of poliomyelitis     AGE 9   • History of surgery on arm     left arm   • HLD (hyperlipidemia)    • Hypertension    • Hypothyroidism, acquired    • Legal blindness     SOME PERIPHERAL VISION ON LEFT, SOME VISION ON RIGHT   • Memory loss     SOME SHORT TERM   • Migraine    • Numbness     LEFT LEG    • Osteoarthritis, multiple sites    • Osteoporosis    • Pacemaker    • Rectal bleeding     X1, WITH BM   • Sleep apnea     DOES NOT USE A MACHINE   • TIA (transient ischemic attack) 12/24/2018    ?, HAD SPELL UNABLE TO TALK   • Type 2 diabetes mellitus (CMS/[facility])    • Type 2 diabetes mellitus, uncontrolled (CMS/[facility])        Past Surgical History:   Procedure Laterality Date   • APPENDECTOMY  1989   • CARDIAC PACEMAKER PLACEMENT  11/25/2004   • CATARACT EXTRACTION Bilateral 1997   • CEREBRAL ANGIOGRAM Bilateral 12/6/2019    Procedure: CAROTID ARTERIOGRAM BILATERAL, RIGHT WRIST APPROACH;  Surgeon: Bryan Severino MD;  Location: [facility] 18/19;  Service: Vascular   • CHOLECYSTECTOMY  1989   • COLONOSCOPY  2013    dr montes   • ENDOSCOPY  12/06/2012    Dr. Montes; food in stomach, gastritis   • EXCISION LESION  1994    BACK CYST BENGIN   • EYE SURGERY  12/2012   • GLAUCOMA SURGERY Bilateral 1995    laser surgery   • HARDWARE REMOVAL Left     ELBOW   • HYSTERECTOMY  1986   • ORIF ELBOW FRACTURE Left    • PACEMAKER IMPLANTATION     • PACEMAKER REPLACEMENT  06/2013        Medications Prior to Admission   Medication Sig Dispense Refill Last Dose   • amLODIPine (NORVASC) 2.5 MG tablet Take 1 tablet by mouth Daily. (Patient taking differently: Take 2.5 mg by mouth Daily. PT TAKES AT LUNCH) 90 tablet 3 12/12/2019 at 1300   • insulin lispro protamine-insulin lispro (humaLOG 50-50) (50-50) 100 UNIT/ML suspension injection 40, 20,40 (Patient taking  differently: Inject 20-40 Units under the skin into the appropriate area as directed 3 (Three) Times a Day With Meals. 40 UNITS WITH BREAKFAST, 20 UNITS WITH LUNCH, 40 UNITS WITH DINNER  MAKES ADJUSTMENTS BASED ON BS) 90 mL 3 12/12/2019 at 1325   • insulin NPH (HUMULIN N) 100 UNIT/ML injection 20 units at bedtime as needed for blood glucose greater than 200 (Patient taking differently: Inject 20 Units under the skin into the appropriate area as directed Every Night. 20 units at bedtime as needed for blood glucose greater than 150) 5 each 5 12/12/2019 at 2129   • levothyroxine (SYNTHROID, LEVOTHROID) 88 MCG tablet Take 1 tablet by mouth Daily. 90 tablet 3 12/12/2019 at 0900   • losartan-hydrochlorothiazide (HYZAAR) 100-25 MG per tablet Take 1 tablet by mouth Daily. (Patient taking differently: Take 1 tablet by mouth Daily. PT TAKES AT LUNCH) 90 tablet 3 12/12/2019 at 1300   • Calcium Carb-Cholecalciferol (CALCIUM 600 + D PO) Take 1 tablet by mouth 2 (Two) Times a Day. HOLD PRIOR TO SURGERY   12/11/2019   • Cholecalciferol (VITAMIN D) 25 MCG (1000 UT) tablet Take 1,000 Units by mouth 2 (Two) Times a Day. HOLD PRIOR TO SURGERY   12/11/2019   • HYDROcodone-acetaminophen (NORCO) 5-325 MG per tablet Take 1-2 tablets by mouth Every 6 (Six) Hours As Needed (Pain). 20 tablet 0 HASN'T STARTED       Allergies   Allergen Reactions   • Adhesive Tape Other (See Comments)     REDNESS, SKIN BURN   • Latex Other (See Comments)     REDNESS, SKIN BURN   • Propoxyphene Itching       Social History     Socioeconomic History   • Marital status:      Spouse name: Not on file   • Number of children: Not on file   • Years of education: Not on file   • Highest education level: Not on file   Tobacco Use   • Smoking status: Never Smoker   • Smokeless tobacco: Never Used   Substance and Sexual Activity   • Alcohol use: No   • Drug use: No   • Sexual activity: Defer       Family History   Problem Relation Age of Onset   • Cancer Father   "  • Glaucoma Father         angular glycoma   • Heart disease Father    • Arthritis Father    • Thyroid disease Father    • Stroke Father    • Asthma Sister    • Arthritis Sister    • Cancer Sister    • Stroke Maternal Grandmother    • Heart failure Other    • Diabetes Other    • Hypertension Other    • Stroke Other         aunt   • Thyroid disease Other    • Arthritis Mother    • Diabetes Mother    • Thyroid disease Mother    • Malig Hyperthermia Neg Hx        Physical Exam:  /56   Pulse 86   Temp 98.6 °F (37 °C) (Oral)   Resp 14   Ht 152.4 cm (60\")   Wt 83 kg (183 lb 1 oz)   SpO2 95%   BMI 35.75 kg/m²   Body mass index is 35.75 kg/m².   General appearance: Nontoxic but ill-appearing, conversant   Eyes: anicteric sclerae, moist conjunctivae; no lid-lag; PERRLA  HENT: Left-sided incision clean dry intact no bleeding no significant swelling airway is widely patent; oropharynx clear with moist mucous membranes and no mucosal ulcerations; normal hard and soft palate  Neck: Trachea midline; FROM, supple, no thyromegaly or lymphadenopathy  Lungs: CTA, with shallow respiratory effort and no intercostal retractions  CV: Tachycardic, no MRGs   Abdomen: Soft, non-tender; no masses or HSM  Extremities: No peripheral edema or extremity lymphadenopathy  Skin: Normal temperature, turgor and texture; no rash, ulcers or subcutaneous nodules  Psych: Anxious affect, alert and oriented to person, place and time  Neuro cranial 2 through 12 intact speech intact moves all extremities    LABS:  Results from last 7 days   Lab Units 12/11/19  1224   WBC 10*3/mm3 8.53   HEMOGLOBIN g/dL 13.3   PLATELETS 10*3/mm3 232     Results from last 7 days   Lab Units 12/11/19  1224   SODIUM mmol/L 141   POTASSIUM mmol/L 4.0   CHLORIDE mmol/L 101   CO2 mmol/L 28.5   BUN mg/dL 27*   CREATININE mg/dL 1.09*   GLUCOSE mg/dL 173*   CALCIUM mg/dL 10.0   Estimated Creatinine Clearance: 36.7 mL/min (A) (by C-G formula based on SCr of 1.09 mg/dL " (H)).    Imaging: I personally visualized the images of scans/x-rays performed within last 3 days.  Imaging Results (Most Recent)     Procedure Component Value Units Date/Time    XR Chest 1 View [[id number]] Collected:  12/13/19 1257     Updated:  12/13/19 1301    Narrative:       ONE VIEW PORTABLE CHEST AT 12:25 PM     HISTORY: Recent carotid surgery.     FINDINGS: The lungs are well-expanded and clear except for some minimal  atelectasis at the left base. There is mild cardiomegaly with a  pacemaker in place and this is unchanged from an exam dating back to  06/28/2013.     This report was finalized on 12/13/2019 12:58 PM by Dr. Richard Soriano M.D.             Assessment / Recommendations:  DARI unable to tolerate CPAP  Post operative from carotid tumor resection 12/13/2019  GERD  Osteoporosis  Hypertension  Hypothyroidism  Hyperlipidemia  Type 2 diabetes  Who suffered a respiratory failure    At present time she is having active emesis this is a contraindication to noninvasive ventilation.  She says she cannot tolerate it as an outpatient anyways.  We will go ahead and give her high flow nasal cannula and oxygenate her the best we can without noninvasive ventilation.  Her oxygen saturation is at 99% at present time.  Last ABG looks reassuring as well.  We will admit her to the ICU and closely monitor her.    Chest x-ray reviewed no focal infiltrate  Zofran for nausea    Keep a close eye on her.    Thank you very much for this consultation.        Curtis Nazario MD  [facility]  12/13/19  427 PM

## 2019-12-13 NOTE — PERIOPERATIVE NURSING NOTE
Dr Hall informed of blood sugar of 308 and clarified if a T&S was needed. Ordered received for a T & S.

## 2019-12-13 NOTE — OP NOTE
Operative Note  Date of Admission:  12/13/2019  OR Date: 12/13/2019    Pre-op Diagnosis:   Left carotid body tumor    Post-Op Diagnosis Codes:  Same    Procedure:   Left carotid body tumor resection    Surgeon: Renzo Severino MD    Assistant: Dr. Christopher Kurtz MD and they provided critical assistance during the case including suctioning, exposure, retraction, and reduction of blood loss.    Anesthesia: General    Staff:   Cell Saver : Davon Perez  Circulator: Rashida Alfaro RN; Meri Calle RN  Perfusionist: Rocco Stephen  Scrub Person: Victor Hugo Campbell; Natalia Kelly  Assistant: Clive Garcia    Estimated Blood Loss: 100ml    Specimens:   Order Name Source Comment Collection Info Order Time   TYPE AND SCREEN   Collected By: Amie Nieves RN 12/13/2019  8:02 AM   TISSUE PATHOLOGY EXAM Carotid Artery  Collected By: Bryan Severino MD 12/13/2019 11:33 AM        Complications: None    Findings: See dictation    Indications:    The patient is an 84 y.o. female seen for evaluation 2 cm left carotid body tumor.  Patient has been having some symptoms that may or may not be related to the tumor itself.  The size of the tumor and its splaying of the carotid bifurcation is significant but resectable.  The patient and family understand risk benefits complications of the procedure and consent to the procedure.       Procedure:    The patient was prepped and draped sterilely after general endotracheal anesthetic was instituted.  At initiation of anesthetic there was a small amount of bile in the oropharynx after intubation.  There is no evidence of bile within the ET tube but an OG tube was placed to drain anything in the stomach.  Incision along the anterior border of the sternocleidomastoid was made in the left neck.  Exposure of the jugular vein allowed division of the facial vein was suture ligation.  Preservation of the hypoglossal nerve and vagus nerves were assured throughout the case by  direct vision.  The ansa cervicalis was divided and clipped.  The carotid body tumor was visible at the carotid bifurcation.  It was large and encompassed the internal carotid artery.  Using bipolar cautery I was able to define the plane between the edge of the carotid body tumor and the wall of the artery.  Dividing the superior thyroid artery and mobilization of the external carotid artery allowed exposure of the medial aspect of the tumor which was dissected free with bipolar and clips.  Several feeding branches were ligated directly.  Following this the tumor was carefully resected from the carotid bifurcation with care taken to avoid injury to the crotch of the carotid.  Patient's heart rate never fell.  With the tumor mobilized proximally we were able to dissected free distally from its apex near the distal portion of the internal carotid artery.  As we divided branches and use bipolar cautery and clips to limit the blood supply the tumor actually shrunk allowing us better access and we were eventually able to remove the tumor in total leaving no portion of the tumor segment behind.  The tumor itself was sent to pathology.  Patient had the procedure done under heparinization which was reversed at completion.  PRP with vancomycin was placed in the wound and closure of the deep tissue with 3-0 Vicryl followed by closure of the platysma with 4-0 Vicryl and closure of the skin with 4-0 Vicryl subcuticular closure and skin glue was performed.  Patient tolerated the procedure well and was awakened from anesthetic moving all extremities to command.      Active Hospital Problems    Diagnosis  POA   • Carotid body tumor (CMS/HCC) [D44.6]  Yes      Resolved Hospital Problems   No resolved problems to display.      Bryan Severino MD     Date: 12/13/2019  Time: 1:36 PM

## 2019-12-14 ENCOUNTER — APPOINTMENT (OUTPATIENT)
Dept: GENERAL RADIOLOGY | Facility: HOSPITAL | Age: 84
End: 2019-12-14

## 2019-12-14 LAB
ALBUMIN SERPL-MCNC: 3.7 G/DL (ref 3.5–5.2)
ALBUMIN/GLOB SERPL: 1.1 G/DL
ALP SERPL-CCNC: 121 U/L (ref 39–117)
ALT SERPL W P-5'-P-CCNC: 12 U/L (ref 1–33)
AMYLASE SERPL-CCNC: 35 U/L (ref 28–100)
ANION GAP SERPL CALCULATED.3IONS-SCNC: 11.7 MMOL/L (ref 5–15)
AST SERPL-CCNC: 18 U/L (ref 1–32)
BASOPHILS # BLD AUTO: 0.02 10*3/MM3 (ref 0–0.2)
BASOPHILS NFR BLD AUTO: 0.2 % (ref 0–1.5)
BILIRUB SERPL-MCNC: 0.5 MG/DL (ref 0.2–1.2)
BUN BLD-MCNC: 30 MG/DL (ref 8–23)
BUN/CREAT SERPL: 32.6 (ref 7–25)
CALCIUM SPEC-SCNC: 9.4 MG/DL (ref 8.6–10.5)
CHLORIDE SERPL-SCNC: 102 MMOL/L (ref 98–107)
CO2 SERPL-SCNC: 27.3 MMOL/L (ref 22–29)
CREAT BLD-MCNC: 0.92 MG/DL (ref 0.57–1)
DEPRECATED RDW RBC AUTO: 43 FL (ref 37–54)
EOSINOPHIL # BLD AUTO: 0 10*3/MM3 (ref 0–0.4)
EOSINOPHIL NFR BLD AUTO: 0 % (ref 0.3–6.2)
ERYTHROCYTE [DISTWIDTH] IN BLOOD BY AUTOMATED COUNT: 13.2 % (ref 12.3–15.4)
GFR SERPL CREATININE-BSD FRML MDRD: 58 ML/MIN/1.73
GLOBULIN UR ELPH-MCNC: 3.3 GM/DL
GLUCOSE BLD-MCNC: 125 MG/DL (ref 65–99)
GLUCOSE BLDC GLUCOMTR-MCNC: 101 MG/DL (ref 70–130)
GLUCOSE BLDC GLUCOMTR-MCNC: 155 MG/DL (ref 70–130)
GLUCOSE BLDC GLUCOMTR-MCNC: 191 MG/DL (ref 70–130)
GLUCOSE BLDC GLUCOMTR-MCNC: 214 MG/DL (ref 70–130)
GLUCOSE BLDC GLUCOMTR-MCNC: 279 MG/DL (ref 70–130)
HCT VFR BLD AUTO: 41.3 % (ref 34–46.6)
HGB BLD-MCNC: 12.9 G/DL (ref 12–15.9)
IMM GRANULOCYTES # BLD AUTO: 0.04 10*3/MM3 (ref 0–0.05)
IMM GRANULOCYTES NFR BLD AUTO: 0.3 % (ref 0–0.5)
LIPASE SERPL-CCNC: 6 U/L (ref 13–60)
LYMPHOCYTES # BLD AUTO: 1.31 10*3/MM3 (ref 0.7–3.1)
LYMPHOCYTES NFR BLD AUTO: 10.3 % (ref 19.6–45.3)
MCH RBC QN AUTO: 27.7 PG (ref 26.6–33)
MCHC RBC AUTO-ENTMCNC: 31.2 G/DL (ref 31.5–35.7)
MCV RBC AUTO: 88.6 FL (ref 79–97)
MONOCYTES # BLD AUTO: 0.48 10*3/MM3 (ref 0.1–0.9)
MONOCYTES NFR BLD AUTO: 3.8 % (ref 5–12)
NEUTROPHILS # BLD AUTO: 10.93 10*3/MM3 (ref 1.7–7)
NEUTROPHILS NFR BLD AUTO: 85.4 % (ref 42.7–76)
NRBC BLD AUTO-RTO: 0 /100 WBC (ref 0–0.2)
PHOSPHATE SERPL-MCNC: 2.5 MG/DL (ref 2.5–4.5)
PLATELET # BLD AUTO: 228 10*3/MM3 (ref 140–450)
PMV BLD AUTO: 11.5 FL (ref 6–12)
POTASSIUM BLD-SCNC: 4 MMOL/L (ref 3.5–5.2)
PROT SERPL-MCNC: 7 G/DL (ref 6–8.5)
RBC # BLD AUTO: 4.66 10*6/MM3 (ref 3.77–5.28)
SODIUM BLD-SCNC: 141 MMOL/L (ref 136–145)
TSH SERPL DL<=0.05 MIU/L-ACNC: 0.74 UIU/ML (ref 0.27–4.2)
WBC NRBC COR # BLD: 12.78 10*3/MM3 (ref 3.4–10.8)

## 2019-12-14 PROCEDURE — 71045 X-RAY EXAM CHEST 1 VIEW: CPT

## 2019-12-14 PROCEDURE — 80053 COMPREHEN METABOLIC PANEL: CPT | Performed by: INTERNAL MEDICINE

## 2019-12-14 PROCEDURE — 82784 ASSAY IGA/IGD/IGG/IGM EACH: CPT | Performed by: INTERNAL MEDICINE

## 2019-12-14 PROCEDURE — 99231 SBSQ HOSP IP/OBS SF/LOW 25: CPT | Performed by: INTERNAL MEDICINE

## 2019-12-14 PROCEDURE — 83690 ASSAY OF LIPASE: CPT | Performed by: INTERNAL MEDICINE

## 2019-12-14 PROCEDURE — 84100 ASSAY OF PHOSPHORUS: CPT | Performed by: INTERNAL MEDICINE

## 2019-12-14 PROCEDURE — 94799 UNLISTED PULMONARY SVC/PX: CPT

## 2019-12-14 PROCEDURE — 63710000001 INSULIN LISPRO (HUMAN) PER 5 UNITS: Performed by: INTERNAL MEDICINE

## 2019-12-14 PROCEDURE — 94640 AIRWAY INHALATION TREATMENT: CPT

## 2019-12-14 PROCEDURE — 63710000001 INSULIN GLARGINE PER 5 UNITS: Performed by: INTERNAL MEDICINE

## 2019-12-14 PROCEDURE — 82962 GLUCOSE BLOOD TEST: CPT

## 2019-12-14 PROCEDURE — 82150 ASSAY OF AMYLASE: CPT | Performed by: INTERNAL MEDICINE

## 2019-12-14 PROCEDURE — 84443 ASSAY THYROID STIM HORMONE: CPT | Performed by: INTERNAL MEDICINE

## 2019-12-14 PROCEDURE — 86255 FLUORESCENT ANTIBODY SCREEN: CPT | Performed by: INTERNAL MEDICINE

## 2019-12-14 PROCEDURE — 85025 COMPLETE CBC W/AUTO DIFF WBC: CPT | Performed by: INTERNAL MEDICINE

## 2019-12-14 PROCEDURE — 83516 IMMUNOASSAY NONANTIBODY: CPT | Performed by: INTERNAL MEDICINE

## 2019-12-14 RX ADMIN — INSULIN LISPRO 4 UNITS: 100 INJECTION, SOLUTION INTRAVENOUS; SUBCUTANEOUS at 17:46

## 2019-12-14 RX ADMIN — PANTOPRAZOLE SODIUM 40 MG: 40 TABLET, DELAYED RELEASE ORAL at 05:27

## 2019-12-14 RX ADMIN — HYDROCODONE BITARTRATE AND ACETAMINOPHEN 1 TABLET: 7.5; 325 TABLET ORAL at 22:18

## 2019-12-14 RX ADMIN — IPRATROPIUM BROMIDE AND ALBUTEROL SULFATE 3 ML: 2.5; .5 SOLUTION RESPIRATORY (INHALATION) at 07:07

## 2019-12-14 RX ADMIN — AMLODIPINE BESYLATE 2.5 MG: 2.5 TABLET ORAL at 08:50

## 2019-12-14 RX ADMIN — INSULIN LISPRO 7 UNITS: 100 INJECTION, SOLUTION INTRAVENOUS; SUBCUTANEOUS at 11:38

## 2019-12-14 RX ADMIN — LEVOTHYROXINE SODIUM 88 MCG: 88 TABLET ORAL at 08:50

## 2019-12-14 RX ADMIN — IPRATROPIUM BROMIDE AND ALBUTEROL SULFATE 3 ML: 2.5; .5 SOLUTION RESPIRATORY (INHALATION) at 15:47

## 2019-12-14 RX ADMIN — ACETAMINOPHEN 650 MG: 325 TABLET, FILM COATED ORAL at 14:21

## 2019-12-14 RX ADMIN — INSULIN LISPRO 4 UNITS: 100 INJECTION, SOLUTION INTRAVENOUS; SUBCUTANEOUS at 04:07

## 2019-12-14 RX ADMIN — INSULIN LISPRO 7 UNITS: 100 INJECTION, SOLUTION INTRAVENOUS; SUBCUTANEOUS at 08:50

## 2019-12-14 RX ADMIN — INSULIN LISPRO 7 UNITS: 100 INJECTION, SOLUTION INTRAVENOUS; SUBCUTANEOUS at 17:46

## 2019-12-14 RX ADMIN — INSULIN GLARGINE 20 UNITS: 100 INJECTION, SOLUTION SUBCUTANEOUS at 08:50

## 2019-12-14 RX ADMIN — ASPIRIN 81 MG: 81 TABLET, COATED ORAL at 08:51

## 2019-12-14 RX ADMIN — INSULIN LISPRO 8 UNITS: 100 INJECTION, SOLUTION INTRAVENOUS; SUBCUTANEOUS at 20:38

## 2019-12-14 RX ADMIN — LOSARTAN POTASSIUM: 50 TABLET, FILM COATED ORAL at 08:51

## 2019-12-14 RX ADMIN — INSULIN GLARGINE 20 UNITS: 100 INJECTION, SOLUTION SUBCUTANEOUS at 20:38

## 2019-12-14 RX ADMIN — ACETAMINOPHEN 650 MG: 325 TABLET, FILM COATED ORAL at 05:29

## 2019-12-14 RX ADMIN — INSULIN LISPRO 12 UNITS: 100 INJECTION, SOLUTION INTRAVENOUS; SUBCUTANEOUS at 11:39

## 2019-12-14 NOTE — PROGRESS NOTES
[facility]  Inpatient Progress Note    Reason for Follow Up:  Post op nausea/vomiting    Subjective     Interval History:   No further vomiting since one post-op episode yesterday.  Patient tells me this occurred after significant coughing fit while coming out of anesthesia.      Current Facility-Administered Medications:   •  acetaminophen (TYLENOL) tablet 650 mg, 650 mg, Oral, Q6H PRN, Curtis Nazario MD, 650 mg at 12/14/19 0529  •  amLODIPine (NORVASC) tablet 2.5 mg, 2.5 mg, Oral, Daily, Bryan Severino MD, Stopped at 12/13/19 1729  •  aspirin EC tablet 81 mg, 81 mg, Oral, Daily, Bryan Severino MD  •  dextrose (D50W) 25 g/ 50mL Intravenous Solution 25 g, 25 g, Intravenous, Q15 Min PRN, Ruben Nguyen MD  •  dextrose (GLUTOSE) oral gel 15 g, 15 g, Oral, Q15 Min PRN, Ruben Nguyen MD  •  glucagon (human recombinant) (GLUCAGEN DIAGNOSTIC) injection 1 mg, 1 mg, Subcutaneous, Q15 Min PRN, Ruben Nguyen MD  •  HYDROcodone-acetaminophen (NORCO) 7.5-325 MG per tablet 1 tablet, 1 tablet, Oral, Q4H PRN, Bryan Severino MD, 1 tablet at 12/13/19 2032  •  HYDROmorphone (DILAUDID) injection 0.5 mg, 0.5 mg, Intravenous, Q2H PRN **AND** naloxone (NARCAN) injection 0.4 mg, 0.4 mg, Intravenous, Q5 Min PRN, Bryan Severino MD  •  insulin glargine (LANTUS) injection 20 Units, 20 Units, Subcutaneous, Q12H, Ruben Nguyen MD, 20 Units at 12/13/19 2032  •  insulin lispro (humaLOG) injection 0-24 Units, 0-24 Units, Subcutaneous, Q4H, Ruben Nguyen MD, 4 Units at 12/14/19 0407  •  insulin lispro (humaLOG) injection 7 Units, 7 Units, Subcutaneous, TID With Meals, Ruben Nguyen MD  •  ipratropium-albuterol (DUO-NEB) nebulizer solution 3 mL, 3 mL, Nebulization, Q8H - RT, Bryan Severino MD, 3 mL at 12/14/19 0707  •  ketorolac (TORADOL) injection 15 mg, 15 mg, Intravenous, Q6H PRN, rByan Severino MD  •  lactated ringers infusion, 9 mL/hr, Intravenous, Continuous,  Bryan Severino MD, Last Rate: 9 mL/hr at 12/13/19 0816  •  levothyroxine (SYNTHROID, LEVOTHROID) tablet 88 mcg, 88 mcg, Oral, Daily, Bryan Severino MD, Stopped at 12/13/19 1729  •  losartan (COZAAR) 100 mg, hydroCHLOROthiazide (HYDRODIURIL) 25 mg for HYZAAR 100-25, , Oral, Daily, Bryan Severino MD, Stopped at 12/13/19 1729  •  niCARdipine (CARDENE) 25 mg in 250 mL NS (0.1 mg/mL) infusion kit, 5-15 mg/hr, Intravenous, Titrated, Bryan Severino MD, Stopped at 12/13/19 1604  •  nitroglycerin (NITROSTAT) SL tablet 0.4 mg, 0.4 mg, Sublingual, Q5 Min PRN, Bryan Severino MD  •  ondansetron (ZOFRAN) tablet 4 mg, 4 mg, Oral, Q6H PRN **OR** ondansetron (ZOFRAN) injection 4 mg, 4 mg, Intravenous, Q6H PRN, Bryan Severino MD  •  ondansetron (ZOFRAN) injection 4 mg, 4 mg, Intravenous, Q6H PRN, Curtis Nazario MD, 4 mg at 12/13/19 1555  •  pantoprazole (PROTONIX) EC tablet 40 mg, 40 mg, Oral, Q AM, Tevin Bentley MD, 40 mg at 12/14/19 0527  •  phenylephrine (KATIE-SYNEPHRINE) 50 mg in sodium chloride 0.9 % 250 mL (0.2 mg/mL) infusion, 0.5-3 mcg/kg/min, Intravenous, Titrated, Bryan Severino MD, Stopped at 12/13/19 1604  •  sodium chloride 0.9 % infusion, 50 mL/hr, Intravenous, Continuous, Bryan Severino MD, Last Rate: 50 mL/hr at 12/13/19 1614, 50 mL/hr at 12/13/19 1614  Review of Systems:    The following systems were reviewed and negative;  gastrointestinal    Objective     Vital Signs  Temp:  [97.8 °F (36.6 °C)-98.6 °F (37 °C)] 98 °F (36.7 °C)  Heart Rate:  [63-95] 64  Resp:  [8-20] 14  BP: (117-244)/() 129/55  Body mass index is 35.75 kg/m².    Intake/Output Summary (Last 24 hours) at 12/14/2019 0708  Last data filed at 12/14/2019 0532  Gross per 24 hour   Intake 1811.7 ml   Output 555 ml   Net 1256.7 ml     No intake/output data recorded.     Physical Exam:   General: patient awake, alert and cooperative   Abdomen: soft, nontender, nondistended; normal bowel sounds   Psychiatric: Normal mood and  behavior; memory intact     Results Review:     I reviewed the patient's new clinical results.    Results from last 7 days   Lab Units 12/14/19  0411 12/11/19  1224   WBC 10*3/mm3 12.78* 8.53   HEMOGLOBIN g/dL 12.9 13.3   HEMATOCRIT % 41.3 41.6   PLATELETS 10*3/mm3 228 232     Results from last 7 days   Lab Units 12/14/19  0411 12/11/19  1224   SODIUM mmol/L 141 141   POTASSIUM mmol/L 4.0 4.0   CHLORIDE mmol/L 102 101   CO2 mmol/L 27.3 28.5   BUN mg/dL 30* 27*   CREATININE mg/dL 0.92 1.09*   CALCIUM mg/dL 9.4 10.0   BILIRUBIN mg/dL 0.5  --    ALK PHOS U/L 121*  --    ALT (SGPT) U/L 12  --    AST (SGOT) U/L 18  --    GLUCOSE mg/dL 125* 173*         Lab Results   Lab Value Date/Time    LIPASE 6 (L) 12/14/2019 0411         Assessment/Plan   Assessment:   1.  Post-op vomiting  2.  Chronic diarrhea  3.  Carotid body tumor s/p surgical excision POD#1    Plan:   No further post-op nausea/vomiting.  ? If this was anesthesia related or just post-tussive.  OK for diet from GI standpoint  She wants to f/u in office with Dr Bentley for management of her chronic GI issues.  I will arrange for appointment in next few weeks.      Gi will sign off.  We will be happy to see the patient again as necessary.     I discussed the patients findings and my recommendations with patient and family.         Telly Mejias M.D.  [facility]  [address] [place] [address]  Office: [phone]

## 2019-12-14 NOTE — PLAN OF CARE
Problem: Patient Care Overview  Goal: Plan of Care Review  Outcome: Ongoing (interventions implemented as appropriate)  Flowsheets (Taken 12/14/2019 1809)  Progress: improving  Plan of Care Reviewed With: patient  Outcome Summary: VSS. Patient transfered from ICU to . Patient continues to require 2L NC when sleeping, with sats dropping as low as 70's r/t sleep apnea. PRN tylenol given for pain. Carotid incision C/D/I with dermabond. Possible d/c home tomorrow. Will continue to monitor.     Problem: Pain, Chronic (Adult)  Goal: Acceptable Pain/Comfort Level and Functional Ability  Outcome: Ongoing (interventions implemented as appropriate)  Flowsheets (Taken 12/14/2019 1809)  Acceptable Pain/Comfort Level and Functional Ability: making progress toward outcome     Problem: Skin Injury Risk (Adult)  Goal: Skin Health and Integrity  Outcome: Ongoing (interventions implemented as appropriate)  Flowsheets (Taken 12/14/2019 1809)  Skin Health and Integrity: making progress toward outcome

## 2019-12-14 NOTE — PROGRESS NOTES
The patient is 1 day status post resection of a left carotid body tumor.  Postoperatively there were issues with possible aspiration and problems with obstructive sleep apnea.  She was placed in the intensive care unit.  Currently she is doing quite well.  She is sitting up eating breakfast.  She is on 2 L nasal cannula with good oxygenation.    Left neck incision is healing satisfactorily.  No hematoma noted.  Swallowing without difficulty.  Neurologic examination is normal.    She appears to be doing quite well following her procedure.  I discussed her current situation with the pulmonary service and okay to move out of the unit to the floor.

## 2019-12-14 NOTE — PROGRESS NOTES
"  Daily Progress Note.   [facility]  12/14/2019    Patient:  Name:  Roland Russell  MRN:  [id number]  1934  84 y.o.  female         CC: Postoperative hypoxia    Interval History:  Better she is awake alert sitting at bedside with her sisters.  Oxygen saturation good on room air.  She has no respiratory distress.    ROS: No fever, no diarrhea, no chest pain  PMFSSH: no change    Physical Exam:  /60 (BP Location: Left arm, Patient Position: Lying)   Pulse 73   Temp 97.4 °F (36.3 °C) (Oral)   Resp 16   Ht 152.4 cm (60\")   Wt 83 kg (183 lb 1 oz)   SpO2 99%   BMI 35.75 kg/m²   Body mass index is 35.75 kg/m².    Intake/Output Summary (Last 24 hours) at 12/14/2019 1044  Last data filed at 12/14/2019 0532  Gross per 24 hour   Intake 1811.7 ml   Output 530 ml   Net 1281.7 ml     General appearance: Nontoxic, conversant   Eyes: anicteric sclerae, moist conjunctivae; no lid-lag; PERRLA  HENT: Left-sided incision clean dry intact no bleeding no significant swelling airway is widely patent; oropharynx clear with moist mucous membranes and no mucosal ulcerations; normal hard and soft palate  Neck: Trachea midline; FROM, supple, no thyromegaly or lymphadenopathy  Lungs: CTA, with shallow respiratory effort and no intercostal retractions  CV: Tachycardic, no MRGs   Abdomen: Soft, non-tender; no masses or HSM  Extremities: No peripheral edema or extremity lymphadenopathy  Skin: Normal temperature, turgor and texture; no rash, ulcers or subcutaneous nodules  Psych: Anxious affect, alert and oriented to person, place and time  Neuro cranial 2 through 12 intact speech intact moves all extremities    Data Review:  Notable Labs:  Results from last 7 days   Lab Units 12/14/19  0411 12/11/19  1224   WBC 10*3/mm3 12.78* 8.53   HEMOGLOBIN g/dL 12.9 13.3   PLATELETS 10*3/mm3 228 232     Results from last 7 days   Lab Units 12/14/19  0411 12/11/19  1224   SODIUM mmol/L 141 141   POTASSIUM mmol/L " 4.0 4.0   CHLORIDE mmol/L 102 101   CO2 mmol/L 27.3 28.5   BUN mg/dL 30* 27*   CREATININE mg/dL 0.92 1.09*   GLUCOSE mg/dL 125* 173*   CALCIUM mg/dL 9.4 10.0   Estimated Creatinine Clearance: 43.5 mL/min (by C-G formula based on SCr of 0.92 mg/dL).    Imaging:  Reviewed chest images personally from past 3 days    Scheduled meds:      amLODIPine 2.5 mg Oral Daily   aspirin 81 mg Oral Daily   insulin glargine 20 Units Subcutaneous Q12H   insulin lispro 0-24 Units Subcutaneous Q4H   insulin lispro 7 Units Subcutaneous TID With Meals   ipratropium-albuterol 3 mL Nebulization Q8H - RT   levothyroxine 88 mcg Oral Daily   losartan-HCTZ (HYZAAR) 100-25 combo dose  Oral Daily   pantoprazole 40 mg Oral Q AM       ASSESSMENT  /  PLAN:  DARI unable to tolerate CPAP  Post operative from carotid tumor resection 12/13/2019  GERD  Osteoporosis  Hypertension  Hypothyroidism  Hyperlipidemia  Type 2 diabetes  Post operative respiratory failure improved    Repeat cxr showing resolution of her atelectasis  Cont supplemental oxygen to keep sats above 88%  Suggest an outpatient sleep study with Ambien this time her sister sees Dr. Sotelo and suggested follow-up with him as an outpatient.  Suspect she will need this when she sleeps.  No icu needs, cont care of floor.  D/w Dr EFRAIN Dao this am.      Curtis Nazario MD  [facility]  12/14/19  10:44 AM

## 2019-12-14 NOTE — ADDENDUM NOTE
Addendum  created 12/13/19 1902 by Victor Hugo Edwards MD    Review and Sign - Ready for Procedure, Review and Sign - Signed

## 2019-12-15 LAB
ALBUMIN SERPL-MCNC: 3.5 G/DL (ref 3.5–5.2)
ANION GAP SERPL CALCULATED.3IONS-SCNC: 9.3 MMOL/L (ref 5–15)
BASOPHILS # BLD AUTO: 0.05 10*3/MM3 (ref 0–0.2)
BASOPHILS NFR BLD AUTO: 0.4 % (ref 0–1.5)
BUN BLD-MCNC: 26 MG/DL (ref 8–23)
BUN/CREAT SERPL: 28.3 (ref 7–25)
CALCIUM SPEC-SCNC: 9.2 MG/DL (ref 8.6–10.5)
CHLORIDE SERPL-SCNC: 103 MMOL/L (ref 98–107)
CO2 SERPL-SCNC: 28.7 MMOL/L (ref 22–29)
CREAT BLD-MCNC: 0.92 MG/DL (ref 0.57–1)
DEPRECATED RDW RBC AUTO: 42.3 FL (ref 37–54)
EOSINOPHIL # BLD AUTO: 0.09 10*3/MM3 (ref 0–0.4)
EOSINOPHIL NFR BLD AUTO: 0.7 % (ref 0.3–6.2)
ERYTHROCYTE [DISTWIDTH] IN BLOOD BY AUTOMATED COUNT: 13.2 % (ref 12.3–15.4)
GFR SERPL CREATININE-BSD FRML MDRD: 58 ML/MIN/1.73
GLUCOSE BLD-MCNC: 122 MG/DL (ref 65–99)
GLUCOSE BLDC GLUCOMTR-MCNC: 116 MG/DL (ref 70–130)
GLUCOSE BLDC GLUCOMTR-MCNC: 139 MG/DL (ref 70–130)
GLUCOSE BLDC GLUCOMTR-MCNC: 145 MG/DL (ref 70–130)
GLUCOSE BLDC GLUCOMTR-MCNC: 213 MG/DL (ref 70–130)
HCT VFR BLD AUTO: 40 % (ref 34–46.6)
HGB BLD-MCNC: 12.5 G/DL (ref 12–15.9)
IMM GRANULOCYTES # BLD AUTO: 0.04 10*3/MM3 (ref 0–0.05)
IMM GRANULOCYTES NFR BLD AUTO: 0.3 % (ref 0–0.5)
LYMPHOCYTES # BLD AUTO: 2.59 10*3/MM3 (ref 0.7–3.1)
LYMPHOCYTES NFR BLD AUTO: 21 % (ref 19.6–45.3)
MCH RBC QN AUTO: 27.6 PG (ref 26.6–33)
MCHC RBC AUTO-ENTMCNC: 31.3 G/DL (ref 31.5–35.7)
MCV RBC AUTO: 88.3 FL (ref 79–97)
MONOCYTES # BLD AUTO: 0.89 10*3/MM3 (ref 0.1–0.9)
MONOCYTES NFR BLD AUTO: 7.2 % (ref 5–12)
NEUTROPHILS # BLD AUTO: 8.7 10*3/MM3 (ref 1.7–7)
NEUTROPHILS NFR BLD AUTO: 70.4 % (ref 42.7–76)
NRBC BLD AUTO-RTO: 0 /100 WBC (ref 0–0.2)
PHOSPHATE SERPL-MCNC: 2.7 MG/DL (ref 2.5–4.5)
PLATELET # BLD AUTO: 208 10*3/MM3 (ref 140–450)
PMV BLD AUTO: 10.9 FL (ref 6–12)
POTASSIUM BLD-SCNC: 3.9 MMOL/L (ref 3.5–5.2)
RBC # BLD AUTO: 4.53 10*6/MM3 (ref 3.77–5.28)
SODIUM BLD-SCNC: 141 MMOL/L (ref 136–145)
WBC NRBC COR # BLD: 12.36 10*3/MM3 (ref 3.4–10.8)

## 2019-12-15 PROCEDURE — 97161 PT EVAL LOW COMPLEX 20 MIN: CPT

## 2019-12-15 PROCEDURE — 97116 GAIT TRAINING THERAPY: CPT

## 2019-12-15 PROCEDURE — 94799 UNLISTED PULMONARY SVC/PX: CPT

## 2019-12-15 PROCEDURE — 97530 THERAPEUTIC ACTIVITIES: CPT

## 2019-12-15 PROCEDURE — 82962 GLUCOSE BLOOD TEST: CPT

## 2019-12-15 PROCEDURE — 94660 CPAP INITIATION&MGMT: CPT

## 2019-12-15 PROCEDURE — 63710000001 INSULIN LISPRO (HUMAN) PER 5 UNITS: Performed by: INTERNAL MEDICINE

## 2019-12-15 PROCEDURE — 63710000001 INSULIN LISPRO (HUMAN) PER 5 UNITS: Performed by: SURGERY

## 2019-12-15 PROCEDURE — 80069 RENAL FUNCTION PANEL: CPT | Performed by: INTERNAL MEDICINE

## 2019-12-15 PROCEDURE — 63710000001 INSULIN GLARGINE PER 5 UNITS: Performed by: INTERNAL MEDICINE

## 2019-12-15 PROCEDURE — 85025 COMPLETE CBC W/AUTO DIFF WBC: CPT | Performed by: INTERNAL MEDICINE

## 2019-12-15 RX ADMIN — ACETAMINOPHEN 650 MG: 325 TABLET, FILM COATED ORAL at 05:39

## 2019-12-15 RX ADMIN — INSULIN GLARGINE 20 UNITS: 100 INJECTION, SOLUTION SUBCUTANEOUS at 08:19

## 2019-12-15 RX ADMIN — ASPIRIN 81 MG: 81 TABLET, COATED ORAL at 08:23

## 2019-12-15 RX ADMIN — INSULIN LISPRO 8 UNITS: 100 INJECTION, SOLUTION INTRAVENOUS; SUBCUTANEOUS at 11:39

## 2019-12-15 RX ADMIN — IPRATROPIUM BROMIDE AND ALBUTEROL SULFATE 3 ML: 2.5; .5 SOLUTION RESPIRATORY (INHALATION) at 07:12

## 2019-12-15 RX ADMIN — PANTOPRAZOLE SODIUM 40 MG: 40 TABLET, DELAYED RELEASE ORAL at 05:39

## 2019-12-15 RX ADMIN — LOSARTAN POTASSIUM: 50 TABLET, FILM COATED ORAL at 08:23

## 2019-12-15 RX ADMIN — INSULIN LISPRO 7 UNITS: 100 INJECTION, SOLUTION INTRAVENOUS; SUBCUTANEOUS at 11:39

## 2019-12-15 RX ADMIN — INSULIN LISPRO 7 UNITS: 100 INJECTION, SOLUTION INTRAVENOUS; SUBCUTANEOUS at 17:54

## 2019-12-15 RX ADMIN — INSULIN LISPRO 7 UNITS: 100 INJECTION, SOLUTION INTRAVENOUS; SUBCUTANEOUS at 08:24

## 2019-12-15 RX ADMIN — ACETAMINOPHEN 650 MG: 325 TABLET, FILM COATED ORAL at 21:17

## 2019-12-15 RX ADMIN — LEVOTHYROXINE SODIUM 88 MCG: 88 TABLET ORAL at 08:24

## 2019-12-15 RX ADMIN — AMLODIPINE BESYLATE 2.5 MG: 2.5 TABLET ORAL at 08:24

## 2019-12-15 RX ADMIN — INSULIN GLARGINE 20 UNITS: 100 INJECTION, SOLUTION SUBCUTANEOUS at 21:05

## 2019-12-15 NOTE — THERAPY EVALUATION
"Acute Care - Physical Therapy Initial Evaluation  [facility]     Patient Name: Roland Russell  : 1934  MRN: [id number]  Today's Date: 12/15/2019   Onset of Illness/Injury or Date of Surgery: 19  Date of Referral to PT: 19  Referring Physician: GABRIEL      Admit Date: 2019    Visit Dx:     ICD-10-CM ICD-9-CM   1. Decreased mobility and endurance Z74.09 780.99   2. Tumor D49.9 239.9     Patient Active Problem List   Diagnosis   • Legal blindness   • Uncontrolled type 2 diabetes mellitus with hypoglycemia without coma (CMS/HCC)   • GERD (gastroesophageal reflux disease)   • HLD (hyperlipidemia)   • Hypothyroidism, acquired   • Osteoporosis   • Osteoarthritis, multiple sites   • Essential hypertension   • Senile osteoporosis   • Renal insufficiency   • T12 compression fracture (CMS/HCC)   • Vitamin D deficiency   • Severe headache   • Carotid body tumor (CMS/HCC)   • Glomus jugulare tumor (CMS/HCC)     Past Medical History:   Diagnosis Date   • Abnormal vision     SEES \"KALEIDOSCOPE\"   • Allergy to adhesive tape    • Arthralgia    • AVB (atrioventricular block)    • Balance problem    • Carotid body tumor (CMS/HCC)     LEFT   • Difficulty walking    • H/O complete eye exam 10/2016   • Headache     OFF AND ON BACK OF HEAD, DOWN NECK TO SHOULDER   • History of glaucoma    • History of poliomyelitis     AGE 9   • History of surgery on arm     left arm   • HLD (hyperlipidemia)    • Hypertension    • Hypothyroidism, acquired    • Legal blindness     SOME PERIPHERAL VISION ON LEFT, SOME VISION ON RIGHT   • Memory loss     SOME SHORT TERM   • Migraine    • Numbness     LEFT LEG    • Osteoarthritis, multiple sites    • Osteoporosis    • Pacemaker    • Rectal bleeding     X1, WITH BM   • Sleep apnea     DOES NOT USE A MACHINE   • TIA (transient ischemic attack) 2018    ?, HAD SPELL UNABLE TO TALK   • Type 2 diabetes mellitus (CMS/HCC)    • Type 2 diabetes mellitus, uncontrolled (CMS/HCC)  "     Past Surgical History:   Procedure Laterality Date   • APPENDECTOMY  1989   • CARDIAC PACEMAKER PLACEMENT  11/25/2004   • CATARACT EXTRACTION Bilateral 1997   • CEREBRAL ANGIOGRAM Bilateral 12/6/2019    Procedure: CAROTID ARTERIOGRAM BILATERAL, RIGHT WRIST APPROACH;  Surgeon: Bryan Severino MD;  Location: [facility] 18/19;  Service: Vascular   • CHOLECYSTECTOMY  1989   • COLONOSCOPY  2013    dr montes   • ENDOSCOPY  12/06/2012    Dr. Montes; food in stomach, gastritis   • EXCISION LESION  1994    BACK CYST BENGIN   • EYE SURGERY  12/2012   • GLAUCOMA SURGERY Bilateral 1995    laser surgery   • HARDWARE REMOVAL Left     ELBOW   • HYSTERECTOMY  1986   • ORIF ELBOW FRACTURE Left    • PACEMAKER IMPLANTATION     • PACEMAKER REPLACEMENT  06/2013        PT ASSESSMENT (last 12 hours)      Physical Therapy Evaluation     Row Name 12/15/19 8556          PT Evaluation Time/Intention    Subjective Information  no complaints  -JR     Document Type  evaluation  -JR     Mode of Treatment  physical therapy  -JR     Total Evaluation Minutes, Physical Therapy  -- 23  -JR     Patient Effort  good  -JR     Symptoms Noted During/After Treatment  none  -JR     Comment  LOOKING TO BE DISCHARGED TOMORROW.    -JR     Row Name 12/15/19 8244          General Information    Patient Profile Reviewed?  yes  -JR     Onset of Illness/Injury or Date of Surgery  12/13/19  -JR     Referring Physician  GABRIEL  -JR     Patient Observations  alert  -JR     Patient/Family Observations  SON IS PRESENT AND WILL BE STAYING WITH Pt FOR ABOUT A WEEK.    -JR     General Observations of Patient  SUPINE IN BED IN NO ACUTE DISTRESS,    -JR     Prior Level of Function  independent:;all household mobility;community mobility;gait;ADL's  -JR     Equipment Currently Used at Home  walker, rolling  -JR     Pertinent History of Current Functional Problem  CAROTID SURGERY FOR TUMOR REMOVAL.   -JR     Existing Precautions/Restrictions  oxygen therapy device and L/min  NOT ON O2 AT HOME, BUT OCCASIONAL AT NIGHT.   -JR     Limitations/Impairments  visual  -JR     Risks Reviewed  patient and family:;LOB;nausea/vomiting;dizziness;increased discomfort;change in vital signs;increased drainage;lines disloged  -JR     Benefits Reviewed  patient and family:;improve function;increase independence;increase strength;increase balance;decrease pain;decrease risk of DVT;improve skin integrity;increase knowledge  -JR     Barriers to Rehab  none identified  -JR     Row Name 12/15/19 1337          Relationship/Environment    Primary Source of Support/Comfort  child(dima)  -JR     Lives With  alone  -JR     Row Name 12/15/19 1337          Resource/Environmental Concerns    Current Living Arrangements  home/apartment/condo  -     Row Name 12/15/19 1337          Cognitive Assessment/Intervention- PT/OT    Orientation Status (Cognition)  oriented x 4  -JR     Follows Commands (Cognition)  WNL;WFL  -     Row Name 12/15/19 1337          Bed Mobility Assessment/Treatment    Bed Mobility Assessment/Treatment  supine-sit;sit-supine  -     Supine-Sit Bradner (Bed Mobility)  supervision  -     Sit-Supine Bradner (Bed Mobility)  supervision  -     Row Name 12/15/19 1337          Transfer Assessment/Treatment    Transfer Assessment/Treatment  sit-stand transfer;stand-sit transfer  -     Sit-Stand Bradner (Transfers)  supervision  -     Stand-Sit Bradner (Transfers)  supervision  -     Row Name 12/15/19 1337          Sit-Stand Transfer    Assistive Device (Sit-Stand Transfers)  walker, front-wheeled  -     Row Name 12/15/19 1337          Stand-Sit Transfer    Assistive Device (Stand-Sit Transfers)  walker, front-wheeled  -     Row Name 12/15/19 1337          Gait/Stairs Assessment/Training    Gait/Stairs Assessment/Training  gait/ambulation independence;gait/ambulation assistive device;distance ambulated  -     Bradner Level (Gait)  supervision  -     Assistive  Device (Gait)  walker, front-wheeled  -     Distance in Feet (Gait)  300  -     Comment (Gait/Stairs)  O2 SAT REMAINED AT 98+% ON 1.5 L.  VERY GOOD BHAVNA WHILE AMBULATING WITHOUT ANY  -     Row Name 12/15/19 1337          General ROM    GENERAL ROM COMMENTS  WFLs.   -     Row Name 12/15/19 1337          MMT (Manual Muscle Testing)    General MMT Comments  5/5 THROUGHOUT ALL EXTREMITIES.   -     Row Name 12/15/19 1337          Pain Assessment    Additional Documentation  Pain Scale: Numbers Pre/Post-Treatment (Group)  -     Row Name 12/15/19 4997          Pain Scale: Numbers Pre/Post-Treatment    Pain Scale: Numbers, Pretreatment  0/10 - no pain  -     Row Name             Wound 12/13/19 1137 Left neck Incision    Wound - Properties Group Date first assessed: 12/13/19  -AC Time first assessed: 1137  -AC Side: Left  -AC Location: neck  -AC Primary Wound Type: Incision  -AC    Row Name 12/15/19 9507          Plan of Care Review    Plan of Care Reviewed With  patient;son  -     Row Name 12/15/19 8988          Physical Therapy Clinical Impression    Date of Referral to PT  12/14/19  -     PT Diagnosis (PT Clinical Impression)  MOBILITY CONCERNS.  ONE TIME VISIT.   -     Functional Level at Time of Evaluation (PT Clinical Impression)  SUPERVISION FOR ALL ACTIVITY  -     Patient/Family Goals Statement (PT Clinical Impression)  GO HOME  -     Criteria for Skilled Interventions Met (PT Clinical Impression)  no problems identified which require skilled intervention  -     Rehab Potential (PT Clinical Summary)  other (see comments) ONE TIME VISIT.   -     Care Plan Review (PT)  evaluation/treatment results reviewed;care plan/treatment goals reviewed;risks/benefits reviewed;current/potential barriers reviewed;patient/other agree to care plan  -     Care Plan Review, Other Participant (PT Clinical Impression)  kandice PRECIADO     Row Name 12/15/19 6715          Vital Signs    Pre SpO2 (%)  99  -JR     O2  Delivery Pre Treatment  supplemental O2  -JR     Intra SpO2 (%)  98  -JR     O2 Delivery Intra Treatment  supplemental O2  -JR     Post SpO2 (%)  -- 99  -JR     O2 Delivery Post Treatment  supplemental O2  -JR     Row Name 12/15/19 6937          Positioning and Restraints    Pre-Treatment Position  in bed  -JR     Post Treatment Position  bed  -JR     In Bed  notified nsg;sitting EOB;call light within reach;encouraged to call for assist;with family/caregiver  -JR       User Key  (r) = Recorded By, (t) = Taken By, (c) = Cosigned By    Initials Name Provider Type    Rashida Hart RN Registered Nurse    Victor Hugo Vo PT Physical Therapist          PT Recommendation and Plan  Anticipated Discharge Disposition (PT): home with assist  Planned Therapy Interventions (PT Eval): other (see comments)(ONE TIME VISIT.  Pt IS FUNCTIONING AT BASELINE LEVEL.  )  Therapy Frequency (PT Clinical Impression): evaluation only  Outcome Summary/Treatment Plan (PT)  Anticipated Discharge Disposition (PT): home with assist  Plan of Care Reviewed With: patient, son  Outcome Measures     Row Name 12/15/19 1345             How much help from another person do you currently need...    Turning from your back to your side while in flat bed without using bedrails?  4  -JR      Moving from lying on back to sitting on the side of a flat bed without bedrails?  4  -JR      Moving to and from a bed to a chair (including a wheelchair)?  4  -JR      Standing up from a chair using your arms (e.g., wheelchair, bedside chair)?  4  -JR      Climbing 3-5 steps with a railing?  3  -JR      To walk in hospital room?  4  -JR      AM-PAC 6 Clicks Score (PT)  23  -JR         Functional Assessment    Outcome Measure Options  AM-PAC 6 Clicks Basic Mobility (PT)  -JR        User Key  (r) = Recorded By, (t) = Taken By, (c) = Cosigned By    Initials Name Provider Type    Victor Hugo Vo PT Physical Therapist         Time Calculation:   PT Charges      Row Name 12/15/19 1348             Time Calculation    Start Time  1310  -JR      Stop Time  1349  -JR      Time Calculation (min)  39 min  -JR      PT Received On  12/15/19  -JR        User Key  (r) = Recorded By, (t) = Taken By, (c) = Cosigned By    Initials Name Provider Type    Victor Hugo Vo, PT Physical Therapist        Therapy Charges for Today     Code Description Service Date Service Provider Modifiers Qty    30104507829 HC PT EVAL LOW COMPLEXITY 1 12/15/2019 Victor Hugo Salinas, PT GP 1    42324368479 HC PT THERAPEUTIC ACT EA 15 MIN 12/15/2019 Victor Hugo Salinas, PT GP 1    83410966935 HC GAIT TRAINING EA 15 MIN 12/15/2019 Victor Hugo Salinas, PT GP 1          PT G-Codes  Outcome Measure Options: AM-PAC 6 Clicks Basic Mobility (PT)  AM-PAC 6 Clicks Score (PT): 23      Victor Hugo Salinas, PT  12/15/2019

## 2019-12-15 NOTE — PLAN OF CARE
Left neck incision CDI and soft. Requested Lortab for incisional and pleuritic pain, given x 1. Continues to severely desat with sleep, even with oxygen increased to 3L. Family at bedside. Possible d/c home today. Will continue to monitor.

## 2019-12-15 NOTE — PLAN OF CARE
Problem: Patient Care Overview  Goal: Plan of Care Review  Flowsheets (Taken 12/15/2019 6229)  Plan of Care Reviewed With: patient; son  Note:   PATIENT APPEARS TO BE SAFE WHILE AMBULATING AND O2 SATS REMAINED AT 98+% WITH ACTIVITY.  SHE WILL ALSO HAVE HELP AT HOME FOR THE NEXT WEEK OR SO.  PT INTERVENTION DOES NOT APPEAR TO BE NECESSARY AT THIS TIME.

## 2019-12-15 NOTE — PROGRESS NOTES
"  Daily Progress Note.   [address]  12/15/2019    Patient:  Name:  Roland Russell  MRN:  [id number]  1934  84 y.o.  female         CC: Postoperative hypoxia    Interval History:  Patient is awake alert denies any shortness of breath chest pain or cough.  No bloody secretions.  No fever no chills she is awake alert really only complains of the lunch.  Her son is at bedside and we discussed his significant improvement with using his CPAP.  Uses it consistently and every day.  She again tells a story of not being able to tolerate and leaving in the middle the night from her prior sleep study.  She had difficulty going to sleep.  She is however agreeable to trying this again made with some Ambien this time to help her sleep and achieve the study.    ROS: No fever, no diarrhea, no chest pain  PMFSSH: no change    Physical Exam:  /51 (BP Location: Left arm, Patient Position: Lying)   Pulse 68   Temp 98.6 °F (37 °C) (Oral)   Resp 16   Ht 152.4 cm (60\")   Wt 83 kg (183 lb 1 oz)   SpO2 97%   BMI 35.75 kg/m²   Body mass index is 35.75 kg/m².    Intake/Output Summary (Last 24 hours) at 12/15/2019 1641  Last data filed at 12/15/2019 1500  Gross per 24 hour   Intake 720 ml   Output --   Net 720 ml     General appearance: Nontoxic, conversant   Eyes: anicteric sclerae, moist conjunctivae; no lid-lag; PERRLA  HENT: Left-sided incision clean dry intact no bleeding no significant swelling airway is widely patent; oropharynx clear with moist mucous membranes and no mucosal ulcerations; normal hard and soft palate  Neck: Trachea midline; FROM, supple, no thyromegaly or lymphadenopathy  Lungs: CTA, with  normal respiratory effort and no intercostal retractions  CV:  Regular rate rhythm, no MRGs   Abdomen: Soft, non-tender; no masses or HSM  Extremities: No peripheral edema or extremity lymphadenopathy  Skin: Normal temperature, turgor and texture; no rash, ulcers or subcutaneous nodules  Psych:  " Appropriate affect, alert and oriented to person, place and time  Neuro cranial 2 through 12 intact speech intact moves all extremities    Data Review:  Notable Labs:  Results from last 7 days   Lab Units 12/15/19  0548 12/14/19  0411 12/11/19  1224   WBC 10*3/mm3 12.36* 12.78* 8.53   HEMOGLOBIN g/dL 12.5 12.9 13.3   PLATELETS 10*3/mm3 208 228 232     Results from last 7 days   Lab Units 12/15/19  0548 12/14/19  1442 12/14/19  0411 12/11/19  1224   SODIUM mmol/L 141  --  141 141   POTASSIUM mmol/L 3.9  --  4.0 4.0   CHLORIDE mmol/L 103  --  102 101   CO2 mmol/L 28.7  --  27.3 28.5   BUN mg/dL 26*  --  30* 27*   CREATININE mg/dL 0.92  --  0.92 1.09*   GLUCOSE mg/dL 122*  --  125* 173*   CALCIUM mg/dL 9.2  --  9.4 10.0   PHOSPHORUS mg/dL 2.7 2.5  --   --    Estimated Creatinine Clearance: 43.5 mL/min (by C-G formula based on SCr of 0.92 mg/dL).    Imaging:  Reviewed chest images personally from past 3 days    Scheduled meds:      amLODIPine 2.5 mg Oral Daily   aspirin 81 mg Oral Daily   insulin glargine 20 Units Subcutaneous Q12H   insulin lispro 0-24 Units Subcutaneous 4x Daily With Meals & Nightly   insulin lispro 7 Units Subcutaneous TID With Meals   levothyroxine 88 mcg Oral Daily   losartan-HCTZ (HYZAAR) 100-25 combo dose  Oral Daily   pantoprazole 40 mg Oral Q AM       ASSESSMENT  /  PLAN:  DARI unable to tolerate CPAP  Post operative from carotid tumor resection 12/13/2019  GERD  Osteoporosis  Hypertension  Hypothyroidism  Hyperlipidemia  Type 2 diabetes  Post operative respiratory failure improved    From a pulmonary perspective:  Repeat cxr showing resolution of her atelectasis - no signs of aspiration pneumonia and patient remains afebrile.  She has no cough, congestion or shortness of breath. Cont supplemental oxygen to keep sats above 88% patient still has little bit of a leukocytosis I will send off procalcitonin in the morning.    From a sleep medicine perspective:  Suggest an outpatient sleep study with  Ambien this time - her sister sees Dr. Sotelo and patient will follow up with him upon discharge.  Patient left during the middle of her prior study as she couldn't tolerate CPAP.   This has been discussed with patient with each visit.     As far as treatment of yannick mentioned by vascular surgery, Empiric treatment while in house for presumed YANNICK with CPAP is fine however with her recent carotid wound the straps might open this back up as the often lay over the same area.  Ill see if a nasal CPAP mask is available that might reduce this risk, and be tolerated by the patient despite failure with past attempts.  However patient wont be able to qualify for a home cpap machine as an intpatient until she gets a sleep study as mentioned in prior notes.  The treatment for people who are intolerant of CPAP is supplemental oxygen at night which she has been getting since her surgery.  As an outpatient if she gets study that demonstrates YANNICK, she could explore other options like inspyre device, dental prosthesis if she remains intolerant of CPAP.    Please call with any questions regarding the above and happy to help in any way possible.  Thanks for allowing us to participate in the care of this patient.  LPC is always available to discuss any concerns, questions or to help coordinate the patient's care.      Curtis Nazario MD  [facility]  12/15/19  10:44 AM

## 2019-12-15 NOTE — PROGRESS NOTES
The patient is 2 days status post resection of a left carotid body tumor.  Postoperatively there were issues with possible aspiration and problems with obstructive sleep apnea.  She was placed in the intensive care unit.    She was moved out to the floor yesterday.  Currently she is doing quite well.  She is sitting up eating breakfast.  She is on 2 L nasal cannula with good oxygenation.  She had desaturation during sleep and her oxygen was increased to 3 L.     Left neck incision is healing satisfactorily.  No hematoma noted.  Swallowing without difficulty.  Neurologic examination is normal.     She appears to be doing quite well from a vascular standpoint following her procedure.  Still some issues from a pulmonary standpoint.  We will continue to monitor her 1 more day.  Wonder if we can initiate treatment for sleep apnea?  I have discussed with the patient and son and they seem to be receptive.

## 2019-12-15 NOTE — PLAN OF CARE
Problem: Patient Care Overview  Goal: Plan of Care Review  Flowsheets (Taken 12/15/2019 1812)  Progress: improving  Plan of Care Reviewed With: patient  Outcome Summary: VSS. Patient continues to require O2 when sleeping r/t DARI. Plan to evaluate DARI as outpatient. PRN tylenol for pain control. Carotid incision C/D/I. Possible d/c home tomorrow. Will continue to monitor.     Problem: Pain, Chronic (Adult)  Goal: Acceptable Pain/Comfort Level and Functional Ability  Flowsheets (Taken 12/15/2019 1812)  Acceptable Pain/Comfort Level and Functional Ability: making progress toward outcome     Problem: Skin Injury Risk (Adult)  Goal: Skin Health and Integrity  Flowsheets (Taken 12/15/2019 0429 by Dayanara Cedillo RN)  Skin Health and Integrity: making progress toward outcome

## 2019-12-16 ENCOUNTER — LAB (OUTPATIENT)
Dept: ENDOCRINOLOGY | Age: 84
End: 2019-12-16

## 2019-12-16 VITALS
DIASTOLIC BLOOD PRESSURE: 75 MMHG | RESPIRATION RATE: 16 BRPM | WEIGHT: 183.06 LBS | HEIGHT: 60 IN | SYSTOLIC BLOOD PRESSURE: 180 MMHG | OXYGEN SATURATION: 94 % | BODY MASS INDEX: 35.94 KG/M2 | TEMPERATURE: 98.5 F | HEART RATE: 73 BPM

## 2019-12-16 DIAGNOSIS — E55.9 VITAMIN D DEFICIENCY: ICD-10-CM

## 2019-12-16 DIAGNOSIS — E11.649 UNCONTROLLED TYPE 2 DIABETES MELLITUS WITH HYPOGLYCEMIA WITHOUT COMA (HCC): ICD-10-CM

## 2019-12-16 DIAGNOSIS — E11.649 UNCONTROLLED TYPE 2 DIABETES MELLITUS WITH HYPOGLYCEMIA WITHOUT COMA (HCC): Primary | ICD-10-CM

## 2019-12-16 DIAGNOSIS — E03.9 HYPOTHYROIDISM, ACQUIRED: ICD-10-CM

## 2019-12-16 DIAGNOSIS — E78.2 MIXED HYPERLIPIDEMIA: ICD-10-CM

## 2019-12-16 PROBLEM — G47.33 SLEEP APNEA, OBSTRUCTIVE: Status: ACTIVE | Noted: 2019-12-16

## 2019-12-16 LAB
ALBUMIN SERPL-MCNC: 3.3 G/DL (ref 3.5–5.2)
ANION GAP SERPL CALCULATED.3IONS-SCNC: 9.9 MMOL/L (ref 5–15)
BASOPHILS # BLD AUTO: 0.07 10*3/MM3 (ref 0–0.2)
BASOPHILS NFR BLD AUTO: 0.8 % (ref 0–1.5)
BUN BLD-MCNC: 28 MG/DL (ref 8–23)
BUN/CREAT SERPL: 30.1 (ref 7–25)
CALCIUM SPEC-SCNC: 9.6 MG/DL (ref 8.6–10.5)
CHLORIDE SERPL-SCNC: 98 MMOL/L (ref 98–107)
CO2 SERPL-SCNC: 29.1 MMOL/L (ref 22–29)
CREAT BLD-MCNC: 0.93 MG/DL (ref 0.57–1)
CYTO UR: NORMAL
DEPRECATED RDW RBC AUTO: 42.1 FL (ref 37–54)
ENDOMYSIUM IGA SER QL: NEGATIVE
EOSINOPHIL # BLD AUTO: 0.26 10*3/MM3 (ref 0–0.4)
EOSINOPHIL NFR BLD AUTO: 2.9 % (ref 0.3–6.2)
ERYTHROCYTE [DISTWIDTH] IN BLOOD BY AUTOMATED COUNT: 12.9 % (ref 12.3–15.4)
GFR SERPL CREATININE-BSD FRML MDRD: 57 ML/MIN/1.73
GLIADIN PEPTIDE IGA SER-ACNC: 6 UNITS (ref 0–19)
GLIADIN PEPTIDE IGG SER-ACNC: 7 UNITS (ref 0–19)
GLUCOSE BLD-MCNC: 255 MG/DL (ref 65–99)
GLUCOSE BLDC GLUCOMTR-MCNC: 246 MG/DL (ref 70–130)
HCT VFR BLD AUTO: 41.4 % (ref 34–46.6)
HGB BLD-MCNC: 13 G/DL (ref 12–15.9)
IGA SERPL-MCNC: 312 MG/DL (ref 64–422)
IMM GRANULOCYTES # BLD AUTO: 0.03 10*3/MM3 (ref 0–0.05)
IMM GRANULOCYTES NFR BLD AUTO: 0.3 % (ref 0–0.5)
LAB AP CASE REPORT: NORMAL
LAB AP DIAGNOSIS COMMENT: NORMAL
LYMPHOCYTES # BLD AUTO: 1.97 10*3/MM3 (ref 0.7–3.1)
LYMPHOCYTES NFR BLD AUTO: 21.7 % (ref 19.6–45.3)
MCH RBC QN AUTO: 28.4 PG (ref 26.6–33)
MCHC RBC AUTO-ENTMCNC: 31.4 G/DL (ref 31.5–35.7)
MCV RBC AUTO: 90.4 FL (ref 79–97)
MONOCYTES # BLD AUTO: 0.7 10*3/MM3 (ref 0.1–0.9)
MONOCYTES NFR BLD AUTO: 7.7 % (ref 5–12)
NEUTROPHILS # BLD AUTO: 6.03 10*3/MM3 (ref 1.7–7)
NEUTROPHILS NFR BLD AUTO: 66.6 % (ref 42.7–76)
NRBC BLD AUTO-RTO: 0 /100 WBC (ref 0–0.2)
PATH REPORT.FINAL DX SPEC: NORMAL
PATH REPORT.GROSS SPEC: NORMAL
PHOSPHATE SERPL-MCNC: 3.1 MG/DL (ref 2.5–4.5)
PLATELET # BLD AUTO: 200 10*3/MM3 (ref 140–450)
PMV BLD AUTO: 10.9 FL (ref 6–12)
POTASSIUM BLD-SCNC: 4.2 MMOL/L (ref 3.5–5.2)
RBC # BLD AUTO: 4.58 10*6/MM3 (ref 3.77–5.28)
SODIUM BLD-SCNC: 137 MMOL/L (ref 136–145)
TTG IGA SER-ACNC: <2 U/ML (ref 0–3)
TTG IGG SER-ACNC: <2 U/ML (ref 0–5)
WBC NRBC COR # BLD: 9.06 10*3/MM3 (ref 3.4–10.8)

## 2019-12-16 PROCEDURE — 63710000001 INSULIN GLARGINE PER 5 UNITS: Performed by: INTERNAL MEDICINE

## 2019-12-16 PROCEDURE — 80069 RENAL FUNCTION PANEL: CPT | Performed by: INTERNAL MEDICINE

## 2019-12-16 PROCEDURE — 63710000001 INSULIN LISPRO (HUMAN) PER 5 UNITS: Performed by: SURGERY

## 2019-12-16 PROCEDURE — 63710000001 INSULIN LISPRO (HUMAN) PER 5 UNITS: Performed by: INTERNAL MEDICINE

## 2019-12-16 PROCEDURE — 85025 COMPLETE CBC W/AUTO DIFF WBC: CPT | Performed by: INTERNAL MEDICINE

## 2019-12-16 PROCEDURE — 82962 GLUCOSE BLOOD TEST: CPT

## 2019-12-16 RX ADMIN — ASPIRIN 81 MG: 81 TABLET, COATED ORAL at 08:49

## 2019-12-16 RX ADMIN — INSULIN GLARGINE 20 UNITS: 100 INJECTION, SOLUTION SUBCUTANEOUS at 08:50

## 2019-12-16 RX ADMIN — PANTOPRAZOLE SODIUM 40 MG: 40 TABLET, DELAYED RELEASE ORAL at 06:30

## 2019-12-16 RX ADMIN — INSULIN LISPRO 7 UNITS: 100 INJECTION, SOLUTION INTRAVENOUS; SUBCUTANEOUS at 08:50

## 2019-12-16 RX ADMIN — AMLODIPINE BESYLATE 2.5 MG: 2.5 TABLET ORAL at 08:49

## 2019-12-16 RX ADMIN — LOSARTAN POTASSIUM: 50 TABLET, FILM COATED ORAL at 08:49

## 2019-12-16 RX ADMIN — LEVOTHYROXINE SODIUM 88 MCG: 88 TABLET ORAL at 08:49

## 2019-12-16 RX ADMIN — INSULIN LISPRO 8 UNITS: 100 INJECTION, SOLUTION INTRAVENOUS; SUBCUTANEOUS at 08:49

## 2019-12-16 NOTE — PROGRESS NOTES
LOS: 3 days   Patient Care Team:  Davon Marie MD as PCP - General  Davon Marie MD as PCP - Family Medicine  Ambrocio, Tevin Nieto MD as Consulting Physician (Cardiac Electrophysiology)    Chief Complaint: Carotid body tumor    Subjective     84 y.o. female with carotid body tumor resection.  Doing much much better today.  Desires discharge    Review of Systems  Review of Systems -denies pain or headache.      Objective     Vital Signs  Temp:  [97 °F (36.1 °C)-98.6 °F (37 °C)] 97 °F (36.1 °C)  Heart Rate:  [66-76] 69  Resp:  [16] 16  BP: (146-165)/(51-73) 146/72    Physical Exam  General: No acute distress. Alert and oriented x 4  HEENT: No jugular venous distension, trachea is midline neck incision clear  CV: RRR, S1S2  Resp: Clear unlabored breathing on both sides  Abd: Abdomen is soft, nontender, nondistended  Extremities: Viable    Results Review:       Recent Results (from the past 24 hour(s))   POC Glucose Once    Collection Time: 12/15/19 11:26 AM   Result Value Ref Range    Glucose 213 (H) 70 - 130 mg/dL   POC Glucose Once    Collection Time: 12/15/19  4:54 PM   Result Value Ref Range    Glucose 116 70 - 130 mg/dL   POC Glucose Once    Collection Time: 12/15/19  8:45 PM   Result Value Ref Range    Glucose 139 (H) 70 - 130 mg/dL   Renal Function Panel    Collection Time: 12/16/19  5:22 AM   Result Value Ref Range    Glucose 255 (H) 65 - 99 mg/dL    BUN 28 (H) 8 - 23 mg/dL    Creatinine 0.93 0.57 - 1.00 mg/dL    Sodium 137 136 - 145 mmol/L    Potassium 4.2 3.5 - 5.2 mmol/L    Chloride 98 98 - 107 mmol/L    CO2 29.1 (H) 22.0 - 29.0 mmol/L    Calcium 9.6 8.6 - 10.5 mg/dL    Albumin 3.30 (L) 3.50 - 5.20 g/dL    Phosphorus 3.1 2.5 - 4.5 mg/dL    Anion Gap 9.9 5.0 - 15.0 mmol/L    BUN/Creatinine Ratio 30.1 (H) 7.0 - 25.0    eGFR Non African Amer 57 (L) >60 mL/min/1.73   CBC Auto Differential    Collection Time: 12/16/19  5:22 AM   Result Value Ref Range    WBC 9.06 3.40 - 10.80 10*3/mm3    RBC 4.58 3.77  - 5.28 10*6/mm3    Hemoglobin 13.0 12.0 - 15.9 g/dL    Hematocrit 41.4 34.0 - 46.6 %    MCV 90.4 79.0 - 97.0 fL    MCH 28.4 26.6 - 33.0 pg    MCHC 31.4 (L) 31.5 - 35.7 g/dL    RDW 12.9 12.3 - 15.4 %    RDW-SD 42.1 37.0 - 54.0 fl    MPV 10.9 6.0 - 12.0 fL    Platelets 200 140 - 450 10*3/mm3    Neutrophil % 66.6 42.7 - 76.0 %    Lymphocyte % 21.7 19.6 - 45.3 %    Monocyte % 7.7 5.0 - 12.0 %    Eosinophil % 2.9 0.3 - 6.2 %    Basophil % 0.8 0.0 - 1.5 %    Immature Grans % 0.3 0.0 - 0.5 %    Neutrophils, Absolute 6.03 1.70 - 7.00 10*3/mm3    Lymphocytes, Absolute 1.97 0.70 - 3.10 10*3/mm3    Monocytes, Absolute 0.70 0.10 - 0.90 10*3/mm3    Eosinophils, Absolute 0.26 0.00 - 0.40 10*3/mm3    Basophils, Absolute 0.07 0.00 - 0.20 10*3/mm3    Immature Grans, Absolute 0.03 0.00 - 0.05 10*3/mm3    nRBC 0.0 0.0 - 0.2 /100 WBC   POC Glucose Once    Collection Time: 12/16/19  6:17 AM   Result Value Ref Range    Glucose 246 (H) 70 - 130 mg/dL   ]      Assessment/Plan             Carotid body tumor (CMS/HCC)      Assessment & Plan  84 y.o. female status post carotid body tumor resection stop day 3.  Complicated by significant sleep apnea and initial concerns for aspiration are negative.  White count is normalized and she is stable for discharge.  She is agreeable to going home with oxygen at night.  She is doubtful about whether she will follow-up for the sleep apnea but she is aware that she has an appointment with Dr. Sotelo for discharge.  She will follow-up with me in 1 month for wound check and carotid duplex scan.  She looks overall very good today.      Bryan Severino MD  12/16/19  7:41 AM

## 2019-12-16 NOTE — PROGRESS NOTES
Discharge Planning Assessment  [facility]     Patient Name: Roland Russell  MRN: [id number]  Today's Date: 12/16/2019    Admit Date: 12/13/2019    Discharge Needs Assessment     Row Name 12/16/19 0952       Living Environment    Lives With  alone    Current Living Arrangements  home/apartment/condo    Family Caregiver if Needed  child(dima), adult       Resource/Environmental Concerns    Resource/Environmental Concerns  none    Home Accessibility Concerns  stairs to enter home       Transition Planning    Patient/Family Anticipates Transition to  home       Discharge Needs Assessment    Equipment Currently Used at Home  cane, straight;cane, quad;walker, rolling;glucometer;grab bar    Equipment Needed After Discharge  oxygen        Discharge Plan     Row Name 12/16/19 1026       Plan    Plan  Home     Patient/Family in Agreement with Plan  yes    Plan Comments  Met with pt and family at bedside.  Introduced self, explained CCP role, facesheet verified.  Pt states she lives alone and children check on her frequently.  Uses can or walker regularly.  Plans to return home.  Order for nighttime O2 ordered.  Pt has sleep apnea and insurance will not cover night time O2.  Discussed option of pt paying out of pocket ~$100/month.  Pt declines.  Has plans to follow up with Dr Sotelo in 2-3 weeks.  Informed Dr. Severino.  No further needs identified.  POORNIMA Ribera RN        Destination      Coordination has not been started for this encounter.      Durable Medical Equipment      Coordination has not been started for this encounter.      Dialysis/Infusion      Coordination has not been started for this encounter.      Home Medical Care      Coordination has not been started for this encounter.      Therapy      Coordination has not been started for this encounter.      Community Resources      Coordination has not been started for this encounter.        Expected Discharge Date and Time     Expected Discharge Date Expected Discharge  Time    Dec 16, 2019         Demographic Summary     Row Name 12/16/19 0952       General Information    Admission Type  inpatient    Arrived From  home    Referral Source  admission list    Reason for Consult  discharge planning    Preferred Language  English       Contact Information    Permission Granted to Share Info With  family/designee Yanet Wen (POA/dtr)         Functional Status     Row Name 12/16/19 0952       Functional Status, IADL    Medications  independent    Meal Preparation  independent    Housekeeping  independent    Shopping  assistive equipment and person        Psychosocial    No documentation.       Abuse/Neglect    No documentation.       Legal    No documentation.       Substance Abuse    No documentation.       Patient Forms    No documentation.           Dariana Ribera RN

## 2019-12-16 NOTE — PLAN OF CARE
Problem: Patient Care Overview  Goal: Plan of Care Review  Flowsheets  Taken 12/15/2019 2000  Plan of Care Reviewed With: patient  Taken 12/16/2019 0318  Outcome Summary: wearing cpap overnight with oxygen bled in tolerating well sats staying up higher than on oxygen oygen is bled i to cpap tylenol given for pain in left neck incision with good results incision intact immediate area around incision is slightly firm but no change since first assessment no neuro deficits family memember at bedside

## 2019-12-16 NOTE — PROGRESS NOTES
Case Management Discharge Note      Final Note: Pt discharged home, no known needs.  POORNIMA Ribera RN         Destination      No service has been selected for the patient.      Durable Medical Equipment      No service has been selected for the patient.      Dialysis/Infusion      No service has been selected for the patient.      Home Medical Care      No service has been selected for the patient.      Therapy      No service has been selected for the patient.      Community Resources      No service has been selected for the patient.        Transportation Services  Private: Car    Final Discharge Disposition Code: 01 - home or self-care

## 2019-12-16 NOTE — DISCHARGE SUMMARY
"  Name: Roland Russell ADMIT: 2019   : 1934  PCP: Davon Marie MD    MRN: [id number] LOS: 3 days   AGE/SEX: 84 y.o. 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       SECONDARY DIAGNOSES  Past Medical History:   Diagnosis Date   • Abnormal vision     SEES \"KALEIDOSCOPE\"   • Allergy to adhesive tape    • Arthralgia    • AVB (atrioventricular block)    • Balance problem    • Carotid body tumor (CMS/HCC)     LEFT   • Difficulty walking    • H/O complete eye exam 10/2016   • Headache     OFF AND ON BACK OF HEAD, DOWN NECK TO SHOULDER   • History of glaucoma    • History of poliomyelitis     AGE 9   • History of surgery on arm     left arm   • HLD (hyperlipidemia)    • Hypertension    • Hypothyroidism, acquired    • Legal blindness     SOME PERIPHERAL VISION ON LEFT, SOME VISION ON RIGHT   • Memory loss     SOME SHORT TERM   • Migraine    • Numbness     LEFT LEG    • Osteoarthritis, multiple sites    • Osteoporosis    • Pacemaker    • Rectal bleeding     X1, WITH BM   • Sleep apnea     DOES NOT USE A MACHINE   • TIA (transient ischemic attack) 2018    ?, HAD SPELL UNABLE TO TALK   • Type 2 diabetes mellitus (CMS/HCC)    • Type 2 diabetes mellitus, uncontrolled (CMS/HCC)        CONSULTS   Consults     Date and Time Order Name Status Description    2019 1453 Inpatient Gastroenterology Consult Completed     2019 1334 Inpatient Pulmonology Consult            PROCEDURES PERFORMED    Date: 19    HOSPITAL COURSE  Patient is a 84 y.o. female presented to [facility] admitted for carotid body tumor now status post resection from the left side.  Doing extremely well.  No problems from " "the standpoint of surgery.  She did have some obstructive sleep apnea that was known before but untreated.  Is been evaluated now and she has been recommended for further sleep studies.  She is currently agreeable only with home oxygen at night.  We will arrange this.  At the time of discharge she is stable afebrile tolerating diet and ambulatory..  Please see the admitting history and physical for further details.  Follow-up will be in 1 month with Dr. Severino for appointment with carotid duplex scan.  She will also see Dr. Sotelo in 2 to 3 weeks to rediscuss sleep apnea      VITAL SIGNS  /72 (BP Location: Left arm, Patient Position: Lying)   Pulse 69   Temp 97 °F (36.1 °C) (Axillary)   Resp 16   Ht 152.4 cm (60\")   Wt 83 kg (183 lb 1 oz)   SpO2 96%   BMI 35.75 kg/m²   Objective  CONDITION ON DISCHARGE  Stable.      DISCHARGE DISPOSITION   Home-Health Care Svc      DISCHARGE MEDICATIONS     Discharge Medications      Changes to Medications      Instructions Start Date   amLODIPine 2.5 MG tablet  Commonly known as:  NORVASC  What changed:  additional instructions   2.5 mg, Oral, Daily      insulin lispro protamine-insulin lispro (50-50) 100 UNIT/ML suspension injection  Commonly known as:  humaLOG 50-50  What changed:    · how much to take  · how to take this  · when to take this  · additional instructions   40, 20,40      insulin  UNIT/ML injection  Commonly known as:  HUMULIN N  What changed:    · how much to take  · how to take this  · when to take this  · additional instructions   20 units at bedtime as needed for blood glucose greater than 200      losartan-hydrochlorothiazide 100-25 MG per tablet  Commonly known as:  HYZAAR  What changed:  additional instructions   1 tablet, Oral, Daily         Continue These Medications      Instructions Start Date   CALCIUM 600 + D PO   1 tablet, Oral, 2 Times Daily, HOLD PRIOR TO SURGERY      HYDROcodone-acetaminophen 5-325 MG per tablet  Commonly known as: "  NORCO   1-2 tablets, Oral, Every 6 Hours PRN      levothyroxine 88 MCG tablet  Commonly known as:  SYNTHROID, LEVOTHROID   88 mcg, Oral, Daily      Vitamin D 25 MCG (1000 UT) tablet   1,000 Units, Oral, 2 Times Daily, HOLD PRIOR TO SURGERY              Future Appointments   Date Time Provider Department Center   2019 11:00 AM Anel Dubon APRN [facility] None     Additional Instructions for the Follow-ups that You Need to Schedule     Discharge Follow-up with Specified Provider: Dr. Severino; 1 Month   As directed      To:  Dr. Severino    Follow Up:  1 Month    Follow Up Details:  Carotid duplex scan in the office         Discharge Follow-up with Specified Provider: Dr. Hwang; 2 Weeks   As directed      To:  Dr. Hwang    Follow Up:  2 Weeks    Follow Up Details:  To discuss sleep apnea           Follow-up Information     Jaclyn Sotelo MD Follow up.    Specialties:  Pulmonary Disease, Sleep Medicine  Why:  For a sleep medicine appt  Contact information:  [address] [address]  [phone]             Jaclyn Sotelo MD .    Specialties:  Pulmonary Disease, Sleep Medicine  Contact information:  [address] [address]  [phone]             Davon Marie MD .    Specialty:  Family Medicine  Contact information:  [address] [facility] [address]  [place] [address]  [phone]                   TEST  RESULTS PENDING AT DISCHARGE   Order Current Status    Celiac Comprehensive Panel In process           [place], Post Op [facility]    Bryan Severino MD  Office Number [phone]    19  7:50 AM

## 2019-12-17 ENCOUNTER — READMISSION MANAGEMENT (OUTPATIENT)
Dept: CALL CENTER | Facility: HOSPITAL | Age: 84
End: 2019-12-17

## 2019-12-17 LAB
25(OH)D3+25(OH)D2 SERPL-MCNC: 45.4 NG/ML (ref 30–100)
ACT BLD: 125 SECONDS (ref 82–152)
ACT BLD: 186 SECONDS (ref 82–152)
ACT BLD: 87 SECONDS (ref 82–152)
ALBUMIN SERPL-MCNC: 3.5 G/DL (ref 3.5–5.2)
ALBUMIN/GLOB SERPL: 1.4 G/DL
ALP SERPL-CCNC: 129 U/L (ref 39–117)
ALT SERPL-CCNC: 12 U/L (ref 1–33)
AST SERPL-CCNC: 12 U/L (ref 1–32)
BILIRUB SERPL-MCNC: 0.4 MG/DL (ref 0.2–1.2)
BUN SERPL-MCNC: 29 MG/DL (ref 8–23)
BUN/CREAT SERPL: 25.7 (ref 7–25)
C PEPTIDE SERPL-MCNC: 1.5 NG/ML (ref 1.1–4.4)
CALCIUM SERPL-MCNC: 9.5 MG/DL (ref 8.6–10.5)
CHLORIDE SERPL-SCNC: 96 MMOL/L (ref 98–107)
CHOLEST SERPL-MCNC: 177 MG/DL (ref 0–200)
CO2 SERPL-SCNC: 29.1 MMOL/L (ref 22–29)
CREAT SERPL-MCNC: 1.13 MG/DL (ref 0.57–1)
FT4I SERPL CALC-MCNC: 2.1 (ref 1.2–4.9)
GLOBULIN SER CALC-MCNC: 2.5 GM/DL
GLUCOSE SERPL-MCNC: 316 MG/DL (ref 65–99)
HBA1C MFR BLD: 8.6 % (ref 4.8–5.6)
HDLC SERPL-MCNC: 39 MG/DL (ref 40–60)
INTERPRETATION: NORMAL
LDLC SERPL CALC-MCNC: 90 MG/DL (ref 0–100)
Lab: NORMAL
POTASSIUM SERPL-SCNC: 4.3 MMOL/L (ref 3.5–5.2)
PROT SERPL-MCNC: 6 G/DL (ref 6–8.5)
SODIUM SERPL-SCNC: 138 MMOL/L (ref 136–145)
T3FREE SERPL-MCNC: 1.8 PG/ML (ref 2–4.4)
T3RU NFR SERPL: 32 % (ref 24–39)
T4 FREE SERPL-MCNC: 1.48 NG/DL (ref 0.93–1.7)
T4 SERPL-MCNC: 6.7 UG/DL (ref 4.5–12)
TRIGL SERPL-MCNC: 239 MG/DL (ref 0–150)
TSH SERPL DL<=0.005 MIU/L-ACNC: 1 UIU/ML (ref 0.45–4.5)
UNABLE TO VOID: NORMAL
URATE SERPL-MCNC: 3 MG/DL (ref 2.4–5.7)
VLDLC SERPL CALC-MCNC: 47.8 MG/DL

## 2019-12-18 ENCOUNTER — READMISSION MANAGEMENT (OUTPATIENT)
Dept: CALL CENTER | Facility: HOSPITAL | Age: 84
End: 2019-12-18

## 2019-12-18 ENCOUNTER — TRANSITIONAL CARE MANAGEMENT TELEPHONE ENCOUNTER (OUTPATIENT)
Dept: FAMILY MEDICINE CLINIC | Facility: CLINIC | Age: 84
End: 2019-12-18

## 2019-12-18 NOTE — OUTREACH NOTE
General Surgery Week 1 Survey      Responses   Facility patient discharged from?  [facility]   Does the patient have one of the following disease processes/diagnoses(primary or secondary)?  General Surgery   Is there a successful TCM telephone encounter documented?  No   Week 1 attempt successful?  Yes   Call start time  0820   Call end time  0833   General alerts for this patient  Legally Blind   Discharge diagnosis  carotid body tumor status post resection from the left side   Is patient permission given to speak with other caregiver?  No   Meds reviewed with patient/caregiver?  Yes   Is the patient having any side effects they believe may be caused by any medication additions or changes?  No   Does the patient have all medications related to this admission filled (includes all antibiotics, pain medications, etc.)  Yes   Is the patient taking all medications as directed (includes completed medication regime)?  Yes   Does the patient have a follow up appointment scheduled with their surgeon?  Yes   Has the patient kept scheduled appointments due by today?  N/A   Comments  Jan 26, 2019   Has home health visited the patient within 72 hours of discharge?  N/A   DME comments  Has a walker.   Psychosocial issues?  No   Did the patient receive a copy of their discharge instructions?  Yes   Nursing interventions  Reviewed instructions with patient   What is the patient's perception of their health status since discharge?  Worsening [Swelling at neck]   Nursing interventions  Advised patient to call provider   Is the patient /caregiver able to teach back basic post-op care?  No tub bath, swimming, or hot tub until instructed by MD, Keep incision areas clean,dry and protected, Lifting as instructed by MD in discharge instructions, Drive as instructed by MD in discharge instructions, Practice 'cough and deep breath', Take showers only when approved by MD-sponge bathe until then   Is the patient/caregiver able to teach back  signs and symptoms of incisional infection?  Increased redness, swelling or pain at the incisonal site, Increased drainage or bleeding, Incisional warmth, Pus or odor from incision, Fever [Much more swollen, at surgical site, on neck and up behind her ear down to shoulder bone.]   Is the patient/caregiver able to teach back steps to recovery at home?  Rest and rebuild strength, gradually increase activity, Eat a well-balance diet   Is the patient/caregiver able to teach back the hierarchy of who to call/visit for symptoms/problems? PCP, Specialist, Home health nurse, Urgent Care, ED, 911  Yes   Week 1 call completed?  Yes          Bessie Tidwell RN

## 2019-12-18 NOTE — OUTREACH NOTE
Spoke with pt, feeling ok, but has concerns re: incision s/p resection of left carotid body tumor. Pt states incision and area around is slightly red, but very swollen up into pt face, and down into shoulder. No fever, chills. Apparently Bessie with Pt Readmission at [facility] had already called pt and has left a msg with Kinsey in Dr Severino's office re: this issue. Pt does decline TCM hosp fwp at this time, as she is scheduling post op appts as well as GI and Endocrinology. She will call office to sched.

## 2019-12-18 NOTE — OUTREACH NOTE
Prep Survey      Responses   Facility patient discharged from?  [facility]   Is patient eligible?  Yes   Discharge diagnosis  carotid body tumor status post resection from the left side   Does the patient have one of the following disease processes/diagnoses(primary or secondary)?  General Surgery   Does the patient have Home health ordered?  No   Is there a DME ordered?  No   Prep survey completed?  Yes          Leilani Angeles RN

## 2019-12-20 ENCOUNTER — OFFICE VISIT (OUTPATIENT)
Dept: ENDOCRINOLOGY | Age: 84
End: 2019-12-20

## 2019-12-20 VITALS
HEIGHT: 60 IN | SYSTOLIC BLOOD PRESSURE: 128 MMHG | WEIGHT: 180 LBS | BODY MASS INDEX: 35.34 KG/M2 | DIASTOLIC BLOOD PRESSURE: 62 MMHG

## 2019-12-20 DIAGNOSIS — E55.9 VITAMIN D DEFICIENCY: ICD-10-CM

## 2019-12-20 DIAGNOSIS — I10 ESSENTIAL HYPERTENSION: ICD-10-CM

## 2019-12-20 DIAGNOSIS — N28.9 RENAL INSUFFICIENCY: ICD-10-CM

## 2019-12-20 DIAGNOSIS — E78.2 MIXED HYPERLIPIDEMIA: ICD-10-CM

## 2019-12-20 DIAGNOSIS — E03.9 HYPOTHYROIDISM, ACQUIRED: ICD-10-CM

## 2019-12-20 DIAGNOSIS — E66.01 MORBIDLY OBESE (HCC): ICD-10-CM

## 2019-12-20 DIAGNOSIS — E11.649 UNCONTROLLED TYPE 2 DIABETES MELLITUS WITH HYPOGLYCEMIA WITHOUT COMA (HCC): Primary | ICD-10-CM

## 2019-12-20 PROCEDURE — 99214 OFFICE O/P EST MOD 30 MIN: CPT | Performed by: NURSE PRACTITIONER

## 2019-12-20 NOTE — PROGRESS NOTES
"Subjective   Roland Russell is a 84 y.o. female is here today for follow-up.  Chief Complaint   Patient presents with   • Diabetes     recent labs, pt is testing 4x daily, pt brought BG logs   • Hypothyroidism   • Hypertension   • Hyperlipidemia   • Vitamin D Deficiency   • Osteoporosis     /62   Ht 152.4 cm (60\")   Wt 81.6 kg (180 lb)   BMI 35.15 kg/m²   Current Outpatient Medications on File Prior to Visit   Medication Sig   • amLODIPine (NORVASC) 2.5 MG tablet Take 1 tablet by mouth Daily. (Patient taking differently: Take 2.5 mg by mouth Daily. PT TAKES AT LUNCH)   • Calcium Carb-Cholecalciferol (CALCIUM 600 + D PO) Take 1 tablet by mouth 2 (Two) Times a Day. HOLD PRIOR TO SURGERY   • Cholecalciferol (VITAMIN D) 25 MCG (1000 UT) tablet Take 1,000 Units by mouth 2 (Two) Times a Day. HOLD PRIOR TO SURGERY   • levothyroxine (SYNTHROID, LEVOTHROID) 88 MCG tablet Take 1 tablet by mouth Daily.   • losartan-hydrochlorothiazide (HYZAAR) 100-25 MG per tablet Take 1 tablet by mouth Daily. (Patient taking differently: Take 1 tablet by mouth Daily. PT TAKES AT LUNCH)   • [DISCONTINUED] insulin lispro protamine-insulin lispro (humaLOG 50-50) (50-50) 100 UNIT/ML suspension injection 40, 20,40 (Patient taking differently: Inject 20-40 Units under the skin into the appropriate area as directed 3 (Three) Times a Day With Meals. 40 UNITS WITH BREAKFAST, 20 UNITS WITH LUNCH, 40 UNITS WITH DINNER  MAKES ADJUSTMENTS BASED ON BS)   • [DISCONTINUED] insulin NPH (HUMULIN N) 100 UNIT/ML injection 20 units at bedtime as needed for blood glucose greater than 200 (Patient taking differently: Inject 20 Units under the skin into the appropriate area as directed Every Night. 20 units at bedtime as needed for blood glucose greater than 150)   • HYDROcodone-acetaminophen (NORCO) 5-325 MG per tablet Take 1-2 tablets by mouth Every 6 (Six) Hours As Needed (Pain).     No current facility-administered medications on file prior to visit.  "     Family History   Problem Relation Age of Onset   • Cancer Father    • Glaucoma Father         angular glycoma   • Heart disease Father    • Arthritis Father    • Thyroid disease Father    • Stroke Father    • Asthma Sister    • Arthritis Sister    • Cancer Sister    • Stroke Maternal Grandmother    • Heart failure Other    • Diabetes Other    • Hypertension Other    • Stroke Other         aunt   • Thyroid disease Other    • Arthritis Mother    • Diabetes Mother    • Thyroid disease Mother    • Malig Hyperthermia Neg Hx      Social History     Tobacco Use   • Smoking status: Never Smoker   • Smokeless tobacco: Never Used   Substance Use Topics   • Alcohol use: No   • Drug use: No     Allergies   Allergen Reactions   • Adhesive Tape Other (See Comments)     REDNESS, SKIN BURN   • Latex Other (See Comments)     REDNESS, SKIN BURN   • Propoxyphene Itching         History of Present Illness   Encounter Diagnoses   Name Primary?   • Essential hypertension    • Mixed hyperlipidemia    • Hypothyroidism, acquired    • Uncontrolled type 2 diabetes mellitus with hypoglycemia without coma (CMS/HCC) Yes   • Vitamin D deficiency    • Morbidly obese (CMS/HCC)    • Renal insufficiency      This is an 84-year-old female patient here today for follow-up visit.  She has been seen for the above-mentioned problems.  She is accompanied by her daughter who helps care for her.  She is taking insulin Humulin 50/50 consistently 40 units in the morning and 20 units at lunch and 40 units at dinner.  She has been more consistent with taking NPH insulin 20 units at bedtime.  She will take less insulin at that time pending her blood sugars.  She has had recent surgery to repair her carotid artery.  She is little tender and her incision is healing.  No signs and symptoms of infection.  She does have some redness around the incision.  She was followed up with the surgeon yesterday.  She does not currently see a podiatrist.  Her daughter trims  her toenails.  She has had no significant hypoglycemic events.  Her highest blood sugar reading has been in the 300s.  Her hemoglobin A1c has gone up possibly due to stress and surgery.  Her lowest blood sugar has been 65.  She is requesting refills on her insulin.  Her medication list was reviewed and updated.    The following portions of the patient's history were reviewed and updated as appropriate: allergies, current medications, past family history, past medical history, past social history, past surgical history and problem list.    Review of Systems   Constitutional: Positive for fatigue.   Respiratory: Negative for cough.    Gastrointestinal: Negative for nausea.   Endocrine: Negative for polyuria.   Neurological: Negative for light-headedness.       Objective   Physical Exam   Constitutional: She is oriented to person, place, and time. She appears well-developed and well-nourished. No distress.   HENT:   Head: Normocephalic and atraumatic.   Right Ear: External ear normal.   Left Ear: External ear normal.   Nose: Nose normal.   Mouth/Throat: Oropharynx is clear and moist. No oropharyngeal exudate.   Eyes: Pupils are equal, round, and reactive to light. EOM are normal. Right eye exhibits no discharge. Left eye exhibits no discharge.   Neck: Trachea normal, normal range of motion and full passive range of motion without pain. Neck supple. No tracheal tenderness present. Carotid bruit is not present. No tracheal deviation, no edema and no erythema present. No thyroid mass and no thyromegaly present.   Cardiovascular: Normal rate, regular rhythm, normal heart sounds and intact distal pulses. Exam reveals no gallop and no friction rub.   No murmur heard.  Pulmonary/Chest: Effort normal and breath sounds normal. No stridor. No respiratory distress. She has no wheezes. She has no rales.   Abdominal: Soft. Bowel sounds are normal. She exhibits no distension.   Musculoskeletal: Normal range of motion. She exhibits  no edema or deformity.     Vascular Status -  Her right foot exhibits normal foot vasculature  and no edema. Her left foot exhibits normal foot vasculature  and no edema.  Skin Integrity  -  Her right foot skin is intact.Her left foot skin is intact..  Lymphadenopathy:     She has no cervical adenopathy.   Neurological: She is alert and oriented to person, place, and time.   Skin: Skin is warm and dry. No rash noted. She is not diaphoretic. No erythema. No pallor.   Psychiatric: She has a normal mood and affect. Her behavior is normal. Judgment and thought content normal.   Nursing note and vitals reviewed.    Results for orders placed or performed in visit on 12/16/19   Thyroid Panel With TSH   Result Value Ref Range    TSH 1.000 0.450 - 4.500 uIU/mL    T4, Total 6.7 4.5 - 12.0 ug/dL    T3 Uptake 32 24 - 39 %    Free Thyroxine Index 2.1 1.2 - 4.9   T3, Free   Result Value Ref Range    T3, Free 1.8 (L) 2.0 - 4.4 pg/mL   T4, Free   Result Value Ref Range    Free T4 1.48 0.93 - 1.70 ng/dL   Uric Acid   Result Value Ref Range    Uric Acid 3.0 2.4 - 5.7 mg/dL   Vitamin D 25 Hydroxy   Result Value Ref Range    25 Hydroxy, Vitamin D 45.4 30.0 - 100.0 ng/ml   Comprehensive Metabolic Panel   Result Value Ref Range    Glucose 316 (H) 65 - 99 mg/dL    BUN 29 (H) 8 - 23 mg/dL    Creatinine 1.13 (H) 0.57 - 1.00 mg/dL    eGFR Non African Am 46 (L) >60 mL/min/1.73    eGFR African Am 56 (L) >60 mL/min/1.73    BUN/Creatinine Ratio 25.7 (H) 7.0 - 25.0    Sodium 138 136 - 145 mmol/L    Potassium 4.3 3.5 - 5.2 mmol/L    Chloride 96 (L) 98 - 107 mmol/L    Total CO2 29.1 (H) 22.0 - 29.0 mmol/L    Calcium 9.5 8.6 - 10.5 mg/dL    Total Protein 6.0 6.0 - 8.5 g/dL    Albumin 3.50 3.50 - 5.20 g/dL    Globulin 2.5 gm/dL    A/G Ratio 1.4 g/dL    Total Bilirubin 0.4 0.2 - 1.2 mg/dL    Alkaline Phosphatase 129 (H) 39 - 117 U/L    AST (SGOT) 12 1 - 32 U/L    ALT (SGPT) 12 1 - 33 U/L   C-Peptide   Result Value Ref Range    C-Peptide 1.5 1.1 - 4.4  ng/mL   Hemoglobin A1c   Result Value Ref Range    Hemoglobin A1C 8.60 (H) 4.80 - 5.60 %   Lipid Panel   Result Value Ref Range    Total Cholesterol 177 0 - 200 mg/dL    Triglycerides 239 (H) 0 - 150 mg/dL    HDL Cholesterol 39 (L) 40 - 60 mg/dL    VLDL Cholesterol 47.8 mg/dL    LDL Cholesterol  90 0 - 100 mg/dL   Unable To Void   Result Value Ref Range    Unable to Void Comment    Cardiovascular Risk Assessment   Result Value Ref Range    Interpretation Note    Diabetes Patient Education   Result Value Ref Range    PDF Image Not applicable          Assessment/Plan   Problems Addressed this Visit        Cardiovascular and Mediastinum    HLD (hyperlipidemia)    Essential hypertension       Digestive    Vitamin D deficiency    Morbidly obese (CMS/[facility])       Endocrine    Uncontrolled type 2 diabetes mellitus with hypoglycemia without coma (CMS/[facility]) - Primary    Relevant Medications    insulin NPH (HUMULIN N) 100 UNIT/ML injection    insulin lispro protamine-insulin lispro (humaLOG 50-50) (50-50) 100 UNIT/ML suspension injection    Hypothyroidism, acquired       Genitourinary    Renal insufficiency        Patient was seen and examined.  Metabolically and clinically she is stable.  Her A1c is above goal of 8.  She has had some fluctuations with her blood sugars.  Her blood sugar records were reviewed.  She has had no emergent hypoglycemic event however she has had blood sugars in the 60 range.  She has had some excursions with blood sugars due to the holidays as well as having surgery.  She has no complaints at today's visit.  She is not currently seeing a podiatrist and her daughter is trimming her toenails.  Her blood pressure is in satisfactory range.  Her labs were reviewed and she was provided a copy.  She is been encouraged to drink more fluid based on her elevated creatinine on her recent labs.  She will follow-up in 6 months with Dr. Elizalde or myself.  The daughter has been encouraged to sign up for my chart to be  able to communicate more efficiently with this office.

## 2019-12-27 ENCOUNTER — READMISSION MANAGEMENT (OUTPATIENT)
Dept: CALL CENTER | Facility: HOSPITAL | Age: 84
End: 2019-12-27

## 2019-12-27 NOTE — OUTREACH NOTE
General Surgery Week 2 Survey      Responses   Facility patient discharged from?  [facility]   Does the patient have one of the following disease processes/diagnoses(primary or secondary)?  General Surgery   Week 2 attempt successful?  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  Yes   Is the patient taking all medications as directed (includes completed medication regime)?  Yes   Has the patient kept scheduled appointments due by today?  Yes   Psychosocial issues?  No   What is the patient's perception of their health status since discharge?  Improving   Is the patient /caregiver able to teach back basic post-op care?  No tub bath, swimming, or hot tub until instructed by MD, Keep incision areas clean,dry and protected, Lifting as instructed by MD in discharge instructions, Drive as instructed by MD in discharge instructions, Practice 'cough and deep breath', Take showers only when approved by MD-sponge bathe until then   Is the patient/caregiver able to teach back the hierarchy of who to call/visit for symptoms/problems? PCP, Specialist, Home health nurse, Urgent Care, ED, 911  Yes   Additional teach back comments  Did not qualify for home oxygen. May f/u with pulmonary s needed.   Week 2 call completed?  Yes          Kerri Lucas RN

## 2020-01-16 ENCOUNTER — OFFICE VISIT (OUTPATIENT)
Dept: GASTROENTEROLOGY | Facility: CLINIC | Age: 85
End: 2020-01-16

## 2020-01-16 VITALS
DIASTOLIC BLOOD PRESSURE: 72 MMHG | SYSTOLIC BLOOD PRESSURE: 130 MMHG | BODY MASS INDEX: 36.6 KG/M2 | TEMPERATURE: 97.9 F | WEIGHT: 186.4 LBS | HEIGHT: 60 IN

## 2020-01-16 DIAGNOSIS — K52.9 CHRONIC DIARRHEA: ICD-10-CM

## 2020-01-16 DIAGNOSIS — R11.2 NAUSEA AND VOMITING, INTRACTABILITY OF VOMITING NOT SPECIFIED, UNSPECIFIED VOMITING TYPE: Primary | ICD-10-CM

## 2020-01-16 DIAGNOSIS — D44.7 GLOMUS JUGULARE TUMOR (HCC): ICD-10-CM

## 2020-01-16 PROCEDURE — 99214 OFFICE O/P EST MOD 30 MIN: CPT | Performed by: NURSE PRACTITIONER

## 2020-01-16 RX ORDER — ASPIRIN 81 MG/1
81 TABLET ORAL DAILY
COMMUNITY
End: 2020-06-26

## 2020-01-16 NOTE — PROGRESS NOTES
"Chief Complaint   Patient presents with   • Hospital follow up       Roland Russell is a  85 y.o. female here for a hospital follow up visit for nausea and vomiting.    HPI  85-year-old female presents today accompanied by her daughter for hospital follow-up visit for nausea and vomiting with chronic diarrhea.  She is a patient of Dr. Bentley.  She was seen at [facility] by our service on 12/13 through 12/16/2019.  We were consulted originally for nonbloody vomiting after her neck surgery for a carotid body tumor.  According to the records it seems like most of her postoperative symptoms of the nausea and vomiting were due to most likely to the anesthesia.  Once anesthesia wore off her nausea and vomiting completely resolved.  She does complain of a longstanding history of intermittent chronic diarrhea.  She did have her gallbladder taken out 1989.  She admits the diarrhea may be happens once a week if that.  And she does drink a lot of milk.  She tells me the diarrhea does not really bother her.  She did will use some Imodium over-the-counter occasionally.  She denies any dysphagia, reflux, abdominal pain, nausea and vomiting, diarrhea, constipation, bleeding or melena.  She does have history of a previous colonoscopy in 2013.  She denies any history of colon polyps.  She denies any significant GI family history.  Her last EGD was done in 2012.  Otherwise she is tell me she is doing really well since her surgery.  She denies any GI issues at this time.  Past Medical History:   Diagnosis Date   • Abnormal vision     SEES \"KALEIDOSCOPE\"   • Allergy to adhesive tape    • Arthralgia    • AVB (atrioventricular block)    • Balance problem    • Carotid body tumor (CMS/HCC)     LEFT   • Difficulty walking    • H/O complete eye exam 10/2016   • Headache     OFF AND ON BACK OF HEAD, DOWN NECK TO SHOULDER   • History of glaucoma    • History of poliomyelitis     AGE 9   • History of surgery on arm     " left arm   • HLD (hyperlipidemia)    • Hypertension    • Hypothyroidism, acquired    • Legal blindness     SOME PERIPHERAL VISION ON LEFT, SOME VISION ON RIGHT   • Memory loss     SOME SHORT TERM   • Migraine    • Numbness     LEFT LEG    • Osteoarthritis, multiple sites    • Osteoporosis    • Pacemaker    • Rectal bleeding     X1, WITH BM   • Sleep apnea     DOES NOT USE A MACHINE   • TIA (transient ischemic attack) 12/24/2018    ?, HAD SPELL UNABLE TO TALK   • Type 2 diabetes mellitus (CMS/HCC)    • Type 2 diabetes mellitus, uncontrolled (CMS/[facility])        Past Surgical History:   Procedure Laterality Date   • APPENDECTOMY  1989   • CARDIAC PACEMAKER PLACEMENT  11/25/2004   • CAROTID ENDARTERECTOMY Left 12/13/2019    Procedure: LEFT CAROTID BODY TUMOR RESECTION;  Surgeon: Bryan Severino MD;  Location: [facility] OR;  Service: Vascular   • CATARACT EXTRACTION Bilateral 1997   • CEREBRAL ANGIOGRAM Bilateral 12/6/2019    Procedure: CAROTID ARTERIOGRAM BILATERAL, RIGHT WRIST APPROACH;  Surgeon: Bryan Severino MD;  Location: [facility] OR 18/19;  Service: Vascular   • CHOLECYSTECTOMY  1989   • COLONOSCOPY  2013    dr montes   • ENDOSCOPY  12/06/2012    Dr. Montes; food in stomach, gastritis   • EXCISION LESION  1994    BACK CYST BENGIN   • EYE SURGERY  12/2012   • GLAUCOMA SURGERY Bilateral 1995    laser surgery   • HARDWARE REMOVAL Left     ELBOW   • HYSTERECTOMY  1986   • ORIF ELBOW FRACTURE Left    • PACEMAKER IMPLANTATION     • PACEMAKER REPLACEMENT  06/2013       Scheduled Meds:    Continuous Infusions:  No current facility-administered medications for this visit.     PRN Meds:.    Allergies   Allergen Reactions   • Adhesive Tape Other (See Comments)     REDNESS, SKIN BURN   • Latex Other (See Comments)     REDNESS, SKIN BURN   • Propoxyphene Itching       Social History     Socioeconomic History   • Marital status:      Spouse name: Not on file   • Number of children: Not on file   • Years of education:  Not on file   • Highest education level: Not on file   Tobacco Use   • Smoking status: Never Smoker   • Smokeless tobacco: Never Used   Substance and Sexual Activity   • Alcohol use: No   • Drug use: No   • Sexual activity: Defer       Family History   Problem Relation Age of Onset   • Cancer Father    • Glaucoma Father         angular glycoma   • Heart disease Father    • Arthritis Father    • Thyroid disease Father    • Stroke Father    • Asthma Sister    • Arthritis Sister    • Cancer Sister    • Stroke Maternal Grandmother    • Heart failure Other    • Diabetes Other    • Hypertension Other    • Stroke Other         aunt   • Thyroid disease Other    • Arthritis Mother    • Diabetes Mother    • Thyroid disease Mother    • Malig Hyperthermia Neg Hx        Review of Systems   Constitutional: Negative for appetite change, chills, diaphoresis, fatigue, fever and unexpected weight change.   HENT: Negative for nosebleeds, postnasal drip, sore throat, trouble swallowing and voice change.    Respiratory: Negative for cough, choking, chest tightness, shortness of breath and wheezing.    Cardiovascular: Negative for chest pain, palpitations and leg swelling.   Gastrointestinal: Negative for abdominal distention, abdominal pain, anal bleeding, blood in stool, constipation, diarrhea, nausea, rectal pain and vomiting.   Endocrine: Negative for polydipsia, polyphagia and polyuria.   Musculoskeletal: Negative for gait problem.   Skin: Negative for rash and wound.   Allergic/Immunologic: Negative for food allergies.   Neurological: Negative for dizziness, speech difficulty and light-headedness.   Psychiatric/Behavioral: Negative for confusion, self-injury, sleep disturbance and suicidal ideas.       Vitals:    01/16/20 1032   BP: 130/72   Temp: 97.9 °F (36.6 °C)       Physical Exam   Constitutional: She is oriented to person, place, and time. She appears well-developed and well-nourished. She does not appear ill. No distress.    HENT:   Head: Normocephalic.   Eyes: Pupils are equal, round, and reactive to light.   Cardiovascular: Normal rate, regular rhythm and normal heart sounds.   Pulmonary/Chest: Effort normal and breath sounds normal.   Abdominal: Soft. Bowel sounds are normal. She exhibits no distension and no mass. There is no hepatosplenomegaly. There is no tenderness. There is no rebound and no guarding. No hernia.   Musculoskeletal: Normal range of motion.   Neurological: She is alert and oriented to person, place, and time.   Skin: Skin is warm and dry.   Psychiatric: She has a normal mood and affect. Her speech is normal and behavior is normal. Judgment normal.       No radiology results for the last 7 days     Assessment and plan     1. Nausea and vomiting, intractability of vomiting not specified, unspecified vomiting type    2. Chronic diarrhea    3. Glomus jugulare tumor (CMS/HCC)    Reviewed hospital records with her today.  Nausea and vomiting have completely resolved.  Most likely due to post anesthesia effects.  Chronic diarrhea seems to not be an issue for her.  She does well with Imodium OTC as needed.  Could be related to dairy.  But she tells me she does not want to quit her dairy at this time.  She denies any other GI issues today.  She seems to be doing well otherwise.  Next screening colonoscopy will be due in 2023.  Patient to call the office with any issues.  Patient to follow-up with us in 1 year.

## 2020-06-22 LAB
25(OH)D3+25(OH)D2 SERPL-MCNC: 47.3 NG/ML (ref 30–100)
ALBUMIN SERPL-MCNC: 4 G/DL (ref 3.5–5.2)
ALBUMIN/GLOB SERPL: 1.3 G/DL
ALP SERPL-CCNC: 132 U/L (ref 39–117)
ALT SERPL-CCNC: 12 U/L (ref 1–33)
AST SERPL-CCNC: 10 U/L (ref 1–32)
BILIRUB SERPL-MCNC: 0.5 MG/DL (ref 0.2–1.2)
BUN SERPL-MCNC: 30 MG/DL (ref 8–23)
BUN/CREAT SERPL: 27.8 (ref 7–25)
C PEPTIDE SERPL-MCNC: 0.7 NG/ML (ref 1.1–4.4)
CALCIUM SERPL-MCNC: 10.5 MG/DL (ref 8.6–10.5)
CHLORIDE SERPL-SCNC: 101 MMOL/L (ref 98–107)
CHOLEST SERPL-MCNC: 172 MG/DL (ref 0–200)
CO2 SERPL-SCNC: 30.6 MMOL/L (ref 22–29)
CREAT SERPL-MCNC: 1.08 MG/DL (ref 0.57–1)
GLOBULIN SER CALC-MCNC: 3.1 GM/DL
GLUCOSE SERPL-MCNC: 61 MG/DL (ref 65–99)
HBA1C MFR BLD: 8 % (ref 4.8–5.6)
HDLC SERPL-MCNC: 41 MG/DL (ref 40–60)
INTERPRETATION: NORMAL
LDLC SERPL CALC-MCNC: 98 MG/DL (ref 0–100)
Lab: NORMAL
MICROALBUMIN UR-MCNC: 7.1 UG/ML
POTASSIUM SERPL-SCNC: 4 MMOL/L (ref 3.5–5.2)
PROT SERPL-MCNC: 7.1 G/DL (ref 6–8.5)
SODIUM SERPL-SCNC: 140 MMOL/L (ref 136–145)
T3FREE SERPL-MCNC: 2.2 PG/ML (ref 2–4.4)
T4 FREE SERPL-MCNC: 1 NG/DL (ref 0.93–1.7)
T4 SERPL-MCNC: 5.89 MCG/DL (ref 4.5–11.7)
THYROGLOB AB SERPL-ACNC: 1.4 IU/ML (ref 0–0.9)
THYROGLOB SERPL-MCNC: 13 NG/ML
TRIGL SERPL-MCNC: 165 MG/DL (ref 0–150)
TSH SERPL DL<=0.005 MIU/L-ACNC: 3.02 UIU/ML (ref 0.27–4.2)
URATE SERPL-MCNC: 3.9 MG/DL (ref 2.4–5.7)
VLDLC SERPL CALC-MCNC: 33 MG/DL

## 2020-06-26 ENCOUNTER — OFFICE VISIT (OUTPATIENT)
Dept: ENDOCRINOLOGY | Age: 85
End: 2020-06-26

## 2020-06-26 ENCOUNTER — TRANSCRIBE ORDERS (OUTPATIENT)
Dept: CARDIOLOGY | Facility: HOSPITAL | Age: 85
End: 2020-06-26

## 2020-06-26 ENCOUNTER — HOSPITAL ENCOUNTER (OUTPATIENT)
Dept: CARDIOLOGY | Facility: HOSPITAL | Age: 85
Discharge: HOME | End: 2020-06-26
Admitting: NURSE PRACTITIONER

## 2020-06-26 VITALS
HEIGHT: 60 IN | WEIGHT: 196 LBS | BODY MASS INDEX: 38.48 KG/M2 | DIASTOLIC BLOOD PRESSURE: 62 MMHG | HEART RATE: 56 BPM | SYSTOLIC BLOOD PRESSURE: 108 MMHG | OXYGEN SATURATION: 94 %

## 2020-06-26 DIAGNOSIS — E11.649 UNCONTROLLED TYPE 2 DIABETES MELLITUS WITH HYPOGLYCEMIA WITHOUT COMA (HCC): Primary | ICD-10-CM

## 2020-06-26 DIAGNOSIS — Z01.811 PRE-OP CHEST EXAM: Primary | ICD-10-CM

## 2020-06-26 DIAGNOSIS — E55.9 VITAMIN D DEFICIENCY: ICD-10-CM

## 2020-06-26 DIAGNOSIS — E78.2 MIXED HYPERLIPIDEMIA: ICD-10-CM

## 2020-06-26 DIAGNOSIS — I10 ESSENTIAL HYPERTENSION: ICD-10-CM

## 2020-06-26 DIAGNOSIS — R60.0 LOCALIZED EDEMA: ICD-10-CM

## 2020-06-26 DIAGNOSIS — E03.9 HYPOTHYROIDISM, ACQUIRED: ICD-10-CM

## 2020-06-26 PROBLEM — R63.5 ABNORMAL WEIGHT GAIN: Status: ACTIVE | Noted: 2020-06-26

## 2020-06-26 PROCEDURE — 99214 OFFICE O/P EST MOD 30 MIN: CPT | Performed by: NURSE PRACTITIONER

## 2020-06-26 PROCEDURE — 93010 ELECTROCARDIOGRAM REPORT: CPT | Performed by: INTERNAL MEDICINE

## 2020-06-26 PROCEDURE — 93005 ELECTROCARDIOGRAM TRACING: CPT

## 2020-06-26 RX ORDER — LOSARTAN POTASSIUM AND HYDROCHLOROTHIAZIDE 25; 100 MG/1; MG/1
1 TABLET ORAL DAILY
Qty: 90 TABLET | Refills: 1 | Status: SHIPPED | OUTPATIENT
Start: 2020-06-26 | End: 2021-01-04

## 2020-06-26 RX ORDER — APIXABAN 5 MG/1
5 TABLET, FILM COATED ORAL EVERY 12 HOURS SCHEDULED
COMMUNITY
Start: 2020-06-18

## 2020-06-26 RX ORDER — AMLODIPINE BESYLATE 2.5 MG/1
2.5 TABLET ORAL DAILY
Qty: 90 TABLET | Refills: 1 | Status: SHIPPED | OUTPATIENT
Start: 2020-06-26 | End: 2020-06-26

## 2020-06-26 RX ORDER — BLOOD PRESSURE TEST KIT
KIT MISCELLANEOUS
Qty: 300 EACH | Refills: 1 | Status: SHIPPED | OUTPATIENT
Start: 2020-06-26 | End: 2021-02-10

## 2020-06-26 RX ORDER — LEVOTHYROXINE SODIUM 88 UG/1
88 TABLET ORAL DAILY
Qty: 90 TABLET | Refills: 3 | Status: SHIPPED | OUTPATIENT
Start: 2020-06-26 | End: 2021-06-28 | Stop reason: SDUPTHER

## 2020-06-26 NOTE — PROGRESS NOTES
"Subjective   Roland Russell is a 85 y.o. female is here today for follow-up.  Chief Complaint   Patient presents with   • Diabetes     type 2 dm check Bg 4 times daily recent labs    • Hypothyroidism   • Osteoporosis   • Vitamin D Deficiency     /62   Pulse 64   Ht 152.4 cm (60\")   Wt 88.9 kg (196 lb)   SpO2 94%   BMI 38.28 kg/m²   Current Outpatient Medications on File Prior to Visit   Medication Sig   • amLODIPine (NORVASC) 2.5 MG tablet Take 1 tablet by mouth Daily. (Patient taking differently: Take 2.5 mg by mouth Daily. PT TAKES AT LUNCH)   • Calcium Carb-Cholecalciferol (CALCIUM 600 + D PO) Take 1 tablet by mouth 2 (Two) Times a Day. HOLD PRIOR TO SURGERY   • Cholecalciferol (VITAMIN D) 25 MCG (1000 UT) tablet Take 1,000 Units by mouth 2 (Two) Times a Day. HOLD PRIOR TO SURGERY   • ELIQUIS 5 MG tablet tablet    • insulin lispro protamine-insulin lispro (humaLOG 50-50) (50-50) 100 UNIT/ML suspension injection Up to 100 units daily for MDI   • insulin NPH (HUMULIN N) 100 UNIT/ML injection Up to 30 units at bedtime as needed for blood glucose greater than 200   • levothyroxine (SYNTHROID, LEVOTHROID) 88 MCG tablet Take 1 tablet by mouth Daily.   • losartan-hydrochlorothiazide (HYZAAR) 100-25 MG per tablet Take 1 tablet by mouth Daily. (Patient taking differently: Take 1 tablet by mouth Daily. PT TAKES AT LUNCH)   • [DISCONTINUED] aspirin 81 MG EC tablet Take 81 mg by mouth Daily.     No current facility-administered medications on file prior to visit.      Family History   Problem Relation Age of Onset   • Cancer Father    • Glaucoma Father         angular glycoma   • Heart disease Father    • Arthritis Father    • Thyroid disease Father    • Stroke Father    • Asthma Sister    • Arthritis Sister    • Cancer Sister    • Stroke Maternal Grandmother    • Heart failure Other    • Diabetes Other    • Hypertension Other    • Stroke Other         aunt   • Thyroid disease Other    • Arthritis Mother    • " Diabetes Mother    • Thyroid disease Mother    • Malig Hyperthermia Neg Hx      Social History     Tobacco Use   • Smoking status: Never Smoker   • Smokeless tobacco: Never Used   Substance Use Topics   • Alcohol use: No   • Drug use: No     Allergies   Allergen Reactions   • Adhesive Tape Other (See Comments)     REDNESS, SKIN BURN   • Latex Other (See Comments)     REDNESS, SKIN BURN   • Propoxyphene Itching         History of Present Illness   Encounter Diagnoses   Name Primary?   • Essential hypertension    • Mixed hyperlipidemia    • Hypothyroidism, acquired    • Uncontrolled type 2 diabetes mellitus with hypoglycemia without coma (CMS/[facility]) Yes   • Vitamin D deficiency      85-year-old female patient here today for follow-up visit.  She has been seen for the above diagnoses.  She is accompanied by her daughter who helps care for her.  She has significant weight gain and swelling in her lower extremities.  She has been advised to elevate her feet.  She is currently on amlodipine 2.5 mg.  We will stop the amlodipine to see if this helps improve her swelling.  She has a pacemaker however her heart sounds are very difficult to hear and her pulse was at 56 when counted.  Her O2 saturation is at 94%.  She has a cardiologist she sees and she has her pacemaker checked every 3 months.  She will be sent for an EKG to evaluate her heart rate and rhythm.  Her medication list was reviewed and updated.  She is requesting refills to her mail order pharmacy.  Her hemoglobin A1c has improved since her last visit.  She does have an occasional low blood sugars.  She does not follow-up with an eye doctor anymore because there is no further actions they can do.  She denies neuropathy in her feet.  She is checking her blood sugars 4 times daily and brought her record with her to today's visit.  Her lowest blood sugars are typically in the 50 and 60 range.    The following portions of the patient's history were reviewed and updated  as appropriate: allergies, current medications, past family history, past medical history, past social history, past surgical history and problem list.    Review of Systems   Constitutional: Negative for appetite change and fatigue.   Eyes: Negative for visual disturbance.   Respiratory: Negative for cough.    Cardiovascular: Negative for leg swelling.   Gastrointestinal: Negative for constipation and diarrhea.   Endocrine: Negative for cold intolerance, heat intolerance, polydipsia, polyphagia and polyuria.   Genitourinary: Negative for frequency.   Neurological: Negative for numbness.       Objective   Physical Exam   Constitutional: She is oriented to person, place, and time. She appears well-developed and well-nourished. No distress.   HENT:   Head: Normocephalic and atraumatic.   Right Ear: External ear normal.   Left Ear: External ear normal.   Nose: Nose normal.   Mouth/Throat: Oropharynx is clear and moist. No oropharyngeal exudate.   Eyes: Pupils are equal, round, and reactive to light. EOM are normal. Right eye exhibits no discharge. Left eye exhibits no discharge.   Neck: Trachea normal, normal range of motion and full passive range of motion without pain. Neck supple. No tracheal tenderness present. Carotid bruit is not present. No tracheal deviation, no edema and no erythema present. No thyroid mass and no thyromegaly present.   Cardiovascular: Normal rate, regular rhythm, normal heart sounds and intact distal pulses. Exam reveals no gallop and no friction rub.   No murmur heard.  Pulmonary/Chest: Effort normal and breath sounds normal. No stridor. No respiratory distress. She has no wheezes. She has no rales.   Abdominal: Soft. Bowel sounds are normal. She exhibits no distension.   Musculoskeletal: Normal range of motion. She exhibits no edema or deformity.     Vascular Status -  Her right foot exhibits normal foot vasculature  and no edema. Her left foot exhibits normal foot vasculature  and no  edema.  Skin Integrity  -  Her right foot skin is intact.Her left foot skin is intact..  Lymphadenopathy:     She has no cervical adenopathy.   Neurological: She is alert and oriented to person, place, and time.   Skin: Skin is warm and dry. No rash noted. She is not diaphoretic. No erythema. No pallor.   Psychiatric: She has a normal mood and affect. Her behavior is normal. Judgment and thought content normal.   Nursing note and vitals reviewed.    Results for orders placed or performed in visit on 06/12/20   Comprehensive Metabolic Panel   Result Value Ref Range    Glucose 61 (L) 65 - 99 mg/dL    BUN 30 (H) 8 - 23 mg/dL    Creatinine 1.08 (H) 0.57 - 1.00 mg/dL    eGFR Non African Am 48 (L) >60 mL/min/1.73    eGFR African Am 58 (L) >60 mL/min/1.73    BUN/Creatinine Ratio 27.8 (H) 7.0 - 25.0    Sodium 140 136 - 145 mmol/L    Potassium 4.0 3.5 - 5.2 mmol/L    Chloride 101 98 - 107 mmol/L    Total CO2 30.6 (H) 22.0 - 29.0 mmol/L    Calcium 10.5 8.6 - 10.5 mg/dL    Total Protein 7.1 6.0 - 8.5 g/dL    Albumin 4.00 3.50 - 5.20 g/dL    Globulin 3.1 gm/dL    A/G Ratio 1.3 g/dL    Total Bilirubin 0.5 0.2 - 1.2 mg/dL    Alkaline Phosphatase 132 (H) 39 - 117 U/L    AST (SGOT) 10 1 - 32 U/L    ALT (SGPT) 12 1 - 33 U/L   Lipid Panel   Result Value Ref Range    Total Cholesterol 172 0 - 200 mg/dL    Triglycerides 165 (H) 0 - 150 mg/dL    HDL Cholesterol 41 40 - 60 mg/dL    VLDL Cholesterol 33 mg/dL    LDL Cholesterol  98 0 - 100 mg/dL   Cardiovascular Risk Assessment   Result Value Ref Range    Interpretation Note    T4 & TSH ([facility])   Result Value Ref Range    TSH 3.020 0.270 - 4.200 uIU/mL    T4, Total 5.89 4.50 - 11.70 mcg/dL   Diabetes Patient Education   Result Value Ref Range    PDF Image Not applicable    Hemoglobin A1c   Result Value Ref Range    Hemoglobin A1C 8.00 (H) 4.80 - 5.60 %   T4, Free   Result Value Ref Range    Free T4 1.00 0.93 - 1.70 ng/dL   C-Peptide   Result Value Ref Range    C-Peptide 0.7 (L) 1.1 - 4.4  ng/mL   Vitamin D 25 Hydroxy   Result Value Ref Range    25 Hydroxy, Vitamin D 47.3 30.0 - 100.0 ng/mL   MicroAlbumin, Urine, Random -   Result Value Ref Range    Microalbumin, Urine 7.1 Not Estab. ug/mL   Uric Acid   Result Value Ref Range    Uric Acid 3.9 2.4 - 5.7 mg/dL   Thyroglobulin With Anti-TG   Result Value Ref Range    Thyroglobulin Ab 1.4 (H) 0.0 - 0.9 IU/mL   Thyroglobulin By MARNIE   Result Value Ref Range    Thyroglobulin (TG-MARNIE) 13 ng/mL   T3, Free   Result Value Ref Range    T3, Free 2.2 2.0 - 4.4 pg/mL           Assessment/Plan   Problems Addressed this Visit        Cardiovascular and Mediastinum    HLD (hyperlipidemia)    Essential hypertension       Digestive    Vitamin D deficiency       Endocrine    Uncontrolled type 2 diabetes mellitus with hypoglycemia without coma (CMS/HCC) - Primary    Hypothyroidism, acquired          Patient was seen and examined.  She has 2+ edema in her lower extremities.  She has had 10 pounds of weight gain.  We will stop amlodipine to see if her swelling improves.  She has been advised to follow-up with her cardiologist to monitor her blood pressure at home.  She will be sent for an EKG at the hospital for further evaluation of her heart rhythm.  She will follow-up in our office in 6 months with labs prior.  Her blood pressures in satisfactory range.  Her O2 sat is in satisfactory range.  She does complain of increased weakness and fatigue.  Her hemoglobin A1c has improved to 8.  She is at risk for hypoglycemia on multiple daily injections of insulin.  She does have occasional low blood sugars.  She is checking her blood sugars 4 times daily which were reviewed.  She no longer follows with an eye doctor for routine eye exam.  Her thyroid hormones are in satisfactory range.

## 2020-06-26 NOTE — PATIENT INSTRUCTIONS
Follow up with cardiologist  ekg pending for bradycardia  Stop amlodipine and see if edema improves  Monitor blood pressure

## 2020-07-29 DIAGNOSIS — E11.649 UNCONTROLLED TYPE 2 DIABETES MELLITUS WITH HYPOGLYCEMIA WITHOUT COMA (HCC): ICD-10-CM

## 2020-07-29 DIAGNOSIS — E03.9 HYPOTHYROIDISM, ACQUIRED: ICD-10-CM

## 2020-07-29 DIAGNOSIS — E78.2 MIXED HYPERLIPIDEMIA: ICD-10-CM

## 2020-07-29 DIAGNOSIS — E55.9 VITAMIN D DEFICIENCY: ICD-10-CM

## 2020-07-29 DIAGNOSIS — I10 ESSENTIAL HYPERTENSION: ICD-10-CM

## 2021-01-03 DIAGNOSIS — I10 ESSENTIAL HYPERTENSION: ICD-10-CM

## 2021-01-04 RX ORDER — LOSARTAN POTASSIUM AND HYDROCHLOROTHIAZIDE 25; 100 MG/1; MG/1
TABLET ORAL
Qty: 90 TABLET | Refills: 1 | Status: SHIPPED | OUTPATIENT
Start: 2021-01-04 | End: 2021-06-28 | Stop reason: SDUPTHER

## 2021-01-28 ENCOUNTER — HOSPITAL ENCOUNTER (OUTPATIENT)
Dept: GENERAL RADIOLOGY | Facility: HOSPITAL | Age: 86
Discharge: HOME | End: 2021-01-28
Admitting: SURGERY

## 2021-01-28 DIAGNOSIS — M54.9 BACK PAIN, UNSPECIFIED BACK LOCATION, UNSPECIFIED BACK PAIN LATERALITY, UNSPECIFIED CHRONICITY: ICD-10-CM

## 2021-01-28 PROCEDURE — 72072 X-RAY EXAM THORAC SPINE 3VWS: CPT

## 2021-02-09 ENCOUNTER — TELEPHONE (OUTPATIENT)
Dept: NEUROSURGERY | Facility: CLINIC | Age: 86
End: 2021-02-09

## 2021-02-09 NOTE — TELEPHONE ENCOUNTER
Patient daughter Yanet called back regarding appt with Dr. Barlow. Patient has been scheduled 2/16/21 @ 12:30pm.

## 2021-02-10 RX ORDER — ISOPROPYL ALCOHOL 0.75 G/1
SWAB TOPICAL
Qty: 300 EACH | Refills: 1 | Status: SHIPPED | OUTPATIENT
Start: 2021-02-10 | End: 2022-03-09 | Stop reason: SDUPTHER

## 2021-02-12 NOTE — PROGRESS NOTES
Subjective   Patient ID: Roland Russell is a 86 y.o. female is being seen for consultation today at the request of Bryan Severino MD for a T12 VCF/possible T8.  Thoracic xray done on 1/28/21.  Today pt reports back/shoulder pain that affects her gait with weakness and that affects her sleep.    86-year-old female with a history of coronary artery disease and diabetes type 2 who has known osteoporosis and recently fell (approximately 2 weeks ago).  Patient is now experiencing focal severe back pain without radiation.  Plain films of the thoracic spine were obtained and show a stable chronic appearing T12 compression deformity but also a new T8 compression deformity.  Patient is unable to undergo an MRI due to a pacemaker.  Patient has had prior neck pain and an elbow fracture with tingling in the left hand.  Patient has difficulties with falling and fell approximately 8 years ago with the suspected T12 fracture.  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  No lower extremity radiating pain numbness or tingling is reported.  No bowel or bladder abnormalities are revealed.      The following portions of the patient's history were reviewed and updated as appropriate: allergies, current medications, past family history, past medical history, past social history, past surgical history and problem list.    Review of Systems   Constitutional: Negative for chills and fever.   HENT: Positive for ear pain. Negative for tinnitus. Nosebleeds: teeth.    Eyes: Positive for pain and visual disturbance (peripheral vision L eye). Eye redness: legally blind.   Respiratory: Positive for choking. Negative for shortness of breath.    Cardiovascular: Positive for palpitations (pacemaker). Negative for chest pain.   Gastrointestinal: Negative for abdominal pain and nausea.   Genitourinary: Negative for difficulty urinating and enuresis.   Musculoskeletal:  Positive for back pain and gait problem.        Shoulder pain   Skin: Negative for rash.   Neurological: Positive for weakness (back/legs). Negative for numbness.   Psychiatric/Behavioral: Positive for sleep disturbance.       Objective   Physical Exam  Vitals signs and nursing note reviewed.   Constitutional:       General: She is in acute distress.      Appearance: She is obese. She is not ill-appearing.   HENT:      Head: Normocephalic and atraumatic.      Nose: Nose normal.      Mouth/Throat:      Mouth: Mucous membranes are moist.   Eyes:      Extraocular Movements: Extraocular movements intact.      Conjunctiva/sclera: Conjunctivae normal.      Pupils: Pupils are equal, round, and reactive to light.   Neck:      Musculoskeletal: Normal range of motion.   Cardiovascular:      Rate and Rhythm: Normal rate.      Pulses: Normal pulses.   Pulmonary:      Effort: Pulmonary effort is normal.   Musculoskeletal: Normal range of motion.         General: Tenderness present.      Thoracic back: She exhibits bony tenderness.   Skin:     General: Skin is warm.   Neurological:      General: No focal deficit present.      Mental Status: She is alert and oriented to person, place, and time.      Cranial Nerves: No cranial nerve deficit.      Sensory: Sensory deficit present.      Motor: Weakness present.      Coordination: Coordination normal.      Gait: Gait normal.   Psychiatric:         Mood and Affect: Mood normal.         Behavior: Behavior normal.         Thought Content: Thought content normal.         Judgment: Judgment normal.       Neurologic Exam     Mental Status   Oriented to person, place, and time.     Cranial Nerves     CN III, IV, VI   Pupils are equal, round, and reactive to light.      Assessment/Plan   Independent Review of Radiographic Studies:    The plain film series dated 1/28/2021 shows a chronic stable appearing compression fracture at T12, but suggestion of a new fracture at T8 when compared to the  previous chest CT dated 2013.  Medical Decision Makin-year-old female with osteoporosis and repeated falling with the most recent fall inducing some mid thoracic back pain and possibly a new compression fracture of T8.  A chronic fracture is again seen at T12.  Given the patient's pacemaker, an MRI cannot be obtained but a CT of the thoracic and lumbar spine will be obtained.  If indeed an acute T8 fracture is present a kyphoplasty can be performed for fracture stabilization and pain relief.  Diagnoses and all orders for this visit:    1. Compression fracture of T8 vertebra with delayed healing (Primary)    2. Compression fracture of T12 vertebra with delayed healing, subsequent encounter  -     CT Thoracic Spine Without Contrast; Future  -     CT Lumbar Spine Without Contrast; Future    3. Osteoporosis, unspecified osteoporosis type, unspecified pathological fracture presence  -     CT Thoracic Spine Without Contrast; Future  -     CT Lumbar Spine Without Contrast; Future      Return in about 1 week (around 2021) for CT T & L Spine, Recheck.

## 2021-02-16 ENCOUNTER — OFFICE VISIT (OUTPATIENT)
Dept: NEUROSURGERY | Facility: CLINIC | Age: 86
End: 2021-02-16

## 2021-02-16 VITALS
BODY MASS INDEX: 35.53 KG/M2 | HEIGHT: 60 IN | RESPIRATION RATE: 16 BRPM | TEMPERATURE: 97.8 F | WEIGHT: 181 LBS | HEART RATE: 64 BPM | SYSTOLIC BLOOD PRESSURE: 120 MMHG | DIASTOLIC BLOOD PRESSURE: 62 MMHG

## 2021-02-16 DIAGNOSIS — S22.080G COMPRESSION FRACTURE OF T12 VERTEBRA WITH DELAYED HEALING, SUBSEQUENT ENCOUNTER: ICD-10-CM

## 2021-02-16 DIAGNOSIS — M81.0 OSTEOPOROSIS, UNSPECIFIED OSTEOPOROSIS TYPE, UNSPECIFIED PATHOLOGICAL FRACTURE PRESENCE: ICD-10-CM

## 2021-02-16 DIAGNOSIS — S22.060G COMPRESSION FRACTURE OF T8 VERTEBRA WITH DELAYED HEALING: Primary | ICD-10-CM

## 2021-02-16 PROCEDURE — 99204 OFFICE O/P NEW MOD 45 MIN: CPT | Performed by: RADIOLOGY

## 2021-02-24 ENCOUNTER — APPOINTMENT (OUTPATIENT)
Dept: GENERAL RADIOLOGY | Facility: HOSPITAL | Age: 86
End: 2021-02-24

## 2021-02-24 ENCOUNTER — TELEPHONE (OUTPATIENT)
Dept: NEUROSURGERY | Facility: CLINIC | Age: 86
End: 2021-02-24

## 2021-02-24 ENCOUNTER — HOSPITAL ENCOUNTER (EMERGENCY)
Facility: HOSPITAL | Age: 86
Discharge: HOME | End: 2021-02-24
Attending: EMERGENCY MEDICINE | Admitting: EMERGENCY MEDICINE

## 2021-02-24 VITALS
SYSTOLIC BLOOD PRESSURE: 150 MMHG | TEMPERATURE: 97.4 F | HEIGHT: 62 IN | DIASTOLIC BLOOD PRESSURE: 93 MMHG | HEART RATE: 62 BPM | BODY MASS INDEX: 36.2 KG/M2 | OXYGEN SATURATION: 97 % | WEIGHT: 196.7 LBS | RESPIRATION RATE: 16 BRPM

## 2021-02-24 DIAGNOSIS — S16.1XXA CERVICAL STRAIN, ACUTE, INITIAL ENCOUNTER: ICD-10-CM
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
## 2021-02-24 DIAGNOSIS — S70.02XA CONTUSION OF LEFT HIP AND THIGH, INITIAL ENCOUNTER: Primary | ICD-10-CM

## 2021-02-24 DIAGNOSIS — S70.12XA CONTUSION OF LEFT HIP AND THIGH, INITIAL ENCOUNTER: Primary | ICD-10-CM

## 2021-02-24 DIAGNOSIS — S30.0XXA LUMBAR CONTUSION, INITIAL ENCOUNTER: ICD-10-CM

## 2021-02-24 LAB
ALBUMIN SERPL-MCNC: 3.8 G/DL (ref 3.5–5.2)
ALBUMIN/GLOB SERPL: 1 G/DL
ALP SERPL-CCNC: 135 U/L (ref 39–117)
ALT SERPL W P-5'-P-CCNC: 17 U/L (ref 1–33)
ANION GAP SERPL CALCULATED.3IONS-SCNC: 10 MMOL/L (ref 5–15)
AST SERPL-CCNC: 31 U/L (ref 1–32)
BASOPHILS # BLD AUTO: 0.08 10*3/MM3 (ref 0–0.2)
BASOPHILS NFR BLD AUTO: 0.7 % (ref 0–1.5)
BILIRUB SERPL-MCNC: 0.5 MG/DL (ref 0–1.2)
BUN SERPL-MCNC: 30 MG/DL (ref 8–23)
BUN/CREAT SERPL: 30.3 (ref 7–25)
CALCIUM SPEC-SCNC: 9.3 MG/DL (ref 8.6–10.5)
CHLORIDE SERPL-SCNC: 100 MMOL/L (ref 98–107)
CO2 SERPL-SCNC: 29 MMOL/L (ref 22–29)
CREAT SERPL-MCNC: 0.99 MG/DL (ref 0.57–1)
DEPRECATED RDW RBC AUTO: 46.7 FL (ref 37–54)
EOSINOPHIL # BLD AUTO: 0.17 10*3/MM3 (ref 0–0.4)
EOSINOPHIL NFR BLD AUTO: 1.5 % (ref 0.3–6.2)
ERYTHROCYTE [DISTWIDTH] IN BLOOD BY AUTOMATED COUNT: 14.9 % (ref 12.3–15.4)
GFR SERPL CREATININE-BSD FRML MDRD: 53 ML/MIN/1.73
GLOBULIN UR ELPH-MCNC: 3.7 GM/DL
GLUCOSE SERPL-MCNC: 92 MG/DL (ref 65–99)
HCT VFR BLD AUTO: 44.4 % (ref 34–46.6)
HGB BLD-MCNC: 14 G/DL (ref 12–15.9)
IMM GRANULOCYTES # BLD AUTO: 0.05 10*3/MM3 (ref 0–0.05)
IMM GRANULOCYTES NFR BLD AUTO: 0.4 % (ref 0–0.5)
INR PPP: 1.04 (ref 0.9–1.1)
LYMPHOCYTES # BLD AUTO: 2.89 10*3/MM3 (ref 0.7–3.1)
LYMPHOCYTES NFR BLD AUTO: 26 % (ref 19.6–45.3)
MCH RBC QN AUTO: 27 PG (ref 26.6–33)
MCHC RBC AUTO-ENTMCNC: 31.5 G/DL (ref 31.5–35.7)
MCV RBC AUTO: 85.7 FL (ref 79–97)
MONOCYTES # BLD AUTO: 0.86 10*3/MM3 (ref 0.1–0.9)
MONOCYTES NFR BLD AUTO: 7.7 % (ref 5–12)
NEUTROPHILS NFR BLD AUTO: 63.7 % (ref 42.7–76)
NEUTROPHILS NFR BLD AUTO: 7.08 10*3/MM3 (ref 1.7–7)
NRBC BLD AUTO-RTO: 0 /100 WBC (ref 0–0.2)
PLATELET # BLD AUTO: 243 10*3/MM3 (ref 140–450)
PMV BLD AUTO: 11.4 FL (ref 6–12)
POTASSIUM SERPL-SCNC: 4.8 MMOL/L (ref 3.5–5.2)
PROT SERPL-MCNC: 7.5 G/DL (ref 6–8.5)
PROTHROMBIN TIME: 13.4 SECONDS (ref 11.7–14.2)
RBC # BLD AUTO: 5.18 10*6/MM3 (ref 3.77–5.28)
SARS-COV-2 RNA RESP QL NAA+PROBE: NOT DETECTED
SODIUM SERPL-SCNC: 139 MMOL/L (ref 136–145)
WBC # BLD AUTO: 11.13 10*3/MM3 (ref 3.4–10.8)

## 2021-02-24 PROCEDURE — 96375 TX/PRO/DX INJ NEW DRUG ADDON: CPT

## 2021-02-24 PROCEDURE — 25010000002 MORPHINE PER 10 MG: Performed by: EMERGENCY MEDICINE

## 2021-02-24 PROCEDURE — U0003 INFECTIOUS AGENT DETECTION BY NUCLEIC ACID (DNA OR RNA); SEVERE ACUTE RESPIRATORY SYNDROME CORONAVIRUS 2 (SARS-COV-2) (CORONAVIRUS DISEASE [COVID-19]), AMPLIFIED PROBE TECHNIQUE, MAKING USE OF HIGH THROUGHPUT TECHNOLOGIES AS DESCRIBED BY CMS-2020-01-R: HCPCS | Performed by: EMERGENCY MEDICINE

## 2021-02-24 PROCEDURE — 72050 X-RAY EXAM NECK SPINE 4/5VWS: CPT

## 2021-02-24 PROCEDURE — 25010000002 ONDANSETRON PER 1 MG: Performed by: EMERGENCY MEDICINE

## 2021-02-24 PROCEDURE — 85610 PROTHROMBIN TIME: CPT | Performed by: EMERGENCY MEDICINE

## 2021-02-24 PROCEDURE — 85025 COMPLETE CBC W/AUTO DIFF WBC: CPT | Performed by: EMERGENCY MEDICINE

## 2021-02-24 PROCEDURE — 99283 EMERGENCY DEPT VISIT LOW MDM: CPT

## 2021-02-24 PROCEDURE — 80053 COMPREHEN METABOLIC PANEL: CPT | Performed by: EMERGENCY MEDICINE

## 2021-02-24 PROCEDURE — 96374 THER/PROPH/DIAG INJ IV PUSH: CPT

## 2021-02-24 PROCEDURE — 73552 X-RAY EXAM OF FEMUR 2/>: CPT

## 2021-02-24 PROCEDURE — 73502 X-RAY EXAM HIP UNI 2-3 VIEWS: CPT

## 2021-02-24 PROCEDURE — 72110 X-RAY EXAM L-2 SPINE 4/>VWS: CPT

## 2021-02-24 RX ORDER — SODIUM CHLORIDE 0.9 % (FLUSH) 0.9 %
10 SYRINGE (ML) INJECTION AS NEEDED
Status: DISCONTINUED | OUTPATIENT
Start: 2021-02-24 | End: 2021-02-24 | Stop reason: HOSPADM

## 2021-02-24 RX ORDER — MORPHINE SULFATE 2 MG/ML
4 INJECTION, SOLUTION INTRAMUSCULAR; INTRAVENOUS ONCE
Status: COMPLETED | OUTPATIENT
Start: 2021-02-24 | End: 2021-02-24

## 2021-02-24 RX ORDER — ONDANSETRON 2 MG/ML
4 INJECTION INTRAMUSCULAR; INTRAVENOUS ONCE
Status: COMPLETED | OUTPATIENT
Start: 2021-02-24 | End: 2021-02-24

## 2021-02-24 RX ADMIN — ONDANSETRON 4 MG: 2 INJECTION INTRAMUSCULAR; INTRAVENOUS at 17:11

## 2021-02-24 RX ADMIN — MORPHINE SULFATE 4 MG: 2 INJECTION, SOLUTION INTRAMUSCULAR; INTRAVENOUS at 17:12

## 2021-02-24 RX ADMIN — SODIUM CHLORIDE, PRESERVATIVE FREE 10 ML: 5 INJECTION INTRAVENOUS at 17:13

## 2021-02-24 NOTE — TELEPHONE ENCOUNTER
Pt's daughter called to say her mother has fallen again and is unable to get up.  Wanted to know if she should go to the ER.  She was due for a CT scan per Dr Barlow at last visit.  She will probably go via ambulance as it is hard for her to get in her daughter's car.  They will go to [facility].

## 2021-03-05 ENCOUNTER — HOSPITAL ENCOUNTER (OUTPATIENT)
Dept: CT IMAGING | Facility: HOSPITAL | Age: 86
Discharge: HOME | End: 2021-03-05
Admitting: RADIOLOGY

## 2021-03-05 DIAGNOSIS — M81.0 OSTEOPOROSIS, UNSPECIFIED OSTEOPOROSIS TYPE, UNSPECIFIED PATHOLOGICAL FRACTURE PRESENCE: ICD-10-CM

## 2021-03-05 DIAGNOSIS — S22.080G COMPRESSION FRACTURE OF T12 VERTEBRA WITH DELAYED HEALING, SUBSEQUENT ENCOUNTER: ICD-10-CM

## 2021-03-05 PROCEDURE — 72128 CT CHEST SPINE W/O DYE: CPT

## 2021-03-05 PROCEDURE — 72131 CT LUMBAR SPINE W/O DYE: CPT

## 2021-03-09 ENCOUNTER — TELEPHONE (OUTPATIENT)
Dept: NEUROSURGERY | Facility: CLINIC | Age: 86
End: 2021-03-09

## 2021-03-12 ENCOUNTER — TELEPHONE (OUTPATIENT)
Dept: NEUROSURGERY | Facility: CLINIC | Age: 86
End: 2021-03-12

## 2021-03-12 NOTE — TELEPHONE ENCOUNTER
Spoke with patient regarding her CT thoracic and lumbar results. Per Dr. Barlow she has chronic VCF but nothing that he could treat with a kyphoplasty. He did advise that she has moderate thoracic stenosis and could see one of our neurosurgeons. Scheduled appt with Dr. Landon for her.

## 2021-04-26 ENCOUNTER — OFFICE VISIT (OUTPATIENT)
Dept: NEUROSURGERY | Facility: CLINIC | Age: 86
End: 2021-04-26

## 2021-04-26 VITALS
SYSTOLIC BLOOD PRESSURE: 120 MMHG | WEIGHT: 173 LBS | OXYGEN SATURATION: 95 % | DIASTOLIC BLOOD PRESSURE: 58 MMHG | HEART RATE: 65 BPM | HEIGHT: 62 IN | BODY MASS INDEX: 31.83 KG/M2

## 2021-04-26 DIAGNOSIS — G89.29 CHRONIC MIDLINE THORACIC BACK PAIN: ICD-10-CM

## 2021-04-26 DIAGNOSIS — S22.080G COMPRESSION FRACTURE OF T12 VERTEBRA WITH DELAYED HEALING, SUBSEQUENT ENCOUNTER: ICD-10-CM

## 2021-04-26 DIAGNOSIS — S22.060G COMPRESSION FRACTURE OF T8 VERTEBRA WITH DELAYED HEALING: Primary | ICD-10-CM

## 2021-04-26 DIAGNOSIS — M54.6 CHRONIC MIDLINE THORACIC BACK PAIN: ICD-10-CM

## 2021-04-26 DIAGNOSIS — M54.2 NECK PAIN: ICD-10-CM

## 2021-04-26 DIAGNOSIS — R26.81 GAIT INSTABILITY: ICD-10-CM

## 2021-04-26 PROCEDURE — 99214 OFFICE O/P EST MOD 30 MIN: CPT | Performed by: NEUROLOGICAL SURGERY

## 2021-04-26 NOTE — PROGRESS NOTES
"Subjective   History of Present Illness: Roland Russell is a 86 y.o. female is here today for follow-up of back pain. She was seen by Dr. Barlow 2/16/21 for consult of compression fracture T8 & T12. He advised that she has chronic VCF & moderate thoracic stenosis and referred her to Dr. Landon. She had CT L-Spine & T-Spine 3/5/21 @ [facility]. Today Ms. Russell reports that she is continuing to have some upper thoracic pain and lower neck pain.  She reports that the pain is daily.  Sometimes the pain becomes severe.  The pain is worse with activity and changing positions.  She lives alone and is able to walk around her house holding onto furniture.  She has a significant visual loss at baseline.  She uses a rolling walker at times for assistance with ambulation.  She has her daughter with her today.  She does have some instability while walking and a shuffling gait, it is unclear if she has had any worsening of her stability while walking over the past year.  She had a fall in 2016 when a lot of her pain started.    History of Present Illness    The following portions of the patient's history were reviewed and updated as appropriate: allergies, current medications, past medical history, past social history, past surgical history and problem list.    Past Medical History:   Diagnosis Date   • Abnormal vision     SEES \"KALEIDOSCOPE\"   • Allergy to adhesive tape    • Arthralgia    • AVB (atrioventricular block)    • Balance problem    • Carotid body tumor (CMS/HCC)     LEFT   • Difficulty walking    • H/O complete eye exam 10/2016   • Headache     OFF AND ON BACK OF HEAD, DOWN NECK TO SHOULDER   • History of glaucoma    • History of poliomyelitis     AGE 9   • History of surgery on arm     left arm   • HLD (hyperlipidemia)    • Hypertension    • Hypothyroidism, acquired    • Legal blindness     SOME PERIPHERAL VISION ON LEFT, SOME VISION ON RIGHT   • Memory loss     SOME SHORT TERM   • Migraine    • Numbness     LEFT LEG    • " Osteoarthritis, multiple sites    • Osteoporosis    • Pacemaker    • Rectal bleeding     X1, WITH BM   • Sleep apnea     DOES NOT USE A MACHINE   • TIA (transient ischemic attack) 12/24/2018    ?, HAD SPELL UNABLE TO TALK   • Type 2 diabetes mellitus (CMS/HCC)    • Type 2 diabetes mellitus, uncontrolled (CMS/HCC)         Past Surgical History:   Procedure Laterality Date   • APPENDECTOMY  1989   • CARDIAC PACEMAKER PLACEMENT  11/25/2004   • CAROTID ENDARTERECTOMY Left 12/13/2019    Procedure: LEFT CAROTID BODY TUMOR RESECTION;  Surgeon: Bryan Severino MD;  Location: [facility] MAIN OR;  Service: Vascular   • CATARACT EXTRACTION Bilateral 1997   • CEREBRAL ANGIOGRAM Bilateral 12/6/2019    Procedure: CAROTID ARTERIOGRAM BILATERAL, RIGHT WRIST APPROACH;  Surgeon: Bryan Severino MD;  Location: [facility] OR 18/19;  Service: Vascular   • CHOLECYSTECTOMY  1989   • COLONOSCOPY  2013    dr montes   • ENDOSCOPY  12/06/2012    Dr. Montes; food in stomach, gastritis   • EXCISION LESION  1994    BACK CYST BENGIN   • EYE SURGERY  12/2012   • GLAUCOMA SURGERY Bilateral 1995    laser surgery   • HARDWARE REMOVAL Left     ELBOW   • HYSTERECTOMY  1986   • ORIF ELBOW FRACTURE Left    • PACEMAKER IMPLANTATION     • PACEMAKER REPLACEMENT  06/2013          Current Outpatient Medications:   •  Alcohol Swabs (B-D SINGLE USE SWABS REGULAR) pads, USE AS DIRECTED 4 TIMES A  DAY., Disp: 300 each, Rfl: 1  •  APPLE CIDER VINEGAR PO, Take 2 tablets by mouth 2 (Two) Times a Day With Meals., Disp: , Rfl:   •  Calcium Carb-Cholecalciferol (CALCIUM 600 + D PO), Take 1 tablet by mouth 3 (Three) Times a Day., Disp: , Rfl:   •  CINNAMON PO, Take 1 tablet/day by mouth., Disp: , Rfl:   •  Cyanocobalamin (B-12 PO), Take 1 tablet/day by mouth., Disp: , Rfl:   •  ELIQUIS 5 MG tablet tablet, Take 5 mg by mouth Every 12 (Twelve) Hours., Disp: , Rfl:   •  insulin lispro protamine-insulin lispro (humaLOG 50-50) (50-50) 100 UNIT/ML suspension injection, Up to 100  units daily for MDI, Disp: 90 mL, Rfl: 3  •  insulin NPH (HumuLIN N) 100 UNIT/ML injection, Up to 30 units at bedtime as needed for blood glucose greater than 200, Disp: 30 mL, Rfl: 3  •  Insulin Pen Needle (BD Pen Needle Luli U/F) 32G X 4 MM misc, USE TO INJECT INSULIN 4    TIMES A DAY, Disp: 360 each, Rfl: 3  •  levothyroxine (SYNTHROID, LEVOTHROID) 88 MCG tablet, Take 1 tablet by mouth Daily., Disp: 90 tablet, Rfl: 3  •  losartan-hydrochlorothiazide (HYZAAR) 100-25 MG per tablet, TAKE 1 TABLET DAILY, Disp: 90 tablet, Rfl: 1     Social History     Socioeconomic History   • Marital status:      Spouse name: Not on file   • Number of children: Not on file   • Years of education: Not on file   • Highest education level: Not on file   Tobacco Use   • Smoking status: Never Smoker   • Smokeless tobacco: Never Used   Vaping Use   • Vaping Use: Never used   Substance and Sexual Activity   • Alcohol use: No   • Drug use: No   • Sexual activity: Defer        Family History   Problem Relation Age of Onset   • Cancer Father    • Glaucoma Father         angular glycoma   • Heart disease Father    • Arthritis Father    • Thyroid disease Father    • Stroke Father    • Asthma Sister    • Arthritis Sister    • Cancer Sister    • Stroke Maternal Grandmother    • Heart failure Other    • Diabetes Other    • Hypertension Other    • Stroke Other         aunt   • Thyroid disease Other    • Arthritis Mother    • Diabetes Mother    • Thyroid disease Mother    • Malig Hyperthermia Neg Hx         Review of Systems   Constitutional: Negative for chills and fever.   Respiratory: Negative for cough and shortness of breath.    Genitourinary: Positive for urgency. Negative for difficulty urinating and enuresis.   Musculoskeletal: Positive for back pain (constant thoracic back pain) and gait problem (Instability; walks holding on to things and uses walker).        + left hip pain  + right leg pain   Neurological: Positive for weakness  "(left arm). Negative for speech difficulty and numbness.   Psychiatric/Behavioral: Positive for confusion. Negative for decreased concentration.       Objective     Vitals:    21 1307   BP: 120/58   Pulse: 65   SpO2: 95%   Weight: 78.5 kg (173 lb)   Height: 157.5 cm (62\")     Body mass index is 31.64 kg/m².      Physical Exam  Neurologic Exam  Awake, alert, oriented  Pupils equal round reactive to light  Decreased visual acuity at baseline however able to mimic the movement of my hands and arms for the purposes of the exam  Extraocular muscles intact  Face symmetric  Speech is fluent and clear  No pronator drift  Motor exam  Bilateral deltoids 5/5, bilateral biceps 5/5, bilateral triceps 5/5, bilateral wrist extension 5/5 bilateral hand  5/5  Bilateral hip flexion 5/5, bilateral knee extension 5/5, bilateral DF/PF 5/5  No clonus  No Milton's reflex  2+ bilateral patellar reflexes  2+ bilateral biceps reflex  Slow and shuffling/unsteady gait, able to walk several feet without assistance   able to detect  light touch in all 4 extremities      Assessment/Plan   Independent Review of Radiographic Studies:      I personally reviewed the images from the following studies.  CT thoracic and lumbar spine from 2021  At T12 there is a compression fracture with a small amount of retropulsion there is a wedge shaped deformity at this level with some associated kyphosis.  There is approximately 40% loss of height.  At T8 there is a more mild compression fracture with approximately 10% loss of height.  At this level there is also what appears to be a calcified disc which is causing some canal stenosis.    Medical Decision Makin-year-old female with chronic upper back and lower neck pain  -She is unable to have an MRI because of a pacemaker.  A CT scan of her thoracic and lumbar spine was completed which showed a T8 and T12 compression fracture.  Upon examination her neck and thoracic pain appears to be " higher than the T8 and T12 levels.  I do not think she would benefit from a kyphoplasty at these levels as her pain seems to be more diffuse and higher in her spine.  I recommended a CT of her cervical spine as well as a CT myelogram of her entire spine to evaluate for any nerve compression or canal stenosis.  She would like to hold off on this study at this time.   -I have recommended physical therapy for improvement of her gait stability.  I have also recommended pain management for possible steroid epidural injections to help treat her pain however she wants to hold off from a pain management referral for now.   -I have also offered a follow-up visit after physical therapy, but she has decided to follow-up as needed.  I have told her to call if she develops any new symptoms including worsening difficulty while trying to walk, new numbness or weakness, wants to reconsider pain management referral or CT myelogram.     Diagnoses and all orders for this visit:    1. Compression fracture of T8 vertebra with delayed healing (Primary)    2. Compression fracture of T12 vertebra with delayed healing, subsequent encounter    3. Chronic midline thoracic back pain  -     Ambulatory Referral to Physical Therapy Evaluate and treat    4. Neck pain  -     Ambulatory Referral to Physical Therapy Evaluate and treat    5. Gait instability  -     Ambulatory Referral to Physical Therapy Evaluate and treat      Return if symptoms worsen or fail to improve.

## 2021-05-05 ENCOUNTER — TREATMENT (OUTPATIENT)
Dept: PHYSICAL THERAPY | Facility: CLINIC | Age: 86
End: 2021-05-05

## 2021-05-05 DIAGNOSIS — M54.2 CERVICAL PAIN: Primary | ICD-10-CM

## 2021-05-05 DIAGNOSIS — G89.29 CHRONIC BILATERAL THORACIC BACK PAIN: ICD-10-CM

## 2021-05-05 DIAGNOSIS — R26.81 GAIT INSTABILITY: ICD-10-CM

## 2021-05-05 DIAGNOSIS — M54.6 CHRONIC BILATERAL THORACIC BACK PAIN: ICD-10-CM

## 2021-05-05 DIAGNOSIS — S22.080G COMPRESSION FRACTURE OF T12 VERTEBRA WITH DELAYED HEALING, SUBSEQUENT ENCOUNTER: ICD-10-CM

## 2021-05-05 PROCEDURE — 97161 PT EVAL LOW COMPLEX 20 MIN: CPT | Performed by: PHYSICAL THERAPIST

## 2021-05-05 PROCEDURE — 97110 THERAPEUTIC EXERCISES: CPT | Performed by: PHYSICAL THERAPIST

## 2021-05-05 PROCEDURE — 97530 THERAPEUTIC ACTIVITIES: CPT | Performed by: PHYSICAL THERAPIST

## 2021-05-05 NOTE — PROGRESS NOTES
"Physical Therapy Initial Evaluation and Plan of Care    Patient: Roland Russell   : 1934  Diagnosis/ICD-10 Code:  Cervical pain [M54.2]  Referring practitioner: Ke Landon MD    Subjective Evaluation    History of Present Illness  Mechanism of injury: Patient suffered a fall at home on 2021; she lost her balance while hanging something up in her closet.  She was found to have T12 and T8 compression fractures as well as T6-T9 disc protrusions.  She reports \"I've had several falls but that one was a good one.\"  She does report a few falls since then but without any significant injury.    Patient reports \"my neck is just killing me.\"  She indicates (R) lower cervical, (R) thoracic and medial scapular border pain.      She states she feels \"funny inside\" prior to her falls and does not believe leg weakness or balance deficits are the sole contributors to her falls.  She reports a more significant decline in physical and mental function within the past year. States she \"furniture walks\" within her house but does have a RWx.      PMH significant for legal blindness and pacemaker. She has fair vision (R) eye without clarity of details and primarily peripheral vision (L) eye. She also reports removal of a tumor from her carotid artery in 2020.  She does report some memory difficulties.  She also fractured her (L) elbow in  with ORIF and decreased function of non-dominant (L) UE.      Patient Occupation: N/A Quality of life: good    Pain  Current pain rating: 10  At best pain rating: 10  At worst pain rating: 10  Location: (R) lower cervical, upper to middle thoracic, (R) medial scapular border  Quality: sharp  Relieving factors: relaxation (reclining in chair)  Aggravating factors: movement, prolonged positioning, overhead activity and outstretched reach (turning head to the right, prolonged postures and activities)  Progression: no change    Social Support  Lives with: alone    Hand " "dominance: right    Diagnostic Tests  CT scan: abnormal (T8, T12 compression fractures; T7-9 disc protrusions)    Treatments  Treatments tried: denies any prior PT.  Patient Goals  Patient goals for therapy: decreased pain  Patient goal: \"just a little bit of everything\"           Subjective Questionnaire: NDI:30/50 = 60%; Oswestry 23/45; LEFS 17/80    Objective          Static Posture     Head  Forward.    Shoulders  Depressed and rounded.    Scapulae  Left downwardly rotated, right downwardly rotated, left depressed and right depressed.    Thoracic Spine  Hyperkyphosis.    Palpation   Left   Hypertonic in the cervical paraspinals, levator scapulae, scalenes, suboccipitals and upper trapezius.     Right   Hypertonic in the cervical paraspinals, levator scapulae, scalenes, suboccipitals and upper trapezius.     Tenderness     Additional Tenderness Details  Mod (+) TTP (R) upper and middle thoracic paraspinals    Active Range of Motion   Cervical/Thoracic Spine   Cervical    Flexion: 50 degrees with pain  Extension: 22 degrees with pain  Left lateral flexion: 21 degrees with pain  Right lateral flexion: 19 degrees with pain  Left rotation: 31 degrees with pain  Right rotation: 24 degrees with pain  Left Shoulder   Flexion: 124 degrees with pain    Right Shoulder   Flexion: 121 degrees     Strength/Myotome Testing     Left Shoulder     Planes of Motion   Flexion: 4-   Abduction: 3+     Right Shoulder     Planes of Motion   Flexion: 4-   Abduction: 3+     Left Hip   Planes of Motion   Flexion: 3+  Abduction: 3- (tested in sitting)  Adduction: 3- (tested in sitting)    Right Hip   Planes of Motion   Flexion: 3+  Abduction: 3- (tested in sitting)  Adduction: 3- (tested in sitting)    Left Knee   Flexion: 4-  Extension: 4-    Right Knee   Flexion: 4-  Extension: 4-    Left Ankle/Foot   Dorsiflexion: 4-    Right Ankle/Foot   Dorsiflexion: 4-    Ambulation     Observational Gait     Additional Observational Gait " Details  Patient requires HHA for safety upon rising to stand and for short distance ambulation  She ambulates with short shuffled steps and is very unstead  She experiences one brief LOB episode which requires assist of PT for recovery          Assessment & Plan     Assessment  Impairments: abnormal coordination, abnormal gait, abnormal muscle firing, abnormal or restricted ROM, activity intolerance, impaired balance, impaired physical strength, lacks appropriate home exercise program, pain with function and safety issue  Assessment details: Patient is an 86 y.o female who reports cervical and thoracic pain as well as imbalance during gait.  She did suffer a significant fall in February 2021 during which she sustained 2 thoracic compression fractures and was also found to have multilevel thoracic disc protrusions.  She does report frequent falling.  Of note, patient is legally blind. She c/o cervical symptoms rated 10/10, thoracic symptoms, and limited tolerance to functional mobility. She exhibits significant postural deficits, decreased and painful cervical AROM, limited thoracic mobility, decreased UE and LE strength, and instability during gait with assistance.  Her self report functional scale scores indicate high levels of perceived disability.  Patient lives alone.  She will benefit from skilled PT services to reduce pain, improve ROM and strength, and assist patient in improving safety during ambulation to decrease fall risk.   Prognosis: good  Functional Limitations: carrying objects, lifting, sleeping, walking, uncomfortable because of pain, standing and stooping  Goals  Plan Goals: STGs: to be met in 4 weeks  1. Patient will be independent with initial HEP  2. Patient will report 25% improved cervical/thoracic symptoms to allow improved tolerance to prolonged positions  3. Patient will demonstrate 25% improved cervical AROM for improved mobility and safety   4. Patient will perform sit to stand transfer  without assistance and exhibit fair to good stability upon standing to reduce fall risk and improve transfer ability    LTGs: to be met in 8 weeks  1. Patient will be independent with progressed HEP  2. Patient will report 50% improved cervical and thoracic symptoms for improved QOL  3. Patient will demonstrate functional cervical and thoracic mobility for ADLs and functional mobility  4. Patient will demonstrate improved UE and LE strength by 1 MMT grade for evidence of improved functional strength  5. Patient will report no falls since beginning PT for evidence of improved safety with ambulation         Manual Therapy:         mins  75669;  Therapeutic Exercise:    14     mins  33913;     Neuromuscular Jose Miguel:        mins  98783;    Therapeutic Activity:     11     mins  09388;     Gait Training:           mins  79437;     Ultrasound:          mins  13036;    Electrical Stimulation:         mins  00123 ( );  Dry Needling          mins self-pay    Timed Treatment:   25   mins   Total Treatment:     47   mins    PT SIGNATURE: Allyson Wharton PT, DPT   DATE TREATMENT INITIATED: 5/5/2021    Medicare Initial Certification  Certification Period: 8/3/2021  I certify that the therapy services are furnished while this patient is under my care.  The services outlined above are required by this patient, and will be reviewed every 90 days.     PHYSICIAN: Ke Landon MD      DATE:     Please sign and return via fax to [phone]. Thank you, [facility].

## 2021-05-07 ENCOUNTER — TREATMENT (OUTPATIENT)
Dept: PHYSICAL THERAPY | Facility: CLINIC | Age: 86
End: 2021-05-07

## 2021-05-07 DIAGNOSIS — M54.6 CHRONIC BILATERAL THORACIC BACK PAIN: ICD-10-CM

## 2021-05-07 DIAGNOSIS — S22.060G COMPRESSION FRACTURE OF T8 VERTEBRA WITH DELAYED HEALING: ICD-10-CM

## 2021-05-07 DIAGNOSIS — R26.81 GAIT INSTABILITY: ICD-10-CM

## 2021-05-07 DIAGNOSIS — G89.29 CHRONIC BILATERAL THORACIC BACK PAIN: ICD-10-CM

## 2021-05-07 DIAGNOSIS — M54.2 CERVICAL PAIN: Primary | ICD-10-CM

## 2021-05-07 DIAGNOSIS — S22.080G COMPRESSION FRACTURE OF T12 VERTEBRA WITH DELAYED HEALING, SUBSEQUENT ENCOUNTER: ICD-10-CM

## 2021-05-07 PROCEDURE — 97110 THERAPEUTIC EXERCISES: CPT | Performed by: PHYSICAL THERAPIST

## 2021-05-07 NOTE — PROGRESS NOTES
"Physical Therapy Daily Progress Note    Visit # 2    Roland Russell reports: \"I'm not doing too good today.  I've felt really weak today and my sugar was up a little. I also walked to the front of the building instead of the side so I need to catch my breath.\"  Patient's son Cecil assists her into the PT clinic this date.     Subjective     Objective   See Exercise, Manual, and Modality Logs for complete treatment.   *Initiated shoulder shrugs, seated heel/toe raises, seated marching, hip add nghia vs pillow (sitting), LAQ, glut sets (sitting)    Assessment & Plan     Assessment  Assessment details: Patient verbalizes HEP compliance once since attending PT.  She requires the assistance of another person to utilize her printed HEP sheets due to visual difficulties and the inconvenience of using her magnifier system at home.  Suggested to both patient and her son Cecil the potential use of an audio recording listing exercises with a brief description of how to perform, that patient may operate independently in order to perform the HEP.  Her motivation with PT activities is quite low and she is anxious to conclude sessions.        Progress per Plan of Care as able.         Manual Therapy:         mins  99628;  Therapeutic Exercise:    29     mins  17811;     Neuromuscular Jose Miguel:        mins  04375;    Therapeutic Activity:          mins  82660;     Gait Training:           mins  75004;     Ultrasound:          mins  06473;    Electrical Stimulation:         mins  54419 ( );  Dry Needling          mins self-pay    Timed Treatment:   29   mins   Total Treatment:     29   mins    Allyson Wharton PT, DPT  Physical Therapist  [place] License # [id number]    "

## 2021-05-12 ENCOUNTER — TREATMENT (OUTPATIENT)
Dept: PHYSICAL THERAPY | Facility: CLINIC | Age: 86
End: 2021-05-12

## 2021-05-12 DIAGNOSIS — R26.81 GAIT INSTABILITY: ICD-10-CM

## 2021-05-12 DIAGNOSIS — G89.29 CHRONIC BILATERAL THORACIC BACK PAIN: ICD-10-CM

## 2021-05-12 DIAGNOSIS — M54.2 CERVICAL PAIN: Primary | ICD-10-CM

## 2021-05-12 DIAGNOSIS — S22.080G COMPRESSION FRACTURE OF T12 VERTEBRA WITH DELAYED HEALING, SUBSEQUENT ENCOUNTER: ICD-10-CM

## 2021-05-12 DIAGNOSIS — M54.6 CHRONIC BILATERAL THORACIC BACK PAIN: ICD-10-CM

## 2021-05-12 DIAGNOSIS — S22.060G COMPRESSION FRACTURE OF T8 VERTEBRA WITH DELAYED HEALING: ICD-10-CM

## 2021-05-12 PROCEDURE — 97110 THERAPEUTIC EXERCISES: CPT | Performed by: PHYSICAL THERAPIST

## 2021-05-12 NOTE — PROGRESS NOTES
"Physical Therapy Daily Progress Note      Visit # 3      Subjective Evaluation    History of Present Illness    Subjective comment: Pt reports that she feels \"the same, wornout and tired\" today.       Objective   See Exercise, Manual, and Modality Logs for complete treatment.       Assessment & Plan     Assessment  Assessment details: Pt tolerated treatment with mild c/o pain in UT and hips.  Pt had c/o hip pain from progressed reps on hip and LE exercise. Pt was able to completed the increased reps with seated rest breaks. Pt benefited from tc/vc to \"pinch shoulder blade\" during scapular retractions.                     Manual Therapy:    0     mins  09472;  Therapeutic Exercise:    23     mins  84638;     Neuromuscular Jose Miguel:    0    mins  57592;    Therapeutic Activity:     0     mins  41928;     Gait Trainin     mins  85690;     Ultrasound:     0     mins  54940;    Work Hardening           0      mins 64983  Iontophoresis               0   mins 33316  E-Stim                          _0_ mins 69477 ( )    Timed Treatment:   23   mins   Total Treatment:     23   mins    Samuel Cox PTA  Physical Therapist Assistant  "

## 2021-05-14 ENCOUNTER — TREATMENT (OUTPATIENT)
Dept: PHYSICAL THERAPY | Facility: CLINIC | Age: 86
End: 2021-05-14

## 2021-05-14 DIAGNOSIS — S22.080G COMPRESSION FRACTURE OF T12 VERTEBRA WITH DELAYED HEALING, SUBSEQUENT ENCOUNTER: ICD-10-CM

## 2021-05-14 DIAGNOSIS — M54.6 CHRONIC BILATERAL THORACIC BACK PAIN: ICD-10-CM

## 2021-05-14 DIAGNOSIS — R26.81 GAIT INSTABILITY: ICD-10-CM

## 2021-05-14 DIAGNOSIS — G89.29 CHRONIC BILATERAL THORACIC BACK PAIN: ICD-10-CM

## 2021-05-14 DIAGNOSIS — S22.060G COMPRESSION FRACTURE OF T8 VERTEBRA WITH DELAYED HEALING: ICD-10-CM

## 2021-05-14 DIAGNOSIS — M54.2 CERVICAL PAIN: Primary | ICD-10-CM

## 2021-05-14 PROCEDURE — 97110 THERAPEUTIC EXERCISES: CPT | Performed by: PHYSICAL THERAPIST

## 2021-05-14 NOTE — PROGRESS NOTES
Physical Therapy Daily Progress Note      Visit # 4    Subjective: Pt reports that she has just come from her dr..  She found out that she has two broken toes on her (L) foot.       Objective   See Exercise, Manual, and Modality Logs for complete treatment.   Added hamstring curl, seated quad set, sit to stand    Assessment & Plan     Assessment  Assessment details: Pt completed treatment with only mild c/o pain.  Pt c/o pain in lateral (L) quad when performing LAQ, but was able to complete the exercise. Pt did become fatigued by end of treatment.                     Manual Therapy:    0     mins  86019;  Therapeutic Exercise:    40     mins  19068;     Neuromuscular Jose Miguel:    0    mins  70120;    Therapeutic Activity:     0     mins  01828;     Gait Trainin     mins  20706;     Ultrasound:     0     mins  83964;    Work Hardening           0      mins 98437  Iontophoresis               0   mins 95015  E-Stim                          _0_ mins 78477 ( )    Timed Treatment:   40   mins   Total Treatment:     40   mins    Samuel Cox PTA  Physical Therapist Assistant

## 2021-05-19 ENCOUNTER — TREATMENT (OUTPATIENT)
Dept: PHYSICAL THERAPY | Facility: CLINIC | Age: 86
End: 2021-05-19

## 2021-05-19 DIAGNOSIS — S22.080G COMPRESSION FRACTURE OF T12 VERTEBRA WITH DELAYED HEALING, SUBSEQUENT ENCOUNTER: ICD-10-CM

## 2021-05-19 DIAGNOSIS — M54.2 CERVICAL PAIN: Primary | ICD-10-CM

## 2021-05-19 DIAGNOSIS — G89.29 CHRONIC BILATERAL THORACIC BACK PAIN: ICD-10-CM

## 2021-05-19 DIAGNOSIS — M54.6 CHRONIC BILATERAL THORACIC BACK PAIN: ICD-10-CM

## 2021-05-19 DIAGNOSIS — R26.81 GAIT INSTABILITY: ICD-10-CM

## 2021-05-19 PROCEDURE — 97110 THERAPEUTIC EXERCISES: CPT | Performed by: PHYSICAL THERAPIST

## 2021-05-19 PROCEDURE — 97530 THERAPEUTIC ACTIVITIES: CPT | Performed by: PHYSICAL THERAPIST

## 2021-05-19 NOTE — PROGRESS NOTES
"Physical Therapy Daily Progress Note    Visit # 5    Roland Russell reports: \"My right knee got emerita getting out of my son's truck just now. I just feel kind of yucky today but that's pretty normal. I am doing the exercises I can remember unless I have someone over to help me to look at the papers for me.\" Patient reports recently finding out that her (L) great toe and 2nd toe are broken.    Subjective     Objective   See Exercise, Manual, and Modality Logs for complete treatment.   *Initiated seated resisted rows    Assessment & Plan     Assessment  Assessment details: Patient c/o onset and mild progression of (R) posterolateral hip soreness throughout PT session which she attributes to stepping \"funny\" while getting out of her son's truck to come to PT session.  She stepped down onto running board with (L) foot and then slid onto (R) LE on the ground.  She is able to complete all exercises but patient's son is encouraged to monitor (R) hip symptoms throughout the day today and tomorrow. He verbalizes understanding. She requires min assist to safely transfer back into her son's truck following PT session.        Progress strengthening /stabilization /functional activity         Manual Therapy:         mins  18714;  Therapeutic Exercise:    30     mins  88099;     Neuromuscular Jose Miguel:        mins  50433;    Therapeutic Activity:     8     mins  92381;     Gait Training:           mins  03019;     Ultrasound:          mins  74158;    Electrical Stimulation:         mins  55727 ( );  Dry Needling          mins self-pay    Timed Treatment:   38   mins   Total Treatment:     38   mins    Allyson Wharton PT, DPT  Physical Therapist  [place] License # [id number]    "

## 2021-05-21 ENCOUNTER — TREATMENT (OUTPATIENT)
Dept: PHYSICAL THERAPY | Facility: CLINIC | Age: 86
End: 2021-05-21

## 2021-05-21 DIAGNOSIS — S22.080G COMPRESSION FRACTURE OF T12 VERTEBRA WITH DELAYED HEALING, SUBSEQUENT ENCOUNTER: ICD-10-CM

## 2021-05-21 DIAGNOSIS — M54.6 CHRONIC BILATERAL THORACIC BACK PAIN: ICD-10-CM

## 2021-05-21 DIAGNOSIS — R26.81 GAIT INSTABILITY: ICD-10-CM

## 2021-05-21 DIAGNOSIS — G89.29 CHRONIC BILATERAL THORACIC BACK PAIN: ICD-10-CM

## 2021-05-21 DIAGNOSIS — M54.2 CERVICAL PAIN: Primary | ICD-10-CM

## 2021-05-21 PROCEDURE — 97110 THERAPEUTIC EXERCISES: CPT | Performed by: PHYSICAL THERAPIST

## 2021-05-21 NOTE — PROGRESS NOTES
Physical Therapy Daily Progress Note      Visit # 6      Subjective Evaluation    History of Present Illness    Subjective comment: Pt reports that she felt lousy yesterday, but thinks she is better today.       Objective   See Exercise, Manual, and Modality Logs for complete treatment.       Assessment & Plan     Assessment  Assessment details: Pt tolerated treatment well.  She c/o of slight pain when performing cervical rotations to the (L).  Pt's reps were progressed on all strengthening exercises with no issues.  Pt was able to perform 12 sit to stands with CGA only.                     Manual Therapy:    0     mins  67903;  Therapeutic Exercise:    29     mins  40724;     Neuromuscular Jose Miguel:    0    mins  41484;    Therapeutic Activity:     0     mins  00813;     Gait Trainin     mins  52007;     Ultrasound:     0     mins  57337;    Work Hardening           0      mins 52647  Iontophoresis               0   mins 72814  E-Stim                          _0_ mins 59020 ( )    Timed Treatment:   29   mins   Total Treatment:     29   mins    Samuel Cox PTA  Physical Therapist Assistant

## 2021-06-02 ENCOUNTER — TELEPHONE (OUTPATIENT)
Dept: FAMILY MEDICINE CLINIC | Facility: CLINIC | Age: 86
End: 2021-06-02

## 2021-06-21 ENCOUNTER — DOCUMENTATION (OUTPATIENT)
Dept: PHYSICAL THERAPY | Facility: CLINIC | Age: 86
End: 2021-06-21

## 2021-06-21 DIAGNOSIS — R26.81 GAIT INSTABILITY: ICD-10-CM

## 2021-06-21 DIAGNOSIS — G89.29 CHRONIC BILATERAL THORACIC BACK PAIN: ICD-10-CM

## 2021-06-21 DIAGNOSIS — M54.6 CHRONIC BILATERAL THORACIC BACK PAIN: ICD-10-CM

## 2021-06-21 DIAGNOSIS — S22.060G COMPRESSION FRACTURE OF T8 VERTEBRA WITH DELAYED HEALING: ICD-10-CM

## 2021-06-21 DIAGNOSIS — M54.2 CERVICAL PAIN: Primary | ICD-10-CM

## 2021-06-21 NOTE — PROGRESS NOTES
Discharge Summary  Discharge Summary from Physical Therapy Report    Patient Information  Roland Russell  1934    Dates  PT visit: 05/05/2021 - 05/21/2021  Number of Visits: 6     Discharge Status of Patient: See final note dated 05/21/2021    Goals: Partially Met    Visit Diagnoses:    ICD-10-CM ICD-9-CM   1. Cervical pain  M54.2 723.1   2. Chronic bilateral thoracic back pain  M54.6 724.1    G89.29 338.29   3. Compression fracture of T8 vertebra with delayed healing  S22.060G V54.17   4. Gait instability  R26.81 781.2       Discharge Plan: Continue with current home exercise program as instructed    Date of Discharge 06/21/2021        Allyson Wharton, PT, DPT  Physical Therapist

## 2021-06-28 ENCOUNTER — OFFICE VISIT (OUTPATIENT)
Dept: FAMILY MEDICINE CLINIC | Facility: CLINIC | Age: 86
End: 2021-06-28

## 2021-06-28 VITALS
HEART RATE: 68 BPM | DIASTOLIC BLOOD PRESSURE: 57 MMHG | OXYGEN SATURATION: 96 % | WEIGHT: 180 LBS | HEIGHT: 62 IN | TEMPERATURE: 97.5 F | RESPIRATION RATE: 20 BRPM | BODY MASS INDEX: 33.13 KG/M2 | SYSTOLIC BLOOD PRESSURE: 121 MMHG

## 2021-06-28 DIAGNOSIS — E03.9 HYPOTHYROIDISM, ACQUIRED: ICD-10-CM

## 2021-06-28 DIAGNOSIS — I10 ESSENTIAL HYPERTENSION: Primary | ICD-10-CM

## 2021-06-28 DIAGNOSIS — E78.2 MIXED HYPERLIPIDEMIA: ICD-10-CM

## 2021-06-28 PROBLEM — M15.9 OSTEOARTHRITIS, MULTIPLE SITES: Status: ACTIVE | Noted: 2018-09-27

## 2021-06-28 PROBLEM — K21.9 GERD (GASTROESOPHAGEAL REFLUX DISEASE): Status: ACTIVE | Noted: 2018-09-27

## 2021-06-28 PROBLEM — I48.0 PAF (PAROXYSMAL ATRIAL FIBRILLATION): Status: ACTIVE | Noted: 2020-02-03

## 2021-06-28 PROCEDURE — 99213 OFFICE O/P EST LOW 20 MIN: CPT | Performed by: FAMILY MEDICINE

## 2021-06-28 RX ORDER — INSULIN ASPART 100 [IU]/ML
INJECTION, SUSPENSION SUBCUTANEOUS
COMMUNITY
Start: 2021-06-04

## 2021-06-28 RX ORDER — LOSARTAN POTASSIUM AND HYDROCHLOROTHIAZIDE 25; 100 MG/1; MG/1
1 TABLET ORAL DAILY
Qty: 90 TABLET | Refills: 1 | Status: SHIPPED | OUTPATIENT
Start: 2021-06-28 | End: 2021-12-01 | Stop reason: SDUPTHER

## 2021-06-28 RX ORDER — LEVOTHYROXINE SODIUM 88 UG/1
88 TABLET ORAL DAILY
Qty: 90 TABLET | Refills: 3 | Status: SHIPPED | OUTPATIENT
Start: 2021-06-28 | End: 2021-12-01

## 2021-06-28 NOTE — PROGRESS NOTES
Subjective   Roland Russell is a 86 y.o. female.     History of Present Illness     86-year-old female who is a new patient to me presents today for follow-up and medication refills for hypertension and hypothyroidism.    Hypertension: Losartan-hydrochlorothiazide 100/25 mg a day.  Blood pressure in the office today 121/57 with heart rate of 68.  Denies chest pain shortness of breath headache or vision changes.    Hypothyroidism: Levothyroxine 88 mcg a day.    Follows with endocrinology; at [facility].    Last visit was Sussy 3, 2021 with endocrinology.  Follows for type 2 diabetes mellitus.  Is on insulin.  Humulin and lispro were discontinued; advised to take NovoLog 70-30; 30 units before breakfast and 20 units before dinner.    Hyperlipidemia: Refuses statin    Chronic cervical and thoracic back pain; refuses injections for pain; has done rehab/PT; amenable to transdermal cream today.    The following portions of the patient's history were reviewed and updated as appropriate: allergies, current medications, past family history, past medical history, past social history, past surgical history and problem list.    Review of Systems   Constitutional: Negative for chills and fever.   HENT: Negative for congestion.    Respiratory: Negative for cough and shortness of breath.    Cardiovascular: Negative for chest pain, palpitations and leg swelling.   Gastrointestinal: Negative for abdominal pain, diarrhea and vomiting.   Musculoskeletal: Positive for back pain.       Objective   Physical Exam  Constitutional:       Appearance: She is well-developed.   HENT:      Head: Normocephalic and atraumatic.      Mouth/Throat:      Pharynx: Uvula midline.   Eyes:      Pupils: Pupils are equal, round, and reactive to light.   Cardiovascular:      Rate and Rhythm: Normal rate and regular rhythm.      Heart sounds: No murmur heard.     Pulmonary:      Effort: Pulmonary effort is normal. No respiratory distress.      Breath sounds:  Normal breath sounds. No stridor. No wheezing or rales.   Skin:     General: Skin is warm.   Neurological:      Mental Status: She is alert.   Psychiatric:         Behavior: Behavior normal.                 Assessment/Plan     Diagnoses and all orders for this visit:    1. Essential hypertension (Primary)  -     CBC & Differential  -     Comprehensive Metabolic Panel  -     losartan-hydrochlorothiazide (HYZAAR) 100-25 MG per tablet; Take 1 tablet by mouth Daily.  Dispense: 90 tablet; Refill: 1    2. Hypothyroidism, acquired  -     TSH Rfx On Abnormal To Free T4  -     levothyroxine (SYNTHROID, LEVOTHROID) 88 MCG tablet; Take 1 tablet by mouth Daily.  Dispense: 90 tablet; Refill: 3    3. Mixed hyperlipidemia    Continue care per endocrinology and cardiology.

## 2021-06-29 LAB
ALBUMIN SERPL-MCNC: 3.9 G/DL (ref 3.5–5.2)
ALBUMIN/GLOB SERPL: 1.8 G/DL
ALP SERPL-CCNC: 126 U/L (ref 39–117)
ALT SERPL-CCNC: 13 U/L (ref 1–33)
AST SERPL-CCNC: 18 U/L (ref 1–32)
BASOPHILS # BLD AUTO: 0.05 10*3/MM3 (ref 0–0.2)
BASOPHILS NFR BLD AUTO: 0.7 % (ref 0–1.5)
BILIRUB SERPL-MCNC: 0.2 MG/DL (ref 0–1.2)
BUN SERPL-MCNC: 27 MG/DL (ref 8–23)
BUN/CREAT SERPL: 22.3 (ref 7–25)
CALCIUM SERPL-MCNC: 9.3 MG/DL (ref 8.6–10.5)
CHLORIDE SERPL-SCNC: 101 MMOL/L (ref 98–107)
CO2 SERPL-SCNC: 30.3 MMOL/L (ref 22–29)
CREAT SERPL-MCNC: 1.21 MG/DL (ref 0.57–1)
EOSINOPHIL # BLD AUTO: 0.19 10*3/MM3 (ref 0–0.4)
EOSINOPHIL NFR BLD AUTO: 2.5 % (ref 0.3–6.2)
ERYTHROCYTE [DISTWIDTH] IN BLOOD BY AUTOMATED COUNT: 13 % (ref 12.3–15.4)
GLOBULIN SER CALC-MCNC: 2.2 GM/DL
GLUCOSE SERPL-MCNC: 155 MG/DL (ref 65–99)
HCT VFR BLD AUTO: 40.7 % (ref 34–46.6)
HGB BLD-MCNC: 13 G/DL (ref 12–15.9)
IMM GRANULOCYTES # BLD AUTO: 0.01 10*3/MM3 (ref 0–0.05)
IMM GRANULOCYTES NFR BLD AUTO: 0.1 % (ref 0–0.5)
LYMPHOCYTES # BLD AUTO: 1.79 10*3/MM3 (ref 0.7–3.1)
LYMPHOCYTES NFR BLD AUTO: 23.9 % (ref 19.6–45.3)
MCH RBC QN AUTO: 28.3 PG (ref 26.6–33)
MCHC RBC AUTO-ENTMCNC: 31.9 G/DL (ref 31.5–35.7)
MCV RBC AUTO: 88.7 FL (ref 79–97)
MONOCYTES # BLD AUTO: 0.53 10*3/MM3 (ref 0.1–0.9)
MONOCYTES NFR BLD AUTO: 7.1 % (ref 5–12)
NEUTROPHILS # BLD AUTO: 4.93 10*3/MM3 (ref 1.7–7)
NEUTROPHILS NFR BLD AUTO: 65.7 % (ref 42.7–76)
NRBC BLD AUTO-RTO: 0 /100 WBC (ref 0–0.2)
PLATELET # BLD AUTO: 204 10*3/MM3 (ref 140–450)
POTASSIUM SERPL-SCNC: 3.6 MMOL/L (ref 3.5–5.2)
PROT SERPL-MCNC: 6.1 G/DL (ref 6–8.5)
RBC # BLD AUTO: 4.59 10*6/MM3 (ref 3.77–5.28)
SODIUM SERPL-SCNC: 140 MMOL/L (ref 136–145)
TSH SERPL DL<=0.005 MIU/L-ACNC: 1.19 UIU/ML (ref 0.27–4.2)
WBC # BLD AUTO: 7.5 10*3/MM3 (ref 3.4–10.8)

## 2021-06-29 NOTE — PROGRESS NOTES
Decrease in kidney function compared to 4 months ago; TSH normal. Recheck BMP in 1 week to follow-up on kidney function. If still decreased will refer to nephrology. Avoid NSAIDs.

## 2021-07-02 DIAGNOSIS — N28.9 FUNCTION KIDNEY DECREASED: Primary | ICD-10-CM

## 2021-12-01 ENCOUNTER — OFFICE VISIT (OUTPATIENT)
Dept: FAMILY MEDICINE CLINIC | Facility: CLINIC | Age: 86
End: 2021-12-01

## 2021-12-01 VITALS
WEIGHT: 167.8 LBS | OXYGEN SATURATION: 98 % | HEART RATE: 64 BPM | SYSTOLIC BLOOD PRESSURE: 142 MMHG | BODY MASS INDEX: 30.88 KG/M2 | DIASTOLIC BLOOD PRESSURE: 72 MMHG | TEMPERATURE: 97.8 F | HEIGHT: 62 IN

## 2021-12-01 DIAGNOSIS — E03.9 HYPOTHYROIDISM, ACQUIRED: ICD-10-CM

## 2021-12-01 DIAGNOSIS — R60.0 LOCALIZED EDEMA: ICD-10-CM

## 2021-12-01 DIAGNOSIS — M15.9 OSTEOARTHRITIS OF MULTIPLE JOINTS, UNSPECIFIED OSTEOARTHRITIS TYPE: ICD-10-CM

## 2021-12-01 DIAGNOSIS — I10 ESSENTIAL HYPERTENSION: Primary | ICD-10-CM

## 2021-12-01 DIAGNOSIS — E11.649 UNCONTROLLED TYPE 2 DIABETES MELLITUS WITH HYPOGLYCEMIA WITHOUT COMA (HCC): ICD-10-CM

## 2021-12-01 DIAGNOSIS — S22.060G COMPRESSION FRACTURE OF T8 VERTEBRA WITH DELAYED HEALING: ICD-10-CM

## 2021-12-01 DIAGNOSIS — E78.2 MIXED HYPERLIPIDEMIA: ICD-10-CM

## 2021-12-01 DIAGNOSIS — K21.9 GASTROESOPHAGEAL REFLUX DISEASE, UNSPECIFIED WHETHER ESOPHAGITIS PRESENT: ICD-10-CM

## 2021-12-01 DIAGNOSIS — E55.9 VITAMIN D DEFICIENCY: ICD-10-CM

## 2021-12-01 DIAGNOSIS — R23.8 DRY SCALP: ICD-10-CM

## 2021-12-01 DIAGNOSIS — I48.0 PAF (PAROXYSMAL ATRIAL FIBRILLATION) (HCC): ICD-10-CM

## 2021-12-01 DIAGNOSIS — G47.33 SLEEP APNEA, OBSTRUCTIVE: ICD-10-CM

## 2021-12-01 DIAGNOSIS — M81.0 SENILE OSTEOPOROSIS: ICD-10-CM

## 2021-12-01 PROCEDURE — 99214 OFFICE O/P EST MOD 30 MIN: CPT | Performed by: FAMILY MEDICINE

## 2021-12-01 RX ORDER — LEVOTHYROXINE SODIUM 75 MCG
75 TABLET ORAL DAILY
Qty: 90 TABLET | Refills: 1 | Status: SHIPPED | OUTPATIENT
Start: 2021-12-01 | End: 2022-06-03 | Stop reason: SDUPTHER

## 2021-12-01 RX ORDER — PRAVASTATIN SODIUM 20 MG
20 TABLET ORAL DAILY
COMMUNITY
Start: 2021-09-07 | End: 2022-06-01 | Stop reason: SDUPTHER

## 2021-12-01 RX ORDER — LOSARTAN POTASSIUM AND HYDROCHLOROTHIAZIDE 25; 100 MG/1; MG/1
1 TABLET ORAL DAILY
Qty: 90 TABLET | Refills: 1 | Status: SHIPPED | OUTPATIENT
Start: 2021-12-01 | End: 2022-06-13

## 2021-12-01 RX ORDER — FUROSEMIDE 20 MG/1
20 TABLET ORAL EVERY MORNING
COMMUNITY
Start: 2021-07-09

## 2021-12-01 RX ORDER — LEVOTHYROXINE SODIUM 75 MCG
TABLET ORAL
COMMUNITY
Start: 2021-11-28 | End: 2021-12-01 | Stop reason: SDUPTHER

## 2021-12-01 NOTE — PROGRESS NOTES
"Subjective   Roland Russell is a 86 y.o. female.     Chief Complaint   Patient presents with   • Establish Care   • Pain     neck and back, back surgery but still not stable    • Hair/Scalp Problem       History of Present Illness   History from pt and daughter    htn- doing well on meds    hld- doing well on meds, recent lipid panel ok    DM2-Last cmp showed BS in 200's. A1C was not gotten. Takes 30 units of insulin in the am and 24 units at supper. Has to have a nighttime snack or her bs drops.     afib- follows with cardiology, has a pacemaker and is on anticoag    Hypothyroid- stable on meds, had recent labs. But felt she did better on 88mcg dose.      Vit d def- doing well on meds, recent labs reviewed and ok    gerd- stable, no isdsues, not on meds    OA- multiple joints.     osteoprosis- was taking prolia but she thinks it deteriorated her gums so she went off this. She declines meds.     Edema- doing well on water pill.     yannick- she declines treatment and understands risks.     Compression fracture in back, still having pain, happened with a fall. She saw spine and they wanted to do injections but pt declined and pt did PT and it did not help. Knows to follow up with Spine.     Itchy scalp. For years. Has seen derm and was given a cream. Has not used it in 6 months and cannot remember what it was. Cant remember if it helped.         The following portions of the patient's history were reviewed and updated as appropriate: allergies, current medications, past family history, past medical history, past social history, past surgical history and problem list.    Past Medical History:   Diagnosis Date   • Abnormal vision     SEES \"KALEIDOSCOPE\"   • Allergy to adhesive tape    • Arthralgia    • AVB (atrioventricular block)    • Balance problem    • Carotid body tumor (HCC)     LEFT   • Difficulty walking    • H/O complete eye exam 10/2016   • Headache     OFF AND ON BACK OF HEAD, DOWN NECK TO SHOULDER   • History of " glaucoma    • History of poliomyelitis     AGE 9   • History of surgery on arm     left arm   • HLD (hyperlipidemia)    • Hypertension    • Hypothyroidism, acquired    • Legal blindness     SOME PERIPHERAL VISION ON LEFT, SOME VISION ON RIGHT   • Memory loss     SOME SHORT TERM   • Migraine    • Numbness     LEFT LEG    • Osteoarthritis, multiple sites    • Osteoporosis    • Pacemaker    • Rectal bleeding     X1, WITH BM   • Sleep apnea     DOES NOT USE A MACHINE   • TIA (transient ischemic attack) 12/24/2018    ?, HAD SPELL UNABLE TO TALK   • Type 2 diabetes mellitus (HCC)    • Type 2 diabetes mellitus, uncontrolled (HCC)        Past Surgical History:   Procedure Laterality Date   • APPENDECTOMY  1989   • CARDIAC PACEMAKER PLACEMENT  11/25/2004   • CAROTID ENDARTERECTOMY Left 12/13/2019    Procedure: LEFT CAROTID BODY TUMOR RESECTION;  Surgeon: Bryan Severino MD;  Location: [facility] OR;  Service: Vascular   • CATARACT EXTRACTION Bilateral 1997   • CEREBRAL ANGIOGRAM Bilateral 12/6/2019    Procedure: CAROTID ARTERIOGRAM BILATERAL, RIGHT WRIST APPROACH;  Surgeon: Bryan Severino MD;  Location: [facility] OR 18/19;  Service: Vascular   • CHOLECYSTECTOMY  1989   • COLONOSCOPY  2013    dr montes   • ELBOW PROCEDURE  2016   • ENDOSCOPY  12/06/2012    Dr. Montes; food in stomach, gastritis   • EXCISION LESION  1994    BACK CYST BENGIN   • EYE SURGERY  12/2012   • GLAUCOMA SURGERY Bilateral 1995    laser surgery   • HARDWARE REMOVAL Left     ELBOW   • HYSTERECTOMY  1986   • ORIF ELBOW FRACTURE Left    • PACEMAKER IMPLANTATION     • PACEMAKER REPLACEMENT  06/2013       Family History   Problem Relation Age of Onset   • Cancer Father    • Glaucoma Father         angular glycoma   • Heart disease Father    • Arthritis Father    • Thyroid disease Father    • Stroke Father    • Asthma Sister    • Arthritis Sister    • Cancer Sister         breast   • Stroke Maternal Grandmother    • Heart failure Other    • Diabetes Other   "  • Hypertension Other    • Stroke Other         aunt   • Thyroid disease Other    • Arthritis Mother    • Diabetes Mother    • Thyroid disease Mother    • Malig Hyperthermia Neg Hx        Social History     Socioeconomic History   • Marital status:    Tobacco Use   • Smoking status: Never Smoker   • Smokeless tobacco: Never Used   Vaping Use   • Vaping Use: Never used   Substance and Sexual Activity   • Alcohol use: No   • Drug use: No   • Sexual activity: Defer       Review of Systems   Constitutional: Negative for fever.   Respiratory: Negative for shortness of breath.        Objective   Visit Vitals  /72 (BP Location: Right arm, Patient Position: Sitting)   Pulse 64   Temp 97.8 °F (36.6 °C)   Ht 157.5 cm (62.01\")   Wt 76.1 kg (167 lb 12.8 oz)   SpO2 98%   BMI 30.68 kg/m²     Body mass index is 30.68 kg/m².  Physical Exam  Constitutional:       Appearance: Normal appearance. She is well-developed.   Cardiovascular:      Rate and Rhythm: Normal rate and regular rhythm.      Heart sounds: Normal heart sounds.   Pulmonary:      Effort: Pulmonary effort is normal.      Breath sounds: Normal breath sounds.   Musculoskeletal:         General: No swelling. Normal range of motion.   Skin:     General: Skin is warm and dry.      Findings: No rash.   Neurological:      General: No focal deficit present.      Mental Status: She is alert and oriented to person, place, and time.   Psychiatric:         Mood and Affect: Mood normal.         Behavior: Behavior normal.           Assessment/Plan   Diagnoses and all orders for this visit:    1. Essential hypertension (Primary)  -     losartan-hydrochlorothiazide (HYZAAR) 100-25 MG per tablet; Take 1 tablet by mouth Daily.  Dispense: 90 tablet; Refill: 1    2. Mixed hyperlipidemia    3. Uncontrolled type 2 diabetes mellitus with hypoglycemia without coma (HCC)  -     Comprehensive Metabolic Panel  -     Hemoglobin A1c    4. Hypothyroidism, acquired  -     TSH Rfx On " Abnormal To Free T4  -     Synthroid 75 MCG tablet; Take 1 tablet by mouth Daily.  Dispense: 90 tablet; Refill: 1    5. PAF (paroxysmal atrial fibrillation) (HCC)    6. Vitamin D deficiency    7. Gastroesophageal reflux disease, unspecified whether esophagitis present    8. Osteoarthritis of multiple joints, unspecified osteoarthritis type    9. Senile osteoporosis    10. Compression fracture of T8 vertebra with delayed healing    11. Sleep apnea, obstructive    12. Localized edema    13. Dry scalp  -     Ketoconazole 1 % shampoo; Apply 1 application topically 2 (Two) Times a Week.  Dispense: 200 mL; Refill: 1        Risks and benefits of shampoo discussed. F/u if worse or no better and in 6 months.

## 2021-12-02 LAB
ALBUMIN SERPL-MCNC: 4.4 G/DL (ref 3.6–4.6)
ALBUMIN/GLOB SERPL: 1.8 {RATIO} (ref 1.2–2.2)
ALP SERPL-CCNC: 132 IU/L (ref 44–121)
ALT SERPL-CCNC: 17 IU/L (ref 0–32)
AST SERPL-CCNC: 20 IU/L (ref 0–40)
BILIRUB SERPL-MCNC: 0.5 MG/DL (ref 0–1.2)
BUN SERPL-MCNC: 30 MG/DL (ref 8–27)
BUN/CREAT SERPL: 26 (ref 12–28)
CALCIUM SERPL-MCNC: 10.1 MG/DL (ref 8.7–10.3)
CHLORIDE SERPL-SCNC: 99 MMOL/L (ref 96–106)
CO2 SERPL-SCNC: 28 MMOL/L (ref 20–29)
CREAT SERPL-MCNC: 1.17 MG/DL (ref 0.57–1)
GLOBULIN SER CALC-MCNC: 2.5 G/DL (ref 1.5–4.5)
GLUCOSE SERPL-MCNC: 40 MG/DL (ref 65–99)
HBA1C MFR BLD: 8.4 % (ref 4.8–5.6)
POTASSIUM SERPL-SCNC: 3.5 MMOL/L (ref 3.5–5.2)
PROT SERPL-MCNC: 6.9 G/DL (ref 6–8.5)
SODIUM SERPL-SCNC: 142 MMOL/L (ref 134–144)
TSH SERPL DL<=0.005 MIU/L-ACNC: 1 UIU/ML (ref 0.45–4.5)

## 2021-12-07 ENCOUNTER — TELEPHONE (OUTPATIENT)
Dept: FAMILY MEDICINE CLINIC | Facility: CLINIC | Age: 86
End: 2021-12-07

## 2021-12-07 NOTE — TELEPHONE ENCOUNTER
Ok for hub to read-    If [facility] calls back, per Dr. Zhang it is ok to use the 2% Ketoconazole instead of the 1%.

## 2021-12-07 NOTE — TELEPHONE ENCOUNTER
Pharmacy Name: [facility]      Pharmacy representative name: IRAIDA     Pharmacy representative phone number: [phone]  REFERENCE # [id number]    What medication are you calling in regards to:Ketoconazole 1 % shampoo       What question does the pharmacy have: FAX IS BLURRY, CAN'T READ DESCRIPTION     Who is the provider that prescribed the medication: DR AGUILA     PLEASE ADVISE

## 2022-02-03 ENCOUNTER — TRANSCRIBE ORDERS (OUTPATIENT)
Dept: ADMINISTRATIVE | Facility: HOSPITAL | Age: 87
End: 2022-02-03

## 2022-02-03 DIAGNOSIS — R22.1 MASS OF LEFT SIDE OF NECK: Primary | ICD-10-CM

## 2022-02-10 ENCOUNTER — HOSPITAL ENCOUNTER (OUTPATIENT)
Dept: CT IMAGING | Facility: HOSPITAL | Age: 87
Discharge: HOME | End: 2022-02-10
Admitting: SURGERY

## 2022-02-10 DIAGNOSIS — R22.1 MASS OF LEFT SIDE OF NECK: ICD-10-CM

## 2022-02-10 PROCEDURE — 70496 CT ANGIOGRAPHY HEAD: CPT

## 2022-02-10 PROCEDURE — 70498 CT ANGIOGRAPHY NECK: CPT

## 2022-02-10 PROCEDURE — 0 IOPAMIDOL PER 1 ML: Performed by: SURGERY

## 2022-02-10 PROCEDURE — 82565 ASSAY OF CREATININE: CPT

## 2022-02-10 RX ADMIN — IOPAMIDOL 95 ML: 755 INJECTION, SOLUTION INTRAVENOUS at 11:26

## 2022-02-11 LAB — CREAT BLDA-MCNC: 1.3 MG/DL (ref 0.6–1.3)

## 2022-03-09 ENCOUNTER — TELEPHONE (OUTPATIENT)
Dept: ENDOCRINOLOGY | Age: 87
End: 2022-03-09

## 2022-03-09 RX ORDER — ISOPROPYL ALCOHOL 0.75 G/1
SWAB TOPICAL
Qty: 400 EACH | Refills: 3 | Status: SHIPPED | OUTPATIENT
Start: 2022-03-09

## 2022-05-23 NOTE — PATIENT INSTRUCTIONS
Medicare Wellness  Personal Prevention Plan of Service     Date of Office Visit:  2019  Encounter Provider:  Davon Marie MD  Place of Service:  [facility]  Patient Name: Roland Russell  :  1934    As part of the Medicare Wellness portion of your visit today, we are providing you with this personalized preventive plan of services (PPPS). This plan is based upon recommendations of the [place] Preventive Services Task Force (USPSTF) and the Advisory Committee on Immunization Practices (ACIP).    This lists the preventive care services that should be considered, and provides dates of when you are due. Items listed as completed are up-to-date and do not require any further intervention.    Health Maintenance   Topic Date Due   • MAMMOGRAM  2017   • HEMOGLOBIN A1C  2019   • DXA SCAN  2019   • LIPID PANEL  10/24/2019   • URINE MICROALBUMIN  10/24/2019   • MEDICARE ANNUAL WELLNESS  2020   • TDAP/TD VACCINES (2 - Td) 2027   • INFLUENZA VACCINE  Discontinued   • PNEUMOCOCCAL VACCINES (65+ LOW/MEDIUM RISK)  Discontinued   • ZOSTER VACCINE  Discontinued       No orders of the defined types were placed in this encounter.      No Follow-up on file.         musculoskeletal

## 2022-06-01 ENCOUNTER — OFFICE VISIT (OUTPATIENT)
Dept: FAMILY MEDICINE CLINIC | Facility: CLINIC | Age: 87
End: 2022-06-01

## 2022-06-01 VITALS
WEIGHT: 158.6 LBS | DIASTOLIC BLOOD PRESSURE: 70 MMHG | OXYGEN SATURATION: 97 % | HEIGHT: 62 IN | SYSTOLIC BLOOD PRESSURE: 124 MMHG | BODY MASS INDEX: 29.19 KG/M2 | HEART RATE: 68 BPM | TEMPERATURE: 98.2 F

## 2022-06-01 DIAGNOSIS — E55.9 VITAMIN D DEFICIENCY: ICD-10-CM

## 2022-06-01 DIAGNOSIS — M81.0 SENILE OSTEOPOROSIS: ICD-10-CM

## 2022-06-01 DIAGNOSIS — M15.9 OSTEOARTHRITIS OF MULTIPLE JOINTS, UNSPECIFIED OSTEOARTHRITIS TYPE: ICD-10-CM

## 2022-06-01 DIAGNOSIS — I48.0 PAF (PAROXYSMAL ATRIAL FIBRILLATION): ICD-10-CM

## 2022-06-01 DIAGNOSIS — S22.080G COMPRESSION FRACTURE OF T12 VERTEBRA WITH DELAYED HEALING, SUBSEQUENT ENCOUNTER: ICD-10-CM

## 2022-06-01 DIAGNOSIS — E78.2 MIXED HYPERLIPIDEMIA: Primary | ICD-10-CM

## 2022-06-01 DIAGNOSIS — K21.9 GASTROESOPHAGEAL REFLUX DISEASE, UNSPECIFIED WHETHER ESOPHAGITIS PRESENT: ICD-10-CM

## 2022-06-01 DIAGNOSIS — E03.9 HYPOTHYROIDISM, ACQUIRED: ICD-10-CM

## 2022-06-01 DIAGNOSIS — I10 ESSENTIAL HYPERTENSION: ICD-10-CM

## 2022-06-01 DIAGNOSIS — E11.649 UNCONTROLLED TYPE 2 DIABETES MELLITUS WITH HYPOGLYCEMIA WITHOUT COMA: ICD-10-CM

## 2022-06-01 DIAGNOSIS — S22.060G COMPRESSION FRACTURE OF T8 VERTEBRA WITH DELAYED HEALING: ICD-10-CM

## 2022-06-01 PROCEDURE — 1159F MED LIST DOCD IN RCRD: CPT | Performed by: FAMILY MEDICINE

## 2022-06-01 PROCEDURE — 99214 OFFICE O/P EST MOD 30 MIN: CPT | Performed by: FAMILY MEDICINE

## 2022-06-01 PROCEDURE — G0439 PPPS, SUBSEQ VISIT: HCPCS | Performed by: FAMILY MEDICINE

## 2022-06-01 PROCEDURE — 1170F FXNL STATUS ASSESSED: CPT | Performed by: FAMILY MEDICINE

## 2022-06-01 RX ORDER — PRAVASTATIN SODIUM 20 MG
20 TABLET ORAL DAILY
Qty: 90 TABLET | Refills: 3 | Status: SHIPPED | OUTPATIENT
Start: 2022-06-01

## 2022-06-01 RX ORDER — KETOCONAZOLE 20 MG/ML
SHAMPOO TOPICAL
COMMUNITY
Start: 2022-03-31

## 2022-06-01 NOTE — PROGRESS NOTES
QUICK REFERENCE INFORMATION:  The ABCs of the Annual Wellness Visit    Subsequent Medicare Wellness Visit    HEALTH RISK ASSESSMENT    1934    Recent Hospitalizations:  No hospitalization(s) within the last year..    History from pt and daughter    htn- doing well on meds    hld- doing well on meds    DM2-Last cmp showed BS in 100-200's. Takes 20 units of insulin in the am and 20 units at supper. Took herself down on insulin for low bs and has been working on diet and weight loss. Has to have a nighttime snack or her bs drops. Pt did not want to change insulin.     afib- follows with cardiology, has a pacemaker and is on anticoag    Hypothyroid- stable on meds , she put herself back on synthroid 88mcg.     Vit d def- doing well on meds    gerd- stable, no isdsues, not on meds    OA- multiple joints.     osteoprosis- was taking prolia but she thinks it deteriorated her gums so she went off this. She declines meds or testing.     Edema- doing well on water pill.     yannick- she declines treatment and understands risks.     Compression fracture in back, still having pain, happened with a fall. She saw spine and they wanted to do injections but pt declined and pt did PT and it did not help. Knows to follow up with Spine. Has not followed up yet.           Current Medical Providers:  Patient Care Team:  Anna Zhang MD as PCP - General (Family Medicine)  Tevin Albrecht MD as Consulting Physician (Cardiac Electrophysiology)        Smoking Status:  Social History     Tobacco Use   Smoking Status Never Smoker   Smokeless Tobacco Never Used       Alcohol Consumption:  Social History     Substance and Sexual Activity   Alcohol Use No       Depression Screen:   PHQ-2/PHQ-9 Depression Screening 6/1/2022   Retired PHQ-9 Total Score -   Retired Total Score -   Little Interest or Pleasure in Doing Things 0-->not at all   Feeling Down, Depressed or Hopeless 0-->not at all   PHQ-9: Brief Depression Severity Measure  Score 0       Health Habits and Functional and Cognitive Screening:  Functional & Cognitive Status 6/1/2022   Do you have difficulty preparing food and eating? Yes   Do you have difficulty bathing yourself, getting dressed or grooming yourself? Yes   Do you have difficulty using the toilet? Yes   Do you have difficulty moving around from place to place? Yes   Do you have trouble with steps or getting out of a bed or a chair? Yes   Current Diet Well Balanced Diet   Dental Exam Up to date   Eye Exam Other   Exercise (times per week) 0 times per week   Current Exercises Include No Regular Exercise   Current Exercise Activities Include -   Do you need help using the phone?  Yes   Are you deaf or do you have serious difficulty hearing?  No   Do you need help with transportation? Yes   Do you need help shopping? Yes   Do you need help preparing meals?  Yes   Do you need help with housework?  No   Do you need help with laundry? No   Do you need help taking your medications? No   Do you need help managing money? No   Do you ever drive or ride in a car without wearing a seat belt? No   Have you felt unusual stress, anger or loneliness in the last month? No   Who do you live with? Alone   If you need help, do you have trouble finding someone available to you? No   Have you been bothered in the last four weeks by sexual problems? No   Do you have difficulty concentrating, remembering or making decisions? Yes           Does the patient have evidence of cognitive impairment? No    Aspirin use counseling: Does not need ASA (and currently is not on it)      Recent Lab Results:  CMP:  Lab Results   Component Value Date     (H) 09/03/2021    BUN 30 (H) 12/01/2021    CREATININE 1.30 02/10/2022    EGFRIFNONA 42 (L) 12/01/2021    EGFRIFAFRI 49 (L) 12/01/2021    BCR 26 12/01/2021     12/01/2021    K 3.5 12/01/2021    CO2 28 12/01/2021    CALCIUM 10.1 12/01/2021    PROTENTOTREF 6.9 12/01/2021    ALBUMIN 4.4 12/01/2021     LABGLOBREF 2.5 12/01/2021    LABIL2 1.8 12/01/2021    BILITOT 0.5 12/01/2021    ALKPHOS 132 (H) 12/01/2021    AST 20 12/01/2021    ALT 17 12/01/2021     Lipid Panel:  Lab Results   Component Value Date    TRIG 165 (H) 06/12/2020    HDL 41 06/12/2020    VLDL 33 06/12/2020     HbA1c:  Lab Results   Component Value Date    HGBA1C 8.4 (H) 12/01/2021       Visual Acuity:  No exam data present    Age-appropriate Screening Schedule:  Refer to the list below for future screening recommendations based on patient's age, sex and/or medical conditions. Orders for these recommended tests are listed in the plan section. The patient has been provided with a written plan.    Health Maintenance   Topic Date Due   • URINE MICROALBUMIN  06/12/2021   • HEMOGLOBIN A1C  06/01/2022   • LIPID PANEL  09/03/2022   • TDAP/TD VACCINES (2 - Td or Tdap) 05/03/2027   • INFLUENZA VACCINE  Discontinued   • MAMMOGRAM  Discontinued   • DIABETIC EYE EXAM  Discontinued   • DXA SCAN  Discontinued   • ZOSTER VACCINE  Discontinued        Subjective   History of Present Illness    Roland Russell is a 87 y.o. female who presents for an Subsequent Wellness Visit.    The following portions of the patient's history were reviewed and updated as appropriate: allergies, current medications, past family history, past medical history, past social history, past surgical history and problem list.    Outpatient Medications Prior to Visit   Medication Sig Dispense Refill   • Alcohol Swabs (B-D SINGLE USE SWABS REGULAR) pads USE AS DIRECTED 4 TIMES A  DAY. 400 each 3   • APPLE CIDER VINEGAR PO Take 2 tablets by mouth 2 (Two) Times a Day With Meals.     • Calcium Carb-Cholecalciferol (CALCIUM 600 + D PO) Take 1 tablet by mouth 3 (Three) Times a Day.     • CINNAMON PO Take 1 tablet/day by mouth.     • Cyanocobalamin (B-12 PO) Take 1 tablet/day by mouth.     • ELIQUIS 5 MG tablet tablet Take 5 mg by mouth Every 12 (Twelve) Hours.     • furosemide (LASIX) 20 MG tablet TAKE 1  TABLET DAILY AS     NEEDED (EDEMA)     • Insulin Pen Needle (BD Pen Needle Luli U/F) 32G X 4 MM misc USE TO INJECT INSULIN 4    TIMES A  each 3   • ketoconazole (NIZORAL) 2 % shampoo      • Ketoconazole 1 % shampoo Apply 1 application topically 2 (Two) Times a Week. 200 mL 1   • losartan-hydrochlorothiazide (HYZAAR) 100-25 MG per tablet Take 1 tablet by mouth Daily. (Patient taking differently: Take 1 tablet by mouth Daily. Pt takes half tablet when bp is high) 90 tablet 1   • NovoLOG Mix 70/30 FlexPen (70-30) 100 UNIT/ML suspension pen-injector injection BID     • Synthroid 75 MCG tablet Take 1 tablet by mouth Daily. (Patient taking differently: Take 75 mcg by mouth Daily. Pt states she is taking 88) 90 tablet 1   • pravastatin (PRAVACHOL) 20 MG tablet Take 20 mg by mouth Daily.       No facility-administered medications prior to visit.       Patient Active Problem List   Diagnosis   • Legal blindness   • Uncontrolled type 2 diabetes mellitus with hypoglycemia without coma (HCC)   • GERD (gastroesophageal reflux disease)   • Mixed hyperlipidemia   • Hypothyroidism, acquired   • Osteoarthritis, multiple sites   • Essential hypertension   • Senile osteoporosis   • Renal insufficiency   • T12 compression fracture (HCC)   • Vitamin D deficiency   • Severe headache   • Carotid body tumor (HCC)   • Glomus jugulare tumor (HCC)   • Sleep apnea, obstructive   • Morbidly obese (HCC)   • Abnormal weight gain   • Localized edema   • Compression fracture of T8 vertebra with delayed healing   • PAF (paroxysmal atrial fibrillation) (HCC)   • Dry scalp       Advance Care Planning:  ACP discussion was held with the patient during this visit. Patient has an advance directive in EMR which is still valid.     Identification of Risk Factors:  Risk factors include: Advance Directive Discussion.    Review of Systems   Constitutional: Negative for fever.   Respiratory: Negative for shortness of breath.        Compared to one year  "ago, the patient feels her physical health is the same.  Compared to one year ago, the patient feels her mental health is the same.    Objective     Physical Exam  Constitutional:       Appearance: Normal appearance. She is well-developed.   Cardiovascular:      Rate and Rhythm: Normal rate and regular rhythm.      Heart sounds: Normal heart sounds.   Pulmonary:      Effort: Pulmonary effort is normal.      Breath sounds: Normal breath sounds.   Musculoskeletal:         General: No swelling. Normal range of motion.   Skin:     General: Skin is warm and dry.      Findings: No rash.   Neurological:      General: No focal deficit present.      Mental Status: She is alert and oriented to person, place, and time.   Psychiatric:         Mood and Affect: Mood normal.         Behavior: Behavior normal.         Vitals:    06/01/22 0939   BP: 124/70   BP Location: Left arm   Patient Position: Sitting   Pulse: 68   Temp: 98.2 °F (36.8 °C)   SpO2: 97%   Weight: 71.9 kg (158 lb 9.6 oz)   Height: 157.5 cm (62.01\")       BMI is >= 25 and < 30. (Overweight) The following options were offered after discussion: exercise counseling/recommendations and nutrition counseling/recommendations      Assessment & Plan   Patient Self-Management and Personalized Health Advice  The patient has been provided with information about: diet and exercise and preventive services including:   · Annual Wellness Visit (AWV).    Visit Diagnoses:    ICD-10-CM ICD-9-CM   1. Mixed hyperlipidemia  E78.2 272.2   2. Essential hypertension  I10 401.9   3. PAF (paroxysmal atrial fibrillation) ([facility])  I48.0 427.31   4. Uncontrolled type 2 diabetes mellitus with hypoglycemia without coma ([facility])  E11.649 250.82     251.2   5. Hypothyroidism, acquired  E03.9 244.9   6. Vitamin D deficiency  E55.9 268.9   7. Gastroesophageal reflux disease, unspecified whether esophagitis present  K21.9 530.81   8. Osteoarthritis of multiple joints, unspecified osteoarthritis type  M15.9 " 715.89   9. Senile osteoporosis  M81.0 733.01   10. Compression fracture of T12 vertebra with delayed healing, subsequent encounter  S22.080G V54.17   11. Compression fracture of T8 vertebra with delayed healing  S22.060G V54.17       Orders Placed This Encounter   Procedures   • Comprehensive Metabolic Panel     Order Specific Question:   Release to patient     Answer:   Immediate   • Lipid Panel   • Hemoglobin A1c     Order Specific Question:   Release to patient     Answer:   Immediate   • Microalbumin / Creatinine Urine Ratio - Urine, Clean Catch     Order Specific Question:   Release to patient     Answer:   Immediate   • TSH Rfx On Abnormal To Free T4     Order Specific Question:   Release to patient     Answer:   Immediate   • Vitamin D 25 Hydroxy     Order Specific Question:   Release to patient     Answer:   Immediate       Outpatient Encounter Medications as of 6/1/2022   Medication Sig Dispense Refill   • Alcohol Swabs (B-D SINGLE USE SWABS REGULAR) pads USE AS DIRECTED 4 TIMES A  DAY. 400 each 3   • APPLE CIDER VINEGAR PO Take 2 tablets by mouth 2 (Two) Times a Day With Meals.     • Calcium Carb-Cholecalciferol (CALCIUM 600 + D PO) Take 1 tablet by mouth 3 (Three) Times a Day.     • CINNAMON PO Take 1 tablet/day by mouth.     • Cyanocobalamin (B-12 PO) Take 1 tablet/day by mouth.     • ELIQUIS 5 MG tablet tablet Take 5 mg by mouth Every 12 (Twelve) Hours.     • furosemide (LASIX) 20 MG tablet TAKE 1 TABLET DAILY AS     NEEDED (EDEMA)     • Insulin Pen Needle (BD Pen Needle Luli U/F) 32G X 4 MM misc USE TO INJECT INSULIN 4    TIMES A  each 3   • ketoconazole (NIZORAL) 2 % shampoo      • Ketoconazole 1 % shampoo Apply 1 application topically 2 (Two) Times a Week. 200 mL 1   • losartan-hydrochlorothiazide (HYZAAR) 100-25 MG per tablet Take 1 tablet by mouth Daily. (Patient taking differently: Take 1 tablet by mouth Daily. Pt takes half tablet when bp is high) 90 tablet 1   • NovoLOG Mix 70/30 FlexPen  (70-30) 100 UNIT/ML suspension pen-injector injection BID     • pravastatin (PRAVACHOL) 20 MG tablet Take 1 tablet by mouth Daily. 90 tablet 3   • Synthroid 75 MCG tablet Take 1 tablet by mouth Daily. (Patient taking differently: Take 75 mcg by mouth Daily. Pt states she is taking 88) 90 tablet 1   • [DISCONTINUED] pravastatin (PRAVACHOL) 20 MG tablet Take 20 mg by mouth Daily.       No facility-administered encounter medications on file as of 6/1/2022.       Reviewed use of high risk medication in the elderly: yes  Reviewed for potential of harmful drug interactions in the elderly: yes    Follow Up:  Return in about 6 months (around 12/1/2022) for Recheck.     An After Visit Summary and PPPS with all of these plans were given to the patient.       cont meds, will adjust thyroid med and call in as needed. Is taking 88mcg. Discussed risk factors and f/u in 6 months. Advised pt to quit adjusting her own meds.

## 2022-06-01 NOTE — PROGRESS NOTES
"Subjective   Roland Russell is a 87 y.o. female.     No chief complaint on file.      History of Present Illness   History from pt and daughter    htn- doing well on meds    hld- doing well on meds, recent lipid panel ok    DM2-Last cmp showed BS in 200's. A1C was not gotten. Takes 30 units of insulin in the am and 24 units at supper. Has to have a nighttime snack or her bs drops.     afib- follows with cardiology, has a pacemaker and is on anticoag    Hypothyroid- stable on meds, had recent labs. But felt she did better on 88mcg dose.      Vit d def- doing well on meds, recent labs reviewed and ok    gerd- stable, no isdsues, not on meds    OA- multiple joints.     osteoprosis- was taking prolia but she thinks it deteriorated her gums so she went off this. She declines meds.     Edema- doing well on water pill.     yannick- she declines treatment and understands risks.     Compression fracture in back, still having pain, happened with a fall. She saw spine and they wanted to do injections but pt declined and pt did PT and it did not help. Knows to follow up with Spine.     Itchy scalp. For years. Has seen derm and was given a cream. Has not used it in 6 months and cannot remember what it was. Cant remember if it helped.         The following portions of the patient's history were reviewed and updated as appropriate: allergies, current medications, past family history, past medical history, past social history, past surgical history and problem list.    Past Medical History:   Diagnosis Date   • Abnormal vision     SEES \"KALEIDOSCOPE\"   • Allergy to adhesive tape    • Arthralgia    • AVB (atrioventricular block)    • Balance problem    • Carotid body tumor (HCC)     LEFT   • Difficulty walking    • H/O complete eye exam 10/2016   • Headache     OFF AND ON BACK OF HEAD, DOWN NECK TO SHOULDER   • History of glaucoma    • History of poliomyelitis     AGE 9   • History of surgery on arm     left arm   • HLD (hyperlipidemia)  "   • Hypertension    • Hypothyroidism, acquired    • Legal blindness     SOME PERIPHERAL VISION ON LEFT, SOME VISION ON RIGHT   • Memory loss     SOME SHORT TERM   • Migraine    • Numbness     LEFT LEG    • Osteoarthritis, multiple sites    • Osteoporosis    • Pacemaker    • Rectal bleeding     X1, WITH BM   • Sleep apnea     DOES NOT USE A MACHINE   • TIA (transient ischemic attack) 12/24/2018    ?, HAD SPELL UNABLE TO TALK   • Type 2 diabetes mellitus (HCC)    • Type 2 diabetes mellitus, uncontrolled (HCC)        Past Surgical History:   Procedure Laterality Date   • APPENDECTOMY  1989   • CARDIAC PACEMAKER PLACEMENT  11/25/2004   • CAROTID ENDARTERECTOMY Left 12/13/2019    Procedure: LEFT CAROTID BODY TUMOR RESECTION;  Surgeon: Bryan Severino MD;  Location: [facility] OR;  Service: Vascular   • CATARACT EXTRACTION Bilateral 1997   • CEREBRAL ANGIOGRAM Bilateral 12/6/2019    Procedure: CAROTID ARTERIOGRAM BILATERAL, RIGHT WRIST APPROACH;  Surgeon: Bryan Severino MD;  Location: [facility] OR 18/19;  Service: Vascular   • CHOLECYSTECTOMY  1989   • COLONOSCOPY  2013    dr montes   • ELBOW PROCEDURE  2016   • ENDOSCOPY  12/06/2012    Dr. Montes; food in stomach, gastritis   • EXCISION LESION  1994    BACK CYST BENGIN   • EYE SURGERY  12/2012   • GLAUCOMA SURGERY Bilateral 1995    laser surgery   • HARDWARE REMOVAL Left     ELBOW   • HYSTERECTOMY  1986   • ORIF ELBOW FRACTURE Left    • PACEMAKER IMPLANTATION     • PACEMAKER REPLACEMENT  06/2013       Family History   Problem Relation Age of Onset   • Cancer Father    • Glaucoma Father         angular glycoma   • Heart disease Father    • Arthritis Father    • Thyroid disease Father    • Stroke Father    • Asthma Sister    • Arthritis Sister    • Cancer Sister         breast   • Stroke Maternal Grandmother    • Heart failure Other    • Diabetes Other    • Hypertension Other    • Stroke Other         aunt   • Thyroid disease Other    • Arthritis Mother    • Diabetes  Mother    • Thyroid disease Mother    • Malig Hyperthermia Neg Hx        Social History     Socioeconomic History   • Marital status:    Tobacco Use   • Smoking status: Never Smoker   • Smokeless tobacco: Never Used   Vaping Use   • Vaping Use: Never used   Substance and Sexual Activity   • Alcohol use: No   • Drug use: No   • Sexual activity: Defer       Review of Systems   Constitutional: Negative for fever.   Respiratory: Negative for shortness of breath.        Objective   There were no vitals taken for this visit.  There is no height or weight on file to calculate BMI.  Physical Exam  Constitutional:       Appearance: Normal appearance. She is well-developed.   Cardiovascular:      Rate and Rhythm: Normal rate and regular rhythm.      Heart sounds: Normal heart sounds.   Pulmonary:      Effort: Pulmonary effort is normal.      Breath sounds: Normal breath sounds.   Musculoskeletal:         General: No swelling. Normal range of motion.   Skin:     General: Skin is warm and dry.      Findings: No rash.   Neurological:      General: No focal deficit present.      Mental Status: She is alert and oriented to person, place, and time.   Psychiatric:         Mood and Affect: Mood normal.         Behavior: Behavior normal.           Assessment & Plan   There are no diagnoses linked to this encounter.    Risks and benefits of shampoo discussed. F/u if worse or no better and in 6 months.

## 2022-06-02 LAB
25(OH)D3+25(OH)D2 SERPL-MCNC: 55.6 NG/ML (ref 30–100)
ALBUMIN SERPL-MCNC: 4 G/DL (ref 3.6–4.6)
ALBUMIN/CREAT UR: 7 MG/G CREAT (ref 0–29)
ALBUMIN/GLOB SERPL: 1.7 {RATIO} (ref 1.2–2.2)
ALP SERPL-CCNC: 124 IU/L (ref 44–121)
ALT SERPL-CCNC: 13 IU/L (ref 0–32)
AST SERPL-CCNC: 15 IU/L (ref 0–40)
BILIRUB SERPL-MCNC: 0.8 MG/DL (ref 0–1.2)
BUN SERPL-MCNC: 29 MG/DL (ref 8–27)
BUN/CREAT SERPL: 27 (ref 12–28)
CALCIUM SERPL-MCNC: 10.1 MG/DL (ref 8.7–10.3)
CHLORIDE SERPL-SCNC: 102 MMOL/L (ref 96–106)
CHOLEST SERPL-MCNC: 124 MG/DL (ref 100–199)
CO2 SERPL-SCNC: 25 MMOL/L (ref 20–29)
CREAT SERPL-MCNC: 1.09 MG/DL (ref 0.57–1)
CREAT UR-MCNC: 163.4 MG/DL
EGFRCR SERPLBLD CKD-EPI 2021: 49 ML/MIN/1.73
GLOBULIN SER CALC-MCNC: 2.4 G/DL (ref 1.5–4.5)
GLUCOSE SERPL-MCNC: 106 MG/DL (ref 65–99)
HBA1C MFR BLD: 9.5 % (ref 4.8–5.6)
HDLC SERPL-MCNC: 39 MG/DL
LDLC SERPL CALC-MCNC: 67 MG/DL (ref 0–99)
MICROALBUMIN UR-MCNC: 12.2 UG/ML
POTASSIUM SERPL-SCNC: 4.4 MMOL/L (ref 3.5–5.2)
PROT SERPL-MCNC: 6.4 G/DL (ref 6–8.5)
SODIUM SERPL-SCNC: 143 MMOL/L (ref 134–144)
TRIGL SERPL-MCNC: 92 MG/DL (ref 0–149)
TSH SERPL DL<=0.005 MIU/L-ACNC: 0.6 UIU/ML (ref 0.45–4.5)
VLDLC SERPL CALC-MCNC: 18 MG/DL (ref 5–40)

## 2022-06-03 DIAGNOSIS — E03.9 HYPOTHYROIDISM, ACQUIRED: ICD-10-CM

## 2022-06-03 RX ORDER — LEVOTHYROXINE SODIUM 88 MCG
88 TABLET ORAL DAILY
Qty: 90 TABLET | Refills: 3 | Status: SHIPPED | OUTPATIENT
Start: 2022-06-03 | End: 2022-09-17

## 2022-06-11 DIAGNOSIS — I10 ESSENTIAL HYPERTENSION: ICD-10-CM

## 2022-06-13 RX ORDER — LOSARTAN POTASSIUM AND HYDROCHLOROTHIAZIDE 25; 100 MG/1; MG/1
1 TABLET ORAL DAILY
Qty: 90 TABLET | Refills: 0 | Status: SHIPPED | OUTPATIENT
Start: 2022-06-13 | End: 2022-08-25

## 2022-06-13 NOTE — TELEPHONE ENCOUNTER
Rx Refill Note  Requested Prescriptions     Pending Prescriptions Disp Refills   • losartan-hydrochlorothiazide (HYZAAR) 100-25 MG per tablet [Pharmacy Med Name: LOSARTAN/HCT -25] 90 tablet 1     Sig: TAKE 1 TABLET DAILY      Last office visit with prescribing clinician: 6/1/2022      Next office visit with prescribing clinician: 12/2/2022   Last filled 12/1/2021           Katty Olsen MA  06/13/22, 16:34 EDT

## 2022-06-15 ENCOUNTER — TELEPHONE (OUTPATIENT)
Dept: FAMILY MEDICINE CLINIC | Facility: CLINIC | Age: 87
End: 2022-06-15

## 2022-06-15 NOTE — TELEPHONE ENCOUNTER
Caller: Yanet Wen DAUGHTER    Relationship: Emergency Contact    Best call back number: [phone]    What medication are you requesting: MEDICATION METFORMIN DR AGUILA RECOMMENDED TO PATIENT AT 06.01.22 APPOINTMENT IF SHE CAN QUIT TAKING THE SHOT NovoLOG Mix 70/30 FlexPen (70-30) 100 UNIT/ML suspension pen-injector injection    If a prescription is needed, what is your preferred pharmacy and phone number: [facility] - [place], [place] - [address] AT PORTAL TO [facility] - [phone] [address] [phone]      Additional notes: PATIENT'S DAUGHTER CALLED STATING THAT THE PATIENT WOULD LIKE TO START THE MEDICATION THAT DR AGUILA RECOMMENDED AS LONG AS SHE CAN QUIT TAKING THE INJECTION.      PLEASE CALL DISCUSS AND ADVISE IF NEEDED.

## 2022-06-20 DIAGNOSIS — E11.649 UNCONTROLLED TYPE 2 DIABETES MELLITUS WITH HYPOGLYCEMIA WITHOUT COMA: ICD-10-CM

## 2022-06-20 DIAGNOSIS — E11.649 UNCONTROLLED TYPE 2 DIABETES MELLITUS WITH HYPOGLYCEMIA WITHOUT COMA: Primary | ICD-10-CM

## 2022-06-22 NOTE — TELEPHONE ENCOUNTER
Caller: Yanet Wen DAUGHTER    Relationship to patient: Emergency Contact    Best call back number: [phone]    Patient is needing: DAUGHTER ASKS WHEN PATIENT STARTS TAKING METFORMIN, WILL SHE CONTINUE TO TAKE INSULIN SHOTS OR CAN THE SHOTS BE DISCONTINUED?    DAUGHTER GAVE PERMISSION TO LEAVE VOICEMAIL IF NO ANSWER

## 2022-06-22 NOTE — TELEPHONE ENCOUNTER
The patient needs to take the insulin and the metformin.  We are trying to balance her out so she does not have highs or lows.  It is important that she stays on both of these medicines.  We may be able to taper down the insulin some if she starts to take the metformin and this may cut out her low blood sugars.

## 2022-06-23 NOTE — TELEPHONE ENCOUNTER
Daughter notified. Stated she was told she couldn't get the metformin until July. I tried calling and was unable to get through to anyone at [facility]

## 2022-08-25 DIAGNOSIS — I10 ESSENTIAL HYPERTENSION: ICD-10-CM

## 2022-08-25 RX ORDER — LOSARTAN POTASSIUM AND HYDROCHLOROTHIAZIDE 25; 100 MG/1; MG/1
TABLET ORAL
Qty: 90 TABLET | Refills: 0 | Status: SHIPPED | OUTPATIENT
Start: 2022-08-25 | End: 2022-09-16

## 2022-08-25 NOTE — TELEPHONE ENCOUNTER
Rx Refill Note  Requested Prescriptions     Pending Prescriptions Disp Refills   • losartan-hydrochlorothiazide (HYZAAR) 100-25 MG per tablet [Pharmacy Med Name: LOSARTAN/HCT -25] 90 tablet 0     Sig: TAKE 1 TABLET DAILY      Last office visit with prescribing clinician: 6/1/2022      Next office visit with prescribing clinician: 12/2/2022

## 2022-09-13 ENCOUNTER — TELEPHONE (OUTPATIENT)
Dept: FAMILY MEDICINE CLINIC | Facility: CLINIC | Age: 87
End: 2022-09-13

## 2022-09-13 NOTE — TELEPHONE ENCOUNTER
Caller: Yanet Wen DAUGHTER    Relationship to patient: Emergency Contact    Best call back number: [phone]    Patient is needing: PATIENT'S DAUGHTER STATES THAT [facility] SENT OVER A FAX IN July FOR PATIENT'S ORTHOPEDIC SHOES AND HAVEN'T HEARD ANYTHING BACK AND THEY CAN'T PROCEED WITHOUT IT.PATIENT'S DAUGHTER STATES THAT SHE WILL REQUEST THEM TO FAX IT AGAIN IN CASE IT DIDN'T GET FAXED CORRECTLY.

## 2022-09-13 NOTE — TELEPHONE ENCOUNTER
Caller: WenYanet DAUGHTER    Relationship: Emergency Contact    Best call back number: [phone]    What is the best time to reach you: ANY    Who are you requesting to speak with (clinical staff, provider,  specific staff member): CLINICAL    What was the call regarding: MOLDED DIABETIC SHOE FORM    Do you require a callback: YES.    YANET CALLING BACK TO ADVISE THAT HER MOTHER WILL BE GOING A NEW SPECIALIST WITH A NEW SHOE VENDOR FORM THAT THEY WILL BE SENDING ON Friday TO BE COMPLETED.

## 2022-09-16 ENCOUNTER — OFFICE VISIT (OUTPATIENT)
Dept: FAMILY MEDICINE CLINIC | Facility: CLINIC | Age: 87
End: 2022-09-16

## 2022-09-16 VITALS
OXYGEN SATURATION: 96 % | DIASTOLIC BLOOD PRESSURE: 78 MMHG | RESPIRATION RATE: 18 BRPM | SYSTOLIC BLOOD PRESSURE: 144 MMHG | BODY MASS INDEX: 27.12 KG/M2 | HEIGHT: 62 IN | HEART RATE: 71 BPM | WEIGHT: 147.4 LBS | TEMPERATURE: 98.6 F

## 2022-09-16 DIAGNOSIS — E03.9 HYPOTHYROIDISM, ACQUIRED: ICD-10-CM

## 2022-09-16 DIAGNOSIS — N28.9 RENAL INSUFFICIENCY: ICD-10-CM

## 2022-09-16 DIAGNOSIS — M21.962 DEFORMITY OF BOTH FEET: ICD-10-CM

## 2022-09-16 DIAGNOSIS — E11.649 UNCONTROLLED TYPE 2 DIABETES MELLITUS WITH HYPOGLYCEMIA WITHOUT COMA: Primary | ICD-10-CM

## 2022-09-16 DIAGNOSIS — M21.961 DEFORMITY OF BOTH FEET: ICD-10-CM

## 2022-09-16 DIAGNOSIS — E78.2 MIXED HYPERLIPIDEMIA: ICD-10-CM

## 2022-09-16 DIAGNOSIS — I10 ESSENTIAL HYPERTENSION: ICD-10-CM

## 2022-09-16 DIAGNOSIS — R42 DIZZINESS: ICD-10-CM

## 2022-09-16 PROCEDURE — 99214 OFFICE O/P EST MOD 30 MIN: CPT | Performed by: FAMILY MEDICINE

## 2022-09-16 RX ORDER — LOSARTAN POTASSIUM 100 MG/1
100 TABLET ORAL DAILY
Qty: 30 TABLET | Refills: 3 | Status: SHIPPED | OUTPATIENT
Start: 2022-09-16 | End: 2022-11-16 | Stop reason: SDUPTHER

## 2022-09-16 NOTE — PROGRESS NOTES
"Chief Complaint  Dizziness  Answers for HPI/ROS submitted by the patient on 9/15/2022  Please describe your symptoms.: Dizzy, light headed, diarrhea  Have you had these symptoms before?: No  How long have you been having these symptoms?: Greater than 2 weeks  Please list any medications you are currently taking for this condition.: Furosemide 20 Mg (Lasix), 1 daily , , Eliquis 5 mg (apixaban), 2 daily, , Insulin: Novalog 70/30 injection , 30 morning; 24 at supper, , Losartan-hydroChlorthiazide 100-25 mg , , Pravastatin sodium 20mg, , Synthroid 88mcg, Under active thyroid, , Metformin 1 twice a day  Please describe any probable cause for these symptoms. : The only recent change in meds has been metformin.  What is the primary reason for your visit?: Other      Subjective        Roland Russell presents to [facility]  History of Present Illness  Pt is here for refills, labs and  DM- sugars are in mid 150s; started metformin couple of months ago;  She is on insulin at lunch and also at dinner if sugars are high.   HTN- bp has been in low range;  She has cut her bp med to half a pill daily.    Hypothyroidism- stable with med  She has been getting dizzy feelings recently  Daughter is concerned about dehydration but pt states she does drink enough fluids.    She is needing diabetic shoes for diabetes and foot deformity.  I am prescribing therapeutic footwear and trilaminate inserts bilaterally.  Objective   Vital Signs:  /78 (BP Location: Right arm, Patient Position: Sitting, Cuff Size: Large Adult)   Pulse 71   Temp 98.6 °F (37 °C) (Temporal)   Resp 18   Ht 157.5 cm (62.01\")   Wt 66.9 kg (147 lb 6.4 oz)   SpO2 96%   BMI 26.95 kg/m²   Estimated body mass index is 26.95 kg/m² as calculated from the following:    Height as of this encounter: 157.5 cm (62.01\").    Weight as of this encounter: 66.9 kg (147 lb 6.4 oz).          Physical Exam  Vitals and nursing note reviewed. "   Constitutional:       Appearance: Normal appearance. She is well-developed.   Cardiovascular:      Rate and Rhythm: Normal rate. Rhythm irregular.      Heart sounds: Normal heart sounds. No murmur heard.  Pulmonary:      Effort: Pulmonary effort is normal. No respiratory distress.      Breath sounds: Normal breath sounds. No stridor. No wheezing or rhonchi.   Musculoskeletal:      Comments: Foot deformity B   Neurological:      General: No focal deficit present.      Mental Status: She is alert and oriented to person, place, and time. She is not disoriented.   Psychiatric:         Mood and Affect: Mood normal.         Behavior: Behavior normal.        Result Review :                Assessment and Plan   Diagnoses and all orders for this visit:    1. Uncontrolled type 2 diabetes mellitus with hypoglycemia without coma (HCC) (Primary)  -     Hemoglobin A1c    2. Mixed hyperlipidemia  -     Lipid Panel    3. Essential hypertension  -     Comprehensive Metabolic Panel  -     losartan (Cozaar) 100 MG tablet; Take 1 tablet by mouth Daily.  Dispense: 30 tablet; Refill: 3    4. Hypothyroidism, acquired  -     TSH    5. Renal insufficiency  -     Comprehensive Metabolic Panel    6. Dizziness  -     CBC & Differential    7. Deformity of both feet             Follow Up   Return in about 3 months (around 12/16/2022).  Patient was given instructions and counseling regarding her condition or for health maintenance advice. Please see specific information pulled into the AVS if appropriate.     Refills, labs and will reduce her bp med just losartan 100 daily and if still low BP will reduce it to half pill daily.   Given warning signs for stroke and MI.    Patient to monitor BP over next week and call me with readings at that time and we can make adjustments accordingly if needed.   Encourage pushing fluids.    Will need diabetic shoes.

## 2022-09-17 DIAGNOSIS — E03.9 HYPOTHYROIDISM, ACQUIRED: Primary | ICD-10-CM

## 2022-09-17 DIAGNOSIS — E11.649 UNCONTROLLED TYPE 2 DIABETES MELLITUS WITH HYPOGLYCEMIA WITHOUT COMA: Primary | ICD-10-CM

## 2022-09-17 LAB
ALBUMIN SERPL-MCNC: 4.2 G/DL (ref 3.6–4.6)
ALBUMIN/GLOB SERPL: 1.8 {RATIO} (ref 1.2–2.2)
ALP SERPL-CCNC: 101 IU/L (ref 44–121)
ALT SERPL-CCNC: 13 IU/L (ref 0–32)
AST SERPL-CCNC: 18 IU/L (ref 0–40)
BASOPHILS # BLD AUTO: 0 X10E3/UL (ref 0–0.2)
BASOPHILS NFR BLD AUTO: 0 %
BILIRUB SERPL-MCNC: 0.6 MG/DL (ref 0–1.2)
BUN SERPL-MCNC: 23 MG/DL (ref 8–27)
BUN/CREAT SERPL: 21 (ref 12–28)
CALCIUM SERPL-MCNC: 9.4 MG/DL (ref 8.7–10.3)
CHLORIDE SERPL-SCNC: 98 MMOL/L (ref 96–106)
CHOLEST SERPL-MCNC: 150 MG/DL (ref 100–199)
CO2 SERPL-SCNC: 26 MMOL/L (ref 20–29)
CREAT SERPL-MCNC: 1.1 MG/DL (ref 0.57–1)
EGFRCR SERPLBLD CKD-EPI 2021: 49 ML/MIN/1.73
EOSINOPHIL # BLD AUTO: 0.1 X10E3/UL (ref 0–0.4)
EOSINOPHIL NFR BLD AUTO: 1 %
ERYTHROCYTE [DISTWIDTH] IN BLOOD BY AUTOMATED COUNT: 12.5 % (ref 11.7–15.4)
GLOBULIN SER CALC-MCNC: 2.3 G/DL (ref 1.5–4.5)
GLUCOSE SERPL-MCNC: 212 MG/DL (ref 65–99)
HBA1C MFR BLD: 10.2 % (ref 4.8–5.6)
HCT VFR BLD AUTO: 39.9 % (ref 34–46.6)
HDLC SERPL-MCNC: 39 MG/DL
HGB BLD-MCNC: 12.8 G/DL (ref 11.1–15.9)
IMM GRANULOCYTES # BLD AUTO: 0 X10E3/UL (ref 0–0.1)
IMM GRANULOCYTES NFR BLD AUTO: 0 %
LDLC SERPL CALC-MCNC: 84 MG/DL (ref 0–99)
LYMPHOCYTES # BLD AUTO: 2.2 X10E3/UL (ref 0.7–3.1)
LYMPHOCYTES NFR BLD AUTO: 24 %
MCH RBC QN AUTO: 28.7 PG (ref 26.6–33)
MCHC RBC AUTO-ENTMCNC: 32.1 G/DL (ref 31.5–35.7)
MCV RBC AUTO: 90 FL (ref 79–97)
MONOCYTES # BLD AUTO: 0.6 X10E3/UL (ref 0.1–0.9)
MONOCYTES NFR BLD AUTO: 6 %
NEUTROPHILS # BLD AUTO: 6.3 X10E3/UL (ref 1.4–7)
NEUTROPHILS NFR BLD AUTO: 69 %
PLATELET # BLD AUTO: 168 X10E3/UL (ref 150–450)
POTASSIUM SERPL-SCNC: 4.4 MMOL/L (ref 3.5–5.2)
PROT SERPL-MCNC: 6.5 G/DL (ref 6–8.5)
RBC # BLD AUTO: 4.46 X10E6/UL (ref 3.77–5.28)
SODIUM SERPL-SCNC: 140 MMOL/L (ref 134–144)
TRIGL SERPL-MCNC: 154 MG/DL (ref 0–149)
TSH SERPL DL<=0.005 MIU/L-ACNC: 0.2 UIU/ML (ref 0.45–4.5)
VLDLC SERPL CALC-MCNC: 27 MG/DL (ref 5–40)
WBC # BLD AUTO: 9.2 X10E3/UL (ref 3.4–10.8)

## 2022-09-17 RX ORDER — PIOGLITAZONEHYDROCHLORIDE 15 MG/1
15 TABLET ORAL DAILY
Qty: 30 TABLET | Refills: 3 | Status: SHIPPED | OUTPATIENT
Start: 2022-09-17 | End: 2022-11-11 | Stop reason: SDUPTHER

## 2022-09-17 RX ORDER — LEVOTHYROXINE SODIUM 0.07 MG/1
75 TABLET ORAL DAILY
Qty: 30 TABLET | Refills: 3 | Status: SHIPPED | OUTPATIENT
Start: 2022-09-17 | End: 2023-01-12

## 2022-09-21 ENCOUNTER — TELEPHONE (OUTPATIENT)
Dept: FAMILY MEDICINE CLINIC | Facility: CLINIC | Age: 87
End: 2022-09-21

## 2022-09-21 NOTE — TELEPHONE ENCOUNTER
Caller: Yanet Wen DAUGHTER    Relationship: Emergency Contact    Best call back number: [phone]    Who are you requesting to speak with (clinical staff, provider,  specific staff member): DR.. KAUFFMAN    What was the call regarding: PATIENT'S DAUGHTER, YANET, IS CALLING TODAY REQUESTING A CALLBACK FROM THE OFFICE.  SHE STATES THE PATIENT SAW DR. KAUFFMAN LAST WEEK FOR DIZZINESS AND LIGHTHEADEDNESS AND HE PRESCRIBED PIOGLITAZONE.  YANET STATES SHE GAVE ONE TO THE PATIENT LAST NIGHT AND HER BLOOD SUGAR WAS VERY LOW AT BEDTIME CAUSING HER TO HAVE TO EAT PRIOR TO BED.  HOWEVER, YANET IS MOST CONCERNED ABOUT THE WARNING LABEL INCLUDED WITH THE MEDICATION THAT STATED IT CAN CAUSE A HEART ATTACK AND TO NOT TAKE IT IF THE PATIENT HAS A HEART CONDITION.   YANET WANTS TO MAKE SURE DR. KAUFFMAN IS AWARE THAT THE PATIENT HAS A PACEMAKER, AND SHE WOULD LIKE A CALLBACK TO CONFIRM THIS MEDICATION IS SAFE FOR THE PATIENT TO CONTINUE TAKING.      Do you require a callback: YES.

## 2022-09-22 NOTE — TELEPHONE ENCOUNTER
Even though she has atrial fibrillation, it is a safe medicine for her to take.  The only contraindication for this is if she has congestive heart failure which she does not.  It will not cause a heart attack.  If the blood sugars are low in the evening when she takes this, have her take it around breakfast so the sugars will not drop.  Please let me know if you have any other questions.

## 2022-10-06 PROBLEM — M21.961 DEFORMITY OF BOTH FEET: Status: ACTIVE | Noted: 2022-10-06

## 2022-10-06 PROBLEM — M21.962 DEFORMITY OF BOTH FEET: Status: ACTIVE | Noted: 2022-10-06

## 2022-10-09 DIAGNOSIS — E11.649 UNCONTROLLED TYPE 2 DIABETES MELLITUS WITH HYPOGLYCEMIA WITHOUT COMA: ICD-10-CM

## 2022-11-02 ENCOUNTER — HOSPITAL ENCOUNTER (INPATIENT)
Facility: HOSPITAL | Age: 87
LOS: 4 days | Discharge: HOME HEALTH | DRG: 638 | End: 2022-11-06
Attending: EMERGENCY MEDICINE | Admitting: STUDENT IN AN ORGANIZED HEALTH CARE EDUCATION/TRAINING PROGRAM
Payer: MEDICARE

## 2022-11-02 ENCOUNTER — APPOINTMENT (OUTPATIENT)
Dept: GENERAL RADIOLOGY | Facility: HOSPITAL | Age: 87
DRG: 638 | End: 2022-11-02
Payer: MEDICARE

## 2022-11-02 ENCOUNTER — APPOINTMENT (OUTPATIENT)
Dept: CT IMAGING | Facility: HOSPITAL | Age: 87
DRG: 638 | End: 2022-11-02
Payer: MEDICARE

## 2022-11-02 DIAGNOSIS — N18.9 CHRONIC RENAL IMPAIRMENT, UNSPECIFIED CKD STAGE: ICD-10-CM

## 2022-11-02 DIAGNOSIS — G93.40 ACUTE ENCEPHALOPATHY: Primary | ICD-10-CM

## 2022-11-02 DIAGNOSIS — M81.0 SENILE OSTEOPOROSIS: ICD-10-CM

## 2022-11-02 DIAGNOSIS — R79.89 ELEVATED LACTIC ACID LEVEL: ICD-10-CM

## 2022-11-02 DIAGNOSIS — E11.649 UNCONTROLLED TYPE 2 DIABETES MELLITUS WITH HYPOGLYCEMIA WITHOUT COMA: ICD-10-CM

## 2022-11-02 DIAGNOSIS — E66.01 MORBIDLY OBESE: ICD-10-CM

## 2022-11-02 DIAGNOSIS — E16.2 HYPOGLYCEMIA: ICD-10-CM

## 2022-11-02 LAB
ALBUMIN SERPL-MCNC: 4.2 G/DL (ref 3.5–5.2)
ALBUMIN/GLOB SERPL: 1.4 G/DL
ALP SERPL-CCNC: 90 U/L (ref 39–117)
ALT SERPL W P-5'-P-CCNC: 16 U/L (ref 1–33)
ANION GAP SERPL CALCULATED.3IONS-SCNC: 17.7 MMOL/L (ref 5–15)
AST SERPL-CCNC: 17 U/L (ref 1–32)
BASOPHILS # BLD AUTO: 0.05 10*3/MM3 (ref 0–0.2)
BASOPHILS NFR BLD AUTO: 0.5 % (ref 0–1.5)
BILIRUB SERPL-MCNC: 0.6 MG/DL (ref 0–1.2)
BILIRUB UR QL STRIP: NEGATIVE
BUN SERPL-MCNC: 26 MG/DL (ref 8–23)
BUN/CREAT SERPL: 21.7 (ref 7–25)
CALCIUM SPEC-SCNC: 10.2 MG/DL (ref 8.6–10.5)
CHLORIDE SERPL-SCNC: 99 MMOL/L (ref 98–107)
CLARITY UR: CLEAR
CO2 SERPL-SCNC: 23.3 MMOL/L (ref 22–29)
COLOR UR: YELLOW
CREAT SERPL-MCNC: 1.2 MG/DL (ref 0.57–1)
D-LACTATE SERPL-SCNC: 5.6 MMOL/L (ref 0.5–2)
DEPRECATED RDW RBC AUTO: 41.3 FL (ref 37–54)
EGFRCR SERPLBLD CKD-EPI 2021: 43.9 ML/MIN/1.73
EOSINOPHIL # BLD AUTO: 0.11 10*3/MM3 (ref 0–0.4)
EOSINOPHIL NFR BLD AUTO: 1.2 % (ref 0.3–6.2)
ERYTHROCYTE [DISTWIDTH] IN BLOOD BY AUTOMATED COUNT: 12.8 % (ref 12.3–15.4)
GLOBULIN UR ELPH-MCNC: 2.9 GM/DL
GLUCOSE BLDC GLUCOMTR-MCNC: 189 MG/DL (ref 70–130)
GLUCOSE BLDC GLUCOMTR-MCNC: 63 MG/DL (ref 70–130)
GLUCOSE SERPL-MCNC: 65 MG/DL (ref 65–99)
GLUCOSE UR STRIP-MCNC: ABNORMAL MG/DL
HCT VFR BLD AUTO: 38.8 % (ref 34–46.6)
HGB BLD-MCNC: 12.8 G/DL (ref 12–15.9)
HGB UR QL STRIP.AUTO: NEGATIVE
IMM GRANULOCYTES # BLD AUTO: 0.02 10*3/MM3 (ref 0–0.05)
IMM GRANULOCYTES NFR BLD AUTO: 0.2 % (ref 0–0.5)
KETONES UR QL STRIP: NEGATIVE
LEUKOCYTE ESTERASE UR QL STRIP.AUTO: NEGATIVE
LYMPHOCYTES # BLD AUTO: 4.48 10*3/MM3 (ref 0.7–3.1)
LYMPHOCYTES NFR BLD AUTO: 47.7 % (ref 19.6–45.3)
MCH RBC QN AUTO: 29 PG (ref 26.6–33)
MCHC RBC AUTO-ENTMCNC: 33 G/DL (ref 31.5–35.7)
MCV RBC AUTO: 88 FL (ref 79–97)
MONOCYTES # BLD AUTO: 0.88 10*3/MM3 (ref 0.1–0.9)
MONOCYTES NFR BLD AUTO: 9.4 % (ref 5–12)
NEUTROPHILS NFR BLD AUTO: 3.86 10*3/MM3 (ref 1.7–7)
NEUTROPHILS NFR BLD AUTO: 41 % (ref 42.7–76)
NITRITE UR QL STRIP: NEGATIVE
NRBC BLD AUTO-RTO: 0 /100 WBC (ref 0–0.2)
PH UR STRIP.AUTO: 8 [PH] (ref 5–8)
PLATELET # BLD AUTO: 191 10*3/MM3 (ref 140–450)
PMV BLD AUTO: 11.3 FL (ref 6–12)
POTASSIUM SERPL-SCNC: 3.6 MMOL/L (ref 3.5–5.2)
PROCALCITONIN SERPL-MCNC: 0.04 NG/ML (ref 0–0.25)
PROT SERPL-MCNC: 7.1 G/DL (ref 6–8.5)
PROT UR QL STRIP: NEGATIVE
RBC # BLD AUTO: 4.41 10*6/MM3 (ref 3.77–5.28)
SODIUM SERPL-SCNC: 140 MMOL/L (ref 136–145)
SP GR UR STRIP: 1.01 (ref 1–1.03)
T4 FREE SERPL-MCNC: 1.45 NG/DL (ref 0.93–1.7)
TROPONIN T SERPL-MCNC: <0.01 NG/ML (ref 0–0.03)
TSH SERPL DL<=0.05 MIU/L-ACNC: 3.48 UIU/ML (ref 0.27–4.2)
UROBILINOGEN UR QL STRIP: ABNORMAL
WBC NRBC COR # BLD: 9.4 10*3/MM3 (ref 3.4–10.8)

## 2022-11-02 PROCEDURE — 93005 ELECTROCARDIOGRAM TRACING: CPT | Performed by: EMERGENCY MEDICINE

## 2022-11-02 PROCEDURE — 84439 ASSAY OF FREE THYROXINE: CPT | Performed by: EMERGENCY MEDICINE

## 2022-11-02 PROCEDURE — 71250 CT THORAX DX C-: CPT

## 2022-11-02 PROCEDURE — 70450 CT HEAD/BRAIN W/O DYE: CPT

## 2022-11-02 PROCEDURE — 83605 ASSAY OF LACTIC ACID: CPT | Performed by: EMERGENCY MEDICINE

## 2022-11-02 PROCEDURE — 82962 GLUCOSE BLOOD TEST: CPT

## 2022-11-02 PROCEDURE — 93010 ELECTROCARDIOGRAM REPORT: CPT | Performed by: INTERNAL MEDICINE

## 2022-11-02 PROCEDURE — 84145 PROCALCITONIN (PCT): CPT | Performed by: EMERGENCY MEDICINE

## 2022-11-02 PROCEDURE — 71045 X-RAY EXAM CHEST 1 VIEW: CPT

## 2022-11-02 PROCEDURE — P9612 CATHETERIZE FOR URINE SPEC: HCPCS

## 2022-11-02 PROCEDURE — 84484 ASSAY OF TROPONIN QUANT: CPT | Performed by: EMERGENCY MEDICINE

## 2022-11-02 PROCEDURE — 81003 URINALYSIS AUTO W/O SCOPE: CPT | Performed by: EMERGENCY MEDICINE

## 2022-11-02 PROCEDURE — 74176 CT ABD & PELVIS W/O CONTRAST: CPT

## 2022-11-02 PROCEDURE — 84443 ASSAY THYROID STIM HORMONE: CPT | Performed by: EMERGENCY MEDICINE

## 2022-11-02 PROCEDURE — 80053 COMPREHEN METABOLIC PANEL: CPT | Performed by: EMERGENCY MEDICINE

## 2022-11-02 PROCEDURE — 99285 EMERGENCY DEPT VISIT HI MDM: CPT

## 2022-11-02 PROCEDURE — 85025 COMPLETE CBC W/AUTO DIFF WBC: CPT | Performed by: EMERGENCY MEDICINE

## 2022-11-02 RX ORDER — SODIUM CHLORIDE 0.9 % (FLUSH) 0.9 %
10 SYRINGE (ML) INJECTION AS NEEDED
Status: DISCONTINUED | OUTPATIENT
Start: 2022-11-02 | End: 2022-11-06 | Stop reason: HOSPADM

## 2022-11-02 RX ORDER — DEXTROSE MONOHYDRATE 25 G/50ML
25 INJECTION, SOLUTION INTRAVENOUS ONCE
Status: COMPLETED | OUTPATIENT
Start: 2022-11-02 | End: 2022-11-02

## 2022-11-02 RX ADMIN — DEXTROSE MONOHYDRATE 25 G: 25 INJECTION, SOLUTION INTRAVENOUS at 21:38

## 2022-11-02 RX ADMIN — SODIUM CHLORIDE, POTASSIUM CHLORIDE, SODIUM LACTATE AND CALCIUM CHLORIDE 1000 ML: 600; 310; 30; 20 INJECTION, SOLUTION INTRAVENOUS at 23:26

## 2022-11-02 RX ADMIN — SODIUM CHLORIDE, POTASSIUM CHLORIDE, SODIUM LACTATE AND CALCIUM CHLORIDE 1000 ML: 600; 310; 30; 20 INJECTION, SOLUTION INTRAVENOUS at 22:35

## 2022-11-03 ENCOUNTER — APPOINTMENT (OUTPATIENT)
Dept: SLEEP MEDICINE | Facility: HOSPITAL | Age: 87
DRG: 638 | End: 2022-11-03
Payer: MEDICARE

## 2022-11-03 PROBLEM — K57.92 DIVERTICULITIS: Status: ACTIVE | Noted: 2022-11-03

## 2022-11-03 PROBLEM — E87.20 LACTIC ACIDOSIS: Status: ACTIVE | Noted: 2022-11-03

## 2022-11-03 LAB
ANION GAP SERPL CALCULATED.3IONS-SCNC: 8.7 MMOL/L (ref 5–15)
B PARAPERT DNA SPEC QL NAA+PROBE: NOT DETECTED
B PERT DNA SPEC QL NAA+PROBE: NOT DETECTED
BUN SERPL-MCNC: 19 MG/DL (ref 8–23)
BUN/CREAT SERPL: 25.7 (ref 7–25)
C PNEUM DNA NPH QL NAA+NON-PROBE: NOT DETECTED
CALCIUM SPEC-SCNC: 9 MG/DL (ref 8.6–10.5)
CHLORIDE SERPL-SCNC: 102 MMOL/L (ref 98–107)
CO2 SERPL-SCNC: 27.3 MMOL/L (ref 22–29)
CREAT SERPL-MCNC: 0.74 MG/DL (ref 0.57–1)
D-LACTATE SERPL-SCNC: 1.3 MMOL/L (ref 0.5–2)
D-LACTATE SERPL-SCNC: 3.7 MMOL/L (ref 0.5–2)
DEPRECATED RDW RBC AUTO: 45 FL (ref 37–54)
EGFRCR SERPLBLD CKD-EPI 2021: 78.4 ML/MIN/1.73
ERYTHROCYTE [DISTWIDTH] IN BLOOD BY AUTOMATED COUNT: 13.6 % (ref 12.3–15.4)
FLUAV SUBTYP SPEC NAA+PROBE: NOT DETECTED
FLUBV RNA ISLT QL NAA+PROBE: NOT DETECTED
GLUCOSE BLDC GLUCOMTR-MCNC: 132 MG/DL (ref 70–130)
GLUCOSE BLDC GLUCOMTR-MCNC: 138 MG/DL (ref 70–130)
GLUCOSE BLDC GLUCOMTR-MCNC: 144 MG/DL (ref 70–130)
GLUCOSE BLDC GLUCOMTR-MCNC: 173 MG/DL (ref 70–130)
GLUCOSE BLDC GLUCOMTR-MCNC: 68 MG/DL (ref 70–130)
GLUCOSE BLDC GLUCOMTR-MCNC: 72 MG/DL (ref 70–130)
GLUCOSE SERPL-MCNC: 151 MG/DL (ref 65–99)
HADV DNA SPEC NAA+PROBE: NOT DETECTED
HBA1C MFR BLD: 9.4 % (ref 4.8–5.6)
HCOV 229E RNA SPEC QL NAA+PROBE: NOT DETECTED
HCOV HKU1 RNA SPEC QL NAA+PROBE: NOT DETECTED
HCOV NL63 RNA SPEC QL NAA+PROBE: NOT DETECTED
HCOV OC43 RNA SPEC QL NAA+PROBE: NOT DETECTED
HCT VFR BLD AUTO: 37.8 % (ref 34–46.6)
HGB BLD-MCNC: 12.3 G/DL (ref 12–15.9)
HMPV RNA NPH QL NAA+NON-PROBE: NOT DETECTED
HPIV1 RNA ISLT QL NAA+PROBE: NOT DETECTED
HPIV2 RNA SPEC QL NAA+PROBE: NOT DETECTED
HPIV3 RNA NPH QL NAA+PROBE: NOT DETECTED
HPIV4 P GENE NPH QL NAA+PROBE: NOT DETECTED
M PNEUMO IGG SER IA-ACNC: NOT DETECTED
MCH RBC QN AUTO: 29.1 PG (ref 26.6–33)
MCHC RBC AUTO-ENTMCNC: 32.5 G/DL (ref 31.5–35.7)
MCV RBC AUTO: 89.4 FL (ref 79–97)
PLATELET # BLD AUTO: 169 10*3/MM3 (ref 140–450)
PMV BLD AUTO: 11.4 FL (ref 6–12)
POTASSIUM SERPL-SCNC: 3.4 MMOL/L (ref 3.5–5.2)
QT INTERVAL: 442 MS
RBC # BLD AUTO: 4.23 10*6/MM3 (ref 3.77–5.28)
RHINOVIRUS RNA SPEC NAA+PROBE: NOT DETECTED
RSV RNA NPH QL NAA+NON-PROBE: NOT DETECTED
SARS-COV-2 RNA NPH QL NAA+NON-PROBE: NOT DETECTED
SODIUM SERPL-SCNC: 138 MMOL/L (ref 136–145)
WBC NRBC COR # BLD: 9.99 10*3/MM3 (ref 3.4–10.8)

## 2022-11-03 PROCEDURE — 99222 1ST HOSP IP/OBS MODERATE 55: CPT | Performed by: PSYCHIATRY & NEUROLOGY

## 2022-11-03 PROCEDURE — 25010000002 ONDANSETRON PER 1 MG: Performed by: NURSE PRACTITIONER

## 2022-11-03 PROCEDURE — 95819 EEG AWAKE AND ASLEEP: CPT

## 2022-11-03 PROCEDURE — 87040 BLOOD CULTURE FOR BACTERIA: CPT | Performed by: STUDENT IN AN ORGANIZED HEALTH CARE EDUCATION/TRAINING PROGRAM

## 2022-11-03 PROCEDURE — 36415 COLL VENOUS BLD VENIPUNCTURE: CPT | Performed by: EMERGENCY MEDICINE

## 2022-11-03 PROCEDURE — 85027 COMPLETE CBC AUTOMATED: CPT | Performed by: NURSE PRACTITIONER

## 2022-11-03 PROCEDURE — 82962 GLUCOSE BLOOD TEST: CPT

## 2022-11-03 PROCEDURE — 83605 ASSAY OF LACTIC ACID: CPT | Performed by: EMERGENCY MEDICINE

## 2022-11-03 PROCEDURE — 83036 HEMOGLOBIN GLYCOSYLATED A1C: CPT | Performed by: STUDENT IN AN ORGANIZED HEALTH CARE EDUCATION/TRAINING PROGRAM

## 2022-11-03 PROCEDURE — 25010000002 LEVOFLOXACIN PER 250 MG: Performed by: STUDENT IN AN ORGANIZED HEALTH CARE EDUCATION/TRAINING PROGRAM

## 2022-11-03 PROCEDURE — 95819 EEG AWAKE AND ASLEEP: CPT | Performed by: PSYCHIATRY & NEUROLOGY

## 2022-11-03 PROCEDURE — 0202U NFCT DS 22 TRGT SARS-COV-2: CPT | Performed by: STUDENT IN AN ORGANIZED HEALTH CARE EDUCATION/TRAINING PROGRAM

## 2022-11-03 PROCEDURE — 80048 BASIC METABOLIC PNL TOTAL CA: CPT | Performed by: NURSE PRACTITIONER

## 2022-11-03 RX ORDER — PRAVASTATIN SODIUM 20 MG
20 TABLET ORAL NIGHTLY
Status: DISCONTINUED | OUTPATIENT
Start: 2022-11-03 | End: 2022-11-06 | Stop reason: HOSPADM

## 2022-11-03 RX ORDER — FAMOTIDINE 10 MG/ML
20 INJECTION, SOLUTION INTRAVENOUS EVERY 12 HOURS SCHEDULED
Status: DISCONTINUED | OUTPATIENT
Start: 2022-11-03 | End: 2022-11-04

## 2022-11-03 RX ORDER — ACETAMINOPHEN 650 MG/1
650 SUPPOSITORY RECTAL EVERY 4 HOURS PRN
Status: DISCONTINUED | OUTPATIENT
Start: 2022-11-03 | End: 2022-11-06 | Stop reason: HOSPADM

## 2022-11-03 RX ORDER — FUROSEMIDE 20 MG/1
20 TABLET ORAL EVERY MORNING
Status: DISCONTINUED | OUTPATIENT
Start: 2022-11-03 | End: 2022-11-06 | Stop reason: HOSPADM

## 2022-11-03 RX ORDER — ENOXAPARIN SODIUM 100 MG/ML
40 INJECTION SUBCUTANEOUS EVERY 24 HOURS
Status: DISCONTINUED | OUTPATIENT
Start: 2022-11-03 | End: 2022-11-03

## 2022-11-03 RX ORDER — SODIUM CHLORIDE 0.9 % (FLUSH) 0.9 %
10 SYRINGE (ML) INJECTION AS NEEDED
Status: DISCONTINUED | OUTPATIENT
Start: 2022-11-03 | End: 2022-11-06 | Stop reason: HOSPADM

## 2022-11-03 RX ORDER — CHOLECALCIFEROL (VITAMIN D3) 125 MCG
500 CAPSULE ORAL DAILY
Status: DISCONTINUED | OUTPATIENT
Start: 2022-11-03 | End: 2022-11-06 | Stop reason: HOSPADM

## 2022-11-03 RX ORDER — SODIUM CHLORIDE 0.9 % (FLUSH) 0.9 %
10 SYRINGE (ML) INJECTION EVERY 12 HOURS SCHEDULED
Status: DISCONTINUED | OUTPATIENT
Start: 2022-11-03 | End: 2022-11-06 | Stop reason: HOSPADM

## 2022-11-03 RX ORDER — LEVOFLOXACIN 5 MG/ML
750 INJECTION, SOLUTION INTRAVENOUS EVERY 24 HOURS
Status: DISCONTINUED | OUTPATIENT
Start: 2022-11-03 | End: 2022-11-04

## 2022-11-03 RX ORDER — NICOTINE POLACRILEX 4 MG
15 LOZENGE BUCCAL
Status: DISCONTINUED | OUTPATIENT
Start: 2022-11-03 | End: 2022-11-04 | Stop reason: SDUPTHER

## 2022-11-03 RX ORDER — CLOPIDOGREL BISULFATE 75 MG/1
75 TABLET ORAL DAILY
Status: DISCONTINUED | OUTPATIENT
Start: 2022-11-03 | End: 2022-11-04

## 2022-11-03 RX ORDER — DEXTROSE MONOHYDRATE 25 G/50ML
25 INJECTION, SOLUTION INTRAVENOUS
Status: DISCONTINUED | OUTPATIENT
Start: 2022-11-03 | End: 2022-11-04 | Stop reason: SDUPTHER

## 2022-11-03 RX ORDER — LEVOTHYROXINE SODIUM 0.07 MG/1
75 TABLET ORAL DAILY
Status: DISCONTINUED | OUTPATIENT
Start: 2022-11-03 | End: 2022-11-06 | Stop reason: HOSPADM

## 2022-11-03 RX ORDER — LOSARTAN POTASSIUM 100 MG/1
100 TABLET ORAL DAILY
Status: DISCONTINUED | OUTPATIENT
Start: 2022-11-03 | End: 2022-11-06 | Stop reason: HOSPADM

## 2022-11-03 RX ORDER — ONDANSETRON 2 MG/ML
4 INJECTION INTRAMUSCULAR; INTRAVENOUS EVERY 6 HOURS PRN
Status: DISCONTINUED | OUTPATIENT
Start: 2022-11-03 | End: 2022-11-06 | Stop reason: HOSPADM

## 2022-11-03 RX ORDER — ACETAMINOPHEN 325 MG/1
650 TABLET ORAL EVERY 4 HOURS PRN
Status: DISCONTINUED | OUTPATIENT
Start: 2022-11-03 | End: 2022-11-06 | Stop reason: HOSPADM

## 2022-11-03 RX ORDER — ONDANSETRON 4 MG/1
4 TABLET, FILM COATED ORAL EVERY 6 HOURS PRN
Status: DISCONTINUED | OUTPATIENT
Start: 2022-11-03 | End: 2022-11-06 | Stop reason: HOSPADM

## 2022-11-03 RX ORDER — CALCIUM CARBONATE 200(500)MG
2 TABLET,CHEWABLE ORAL 2 TIMES DAILY PRN
Status: DISCONTINUED | OUTPATIENT
Start: 2022-11-03 | End: 2022-11-06 | Stop reason: HOSPADM

## 2022-11-03 RX ORDER — ACETAMINOPHEN 160 MG/5ML
650 SOLUTION ORAL EVERY 4 HOURS PRN
Status: DISCONTINUED | OUTPATIENT
Start: 2022-11-03 | End: 2022-11-06 | Stop reason: HOSPADM

## 2022-11-03 RX ORDER — SODIUM CHLORIDE 9 MG/ML
100 INJECTION, SOLUTION INTRAVENOUS CONTINUOUS
Status: DISCONTINUED | OUTPATIENT
Start: 2022-11-03 | End: 2022-11-04

## 2022-11-03 RX ADMIN — ACETAMINOPHEN 650 MG: 325 TABLET, FILM COATED ORAL at 23:11

## 2022-11-03 RX ADMIN — LOSARTAN POTASSIUM 100 MG: 100 TABLET, FILM COATED ORAL at 16:12

## 2022-11-03 RX ADMIN — FAMOTIDINE 20 MG: 10 INJECTION INTRAVENOUS at 21:21

## 2022-11-03 RX ADMIN — PRAVASTATIN SODIUM 20 MG: 20 TABLET ORAL at 21:21

## 2022-11-03 RX ADMIN — DEXTROSE MONOHYDRATE 25 G: 25 INJECTION, SOLUTION INTRAVENOUS at 01:03

## 2022-11-03 RX ADMIN — FAMOTIDINE 20 MG: 10 INJECTION INTRAVENOUS at 10:03

## 2022-11-03 RX ADMIN — LEVOFLOXACIN 750 MG: 5 INJECTION, SOLUTION INTRAVENOUS at 18:00

## 2022-11-03 RX ADMIN — APIXABAN 5 MG: 5 TABLET, FILM COATED ORAL at 21:21

## 2022-11-03 RX ADMIN — Medication 500 MCG: at 16:12

## 2022-11-03 RX ADMIN — LEVOTHYROXINE SODIUM 75 MCG: 0.07 TABLET ORAL at 21:21

## 2022-11-03 RX ADMIN — SODIUM CHLORIDE 100 ML/HR: 9 INJECTION, SOLUTION INTRAVENOUS at 03:41

## 2022-11-03 RX ADMIN — ONDANSETRON 4 MG: 2 INJECTION INTRAMUSCULAR; INTRAVENOUS at 03:41

## 2022-11-03 RX ADMIN — FUROSEMIDE 20 MG: 20 TABLET ORAL at 16:11

## 2022-11-03 RX ADMIN — SODIUM CHLORIDE 500 ML: 9 INJECTION, SOLUTION INTRAVENOUS at 03:40

## 2022-11-03 RX ADMIN — Medication 10 ML: at 21:21

## 2022-11-03 RX ADMIN — Medication 10 ML: at 10:03

## 2022-11-04 ENCOUNTER — APPOINTMENT (OUTPATIENT)
Dept: CT IMAGING | Facility: HOSPITAL | Age: 87
DRG: 638 | End: 2022-11-04
Payer: MEDICARE

## 2022-11-04 LAB
ANION GAP SERPL CALCULATED.3IONS-SCNC: 8.4 MMOL/L (ref 5–15)
BUN SERPL-MCNC: 13 MG/DL (ref 8–23)
BUN/CREAT SERPL: 14 (ref 7–25)
CALCIUM SPEC-SCNC: 8.6 MG/DL (ref 8.6–10.5)
CHLORIDE SERPL-SCNC: 106 MMOL/L (ref 98–107)
CO2 SERPL-SCNC: 27.6 MMOL/L (ref 22–29)
CREAT SERPL-MCNC: 0.93 MG/DL (ref 0.57–1)
DEPRECATED RDW RBC AUTO: 40.8 FL (ref 37–54)
EGFRCR SERPLBLD CKD-EPI 2021: 59.6 ML/MIN/1.73
ERYTHROCYTE [DISTWIDTH] IN BLOOD BY AUTOMATED COUNT: 12.8 % (ref 12.3–15.4)
GLUCOSE BLDC GLUCOMTR-MCNC: 247 MG/DL (ref 70–130)
GLUCOSE BLDC GLUCOMTR-MCNC: 308 MG/DL (ref 70–130)
GLUCOSE BLDC GLUCOMTR-MCNC: 395 MG/DL (ref 70–130)
GLUCOSE SERPL-MCNC: 182 MG/DL (ref 65–99)
HCT VFR BLD AUTO: 34.3 % (ref 34–46.6)
HGB BLD-MCNC: 11.3 G/DL (ref 12–15.9)
HIV1+2 AB SER QL: NORMAL
MCH RBC QN AUTO: 28.9 PG (ref 26.6–33)
MCHC RBC AUTO-ENTMCNC: 32.9 G/DL (ref 31.5–35.7)
MCV RBC AUTO: 87.7 FL (ref 79–97)
PLATELET # BLD AUTO: 149 10*3/MM3 (ref 140–450)
PMV BLD AUTO: 11.2 FL (ref 6–12)
POTASSIUM SERPL-SCNC: 3.5 MMOL/L (ref 3.5–5.2)
RBC # BLD AUTO: 3.91 10*6/MM3 (ref 3.77–5.28)
RPR SER QL: NORMAL
SODIUM SERPL-SCNC: 142 MMOL/L (ref 136–145)
VIT B12 BLD-MCNC: 951 PG/ML (ref 211–946)
WBC NRBC COR # BLD: 6.73 10*3/MM3 (ref 3.4–10.8)

## 2022-11-04 PROCEDURE — 99231 SBSQ HOSP IP/OBS SF/LOW 25: CPT | Performed by: PSYCHIATRY & NEUROLOGY

## 2022-11-04 PROCEDURE — 82962 GLUCOSE BLOOD TEST: CPT

## 2022-11-04 PROCEDURE — 85027 COMPLETE CBC AUTOMATED: CPT | Performed by: STUDENT IN AN ORGANIZED HEALTH CARE EDUCATION/TRAINING PROGRAM

## 2022-11-04 PROCEDURE — 80048 BASIC METABOLIC PNL TOTAL CA: CPT | Performed by: STUDENT IN AN ORGANIZED HEALTH CARE EDUCATION/TRAINING PROGRAM

## 2022-11-04 PROCEDURE — 63710000001 INSULIN LISPRO (HUMAN) PER 5 UNITS: Performed by: STUDENT IN AN ORGANIZED HEALTH CARE EDUCATION/TRAINING PROGRAM

## 2022-11-04 PROCEDURE — 97110 THERAPEUTIC EXERCISES: CPT

## 2022-11-04 PROCEDURE — 86592 SYPHILIS TEST NON-TREP QUAL: CPT | Performed by: STUDENT IN AN ORGANIZED HEALTH CARE EDUCATION/TRAINING PROGRAM

## 2022-11-04 PROCEDURE — G0432 EIA HIV-1/HIV-2 SCREEN: HCPCS | Performed by: STUDENT IN AN ORGANIZED HEALTH CARE EDUCATION/TRAINING PROGRAM

## 2022-11-04 PROCEDURE — 25010000002 ONDANSETRON PER 1 MG: Performed by: NURSE PRACTITIONER

## 2022-11-04 PROCEDURE — 0 IOPAMIDOL PER 1 ML: Performed by: STUDENT IN AN ORGANIZED HEALTH CARE EDUCATION/TRAINING PROGRAM

## 2022-11-04 PROCEDURE — 97162 PT EVAL MOD COMPLEX 30 MIN: CPT

## 2022-11-04 PROCEDURE — 70496 CT ANGIOGRAPHY HEAD: CPT

## 2022-11-04 PROCEDURE — 82607 VITAMIN B-12: CPT | Performed by: STUDENT IN AN ORGANIZED HEALTH CARE EDUCATION/TRAINING PROGRAM

## 2022-11-04 PROCEDURE — 70498 CT ANGIOGRAPHY NECK: CPT

## 2022-11-04 RX ORDER — NICOTINE POLACRILEX 4 MG
15 LOZENGE BUCCAL
Status: DISCONTINUED | OUTPATIENT
Start: 2022-11-04 | End: 2022-11-06 | Stop reason: HOSPADM

## 2022-11-04 RX ORDER — LEVOFLOXACIN 750 MG/1
750 TABLET ORAL EVERY 24 HOURS
Status: DISCONTINUED | OUTPATIENT
Start: 2022-11-04 | End: 2022-11-06 | Stop reason: HOSPADM

## 2022-11-04 RX ORDER — INSULIN LISPRO 100 [IU]/ML
0-7 INJECTION, SOLUTION INTRAVENOUS; SUBCUTANEOUS
Status: DISCONTINUED | OUTPATIENT
Start: 2022-11-04 | End: 2022-11-06 | Stop reason: HOSPADM

## 2022-11-04 RX ORDER — LEVOFLOXACIN 5 MG/ML
750 INJECTION, SOLUTION INTRAVENOUS
Status: DISCONTINUED | OUTPATIENT
Start: 2022-11-05 | End: 2022-11-04

## 2022-11-04 RX ORDER — FUROSEMIDE 10 MG/ML
INJECTION INTRAMUSCULAR; INTRAVENOUS
Status: DISPENSED
Start: 2022-11-04 | End: 2022-11-04

## 2022-11-04 RX ORDER — LEVETIRACETAM 500 MG/1
500 TABLET, EXTENDED RELEASE ORAL NIGHTLY
Status: DISCONTINUED | OUTPATIENT
Start: 2022-11-04 | End: 2022-11-06 | Stop reason: HOSPADM

## 2022-11-04 RX ORDER — FAMOTIDINE 20 MG/1
20 TABLET, FILM COATED ORAL DAILY
Status: DISCONTINUED | OUTPATIENT
Start: 2022-11-04 | End: 2022-11-06 | Stop reason: HOSPADM

## 2022-11-04 RX ORDER — DEXTROSE MONOHYDRATE 25 G/50ML
25 INJECTION, SOLUTION INTRAVENOUS
Status: DISCONTINUED | OUTPATIENT
Start: 2022-11-04 | End: 2022-11-06 | Stop reason: HOSPADM

## 2022-11-04 RX ADMIN — ACETAMINOPHEN 650 MG: 325 TABLET, FILM COATED ORAL at 20:00

## 2022-11-04 RX ADMIN — IOPAMIDOL 95 ML: 755 INJECTION, SOLUTION INTRAVENOUS at 12:40

## 2022-11-04 RX ADMIN — Medication 10 ML: at 09:08

## 2022-11-04 RX ADMIN — Medication 10 ML: at 20:01

## 2022-11-04 RX ADMIN — INSULIN LISPRO 6 UNITS: 100 INJECTION, SOLUTION INTRAVENOUS; SUBCUTANEOUS at 21:40

## 2022-11-04 RX ADMIN — FUROSEMIDE 20 MG: 20 TABLET ORAL at 06:23

## 2022-11-04 RX ADMIN — LEVOTHYROXINE SODIUM 75 MCG: 0.07 TABLET ORAL at 09:07

## 2022-11-04 RX ADMIN — Medication 500 MCG: at 09:07

## 2022-11-04 RX ADMIN — LEVETIRACETAM 500 MG: 500 TABLET, FILM COATED, EXTENDED RELEASE ORAL at 20:55

## 2022-11-04 RX ADMIN — LEVOFLOXACIN 750 MG: 750 TABLET, FILM COATED ORAL at 17:59

## 2022-11-04 RX ADMIN — INSULIN GLARGINE-YFGN 10 UNITS: 100 INJECTION, SOLUTION SUBCUTANEOUS at 19:04

## 2022-11-04 RX ADMIN — APIXABAN 5 MG: 5 TABLET, FILM COATED ORAL at 09:07

## 2022-11-04 RX ADMIN — ONDANSETRON 4 MG: 2 INJECTION INTRAMUSCULAR; INTRAVENOUS at 11:41

## 2022-11-04 RX ADMIN — PRAVASTATIN SODIUM 20 MG: 20 TABLET ORAL at 20:01

## 2022-11-04 RX ADMIN — APIXABAN 5 MG: 5 TABLET, FILM COATED ORAL at 20:00

## 2022-11-04 RX ADMIN — LOSARTAN POTASSIUM 100 MG: 100 TABLET, FILM COATED ORAL at 09:07

## 2022-11-04 RX ADMIN — FAMOTIDINE 20 MG: 20 TABLET ORAL at 09:07

## 2022-11-05 LAB
ANION GAP SERPL CALCULATED.3IONS-SCNC: 8.2 MMOL/L (ref 5–15)
BUN SERPL-MCNC: 17 MG/DL (ref 8–23)
BUN/CREAT SERPL: 20.2 (ref 7–25)
CALCIUM SPEC-SCNC: 8.9 MG/DL (ref 8.6–10.5)
CHLORIDE SERPL-SCNC: 102 MMOL/L (ref 98–107)
CO2 SERPL-SCNC: 30.8 MMOL/L (ref 22–29)
CREAT SERPL-MCNC: 0.84 MG/DL (ref 0.57–1)
DEPRECATED RDW RBC AUTO: 42.2 FL (ref 37–54)
EGFRCR SERPLBLD CKD-EPI 2021: 67.4 ML/MIN/1.73
ERYTHROCYTE [DISTWIDTH] IN BLOOD BY AUTOMATED COUNT: 12.8 % (ref 12.3–15.4)
GLUCOSE BLDC GLUCOMTR-MCNC: 210 MG/DL (ref 70–130)
GLUCOSE BLDC GLUCOMTR-MCNC: 229 MG/DL (ref 70–130)
GLUCOSE BLDC GLUCOMTR-MCNC: 305 MG/DL (ref 70–130)
GLUCOSE BLDC GLUCOMTR-MCNC: 457 MG/DL (ref 70–130)
GLUCOSE SERPL-MCNC: 238 MG/DL (ref 65–99)
HCT VFR BLD AUTO: 36.5 % (ref 34–46.6)
HGB BLD-MCNC: 11.6 G/DL (ref 12–15.9)
MCH RBC QN AUTO: 28.8 PG (ref 26.6–33)
MCHC RBC AUTO-ENTMCNC: 31.8 G/DL (ref 31.5–35.7)
MCV RBC AUTO: 90.6 FL (ref 79–97)
PLATELET # BLD AUTO: 151 10*3/MM3 (ref 140–450)
PMV BLD AUTO: 11.1 FL (ref 6–12)
POTASSIUM SERPL-SCNC: 3.5 MMOL/L (ref 3.5–5.2)
RBC # BLD AUTO: 4.03 10*6/MM3 (ref 3.77–5.28)
SODIUM SERPL-SCNC: 141 MMOL/L (ref 136–145)
WBC NRBC COR # BLD: 7.55 10*3/MM3 (ref 3.4–10.8)

## 2022-11-05 PROCEDURE — 99231 SBSQ HOSP IP/OBS SF/LOW 25: CPT | Performed by: PSYCHIATRY & NEUROLOGY

## 2022-11-05 PROCEDURE — 85027 COMPLETE CBC AUTOMATED: CPT | Performed by: STUDENT IN AN ORGANIZED HEALTH CARE EDUCATION/TRAINING PROGRAM

## 2022-11-05 PROCEDURE — 80048 BASIC METABOLIC PNL TOTAL CA: CPT | Performed by: STUDENT IN AN ORGANIZED HEALTH CARE EDUCATION/TRAINING PROGRAM

## 2022-11-05 PROCEDURE — 63710000001 INSULIN LISPRO (HUMAN) PER 5 UNITS: Performed by: STUDENT IN AN ORGANIZED HEALTH CARE EDUCATION/TRAINING PROGRAM

## 2022-11-05 PROCEDURE — 82962 GLUCOSE BLOOD TEST: CPT

## 2022-11-05 RX ORDER — POLYETHYLENE GLYCOL 3350 17 G/17G
17 POWDER, FOR SOLUTION ORAL DAILY
Status: DISCONTINUED | OUTPATIENT
Start: 2022-11-06 | End: 2022-11-06 | Stop reason: HOSPADM

## 2022-11-05 RX ORDER — ASPIRIN 81 MG/1
81 TABLET, CHEWABLE ORAL DAILY
Status: DISCONTINUED | OUTPATIENT
Start: 2022-11-05 | End: 2022-11-06 | Stop reason: HOSPADM

## 2022-11-05 RX ORDER — AMOXICILLIN 250 MG
1 CAPSULE ORAL 2 TIMES DAILY
Status: DISCONTINUED | OUTPATIENT
Start: 2022-11-05 | End: 2022-11-06 | Stop reason: HOSPADM

## 2022-11-05 RX ADMIN — FAMOTIDINE 20 MG: 20 TABLET ORAL at 08:31

## 2022-11-05 RX ADMIN — PRAVASTATIN SODIUM 20 MG: 20 TABLET ORAL at 20:19

## 2022-11-05 RX ADMIN — INSULIN LISPRO 3 UNITS: 100 INJECTION, SOLUTION INTRAVENOUS; SUBCUTANEOUS at 08:30

## 2022-11-05 RX ADMIN — LOSARTAN POTASSIUM 100 MG: 100 TABLET, FILM COATED ORAL at 08:31

## 2022-11-05 RX ADMIN — Medication 500 MCG: at 08:31

## 2022-11-05 RX ADMIN — ACETAMINOPHEN 650 MG: 325 TABLET, FILM COATED ORAL at 20:18

## 2022-11-05 RX ADMIN — Medication 10 ML: at 08:36

## 2022-11-05 RX ADMIN — ACETAMINOPHEN 650 MG: 325 TABLET, FILM COATED ORAL at 08:34

## 2022-11-05 RX ADMIN — APIXABAN 5 MG: 5 TABLET, FILM COATED ORAL at 20:19

## 2022-11-05 RX ADMIN — DOCUSATE SODIUM 50MG AND SENNOSIDES 8.6MG 1 TABLET: 8.6; 5 TABLET, FILM COATED ORAL at 20:19

## 2022-11-05 RX ADMIN — INSULIN LISPRO 5 UNITS: 100 INJECTION, SOLUTION INTRAVENOUS; SUBCUTANEOUS at 17:29

## 2022-11-05 RX ADMIN — ASPIRIN 81 MG: 81 TABLET, CHEWABLE ORAL at 13:21

## 2022-11-05 RX ADMIN — LEVETIRACETAM 500 MG: 500 TABLET, FILM COATED, EXTENDED RELEASE ORAL at 20:19

## 2022-11-05 RX ADMIN — INSULIN GLARGINE-YFGN 15 UNITS: 100 INJECTION, SOLUTION SUBCUTANEOUS at 22:05

## 2022-11-05 RX ADMIN — FUROSEMIDE 20 MG: 20 TABLET ORAL at 08:31

## 2022-11-05 RX ADMIN — LEVOFLOXACIN 750 MG: 750 TABLET, FILM COATED ORAL at 13:21

## 2022-11-05 RX ADMIN — Medication 10 ML: at 20:19

## 2022-11-05 RX ADMIN — INSULIN LISPRO 3 UNITS: 100 INJECTION, SOLUTION INTRAVENOUS; SUBCUTANEOUS at 13:21

## 2022-11-05 RX ADMIN — APIXABAN 5 MG: 5 TABLET, FILM COATED ORAL at 08:31

## 2022-11-05 RX ADMIN — LEVOTHYROXINE SODIUM 75 MCG: 0.07 TABLET ORAL at 08:31

## 2022-11-06 ENCOUNTER — READMISSION MANAGEMENT (OUTPATIENT)
Dept: CALL CENTER | Facility: HOSPITAL | Age: 87
End: 2022-11-06

## 2022-11-06 VITALS
SYSTOLIC BLOOD PRESSURE: 140 MMHG | DIASTOLIC BLOOD PRESSURE: 61 MMHG | RESPIRATION RATE: 18 BRPM | OXYGEN SATURATION: 98 % | HEART RATE: 60 BPM | WEIGHT: 157.9 LBS | BODY MASS INDEX: 29.81 KG/M2 | TEMPERATURE: 97.8 F | HEIGHT: 61 IN

## 2022-11-06 LAB
ANION GAP SERPL CALCULATED.3IONS-SCNC: 8 MMOL/L (ref 5–15)
BUN SERPL-MCNC: 20 MG/DL (ref 8–23)
BUN/CREAT SERPL: 17.5 (ref 7–25)
CALCIUM SPEC-SCNC: 8.8 MG/DL (ref 8.6–10.5)
CHLORIDE SERPL-SCNC: 101 MMOL/L (ref 98–107)
CO2 SERPL-SCNC: 30 MMOL/L (ref 22–29)
CREAT SERPL-MCNC: 1.14 MG/DL (ref 0.57–1)
DEPRECATED RDW RBC AUTO: 43.1 FL (ref 37–54)
EGFRCR SERPLBLD CKD-EPI 2021: 46.7 ML/MIN/1.73
ERYTHROCYTE [DISTWIDTH] IN BLOOD BY AUTOMATED COUNT: 12.7 % (ref 12.3–15.4)
GLUCOSE BLDC GLUCOMTR-MCNC: 275 MG/DL (ref 70–130)
GLUCOSE BLDC GLUCOMTR-MCNC: 289 MG/DL (ref 70–130)
GLUCOSE SERPL-MCNC: 287 MG/DL (ref 65–99)
HCT VFR BLD AUTO: 37.6 % (ref 34–46.6)
HGB BLD-MCNC: 11.8 G/DL (ref 12–15.9)
MCH RBC QN AUTO: 28.9 PG (ref 26.6–33)
MCHC RBC AUTO-ENTMCNC: 31.4 G/DL (ref 31.5–35.7)
MCV RBC AUTO: 92.2 FL (ref 79–97)
PLATELET # BLD AUTO: 152 10*3/MM3 (ref 140–450)
PMV BLD AUTO: 11.8 FL (ref 6–12)
POTASSIUM SERPL-SCNC: 3.6 MMOL/L (ref 3.5–5.2)
RBC # BLD AUTO: 4.08 10*6/MM3 (ref 3.77–5.28)
SODIUM SERPL-SCNC: 139 MMOL/L (ref 136–145)
WBC NRBC COR # BLD: 6.53 10*3/MM3 (ref 3.4–10.8)

## 2022-11-06 PROCEDURE — 97530 THERAPEUTIC ACTIVITIES: CPT

## 2022-11-06 PROCEDURE — 63710000001 INSULIN LISPRO (HUMAN) PER 5 UNITS: Performed by: STUDENT IN AN ORGANIZED HEALTH CARE EDUCATION/TRAINING PROGRAM

## 2022-11-06 PROCEDURE — 80048 BASIC METABOLIC PNL TOTAL CA: CPT | Performed by: STUDENT IN AN ORGANIZED HEALTH CARE EDUCATION/TRAINING PROGRAM

## 2022-11-06 PROCEDURE — 82962 GLUCOSE BLOOD TEST: CPT

## 2022-11-06 PROCEDURE — 85027 COMPLETE CBC AUTOMATED: CPT | Performed by: STUDENT IN AN ORGANIZED HEALTH CARE EDUCATION/TRAINING PROGRAM

## 2022-11-06 RX ORDER — LEVETIRACETAM 500 MG/1
500 TABLET, EXTENDED RELEASE ORAL NIGHTLY
Qty: 30 TABLET | Refills: 0 | Status: SHIPPED | OUTPATIENT
Start: 2022-11-06

## 2022-11-06 RX ORDER — INSULIN LISPRO 100 [IU]/ML
3 INJECTION, SOLUTION INTRAVENOUS; SUBCUTANEOUS
Status: DISCONTINUED | OUTPATIENT
Start: 2022-11-06 | End: 2022-11-06 | Stop reason: HOSPADM

## 2022-11-06 RX ORDER — LEVOFLOXACIN 750 MG/1
750 TABLET ORAL EVERY 24 HOURS
Qty: 1 TABLET | Refills: 0 | Status: SHIPPED | OUTPATIENT
Start: 2022-11-07 | End: 2022-11-08

## 2022-11-06 RX ORDER — ASPIRIN 81 MG/1
81 TABLET, CHEWABLE ORAL DAILY
Qty: 30 TABLET | Refills: 0 | Status: SHIPPED | OUTPATIENT
Start: 2022-11-07 | End: 2023-01-13

## 2022-11-06 RX ADMIN — LOSARTAN POTASSIUM 100 MG: 100 TABLET, FILM COATED ORAL at 08:38

## 2022-11-06 RX ADMIN — INSULIN LISPRO 3 UNITS: 100 INJECTION, SOLUTION INTRAVENOUS; SUBCUTANEOUS at 12:37

## 2022-11-06 RX ADMIN — DOCUSATE SODIUM 50MG AND SENNOSIDES 8.6MG 1 TABLET: 8.6; 5 TABLET, FILM COATED ORAL at 08:38

## 2022-11-06 RX ADMIN — FUROSEMIDE 20 MG: 20 TABLET ORAL at 06:06

## 2022-11-06 RX ADMIN — FAMOTIDINE 20 MG: 20 TABLET ORAL at 08:38

## 2022-11-06 RX ADMIN — Medication 10 ML: at 08:42

## 2022-11-06 RX ADMIN — Medication 500 MCG: at 08:38

## 2022-11-06 RX ADMIN — INSULIN LISPRO 4 UNITS: 100 INJECTION, SOLUTION INTRAVENOUS; SUBCUTANEOUS at 12:34

## 2022-11-06 RX ADMIN — ASPIRIN 81 MG: 81 TABLET, CHEWABLE ORAL at 08:38

## 2022-11-06 RX ADMIN — INSULIN LISPRO 4 UNITS: 100 INJECTION, SOLUTION INTRAVENOUS; SUBCUTANEOUS at 08:39

## 2022-11-06 RX ADMIN — INSULIN LISPRO 3 UNITS: 100 INJECTION, SOLUTION INTRAVENOUS; SUBCUTANEOUS at 08:38

## 2022-11-06 RX ADMIN — LEVOFLOXACIN 750 MG: 750 TABLET, FILM COATED ORAL at 12:35

## 2022-11-06 RX ADMIN — APIXABAN 5 MG: 5 TABLET, FILM COATED ORAL at 08:38

## 2022-11-06 RX ADMIN — LEVOTHYROXINE SODIUM 75 MCG: 0.07 TABLET ORAL at 08:38

## 2022-11-06 NOTE — OUTREACH NOTE
Prep Survey    Flowsheet Row Responses   [facility] patient discharged from? [place]   Is LACE score < 7 ? No   Emergency Room discharge w/ pulse ox? No   Eligibility [facility]   Date of Admission 11/02/22   Date of Discharge 11/06/22   Discharge Disposition Home or Self Care   Discharge diagnosis Acute encephalopathy, N/V, Urinary Retention,    Does the patient have one of the following disease processes/diagnoses(primary or secondary)? Other   Does the patient have Home health ordered? No   Is there a DME ordered? No   Prep survey completed? Yes          STEFAN PETERSON - Registered Nurse

## 2022-11-07 ENCOUNTER — TRANSITIONAL CARE MANAGEMENT TELEPHONE ENCOUNTER (OUTPATIENT)
Dept: CALL CENTER | Facility: HOSPITAL | Age: 87
End: 2022-11-07

## 2022-11-07 ENCOUNTER — HOME HEALTH ADMISSION (OUTPATIENT)
Dept: HOME HEALTH SERVICES | Facility: HOME HEALTHCARE | Age: 87
End: 2022-11-07
Payer: MEDICARE

## 2022-11-07 ENCOUNTER — TELEPHONE (OUTPATIENT)
Dept: FAMILY MEDICINE CLINIC | Facility: CLINIC | Age: 87
End: 2022-11-07

## 2022-11-07 ENCOUNTER — TRANSCRIBE ORDERS (OUTPATIENT)
Dept: HOME HEALTH SERVICES | Facility: HOME HEALTHCARE | Age: 87
End: 2022-11-07

## 2022-11-07 DIAGNOSIS — K57.92 DIVERTICULITIS: Primary | ICD-10-CM

## 2022-11-07 DIAGNOSIS — R56.9 FOCAL SEIZURE: ICD-10-CM

## 2022-11-07 DIAGNOSIS — G45.9 TIA (TRANSIENT ISCHEMIC ATTACK): ICD-10-CM

## 2022-11-07 NOTE — OUTREACH NOTE
Call Center TCM Note    Flowsheet Row Responses   [facility] [place] patient discharged from? [place]   Does the patient have one of the following disease processes/diagnoses(primary or secondary)? Other   TCM attempt successful? Yes   Call start time 1027   Call end time 1030   Discharge diagnosis Acute encephalopathy, N/V, Urinary Retention,    Is patient permission given to speak with other caregiver? Yes   List who call center can speak with Yanet- Daughter   Person spoke with today (if not patient) and relationship Daughter   Meds reviewed with patient/caregiver? Yes   Is the patient having any side effects they believe may be caused by any medication additions or changes? No   Prescription comments Start taking ASA, Keppra, Levaquin   Is the patient taking all medications as directed (includes completed medication regime)? Yes   Comments No tcm appt with pcp available will send office a message   Does the patient have an appointment with their PCP within 7 days of discharge? No   Nursing Interventions Assisted patient with making appointment per protocol, Routed TCM call to PCP office   Has home health visited the patient within 72 hours of discharge? N/A   What DME was ordered? [facility]   Has all DME been delivered? No   DME comments Did give number for [facility] to call in regards to the wheelchair   Psychosocial issues? No   Psychosocial comments Daughter is requesting HH with patient. Patient very weak and needs 24/7 assistance. daughter with mother at this time   Notified Case Management Home Health   Did the patient receive a copy of their discharge instructions? Yes   Nursing interventions Reviewed instructions with patient   What is the patient's perception of their health status since discharge? Same   Is the patient/caregiver able to teach back the hierarchy of who to call/visit for symptoms/problems? PCP, Specialist, Home health nurse, Urgent Care, ED, 911 Yes   TCM call completed? Yes  · Monitor blood pressure   Wrap up additional comments Daughter states mother is still very weak needs home health- Declines SNF-REHAB. Concerned of being dc to soon. Discussed the importance of following up with provider. However daughter states unable to get her to appt- Will send message for Video visit with pcp   Call end time 1030   Would this patient benefit from a Referral to [facility]? Yes   Is the patient interested in additional calls from an ambulatory ?  NOTE:  applies to high risk patients requiring additional follow-up. Yes          Honey Templeton RN    11/7/2022, 10:31 EST

## 2022-11-07 NOTE — TELEPHONE ENCOUNTER
Caller: JACQUELINE WITH [facility]    Relationship: IN HOME HEALTH SERVICES    Best call back number: [phone]    What orders are you requesting (i.e. lab or imaging): VERBAL ORDERS FOR PHYSICAL THERAPY EVALUATION AND NURSING IF NEEDED    In what timeframe would the patient need to come in: ASAP    Where will you receive your lab/imaging services: IN HOME    Additional notes: JACQUELINE WITH [facility] IS REQUESTING THESE VERBAL ORDERS ASAP FOLLOWING PATIENT DISCHARGE FROM HOSPITAL

## 2022-11-08 ENCOUNTER — HOME CARE VISIT (OUTPATIENT)
Dept: HOME HEALTH SERVICES | Facility: HOME HEALTHCARE | Age: 87
End: 2022-11-08
Payer: MEDICARE

## 2022-11-08 ENCOUNTER — TELEPHONE (OUTPATIENT)
Dept: FAMILY MEDICINE CLINIC | Facility: CLINIC | Age: 87
End: 2022-11-08

## 2022-11-08 VITALS
TEMPERATURE: 96.9 F | SYSTOLIC BLOOD PRESSURE: 122 MMHG | DIASTOLIC BLOOD PRESSURE: 60 MMHG | OXYGEN SATURATION: 95 % | HEIGHT: 62 IN | RESPIRATION RATE: 18 BRPM | BODY MASS INDEX: 26.68 KG/M2 | HEART RATE: 62 BPM | WEIGHT: 145 LBS

## 2022-11-08 LAB
BACTERIA SPEC AEROBE CULT: NORMAL
BACTERIA SPEC AEROBE CULT: NORMAL

## 2022-11-08 PROCEDURE — G0299 HHS/HOSPICE OF RN EA 15 MIN: HCPCS

## 2022-11-08 NOTE — TELEPHONE ENCOUNTER
DAMARIS FROM [facility] IS CALLING IN SHE IS TRYING TO VERIFY THAT DOCTOR MINGO WILL BE THE ONE TO SIGN OFF ON THE PLAN OF CARE FOR THIS PATIENT BEFORE SHE SENDS THAT OVER.    SHE IS ALSO ASKING ABOUT TREATING SKIN TEAR ON ARM HAVING HOME HEALTH CHANGE IT OUT TWO TIMES WEEKLY.      PLEASE ADVISE    CALLBACK NUMBER IS  [phone]

## 2022-11-10 ENCOUNTER — HOME CARE VISIT (OUTPATIENT)
Dept: HOME HEALTH SERVICES | Facility: HOME HEALTHCARE | Age: 87
End: 2022-11-10
Payer: MEDICARE

## 2022-11-10 VITALS
DIASTOLIC BLOOD PRESSURE: 78 MMHG | TEMPERATURE: 96.9 F | HEART RATE: 60 BPM | SYSTOLIC BLOOD PRESSURE: 122 MMHG | OXYGEN SATURATION: 99 %

## 2022-11-10 PROCEDURE — G0300 HHS/HOSPICE OF LPN EA 15 MIN: HCPCS

## 2022-11-10 PROCEDURE — G0151 HHCP-SERV OF PT,EA 15 MIN: HCPCS

## 2022-11-10 NOTE — HOME HEALTH
SOC Note: Patient is A&Ox 4 but is forgetful. Before hospitalization she was living by self with frequent phone calls throughout the day from children checking on her. She now has 24/7 caregiver with her. They reported that she will have 24/7 caregiver this week and will see about the next week also. Daughter is very involved in her care.  Patient's vitals are stable, Lungs are clear, +2 pitting noted in BLE.    Patient usually takes care of medications but daughter is helping with pill organizer since patient had changes with meds and cognition.  Patient is diabetic. Reported to nurse that she typically runs very high. States when she begins to dip under 200 she doesn't feel good. Reports that this is the best control she has had for a long time. They have rearranged oral diabetic medications. She is checking TID and keeping a written record.    wounds - skin tear noted on right arm. cleansed with saline, adaptic applied to wound and covered with foam bandage. SN to change during visits. supplies left in home.    Plan for next visit: CP assessment. DM assessment. wound assessment. Educate on medication and diagnosis. goal based teaching.    Home Health ordered for: disciplines PT, OT, SN; Patient is reporting depression but declining any services from  or MSW    Reason for Hosp/Primary Dx/Co-morbidities: Recent hospitalization at [facility] from 11/2-11/6 for acute encephalopathy, lactic acidosis, diverticulitis  Reported to nurse by family that she suffered stroke like symptoms with inability to talk and she went to ER. Reported to nurse that they were unsure if she suffered a TIA or seizure. She was treated with antibiotics for diverticulitis and she showed improvements. She was sent home with the new medications of aspirin, keppra, and levaquin.    Focus of Care: Teaching and assessment following episode of diverticulitis

## 2022-11-11 ENCOUNTER — HOME CARE VISIT (OUTPATIENT)
Dept: HOME HEALTH SERVICES | Facility: HOME HEALTHCARE | Age: 87
End: 2022-11-11
Payer: MEDICARE

## 2022-11-11 ENCOUNTER — OFFICE VISIT (OUTPATIENT)
Dept: FAMILY MEDICINE CLINIC | Facility: CLINIC | Age: 87
End: 2022-11-11

## 2022-11-11 VITALS
SYSTOLIC BLOOD PRESSURE: 97 MMHG | HEART RATE: 90 BPM | OXYGEN SATURATION: 95 % | DIASTOLIC BLOOD PRESSURE: 60 MMHG | TEMPERATURE: 97.7 F

## 2022-11-11 DIAGNOSIS — I10 ESSENTIAL HYPERTENSION: ICD-10-CM

## 2022-11-11 DIAGNOSIS — K57.92 DIVERTICULITIS: ICD-10-CM

## 2022-11-11 DIAGNOSIS — E11.649 UNCONTROLLED TYPE 2 DIABETES MELLITUS WITH HYPOGLYCEMIA WITHOUT COMA: ICD-10-CM

## 2022-11-11 DIAGNOSIS — G93.40 ACUTE ENCEPHALOPATHY: Primary | ICD-10-CM

## 2022-11-11 PROCEDURE — G0152 HHCP-SERV OF OT,EA 15 MIN: HCPCS

## 2022-11-11 PROCEDURE — 99496 TRANSJ CARE MGMT HIGH F2F 7D: CPT | Performed by: NURSE PRACTITIONER

## 2022-11-11 PROCEDURE — 1111F DSCHRG MED/CURRENT MED MERGE: CPT | Performed by: NURSE PRACTITIONER

## 2022-11-11 RX ORDER — PIOGLITAZONEHYDROCHLORIDE 30 MG/1
30 TABLET ORAL DAILY
Qty: 90 TABLET | Refills: 0 | Status: SHIPPED | OUTPATIENT
Start: 2022-11-11

## 2022-11-11 NOTE — PROGRESS NOTES
Transitional Care Follow Up Visit  Subjective   History obtained from patient and daughter.   Roland Russell is a 87 y.o. female who presents for a transitional care management visit.    Within 48 business hours after discharge our office contacted her via telephone to coordinate her care and needs.      I reviewed and discussed the details of that call along with the discharge summary, hospital problems, inpatient lab results, inpatient diagnostic studies, and consultation reports with Roland.     Current outpatient and discharge medications have been reconciled for the patient.  Reviewed by: NORI Ponce      Date of TCM Phone Call 11/6/2022   [facility]   Date of Admission 11/2/2022   Date of Discharge 11/6/2022   Discharge Disposition Home or Self Care     Risk for Readmission (LACE) Score: 12 (11/6/2022  6:00 AM)      History of Present Illness   Course During Hospital Stay:  87 y.o. female with a history of legal blindness, diabetes type 2 on insulin, paroxysmal A. fib and hyperlipidemia who presented with altered mental status.  Neurology was consulted and patient was thought to have TIA versus focal seizure.  Patient was started on aspirin and Keppra.  Her mental status eventually came closer to baseline per family.  Patient with likely diverticulitis and was treated for 5 days with Levaquin.  PT recommended  PT with 24-hour care.  Family able to be with patient for the next few weeks. Would recommend possible dementia testing as an outpatient.        The following portions of the patient's history were reviewed and updated as appropriate: allergies, current medications, past family history, past medical history, past social history, past surgical history and problem list.    Review of Systems   Constitutional: Negative for fever.   Respiratory: Negative for cough and shortness of breath.    Cardiovascular: Negative for chest pain.   Gastrointestinal: Negative for abdominal  pain, diarrhea, nausea and vomiting.   Neurological: Positive for dizziness and weakness.   Psychiatric/Behavioral: Negative for confusion.       Objective   Physical Exam  Vitals and nursing note reviewed.   Constitutional:       Appearance: Normal appearance.   Cardiovascular:      Rate and Rhythm: Normal rate and regular rhythm.   Pulmonary:      Effort: Pulmonary effort is normal.      Breath sounds: Normal breath sounds.   Musculoskeletal:      Comments: In wheelchair   Neurological:      Mental Status: She is alert and oriented to person, place, and time.   Psychiatric:         Mood and Affect: Mood normal.         Assessment & Plan   Diagnoses and all orders for this visit:    1. Acute encephalopathy (Primary)  -     Ambulatory Referral to Neurology    2. Diverticulitis  Comments:  - Resolved    3. Uncontrolled type 2 diabetes mellitus with hypoglycemia without coma (HCC)  Comments:  - Will increase Actos to 30 mg daily.  - Patient will continue to monitor blood sugars at home.   Orders:  -     pioglitazone (Actos) 30 MG tablet; Take 1 tablet by mouth Daily.  Dispense: 90 tablet; Refill: 0    4. Essential hypertension  Comments:  - Patient to monitor BP at home with goal of < 130/80.     A cane or walker would not be appropriate or safe for this patient. She needs a wheelchair for in home use.

## 2022-11-11 NOTE — HOME HEALTH
Reason for referral Patient has decreased strength, activity tolerance, abnormal gait due to encephalopathy    Diagnosis Patient admitted to [facility] 11/2 to 11/6/22 with acute encephalopathy, lactic acidosis    surgical procedure na    PMHx diverticulitis, A fib, HTN, DM, legally blind     Prior level of function Patient ambulated with rolling walker independently, independent with ADLs, IADLs,was not driving     Home social environment Patient resides alone, has no steps to enter the home, has upstairs with 1 rail.     Next MD appointment 11/11/22    Plan for next visit  Progress with theerapeutic exercise, transfers, gait

## 2022-11-14 VITALS — DIASTOLIC BLOOD PRESSURE: 72 MMHG | HEART RATE: 64 BPM | OXYGEN SATURATION: 98 % | SYSTOLIC BLOOD PRESSURE: 130 MMHG

## 2022-11-14 NOTE — HOME HEALTH
Reason for Referral:  Recent hospitalization due to Acute encephalopathy, Lactic Acidosis, Diverticulitis. possible TIA     Medical History:  Type 2 DM, Legally blind, HLD, HTN, Afib    Subjective:  I don't feel well today    Home Environment:  Patient lives alone in patio home, family currently providing 24 hour assist, using walker    PLOF:  Independent     Medical Necessity: Patient requires skilled occupational therapy for remediation of deficits to improve safety in home and improve self care, functional transfers, mobility, strength, endurance, DME/adaptive equipment    Plan for Next Visit: Safety with adl tasks, transfers

## 2022-11-15 ENCOUNTER — HOME CARE VISIT (OUTPATIENT)
Dept: HOME HEALTH SERVICES | Facility: HOME HEALTHCARE | Age: 87
End: 2022-11-15
Payer: MEDICARE

## 2022-11-15 VITALS
TEMPERATURE: 97.9 F | SYSTOLIC BLOOD PRESSURE: 138 MMHG | BODY MASS INDEX: 26.65 KG/M2 | WEIGHT: 145.7 LBS | RESPIRATION RATE: 20 BRPM | OXYGEN SATURATION: 99 % | DIASTOLIC BLOOD PRESSURE: 78 MMHG | HEART RATE: 66 BPM

## 2022-11-15 VITALS
DIASTOLIC BLOOD PRESSURE: 70 MMHG | TEMPERATURE: 97.7 F | OXYGEN SATURATION: 98 % | SYSTOLIC BLOOD PRESSURE: 138 MMHG | HEART RATE: 68 BPM | RESPIRATION RATE: 18 BRPM

## 2022-11-15 VITALS
DIASTOLIC BLOOD PRESSURE: 78 MMHG | OXYGEN SATURATION: 99 % | HEART RATE: 65 BPM | SYSTOLIC BLOOD PRESSURE: 138 MMHG | RESPIRATION RATE: 18 BRPM

## 2022-11-15 PROCEDURE — G0151 HHCP-SERV OF PT,EA 15 MIN: HCPCS

## 2022-11-15 PROCEDURE — G0300 HHS/HOSPICE OF LPN EA 15 MIN: HCPCS

## 2022-11-15 PROCEDURE — G0152 HHCP-SERV OF OT,EA 15 MIN: HCPCS

## 2022-11-15 NOTE — HOME HEALTH
Patient sitting in chair upon arrival. Reported feeling good for the most part.    ASSESSMENT: Discussed need for either transport chair versus wheelchair for transportation.     PLAN FOR NEXT VISIT:  --Continue therapeutic exercises and balance training  --Continue transfer training  --Continue gait training

## 2022-11-15 NOTE — HOME HEALTH
LPN provided wound care. Patient tolerated well. LPN educated on DM. Patient stated she did not take night insulin when CBG was 243 and CBG was 299 next morning. LPN educated on importance of glucose control. LPN eduated on eating a snack with protein at night and taking insulin to enusre CBG doesnt drop. Patient states she feels good when CBG is around 140 and up but is scared it will drop so she doesnt take insulin. Patient will need further education

## 2022-11-15 NOTE — HOME HEALTH
LPN continued education on DM diet. Patient CBG levels stay above 200 for most of the time. LPN educated on lower sugar and carb diet. Patient does not always follow diet. Patient wound to left arm is showing signs of healing. Patient reports no pain at time of visit.

## 2022-11-16 ENCOUNTER — READMISSION MANAGEMENT (OUTPATIENT)
Dept: CALL CENTER | Facility: HOSPITAL | Age: 87
End: 2022-11-16

## 2022-11-16 DIAGNOSIS — I10 ESSENTIAL HYPERTENSION: ICD-10-CM

## 2022-11-16 RX ORDER — LOSARTAN POTASSIUM 100 MG/1
100 TABLET ORAL DAILY
Qty: 30 TABLET | Refills: 3 | Status: SHIPPED | OUTPATIENT
Start: 2022-11-16 | End: 2023-03-10

## 2022-11-16 NOTE — TELEPHONE ENCOUNTER
Caller:  Pharmacy - [facility] - [facility] AT Portal to Registered [facility] - [phone]  - [phone]    Relationship: Pharmacy    Best call back number: [phone] OPTION 2     Requested Prescriptions:   Requested Prescriptions     Pending Prescriptions Disp Refills   • losartan (Cozaar) 100 MG tablet 30 tablet 3     Sig: Take 1 tablet by mouth Daily.        Pharmacy where request should be sent: [facility] - [facility] - [facility] AT PORTAL TO REGISTERED [facility] - [phone]  - [phone] FX     Additional details provided by patient:90 DAY SUPPLY    Does the patient have less than a 3 day supply:  [] Yes  [] No    Tono Roberto Rep   11/16/22 10:11 EST

## 2022-11-16 NOTE — OUTREACH NOTE
Medical Week 2 Survey    Flowsheet Row Responses   [facility] patient discharged from? [place]   Does the patient have one of the following disease processes/diagnoses(primary or secondary)? Other   Week 2 attempt successful? Yes   Call start time 1210   Discharge diagnosis Acute encephalopathy, N/V, Urinary Retention,    Call end time 1214   Person spoke with today (if not patient) and relationship Daughter   Meds reviewed with patient/caregiver? Yes   Is the patient having any side effects they believe may be caused by any medication additions or changes? No   Does the patient have all medications ordered at discharge? Yes   Is the patient taking all medications as directed (includes completed medication regime)? Yes   Does the patient have a primary care provider?  Yes   Does the patient have an appointment with their PCP within 7 days of discharge? Yes   Has the patient kept scheduled appointments due by today? Yes   Has home health visited the patient within 72 hours of discharge? N/A   What DME was ordered? [facility]   Has all DME been delivered? No   DME comments Did give number for [facility] to call in regards to the wheelchair   Psychosocial issues? No   Notified Case Management Home Health   Did the patient receive a copy of their discharge instructions? Yes   Nursing interventions Reviewed instructions with patient   What is the patient's perception of their health status since discharge? Improving   Is the patient/caregiver able to teach back signs and symptoms related to disease process for when to call PCP? Yes   Is the patient/caregiver able to teach back signs and symptoms related to disease process for when to call 911? Yes   Is the patient/caregiver able to teach back the hierarchy of who to call/visit for symptoms/problems? PCP, Specialist, Home health nurse, Urgent Care, ED, 911 Yes   If the patient is a current smoker, are they able to teach back resources for cessation? Not a  smoker   Week 2 Call Completed? Yes   Is the patient interested in additional calls from an ambulatory ?  NOTE:  applies to high risk patients requiring additional follow-up. No          EUGENIA PEDERSON - Registered Nurse

## 2022-11-18 ENCOUNTER — TELEPHONE (OUTPATIENT)
Dept: FAMILY MEDICINE CLINIC | Facility: CLINIC | Age: 87
End: 2022-11-18

## 2022-11-18 ENCOUNTER — HOME CARE VISIT (OUTPATIENT)
Dept: HOME HEALTH SERVICES | Facility: HOME HEALTHCARE | Age: 87
End: 2022-11-18
Payer: MEDICARE

## 2022-11-18 VITALS — HEART RATE: 65 BPM | SYSTOLIC BLOOD PRESSURE: 138 MMHG | OXYGEN SATURATION: 98 % | DIASTOLIC BLOOD PRESSURE: 78 MMHG

## 2022-11-18 VITALS
DIASTOLIC BLOOD PRESSURE: 80 MMHG | RESPIRATION RATE: 18 BRPM | TEMPERATURE: 97.3 F | HEART RATE: 70 BPM | OXYGEN SATURATION: 98 % | SYSTOLIC BLOOD PRESSURE: 144 MMHG

## 2022-11-18 PROCEDURE — G0152 HHCP-SERV OF OT,EA 15 MIN: HCPCS

## 2022-11-18 PROCEDURE — G0151 HHCP-SERV OF PT,EA 15 MIN: HCPCS

## 2022-11-18 PROCEDURE — G0299 HHS/HOSPICE OF RN EA 15 MIN: HCPCS

## 2022-11-18 PROCEDURE — G0180 MD CERTIFICATION HHA PATIENT: HCPCS | Performed by: FAMILY MEDICINE

## 2022-11-18 NOTE — TELEPHONE ENCOUNTER
Taylor with [facility] [phone]   needs a script for a wheelchair sent over to [facility] at fax [phone]

## 2022-11-19 NOTE — HOME HEALTH
Patient sitting in chair upon arrival. Reported just finishing up lunch. Caregiver reported she supervised patient getting her lunch together on her own.     ASSESSMENT: Patient with improved participation due to feeling better today. Patient continues to require frequent rest breaks due to complaints of fatigue. Called and request prescription for wheelchair per family request be faxed to [facility] at Dr. Zhang's office.    PLAN FOR NEXT VISIT:  --Continue therapeutic and balance exercises  --Continue gait training

## 2022-11-20 VITALS
WEIGHT: 149.56 LBS | DIASTOLIC BLOOD PRESSURE: 84 MMHG | BODY MASS INDEX: 27.36 KG/M2 | SYSTOLIC BLOOD PRESSURE: 142 MMHG | HEART RATE: 68 BPM | OXYGEN SATURATION: 99 % | RESPIRATION RATE: 18 BRPM | TEMPERATURE: 97.3 F

## 2022-11-21 VITALS — DIASTOLIC BLOOD PRESSURE: 80 MMHG | SYSTOLIC BLOOD PRESSURE: 144 MMHG | OXYGEN SATURATION: 98 % | HEART RATE: 70 BPM

## 2022-11-21 NOTE — HOME HEALTH
Patient's daughter washing her hair at sink on arrival.   Plan to address kitchen tasks next visit and possible discharge

## 2022-11-21 NOTE — HOME HEALTH
Reviewed DM status with compliance on insulin adm. Instructed on BG levels and therputic effect of antiDM meds.

## 2022-11-22 ENCOUNTER — HOME CARE VISIT (OUTPATIENT)
Dept: HOME HEALTH SERVICES | Facility: HOME HEALTHCARE | Age: 87
End: 2022-11-22
Payer: MEDICARE

## 2022-11-22 ENCOUNTER — TELEPHONE (OUTPATIENT)
Dept: FAMILY MEDICINE CLINIC | Facility: CLINIC | Age: 87
End: 2022-11-22

## 2022-11-22 PROCEDURE — G0299 HHS/HOSPICE OF RN EA 15 MIN: HCPCS

## 2022-11-22 PROCEDURE — G0152 HHCP-SERV OF OT,EA 15 MIN: HCPCS

## 2022-11-22 NOTE — TELEPHONE ENCOUNTER
Caller: Yanet Wen DAUGHTER    Relationship to patient: Emergency Contact    Best call back number: [phone]    Patient is needing: KURTIS'S TOLD THE PATIENT'S DAUGHTER THAT THE OFFICE PLACED THE WRONG DIAGNOSIS CODE FOR THE PATIENT'S WHEELCHAIR REQUEST. THE PATIENT WILL NEED THIS FIXED ASAP. PLEASE ADVISE.

## 2022-11-22 NOTE — TELEPHONE ENCOUNTER
Patient was seen in follow up with [facility] on 11/18 for hospital fu & next appt is on 12/2 with Dr Zhang, in the meantime [facility] needs an order with the corrected code (the hospital used the wrong code) patient has deformity of both feet, non healing compression fx of T-8 & T-12     If there are any questions or concerns please feel free to call Taylor (office clinician) for home health   [phone]

## 2022-11-23 ENCOUNTER — TELEPHONE (OUTPATIENT)
Dept: FAMILY MEDICINE CLINIC | Facility: CLINIC | Age: 87
End: 2022-11-23

## 2022-11-23 ENCOUNTER — HOME CARE VISIT (OUTPATIENT)
Dept: HOME HEALTH SERVICES | Facility: HOME HEALTHCARE | Age: 87
End: 2022-11-23
Payer: MEDICARE

## 2022-11-23 VITALS
OXYGEN SATURATION: 100 % | DIASTOLIC BLOOD PRESSURE: 88 MMHG | SYSTOLIC BLOOD PRESSURE: 154 MMHG | RESPIRATION RATE: 18 BRPM | HEART RATE: 66 BPM | TEMPERATURE: 97.7 F

## 2022-11-23 VITALS — OXYGEN SATURATION: 98 % | HEART RATE: 67 BPM | DIASTOLIC BLOOD PRESSURE: 80 MMHG | SYSTOLIC BLOOD PRESSURE: 142 MMHG

## 2022-11-23 PROCEDURE — G0151 HHCP-SERV OF PT,EA 15 MIN: HCPCS

## 2022-11-23 NOTE — HOME HEALTH
Patient sitting in chair, son present, reports she is exhausted and shaky now, just completed morning routine, laundry task and didn't sleep well.  Plan to address kitchen tasks next visit and OT discharge

## 2022-11-23 NOTE — TELEPHONE ENCOUNTER
Taylor from Home Health wanted to know if you can request referral to [facility] for a wheel chair, [facility] doesn't have the referral.

## 2022-11-23 NOTE — TELEPHONE ENCOUNTER
miguel    Incoming call from [facility] asking for verbal order for 2 weeks of PT once per week    Verbal order given    Next office visit 12/02/2022

## 2022-11-25 VITALS
BODY MASS INDEX: 27.45 KG/M2 | DIASTOLIC BLOOD PRESSURE: 64 MMHG | HEART RATE: 64 BPM | WEIGHT: 150.06 LBS | RESPIRATION RATE: 18 BRPM | SYSTOLIC BLOOD PRESSURE: 112 MMHG | TEMPERATURE: 97.5 F | OXYGEN SATURATION: 98 %

## 2022-11-25 NOTE — HOME HEALTH
Instructed patinet and caregiver on perdischarge visit for SN. Instructed on fall prevention-clear pathways, remove any home hazards, wear appropriate footwear, adequate lightening, change position slowly, etc. Patient voices back understanding. No safety issues noted.

## 2022-11-28 ENCOUNTER — HOME CARE VISIT (OUTPATIENT)
Dept: HOME HEALTH SERVICES | Facility: HOME HEALTHCARE | Age: 87
End: 2022-11-28
Payer: MEDICARE

## 2022-11-28 VITALS
TEMPERATURE: 96.1 F | DIASTOLIC BLOOD PRESSURE: 78 MMHG | HEART RATE: 69 BPM | OXYGEN SATURATION: 95 % | SYSTOLIC BLOOD PRESSURE: 142 MMHG

## 2022-11-28 PROCEDURE — G0157 HHC PT ASSISTANT EA 15: HCPCS

## 2022-11-28 NOTE — CASE COMMUNICATION
DR. Anna Zhang     REGARDING Roland Russell  DATE OF BIRTH 1934    HOME HEALTH AGENCIES ARE REQUIRED BY FEDERAL STATE AND ACCREDITATION REGULATIONS TO NOTIFY THE PHYSICIAN OF THE FOLLOWING PATIENT RELATED INCIDENT. PLEASE CALL [phone] IF YOU HAVE ANY QUESTIONS.     PATIENT HAD AN UNOBSERVED FALL ON 12/26/22 WITH FRACTURED 5TH METATARAL ON THE RIGHT AND BRUISING ON LEFT RIBS      THANK YOU,  Jenny Martini [facility]

## 2022-11-28 NOTE — HOME HEALTH
Subjective: I fell the other morning in the bathroom    Falls- 11-26-22 at 3:30 am, son assisted her up and walked her to the car when he took her to ER at ~ 6 am  Mediction changes- Hydrocodone 5-325 as needed and Meloxicam 7.5mg oral tablet, take 1 tablet daily for 14 days   12-1-22 at 3 pm    Assessment: Patient has walking boot on her right foot from the ER after her fall in the bathroom and they state she has a broken 5th metatarsil toe and is wearing walking boot at all times, abdominal contusion and bruised ribs.    Plan for next visit/Communication  gait training  review transfers  review HEP  balance and safety

## 2022-11-30 ENCOUNTER — HOME CARE VISIT (OUTPATIENT)
Dept: HOME HEALTH SERVICES | Facility: HOME HEALTHCARE | Age: 87
End: 2022-11-30
Payer: MEDICARE

## 2022-11-30 PROCEDURE — G0152 HHCP-SERV OF OT,EA 15 MIN: HCPCS

## 2022-12-01 ENCOUNTER — HOME CARE VISIT (OUTPATIENT)
Dept: HOME HEALTH SERVICES | Facility: HOME HEALTHCARE | Age: 87
End: 2022-12-01
Payer: MEDICARE

## 2022-12-01 VITALS
SYSTOLIC BLOOD PRESSURE: 132 MMHG | HEART RATE: 62 BPM | OXYGEN SATURATION: 99 % | TEMPERATURE: 97.5 F | RESPIRATION RATE: 18 BRPM | DIASTOLIC BLOOD PRESSURE: 82 MMHG

## 2022-12-01 VITALS — SYSTOLIC BLOOD PRESSURE: 158 MMHG | HEART RATE: 71 BPM | OXYGEN SATURATION: 97 % | DIASTOLIC BLOOD PRESSURE: 78 MMHG

## 2022-12-01 PROCEDURE — G0299 HHS/HOSPICE OF RN EA 15 MIN: HCPCS

## 2022-12-01 PROCEDURE — G0151 HHCP-SERV OF PT,EA 15 MIN: HCPCS

## 2022-12-01 NOTE — HOME HEALTH
Patient reported her right foot and left side ribs are still hurting from her fall last week. Family continuing with 24 hour assistance and patient requiring more assist with ADL tasks since fall.   Plan to request additional visits to address safety with adl and transfers to shower.

## 2022-12-02 ENCOUNTER — TRANSCRIBE ORDERS (OUTPATIENT)
Dept: ADMINISTRATIVE | Facility: HOSPITAL | Age: 87
End: 2022-12-02

## 2022-12-02 ENCOUNTER — HOME CARE VISIT (OUTPATIENT)
Dept: HOME HEALTH SERVICES | Facility: HOME HEALTHCARE | Age: 87
End: 2022-12-02
Payer: MEDICARE

## 2022-12-02 DIAGNOSIS — M79.604 PAIN AND SWELLING OF RIGHT LOWER EXTREMITY: Primary | ICD-10-CM

## 2022-12-02 DIAGNOSIS — M79.89 PAIN AND SWELLING OF RIGHT LOWER EXTREMITY: Primary | ICD-10-CM

## 2022-12-02 PROCEDURE — G0152 HHCP-SERV OF OT,EA 15 MIN: HCPCS

## 2022-12-02 NOTE — HOME HEALTH
Patient sitting in recliner upon arrival. Reported having air cast on right foot due to fracture from fall last week.     ASSESSMENT: Therapeutic exercises in standing modified due to complaints of right foot pain from fracture.     PLAN FOR NEXT VISIT:  --Continue therapeutic exercises  --Continue gait training

## 2022-12-05 ENCOUNTER — HOSPITAL ENCOUNTER (OUTPATIENT)
Dept: CARDIOLOGY | Facility: HOSPITAL | Age: 87
Discharge: HOME | End: 2022-12-05
Admitting: INTERNAL MEDICINE

## 2022-12-05 ENCOUNTER — HOSPITAL ENCOUNTER (EMERGENCY)
Facility: HOSPITAL | Age: 87
Discharge: HOME | End: 2022-12-05
Attending: EMERGENCY MEDICINE | Admitting: EMERGENCY MEDICINE

## 2022-12-05 VITALS
RESPIRATION RATE: 18 BRPM | SYSTOLIC BLOOD PRESSURE: 166 MMHG | OXYGEN SATURATION: 96 % | BODY MASS INDEX: 27.63 KG/M2 | WEIGHT: 150.13 LBS | HEIGHT: 62 IN | HEART RATE: 67 BPM | DIASTOLIC BLOOD PRESSURE: 72 MMHG | TEMPERATURE: 97.5 F

## 2022-12-05 VITALS
RESPIRATION RATE: 18 BRPM | DIASTOLIC BLOOD PRESSURE: 78 MMHG | TEMPERATURE: 98.4 F | SYSTOLIC BLOOD PRESSURE: 132 MMHG | HEART RATE: 64 BPM | OXYGEN SATURATION: 98 %

## 2022-12-05 VITALS — HEART RATE: 70 BPM | OXYGEN SATURATION: 94 %

## 2022-12-05 DIAGNOSIS — M79.604 PAIN AND SWELLING OF RIGHT LOWER EXTREMITY: ICD-10-CM

## 2022-12-05 DIAGNOSIS — I80.01 THROMBOPHLEBITIS OF SUPERFICIAL VEINS OF RIGHT LOWER EXTREMITY: Primary | ICD-10-CM

## 2022-12-05 DIAGNOSIS — Z79.01 ANTICOAGULATED: ICD-10-CM

## 2022-12-05 DIAGNOSIS — M79.89 PAIN AND SWELLING OF RIGHT LOWER EXTREMITY: ICD-10-CM

## 2022-12-05 LAB
BH CV LOW VAS RIGHT VARICOSITY BK VESSEL: 1
BH CV LOWER VASCULAR LEFT COMMON FEMORAL AUGMENT: NORMAL
BH CV LOWER VASCULAR LEFT COMMON FEMORAL COMPETENT: NORMAL
BH CV LOWER VASCULAR LEFT COMMON FEMORAL COMPRESS: NORMAL
BH CV LOWER VASCULAR LEFT COMMON FEMORAL PHASIC: NORMAL
BH CV LOWER VASCULAR LEFT COMMON FEMORAL SPONT: NORMAL
BH CV LOWER VASCULAR RIGHT COMMON FEMORAL AUGMENT: NORMAL
BH CV LOWER VASCULAR RIGHT COMMON FEMORAL COMPETENT: NORMAL
BH CV LOWER VASCULAR RIGHT COMMON FEMORAL COMPRESS: NORMAL
BH CV LOWER VASCULAR RIGHT COMMON FEMORAL PHASIC: NORMAL
BH CV LOWER VASCULAR RIGHT COMMON FEMORAL SPONT: NORMAL
BH CV LOWER VASCULAR RIGHT DISTAL FEMORAL COMPRESS: NORMAL
BH CV LOWER VASCULAR RIGHT GASTRONEMIUS COMPRESS: NORMAL
BH CV LOWER VASCULAR RIGHT GREATER SAPH AK COMPRESS: NORMAL
BH CV LOWER VASCULAR RIGHT GREATER SAPH BK COMPRESS: NORMAL
BH CV LOWER VASCULAR RIGHT LESSER SAPH COMPRESS: NORMAL
BH CV LOWER VASCULAR RIGHT MID FEMORAL AUGMENT: NORMAL
BH CV LOWER VASCULAR RIGHT MID FEMORAL COMPETENT: NORMAL
BH CV LOWER VASCULAR RIGHT MID FEMORAL COMPRESS: NORMAL
BH CV LOWER VASCULAR RIGHT MID FEMORAL PHASIC: NORMAL
BH CV LOWER VASCULAR RIGHT MID FEMORAL SPONT: NORMAL
BH CV LOWER VASCULAR RIGHT PERONEAL COMPRESS: NORMAL
BH CV LOWER VASCULAR RIGHT POPLITEAL AUGMENT: NORMAL
BH CV LOWER VASCULAR RIGHT POPLITEAL COMPETENT: NORMAL
BH CV LOWER VASCULAR RIGHT POPLITEAL COMPRESS: NORMAL
BH CV LOWER VASCULAR RIGHT POPLITEAL PHASIC: NORMAL
BH CV LOWER VASCULAR RIGHT POPLITEAL SPONT: NORMAL
BH CV LOWER VASCULAR RIGHT POSTERIOR TIBIAL COMPRESS: NORMAL
BH CV LOWER VASCULAR RIGHT PROFUNDA FEMORAL COMPRESS: NORMAL
BH CV LOWER VASCULAR RIGHT PROXIMAL FEMORAL COMPRESS: NORMAL
BH CV LOWER VASCULAR RIGHT SAPHENOFEMORAL JUNCTION COMPRESS: NORMAL
BH CV LOWER VASCULAR RIGHT VARICOSITY BK COMPRESS: NORMAL
BH CV LOWER VASCULAR RIGHT VARICOSITY BK THROMBUS: NORMAL
MAXIMAL PREDICTED HEART RATE: 133 BPM
STRESS TARGET HR: 113 BPM

## 2022-12-05 PROCEDURE — 93971 EXTREMITY STUDY: CPT

## 2022-12-05 PROCEDURE — 99282 EMERGENCY DEPT VISIT SF MDM: CPT

## 2022-12-05 NOTE — ED PROVIDER NOTES
EMERGENCY DEPARTMENT ENCOUNTER    Room Number:  HE1/I  Date of encounter:  12/5/2022  PCP: Anna Zhang MD  Historian: Patient, daughter    I used full protective equipment while examining this patient.  This includes face mask, gloves and protective eyewear.  I washed my hands before entering the room and immediately upon leaving the room.  Patient was wearing a surgical mask.      HPI:  Chief Complaint: Abnormal Doppler ultrasound  A complete HPI/ROS/PMH/PSH/SH/FH are unobtainable due to: None    Context: Roland Russell is a 87 y.o. female who presents to the ED c/o abnormal Doppler ultrasound of the right leg.  Patient fell approximately 1 week ago and was found to have a fracture of her right fifth metatarsal.  She was placed in a walking boot.  She saw her PCP 3 days ago and was complaining of some right calf pain.  She had a Doppler ultrasound ordered as an outpatient.  This was done earlier today.  Ultrasound showed acute SVT and a varicosity in the right calf.  There was no DVT.  SVT did not extend above the knee.  Patient's PCP advised her to come to the ED for further evaluation.  Patient is on Eliquis and has not missed any recent doses.  Patient denies chest pain, shortness of breath, palpitations, syncope, or dizziness.  She complains of soreness in her right foot and right calf.  Pain is worse with movement and walking.  Denies numbness/tingling in her right lower extremity.      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   • Sleep apnea, obstructive 12/16/2019   • Morbidly obese (HCC) 12/20/2019   • Abnormal weight gain 06/26/2020   • Localized edema 06/26/2020   • Compression fracture of T8 vertebra with delayed healing 02/16/2021   • PAF (paroxysmal atrial fibrillation) ([facility]) 02/03/2020   • Dry scalp 12/01/2021   • Deformity of both feet 10/06/2022   • Acute encephalopathy 11/02/2022   • Lactic acidosis 11/03/2022   • Diverticulitis 11/03/2022     Resolved Ambulatory Problems     Diagnosis Date Noted   • Osteoporosis    • GERD (gastroesophageal reflux disease) 09/27/2018   • Osteoarthritis, multiple sites 09/27/2018     Past Medical History:   Diagnosis Date   • Abnormal vision    • Allergy to adhesive tape    • Arthralgia    • AVB (atrioventricular block)    • Balance problem    • Difficulty walking    • H/O complete eye exam 10/2016   • Headache    • History of glaucoma    • History of poliomyelitis    • History of surgery on arm    • HLD (hyperlipidemia)    • Hypertension    • Memory loss    • Migraine    • Numbness    • Pacemaker    • Rectal bleeding    • Sleep apnea    • TIA (transient ischemic attack) 12/24/2018   • Type 2 diabetes mellitus ([facility])    • Type 2 diabetes mellitus, uncontrolled          PAST SURGICAL HISTORY  Past Surgical History:   Procedure Laterality Date   • APPENDECTOMY  1989   • CARDIAC PACEMAKER PLACEMENT  11/25/2004   • CAROTID ENDARTERECTOMY Left 12/13/2019    Procedure: LEFT CAROTID BODY TUMOR RESECTION;  Surgeon: Bryan Severino MD;  Location: [facility] OR;  Service: Vascular   • CATARACT EXTRACTION Bilateral 1997   • CEREBRAL ANGIOGRAM Bilateral 12/6/2019    Procedure: CAROTID ARTERIOGRAM BILATERAL, RIGHT WRIST APPROACH;  Surgeon: Bryan Severino MD;  Location: [facility] OR 18/19;  Service: Vascular   • CHOLECYSTECTOMY  1989   • COLONOSCOPY  2013    dr montes   • ELBOW PROCEDURE  2016   • ENDOSCOPY   12/06/2012    Dr. Johnson; food in stomach, gastritis   • EXCISION LESION  1994    BACK CYST BENGIN   • EYE SURGERY  12/2012   • GLAUCOMA SURGERY Bilateral 1995    laser surgery   • HARDWARE REMOVAL Left     ELBOW   • HYSTERECTOMY  1986   • ORIF ELBOW FRACTURE Left    • PACEMAKER IMPLANTATION     • PACEMAKER REPLACEMENT  06/2013         FAMILY HISTORY  Family History   Problem Relation Age of Onset   • Cancer Father    • Glaucoma Father         angular glycoma   • Heart disease Father    • Arthritis Father    • Thyroid disease Father    • Stroke Father    • Asthma Sister    • Arthritis Sister    • Cancer Sister         breast   • Stroke Maternal Grandmother    • Heart failure Other    • Diabetes Other    • Hypertension Other    • Stroke Other         aunt   • Thyroid disease Other    • Arthritis Mother    • Diabetes Mother    • Thyroid disease Mother    • Malig Hyperthermia Neg Hx          SOCIAL HISTORY  Social History     Socioeconomic History   • Marital status:    Tobacco Use   • Smoking status: Never   • Smokeless tobacco: Never   Vaping Use   • Vaping Use: Never used   Substance and Sexual Activity   • Alcohol use: No   • Drug use: No   • Sexual activity: Defer         ALLERGIES  Adhesive tape, Latex, and Propoxyphene       REVIEW OF SYSTEMS  Review of Systems      All systems have been reviewed and are negative except as as discussed in the HPI    PHYSICAL EXAM    I have reviewed the triage vital signs and nursing notes.    ED Triage Vitals [12/05/22 1032]   Temp Heart Rate Resp BP SpO2   97.5 °F (36.4 °C) 68 18 169/67 97 %      Temp src Heart Rate Source Patient Position BP Location FiO2 (%)   Oral Monitor Sitting Right arm --       Physical Exam  GENERAL: Awake, alert, oriented x3.  Well-developed, well-nourished elderly female.  Resting comfortably no acute distress  HENT: NCAT, nares patent, moist mucous membranes  EYES: no scleral icterus  CV: regular rhythm, regular rate, equal pedal pulses  bilaterally, brisk cap refill in bilateral toes  RESPIRATORY: normal effort, clear to auscultation bilaterally  ABDOMEN: soft, nontender  MUSCULOSKELETAL: There is an Ortho boot on the right leg.  There is tenderness of the right lateral foot and right calf.  There is trace pedal edema in the lower right leg.  Remainder the right leg is nontender.  Extremities are without obvious deformity.    NEURO: Speech is normal.  No facial droop.  Follows commands.  Normal strength and light touch sensation in the right lower extremity.  SKIN: warm, dry, no rash  PSYCH: Normal mood and affect      LAB RESULTS  Recent Results (from the past 24 hour(s))   Duplex venous lower extremity right CAR    Collection Time: 12/05/22 10:13 AM   Result Value Ref Range    Target HR (85%) 113 bpm    Max. Pred. HR (100%) 133 bpm    Right Common Femoral Spont Y     Right Common Femoral Competent Y     Right Common Femoral Phasic Y     Right Common Femoral Compress C     Right Common Femoral Augment Y     Right Saphenofemoral Junction Compress C     Right Profunda Femoral Compress C     Right Proximal Femoral Compress C     Right Mid Femoral Spont Y     Right Mid Femoral Competent Y     Right Mid Femoral Phasic Y     Right Mid Femoral Compress C     Right Mid Femoral Augment Y     Right Distal Femoral Compress C     Right Popliteal Spont Y     Right Popliteal Competent Y     Right Popliteal Phasic Y     Right Popliteal Compress C     Right Popliteal Augment Y     Right Posterior Tibial Compress C     Right Peroneal Compress C     Right Gastronemius Compress C     Right Greater Saph AK Compress C     Right Greater Saph BK Compress C     Right Lesser Saph Compress C     Right Varicosity BK Vessel 1.0     Right Varicosity BK Compress N     Right Varicosity BK Thrombus A     Left Common Femoral Spont Y     Left Common Femoral Competent Y     Left Common Femoral Phasic Y     Left Common Femoral Compress C     Left Common Femoral Augment Y         Ordered the above labs and independently reviewed the results.      RADIOLOGY  Duplex venous lower extremity right CAR    Result Date: 12/5/2022  •  Acute right lower extremity superficial thrombophlebitis noted in the varicosity (below knee). •  All other right sided veins appeared normal.       I ordered the above noted radiological studies. Reviewed by me and discussed with radiologist.  See dictation for official radiology interpretation.      PROCEDURES  Procedures      MEDICATIONS GIVEN IN ER    Medications - No data to display      PROGRESS, DATA ANALYSIS, CONSULTS, AND MEDICAL DECISION MAKING    All labs have been independently reviewed by me.  All radiology studies have been reviewed by me and discussed with radiologist dictating the report.   EKG's independently viewed and interpreted by me.  I have reviewed the nurse's notes, vital signs, past medical history, and medication list.  Discussion below represents my analysis of pertinent findings related to patient's condition, differential diagnosis, treatment plan and final disposition.      ED Course as of 12/05/22 1612   Mon Dec 05, 2022   1405 Old records reviewed.  Patient had a Doppler ultrasound of her right leg earlier today.  This showed acute superficial thrombophlebitis in a varicosity in the right calf.  This did not extend above the knee.  There was no DVT.  Patient was seen at another ED on 11/26/2022 after falling.  She was found to have a fracture of the right fifth metatarsal and was placed in a walking boot. [WH]   1434 Ultrasound shows superficial thrombophlebitis and varicose vein on the right calf.  There is no DVT.  Patient is already on Eliquis.  She will need a repeat ultrasound in the next 7 to 10 days.  This can be arranged by her PCP.  Patient was advised to elevate her right leg and to apply warm compresses as needed.  She is not tachycardic, tachypneic, or hypoxic.  Return precautions were discussed. [WH]      ED Course  User Index  [WH] Jonathon Early MD       AS OF 16:12 EST VITALS:    BP - 166/72  HR - 67  TEMP - 97.5 °F (36.4 °C) (Oral)  O2 SATS - 96%      DIAGNOSIS  Final diagnoses:   Thrombophlebitis of superficial veins of right lower extremity   Anticoagulated         DISPOSITION  DISCHARGE    Patient discharged in stable condition.    Reviewed implications of results, diagnosis, meds, responsibility to follow up, warning signs and symptoms of possible worsening, potential complications and reasons to return to ER, including worsening pain, increased swelling, chest pain, shortness of breath, palpitations, dizziness, syncope, or other concern.    Patient/Family voiced understanding of above instructions.    Discussed plan for discharge, as there is no emergent indication for admission. Patient referred to primary care provider for BP management due to today's BP. Pt/family is agreeable and understands need for follow up and repeat testing.  Pt is aware that discharge does not mean that nothing is wrong but it indicates no emergency is present that requires admission and they must continue care with follow-up as given below or physician of their choice.     FOLLOW-UP  Anna Zhang MD  [id number] [facility] [address]  [place] [id number] [phone]    Schedule an appointment as soon as possible for a visit   You will need a repeat ultrasound of your right leg in the next 7 to 10 days    Christopher Gee MD  [address] [place] [id number] [phone]    Schedule an appointment as soon as possible for a visit   Right fifth metatarsal fracture         Medication List      Changed    pravastatin 20 MG tablet  Commonly known as: PRAVACHOL  Take 1 tablet by mouth Daily.  What changed: when to take this              Dictated utilizing Dragon dictation     Jonathon Early MD  12/05/22 5730

## 2022-12-05 NOTE — ED NOTES
Per pt family member, pt doctor said she was showing signs of a blood clot. Pt is not complaining of pain or tenderness. No obvious redness or swelling on her leg.    This RN in appropriate PPE while in pt room. Pt wearing mask

## 2022-12-05 NOTE — ED TRIAGE NOTES
Pt presents to ED after having an ultrasound of right calf this AM. Pt is currently in a cast for broken 5th metatarsal. MD Rosario told patient to come and be seen.

## 2022-12-05 NOTE — DISCHARGE INSTRUCTIONS
Elevate right leg.  Apply warm compresses to affected area as needed.  Continue taking Eliquis.  You will need to have a repeat ultrasound of your right leg in the next 7 to 10 days.  Your primary care doctor can order this.  Return to the emergency department for worsening symptoms, increased swelling, chest pain, shortness of breath, or other concern.

## 2022-12-05 NOTE — HOME HEALTH
Patient reports being tired today, had MD appt earlier.   Plan to address shower/transfer next visit.

## 2022-12-06 ENCOUNTER — HOME CARE VISIT (OUTPATIENT)
Dept: HOME HEALTH SERVICES | Facility: HOME HEALTHCARE | Age: 87
End: 2022-12-06
Payer: MEDICARE

## 2022-12-06 VITALS
HEART RATE: 83 BPM | TEMPERATURE: 97.9 F | OXYGEN SATURATION: 98 % | SYSTOLIC BLOOD PRESSURE: 140 MMHG | RESPIRATION RATE: 18 BRPM | DIASTOLIC BLOOD PRESSURE: 80 MMHG

## 2022-12-06 PROCEDURE — G0151 HHCP-SERV OF PT,EA 15 MIN: HCPCS

## 2022-12-06 PROCEDURE — G0152 HHCP-SERV OF OT,EA 15 MIN: HCPCS

## 2022-12-07 NOTE — HOME HEALTH
Patient up walking from bathroom upon arrival. Son present, stated patient was sent to ER due to having blood clot in right leg.    ASSESSMENT: Progress somewhat limited due to lack of compliance with performing home exercise program      PLAN FOR NEXT VISIT:  --Continue therapeutic exercises  --Continue gait training

## 2022-12-08 ENCOUNTER — TRANSCRIBE ORDERS (OUTPATIENT)
Dept: ADMINISTRATIVE | Facility: HOSPITAL | Age: 87
End: 2022-12-08

## 2022-12-08 ENCOUNTER — HOME CARE VISIT (OUTPATIENT)
Dept: HOME HEALTH SERVICES | Facility: HOME HEALTHCARE | Age: 87
End: 2022-12-08
Payer: MEDICARE

## 2022-12-08 VITALS
HEART RATE: 62 BPM | OXYGEN SATURATION: 98 % | RESPIRATION RATE: 18 BRPM | SYSTOLIC BLOOD PRESSURE: 128 MMHG | DIASTOLIC BLOOD PRESSURE: 82 MMHG | TEMPERATURE: 97.8 F

## 2022-12-08 DIAGNOSIS — R52 PAIN: ICD-10-CM

## 2022-12-08 DIAGNOSIS — I80.01 THROMBOPHLEBITIS OF SUPERFICIAL VEINS OF RIGHT LOWER EXTREMITY: Primary | ICD-10-CM

## 2022-12-08 DIAGNOSIS — R60.9 SWELLING: ICD-10-CM

## 2022-12-08 PROCEDURE — G0151 HHCP-SERV OF PT,EA 15 MIN: HCPCS

## 2022-12-08 NOTE — HOME HEALTH
Patient sitting in recliner upon arrival. No complaints, no falls. Reported having med changes. Noted in medications tab. Discussed discharge, patient agreeable. Patient complained of dizziness. This therapist performed [place], and Roll Test, all of which were negative. Testing somewhat limited due to arthritis in neck.

## 2022-12-09 ENCOUNTER — HOME CARE VISIT (OUTPATIENT)
Dept: HOME HEALTH SERVICES | Facility: HOME HEALTHCARE | Age: 87
End: 2022-12-09
Payer: MEDICARE

## 2022-12-09 VITALS — HEART RATE: 83 BPM | DIASTOLIC BLOOD PRESSURE: 80 MMHG | SYSTOLIC BLOOD PRESSURE: 140 MMHG | OXYGEN SATURATION: 98 %

## 2022-12-09 PROCEDURE — G0152 HHCP-SERV OF OT,EA 15 MIN: HCPCS

## 2022-12-09 NOTE — HOME HEALTH
Patient reports that she was at the Emergency room all day yesterday, states she has a superficial blood clot in her leg.  Plan for next visit to address safe transfers, kitchen mobility

## 2022-12-12 ENCOUNTER — HOSPITAL ENCOUNTER (OUTPATIENT)
Dept: CARDIOLOGY | Facility: HOSPITAL | Age: 87
Discharge: HOME | End: 2022-12-12
Admitting: INTERNAL MEDICINE

## 2022-12-12 VITALS — OXYGEN SATURATION: 98 % | DIASTOLIC BLOOD PRESSURE: 80 MMHG | SYSTOLIC BLOOD PRESSURE: 140 MMHG | HEART RATE: 70 BPM

## 2022-12-12 DIAGNOSIS — R60.9 SWELLING: ICD-10-CM

## 2022-12-12 DIAGNOSIS — R52 PAIN: ICD-10-CM

## 2022-12-12 DIAGNOSIS — I80.01 THROMBOPHLEBITIS OF SUPERFICIAL VEINS OF RIGHT LOWER EXTREMITY: ICD-10-CM

## 2022-12-12 LAB
BH CV LOWER VASCULAR LEFT COMMON FEMORAL AUGMENT: NORMAL
BH CV LOWER VASCULAR LEFT COMMON FEMORAL COMPETENT: NORMAL
BH CV LOWER VASCULAR LEFT COMMON FEMORAL COMPRESS: NORMAL
BH CV LOWER VASCULAR LEFT COMMON FEMORAL PHASIC: NORMAL
BH CV LOWER VASCULAR LEFT COMMON FEMORAL SPONT: NORMAL
BH CV LOWER VASCULAR RIGHT COMMON FEMORAL AUGMENT: NORMAL
BH CV LOWER VASCULAR RIGHT COMMON FEMORAL COMPETENT: NORMAL
BH CV LOWER VASCULAR RIGHT COMMON FEMORAL COMPRESS: NORMAL
BH CV LOWER VASCULAR RIGHT COMMON FEMORAL PHASIC: NORMAL
BH CV LOWER VASCULAR RIGHT COMMON FEMORAL SPONT: NORMAL
BH CV LOWER VASCULAR RIGHT DISTAL FEMORAL COMPRESS: NORMAL
BH CV LOWER VASCULAR RIGHT GASTRONEMIUS COMPRESS: NORMAL
BH CV LOWER VASCULAR RIGHT GREATER SAPH AK COMPRESS: NORMAL
BH CV LOWER VASCULAR RIGHT GREATER SAPH BK COMPRESS: NORMAL
BH CV LOWER VASCULAR RIGHT LESSER SAPH COMPRESS: NORMAL
BH CV LOWER VASCULAR RIGHT MID FEMORAL AUGMENT: NORMAL
BH CV LOWER VASCULAR RIGHT MID FEMORAL COMPETENT: NORMAL
BH CV LOWER VASCULAR RIGHT MID FEMORAL COMPRESS: NORMAL
BH CV LOWER VASCULAR RIGHT MID FEMORAL PHASIC: NORMAL
BH CV LOWER VASCULAR RIGHT MID FEMORAL SPONT: NORMAL
BH CV LOWER VASCULAR RIGHT PERONEAL COMPRESS: NORMAL
BH CV LOWER VASCULAR RIGHT POPLITEAL AUGMENT: NORMAL
BH CV LOWER VASCULAR RIGHT POPLITEAL COMPETENT: NORMAL
BH CV LOWER VASCULAR RIGHT POPLITEAL COMPRESS: NORMAL
BH CV LOWER VASCULAR RIGHT POPLITEAL PHASIC: NORMAL
BH CV LOWER VASCULAR RIGHT POPLITEAL SPONT: NORMAL
BH CV LOWER VASCULAR RIGHT POSTERIOR TIBIAL COMPRESS: NORMAL
BH CV LOWER VASCULAR RIGHT PROFUNDA FEMORAL COMPRESS: NORMAL
BH CV LOWER VASCULAR RIGHT PROXIMAL FEMORAL COMPRESS: NORMAL
BH CV LOWER VASCULAR RIGHT SAPHENOFEMORAL JUNCTION COMPRESS: NORMAL
BH CV LOWER VASCULAR RIGHT VARICOSITY BK COMPRESS: NORMAL
MAXIMAL PREDICTED HEART RATE: 133 BPM
STRESS TARGET HR: 113 BPM

## 2022-12-12 PROCEDURE — 93971 EXTREMITY STUDY: CPT

## 2022-12-12 NOTE — HOME HEALTH
Patient sitting in chair, son present. Patient continues to require assist with donning boot due to decreased vision, assist with shower and assist with kitchen tasks. Contacted [place] to discuss status of wheelchair and bedside commode orders, they are waiting for further info from MD, notified daughter and they are to contact her with info. Discussed plan for discharge next week,1 or 2 more times to address kitchen safety and independence, daughter in agreement.

## 2022-12-14 ENCOUNTER — HOME CARE VISIT (OUTPATIENT)
Dept: HOME HEALTH SERVICES | Facility: HOME HEALTHCARE | Age: 87
End: 2022-12-14
Payer: MEDICARE

## 2022-12-15 ENCOUNTER — HOME CARE VISIT (OUTPATIENT)
Dept: HOME HEALTH SERVICES | Facility: HOME HEALTHCARE | Age: 87
End: 2022-12-15
Payer: MEDICARE

## 2023-01-04 ENCOUNTER — HOME CARE VISIT (OUTPATIENT)
Dept: HOME HEALTH SERVICES | Facility: HOME HEALTHCARE | Age: 88
End: 2023-01-04
Payer: MEDICARE

## 2023-01-11 ENCOUNTER — TRANSCRIBE ORDERS (OUTPATIENT)
Dept: ADMINISTRATIVE | Facility: HOSPITAL | Age: 88
End: 2023-01-11
Payer: MEDICARE

## 2023-01-11 DIAGNOSIS — M79.661 PAIN AND SWELLING OF LOWER LEG, RIGHT: Primary | ICD-10-CM

## 2023-01-11 DIAGNOSIS — M79.89 PAIN AND SWELLING OF LOWER LEG, RIGHT: Primary | ICD-10-CM

## 2023-01-11 NOTE — HOME HEALTH
Spoke with daughter by phone, reports patient has improved with adl tasks, daughter in agreement with discharge without visit.

## 2023-01-12 DIAGNOSIS — E03.9 HYPOTHYROIDISM, ACQUIRED: ICD-10-CM

## 2023-01-12 RX ORDER — LEVOTHYROXINE SODIUM 0.07 MG/1
75 TABLET ORAL DAILY
Qty: 30 TABLET | Refills: 3 | Status: SHIPPED | OUTPATIENT
Start: 2023-01-12

## 2023-01-12 NOTE — TELEPHONE ENCOUNTER
Rx Refill Note  Requested Prescriptions     Pending Prescriptions Disp Refills   • levothyroxine (SYNTHROID, LEVOTHROID) 75 MCG tablet [Pharmacy Med Name: LEVOTHYROXINE 0.075MG (75MCG) TABS] 30 tablet 3     Sig: TAKE 1 TABLET BY MOUTH DAILY      Last office visit with prescribing clinician: 9/16/2022   Last telemedicine visit with prescribing clinician: Visit date not found   Next office visit with prescribing clinician: Visit date not found

## 2023-01-13 ENCOUNTER — OFFICE VISIT (OUTPATIENT)
Dept: NEUROLOGY | Facility: CLINIC | Age: 88
End: 2023-01-13
Payer: MEDICARE

## 2023-01-13 VITALS — BODY MASS INDEX: 27.63 KG/M2 | HEART RATE: 68 BPM | WEIGHT: 150.13 LBS | RESPIRATION RATE: 14 BRPM | HEIGHT: 62 IN

## 2023-01-13 DIAGNOSIS — I69.30 SEQUELAE, POST-STROKE: Primary | ICD-10-CM

## 2023-01-13 DIAGNOSIS — R41.3 MEMORY LOSS: ICD-10-CM

## 2023-01-13 DIAGNOSIS — Z86.73 HISTORY OF CEREBRAL INFARCTION: ICD-10-CM

## 2023-01-13 DIAGNOSIS — R47.9 SPEECH DISTURBANCE, UNSPECIFIED TYPE: ICD-10-CM

## 2023-01-13 PROCEDURE — 99215 OFFICE O/P EST HI 40 MIN: CPT | Performed by: STUDENT IN AN ORGANIZED HEALTH CARE EDUCATION/TRAINING PROGRAM

## 2023-01-13 RX ORDER — LIOTHYRONINE SODIUM 5 UG/1
TABLET ORAL
COMMUNITY
Start: 2022-12-02

## 2023-01-13 RX ORDER — MELOXICAM 7.5 MG/1
7.5 TABLET ORAL
COMMUNITY
Start: 2022-11-26

## 2023-01-17 ENCOUNTER — HOSPITAL ENCOUNTER (OUTPATIENT)
Dept: CARDIOLOGY | Facility: HOSPITAL | Age: 88
Setting detail: HOSPITAL OUTPATIENT SURGERY
Discharge: HOME | End: 2023-01-17
Payer: MEDICARE

## 2023-01-17 ENCOUNTER — HOSPITAL ENCOUNTER (OUTPATIENT)
Dept: GENERAL RADIOLOGY | Facility: HOSPITAL | Age: 88
Discharge: HOME | End: 2023-01-17
Payer: MEDICARE

## 2023-01-17 DIAGNOSIS — M79.661 PAIN AND SWELLING OF LOWER LEG, RIGHT: ICD-10-CM

## 2023-01-17 DIAGNOSIS — M79.89 PAIN AND SWELLING OF LOWER LEG, RIGHT: ICD-10-CM

## 2023-01-17 DIAGNOSIS — R29.6 FALLS FREQUENTLY: ICD-10-CM

## 2023-01-17 DIAGNOSIS — M54.2 NECK PAIN: ICD-10-CM

## 2023-01-17 LAB
BH CV LOW VAS RIGHT MID FEMORAL SPONT: 1
BH CV LOWER VASCULAR LEFT COMMON FEMORAL AUGMENT: NORMAL
BH CV LOWER VASCULAR LEFT COMMON FEMORAL COMPETENT: NORMAL
BH CV LOWER VASCULAR LEFT COMMON FEMORAL COMPRESS: NORMAL
BH CV LOWER VASCULAR LEFT COMMON FEMORAL PHASIC: NORMAL
BH CV LOWER VASCULAR LEFT COMMON FEMORAL SPONT: NORMAL
BH CV LOWER VASCULAR RIGHT COMMON FEMORAL AUGMENT: NORMAL
BH CV LOWER VASCULAR RIGHT COMMON FEMORAL COMPETENT: NORMAL
BH CV LOWER VASCULAR RIGHT COMMON FEMORAL COMPRESS: NORMAL
BH CV LOWER VASCULAR RIGHT COMMON FEMORAL PHASIC: NORMAL
BH CV LOWER VASCULAR RIGHT COMMON FEMORAL SPONT: NORMAL
BH CV LOWER VASCULAR RIGHT DISTAL FEMORAL COMPRESS: NORMAL
BH CV LOWER VASCULAR RIGHT GASTRONEMIUS COMPRESS: NORMAL
BH CV LOWER VASCULAR RIGHT GREATER SAPH AK COMPRESS: NORMAL
BH CV LOWER VASCULAR RIGHT GREATER SAPH BK COMPRESS: NORMAL
BH CV LOWER VASCULAR RIGHT LESSER SAPH COMPRESS: NORMAL
BH CV LOWER VASCULAR RIGHT MID FEMORAL AUGMENT: NORMAL
BH CV LOWER VASCULAR RIGHT MID FEMORAL COMPETENT: NORMAL
BH CV LOWER VASCULAR RIGHT MID FEMORAL COMPRESS: NORMAL
BH CV LOWER VASCULAR RIGHT MID FEMORAL PHASIC: NORMAL
BH CV LOWER VASCULAR RIGHT MID FEMORAL SPONT: NORMAL
BH CV LOWER VASCULAR RIGHT MID FEMORAL THROMBUS: NORMAL
BH CV LOWER VASCULAR RIGHT PERONEAL COMPRESS: NORMAL
BH CV LOWER VASCULAR RIGHT POPLITEAL AUGMENT: NORMAL
BH CV LOWER VASCULAR RIGHT POPLITEAL COMPETENT: NORMAL
BH CV LOWER VASCULAR RIGHT POPLITEAL COMPRESS: NORMAL
BH CV LOWER VASCULAR RIGHT POPLITEAL PHASIC: NORMAL
BH CV LOWER VASCULAR RIGHT POPLITEAL SPONT: NORMAL
BH CV LOWER VASCULAR RIGHT POSTERIOR TIBIAL COMPRESS: NORMAL
BH CV LOWER VASCULAR RIGHT PROFUNDA FEMORAL COMPRESS: NORMAL
BH CV LOWER VASCULAR RIGHT PROXIMAL FEMORAL COMPRESS: NORMAL
BH CV LOWER VASCULAR RIGHT SAPHENOFEMORAL JUNCTION COMPRESS: NORMAL
BH CV LOWER VASCULAR RIGHT SOLEAL COMPRESS: NORMAL
MAXIMAL PREDICTED HEART RATE: 132 BPM
STRESS TARGET HR: 112 BPM

## 2023-01-17 PROCEDURE — 72050 X-RAY EXAM NECK SPINE 4/5VWS: CPT

## 2023-01-17 PROCEDURE — 93971 EXTREMITY STUDY: CPT

## 2023-01-31 ENCOUNTER — HOSPITAL ENCOUNTER (OUTPATIENT)
Dept: SLEEP MEDICINE | Facility: HOSPITAL | Age: 88
Discharge: HOME | End: 2023-01-31
Payer: MEDICARE

## 2023-01-31 ENCOUNTER — HOSPITAL ENCOUNTER (OUTPATIENT)
Dept: CARDIOLOGY | Facility: HOSPITAL | Age: 88
Discharge: HOME | End: 2023-01-31
Payer: MEDICARE

## 2023-01-31 VITALS
BODY MASS INDEX: 27.63 KG/M2 | DIASTOLIC BLOOD PRESSURE: 82 MMHG | WEIGHT: 150.13 LBS | SYSTOLIC BLOOD PRESSURE: 140 MMHG | HEIGHT: 62 IN | HEART RATE: 87 BPM

## 2023-01-31 DIAGNOSIS — R47.9 SPEECH DISTURBANCE, UNSPECIFIED TYPE: ICD-10-CM

## 2023-01-31 DIAGNOSIS — I69.30 SEQUELAE, POST-STROKE: ICD-10-CM

## 2023-01-31 DIAGNOSIS — Z86.73 HISTORY OF CEREBRAL INFARCTION: ICD-10-CM

## 2023-01-31 LAB
AORTIC ARCH: 2.1 CM
ASCENDING AORTA: 2.9 CM
BH CV ECHO MEAS - ACS: 2.04 CM
BH CV ECHO MEAS - AO MAX PG: 7.8 MMHG
BH CV ECHO MEAS - AO MEAN PG: 4.3 MMHG
BH CV ECHO MEAS - AO ROOT DIAM: 2.8 CM
BH CV ECHO MEAS - AO V2 MAX: 139.6 CM/SEC
BH CV ECHO MEAS - AO V2 VTI: 30.7 CM
BH CV ECHO MEAS - AVA(I,D): 1.84 CM2
BH CV ECHO MEAS - EDV(MOD-SP2): 86 ML
BH CV ECHO MEAS - EDV(MOD-SP4): 105 ML
BH CV ECHO MEAS - EF(MOD-BP): 61.8 %
BH CV ECHO MEAS - EF(MOD-SP2): 58.1 %
BH CV ECHO MEAS - EF(MOD-SP4): 63.8 %
BH CV ECHO MEAS - ESV(MOD-SP2): 36 ML
BH CV ECHO MEAS - ESV(MOD-SP4): 38 ML
BH CV ECHO MEAS - LAT PEAK E' VEL: 4.2 CM/SEC
BH CV ECHO MEAS - LV DIASTOLIC VOL/BSA (35-75): 61.9 CM2
BH CV ECHO MEAS - LV MAX PG: 2.46 MMHG
BH CV ECHO MEAS - LV MEAN PG: 1.34 MMHG
BH CV ECHO MEAS - LV SYSTOLIC VOL/BSA (12-30): 22.4 CM2
BH CV ECHO MEAS - LV V1 MAX: 78.5 CM/SEC
BH CV ECHO MEAS - LV V1 VTI: 18.9 CM
BH CV ECHO MEAS - LVOT AREA: 3 CM2
BH CV ECHO MEAS - LVOT DIAM: 1.95 CM
BH CV ECHO MEAS - MED PEAK E' VEL: 12.2 CM/SEC
BH CV ECHO MEAS - MV A DUR: 0.17 SEC
BH CV ECHO MEAS - MV A MAX VEL: 114.5 CM/SEC
BH CV ECHO MEAS - MV DEC SLOPE: 732.8 CM/SEC2
BH CV ECHO MEAS - MV DEC TIME: 0.1 MSEC
BH CV ECHO MEAS - MV E MAX VEL: 71.4 CM/SEC
BH CV ECHO MEAS - MV E/A: 0.62
BH CV ECHO MEAS - MV MAX PG: 9.1 MMHG
BH CV ECHO MEAS - MV MEAN PG: 4.1 MMHG
BH CV ECHO MEAS - MV P1/2T: 61.6 MSEC
BH CV ECHO MEAS - MV V2 VTI: 35.3 CM
BH CV ECHO MEAS - MVA(P1/2T): 3.6 CM2
BH CV ECHO MEAS - MVA(VTI): 1.6 CM2
BH CV ECHO MEAS - PA ACC TIME: 0.1 SEC
BH CV ECHO MEAS - PA PR(ACCEL): 33.1 MMHG
BH CV ECHO MEAS - PA V2 MAX: 91.1 CM/SEC
BH CV ECHO MEAS - PULM A REVS DUR: 0.2 SEC
BH CV ECHO MEAS - PULM A REVS VEL: 34.3 CM/SEC
BH CV ECHO MEAS - PULM DIAS VEL: 78.2 CM/SEC
BH CV ECHO MEAS - PULM S/D: 0.8
BH CV ECHO MEAS - PULM SYS VEL: 62.7 CM/SEC
BH CV ECHO MEAS - QP/QS: 0.45
BH CV ECHO MEAS - RAP SYSTOLE: 8 MMHG
BH CV ECHO MEAS - RV MAX PG: 1.95 MMHG
BH CV ECHO MEAS - RV V1 MAX: 69.8 CM/SEC
BH CV ECHO MEAS - RV V1 VTI: 11.2 CM
BH CV ECHO MEAS - RVOT DIAM: 1.71 CM
BH CV ECHO MEAS - RVSP: 42.6 MMHG
BH CV ECHO MEAS - SI(MOD-SP2): 29.5 ML/M2
BH CV ECHO MEAS - SI(MOD-SP4): 39.5 ML/M2
BH CV ECHO MEAS - SV(LVOT): 56.4 ML
BH CV ECHO MEAS - SV(MOD-SP2): 50 ML
BH CV ECHO MEAS - SV(MOD-SP4): 67 ML
BH CV ECHO MEAS - SV(RVOT): 25.6 ML
BH CV ECHO MEAS - TAPSE (>1.6): 3.1 CM
BH CV ECHO MEAS - TR MAX PG: 34.6 MMHG
BH CV ECHO MEAS - TR MAX VEL: 294 CM/SEC
BH CV ECHO MEASUREMENTS AVERAGE E/E' RATIO: 8.71
BH CV ECHO SHUNT ASSESSMENT PERFORMED (HIDDEN SCRIPTING): 1
BH CV XLRA - RV BASE: 3.2 CM
BH CV XLRA - RV LENGTH: 5.6 CM
BH CV XLRA - RV MID: 3.2 CM
BH CV XLRA - TDI S': 13.1 CM/SEC
LEFT ATRIUM VOLUME INDEX: 43.5 ML/M2
MAXIMAL PREDICTED HEART RATE: 132 BPM
SINUS: 3 CM
STJ: 2.22 CM
STRESS TARGET HR: 112 BPM

## 2023-01-31 PROCEDURE — 95813 EEG EXTND MNTR 61-119 MIN: CPT

## 2023-01-31 PROCEDURE — 95813 EEG EXTND MNTR 61-119 MIN: CPT | Performed by: STUDENT IN AN ORGANIZED HEALTH CARE EDUCATION/TRAINING PROGRAM

## 2023-01-31 PROCEDURE — 93306 TTE W/DOPPLER COMPLETE: CPT | Performed by: INTERNAL MEDICINE

## 2023-01-31 PROCEDURE — 25010000002 PERFLUTREN (DEFINITY) 8.476 MG IN SODIUM CHLORIDE (PF) 0.9 % 10 ML INJECTION: Performed by: STUDENT IN AN ORGANIZED HEALTH CARE EDUCATION/TRAINING PROGRAM

## 2023-01-31 PROCEDURE — 93306 TTE W/DOPPLER COMPLETE: CPT

## 2023-01-31 RX ADMIN — PERFLUTREN 2 ML: 6.52 INJECTION, SUSPENSION INTRAVENOUS at 15:25

## 2023-03-09 ENCOUNTER — READMISSION MANAGEMENT (OUTPATIENT)
Dept: CALL CENTER | Facility: HOSPITAL | Age: 88
End: 2023-03-09
Payer: MEDICARE

## 2023-03-09 DIAGNOSIS — I10 ESSENTIAL HYPERTENSION: ICD-10-CM

## 2023-03-09 NOTE — OUTREACH NOTE
Prep Survey    Flowsheet Row Responses   [facility] facility patient discharged from? Non-[facility]   Is LACE score < 7 ? Non-BH Discharge   Eligibility [facility]    Date of Discharge 03/09/23   Discharge Disposition Home or Self Care   Discharge diagnosis Encephalopathy   Does the patient have one of the following disease processes/diagnoses(primary or secondary)? Other   Prep survey completed? Yes          Kerri RUANO - Registered Nurse

## 2023-03-10 ENCOUNTER — TRANSITIONAL CARE MANAGEMENT TELEPHONE ENCOUNTER (OUTPATIENT)
Dept: CALL CENTER | Facility: HOSPITAL | Age: 88
End: 2023-03-10
Payer: MEDICARE

## 2023-03-10 RX ORDER — LOSARTAN POTASSIUM 100 MG/1
TABLET ORAL
Qty: 30 TABLET | Refills: 3 | Status: SHIPPED | OUTPATIENT
Start: 2023-03-10

## 2023-03-10 NOTE — OUTREACH NOTE
Call Center TCM Note    Flowsheet Row Responses   [facility] patient discharged from? Non-   Does the patient have one of the following disease processes/diagnoses(primary or secondary)? Other   TCM attempt successful? Yes   Call start time 1257   Call end time 1301   Discharge diagnosis Encephalopathy   Is patient permission given to speak with other caregiver? Yes   List who call center can speak with POA/Daughter- Yanet   Person spoke with today (if not patient) and relationship Yanet   Does the patient have all medications ordered at discharge? Yes   Is the patient taking all medications as directed (includes completed medication regime)? Yes   Comments daughter has scheduled a f/u with Dr. Rosario   Does the patient have an appointment with their PCP within 7 days of discharge? Yes   What is the Home health agency?  Home Health   Has home health visited the patient within 72 hours of discharge? Yes   Psychosocial issues? No   What is the patient's perception of their health status since discharge? Improving   Is the patient/caregiver able to teach back the hierarchy of who to call/visit for symptoms/problems? PCP, Specialist, Home health nurse, Urgent Care, ED, 911 Yes   TCM call completed? Yes   Wrap up additional comments Per daughter/ POA Yanet, patient is doing well, updated PCP to correct doctor, no questions at this time.   Call end time 1301   Would this patient benefit from a Referral to Amb Social Work? No   Is the patient interested in additional calls from an ambulatory ?  NOTE:  applies to high risk patients requiring additional follow-up. No          Cherelle Roberts RN    3/10/2023, 13:02 EST

## 2023-03-20 RX ORDER — ISOPROPYL ALCOHOL 0.75 G/1
SWAB TOPICAL
Qty: 400 EACH | Refills: 3 | OUTPATIENT
Start: 2023-03-20

## 2023-04-10 ENCOUNTER — HOSPITAL ENCOUNTER (INPATIENT)
Facility: HOSPITAL | Age: 88
LOS: 5 days | Discharge: NURSING, REHAB OR LONG-TERM CARE | DRG: 287 | End: 2023-04-17
Attending: INTERNAL MEDICINE | Admitting: INTERNAL MEDICINE
Payer: MEDICARE

## 2023-04-10 DIAGNOSIS — Z09 FOLLOW-UP EXAM: ICD-10-CM

## 2023-04-10 DIAGNOSIS — R94.39 ABNORMAL NUCLEAR STRESS TEST: Primary | ICD-10-CM

## 2023-04-10 DIAGNOSIS — I25.10 CORONARY ARTERY DISEASE INVOLVING NATIVE CORONARY ARTERY OF NATIVE HEART, UNSPECIFIED WHETHER ANGINA PRESENT: ICD-10-CM

## 2023-04-10 DIAGNOSIS — S82.892A CLOSED FRACTURE OF LEFT ANKLE, INITIAL ENCOUNTER: ICD-10-CM

## 2023-04-10 PROBLEM — S82.899A ANKLE FRACTURE: Status: ACTIVE | Noted: 2023-04-10

## 2023-04-10 LAB — GLUCOSE BLDC GLUCOMTR-MCNC: 260 MG/DL (ref 70–130)

## 2023-04-10 PROCEDURE — 82962 GLUCOSE BLOOD TEST: CPT

## 2023-04-10 RX ORDER — LOSARTAN POTASSIUM 100 MG/1
100 TABLET ORAL DAILY
Status: DISCONTINUED | OUTPATIENT
Start: 2023-04-11 | End: 2023-04-11

## 2023-04-10 RX ORDER — ACETAMINOPHEN 325 MG/1
650 TABLET ORAL EVERY 4 HOURS PRN
Status: DISCONTINUED | OUTPATIENT
Start: 2023-04-10 | End: 2023-04-17 | Stop reason: HOSPADM

## 2023-04-10 RX ORDER — AMOXICILLIN 250 MG
2 CAPSULE ORAL DAILY
Status: DISCONTINUED | OUTPATIENT
Start: 2023-04-11 | End: 2023-04-16

## 2023-04-10 RX ORDER — AMOXICILLIN 250 MG
2 CAPSULE ORAL DAILY
Status: ON HOLD | COMMUNITY
End: 2023-04-14

## 2023-04-10 RX ORDER — DEXTROSE MONOHYDRATE 25 G/50ML
25 INJECTION, SOLUTION INTRAVENOUS
Status: DISCONTINUED | OUTPATIENT
Start: 2023-04-10 | End: 2023-04-17 | Stop reason: HOSPADM

## 2023-04-10 RX ORDER — SODIUM CHLORIDE 9 MG/ML
75 INJECTION, SOLUTION INTRAVENOUS CONTINUOUS
Status: DISCONTINUED | OUTPATIENT
Start: 2023-04-10 | End: 2023-04-11

## 2023-04-10 RX ORDER — GABAPENTIN 300 MG/1
300 CAPSULE ORAL NIGHTLY
Status: ON HOLD | COMMUNITY
End: 2023-04-14

## 2023-04-10 RX ORDER — NALOXONE HCL 0.4 MG/ML
0.4 VIAL (ML) INJECTION
Status: DISCONTINUED | OUTPATIENT
Start: 2023-04-10 | End: 2023-04-15

## 2023-04-10 RX ORDER — LEVOTHYROXINE SODIUM 0.07 MG/1
75 TABLET ORAL DAILY
Status: DISCONTINUED | OUTPATIENT
Start: 2023-04-11 | End: 2023-04-17 | Stop reason: HOSPADM

## 2023-04-10 RX ORDER — NIFEDIPINE 60 MG/1
60 TABLET, EXTENDED RELEASE ORAL DAILY
Status: DISCONTINUED | OUTPATIENT
Start: 2023-04-11 | End: 2023-04-17 | Stop reason: HOSPADM

## 2023-04-10 RX ORDER — ONDANSETRON 4 MG/1
4 TABLET, FILM COATED ORAL EVERY 6 HOURS PRN
Status: DISCONTINUED | OUTPATIENT
Start: 2023-04-10 | End: 2023-04-17 | Stop reason: HOSPADM

## 2023-04-10 RX ORDER — LIOTHYRONINE SODIUM 5 UG/1
5 TABLET ORAL DAILY
Status: DISCONTINUED | OUTPATIENT
Start: 2023-04-11 | End: 2023-04-17 | Stop reason: HOSPADM

## 2023-04-10 RX ORDER — NICOTINE POLACRILEX 4 MG
15 LOZENGE BUCCAL
Status: DISCONTINUED | OUTPATIENT
Start: 2023-04-10 | End: 2023-04-17 | Stop reason: HOSPADM

## 2023-04-10 RX ORDER — HYDROMORPHONE HYDROCHLORIDE 1 MG/ML
0.5 INJECTION, SOLUTION INTRAMUSCULAR; INTRAVENOUS; SUBCUTANEOUS
Status: DISCONTINUED | OUTPATIENT
Start: 2023-04-10 | End: 2023-04-15

## 2023-04-10 RX ORDER — PRAVASTATIN SODIUM 20 MG
20 TABLET ORAL NIGHTLY
Status: DISCONTINUED | OUTPATIENT
Start: 2023-04-10 | End: 2023-04-13

## 2023-04-10 RX ORDER — ONDANSETRON 2 MG/ML
4 INJECTION INTRAMUSCULAR; INTRAVENOUS EVERY 6 HOURS PRN
Status: DISCONTINUED | OUTPATIENT
Start: 2023-04-10 | End: 2023-04-17 | Stop reason: HOSPADM

## 2023-04-10 RX ORDER — INSULIN LISPRO 100 [IU]/ML
0-9 INJECTION, SOLUTION INTRAVENOUS; SUBCUTANEOUS
Status: DISCONTINUED | OUTPATIENT
Start: 2023-04-11 | End: 2023-04-16

## 2023-04-10 RX ORDER — POLYETHYLENE GLYCOL 3350 17 G/17G
17 POWDER, FOR SOLUTION ORAL DAILY
Status: ON HOLD | COMMUNITY
End: 2023-04-14

## 2023-04-10 RX ORDER — NIFEDIPINE 60 MG/1
60 TABLET, EXTENDED RELEASE ORAL DAILY
Status: ON HOLD | COMMUNITY
End: 2023-04-14

## 2023-04-10 RX ORDER — IBUPROFEN 600 MG/1
1 TABLET ORAL
Status: DISCONTINUED | OUTPATIENT
Start: 2023-04-10 | End: 2023-04-17 | Stop reason: HOSPADM

## 2023-04-10 RX ORDER — HYDROCODONE BITARTRATE AND ACETAMINOPHEN 5; 325 MG/1; MG/1
1 TABLET ORAL EVERY 4 HOURS PRN
Status: DISCONTINUED | OUTPATIENT
Start: 2023-04-10 | End: 2023-04-17 | Stop reason: HOSPADM

## 2023-04-10 RX ORDER — UREA 10 %
3 LOTION (ML) TOPICAL NIGHTLY PRN
Status: DISCONTINUED | OUTPATIENT
Start: 2023-04-10 | End: 2023-04-17 | Stop reason: HOSPADM

## 2023-04-10 RX ORDER — GABAPENTIN 300 MG/1
300 CAPSULE ORAL NIGHTLY
Status: DISCONTINUED | OUTPATIENT
Start: 2023-04-10 | End: 2023-04-17 | Stop reason: HOSPADM

## 2023-04-10 RX ADMIN — PRAVASTATIN SODIUM 20 MG: 20 TABLET ORAL at 23:18

## 2023-04-10 RX ADMIN — INSULIN GLARGINE-YFGN 8 UNITS: 100 INJECTION, SOLUTION SUBCUTANEOUS at 23:18

## 2023-04-10 RX ADMIN — SODIUM CHLORIDE 75 ML/HR: 9 INJECTION, SOLUTION INTRAVENOUS at 20:28

## 2023-04-10 NOTE — PLAN OF CARE
Goal Outcome Evaluation:  Plan of Care Reviewed With: patient, daughter        Progress: no change  Outcome Evaluation: Pt is 88/F admitted after a fall a few days ago resulting in a left ankle fracture. VSS. Minimal complaints of pain. SCDs in place. Ortho consulted. NPO at midnight. Will continue to monitor.

## 2023-04-10 NOTE — Clinical Note
Hemostasis started on the right radial artery. Radial compression device applied to vessel. Hemostasis achieved successfully. Closure device additional comment: Vasc band 12 cc of air

## 2023-04-11 ENCOUNTER — APPOINTMENT (OUTPATIENT)
Dept: NUCLEAR MEDICINE | Facility: HOSPITAL | Age: 88
DRG: 287 | End: 2023-04-11
Payer: MEDICARE

## 2023-04-11 ENCOUNTER — APPOINTMENT (OUTPATIENT)
Dept: GENERAL RADIOLOGY | Facility: HOSPITAL | Age: 88
DRG: 287 | End: 2023-04-11
Payer: MEDICARE

## 2023-04-11 PROBLEM — Z79.4 TYPE 2 DIABETES MELLITUS WITH HYPOGLYCEMIA, WITH LONG-TERM CURRENT USE OF INSULIN: Status: ACTIVE | Noted: 2023-04-11

## 2023-04-11 PROBLEM — E11.649 TYPE 2 DIABETES MELLITUS WITH HYPOGLYCEMIA, WITH LONG-TERM CURRENT USE OF INSULIN: Status: ACTIVE | Noted: 2023-04-11

## 2023-04-11 LAB
ANION GAP SERPL CALCULATED.3IONS-SCNC: 6.1 MMOL/L (ref 5–15)
BH CV REST NUCLEAR ISOTOPE DOSE: 10.5 MCI
BH CV STRESS COMMENTS STAGE 1: NORMAL
BH CV STRESS DOSE REGADENOSON STAGE 1: 0.4
BH CV STRESS DURATION MIN STAGE 1: 0
BH CV STRESS DURATION SEC STAGE 1: 10
BH CV STRESS NUCLEAR ISOTOPE DOSE: 27 MCI
BH CV STRESS PROTOCOL 1: NORMAL
BH CV STRESS RECOVERY BP: NORMAL MMHG
BH CV STRESS RECOVERY HR: 77 BPM
BH CV STRESS STAGE 1: 1
BUN SERPL-MCNC: 14 MG/DL (ref 8–23)
BUN/CREAT SERPL: 20.9 (ref 7–25)
CALCIUM SPEC-SCNC: 9.5 MG/DL (ref 8.6–10.5)
CHLORIDE SERPL-SCNC: 107 MMOL/L (ref 98–107)
CO2 SERPL-SCNC: 28.9 MMOL/L (ref 22–29)
CREAT SERPL-MCNC: 0.67 MG/DL (ref 0.57–1)
DEPRECATED RDW RBC AUTO: 40.8 FL (ref 37–54)
EGFRCR SERPLBLD CKD-EPI 2021: 84.2 ML/MIN/1.73
ERYTHROCYTE [DISTWIDTH] IN BLOOD BY AUTOMATED COUNT: 12.5 % (ref 12.3–15.4)
GLUCOSE BLDC GLUCOMTR-MCNC: 119 MG/DL (ref 70–130)
GLUCOSE BLDC GLUCOMTR-MCNC: 124 MG/DL (ref 70–130)
GLUCOSE BLDC GLUCOMTR-MCNC: 140 MG/DL (ref 70–130)
GLUCOSE BLDC GLUCOMTR-MCNC: 144 MG/DL (ref 70–130)
GLUCOSE BLDC GLUCOMTR-MCNC: 173 MG/DL (ref 70–130)
GLUCOSE BLDC GLUCOMTR-MCNC: 56 MG/DL (ref 70–130)
GLUCOSE BLDC GLUCOMTR-MCNC: 60 MG/DL (ref 70–130)
GLUCOSE BLDC GLUCOMTR-MCNC: 64 MG/DL (ref 70–130)
GLUCOSE SERPL-MCNC: 155 MG/DL (ref 65–99)
HBA1C MFR BLD: 9.6 % (ref 4.8–5.6)
HCT VFR BLD AUTO: 37.6 % (ref 34–46.6)
HGB BLD-MCNC: 11.6 G/DL (ref 12–15.9)
LV EF NUC BP: 73 %
MAXIMAL PREDICTED HEART RATE: 132 BPM
MCH RBC QN AUTO: 27.5 PG (ref 26.6–33)
MCHC RBC AUTO-ENTMCNC: 30.9 G/DL (ref 31.5–35.7)
MCV RBC AUTO: 89.1 FL (ref 79–97)
PERCENT MAX PREDICTED HR: 65.91 %
PLATELET # BLD AUTO: 192 10*3/MM3 (ref 140–450)
PMV BLD AUTO: 11.3 FL (ref 6–12)
POTASSIUM SERPL-SCNC: 4.1 MMOL/L (ref 3.5–5.2)
QT INTERVAL: 477 MS
RBC # BLD AUTO: 4.22 10*6/MM3 (ref 3.77–5.28)
SODIUM SERPL-SCNC: 142 MMOL/L (ref 136–145)
STRESS BASELINE BP: NORMAL MMHG
STRESS BASELINE HR: 66 BPM
STRESS PERCENT HR: 78 %
STRESS POST PEAK BP: NORMAL MMHG
STRESS POST PEAK HR: 87 BPM
STRESS TARGET HR: 112 BPM
WBC NRBC COR # BLD: 6.5 10*3/MM3 (ref 3.4–10.8)

## 2023-04-11 PROCEDURE — 82962 GLUCOSE BLOOD TEST: CPT

## 2023-04-11 PROCEDURE — 85027 COMPLETE CBC AUTOMATED: CPT | Performed by: INTERNAL MEDICINE

## 2023-04-11 PROCEDURE — 63710000001 INSULIN ISOPHANE & REGULAR PER 5 UNITS: Performed by: INTERNAL MEDICINE

## 2023-04-11 PROCEDURE — 93010 ELECTROCARDIOGRAM REPORT: CPT | Performed by: INTERNAL MEDICINE

## 2023-04-11 PROCEDURE — 99221 1ST HOSP IP/OBS SF/LOW 40: CPT | Performed by: NURSE PRACTITIONER

## 2023-04-11 PROCEDURE — 78452 HT MUSCLE IMAGE SPECT MULT: CPT

## 2023-04-11 PROCEDURE — 0 TECHNETIUM SESTAMIBI: Performed by: HOSPITALIST

## 2023-04-11 PROCEDURE — 83036 HEMOGLOBIN GLYCOSYLATED A1C: CPT | Performed by: INTERNAL MEDICINE

## 2023-04-11 PROCEDURE — 93016 CV STRESS TEST SUPVJ ONLY: CPT | Performed by: INTERNAL MEDICINE

## 2023-04-11 PROCEDURE — 93017 CV STRESS TEST TRACING ONLY: CPT

## 2023-04-11 PROCEDURE — 93018 CV STRESS TEST I&R ONLY: CPT | Performed by: INTERNAL MEDICINE

## 2023-04-11 PROCEDURE — 80048 BASIC METABOLIC PNL TOTAL CA: CPT | Performed by: INTERNAL MEDICINE

## 2023-04-11 PROCEDURE — 25010000002 REGADENOSON 0.4 MG/5ML SOLUTION: Performed by: HOSPITALIST

## 2023-04-11 PROCEDURE — A9500 TC99M SESTAMIBI: HCPCS | Performed by: HOSPITALIST

## 2023-04-11 PROCEDURE — 78452 HT MUSCLE IMAGE SPECT MULT: CPT | Performed by: INTERNAL MEDICINE

## 2023-04-11 PROCEDURE — 73610 X-RAY EXAM OF ANKLE: CPT

## 2023-04-11 PROCEDURE — 93005 ELECTROCARDIOGRAM TRACING: CPT | Performed by: NURSE PRACTITIONER

## 2023-04-11 RX ORDER — REGADENOSON 0.08 MG/ML
0.4 INJECTION, SOLUTION INTRAVENOUS
Status: COMPLETED | OUTPATIENT
Start: 2023-04-11 | End: 2023-04-11

## 2023-04-11 RX ORDER — DEXTROSE AND SODIUM CHLORIDE 5; .9 G/100ML; G/100ML
75 INJECTION, SOLUTION INTRAVENOUS CONTINUOUS
Status: DISCONTINUED | OUTPATIENT
Start: 2023-04-11 | End: 2023-04-12

## 2023-04-11 RX ORDER — LOSARTAN POTASSIUM 100 MG/1
100 TABLET ORAL DAILY
Status: DISCONTINUED | OUTPATIENT
Start: 2023-04-11 | End: 2023-04-17 | Stop reason: HOSPADM

## 2023-04-11 RX ORDER — METOPROLOL SUCCINATE 25 MG/1
25 TABLET, EXTENDED RELEASE ORAL
Status: DISCONTINUED | OUTPATIENT
Start: 2023-04-12 | End: 2023-04-17 | Stop reason: HOSPADM

## 2023-04-11 RX ORDER — HYDRALAZINE HYDROCHLORIDE 20 MG/ML
10 INJECTION INTRAMUSCULAR; INTRAVENOUS EVERY 6 HOURS PRN
Status: DISCONTINUED | OUTPATIENT
Start: 2023-04-11 | End: 2023-04-17 | Stop reason: HOSPADM

## 2023-04-11 RX ADMIN — TECHNETIUM TC 99M SESTAMIBI 1 DOSE: 1 INJECTION INTRAVENOUS at 14:39

## 2023-04-11 RX ADMIN — HYDROCODONE BITARTRATE AND ACETAMINOPHEN 1 TABLET: 5; 325 TABLET ORAL at 01:44

## 2023-04-11 RX ADMIN — DOCUSATE SODIUM 50MG AND SENNOSIDES 8.6MG 2 TABLET: 8.6; 5 TABLET, FILM COATED ORAL at 16:50

## 2023-04-11 RX ADMIN — TECHNETIUM TC 99M SESTAMIBI 1 DOSE: 1 INJECTION INTRAVENOUS at 12:45

## 2023-04-11 RX ADMIN — DEXTROSE AND SODIUM CHLORIDE 75 ML/HR: 5; 900 INJECTION, SOLUTION INTRAVENOUS at 09:39

## 2023-04-11 RX ADMIN — INSULIN GLARGINE-YFGN 8 UNITS: 100 INJECTION, SOLUTION SUBCUTANEOUS at 09:01

## 2023-04-11 RX ADMIN — LEVOTHYROXINE SODIUM 75 MCG: 0.07 TABLET ORAL at 09:01

## 2023-04-11 RX ADMIN — DEXTROSE MONOHYDRATE 25 G: 25 INJECTION, SOLUTION INTRAVENOUS at 11:14

## 2023-04-11 RX ADMIN — LOSARTAN POTASSIUM 100 MG: 100 TABLET, FILM COATED ORAL at 02:58

## 2023-04-11 RX ADMIN — NIFEDIPINE 60 MG: 60 TABLET, FILM COATED, EXTENDED RELEASE ORAL at 09:01

## 2023-04-11 RX ADMIN — REGADENOSON 0.4 MG: 0.08 INJECTION, SOLUTION INTRAVENOUS at 14:39

## 2023-04-11 RX ADMIN — DEXTROSE MONOHYDRATE 25 G: 25 INJECTION, SOLUTION INTRAVENOUS at 12:42

## 2023-04-11 RX ADMIN — LIOTHYRONINE SODIUM 5 MCG: 5 TABLET ORAL at 09:01

## 2023-04-11 RX ADMIN — PRAVASTATIN SODIUM 20 MG: 20 TABLET ORAL at 21:27

## 2023-04-11 RX ADMIN — INSULIN HUMAN 20 UNITS: 100 INJECTION, SUSPENSION SUBCUTANEOUS at 07:35

## 2023-04-11 NOTE — PLAN OF CARE
Goal Outcome Evaluation:  Plan of Care Reviewed With: patient        Progress: no change  Outcome Evaluation: Pt A&Ox4, VSS, high BP overnight provider aware, voiding per purewick, pain controlled with prn oral pain medication. Pt given chg bath for possible surgery this AM. Pt npo since midnight. Plan pending surgery this AM

## 2023-04-11 NOTE — PLAN OF CARE
Goal Outcome Evaluation:   Patient is Aox4, VSS. Throughout shift patients blood glucose was difficult to control but it appears to be stable now that she is no longer NPO. Bedrest due to fracture. Treatment plan pending. Will continue to monitor.

## 2023-04-11 NOTE — CONSULTS
"[facility]  Cardiology Consult Note    Patient Identification:  Name: Roland Russell  Age: 88 y.o.  Sex: female  :  1934  MRN: [id number]             Requesting Physician: Karen Romo MD    Reason for Consultation / Chief Complaint:  cardiac clearance    History of Present Illness:   This is a 88-year-old female who is new to this provider.  Has a history of DARI without CPAP machine, diabetes mellitus, hypothyroidism, pacemaker with history of AV block, obesity, blindness, hypertension, history of stroke, hyperlipidemia, PAF on Eliquis at home, and hypothyroidism.  She presents to [facility] from [facility] after having a fall.  She states she got up and was dizzy prior to her fall.  Her CT showed minimally displaced oblique fracture of the lateral malleolus.  Associated evolution fracture of the anterior and lateral aspect of the distal tibia at the insertion of the anterior tibiofibular ligament.  Incomplete transverse fracture of the medial malleolus.    Her recent echo on 2023 at [facility] revealed EF 55% with systolic function is normal.  EKG 2022 was atrial for flutter and V paced.  No history of ischemic work-up noted.  Daughter and patient confirmed    Comorbid cardiac risk factors: DM, age, obesity, sleep apnea    Past Medical History:  Past Medical History:   Diagnosis Date   • Abnormal vision     SEES \"KALEIDOSCOPE\"   • Allergy to adhesive tape    • Arthralgia    • AVB (atrioventricular block)    • Balance problem    • Carotid body tumor     LEFT   • Difficulty walking    • H/O complete eye exam 10/2016   • Headache     OFF AND ON BACK OF HEAD, DOWN NECK TO SHOULDER   • History of glaucoma    • History of poliomyelitis     AGE 9   • History of surgery on arm     left arm   • HLD (hyperlipidemia)    • Hypertension    • Hypothyroidism, acquired    • Legal blindness     SOME PERIPHERAL VISION ON LEFT, SOME VISION ON RIGHT   • Memory loss     " SOME SHORT TERM   • Migraine    • Numbness     LEFT LEG    • Osteoarthritis, multiple sites    • Osteoporosis    • Pacemaker    • Rectal bleeding     X1, WITH BM   • Sleep apnea     DOES NOT USE A MACHINE   • TIA (transient ischemic attack) 12/24/2018    ?, HAD SPELL UNABLE TO TALK   • Type 2 diabetes mellitus    • Type 2 diabetes mellitus, uncontrolled      Past Surgical History:  Past Surgical History:   Procedure Laterality Date   • APPENDECTOMY  1989   • CARDIAC PACEMAKER PLACEMENT  11/25/2004   • CAROTID ENDARTERECTOMY Left 12/13/2019    Procedure: LEFT CAROTID BODY TUMOR RESECTION;  Surgeon: Bryan Severino MD;  Location: [facility] MAIN OR;  Service: Vascular   • CATARACT EXTRACTION Bilateral 1997   • CEREBRAL ANGIOGRAM Bilateral 12/6/2019    Procedure: CAROTID ARTERIOGRAM BILATERAL, RIGHT WRIST APPROACH;  Surgeon: Bryan Severino MD;  Location: [facility] OR 18/19;  Service: Vascular   • CHOLECYSTECTOMY  1989   • COLONOSCOPY  2013    dr montes   • ELBOW PROCEDURE  2016   • ENDOSCOPY  12/06/2012    Dr. Montes; food in stomach, gastritis   • EXCISION LESION  1994    BACK CYST BENGIN   • EYE SURGERY  12/2012   • GLAUCOMA SURGERY Bilateral 1995    laser surgery   • HARDWARE REMOVAL Left     ELBOW   • HYSTERECTOMY  1986   • ORIF ELBOW FRACTURE Left    • PACEMAKER IMPLANTATION     • PACEMAKER REPLACEMENT  06/2013      Allergies:  Allergies   Allergen Reactions   • Adhesive Tape Other (See Comments)     REDNESS, SKIN BURN   • Latex Other (See Comments)     REDNESS, SKIN BURN   • Propoxyphene Itching     Home Meds:  Medications Prior to Admission   Medication Sig Dispense Refill Last Dose   • Calcium Carb-Cholecalciferol (CALCIUM 600 + D PO) Take 1 tablet by mouth After Lunch.   Past Week   • Cyanocobalamin (B-12 PO) Take 1 tablet/day by mouth. 1000 mcg daily   Past Week   • ELIQUIS 5 MG tablet tablet Take 1 tablet by mouth Every 12 (Twelve) Hours.   Past Week   • insulin degludec (TRESIBA FLEXTOUCH) 100 UNIT/ML  solution pen-injector injection Inject 8 Units under the skin into the appropriate area as directed 2 (Two) Times a Day.   Past Week   • levothyroxine (SYNTHROID, LEVOTHROID) 75 MCG tablet TAKE 1 TABLET BY MOUTH DAILY 30 tablet 3 Past Week   • losartan (COZAAR) 100 MG tablet TAKE 1 TABLET DAILY 30 tablet 3 Past Week   • pravastatin (PRAVACHOL) 20 MG tablet Take 1 tablet by mouth Daily. (Patient taking differently: Take 1 tablet by mouth Every Night.) 90 tablet 3 Past Week   • Alcohol Swabs (B-D SINGLE USE SWABS REGULAR) pads USE AS DIRECTED 4 TIMES A  DAY. 400 each 3    • furosemide (LASIX) 20 MG tablet Take 1 tablet by mouth Every Morning.   Unknown   • gabapentin (NEURONTIN) 300 MG capsule Take 1 capsule by mouth Every Night.      • Insulin Pen Needle (BD Pen Needle Luli U/F) 32G X 4 MM misc USE TO INJECT INSULIN 4    TIMES A  each 3    • ketoconazole (NIZORAL) 2 % shampoo       • Ketoconazole 1 % shampoo Apply 1 application topically 2 (Two) Times a Week. 200 mL 1    • levETIRAcetam XR (KEPPRA XR) 500 MG 24 hr tablet Take 1 tablet by mouth Every Night. 30 tablet 0    • liothyronine (CYTOMEL) 5 MCG tablet    Unknown   • meloxicam (MOBIC) 7.5 MG tablet Take 1 tablet by mouth Every 14 (Fourteen) Days.   Unknown   • metFORMIN (GLUCOPHAGE) 500 MG tablet TAKE 1 TABLET TWICE DAILY  WITH MEALS 60 tablet 3    • NIFEdipine XL (PROCARDIA XL) 60 MG 24 hr tablet Take 1 tablet by mouth Daily.      • NovoLOG Mix 70/30 FlexPen (70-30) 100 UNIT/ML suspension pen-injector injection 20 u at lunch; takes 10 with dinner if BG high   Unknown   • pioglitazone (Actos) 30 MG tablet Take 1 tablet by mouth Daily. 90 tablet 0    • polyethylene glycol (MIRALAX) 17 g packet Take 17 g by mouth Daily.      • sennosides-docusate (senna-docusate sodium) 8.6-50 MG per tablet Take 2 tablets by mouth Daily.      • SITagliptin-metFORMIN HCl ER (Janumet XR) 100-1000 MG tablet Take 1 tablet by mouth Daily.        Current Meds:     Scheduled    Medication Ordered Dose/Rate, Route, Frequency Last Action   gabapentin (NEURONTIN) capsule 300 mg 300 mg, PO, Nightly Ordered   insulin lispro (ADMELOG) injection 0-9 Units 0-9 Units, SC, TID With Meals Ordered   levothyroxine (SYNTHROID, LEVOTHROID) tablet 75 mcg 75 mcg, PO, Daily Given, 75 mcg at 04/11 0901   liothyronine (CYTOMEL) tablet 5 mcg 5 mcg, PO, Daily Given, 5 mcg at 04/11 0901   losartan (COZAAR) tablet 100 mg 100 mg, PO, Daily Given, 100 mg at 04/11 0258   NIFEdipine XL (PROCARDIA XL) 24 hr tablet 60 mg 60 mg, PO, Daily Given, 60 mg at 04/11 0901   pravastatin (PRAVACHOL) tablet 20 mg 20 mg, PO, Nightly Given, 20 mg at 04/10 2318   sennosides-docusate (PERICOLACE) 8.6-50 MG per tablet 2 tablet 2 tablet, PO, Daily Ordered     Continuous    Medication Ordered Dose/Rate, Route, Frequency Last Action   dextrose 5 % and sodium chloride 0.9 % infusion 75 mL/hr, IV, Continuous New Bag, 75 mL/hr at 04/11 0939     PRN    Medication Ordered Dose/Rate, Route, Frequency Last Action   acetaminophen (TYLENOL) tablet 650 mg 650 mg, PO, Q4H PRN Ordered   dextrose (D50W) (25 g/50 mL) IV injection 25 g 25 g, IV, Q15 Min PRN Ordered   dextrose (GLUTOSE) oral gel 15 g 15 g, PO, Q15 Min PRN Ordered   glucagon (GLUCAGEN) injection 1 mg 1 mg, IM, Q15 Min PRN Ordered   HYDROcodone-acetaminophen (NORCO) 5-325 MG per tablet 1 tablet 1 tablet, PO, Q4H PRN Given, 1 tablet at 04/11 0144   HYDROmorphone (DILAUDID) injection 0.5 mg (And Linked Group #1) 0.5 mg, IV, Q2H PRN Ordered   melatonin tablet 3 mg 3 mg, PO, Nightly PRN Ordered   naloxone (NARCAN) injection 0.4 mg (And Linked Group #1) 0.4 mg, IV, Q5 Min PRN Ordered   ondansetron (ZOFRAN) injection 4 mg (Or Linked Group #2) 4 mg, IV, Q6H PRN Ordered   ondansetron (ZOFRAN) tablet 4 mg (Or Linked Group #2) 4 mg, PO, Q6H PRN Ordered       Social History:   Social History     Tobacco Use   • Smoking status: Never   • Smokeless tobacco: Never   Substance Use Topics    • Alcohol use: No      Family History:  Family History   Problem Relation Age of Onset   • Cancer Father    • Glaucoma Father         angular glycoma   • Heart disease Father    • Arthritis Father    • Thyroid disease Father    • Stroke Father    • Asthma Sister    • Arthritis Sister    • Cancer Sister         breast   • Stroke Maternal Grandmother    • Heart failure Other    • Diabetes Other    • Hypertension Other    • Stroke Other         aunt   • Thyroid disease Other    • Arthritis Mother    • Diabetes Mother    • Thyroid disease Mother    • Malig Hyperthermia Neg Hx         Review of Systems    Constitutional: No weakness,fatigue, fever, rigors, chills   Eyes: No vision changes, eye pain   ENT/oropharynx: No difficulty swallowing, sore throat, epistaxis, changes in hearing   Cardiovascular: No chest pain, chest tightness, palpitations, paroxysmal nocturnal dyspnea, orthopnea, diaphoresis+dizziness / syncopal episode   Respiratory: No shortness of breath, dyspnea on exertion, cough, wheezing hemoptysis   Gastrointestinal: No abdominal pain, nausea, vomiting, diarrhea, bloody stools   Genitourinary: No hematuria, dysuria   Neurological: No headache, tremors, numbness,  one-sided weakness    Musculoskeletal: No cramps, myalgias,  joint pain, joint swelling   Integument: No rash, edema           Constitutional:  Temp:  [97.2 °F (36.2 °C)-97.9 °F (36.6 °C)] 97.9 °F (36.6 °C)  Heart Rate:  [61-69] 64  Resp:  [16-17] 16  BP: (161-197)/(71-77) 186/74    Physical Exam   General:  Appears in no acute distress, resting in bed  Eyes: eom normal and no conjunctival drainage  HEENT:  No JVD. Thyroid not visibly enlarged. No mucosal pallor or cyanosis  Respiratory: Respirations regular and unlabored at rest. BBS with good air entry in all fields. No crackles, rubs or wheezes auscultated  Cardiovascular: S1S2 irregularly irregular rhythm. No murmur, rub or gallop auscultated. No carotid bruits. DP/PT pulses    . No  pretibial pitting edema  Gastrointestinal: Abdomen soft, flat, non tender. Bowel sounds present. No hepatosplenomegaly. No ascites  Musculoskeletal: DAWN x4. No abnormal movements  Extremities: No digital clubbing or cyanosis  Skin:   Skin warm and dry to touch. No rashes  No xanthoma  Neuro: AAO x3 CN II-XII grossly intact              Cardiographics  ECG: Pending  comparison EKG atrial fib/a flutter           Echocardiogram:   Conclusion   Left ventricular global and regional systolic function is normal.   Calculated left ventricular ejection fraction of 55 % (Single plane Hutton's   method).  Exam End: --    Specimen Collected: 02/19/23 17:34 Last Resulted: 02/19/23 19:11   Received From: [facility]        Imaging  Chest X-ray:   IMPRESSION:     1. Minimally displaced fractures of the medial and lateral malleolus.     Lab Review           Results from last 7 days   Lab Units 04/11/23  0528   SODIUM mmol/L 142   POTASSIUM mmol/L 4.1   BUN mg/dL 14   CREATININE mg/dL 0.67   CALCIUM mg/dL 9.5        Results from last 7 days   Lab Units 04/11/23  0528   WBC 10*3/mm3 6.50   HEMOGLOBIN g/dL 11.6*   HEMATOCRIT % 37.6   PLATELETS 10*3/mm3 192         CHADS-VASc Risk Assessment            7 Total Score    1 Hypertension    2 Age >/= 75    1 DM    2 PRIOR STROKE/TIA/THROMBO    1 Sex: Female        Criteria that do not apply:    CHF    Vascular Disease    Age 65-74          Assessment:  displaced oblique fracture of the lateral malleolus  Incomplete transverse fracture of the medial malleous  PAF: Eliquis on hold  History of AV block with [employer] pacemaker  Hypertension  Hyperlipidemia  Diabetes melitis  DARI: She states she is unable to use CPAP machine       Recommendations / Plan:   This is an 88-year-old female who is new to this provider.  She has a history to include PAF on Eliquis at home, chronic pacemaker, hypertension, history of stroke and diabetes mellitus.  She follows [facility] in the outpatient  setting. She presents to [facility] from [facility] after having a fall.  CT noted displaced oblique fracture of lateral malleolus with incomplete transverse fracture of the medial malleolus.  She comes in consult for cardiac clearance.  Previous echo at [facility] revealed EF 55% with systolic function is normal.  We will obtain an EKG and stress test for cardiac clearance.  Further recommendations once these testings have been completed.    Labs/tests ordered for am: Hydralazine as needed for systolic greater than 170, stress test, EKG      NORI Javier  4/11/2023, 09:06 EDT      EMR Dragon/Transcription:   Dictated utilizing Dragon dictation

## 2023-04-11 NOTE — PROGRESS NOTES
Attempted to evaluate this patient but I was informed she is down in cardiology getting a stress test.  I have been unable to view her x-rays as they were taken at [facility].  I spoke with the nurse and he did not know whether or not she came with a disc containing the images.  The chart was down in cardiology with the patient.    I am going to order new ankle x-rays on her and I will review those when available.  I also informed the nurse to have any images that came with the patient loaded onto our system so that I can review those as well.  I will not be available to see the patient tomorrow  but I will have my nurse see her in my absence.  Once I have a chance to review the images we can formulate a tentative plan at that point.    My initial reaction to the report of the x-rays and CT scan is that this injury probably be managed nonsurgically based on the description of minimally displaced fractures.  Obviously I need to view the images and this decision also depends on her medical condition, functional level and premorbid ambulatory status.  I would not recommend keeping her N.P.O. in anticipation of surgery.  If it is determined that she is best served by surgery then this would likely need to be performed in a delayed fashion to allow the typical postinjury swelling to subside.     Hoang Gonzales MD

## 2023-04-11 NOTE — CASE MANAGEMENT/SOCIAL WORK
Discharge Planning Assessment  [facility]     Patient Name: Roland Russell  MRN: [id number]  Today's Date: 4/11/2023    Admit Date: 4/10/2023    Plan: dispoition pending treatment plan and progress with PT- home with [facility]  vs SNF ( [facility])   Discharge Needs Assessment     Row Name 04/11/23 1247       Living Environment    People in Home alone    Current Living Arrangements home    Primary Care Provided by self    Provides Primary Care For no one    Family Caregiver if Needed child(dima), adult    Family Caregiver Names daughter, Yanet Wen    Quality of Family Relationships helpful;involved;supportive    Able to Return to Prior Arrangements yes       Resource/Environmental Concerns    Resource/Environmental Concerns none       Transition Planning    Patient/Family Anticipates Transition to home with family;home with help/services;inpatient rehabilitation facility    Patient/Family Anticipated Services at Transition        Discharge Needs Assessment    Equipment Currently Used at Home commode;shower chair;wheelchair;walker, standard    Concerns to be Addressed denies needs/concerns at this time               Discharge Plan     Row Name 04/11/23 2926       Plan    Plan dispoition pending treatment plan and progress with PT- home with Brigitte  vs SNF ( referral pending to [facility] [place] [facility])    Patient/Family in Agreement with Plan yes    Plan Comments Spoke with patient and daughter, Yanet Wen [phone], at bedside. Introduced self and explained role. Facesheet verified. Patient lives alone in a  condo. There are no steps to enter. It does have a second floor, but patient stays on the lower level.  She does have a walker, wheelchair, 3 in 1 commode, and shower chair. At baseline, she only ambulates short distances. Her family checks on her often and assists as needed. Patient is current with Brigitte , but only had 1 visit left. Referral placed in [facility] and  Marlen/Brigitte will follow for new needs. If patient needs SNF at [place], she would like to go to [facility].  Referral placed in [facility] and called to [facility]. Patient will need [facility] pre-cert for SNF. Awaiting plan of care from [facility]. CCP will follow.              Continued Care and Services - Admitted Since 4/10/2023     Destination     Service Provider Request Status Selected Services Address Phone Fax Patient Preferred    [facility] Pending - Request Sent N/A [address] [facility] [address] [phone] --          Home Medical Care     Service Provider Request Status Selected Services Address Phone Fax Patient Preferred    [facility] Considering N/A [address] , [address], [facility] [address] [phone] --                 Demographic Summary     Row Name 04/11/23 1247       General Information    Admission Type inpatient    Arrived From emergency department    Required Notices Provided Important Message from Medicare    Referral Source admission list    Reason for Consult discharge planning    Preferred Language English               Functional Status     Row Name 04/11/23 1247       Functional Status    Usual Activity Tolerance moderate    Current Activity Tolerance moderate       Functional Status, IADL    Medications independent    Meal Preparation assistive equipment    Housekeeping assistive equipment    Laundry assistive equipment    Shopping assistive equipment       Mental Status    General Appearance WDL WDL               Psychosocial    No documentation.                Abuse/Neglect    No documentation.                Legal    No documentation.                Substance Abuse    No documentation.                Patient Forms    No documentation.                   Elizabeth Membreno RN

## 2023-04-11 NOTE — DISCHARGE PLACEMENT REQUEST
"Britney Russell (88 y.o. Female)     Date of Birth   1934    Social Security Number       Address   [address] [address]    Home Phone   [phone]    MRN   [id number]       Druze   [employer]    Marital Status                               Admission Date   4/10/23    Admission Type   Urgent    Admitting Provider   Karen Romo MD    Attending Provider   Bryan Harrison MD    Department, Room/Bed   [address], P779/1       Discharge Date       Discharge Disposition       Discharge Destination                               Attending Provider: Bryan Harrison MD    Allergies: Adhesive Tape, Latex, Propoxyphene    Isolation: None   Infection: None   Code Status: CPR    Ht: 152.4 cm (60\")   Wt: 64 kg (141 lb)    Admission Cmt: None   Principal Problem: Ankle fracture [S82.899A]                 Active Insurance as of 4/10/2023     Primary Coverage     Payor Plan Insurance Group Employer/Plan Group    [facility] MEDICARE REPLACEMENT [facility] MEDICARE REPLACEMENT [id number]     Payor Plan Address Payor Plan Phone Number Payor Plan Fax Number Effective Dates    [address] [address]   1/1/2018 - None Entered    [facility] [address]       Subscriber Name Subscriber Birth Date Member ID       BRITNEY RUSSELL 1934 [id number]                 Emergency Contacts      (Rel.) Home Phone Work Phone Mobile Phone    Yanet Wen DAUGHTER (Power of ) -- -- [phone]    Armaan Russell Sr (Son) [phone] -- [phone]    Cecil Russell (Son) [phone] -- [phone]          "

## 2023-04-11 NOTE — PROGRESS NOTES
Name: Roland Russell ADMIT: 4/10/2023   : 1934  PCP: Shawanda Rosario MD    MRN: [id number] LOS: 1 days   AGE/SEX: 88 y.o. female  ROOM: [facility]     Subjective   Subjective   Seen on her way down to stress testing.  She has had some low sugars today because of n.p.o. status and administration of insulin this morning.  She has not been symptomatic with it.  Confirms to me that she does not take 70/30 any longer at home    Review of Systems     Objective   Objective   Vital Signs  Temp:  [97.2 °F (36.2 °C)-97.9 °F (36.6 °C)] 97.9 °F (36.6 °C)  Heart Rate:  [61-71] 70  Resp:  [16-17] 16  BP: (129-197)/(62-77) 148/64  SpO2:  [91 %-99 %] 97 %  on  Flow (L/min):  [2] 2;   Device (Oxygen Therapy): room air  Body mass index is 27.54 kg/m².  Physical Exam  Vitals reviewed.   Constitutional:       Appearance: She is obese.   Cardiovascular:      Rate and Rhythm: Normal rate and regular rhythm.   Pulmonary:      Effort: No respiratory distress.   Skin:     General: Skin is warm and dry.      Coloration: Skin is pale.   Neurological:      Mental Status: She is alert and oriented to person, place, and time.   Psychiatric:         Mood and Affect: Mood normal.       Results Review     I reviewed the patient's new clinical results.  Results from last 7 days   Lab Units 23  0528   WBC 10*3/mm3 6.50   HEMOGLOBIN g/dL 11.6*   PLATELETS 10*3/mm3 192     Results from last 7 days   Lab Units 23  0528   SODIUM mmol/L 142   POTASSIUM mmol/L 4.1   CHLORIDE mmol/L 107   CO2 mmol/L 28.9   BUN mg/dL 14   CREATININE mg/dL 0.67   GLUCOSE mg/dL 155*   EGFR mL/min/1.73 84.2       Results from last 7 days   Lab Units 23  0528   CALCIUM mg/dL 9.5       Hemoglobin A1C   Date/Time Value Ref Range Status   2023 0528 9.60 (H) 4.80 - 5.60 % Final     Glucose   Date/Time Value Ref Range Status   2023 1521 140 (H) 70 - 130 mg/dL Final     Comment:     Meter: [id number] : [id number] Emilio BAEZ    04/11/2023 1435 56 (L) 70 - 130 mg/dL Final     Comment:     Meter: [id number] : [id number] Melida BUTTS RN   04/11/2023 1308 144 (H) 70 - 130 mg/dL Final     Comment:     Meter: [id number] : [id number] Leon Wu RN   04/11/2023 1239 60 (L) 70 - 130 mg/dL Final     Comment:     Meter: [id number] : [id number] Ambrocio Dominguez RN   04/11/2023 1133 173 (H) 70 - 130 mg/dL Final     Comment:     Meter: [id number] : [id number] Emilio Mccain NA   04/11/2023 1109 64 (L) 70 - 130 mg/dL Final     Comment:     Meter: [id number] : [id number] Leon Wu RN   04/11/2023 0849 124 70 - 130 mg/dL Final     Comment:     Meter: [id number] : [id number] Leon Wu RN       No radiology results for the last day  I have personally reviewed all medications:  Scheduled Medications  gabapentin, 300 mg, Oral, Nightly  insulin lispro, 0-9 Units, Subcutaneous, TID With Meals  levothyroxine, 75 mcg, Oral, Daily  liothyronine, 5 mcg, Oral, Daily  losartan, 100 mg, Oral, Daily  [START ON 4/12/2023] metoprolol succinate XL, 25 mg, Oral, Q24H  NIFEdipine XL, 60 mg, Oral, Daily  pravastatin, 20 mg, Oral, Nightly  sennosides-docusate, 2 tablet, Oral, Daily    Infusions  dextrose 5 % and sodium chloride 0.9 %, 75 mL/hr, Last Rate: 75 mL/hr (04/11/23 0939)    Diet  NPO Diet NPO Type: Strict NPO    I have personally reviewed:  [x]  Laboratory   [x]  Microbiology   [x]  Radiology   [x]  EKG/Telemetry  [x]  Cardiology/Vascular   [x]  Pathology    []  Records       Assessment/Plan     Active Hospital Problems    Diagnosis  POA   • **Ankle fracture [A25.791Z]  Yes   • Type 2 diabetes mellitus with hypoglycemia, with long-term current use of insulin [E11.649, Z79.4]  Not Applicable   • PAF (paroxysmal atrial fibrillation) (HCC) [I48.0]  Yes   • Essential hypertension [I10]  Yes      Resolved Hospital Problems   No resolved problems to display.       88 y.o. female admitted with Ankle fracture.    Stress test performed  for further work-up and risk stratification prior to surgery.  Appears to have come back abnormal.  Await further plans from cardiology but likely need additional work-up.    Orthopedic note reviewed.  Not planning operative repair urgently at least for the ankle fractures with repeat images pending.    DM2 with hypoglycemia, worsened by insulin administration and n.p.o. status for procedures.  Instructed nurse to order her diet immediately upon return from stress testing if no further plan for cardiac testing.  Also instructed nurse to continue to check glucoses every 1-2 hours through the day as long as she is continuing to be hypoglycemic at times, treating as appropriate.  Ordered D5W.  All long-acting insulins are now discontinued.    Plan to start Lovenox tomorrow if no surgery planned and depending on cardiac plans.  Could place SCDs on the unaffected limb    Disposition TBD.      Bryan Harrison MD  [facility]  04/11/23  17:43 EDT

## 2023-04-11 NOTE — PROGRESS NOTES
"[facility]  Cardiology Progress Note    Patient Identification:  Name: Roland Russell  Age: 88 y.o.  Sex: female  :  1934  MRN: [id number]                   Discussed with Dr. Cornell and he said her stress test was strongly positive. Patient and daughter notified of results. They verbalized understanding of needing a cardiac catheterization and possible stent placement if blockages are noted and possibly needing antiplatelet therapy. I have left Dr. Cornell's number for Dr. Gonzales to discuss results. Daughter and patient would like to discuss with Dr. Gonzales as well.     )2023  NORI Javier/Transcription:   \"Dictated utilizing Dragon dictation\".     "

## 2023-04-11 NOTE — H&P
"HISTORY AND PHYSICAL   [facility]        Date of Admission: 4/10/2023  Patient Identification:  Name: Roland Russell  Age: 88 y.o.  Sex: female  :  1934  MRN: [id number]                     Primary Care Physician: Shawanda Rosario MD    Chief Complaint:  88 year old female who presented to the emergency room at [facility] after a fall earlier today; she had fixed some food,  felt dizzy and lost her balance; she denies syncope; she was unable to bear weight on her leg and imaging revealed a bimalleolar fracture; her podiatrist was contacted and suggested discharge with a walker and nonweightbearing but the patient lives alone and was not able to do this; she was transferred for further evaluation and treatment    History of Present Illness:   As above    Past Medical History:  Past Medical History:   Diagnosis Date   • Abnormal vision     SEES \"KALEIDOSCOPE\"   • Allergy to adhesive tape    • Arthralgia    • AVB (atrioventricular block)    • Balance problem    • Carotid body tumor     LEFT   • Difficulty walking    • H/O complete eye exam 10/2016   • Headache     OFF AND ON BACK OF HEAD, DOWN NECK TO SHOULDER   • History of glaucoma    • History of poliomyelitis     AGE 9   • History of surgery on arm     left arm   • HLD (hyperlipidemia)    • Hypertension    • Hypothyroidism, acquired    • Legal blindness     SOME PERIPHERAL VISION ON LEFT, SOME VISION ON RIGHT   • Memory loss     SOME SHORT TERM   • Migraine    • Numbness     LEFT LEG    • Osteoarthritis, multiple sites    • Osteoporosis    • Pacemaker    • Rectal bleeding     X1, WITH BM   • Sleep apnea     DOES NOT USE A MACHINE   • TIA (transient ischemic attack) 2018    ?, HAD SPELL UNABLE TO TALK   • Type 2 diabetes mellitus    • Type 2 diabetes mellitus, uncontrolled      Past Surgical History:  Past Surgical History:   Procedure Laterality Date   • APPENDECTOMY     • CARDIAC PACEMAKER PLACEMENT  2004   • CAROTID " ENDARTERECTOMY Left 12/13/2019    Procedure: LEFT CAROTID BODY TUMOR RESECTION;  Surgeon: Bryan Severino MD;  Location: [facility] MAIN OR;  Service: Vascular   • CATARACT EXTRACTION Bilateral 1997   • CEREBRAL ANGIOGRAM Bilateral 12/6/2019    Procedure: CAROTID ARTERIOGRAM BILATERAL, RIGHT WRIST APPROACH;  Surgeon: Bryan Severino MD;  Location: [facility] HYBRID OR 18/19;  Service: Vascular   • CHOLECYSTECTOMY  1989   • COLONOSCOPY  2013    dr montes   • ELBOW PROCEDURE  2016   • ENDOSCOPY  12/06/2012    Dr. Montes; food in stomach, gastritis   • EXCISION LESION  1994    BACK CYST BENGIN   • EYE SURGERY  12/2012   • GLAUCOMA SURGERY Bilateral 1995    laser surgery   • HARDWARE REMOVAL Left     ELBOW   • HYSTERECTOMY  1986   • ORIF ELBOW FRACTURE Left    • PACEMAKER IMPLANTATION     • PACEMAKER REPLACEMENT  06/2013      Home Meds:  Medications Prior to Admission   Medication Sig Dispense Refill Last Dose   • Calcium Carb-Cholecalciferol (CALCIUM 600 + D PO) Take 1 tablet by mouth After Lunch.   Past Week   • Cyanocobalamin (B-12 PO) Take 1 tablet/day by mouth. 1000 mcg daily   Past Week   • ELIQUIS 5 MG tablet tablet Take 1 tablet by mouth Every 12 (Twelve) Hours.   Past Week   • insulin degludec (TRESIBA FLEXTOUCH) 100 UNIT/ML solution pen-injector injection Inject 8 Units under the skin into the appropriate area as directed 2 (Two) Times a Day.   Past Week   • levothyroxine (SYNTHROID, LEVOTHROID) 75 MCG tablet TAKE 1 TABLET BY MOUTH DAILY 30 tablet 3 Past Week   • losartan (COZAAR) 100 MG tablet TAKE 1 TABLET DAILY 30 tablet 3 Past Week   • pravastatin (PRAVACHOL) 20 MG tablet Take 1 tablet by mouth Daily. (Patient taking differently: Take 1 tablet by mouth Every Night.) 90 tablet 3 Past Week   • Alcohol Swabs (B-D SINGLE USE SWABS REGULAR) pads USE AS DIRECTED 4 TIMES A  DAY. 400 each 3    • furosemide (LASIX) 20 MG tablet Take 1 tablet by mouth Every Morning.   Unknown   • gabapentin (NEURONTIN) 300 MG capsule Take 1  capsule by mouth Every Night.      • Insulin Pen Needle (BD Pen Needle Luli U/F) 32G X 4 MM misc USE TO INJECT INSULIN 4    TIMES A  each 3    • ketoconazole (NIZORAL) 2 % shampoo       • Ketoconazole 1 % shampoo Apply 1 application topically 2 (Two) Times a Week. 200 mL 1    • levETIRAcetam XR (KEPPRA XR) 500 MG 24 hr tablet Take 1 tablet by mouth Every Night. 30 tablet 0    • liothyronine (CYTOMEL) 5 MCG tablet    Unknown   • meloxicam (MOBIC) 7.5 MG tablet Take 1 tablet by mouth Every 14 (Fourteen) Days.   Unknown   • metFORMIN (GLUCOPHAGE) 500 MG tablet TAKE 1 TABLET TWICE DAILY  WITH MEALS 60 tablet 3    • NIFEdipine XL (PROCARDIA XL) 60 MG 24 hr tablet Take 1 tablet by mouth Daily.      • NovoLOG Mix 70/30 FlexPen (70-30) 100 UNIT/ML suspension pen-injector injection 20 u at lunch; takes 10 with dinner if BG high   Unknown   • pioglitazone (Actos) 30 MG tablet Take 1 tablet by mouth Daily. 90 tablet 0    • polyethylene glycol (MIRALAX) 17 g packet Take 17 g by mouth Daily.      • sennosides-docusate (senna-docusate sodium) 8.6-50 MG per tablet Take 2 tablets by mouth Daily.      • SITagliptin-metFORMIN HCl ER (Janumet XR) 100-1000 MG tablet Take 1 tablet by mouth Daily.          Allergies:  Allergies   Allergen Reactions   • Adhesive Tape Other (See Comments)     REDNESS, SKIN BURN   • Latex Other (See Comments)     REDNESS, SKIN BURN   • Propoxyphene Itching     Immunizations:    There is no immunization history on file for this patient.  Social History:   Social History     Social History Narrative   • Not on file     Social History     Socioeconomic History   • Marital status:    Tobacco Use   • Smoking status: Never   • Smokeless tobacco: Never   Vaping Use   • Vaping Use: Never used   Substance and Sexual Activity   • Alcohol use: No   • Drug use: No   • Sexual activity: Defer       Family History:  Family History   Problem Relation Age of Onset   • Cancer Father    • Glaucoma Father          "angular glycoma   • Heart disease Father    • Arthritis Father    • Thyroid disease Father    • Stroke Father    • Asthma Sister    • Arthritis Sister    • Cancer Sister         breast   • Stroke Maternal Grandmother    • Heart failure Other    • Diabetes Other    • Hypertension Other    • Stroke Other         aunt   • Thyroid disease Other    • Arthritis Mother    • Diabetes Mother    • Thyroid disease Mother    • Malig Hyperthermia Neg Hx         Review of Systems  See history of present illness and past medical history.  Patient denies headache, syncope,  trauma, change in vision, change in hearing, change in taste, changes in weight, changes in appetite, focal weakness, numbness, or paresthesia.  Patient denies chest pain, palpitations, dyspnea, orthopnea, PND, cough, sinus pressure, rhinorrhea, epistaxis, hemoptysis, nausea, vomiting,hematemesis, diarrhea, constipation or hematchezia.  Denies cold or heat intolerance, polydipsia, polyuria, polyphagia. Denies hematuria, pyuria, dysuria, hesitancy, frequency or urgency. Denies consumption of raw and under cooked meats foods or change in water source.  Denies fever, chills, sweats, night sweats.  Denies missing any routine medications.      Objective:  T Max 24 hrs: Temp (24hrs), Av.8 °F (36.6 °C), Min:97.8 °F (36.6 °C), Max:97.8 °F (36.6 °C)    Vitals Ranges:   Temp:  [97.8 °F (36.6 °C)] 97.8 °F (36.6 °C)  Heart Rate:  [69] 69  Resp:  [16] 16  BP: (171)/(74) 171/74      Exam:  /74 (BP Location: Right arm, Patient Position: Lying)   Pulse 69   Temp 97.8 °F (36.6 °C) (Oral)   Resp 16   Ht 152.4 cm (60\")   Wt 64 kg (141 lb)   SpO2 94%   BMI 27.54 kg/m²     General Appearance:    Alert, cooperative, no distress, appears stated age   Head:    Normocephalic, without obvious abnormality, atraumatic   Eyes:    PERRL, conjunctivae/corneas clear, EOM's intact, both eyes   Ears:    Normal external ear canals, both ears   Nose:   Nares normal, septum " midline, mucosa normal, no drainage    or sinus tenderness   Throat:   Lips, mucosa, and tongue normal   Neck:   Supple, symmetrical, trachea midline, no adenopathy;     thyroid:  no enlargement/tenderness/nodules; no carotid    bruit or JVD   Back:     Symmetric, no curvature, ROM normal, no CVA tenderness   Lungs:     Decreased breath sounds bilaterally, respirations unlabored   Chest Wall:    No tenderness or deformity    Heart:    Regular rate and rhythm, S1 and S2 normal, no murmur, rub   or gallop   Abdomen:     Soft, nontender, bowel sounds active all four quadrants,     no masses, no hepatomegaly, no splenomegaly   Extremities:   Extremities normal, atraumatic, splint in place   Pulses:   2+ and symmetric all extremities   Skin:   Skin color, texture, turgor normal, no rashes or lesions               .    Data Review:  Labs in chart were reviewed.  No results found for: WBC, HGB, HCT, PLT  No results found for: NA, K, CL, CO2, BUN, CREATININE, GLUCOSE  No results found for: CALCIUM, MG, PHOS  No results found for: AST, ALT, ALKPHOS             Imaging Results (All)     None            Assessment:  Active Hospital Problems    Diagnosis  POA   • **Ankle fracture [S82.899C]  Yes      Resolved Hospital Problems   No resolved problems to display.   fall  Dizziness  Diabetes  Hypertension  Hypothyroidism  Hyperlipidemia  paf  Sleep apnea  Obesity  blindness    Plan:  Will ask ortho to see  Hold eliquis  Cardiology to see preop  accu checks, insulin sliding scale  Will likely need rehab  Trend labs  dw patient and ED provider at [facility] Jostin Romo MD  4/10/2023  20:22 EDT

## 2023-04-12 ENCOUNTER — APPOINTMENT (OUTPATIENT)
Dept: OTHER | Facility: HOSPITAL | Age: 88
DRG: 287 | End: 2023-04-12
Payer: MEDICARE

## 2023-04-12 ENCOUNTER — APPOINTMENT (OUTPATIENT)
Dept: GENERAL RADIOLOGY | Facility: HOSPITAL | Age: 88
DRG: 287 | End: 2023-04-12
Payer: MEDICARE

## 2023-04-12 LAB
GLUCOSE BLDC GLUCOMTR-MCNC: 185 MG/DL (ref 70–130)
GLUCOSE BLDC GLUCOMTR-MCNC: 335 MG/DL (ref 70–130)
GLUCOSE BLDC GLUCOMTR-MCNC: 348 MG/DL (ref 70–130)

## 2023-04-12 PROCEDURE — 63710000001 INSULIN LISPRO (HUMAN) PER 5 UNITS: Performed by: INTERNAL MEDICINE

## 2023-04-12 PROCEDURE — 73562 X-RAY EXAM OF KNEE 3: CPT

## 2023-04-12 PROCEDURE — 82962 GLUCOSE BLOOD TEST: CPT

## 2023-04-12 PROCEDURE — 25010000002 ENOXAPARIN PER 10 MG: Performed by: INTERNAL MEDICINE

## 2023-04-12 PROCEDURE — 99233 SBSQ HOSP IP/OBS HIGH 50: CPT | Performed by: INTERNAL MEDICINE

## 2023-04-12 RX ORDER — ENOXAPARIN SODIUM 100 MG/ML
1 INJECTION SUBCUTANEOUS ONCE
Status: COMPLETED | OUTPATIENT
Start: 2023-04-12 | End: 2023-04-12

## 2023-04-12 RX ORDER — SODIUM CHLORIDE 9 MG/ML
75 INJECTION, SOLUTION INTRAVENOUS CONTINUOUS
Status: DISCONTINUED | OUTPATIENT
Start: 2023-04-12 | End: 2023-04-14

## 2023-04-12 RX ADMIN — ACETAMINOPHEN 650 MG: 325 TABLET, FILM COATED ORAL at 08:44

## 2023-04-12 RX ADMIN — INSULIN LISPRO 7 UNITS: 100 INJECTION, SOLUTION INTRAVENOUS; SUBCUTANEOUS at 12:06

## 2023-04-12 RX ADMIN — LOSARTAN POTASSIUM 100 MG: 100 TABLET, FILM COATED ORAL at 08:34

## 2023-04-12 RX ADMIN — DOCUSATE SODIUM 50MG AND SENNOSIDES 8.6MG 2 TABLET: 8.6; 5 TABLET, FILM COATED ORAL at 08:34

## 2023-04-12 RX ADMIN — HYDROCODONE BITARTRATE AND ACETAMINOPHEN 1 TABLET: 5; 325 TABLET ORAL at 23:16

## 2023-04-12 RX ADMIN — LIOTHYRONINE SODIUM 5 MCG: 5 TABLET ORAL at 08:34

## 2023-04-12 RX ADMIN — SODIUM CHLORIDE 75 ML/HR: 9 INJECTION, SOLUTION INTRAVENOUS at 23:12

## 2023-04-12 RX ADMIN — PRAVASTATIN SODIUM 20 MG: 20 TABLET ORAL at 20:49

## 2023-04-12 RX ADMIN — ENOXAPARIN SODIUM 60 MG: 100 INJECTION SUBCUTANEOUS at 18:10

## 2023-04-12 RX ADMIN — INSULIN LISPRO 7 UNITS: 100 INJECTION, SOLUTION INTRAVENOUS; SUBCUTANEOUS at 17:06

## 2023-04-12 RX ADMIN — INSULIN LISPRO 2 UNITS: 100 INJECTION, SOLUTION INTRAVENOUS; SUBCUTANEOUS at 08:34

## 2023-04-12 RX ADMIN — HYDROCODONE BITARTRATE AND ACETAMINOPHEN 1 TABLET: 5; 325 TABLET ORAL at 16:00

## 2023-04-12 RX ADMIN — INSULIN GLARGINE-YFGN 8 UNITS: 100 INJECTION, SOLUTION SUBCUTANEOUS at 11:35

## 2023-04-12 RX ADMIN — NIFEDIPINE 60 MG: 60 TABLET, FILM COATED, EXTENDED RELEASE ORAL at 08:34

## 2023-04-12 RX ADMIN — METOPROLOL SUCCINATE 25 MG: 25 TABLET, EXTENDED RELEASE ORAL at 08:34

## 2023-04-12 RX ADMIN — LEVOTHYROXINE SODIUM 75 MCG: 0.07 TABLET ORAL at 08:34

## 2023-04-12 RX ADMIN — HYDROCODONE BITARTRATE AND ACETAMINOPHEN 1 TABLET: 5; 325 TABLET ORAL at 11:35

## 2023-04-12 RX ADMIN — SODIUM CHLORIDE 500 ML: 9 INJECTION, SOLUTION INTRAVENOUS at 18:44

## 2023-04-12 NOTE — PLAN OF CARE
Goal Outcome Evaluation:  Plan of Care Reviewed With: patient           Outcome Evaluation: AOx4, ankle boot brought in for L ankle to wear at all times, voiding per purewick, Norco given for c/o pain, consent form signed for procedure tomorrow, blood sugars elevated during shift sliding scale insulin administered and one dose of 8 units of long acting insulin, pt had drop of BP at 1830 at 88/46, 500 cc NS bolus given -BP up to 103/50, will CTM

## 2023-04-12 NOTE — PLAN OF CARE
Goal Outcome Evaluation:  Plan of Care Reviewed With: patient        Progress: no change  Outcome Evaluation: Patient here with left ankle fracture.  Bedrest . Voiding fine per purewick. Zero complain of pain at this time. Pain medication offered, patient refused. Plan for surgery pending at this time. Education provided on pain control and blood sugar monitoring. Patient verbalized understanding.

## 2023-04-12 NOTE — PROGRESS NOTES
I have reviewed her x-rays.  She has what appears to be a Goyal B type distal fibula fracture with a nondisplaced medial malleolar fracture.  Her talus is a little tilted but I suspect this is positional.  I consider that this injury is amenable to conservative treatment although surgery is reasonable to consider in certain circumstances.    I spoke with Dr. Cornell regarding the recent stress test.  He tells me that Ms. Russell would be at prohibitive risk with any surgery.  Given this, I certainly recommend that we treat her nonsurgically.    I will have her fitted for a boot.  Tentative plan is to keep her in the boot for 7 to 10 days and then switch her to a cast.  She should continue strict nonweightbearing on the left ankle in the interim.  She will need follow-up with me in the office in 7 to 10 days for repeat x-rays and likely cast placement.    I will stop by in the morning to evaluate Ms. Russell.  I start operating at 7 so I will try to come by before my first procedure.  My nurse informs me that Ms. Russell has been having some knee pain so we have ordered knee x-rays on her.  I will review those at that time.    Hoang Gonzales MD

## 2023-04-12 NOTE — PROGRESS NOTES
Name: Roland Russell ADMIT: 4/10/2023   : 1934  PCP: Shawanda Rosario MD    MRN: [id number] LOS: 2 days   AGE/SEX: 88 y.o. female  ROOM: [facility]     Subjective   Subjective   Feels better today.  Reports feeling very poorly with hypoglycemic episodes yesterday.  She has a CGM on her left arm currently reading 357 when I saw her    Review of Systems     Objective   Objective   Vital Signs  Temp:  [97.7 °F (36.5 °C)-98 °F (36.7 °C)] 97.7 °F (36.5 °C)  Heart Rate:  [67-72] 67  Resp:  [16] 16  BP: (127-170)/(54-71) 127/58  SpO2:  [91 %-99 %] 91 %  on  Flow (L/min):  [2] 2;   Device (Oxygen Therapy): room air  Body mass index is 27.54 kg/m².  Physical Exam  Vitals reviewed.   Constitutional:       Appearance: She is obese.   Cardiovascular:      Rate and Rhythm: Normal rate and regular rhythm.      Heart sounds: Normal heart sounds.   Pulmonary:      Effort: Pulmonary effort is normal. No respiratory distress.      Breath sounds: Normal breath sounds.   Abdominal:      General: There is no distension.      Palpations: Abdomen is soft.      Tenderness: There is no abdominal tenderness.   Musculoskeletal:         General: Tenderness (RLE) present.      Comments: Left ankle bruised and swollen   Skin:     General: Skin is warm and dry.      Coloration: Skin is pale.   Neurological:      Mental Status: She is alert and oriented to person, place, and time.   Psychiatric:         Mood and Affect: Mood normal.       Results Review     I reviewed the patient's new clinical results.  Results from last 7 days   Lab Units 23  0528   WBC 10*3/mm3 6.50   HEMOGLOBIN g/dL 11.6*   PLATELETS 10*3/mm3 192     Results from last 7 days   Lab Units 23  0528   SODIUM mmol/L 142   POTASSIUM mmol/L 4.1   CHLORIDE mmol/L 107   CO2 mmol/L 28.9   BUN mg/dL 14   CREATININE mg/dL 0.67   GLUCOSE mg/dL 155*   EGFR mL/min/1.73 84.2       Results from last 7 days   Lab Units 23  0528   CALCIUM mg/dL 9.5       Hemoglobin  A1C   Date/Time Value Ref Range Status   04/11/2023 0528 9.60 (H) 4.80 - 5.60 % Final     Glucose   Date/Time Value Ref Range Status   04/12/2023 1144 335 (H) 70 - 130 mg/dL Final     Comment:     Meter: [id number] : [id number] Conor Castillo NA   04/12/2023 0711 185 (H) 70 - 130 mg/dL Final     Comment:     Meter: [id number] : [id number] Elif Kurtz NA   04/11/2023 1521 140 (H) 70 - 130 mg/dL Final     Comment:     Meter: [id number] : [id number] Emilio Mccain NA   04/11/2023 1435 56 (L) 70 - 130 mg/dL Final     Comment:     Meter: [id number] : [id number] Melida BUTTS RN   04/11/2023 1308 144 (H) 70 - 130 mg/dL Final     Comment:     Meter: [id number] : [id number] Leno Wu RN   04/11/2023 1239 60 (L) 70 - 130 mg/dL Final     Comment:     Meter: [id number] : [id number] Ambrocio Angelica RN   04/11/2023 1133 173 (H) 70 - 130 mg/dL Final     Comment:     Meter: [id number] : [id number] Emilio Mccain NA       XR Knee 3 View Left    Result Date: 4/12/2023  No evidence for fracture  This report was finalized on 4/12/2023 4:00 PM by Dr. Yuniel Berumen M.D.      XR Ankle 3+ View Left    Result Date: 4/11/2023  1. Distal fibular diametaphyseal fracture with for 4 mm posterolateral displacement. 2. Oblique transverse fracture through the base of the medial malleolus with mild separation though without displacement. Mild widening at the medial talar malleolar articulation. 3. Soft tissues swelling.  Tibiotalar joint effusion. Vascular calcifications. Degenerative heel spur.  This report was finalized on 4/11/2023 7:35 PM by Dr. Mik Mercado M.D.      I have personally reviewed all medications:  Scheduled Medications  gabapentin, 300 mg, Oral, Nightly  insulin lispro, 0-9 Units, Subcutaneous, TID With Meals  levothyroxine, 75 mcg, Oral, Daily  liothyronine, 5 mcg, Oral, Daily  losartan, 100 mg, Oral, Daily  metoprolol succinate XL, 25 mg, Oral, Q24H  NIFEdipine XL, 60 mg, Oral,  Daily  pravastatin, 20 mg, Oral, Nightly  sennosides-docusate, 2 tablet, Oral, Daily    Infusions   Diet  NPO Diet NPO Type: Strict NPO  Diet: Diabetic Diets; Consistent Carbohydrate; Texture: Regular Texture (IDDSI 7); Fluid Consistency: Thin (IDDSI 0)  NPO Diet NPO Type: Strict NPO    I have personally reviewed:  [x]  Laboratory   [x]  Microbiology   [x]  Radiology   [x]  EKG/Telemetry  [x]  Cardiology/Vascular   []  Pathology    []  Records       Assessment/Plan     Active Hospital Problems    Diagnosis  POA   • **Ankle fracture [S82.892I]  Yes   • Type 2 diabetes mellitus with hypoglycemia, with long-term current use of insulin [E11.649, Z79.4]  Not Applicable   • PAF (paroxysmal atrial fibrillation) (HCC) [I48.0]  Yes   • Essential hypertension [I10]  Yes      Resolved Hospital Problems   No resolved problems to display.       88 y.o. female admitted with Ankle fracture.    Discussed with Dr. Cornell.  Abnormal stress test with plans for [facility] in a.m.  - Continue Toprol-XL and pravastatin    Orthopedic note reviewed.  Not planning operative repair at this time.  Following their recs regarding cast, mobility etc.  Norco available for pain as needed    DM2 with hypoglycemia, worsened by insulin administration and n.p.o. status for procedures.  Now hyperglycemic.  -We will give Lantus x1 now.  Continue SSI  - N.p.o. again in the morning for cath.  We will hold additional scheduled long-acting insulin until after that procedure    SCDs to the unaffected limb.  Holding Lovenox until post cath  Disposition TBD.      Bryan Harrison MD  [facility]  04/12/23  16:08 EDT

## 2023-04-12 NOTE — PROGRESS NOTES
"    Orthopedic Progress Note      Patient: Roland Russell    YOB: 1934    Medical Record Number: [id number]    Attending Physician: Bryan Harrison*    Date of Admission: 4/10/2023  5:18 PM    Admitting Dx:  Ankle fracture [S82.899A]    Current Problem List:   Ankle fracture    Essential hypertension    PAF (paroxysmal atrial fibrillation) (HCC)    Type 2 diabetes mellitus with hypoglycemia, with long-term current use of insulin      Past Medical History:   Diagnosis Date   • Abnormal vision     SEES \"KALEIDOSCOPE\"   • Allergy to adhesive tape    • Arthralgia    • AVB (atrioventricular block)    • Balance problem    • Carotid body tumor     LEFT   • Difficulty walking    • H/O complete eye exam 10/2016   • Headache     OFF AND ON BACK OF HEAD, DOWN NECK TO SHOULDER   • History of glaucoma    • History of poliomyelitis     AGE 9   • History of surgery on arm     left arm   • HLD (hyperlipidemia)    • Hypertension    • Hypothyroidism, acquired    • Legal blindness     SOME PERIPHERAL VISION ON LEFT, SOME VISION ON RIGHT   • Memory loss     SOME SHORT TERM   • Migraine    • Numbness     LEFT LEG    • Osteoarthritis, multiple sites    • Osteoporosis    • Pacemaker    • Rectal bleeding     X1, WITH BM   • Sleep apnea     DOES NOT USE A MACHINE   • TIA (transient ischemic attack) 12/24/2018    ?, HAD SPELL UNABLE TO TALK   • Type 2 diabetes mellitus    • Type 2 diabetes mellitus, uncontrolled        Current Medications:  Scheduled Meds:gabapentin, 300 mg, Oral, Nightly  insulin glargine, 8 Units, Subcutaneous, Once  insulin lispro, 0-9 Units, Subcutaneous, TID With Meals  levothyroxine, 75 mcg, Oral, Daily  liothyronine, 5 mcg, Oral, Daily  losartan, 100 mg, Oral, Daily  metoprolol succinate XL, 25 mg, Oral, Q24H  NIFEdipine XL, 60 mg, Oral, Daily  pravastatin, 20 mg, Oral, Nightly  sennosides-docusate, 2 tablet, Oral, Daily      PRN Meds:.•  acetaminophen  •  dextrose  •  dextrose  •  glucagon " (human recombinant)  •  hydrALAZINE  •  HYDROcodone-acetaminophen  •  HYDROmorphone **AND** naloxone  •  melatonin  •  ondansetron **OR** ondansetron    SUBJECTIVE: 88 y.o.  female lying in bed and appears in no acute distress.  Family at bedside    OBJECTIVE:   Vitals:    04/11/23 2226 04/12/23 0312 04/12/23 0535 04/12/23 1100   BP: 170/67  155/54 164/71   BP Location: Left arm  Left arm Left arm   Patient Position: Lying  Lying Lying   Pulse: 67 72 72 71   Resp: 16 16 16 16   Temp: 97.9 °F (36.6 °C) 97.8 °F (36.6 °C) 98 °F (36.7 °C) 97.9 °F (36.6 °C)   TempSrc: Oral Oral Oral Oral   SpO2: 99% 98% 96% 99%   Weight:       Height:         I/O last 3 completed shifts:  In: 468 [P.O.:468]  Out: 1850 [Urine:1850]    Diagnostic Tests:  Lab Results (last 72 hours)     Procedure Component Value Units Date/Time    POC Glucose Once [[id number]]  (Abnormal) Collected: 04/12/23 0711    Specimen: Blood Updated: 04/12/23 0713     Glucose 185 mg/dL      Comment: Meter: [id number] : [id number] Elif Kurtz NA       POC Glucose Once [[id number]]  (Abnormal) Collected: 04/11/23 1521    Specimen: Blood Updated: 04/11/23 1522     Glucose 140 mg/dL      Comment: Meter: [id number] : [id number] Emilio Mccain NA       POC Glucose Once [[id number]]  (Abnormal) Collected: 04/11/23 1435    Specimen: Blood Updated: 04/11/23 1436     Glucose 56 mg/dL      Comment: Meter: [id number] : [id number] Melida BUTTS RN       POC Glucose Once [[id number]]  (Abnormal) Collected: 04/11/23 1308    Specimen: Blood Updated: 04/11/23 1311     Glucose 144 mg/dL      Comment: Meter: [id number] : [id number] Leon Wu RN       POC Glucose Once [[id number]]  (Abnormal) Collected: 04/11/23 1239    Specimen: Blood Updated: 04/11/23 1240     Glucose 60 mg/dL      Comment: Meter: [id number] : [id number] Ambrocio Dominguez RN       POC Glucose Once [[id number]]  (Abnormal) Collected: 04/11/23 1133    Specimen: Blood Updated: 04/11/23 1138      Glucose 173 mg/dL      Comment: Meter: [id number] : [id number] Emilio BAEZ       POC Glucose Once [[id number]]  (Abnormal) Collected: 04/11/23 1109    Specimen: Blood Updated: 04/11/23 1112     Glucose 64 mg/dL      Comment: Meter: [id number] : [id number] Leon Wu RN       POC Glucose Once [[id number]]  (Normal) Collected: 04/11/23 0849    Specimen: Blood Updated: 04/11/23 0850     Glucose 124 mg/dL      Comment: Meter: [id number] : [id number] Leon Wu RN       Basic Metabolic Panel [[id number]]  (Abnormal) Collected: 04/11/23 0528    Specimen: Blood Updated: 04/11/23 0655     Glucose 155 mg/dL      BUN 14 mg/dL      Creatinine 0.67 mg/dL      Sodium 142 mmol/L      Potassium 4.1 mmol/L      Chloride 107 mmol/L      CO2 28.9 mmol/L      Calcium 9.5 mg/dL      BUN/Creatinine Ratio 20.9     Anion Gap 6.1 mmol/L      eGFR 84.2 mL/min/1.73     Narrative:      GFR Normal >60  Chronic Kidney Disease <60  Kidney Failure <15    The GFR formula is only valid for adults with stable renal function between ages 18 and 70.    POC Glucose Once [[id number]]  (Normal) Collected: 04/11/23 0612    Specimen: Blood Updated: 04/11/23 0613     Glucose 119 mg/dL      Comment: Meter: [id number] : [id number] Jayde Mohr RN       Hemoglobin A1c [[id number]]  (Abnormal) Collected: 04/11/23 0528    Specimen: Blood Updated: 04/11/23 0603     Hemoglobin A1C 9.60 %     Narrative:      Hemoglobin A1C Ranges:    Increased Risk for Diabetes  5.7% to 6.4%  Diabetes                     >= 6.5%  Diabetic Goal                < 7.0%    CBC (No Diff) [[id number]]  (Abnormal) Collected: 04/11/23 0528    Specimen: Blood Updated: 04/11/23 0552     WBC 6.50 10*3/mm3      RBC 4.22 10*6/mm3      Hemoglobin 11.6 g/dL      Hematocrit 37.6 %      MCV 89.1 fL      MCH 27.5 pg      MCHC 30.9 g/dL      RDW 12.5 %      RDW-SD 40.8 fl      MPV 11.3 fL      Platelets 192 10*3/mm3     POC Glucose Once [[id number]]  (Abnormal) Collected:  04/10/23 2141    Specimen: Blood Updated: 04/10/23 2142     Glucose 260 mg/dL      Comment: Meter: [id number] : [id number] Elif BAEZ          XR Ankle 3+ View Left    Result Date: 4/11/2023  1. Distal fibular diametaphyseal fracture with for 4 mm posterolateral displacement. 2. Oblique transverse fracture through the base of the medial malleolus with mild separation though without displacement. Mild widening at the medial talar malleolar articulation. 3. Soft tissues swelling.  Tibiotalar joint effusion. Vascular calcifications. Degenerative heel spur.  This report was finalized on 4/11/2023 7:35 PM by Dr. Mik Mercado M.D.        PHYSICAL EXAM: Left ankle does have some swelling and some resolving ecchymosis over the lateral malleolar area.  Toes are warm and pink.  Capillary refill is brisk.  She does have good motion and sensation to her left foot and ankle.  Patient is also complaining of left knee pain.  She does have good quad strength.  But does have a small effusion in the suprapatellar area she also has some palpable tenderness along the medial joint line.  Was able to flex her knee to about 80 degrees before she complained of pain.     ASSESSMENT & PLAN:  Dr. Gonzales has reviewed the x-rays.  I have also made arrangements for the x-rays and CT of the left ankle from [facility] to be downloaded into the system for his review.  For now we will plan to treat her nonoperatively in a fracture boot.  According to the family she was given a fracture boot at [facility] emergency room when she fell several days ago however the boot was taken home when she was admitted to [facility].  Made arrangements for the family to bring the boot back and have encouraged her to wear the fracture walker at all times.  Patient will need to be strict nonweightbearing.    X-rays of the left knee ordered    Family is very concerned about her continued syncopal episodes.  She has been hospitalized several times since  November of last year.  Cardiology is following.  Patient stress test done today is still pending.  Patient does live alone.  And have discussed with the family that I think it would be beneficial for her to go to a skilled nursing facility once discharged from the hospital rather than going home.  I will continue to follow while in the hospital        Date: 4/12/2023    Keyona Akhtar RN

## 2023-04-12 NOTE — PROGRESS NOTES
LOS: 2 days   Patient Care Team:  Shawanda Rosario MD as PCP - General (Internal Medicine)  Tevin Albrecht MD as Consulting Physician (Cardiac Electrophysiology)    Chief Complaint: Follow-up left ankle fracture, abnormal stress test.    Interval History: No chest pain or shortness of breath.  Still with pain in her ankle.    Vital Signs:  Temp:  [97.7 °F (36.5 °C)-98 °F (36.7 °C)] 97.7 °F (36.5 °C)  Heart Rate:  [67-72] 67  Resp:  [16] 16  BP: (127-170)/(54-71) 127/58    Intake/Output Summary (Last 24 hours) at 4/12/2023 1655  Last data filed at 4/12/2023 1300  Gross per 24 hour   Intake 658 ml   Output 900 ml   Net -242 ml       Physical Exam:   General Appearance:    No acute distress, alert and oriented x4   Lungs:     Clear to auscultation bilaterally     Heart:    Regular rhythm and normal rate.  No murmurs, gallops, or       rubs.   Abdomen:     Soft, nontender, nondistended.    Extremities:   Left ankle bruising and swelling.     Results Review:    Results from last 7 days   Lab Units 04/11/23  0528   SODIUM mmol/L 142   POTASSIUM mmol/L 4.1   CHLORIDE mmol/L 107   CO2 mmol/L 28.9   BUN mg/dL 14   CREATININE mg/dL 0.67   GLUCOSE mg/dL 155*   CALCIUM mg/dL 9.5         Results from last 7 days   Lab Units 04/11/23  0528   WBC 10*3/mm3 6.50   HEMOGLOBIN g/dL 11.6*   HEMATOCRIT % 37.6   PLATELETS 10*3/mm3 192                       I reviewed the patient's new clinical results.        Assessment:  1.  Mechanical fall with left ankle fracture (distal fibula fracture with a nondisplaced medial malleolus fracture)  2.  Abnormal high risk Cardiolite stress test (moderate sized and severe intensity ischemia in the anterior wall and apex)  3.  Diabetes, longstanding and poorly controlled   4.  Paroxysmal atrial fibrillation  5.  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 Tevin Albrecht at [place])  6.  Normal ejection fraction 55% by echo on 2/19/2023  7.   Hypertension  8.  DARI, intolerant of CPAP    Plan:  -I had a long and detailed discussion with the patient and her daughter today.  She lives independently at home (at least prior to these events).  She has a longstanding history of diabetes.  She is not having anginal symptoms, although her preoperative stress test was abnormal and high risk with a moderate sized area of severe ischemia in the anterior wall and apex.  This is suggestive of potential severe LAD distribution ischemia.  I explained that a nuclear stress test is only 85% accurate, although it is suggestive of a potential issue.    -I discussed a left heart catheterization, including the risks involved (including stroke, death, bleeding, and vascular complications).  After discussing further with the patient and her daughter, they were agreeable to proceed.  I will have this scheduled for tomorrow morning.     -I did also explain that if the stent were needed, she would have to be on antiplatelet therapy with Plavix (and she already takes Eliquis for atrial fibrillation as an outpatient).  This obviously would increase her risk for bleeding in the future.    -Discussed with Dr. Gonzaels and Dr. Harrison earlier as well.  Dr. Gonzales is not planning on any operative intervention at this time.  With this high risk stress test, she would be relatively prohibitive for surgery, and at a minimum high risk.    -Continue Toprol-XL and statin therapy with pravastatin.  She is also on nifedipine at baseline.    -Holding Eliquis in preparation for the heart catheterization.  I will give her 1 dose of Lovenox now.    Sergei Cornell MD  04/12/23  16:55 EDT

## 2023-04-13 LAB
ANION GAP SERPL CALCULATED.3IONS-SCNC: 5.9 MMOL/L (ref 5–15)
BUN SERPL-MCNC: 18 MG/DL (ref 8–23)
BUN/CREAT SERPL: 18.4 (ref 7–25)
CALCIUM SPEC-SCNC: 9.2 MG/DL (ref 8.6–10.5)
CHLORIDE SERPL-SCNC: 102 MMOL/L (ref 98–107)
CO2 SERPL-SCNC: 30.1 MMOL/L (ref 22–29)
CREAT SERPL-MCNC: 0.98 MG/DL (ref 0.57–1)
DEPRECATED RDW RBC AUTO: 39.6 FL (ref 37–54)
EGFRCR SERPLBLD CKD-EPI 2021: 55.6 ML/MIN/1.73
ERYTHROCYTE [DISTWIDTH] IN BLOOD BY AUTOMATED COUNT: 12.2 % (ref 12.3–15.4)
GLUCOSE BLDC GLUCOMTR-MCNC: 199 MG/DL (ref 70–130)
GLUCOSE BLDC GLUCOMTR-MCNC: 260 MG/DL (ref 70–130)
GLUCOSE BLDC GLUCOMTR-MCNC: 324 MG/DL (ref 70–130)
GLUCOSE SERPL-MCNC: 213 MG/DL (ref 65–99)
HCT VFR BLD AUTO: 35.6 % (ref 34–46.6)
HGB BLD-MCNC: 11.1 G/DL (ref 12–15.9)
MCH RBC QN AUTO: 27.8 PG (ref 26.6–33)
MCHC RBC AUTO-ENTMCNC: 31.2 G/DL (ref 31.5–35.7)
MCV RBC AUTO: 89.2 FL (ref 79–97)
PLATELET # BLD AUTO: 189 10*3/MM3 (ref 140–450)
PMV BLD AUTO: 11.8 FL (ref 6–12)
POTASSIUM SERPL-SCNC: 4.2 MMOL/L (ref 3.5–5.2)
RBC # BLD AUTO: 3.99 10*6/MM3 (ref 3.77–5.28)
SODIUM SERPL-SCNC: 138 MMOL/L (ref 136–145)
WBC NRBC COR # BLD: 7.06 10*3/MM3 (ref 3.4–10.8)

## 2023-04-13 PROCEDURE — G0378 HOSPITAL OBSERVATION PER HR: HCPCS

## 2023-04-13 PROCEDURE — 25010000002 HEPARIN (PORCINE) PER 1000 UNITS: Performed by: STUDENT IN AN ORGANIZED HEALTH CARE EDUCATION/TRAINING PROGRAM

## 2023-04-13 PROCEDURE — 93458 L HRT ARTERY/VENTRICLE ANGIO: CPT | Performed by: STUDENT IN AN ORGANIZED HEALTH CARE EDUCATION/TRAINING PROGRAM

## 2023-04-13 PROCEDURE — 4A023N7 MEASUREMENT OF CARDIAC SAMPLING AND PRESSURE, LEFT HEART, PERCUTANEOUS APPROACH: ICD-10-PCS | Performed by: STUDENT IN AN ORGANIZED HEALTH CARE EDUCATION/TRAINING PROGRAM

## 2023-04-13 PROCEDURE — 25510000001 IOPAMIDOL PER 1 ML: Performed by: STUDENT IN AN ORGANIZED HEALTH CARE EDUCATION/TRAINING PROGRAM

## 2023-04-13 PROCEDURE — B2111ZZ FLUOROSCOPY OF MULTIPLE CORONARY ARTERIES USING LOW OSMOLAR CONTRAST: ICD-10-PCS | Performed by: STUDENT IN AN ORGANIZED HEALTH CARE EDUCATION/TRAINING PROGRAM

## 2023-04-13 PROCEDURE — 85027 COMPLETE CBC AUTOMATED: CPT | Performed by: INTERNAL MEDICINE

## 2023-04-13 PROCEDURE — 80048 BASIC METABOLIC PNL TOTAL CA: CPT | Performed by: INTERNAL MEDICINE

## 2023-04-13 PROCEDURE — 99222 1ST HOSP IP/OBS MODERATE 55: CPT | Performed by: ORTHOPAEDIC SURGERY

## 2023-04-13 PROCEDURE — 27808 TREATMENT OF ANKLE FRACTURE: CPT | Performed by: ORTHOPAEDIC SURGERY

## 2023-04-13 PROCEDURE — C1769 GUIDE WIRE: HCPCS | Performed by: STUDENT IN AN ORGANIZED HEALTH CARE EDUCATION/TRAINING PROGRAM

## 2023-04-13 PROCEDURE — 82962 GLUCOSE BLOOD TEST: CPT

## 2023-04-13 PROCEDURE — C1894 INTRO/SHEATH, NON-LASER: HCPCS | Performed by: STUDENT IN AN ORGANIZED HEALTH CARE EDUCATION/TRAINING PROGRAM

## 2023-04-13 PROCEDURE — 99232 SBSQ HOSP IP/OBS MODERATE 35: CPT | Performed by: STUDENT IN AN ORGANIZED HEALTH CARE EDUCATION/TRAINING PROGRAM

## 2023-04-13 RX ORDER — ACETAMINOPHEN 325 MG/1
650 TABLET ORAL EVERY 4 HOURS PRN
Status: DISCONTINUED | OUTPATIENT
Start: 2023-04-13 | End: 2023-04-15

## 2023-04-13 RX ORDER — VERAPAMIL HYDROCHLORIDE 2.5 MG/ML
INJECTION, SOLUTION INTRAVENOUS
Status: DISCONTINUED | OUTPATIENT
Start: 2023-04-13 | End: 2023-04-13 | Stop reason: HOSPADM

## 2023-04-13 RX ORDER — LIDOCAINE HYDROCHLORIDE 20 MG/ML
INJECTION, SOLUTION INFILTRATION; PERINEURAL
Status: DISCONTINUED | OUTPATIENT
Start: 2023-04-13 | End: 2023-04-13 | Stop reason: HOSPADM

## 2023-04-13 RX ORDER — ATORVASTATIN CALCIUM 20 MG/1
40 TABLET, FILM COATED ORAL DAILY
Status: DISCONTINUED | OUTPATIENT
Start: 2023-04-13 | End: 2023-04-17 | Stop reason: HOSPADM

## 2023-04-13 RX ORDER — SODIUM CHLORIDE 9 MG/ML
INJECTION, SOLUTION INTRAVENOUS
Status: COMPLETED | OUTPATIENT
Start: 2023-04-13 | End: 2023-04-13

## 2023-04-13 RX ORDER — HEPARIN SODIUM 1000 [USP'U]/ML
INJECTION, SOLUTION INTRAVENOUS; SUBCUTANEOUS
Status: DISCONTINUED | OUTPATIENT
Start: 2023-04-13 | End: 2023-04-13 | Stop reason: HOSPADM

## 2023-04-13 RX ADMIN — LIOTHYRONINE SODIUM 5 MCG: 5 TABLET ORAL at 08:25

## 2023-04-13 RX ADMIN — SODIUM CHLORIDE 75 ML/HR: 9 INJECTION, SOLUTION INTRAVENOUS at 12:05

## 2023-04-13 RX ADMIN — LOSARTAN POTASSIUM 100 MG: 100 TABLET, FILM COATED ORAL at 08:25

## 2023-04-13 RX ADMIN — HYDROCODONE BITARTRATE AND ACETAMINOPHEN 1 TABLET: 5; 325 TABLET ORAL at 20:45

## 2023-04-13 RX ADMIN — NIFEDIPINE 60 MG: 60 TABLET, FILM COATED, EXTENDED RELEASE ORAL at 08:25

## 2023-04-13 RX ADMIN — ATORVASTATIN CALCIUM 40 MG: 20 TABLET, FILM COATED ORAL at 20:45

## 2023-04-13 RX ADMIN — INSULIN GLARGINE-YFGN 8 UNITS: 100 INJECTION, SOLUTION SUBCUTANEOUS at 20:45

## 2023-04-13 RX ADMIN — LEVOTHYROXINE SODIUM 75 MCG: 0.07 TABLET ORAL at 08:25

## 2023-04-13 RX ADMIN — HYDROCODONE BITARTRATE AND ACETAMINOPHEN 1 TABLET: 5; 325 TABLET ORAL at 14:26

## 2023-04-13 RX ADMIN — METOPROLOL SUCCINATE 25 MG: 25 TABLET, EXTENDED RELEASE ORAL at 08:25

## 2023-04-13 RX ADMIN — ACETAMINOPHEN 650 MG: 325 TABLET, FILM COATED ORAL at 07:00

## 2023-04-13 NOTE — NURSING NOTE
"Diabetes Education  Assessment/Teaching    Patient Name:  Roland Russell  YOB: 1934  MRN: [id number]  Admit Date:  4/10/2023      Assessment Date:  4/13/2023  Flowsheet Row Most Recent Value   General Information     Referral From: Other -order set. Meet with 89 y/o at bedside to assess needs for DM ed.    Height 152.4 cm (60\")   Height Method Stated   Weight 64 kg (141 lb)   Weight Method Stated   Diabetes History    What type of diabetes do you have? Type 2   Length of Diabetes Diagnosis 10 + years   Do you test your blood sugar at home? yes -pt has Freestyle Jeanmarie/BG sensor.   Have you had high blood sugar? (>140mg/dl) yes -current a1c >9.   Do you have any diabetes complications? blindness   Education Preferences    Barriers to Learning -- vision.   Nutrition Information Family reports pt eats very light breakfast, and mid day, and sometimes a larger dinner meal.    Assessment Topics    Taking Medication - Assessment Needs education -pt, family report pt taking Tresiba bid (rather than qd.) Family describe pt having pp BG spike with larger meal.   Problem Solving - Assessment Competent: hypoglycemia and appropriate tx.   Healthy Coping - Assessment Competent -supportive dtr and family members at bedside.   Monitoring - Assessment Competent   DM Goals    Contact Plan Follow-up medical care          Flowsheet Row Most Recent Value   DM Education Needs    Meter Has own   Meter Type Freestyle   Medication Insulin -advise pt, family Tresiba is designed to be dosed once daily and will not control a post prandial BG elevation. Discuss rationale for prandial insulin.   Problem Solving Hypoglycemia   Discharge Plan Follow-up with PCP -dispo tbd.   Motivation Engaged   Teaching Method Explanation, Discussion   Patient Response Verbalized understanding, Needs reinforcement      Other Comments:  Addendum. Per family request, ask [facility] pharmacist to remove 70/30 insulin from home med list.   Electronically " signed by:  Dariana Monaco, RN, BSN, [facility]  04/13/23 11:43 EDT

## 2023-04-13 NOTE — PLAN OF CARE
Goal Outcome Evaluation:  Plan of Care Reviewed With: patient        Progress: no change  Outcome Evaluation: PATIENT HERE WIOTH LEFT ANKLE FRACTURE. BOOT IN PLACE. NWB TO LEFT LEG. VSS. PO PAIN MEDICATION HELPING WITH PAIN. VOIDING FINE PER SOFYA. PATIENT NPO FOR LEFT HEART CATH TODAY. EDUCATION PROVIDED ON BP AND BLOOD SUGAR MONITORING. PATIENT VERBALIZED UNDERSTANDING. WILL CONTINUE TO MONITOR.

## 2023-04-13 NOTE — PROGRESS NOTES
"    Patient Name: Roland Russell  :1934  88 y.o.      Patient Care Team:  Shawanda Rosario MD as PCP - General (Internal Medicine)  Tevin Albrecht MD as Consulting Physician (Cardiac Electrophysiology)    Chief Complaint:   Left ankle pain    Interval History:   NAEO. Intermittent ankle pain.    Objective   Vital Signs  Temp:  [97.6 °F (36.4 °C)-97.8 °F (36.6 °C)] 97.8 °F (36.6 °C)  Heart Rate:  [60-64] 64  Resp:  [16] 16  BP: ()/(46-72) 142/72    Intake/Output Summary (Last 24 hours) at 2023 1509  Last data filed at 2023 1205  Gross per 24 hour   Intake 1266.25 ml   Output 550 ml   Net 716.25 ml     Flowsheet Rows    Flowsheet Row First Filed Value   Admission Height 152.4 cm (60\") Documented at 04/10/2023 1730   Admission Weight 64 kg (141 lb) Documented at 04/10/2023 1730          GEN: no distress, alert and oriented  HEENT: NACT, EOMI, moist mucous membranes  Lungs: CTAB, no wheezes, rales or rhonchi  CV: normal rate, regular rhythm, normal S1, S2, no murmurs, +2 radial pulses b/l, no carotid bruit  Abdomen: soft, nontender, nondistended, NABS  Extremities: no edema  Skin: no rash, warm, dry  Heme/Lymph: no bruising  Psych: organized thought, normal behavior and affect    Results Review:    Results from last 7 days   Lab Units 23  0512   SODIUM mmol/L 138   POTASSIUM mmol/L 4.2   CHLORIDE mmol/L 102   CO2 mmol/L 30.1*   BUN mg/dL 18   CREATININE mg/dL 0.98   GLUCOSE mg/dL 213*   CALCIUM mg/dL 9.2         Results from last 7 days   Lab Units 23  0512   WBC 10*3/mm3 7.06   HEMOGLOBIN g/dL 11.1*   HEMATOCRIT % 35.6   PLATELETS 10*3/mm3 189                           Medication Review:   gabapentin, 300 mg, Oral, Nightly  insulin glargine, 8 Units, Subcutaneous, Q12H  insulin lispro, 0-9 Units, Subcutaneous, TID With Meals  levothyroxine, 75 mcg, Oral, Daily  liothyronine, 5 mcg, Oral, Daily  losartan, 100 mg, Oral, Daily  metoprolol succinate XL, 25 mg, Oral, " Q24H  NIFEdipine XL, 60 mg, Oral, Daily  pravastatin, 20 mg, Oral, Nightly  sennosides-docusate, 2 tablet, Oral, Daily         sodium chloride, 75 mL/hr, Last Rate: 75 mL/hr (04/13/23 1205)        Assessment & Plan   #Left ankle fracture  #Abnormal nuclear stress test  #Diabetes  #Paroxysmal A-fib  #High degree AV block status post dual-chamber pacemaker  #Hypertension  #DARI    88-year-old with paroxysmal A-fib on liquids, diabetes, hypertension, DARI, high-grade block status post dual-chamber pacemaker who presented with a left ankle fracture.  She underwent nuclear stress test as part of a preoperative work-up prior to possible left ankle fracture surgery and was referred for left heart catheterization for further evaluation.    -Continue Toprol 25 mg daily, nifedipine 60 mg daily, losartan 100 mg daily, pravastatin 20 mg nightly  -Heart catheterization today    Rikki Oconnor MD  [facility]  04/13/23  15:09 EDT

## 2023-04-13 NOTE — DISCHARGE INSTRUCTIONS
[facility]  [address] [place] [address]    Coronary Angiogram (Radial/Ulnar Approach) After Care    Refer to this sheet in the next few weeks. These instructions provide you with information on caring for yourself after your procedure. Your caregiver may also give you more specific instructions. Your treatment has been planned according to current medical practices, but problems sometimes occur. Call your caregiver if you have any problems or questions after your procedure.    Home Care Instructions:  You may shower the day after the procedure. Remove the bandage (dressing) and gently wash the site with plain soap and water. Gently pat the site dry. You may apply a band aid daily for 2 days if desired.    Do not apply powder or lotion to the site.  Do not submerge the affected site in water for 3 to 5 days or until the site is completely healed.   Do not lift, push or pull anything over 5 pounds for 5 days after your procedure or as directed by your physician.  As a reference, a gallon of milk weighs 8 pounds.   Inspect the site at least twice daily. You may notice some bruising at the site and it may be tender for 1 to 2 weeks.     Increase your fluid intake for the next 2 days.    Keep arm elevated for 24 hours. For the remainder of the day, keep your arm in “Pledge of Allegiance” position when up and about.     You may drive 24 hours after the procedure unless otherwise instructed by your caregiver.  Do not operate machinery or power tools for 24 hours.  A responsible adult should be with you for the first 24 hours after you arrive home. Do not make any important legal decisions or sign legal papers for 24 hours.  Do not drink alcohol for 24 hours.    Metformin or any medications containing Metformin should not be taken for 48 hours after your procedure.      Call Your Doctor if:   You have unusual pain at the radial/ulnar (wrist) site.  You have redness, warmth, swelling, or pain at the  radial/ulnar (wrist) site.  You have drainage (other than a small amount of blood on the dressing).  `You have chills or a fever > 101.  Your arm becomes pale or dark, cool, tingly, or numb.  You develop chest pain, shortness of breath, feel faint or pass out.    You have heavy bleeding from the site, hold pressure on the site for 20 minutes.  If the bleeding stops, apply a fresh bandage and call your cardiologist.  However, if you        continue to have bleeding, call 911 and continue to apply pressure to the site.   You have any symptoms of a stroke.  Remember BE FAST  B-balance. Sudden trouble walking or loss of balance.  E-eyes.  Sudden changes in how you see or a sudden onset of a very bad headache.   F-face. Sudden weakness or loss of feeling of the face or facial droop on one side.   A-arms Sudden weakness or numbness in one arm.  One arm drifts down if they are both held out in front of you. This happens suddenly and usually on one side of the body.   S-speech.  Sudden trouble speaking, slurred speech or trouble understanding what are saying.   T-time  Time to call emergency services.  Write down the symptoms and the time they started.

## 2023-04-13 NOTE — PROGRESS NOTES
[facility]    [phone]   LOS: 3 days     Name: Roland Russell  Age/Sex: 88 y.o. female  :  1934        PCP: Shawanda Rosario MD  No chief complaint on file.     Subjective   Pain is controlled she is feeling okay today denies other new issues at this moment.  She is frustrated that she still has not been for her heart cath yet.  General: No Fever or Chills, Cardiac: No Chest Pain or Palpitations, Resp: No Cough or SOA, GI: No Nausea, Vomiting, or Diarrhea and Other: No bleeding    gabapentin, 300 mg, Oral, Nightly  insulin lispro, 0-9 Units, Subcutaneous, TID With Meals  levothyroxine, 75 mcg, Oral, Daily  liothyronine, 5 mcg, Oral, Daily  losartan, 100 mg, Oral, Daily  metoprolol succinate XL, 25 mg, Oral, Q24H  NIFEdipine XL, 60 mg, Oral, Daily  pravastatin, 20 mg, Oral, Nightly  sennosides-docusate, 2 tablet, Oral, Daily      sodium chloride, 75 mL/hr, Last Rate: 75 mL/hr (23 1205)        Objective   Vital Signs  Temp:  [97.6 °F (36.4 °C)-97.8 °F (36.6 °C)] 97.6 °F (36.4 °C)  Heart Rate:  [60-67] 62  Resp:  [16] 16  BP: ()/(46-70) 137/68  Body mass index is 27.54 kg/m².    Intake/Output Summary (Last 24 hours) at 2023 1325  Last data filed at 2023 1205  Gross per 24 hour   Intake 1266.25 ml   Output 550 ml   Net 716.25 ml       Physical Exam  Vitals and nursing note reviewed.   Constitutional:       General: She is not in acute distress.     Appearance: She is ill-appearing.   Cardiovascular:      Rate and Rhythm: Normal rate and regular rhythm.   Pulmonary:      Effort: No respiratory distress.      Breath sounds: Normal breath sounds.   Abdominal:      General: Bowel sounds are normal.      Palpations: Abdomen is soft.   Musculoskeletal:      Comments: Left ankle in a boot   Neurological:      General: No focal deficit present.      Mental Status: She is alert. Mental status is at baseline.           Results Review:       I reviewed the patient's new  clinical results.  Results from last 7 days   Lab Units 04/13/23  0512 04/11/23  0528   WBC 10*3/mm3 7.06 6.50   HEMOGLOBIN g/dL 11.1* 11.6*   PLATELETS 10*3/mm3 189 192     Results from last 7 days   Lab Units 04/13/23  0512 04/11/23  0528   SODIUM mmol/L 138 142   POTASSIUM mmol/L 4.2 4.1   CHLORIDE mmol/L 102 107   CO2 mmol/L 30.1* 28.9   BUN mg/dL 18 14   CREATININE mg/dL 0.98 0.67   CALCIUM mg/dL 9.2 9.5   Estimated Creatinine Clearance: 33.1 mL/min (by C-G formula based on SCr of 0.98 mg/dL).  Lab Results   Component Value Date    HGBA1C 9.60 (H) 04/11/2023    HGBA1C 9.40 (H) 11/03/2022    HGBA1C 10.2 (H) 09/16/2022     Glucose   Date/Time Value Ref Range Status   04/13/2023 1050 260 (H) 70 - 130 mg/dL Final     Comment:     Meter: [id number] : [id number] Jayant Reevesique NA   04/13/2023 0644 199 (H) 70 - 130 mg/dL Final     Comment:     Meter: [id number] : [id number] Elif Diamonique NA   04/12/2023 1700 348 (H) 70 - 130 mg/dL Final     Comment:     Meter: [id number] : [id number] [facility] April NA 04/12/2023 1144 335 (H) 70 - 130 mg/dL Final     Comment:     Meter: [id number] : [id number] [facility] NA 04/12/2023 0711 185 (H) 70 - 130 mg/dL Final     Comment:     Meter: [id number] : [id number] Elif Diamonique NA   04/11/2023 1521 140 (H) 70 - 130 mg/dL Final     Comment:     Meter: [id number] : [id number] Emilio Kalyn NA   04/11/2023 1435 56 (L) 70 - 130 mg/dL Final     Comment:     Meter: [id number] : [id number] Melida BUTTS RN   04/11/2023 1308 144 (H) 70 - 130 mg/dL Final     Comment:     Meter: [id number] : [id number] Leon Wu RN         Assessment & Plan   Active Hospital Problems    Diagnosis  POA   • **Ankle fracture [O82.615U]  Yes   • Type 2 diabetes mellitus with hypoglycemia, with long-term current use of insulin [E11.649, Z79.4]  Not Applicable   • PAF (paroxysmal atrial fibrillation) (HCC) [I48.0]  Yes   • Essential hypertension [I10]  Yes       Resolved Hospital Problems   No resolved problems to display.       PLAN  This is an 88-year-old lady with a history of type 2 diabetes paroxysmal A-fib and hypertension that presented to the hospital after a fall and was found to have left leg pain with noted ankle fracture and left MCL sprain  -Appreciate orthopedic surgery's evaluation and plan for outpatient follow-up with repeat imaging.  -Cardiology evaluated the patient for perioperative risk assessment and is felt that she has underlying coronary artery disease after the patient had a high risk stress test.  Plan for cardiac catheterization today  -Blood pressure stable continue to current only monitor blood pressure medications and monitor for postoperative hypotension  -Blood sugars are running high today.,  Currently n.p.o. for procedure and had hypoglycemia yesterday so the basal insulin was held.  I will resume at 8 units twice a day her home dose this evening.  Continue sliding scale coverage for the time being  -Hopefully we can get out of the hospital tomorrow after cardiac catheterization today.  -Mechanical DVT prophylaxis  -Full code      Disposition  Expected Discharge Date and Time     Expected Discharge Date Expected Discharge Time    Apr 14, 2023              Jj Gonzalez MD  [facility]  04/13/23  13:25 EDT

## 2023-04-13 NOTE — PLAN OF CARE
Goal Outcome Evaluation:  Plan of Care Reviewed With: patient, daughter        Progress: no change  Outcome Evaluation: patient here with left ankle fracture. VSS. Boot in place. Voiding per purewick. PRN pain meds given. Educated on blood sugar monitoring. NPO for heart cath procedure. Will continue to monitor.

## 2023-04-13 NOTE — PROGRESS NOTES
Continued Stay Note  [facility]     Patient Name: Roland Russell  MRN: [id number]  Today's Date: 4/13/2023    Admit Date: 4/10/2023    Plan: TBD, precert pending. Awaiting patient to be medically stable before starting precert   Discharge Plan     Row Name 04/13/23 1329       Plan    Plan TBD, precert pending. Awaiting patient to be medically stable before starting precert    Patient/Family in Agreement with Plan yes    Plan Comments Spoke with pt and family at bedside, patient is to go for procedure today and will likely go to a telemetry unit post op. Pt wishes to d/c to [facility] as first choice and [facility] as 2nd choice. [facility] following and informed her that patient will likely be ready for d/c this weekend or Monday, if medically stable. Pt has [facility] so will require precert. CCP will need to confirm with [facility] when d/c is planned so they can get auth/precert for SNF. CCP to follow. Will need to update pharmacy.               Discharge Codes    No documentation.               Expected Discharge Date and Time     Expected Discharge Date Expected Discharge Time    Apr 14, 2023             Chinyere Suarez RN

## 2023-04-13 NOTE — CONSULTS
History & Physical     Patient: Roland Russell    YOB: 1934    Medical Record Number: [id number]    Reason for consultation: Left ankle injury    History of Present Illness: 88 y.o. female seen for evaluation of her left ankle.  She fell at home later this week, twisting her left ankle.  She also twisted her left knee.  Denies any other injuries or complaints.  Majority of her pain is at the ankle.  She was seen at [place] and then subsequently transferred here for cardiac issues.  She underwent a stress test recently.  I did speak with Dr. Cornell regarding that study.  She is in a boot at present.  She says the boot is comfortable.  Current pain is mild.  She was a household ambulator prior to the fall.  She uses a walker at home.  She does not do her own shopping or typically drive.    Allergies   Allergen Reactions   • Adhesive Tape Other (See Comments)     REDNESS, SKIN BURN   • Latex Other (See Comments)     REDNESS, SKIN BURN   • Propoxyphene Itching         Current Facility-Administered Medications:   •  acetaminophen (TYLENOL) tablet 650 mg, 650 mg, Oral, Q4H PRN, Karen Romo MD, 650 mg at 04/13/23 0700  •  dextrose (D50W) (25 g/50 mL) IV injection 25 g, 25 g, Intravenous, Q15 Min PRN, Karen Romo MD, 25 g at 04/11/23 1242  •  dextrose (GLUTOSE) oral gel 15 g, 15 g, Oral, Q15 Min PRN, Karen Romo MD  •  gabapentin (NEURONTIN) capsule 300 mg, 300 mg, Oral, Nightly, Karen Romo MD  •  glucagon (GLUCAGEN) injection 1 mg, 1 mg, Intramuscular, Q15 Min PRN, Karen Romo MD  •  hydrALAZINE (APRESOLINE) injection 10 mg, 10 mg, Intravenous, Q6H PRN, Landy Amaya APRN  •  HYDROcodone-acetaminophen (NORCO) 5-325 MG per tablet 1 tablet, 1 tablet, Oral, Q4H PRN, Karen Romo MD, 1 tablet at 04/12/23 2316  •  HYDROmorphone (DILAUDID) injection 0.5 mg, 0.5 mg, Intravenous, Q2H PRN **AND** naloxone (NARCAN) injection 0.4 mg, 0.4 mg,  "Intravenous, Q5 Min PRN, Karen Romo MD  •  insulin lispro (ADMELOG) injection 0-9 Units, 0-9 Units, Subcutaneous, TID With Meals, Karen Romo MD, 7 Units at 04/12/23 1706  •  levothyroxine (SYNTHROID, LEVOTHROID) tablet 75 mcg, 75 mcg, Oral, Daily, Karen Romo MD, 75 mcg at 04/12/23 0834  •  liothyronine (CYTOMEL) tablet 5 mcg, 5 mcg, Oral, Daily, Karen Romo MD, 5 mcg at 04/12/23 0834  •  losartan (COZAAR) tablet 100 mg, 100 mg, Oral, Daily, Sharlene Bernardo APRN, 100 mg at 04/12/23 0834  •  melatonin tablet 3 mg, 3 mg, Oral, Nightly PRN, Karen Romo MD  •  metoprolol succinate XL (TOPROL-XL) 24 hr tablet 25 mg, 25 mg, Oral, Q24H, Landy Amaya APRN, 25 mg at 04/12/23 0834  •  NIFEdipine XL (PROCARDIA XL) 24 hr tablet 60 mg, 60 mg, Oral, Daily, Karen Romo MD, 60 mg at 04/12/23 0834  •  ondansetron (ZOFRAN) tablet 4 mg, 4 mg, Oral, Q6H PRN **OR** ondansetron (ZOFRAN) injection 4 mg, 4 mg, Intravenous, Q6H PRN, Karen Romo MD  •  pravastatin (PRAVACHOL) tablet 20 mg, 20 mg, Oral, Nightly, Karen Romo MD, 20 mg at 04/12/23 2049  •  sennosides-docusate (PERICOLACE) 8.6-50 MG per tablet 2 tablet, 2 tablet, Oral, Daily, Karen Romo MD, 2 tablet at 04/12/23 0834  •  sodium chloride 0.9 % infusion, 75 mL/hr, Intravenous, Continuous, Sergei Cornell MD, Last Rate: 75 mL/hr at 04/12/23 2312, 75 mL/hr at 04/12/23 4762    Past Medical History:   Diagnosis Date   • Abnormal vision     SEES \"KALEIDOSCOPE\"   • Allergy to adhesive tape    • Arthralgia    • AVB (atrioventricular block)    • Balance problem    • Carotid body tumor     LEFT   • Difficulty walking    • H/O complete eye exam 10/2016   • Headache     OFF AND ON BACK OF HEAD, DOWN NECK TO SHOULDER   • History of glaucoma    • History of poliomyelitis     AGE 9   • History of surgery on arm     left arm   • HLD (hyperlipidemia)    • Hypertension    • " Hypothyroidism, acquired    • Legal blindness     SOME PERIPHERAL VISION ON LEFT, SOME VISION ON RIGHT   • Memory loss     SOME SHORT TERM   • Migraine    • Numbness     LEFT LEG    • Osteoarthritis, multiple sites    • Osteoporosis    • Pacemaker    • Rectal bleeding     X1, WITH BM   • Sleep apnea     DOES NOT USE A MACHINE   • TIA (transient ischemic attack) 12/24/2018    ?, HAD SPELL UNABLE TO TALK   • Type 2 diabetes mellitus    • Type 2 diabetes mellitus, uncontrolled           Past Surgical History:   Procedure Laterality Date   • APPENDECTOMY  1989   • CARDIAC PACEMAKER PLACEMENT  11/25/2004   • CAROTID ENDARTERECTOMY Left 12/13/2019    Procedure: LEFT CAROTID BODY TUMOR RESECTION;  Surgeon: Bryan Severino MD;  Location: [facility] MAIN OR;  Service: Vascular   • CATARACT EXTRACTION Bilateral 1997   • CEREBRAL ANGIOGRAM Bilateral 12/6/2019    Procedure: CAROTID ARTERIOGRAM BILATERAL, RIGHT WRIST APPROACH;  Surgeon: Bryan Severino MD;  Location: [facility] OR 18/19;  Service: Vascular   • CHOLECYSTECTOMY  1989   • COLONOSCOPY  2013    dr montes   • ELBOW PROCEDURE  2016   • ENDOSCOPY  12/06/2012    Dr. Montes; food in stomach, gastritis   • EXCISION LESION  1994    BACK CYST BENGIN   • EYE SURGERY  12/2012   • GLAUCOMA SURGERY Bilateral 1995    laser surgery   • HARDWARE REMOVAL Left     ELBOW   • HYSTERECTOMY  1986   • ORIF ELBOW FRACTURE Left    • PACEMAKER IMPLANTATION     • PACEMAKER REPLACEMENT  06/2013          Social History     Occupational History   • Not on file   Tobacco Use   • Smoking status: Never   • Smokeless tobacco: Never   Vaping Use   • Vaping Use: Never used   Substance and Sexual Activity   • Alcohol use: No   • Drug use: No   • Sexual activity: Defer      Social History     Social History Narrative   • Not on file          Family History   Problem Relation Age of Onset   • Cancer Father    • Glaucoma Father         angular glycoma   • Heart disease Father    • Arthritis Father    •  Thyroid disease Father    • Stroke Father    • Asthma Sister    • Arthritis Sister    • Cancer Sister         breast   • Stroke Maternal Grandmother    • Heart failure Other    • Diabetes Other    • Hypertension Other    • Stroke Other         aunt   • Thyroid disease Other    • Arthritis Mother    • Diabetes Mother    • Thyroid disease Mother    • Malig Hyperthermia Neg Hx        Review of Systems:    14 point review of systems is reviewed with the patient.  Pertinent positives listed above.    Physical Exam: 88 y.o. female    Vitals:    04/12/23 1915 04/12/23 2232 04/13/23 0215 04/13/23 0638   BP: 103/50 105/59 103/56 155/67   BP Location: Right arm Left arm Left arm Right arm   Patient Position: Sitting Lying Lying Lying   Pulse:  60 60 60   Resp:  16 16 16   Temp:  97.8 °F (36.6 °C) 97.7 °F (36.5 °C) 97.8 °F (36.6 °C)   TempSrc:  Oral Oral Oral   SpO2:  96% 98% 98%   Weight:       Height:           General:  Patient is awake and alert.  Appears in no acute distress or discomfort.    Psych:  Affect and demeanor are appropriate.    Lymph:  No palpable masses or adenopathy in the left lower extremity    Left lower extremity: Boot was in place and partially removed.  There is diffuse edema laterally. Skin appears benign.  No lacerations or abrasions.  Focal tenderness noted over both the medial and lateral malleoli.  There are no palpable step-offs.  No tenderness posteriorly.  Achilles is palpably intact.  Mild medial tenderness at the knee.  No lateral or anterior tenderness.  No significant knee effusion.  Valgus stress is moderately uncomfortable but she seems to have a good endpoint.  Knee is otherwise stable.  Knee range of motion is fairly well-tolerated.  I could get her from full extension to about 95 of flexion. Compartments soft in the calf and foot.  Painful, limited ROM of the ankle.  Could not assess stability due to discomfort with motion.  Good strength in the toes with plantar flexion and  dorsiflexion albeit with discomfort.  Intact sensation.  Brisk cap refill.  Palpable dorsalis pedis pulse.  Toes are pink and warm.    Diagnostic Tests:  Lab Results   Component Value Date    GLUCOSE 213 (H) 04/13/2023    CALCIUM 9.2 04/13/2023     04/13/2023    K 4.2 04/13/2023    CO2 30.1 (H) 04/13/2023     04/13/2023    BUN 18 04/13/2023    CREATININE 0.98 04/13/2023    EGFRIFAFRI 49 (L) 12/01/2021    EGFRIFNONA 42 (L) 12/01/2021    BCR 18.4 04/13/2023    ANIONGAP 5.9 04/13/2023     Lab Results   Component Value Date    WBC 7.06 04/13/2023    HGB 11.1 (L) 04/13/2023    HCT 35.6 04/13/2023    MCV 89.2 04/13/2023     04/13/2023     Lab Results   Component Value Date    INR 1.0 02/18/2023    INR 1.04 02/24/2021    PROTIME 10.2 02/18/2023    PROTIME 13.4 02/24/2021       Imaging:  AP, mortise and lateral views of the left ankle are reviewed along with the associated report.  There is a minimally displaced Goyal B type lateral malleolar fracture with a nondisplaced medial malleolar fracture.  There is slight tilt of the talus.  Medial clear space measures 2.5 mm.      AP, lateral and merchants views left knee are ordered and reviewed to evaluate her complaint of knee pain.  No comparison films are available.  She has moderate patellofemoral arthritis.  No acute abnormalities.    Her outside x-rays and CT scan of the left ankle are reviewed.  Again, the studies confirm a minimally displaced bimalleolar ankle fracture    Assessment: 1.  Minimally displaced bimalleolar left ankle fracture 2.  Left knee contusion and low-grade MCL sprain    Plan: Her son was at the bedside at the time of my evaluation.  We had a thorough discussion regarding her options and the risks of non-surgical management versus surgery.  She is at prohibitive risk from a cardiac standpoint with the surgery and I recommend conservative treatment.  I have recommended we keep her in the boot for now to allow the swelling to subside.   I will plan to see her back in the office in 7 to 10 days for cast placement.  She needs to continue strict nonweightbearing on the left lower extremity.  She will need rehab placement.  Thanks for the consultation.  Please call with any questions or concerns.    Date: 4/13/2023    Hoang Gonzales MD

## 2023-04-14 LAB
ALBUMIN SERPL-MCNC: 3 G/DL (ref 3.5–5.2)
ANION GAP SERPL CALCULATED.3IONS-SCNC: 6.7 MMOL/L (ref 5–15)
BASOPHILS # BLD AUTO: 0.05 10*3/MM3 (ref 0–0.2)
BASOPHILS NFR BLD AUTO: 0.8 % (ref 0–1.5)
BUN SERPL-MCNC: 15 MG/DL (ref 8–23)
BUN/CREAT SERPL: 21.1 (ref 7–25)
CALCIUM SPEC-SCNC: 9.3 MG/DL (ref 8.6–10.5)
CHLORIDE SERPL-SCNC: 105 MMOL/L (ref 98–107)
CHOLEST SERPL-MCNC: 104 MG/DL (ref 0–200)
CO2 SERPL-SCNC: 29.3 MMOL/L (ref 22–29)
CREAT SERPL-MCNC: 0.71 MG/DL (ref 0.57–1)
DEPRECATED RDW RBC AUTO: 43 FL (ref 37–54)
EGFRCR SERPLBLD CKD-EPI 2021: 81.9 ML/MIN/1.73
EOSINOPHIL # BLD AUTO: 0.19 10*3/MM3 (ref 0–0.4)
EOSINOPHIL NFR BLD AUTO: 2.9 % (ref 0.3–6.2)
ERYTHROCYTE [DISTWIDTH] IN BLOOD BY AUTOMATED COUNT: 12.7 % (ref 12.3–15.4)
GLUCOSE BLDC GLUCOMTR-MCNC: 207 MG/DL (ref 70–130)
GLUCOSE BLDC GLUCOMTR-MCNC: 227 MG/DL (ref 70–130)
GLUCOSE BLDC GLUCOMTR-MCNC: 233 MG/DL (ref 70–130)
GLUCOSE BLDC GLUCOMTR-MCNC: 268 MG/DL (ref 70–130)
GLUCOSE SERPL-MCNC: 266 MG/DL (ref 65–99)
HCT VFR BLD AUTO: 39 % (ref 34–46.6)
HDLC SERPL-MCNC: 29 MG/DL (ref 40–60)
HGB BLD-MCNC: 12.2 G/DL (ref 12–15.9)
IMM GRANULOCYTES # BLD AUTO: 0.01 10*3/MM3 (ref 0–0.05)
IMM GRANULOCYTES NFR BLD AUTO: 0.2 % (ref 0–0.5)
LDLC SERPL CALC-MCNC: 44 MG/DL (ref 0–100)
LDLC/HDLC SERPL: 1.28 {RATIO}
LYMPHOCYTES # BLD AUTO: 2.16 10*3/MM3 (ref 0.7–3.1)
LYMPHOCYTES NFR BLD AUTO: 32.9 % (ref 19.6–45.3)
MCH RBC QN AUTO: 28.8 PG (ref 26.6–33)
MCHC RBC AUTO-ENTMCNC: 31.3 G/DL (ref 31.5–35.7)
MCV RBC AUTO: 92.2 FL (ref 79–97)
MONOCYTES # BLD AUTO: 0.43 10*3/MM3 (ref 0.1–0.9)
MONOCYTES NFR BLD AUTO: 6.5 % (ref 5–12)
NEUTROPHILS NFR BLD AUTO: 3.73 10*3/MM3 (ref 1.7–7)
NEUTROPHILS NFR BLD AUTO: 56.7 % (ref 42.7–76)
NRBC BLD AUTO-RTO: 0 /100 WBC (ref 0–0.2)
PHOSPHATE SERPL-MCNC: 2.7 MG/DL (ref 2.5–4.5)
PLATELET # BLD AUTO: 182 10*3/MM3 (ref 140–450)
PMV BLD AUTO: 11.2 FL (ref 6–12)
POTASSIUM SERPL-SCNC: 4.3 MMOL/L (ref 3.5–5.2)
QT INTERVAL: 475 MS
RBC # BLD AUTO: 4.23 10*6/MM3 (ref 3.77–5.28)
SODIUM SERPL-SCNC: 141 MMOL/L (ref 136–145)
TRIGL SERPL-MCNC: 190 MG/DL (ref 0–150)
VLDLC SERPL-MCNC: 31 MG/DL (ref 5–40)
WBC NRBC COR # BLD: 6.57 10*3/MM3 (ref 3.4–10.8)

## 2023-04-14 PROCEDURE — 80061 LIPID PANEL: CPT | Performed by: STUDENT IN AN ORGANIZED HEALTH CARE EDUCATION/TRAINING PROGRAM

## 2023-04-14 PROCEDURE — 82962 GLUCOSE BLOOD TEST: CPT

## 2023-04-14 PROCEDURE — 80069 RENAL FUNCTION PANEL: CPT | Performed by: HOSPITALIST

## 2023-04-14 PROCEDURE — 97162 PT EVAL MOD COMPLEX 30 MIN: CPT

## 2023-04-14 PROCEDURE — 93005 ELECTROCARDIOGRAM TRACING: CPT | Performed by: STUDENT IN AN ORGANIZED HEALTH CARE EDUCATION/TRAINING PROGRAM

## 2023-04-14 PROCEDURE — G0378 HOSPITAL OBSERVATION PER HR: HCPCS

## 2023-04-14 PROCEDURE — 85025 COMPLETE CBC W/AUTO DIFF WBC: CPT | Performed by: HOSPITALIST

## 2023-04-14 PROCEDURE — 93010 ELECTROCARDIOGRAM REPORT: CPT | Performed by: INTERNAL MEDICINE

## 2023-04-14 PROCEDURE — 63710000001 INSULIN LISPRO (HUMAN) PER 5 UNITS: Performed by: STUDENT IN AN ORGANIZED HEALTH CARE EDUCATION/TRAINING PROGRAM

## 2023-04-14 PROCEDURE — 97110 THERAPEUTIC EXERCISES: CPT

## 2023-04-14 PROCEDURE — 25010000002 HYDRALAZINE PER 20 MG: Performed by: STUDENT IN AN ORGANIZED HEALTH CARE EDUCATION/TRAINING PROGRAM

## 2023-04-14 PROCEDURE — 99232 SBSQ HOSP IP/OBS MODERATE 35: CPT | Performed by: STUDENT IN AN ORGANIZED HEALTH CARE EDUCATION/TRAINING PROGRAM

## 2023-04-14 RX ORDER — CLOPIDOGREL BISULFATE 75 MG/1
75 TABLET ORAL DAILY
Status: DISCONTINUED | OUTPATIENT
Start: 2023-04-14 | End: 2023-04-17 | Stop reason: HOSPADM

## 2023-04-14 RX ORDER — GABAPENTIN 300 MG/1
300 CAPSULE ORAL 2 TIMES DAILY
Status: ON HOLD | COMMUNITY
End: 2023-04-17 | Stop reason: SDUPTHER

## 2023-04-14 RX ADMIN — ACETAMINOPHEN 650 MG: 325 TABLET, FILM COATED ORAL at 14:41

## 2023-04-14 RX ADMIN — CLOPIDOGREL BISULFATE 75 MG: 75 TABLET, FILM COATED ORAL at 09:29

## 2023-04-14 RX ADMIN — NIFEDIPINE 60 MG: 60 TABLET, FILM COATED, EXTENDED RELEASE ORAL at 12:32

## 2023-04-14 RX ADMIN — DOCUSATE SODIUM 50MG AND SENNOSIDES 8.6MG 2 TABLET: 8.6; 5 TABLET, FILM COATED ORAL at 09:29

## 2023-04-14 RX ADMIN — APIXABAN 5 MG: 5 TABLET, FILM COATED ORAL at 12:32

## 2023-04-14 RX ADMIN — LIOTHYRONINE SODIUM 5 MCG: 5 TABLET ORAL at 09:28

## 2023-04-14 RX ADMIN — GABAPENTIN 300 MG: 300 CAPSULE ORAL at 21:52

## 2023-04-14 RX ADMIN — METOPROLOL SUCCINATE 25 MG: 25 TABLET, EXTENDED RELEASE ORAL at 09:28

## 2023-04-14 RX ADMIN — LOSARTAN POTASSIUM 100 MG: 100 TABLET, FILM COATED ORAL at 09:28

## 2023-04-14 RX ADMIN — INSULIN LISPRO 6 UNITS: 100 INJECTION, SOLUTION INTRAVENOUS; SUBCUTANEOUS at 17:07

## 2023-04-14 RX ADMIN — INSULIN LISPRO 4 UNITS: 100 INJECTION, SOLUTION INTRAVENOUS; SUBCUTANEOUS at 12:32

## 2023-04-14 RX ADMIN — ATORVASTATIN CALCIUM 40 MG: 20 TABLET, FILM COATED ORAL at 21:52

## 2023-04-14 RX ADMIN — INSULIN GLARGINE-YFGN 8 UNITS: 100 INJECTION, SOLUTION SUBCUTANEOUS at 09:29

## 2023-04-14 RX ADMIN — HYDRALAZINE HYDROCHLORIDE 10 MG: 20 INJECTION INTRAMUSCULAR; INTRAVENOUS at 14:42

## 2023-04-14 RX ADMIN — HYDROCODONE BITARTRATE AND ACETAMINOPHEN 1 TABLET: 5; 325 TABLET ORAL at 15:17

## 2023-04-14 RX ADMIN — INSULIN LISPRO 4 UNITS: 100 INJECTION, SOLUTION INTRAVENOUS; SUBCUTANEOUS at 07:23

## 2023-04-14 RX ADMIN — LEVOTHYROXINE SODIUM 75 MCG: 0.07 TABLET ORAL at 09:29

## 2023-04-14 RX ADMIN — INSULIN GLARGINE-YFGN 12 UNITS: 100 INJECTION, SOLUTION SUBCUTANEOUS at 22:03

## 2023-04-14 RX ADMIN — APIXABAN 5 MG: 5 TABLET, FILM COATED ORAL at 21:52

## 2023-04-14 NOTE — CASE MANAGEMENT/SOCIAL WORK
Continued Stay Note  [facility]     Patient Name: Roland Russell  MRN: [id number]  Today's Date: 4/14/2023    Admit Date: 4/10/2023    Plan: [facility] prec[facility] still pending as of 5:48 PM;  Will need stretcher transport at d/c.   Discharge Plan     Row Name 04/14/23 1747       Plan    Plan [facility] precert still pending as of 5:48 PM;  Will need stretcher transport at d/c.    Plan Comments Per [facility], precert still pending.  CCP had previously scheduled [facility] for 7:30 PM tonight.  CCP called and spoke with [facility] at 5:48PM to cancel the transport.  Packet placed in Pt folder/cubby.  CCP following.........SRS/RN CM               Discharge Codes    No documentation.               Expected Discharge Date and Time     Expected Discharge Date Expected Discharge Time    Apr 14, 2023             Jessica Prather RN

## 2023-04-14 NOTE — PLAN OF CARE
Goal Outcome Evaluation:  Plan of Care Reviewed With: patient        Progress: no change     Patient Is A&O x 4. All vs stable. Rt radial site soft,dry and intact. Pain treated per MAR. Will continue to monitor.       Problem: Adult Inpatient Plan of Care  Goal: Plan of Care Review  Outcome: Ongoing, Progressing  Flowsheets (Taken 4/14/2023 0506)  Progress: no change  Plan of Care Reviewed With: patient  Goal: Patient-Specific Goal (Individualized)  Outcome: Ongoing, Progressing  Goal: Absence of Hospital-Acquired Illness or Injury  Outcome: Ongoing, Progressing  Intervention: Identify and Manage Fall Risk  Description: Perform standard risk assessment on admission using a validated tool or comprehensive approach appropriate to the patient; reassess fall risk frequently, with change in status or transfer to another level of care.  Communicate fall injury risk to interprofessional healthcare team.  Determine need for increased observation, equipment and environmental modification, such as low bed, signage and supportive, nonskid footwear.  Adjust safety measures to individual developmental age, stage and identified risk factors.  Reinforce the importance of safety and physical activity with patient and family.  Perform regular intentional rounding to assess need for position change, pain assessment and personal needs, including assistance with toileting.  Recent Flowsheet Documentation  Taken 4/14/2023 0450 by Jignesh Gonzalez, RN  Safety Promotion/Fall Prevention:   assistive device/personal items within reach   activity supervised   fall prevention program maintained   clutter free environment maintained   nonskid shoes/slippers when out of bed   room organization consistent   safety round/check completed  Taken 4/14/2023 0221 by Jignesh Gonzalez, RN  Safety Promotion/Fall Prevention:   assistive device/personal items within reach   activity supervised   lighting adjusted   mobility aid in reach   room organization  consistent   safety round/check completed  Taken 4/14/2023 0013 by Jignesh Gonzalez RN  Safety Promotion/Fall Prevention:   assistive device/personal items within reach   activity supervised   clutter free environment maintained   fall prevention program maintained   mobility aid in reach   lighting adjusted   nonskid shoes/slippers when out of bed   safety round/check completed   room organization consistent  Taken 4/13/2023 2207 by Jignesh Gonzalez RN  Safety Promotion/Fall Prevention:   clutter free environment maintained   activity supervised   assistive device/personal items within reach   fall prevention program maintained   muscle strengthening facilitated   nonskid shoes/slippers when out of bed   safety round/check completed   room organization consistent  Taken 4/13/2023 2045 by Jignesh Gonzalez RN  Safety Promotion/Fall Prevention:   assistive device/personal items within reach   activity supervised   clutter free environment maintained   fall prevention program maintained   muscle strengthening facilitated   nonskid shoes/slippers when out of bed   lighting adjusted   room organization consistent   safety round/check completed  Intervention: Prevent Skin Injury  Description: Perform a screening for skin injury risk, such as pressure or moisture associated skin damage on admission and at regular intervals throughout hospital stay.  Keep all areas of skin (especially folds) clean and dry.  Maintain adequate skin hydration.  Relieve and redistribute pressure and protect bony prominences; implement measures based on patient-specific risk factors.  Match turning and repositioning schedule to clinical condition.  Encourage weight shift frequently; assist with reposition if unable to complete independently.  Float heels off bed; avoid pressure on the Achilles tendon.  Keep skin free from extended contact with medical devices.  Encourage functional activity and mobility, as early as tolerated.  Use aids (e.g., slide  boards, mechanical lift) during transfer.  Recent Flowsheet Documentation  Taken 4/14/2023 0450 by Jignesh Gonzalez RN  Body Position: position changed independently  Taken 4/14/2023 0221 by Jignesh Gonzalez RN  Body Position:   patient/family refused   upper extremity elevated   weight shifting  Taken 4/14/2023 0013 by Jignesh Gonzalez RN  Body Position:   position changed independently   patient/family refused   weight shifting   upper extremity elevated  Taken 4/13/2023 2207 by Jignesh Gonzalez RN  Body Position: patient/family refused  Taken 4/13/2023 2045 by Jignesh Gonzalez RN  Body Position: (pt refused to turned/ education provided.)   position changed independently   patient/family refused  Intervention: Prevent and Manage VTE (Venous Thromboembolism) Risk  Description: Assess for VTE (venous thromboembolism) risk.  Encourage and assist with early ambulation.  Initiate and maintain compression or other therapy, as indicated, based on identified risk in accordance with organizational protocol and provider order.  Encourage both active and passive leg exercises while in bed, if unable to ambulate.  Recent Flowsheet Documentation  Taken 4/14/2023 0450 by Jignesh Gonzalez RN  Activity Management: activity encouraged  Taken 4/14/2023 0221 by Jignesh Gonzalez RN  Activity Management: activity encouraged  Taken 4/14/2023 0013 by Jignesh Gonzalez RN  Activity Management: activity encouraged  VTE Prevention/Management: (boot on left foot)   bilateral   compression stockings off   other (see comments)  Taken 4/13/2023 2207 by Jignesh Gonzalez RN  Activity Management: activity encouraged  Taken 4/13/2023 2045 by Jignesh Gonzalez RN  Activity Management: activity encouraged  VTE Prevention/Management:   bilateral   compression stockings off  Intervention: Prevent Infection  Description: Maintain skin and mucous membrane integrity; promote hand, oral and pulmonary hygiene.  Optimize fluid balance, nutrition, sleep and glycemic control to maximize  infection resistance.  Identify potential sources of infection early to prevent or mitigate progression of infection (e.g., wound, lines, devices).  Evaluate ongoing need for invasive devices; remove promptly when no longer indicated.  Recent Flowsheet Documentation  Taken 4/14/2023 0450 by Jignesh Gonzalez RN  Infection Prevention:   visitors restricted/screened   single patient room provided   personal protective equipment utilized   rest/sleep promoted   hand hygiene promoted   equipment surfaces disinfected   environmental surveillance performed   cohorting utilized  Taken 4/14/2023 0221 by Jignesh Gonzalez RN  Infection Prevention:   visitors restricted/screened   rest/sleep promoted   single patient room provided   personal protective equipment utilized   hand hygiene promoted   equipment surfaces disinfected   environmental surveillance performed   cohorting utilized  Taken 4/14/2023 0013 by Jignesh Gonzalez RN  Infection Prevention:   visitors restricted/screened   single patient room provided   rest/sleep promoted   personal protective equipment utilized   hand hygiene promoted   equipment surfaces disinfected   environmental surveillance performed   cohorting utilized  Taken 4/13/2023 2207 by Jignesh Gonzalez RN  Infection Prevention:   visitors restricted/screened   single patient room provided   rest/sleep promoted   hand hygiene promoted   equipment surfaces disinfected   environmental surveillance performed   cohorting utilized   personal protective equipment utilized  Taken 4/13/2023 2045 by Jignesh Gonzalez RN  Infection Prevention:   visitors restricted/screened   single patient room provided   rest/sleep promoted   personal protective equipment utilized   hand hygiene promoted   equipment surfaces disinfected   environmental surveillance performed   cohorting utilized  Goal: Optimal Comfort and Wellbeing  Outcome: Ongoing, Progressing  Intervention: Provide Person-Centered Care  Description: Use a family-focused  approach to care.  Develop trust and rapport by proactively providing information, encouraging questions, addressing concerns and offering reassurance.  Acknowledge emotional response to hospitalization.  Recognize and utilize personal coping strategies.  Honor spiritual and cultural preferences.  Recent Flowsheet Documentation  Taken 4/14/2023 0450 by Jignesh Gonzalez RN  Trust Relationship/Rapport:   thoughts/feelings acknowledged   reassurance provided   emotional support provided   choices provided   questions encouraged   questions answered   empathic listening provided   care explained  Taken 4/14/2023 0013 by Jignesh Gonzalez RN  Trust Relationship/Rapport:   thoughts/feelings acknowledged   questions encouraged   reassurance provided   questions answered   emotional support provided   care explained   choices provided   empathic listening provided  Taken 4/13/2023 2045 by Jignesh Gonzalez RN  Trust Relationship/Rapport:   thoughts/feelings acknowledged   empathic listening provided   emotional support provided   care explained   choices provided   reassurance provided   questions encouraged   questions answered  Goal: Readiness for Transition of Care  Outcome: Ongoing, Progressing

## 2023-04-14 NOTE — PROGRESS NOTES
"    Patient Name: Roland Russell  :1934  88 y.o.      Patient Care Team:  Shawanda Rosario MD as PCP - General (Internal Medicine)  Tevin Albrecht MD as Consulting Physician (Cardiac Electrophysiology)    Chief Complaint:   Left ankle pain    Interval History:   Status post left heart catheterization on 2023 which revealed severe, calcified RCA stenoses.Also with severe diffuse LAD disease, not amenable to PCI.  No acute events overnight.    Objective   Vital Signs  Temp:  [97.6 °F (36.4 °C)-98.4 °F (36.9 °C)] 97.9 °F (36.6 °C)  Heart Rate:  [60-64] 64  Resp:  [12-21] 18  BP: (100-173)/(46-72) 165/62    Intake/Output Summary (Last 24 hours) at 2023 0723  Last data filed at 2023 0705  Gross per 24 hour   Intake 1221.25 ml   Output 800 ml   Net 421.25 ml     Flowsheet Rows    Flowsheet Row First Filed Value   Admission Height 152.4 cm (60\") Documented at 04/10/2023 1730   Admission Weight 64 kg (141 lb) Documented at 04/10/2023 1730          GEN: Elderly, no distress, alert and oriented  HEENT: NACT, EOMI, moist mucous membranes  Lungs: CTAB, no wheezes, rales or rhonchi  CV: normal rate, regular rhythm, normal S1, S2, no murmurs, right radial access site CDI, no hematoma, pulse 2+  Abdomen: soft, nontender, nondistended, NABS  Extremities: no edema  Skin: no rash, warm, dry  Heme/Lymph: no bruising  Psych: organized thought, normal behavior and affect    Results Review:    Results from last 7 days   Lab Units 23  0311   SODIUM mmol/L 141   POTASSIUM mmol/L 4.3   CHLORIDE mmol/L 105   CO2 mmol/L 29.3*   BUN mg/dL 15   CREATININE mg/dL 0.71   GLUCOSE mg/dL 266*   CALCIUM mg/dL 9.3         Results from last 7 days   Lab Units 23  0311   WBC 10*3/mm3 6.57   HEMOGLOBIN g/dL 12.2   HEMATOCRIT % 39.0   PLATELETS 10*3/mm3 182             Results from last 7 days   Lab Units 23  0311   CHOLESTEROL mg/dL 104   TRIGLYCERIDES mg/dL 190*   HDL CHOL mg/dL 29*   LDL CHOL mg/dL 44 "               Medication Review:   atorvastatin, 40 mg, Oral, Daily  gabapentin, 300 mg, Oral, Nightly  insulin glargine, 8 Units, Subcutaneous, Q12H  insulin lispro, 0-9 Units, Subcutaneous, TID With Meals  levothyroxine, 75 mcg, Oral, Daily  liothyronine, 5 mcg, Oral, Daily  losartan, 100 mg, Oral, Daily  metoprolol succinate XL, 25 mg, Oral, Q24H  NIFEdipine XL, 60 mg, Oral, Daily  sennosides-docusate, 2 tablet, Oral, Daily         sodium chloride, 75 mL/hr, Last Rate: 75 mL/hr (04/13/23 1205)        Assessment & Plan   #Severe RCA stenoses  #Left ankle fracture  #Abnormal nuclear stress test  #Diabetes  #Paroxysmal A-fib  #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  She underwent nuclear stress test as part of a preoperative work-up prior to possible left ankle fracture surgery and was referred for left heart catheterization for further evaluation.  She underwent left heart catheterization on 4/13/2023 which revealed severe calcified RCA disease, severe diffuse LAD disease not amenable to PCI, plan for staged PCI with atherectomy.     - Continue Toprol 25 mg daily, nifedipine 60 mg daily, losartan 100 mg daily, pravastatin 20 mg nightly  - start clopidogrel 75mg daily in preparation for PCI next week, will need to hold Eliquis for 2 days prior to PCI, can restart today    Rikki Oconnor MD  [facility]  04/14/23  07:23 EDT

## 2023-04-14 NOTE — PLAN OF CARE
"Goal Outcome Evaluation:  Plan of Care Reviewed With: patient, son           Outcome Evaluation: Pt is 87 yo female admitted to [facility] s/p fall, diagnosed with L ankle fx and L MCL sprain to be treated non-operatively. Patient also underwent L heart cath on 4/13/23. PMH significant for DM, afib, HTN. patient lives alone, uses rwx at baseline, reports frequent falls at home due to feeling \"swimmy-headed\". Today, patient in supine, awake and alert, no c/o pain at rest but c/o nausea. Patient performed general strengthening exercises for hilda LEs in supine and sitting, required kate for bed mobility, sit to stand transfers deferred due to NWB status on LLE and current precautions for limited use of RUE due to cardiac cath yesterday. Pt demonstrates impairments consisting of generalized weakness, decreased activity tolerance and would benefit from skilled PT. d/c plans are SNU.  "

## 2023-04-14 NOTE — CASE MANAGEMENT/SOCIAL WORK
Continued Stay Note  [facility]     Patient Name: Roland Russell  MRN: [id number]  Today's Date: 4/14/2023    Admit Date: 4/10/2023    Plan: Will need precert for [facility].  Need P.T eval before precert can be started (ordered this morning)   Discharge Plan     Row Name 04/14/23 1206       Plan    Plan [facility] accepted and will begin precert once P.T. eval completed.    Plan Comments S/W Kriss/Darlin to advise Pt now on [facility].  Pt arrived to [facility] from ortho unit last night.  Kriss advised precert for [facility] cannot be initiated until a P.T. eval is completed.  P.T. is aware and will get to patient as soon as they can.  CCP following..........Jessica ZUNIGA/LORY CM               Discharge Codes    No documentation.               Expected Discharge Date and Time     Expected Discharge Date Expected Discharge Time    Apr 14, 2023             Jessica Prather RN

## 2023-04-14 NOTE — PROGRESS NOTES
Dedicated to Hospital Care    [phone]   LOS: 3 days     Name: Roland Russell  Age/Sex: 88 y.o. female  :  1934        PCP: Shawanda Rosario MD  No chief complaint on file.     Subjective   Pain is controlled she is feeling okay today denies other new issues at this moment.  Tolerated heart cath yesterday.  Discussed with cardiology yesterday and patient acknowledges plan for staged PCI/intervention.  General: No Fever or Chills, Cardiac: No Chest Pain or Palpitations, Resp: No Cough or SOA, GI: No Nausea, Vomiting, or Diarrhea and Other: No bleeding    atorvastatin, 40 mg, Oral, Daily  clopidogrel, 75 mg, Oral, Daily  gabapentin, 300 mg, Oral, Nightly  insulin glargine, 8 Units, Subcutaneous, Q12H  insulin lispro, 0-9 Units, Subcutaneous, TID With Meals  levothyroxine, 75 mcg, Oral, Daily  liothyronine, 5 mcg, Oral, Daily  losartan, 100 mg, Oral, Daily  metoprolol succinate XL, 25 mg, Oral, Q24H  NIFEdipine XL, 60 mg, Oral, Daily  sennosides-docusate, 2 tablet, Oral, Daily      sodium chloride, 75 mL/hr, Last Rate: 75 mL/hr (23 1205)        Objective   Vital Signs  Temp:  [97.6 °F (36.4 °C)-98.4 °F (36.9 °C)] 97.6 °F (36.4 °C)  Heart Rate:  [60-64] 64  Resp:  [12-21] 16  BP: (100-173)/(46-72) 169/68  Body mass index is 27.54 kg/m².    Intake/Output Summary (Last 24 hours) at 2023 1052  Last data filed at 2023 1041  Gross per 24 hour   Intake 1221.25 ml   Output 1250 ml   Net -28.75 ml       Physical Exam  Vitals and nursing note reviewed.   Constitutional:       General: She is not in acute distress.     Appearance: She is ill-appearing.   Cardiovascular:      Rate and Rhythm: Normal rate and regular rhythm.   Pulmonary:      Effort: No respiratory distress.      Breath sounds: Normal breath sounds.   Abdominal:      General: Bowel sounds are normal.      Palpations: Abdomen is soft.   Musculoskeletal:      Comments: Left ankle in a boot   Neurological:      General: No focal deficit  present.      Mental Status: She is alert. Mental status is at baseline.           Results Review:       I reviewed the patient's new clinical results.  Results from last 7 days   Lab Units 04/14/23 0311 04/13/23 0512 04/11/23  0528   WBC 10*3/mm3 6.57 7.06 6.50   HEMOGLOBIN g/dL 12.2 11.1* 11.6*   PLATELETS 10*3/mm3 182 189 192     Results from last 7 days   Lab Units 04/14/23 0311 04/13/23 0512 04/11/23  0528   SODIUM mmol/L 141 138 142   POTASSIUM mmol/L 4.3 4.2 4.1   CHLORIDE mmol/L 105 102 107   CO2 mmol/L 29.3* 30.1* 28.9   BUN mg/dL 15 18 14   CREATININE mg/dL 0.71 0.98 0.67   CALCIUM mg/dL 9.3 9.2 9.5   PHOSPHORUS mg/dL 2.7  --   --    Estimated Creatinine Clearance: 45.7 mL/min (by C-G formula based on SCr of 0.71 mg/dL).  Lab Results   Component Value Date    HGBA1C 9.60 (H) 04/11/2023    HGBA1C 9.40 (H) 11/03/2022    HGBA1C 10.2 (H) 09/16/2022     Glucose   Date/Time Value Ref Range Status   04/14/2023 1036 227 (H) 70 - 130 mg/dL Final     Comment:     Meter: [id number] : [id number] Katharine Bobn NA   04/14/2023 0556 207 (H) 70 - 130 mg/dL Final     Comment:     Meter: [id number] : [id number] Beltran Eloissa NA   04/13/2023 2026 324 (H) 70 - 130 mg/dL Final     Comment:     Meter: [id number] : [id number] Beltran Eloissa NA   04/13/2023 1050 260 (H) 70 - 130 mg/dL Final     Comment:     Meter: [id number] : [id number] Jayant Viveros NA   04/13/2023 0644 199 (H) 70 - 130 mg/dL Final     Comment:     Meter: [id number] : [id number] Elif Diamonirobyn NA   04/12/2023 1700 348 (H) 70 - 130 mg/dL Final     Comment:     Meter: [id number] : [id number] Conor April NA 04/12/2023 1144 335 (H) 70 - 130 mg/dL Final     Comment:     Meter: [id number] : [id number] Conor April NA 04/12/2023 0711 185 (H) 70 - 130 mg/dL Final     Comment:     Meter: [id number] : [id number] Elif BAEZ         Assessment & Plan   Active Hospital Problems    Diagnosis  POA   • **Ankle fracture  [F78.613K]  Yes   • Type 2 diabetes mellitus with hypoglycemia, with long-term current use of insulin [E11.649, Z79.4]  Not Applicable   • PAF (paroxysmal atrial fibrillation) (HCC) [I48.0]  Yes   • Essential hypertension [I10]  Yes      Resolved Hospital Problems   No resolved problems to display.       PLAN  This is an 88-year-old lady with a history of type 2 diabetes paroxysmal A-fib and hypertension that presented to the hospital after a fall and was found to have left leg pain with noted ankle fracture and left MCL sprain  -Appreciate orthopedic surgery's evaluation and plan for outpatient follow-up with repeat imaging.  Continue current boot until orthopedic follow-up  -Cardiology evaluated the patient for perioperative risk assessment and is felt that she has underlying coronary artery disease after the patient had a high risk stress test.  She underwent left heart catheterization on 4/13/2023 which revealed severe calcified RCA disease, severe diffuse LAD disease not amenable to PCI, plan for staged PCI with atherectomy.  -Eliquis restarted today.  She will need to hold this 48 hours prior to her next intervention.  -Blood pressure stable continue to current only monitor blood pressure medications and monitor for postoperative hypotension  -Blood sugars are running high today.  Increase basal units to 12 units twice daily.  A1c elevated and above goal especially given her coronary artery disease.  -Mechanical DVT prophylaxis  -Full code      Disposition  Expected Discharge Date and Time     Expected Discharge Date Expected Discharge Time    Apr 14, 2023       Patient is stable for discharge today.  Working on placement.  If this can be arranged to be discharged this evening.  See hospital course dictated below      Jj Gonzalez MD  [facility]  04/14/23  10:52 EDT    This is an 88-year-old lady with history of paroxysmal A-fib, type 2 diabetes, hypertension, obstructive sleep  apnea and coronary artery disease who presents to the hospital after a fall at home.  She had fallen several days prior to coming to the hospital but given persistent pain and impaired mobility they brought her in for evaluation.  She was found to have a minimally displaced left ankle fracture and an MCL sprain.  Orthopedic surgery evaluated the patient and recommended against operative intervention given the high risk nature with her coronary artery disease.  Cardiology evaluated the patient for perioperative risk assessment and she had a high risk stress test and was referred for left heart catheterization.  She underwent left heart cath on April 13 which revealed severe calcified RCA disease and severe diffuse LAD disease that was not amenable to PCI.  Cardiology has recommended coming back to the hospital in a week or 2 for planned staging PCI with atherectomy.  Cardiology is recommended remaining on Toprol all nifedipine losartan and pravastatin.  It is okay to resume Eliquis and continue Plavix in preparation for PCI.  She will need to be off Eliquis 48 hours prior to repeat cardiac catheterization.  Orthopedic surgery's final recommendations are to remain in the boot and follow-up in office in 7 to 10 days for repeat imaging and possible cast placement.  The plans been discussed with the patient and her family at the bedside all questions have been addressed and answered and at this point she stable to discharge to skilled nursing facility for ongoing care and evaluation.

## 2023-04-14 NOTE — PLAN OF CARE
Goal Outcome Evaluation:  Plan of Care Reviewed With: patient     Progress: no change  Outcome Evaluation: Pt is AOx4. Pain treated per MAR. K-pad ordered for chronic neck pain. Remains in a paced rythym. BPs were elevated earlier in the shift, PRN hydralazine administered per MAR. Other vital signs stable. Left foot with boot in place. Plan for discharge to SNF, pre-cert still pending. Will continue to monitor.

## 2023-04-14 NOTE — THERAPY EVALUATION
"Patient Name: Roland Russell  : 1934    MRN: [id number]                              Today's Date: 2023       Admit Date: 4/10/2023    Visit Dx:     ICD-10-CM ICD-9-CM   1. Abnormal nuclear stress test  R94.39 794.39   2. Follow-up exam  Z09 V67.9   3. Coronary artery disease involving native coronary artery of native heart, unspecified whether angina present  I25.10 414.01     Patient Active Problem List   Diagnosis   • Legal blindness   • Uncontrolled type 2 diabetes mellitus with hypoglycemia without coma ([facility])   • GERD (gastroesophageal reflux disease)   • Mixed hyperlipidemia   • Hypothyroidism, acquired   • Osteoarthritis, multiple sites   • Essential hypertension   • Senile osteoporosis   • Renal insufficiency   • T12 compression fracture   • Vitamin D deficiency   • Severe headache   • Carotid body tumor   • Glomus jugulare tumor   • Sleep apnea, obstructive   • Morbidly obese   • Abnormal weight gain   • Localized edema   • Compression fracture of T8 vertebra with delayed healing   • PAF (paroxysmal atrial fibrillation) ([facility])   • Dry scalp   • Deformity of both feet   • Acute encephalopathy   • Lactic acidosis   • Diverticulitis   • Ankle fracture   • Type 2 diabetes mellitus with hypoglycemia, with long-term current use of insulin     Past Medical History:   Diagnosis Date   • Abnormal vision     SEES \"KALEIDOSCOPE\"   • Allergy to adhesive tape    • Arthralgia    • AVB (atrioventricular block)    • Balance problem    • Carotid body tumor     LEFT   • Difficulty walking    • H/O complete eye exam 10/2016   • Headache     OFF AND ON BACK OF HEAD, DOWN NECK TO SHOULDER   • History of glaucoma    • History of poliomyelitis     AGE 9   • History of surgery on arm     left arm   • HLD (hyperlipidemia)    • Hypertension    • Hypothyroidism, acquired    • Legal blindness     SOME PERIPHERAL VISION ON LEFT, SOME VISION ON RIGHT   • Memory loss     SOME SHORT TERM   • Migraine    • Numbness     LEFT LEG "    • Osteoarthritis, multiple sites    • Osteoporosis    • Pacemaker    • Rectal bleeding     X1, WITH BM   • Sleep apnea     DOES NOT USE A MACHINE   • TIA (transient ischemic attack) 12/24/2018    ?, HAD SPELL UNABLE TO TALK   • Type 2 diabetes mellitus    • Type 2 diabetes mellitus, uncontrolled      Past Surgical History:   Procedure Laterality Date   • APPENDECTOMY  1989   • CARDIAC CATHETERIZATION N/A 4/13/2023    Procedure: Left Heart Cath;  Surgeon: Rikki Oconnor MD;  Location: [facility] CATH INVASIVE LOCATION;  Service: Cardiology;  Laterality: N/A;   • CARDIAC CATHETERIZATION N/A 4/13/2023    Procedure: Coronary angiography;  Surgeon: Rikki Oconnor MD;  Location: [facility] CATH INVASIVE LOCATION;  Service: Cardiology;  Laterality: N/A;   • CARDIAC CATHETERIZATION N/A 4/13/2023    Procedure: Left ventriculography;  Surgeon: Rikki Oconnor MD;  Location: [facility] CATH INVASIVE LOCATION;  Service: Cardiology;  Laterality: N/A;   • CARDIAC PACEMAKER PLACEMENT  11/25/2004   • CAROTID ENDARTERECTOMY Left 12/13/2019    Procedure: LEFT CAROTID BODY TUMOR RESECTION;  Surgeon: Bryan Severino MD;  Location: [facility] MAIN OR;  Service: Vascular   • CATARACT EXTRACTION Bilateral 1997   • CEREBRAL ANGIOGRAM Bilateral 12/6/2019    Procedure: CAROTID ARTERIOGRAM BILATERAL, RIGHT WRIST APPROACH;  Surgeon: Bryan Severino MD;  Location: [facility] HYBRID OR 18/19;  Service: Vascular   • CHOLECYSTECTOMY  1989   • COLONOSCOPY  2013    dr montes   • ELBOW PROCEDURE  2016   • ENDOSCOPY  12/06/2012    Dr. Montes; food in stomach, gastritis   • EXCISION LESION  1994    BACK CYST BENGIN   • EYE SURGERY  12/2012   • GLAUCOMA SURGERY Bilateral 1995    laser surgery   • HARDWARE REMOVAL Left     ELBOW   • HYSTERECTOMY  1986   • ORIF ELBOW FRACTURE Left    • PACEMAKER IMPLANTATION     • PACEMAKER REPLACEMENT  06/2013      General Information     Row Name 04/14/23 1256          Physical Therapy Time and Intention    Document Type evaluation  -EM     Mode of  Treatment individual therapy;physical therapy  -EM     Row Name 04/14/23 1256          General Information    Patient Profile Reviewed yes  -EM     Prior Level of Function independent:  uses rwx at baseline  -EM     Existing Precautions/Restrictions fall;non-weight bearing   NWB LLE  -EM     Row Name 04/14/23 1256          Living Environment    People in Home alone  -EM     Row Name 04/14/23 1256          Home Main Entrance    Number of Stairs, Main Entrance none  -EM     Row Name 04/14/23 1256          Stairs Within Home, Primary    Number of Stairs, Within Home, Primary none  -EM     Row Name 04/14/23 1256          Cognition    Orientation Status (Cognition) oriented x 3  -EM     Row Name 04/14/23 1256          Safety Issues, Functional Mobility    Impairments Affecting Function (Mobility) balance;endurance/activity tolerance;strength;pain  -EM           User Key  (r) = Recorded By, (t) = Taken By, (c) = Cosigned By    Initials Name Provider Type    EM Landy Chowdary, PT Physical Therapist               Mobility     Row Name 04/14/23 1257          Bed Mobility    Bed Mobility supine-sit;sit-supine  -EM     Supine-Sit [place] (Bed Mobility) minimum assist (75% patient effort);verbal cues  -EM     Sit-Supine [place] (Bed Mobility) minimum assist (75% patient effort)  -EM     Comment, (Bed Mobility) pt up to EOB with Shamika, cues to not push thru RUE too much (limitations on use of RUE due to cardiac cath yesterday)  -EM     Row Name 04/14/23 1257          Transfers    Comment, (Transfers) tsf deferred, pt not to push thru R wrist for another few hours due to cardiac cath yesterday, NWB LLE, limited by nausea  -EM     Row Name 04/14/23 1257          Mobility    Extremity Weight-bearing Status left lower extremity  -EM     Left Lower Extremity (Weight-bearing Status) non weight-bearing (NWB)  -EM           User Key  (r) = Recorded By, (t) = Taken By, (c) = Cosigned By    Initials Name Provider Type    EM  Landy Chowdary PT Physical Therapist               Obj/Interventions     Row Name 04/14/23 1258          Range of Motion Comprehensive    General Range of Motion no range of motion deficits identified  -EM     Comment, General Range of Motion L ankle in boot  -EM     Row Name 04/14/23 1258          Strength Comprehensive (MMT)    General Manual Muscle Testing (MMT) Assessment other (see comments)  -EM     Comment, General Manual Muscle Testing (MMT) Assessment no focal deficits identified, generalized weakness noted  -EM     Row Name 04/14/23 1258          Motor Skills    Therapeutic Exercise other (see comments)  AP on R, QS, GS, LAQ, marching in sitting x 5 reps  -EM     Row Name 04/14/23 1258          Balance    Balance Assessment sitting static balance;sitting dynamic balance;standing static balance;standing dynamic balance  -EM     Static Sitting Balance supervision  -EM     Dynamic Sitting Balance supervision  -EM     Position, Sitting Balance sitting edge of bed  -EM     Row Name 04/14/23 1258          Sensory Assessment (Somatosensory)    Sensory Assessment (Somatosensory) other (see comments)  hypersensitive to light touch on hilda LEs  -EM           User Key  (r) = Recorded By, (t) = Taken By, (c) = Cosigned By    Initials Name Provider Type    EM Landy Chowdary, PT Physical Therapist               Goals/Plan     Row Name 04/14/23 1305          Bed Mobility Goal 1 (PT)    Activity/Assistive Device (Bed Mobility Goal 1, PT) bed mobility activities, all  -EM     [place] Level/Cues Needed (Bed Mobility Goal 1, PT) contact guard required  -EM     Time Frame (Bed Mobility Goal 1, PT) 1 week  -EM     Row Name 04/14/23 1308          Transfer Goal 1 (PT)    Activity/Assistive Device (Transfer Goal 1, PT) bed-to-chair/chair-to-bed;walker, rolling  maintaing NWB on LLE  -EM     [place] Level/Cues Needed (Transfer Goal 1, PT) moderate assist (50-74% patient effort)  -EM     Time Frame (Transfer  "Goal 1, PT) 1 week  -EM     Row Name 04/14/23 1305          Therapy Assessment/Plan (PT)    Planned Therapy Interventions (PT) bed mobility training;home exercise program;patient/family education;transfer training  -EM           User Key  (r) = Recorded By, (t) = Taken By, (c) = Cosigned By    Initials Name Provider Type    EM Landy Chowdary, PT Physical Therapist               Clinical Impression     Row Name 04/14/23 1300          Pain    Pretreatment Pain Rating 0/10 - no pain  -EM     Pre/Posttreatment Pain Comment pt has no c/o pain at rest, c/o nausea. c/o pain \"all over\" once began to mobilize, did not rate on numeric scale  -EM     Pain Intervention(s) Repositioned;Medication (See MAR)  -EM     Row Name 04/14/23 1300          Plan of Care Review    Plan of Care Reviewed With patient;son  -EM     Outcome Evaluation Pt is 89 yo female admitted to [facility] s/p fall, diagnosed with L ankle fx and L MCL sprain to be treated non-operatively. Patient also underwent L heart cath on 4/13/23. PMH significant for DM, afib, HTN. patient lives alone, uses rwx at baseline, reports frequent falls at home due to feeling \"swimmy-headed\". Today, patient in supine, awake and alert, no c/o pain at rest but c/o nausea. Patient performed general strengthening exercises for hilda LEs in supine and sitting, required kate for bed mobility, sit to stand transfers deferred due to NWB status on LLE and current precautions for limited use of RUE due to cardiac cath yesterday. Pt demonstrates impairments consisting of generalized weakness, decreased activity tolerance and would benefit from skilled PT. d/c plans are SNU.  -EM     Row Name 04/14/23 1300          Therapy Assessment/Plan (PT)    Patient/Family Therapy Goals Statement (PT) get stronger  -EM     Rehab Potential (PT) good, to achieve stated therapy goals  -EM     Criteria for Skilled Interventions Met (PT) yes;skilled treatment is necessary  -EM     Therapy Frequency (PT) 6 " times/wk  -EM     Row Name 04/14/23 1300          Positioning and Restraints    Pre-Treatment Position in bed  -EM     Post Treatment Position bed  -EM     In Bed supine;call light within reach;exit alarm on;with family/caregiver;notified nsg  -EM           User Key  (r) = Recorded By, (t) = Taken By, (c) = Cosigned By    Initials Name Provider Type    Landy Ramos PT Physical Therapist               Outcome Measures     Row Name 04/14/23 1305          How much help from another person do you currently need...    Turning from your back to your side while in flat bed without using bedrails? 3  -EM     Moving from lying on back to sitting on the side of a flat bed without bedrails? 3  -EM     Moving to and from a bed to a chair (including a wheelchair)? 2  -EM     Standing up from a chair using your arms (e.g., wheelchair, bedside chair)? 2  -EM     Climbing 3-5 steps with a railing? 1  -EM     To walk in hospital room? 1  -EM     AM-PAC 6 Clicks Score (PT) 12  -EM     Highest level of mobility 4 --> Transferred to chair/commode  -EM           User Key  (r) = Recorded By, (t) = Taken By, (c) = Cosigned By    Initials Name Provider Type    Landy Ramos PT Physical Therapist                             Physical Therapy Education     Title: PT OT SLP Therapies (In Progress)     Topic: Physical Therapy (In Progress)     Point: Mobility training (In Progress)     Learning Progress Summary           Patient Acceptance, E, NR by  at 4/14/2023 1306                   Point: Home exercise program (Not Started)     Learner Progress:  Not documented in this visit.          Point: Body mechanics (Not Started)     Learner Progress:  Not documented in this visit.          Point: Precautions (In Progress)     Learning Progress Summary           Patient Acceptance, E, NR by EM at 4/14/2023 1306                               User Key     Initials Effective Dates Name Provider Type [facility] 06/16/21 -   "Landy Chowdary PT Physical Therapist PT              PT Recommendation and Plan  Planned Therapy Interventions (PT): bed mobility training, home exercise program, patient/family education, transfer training  Plan of Care Reviewed With: patient, son  Outcome Evaluation: Pt is 87 yo female admitted to [facility] s/p fall, diagnosed with L ankle fx and L MCL sprain to be treated non-operatively. Patient also underwent L heart cath on 4/13/23. PMH significant for DM, afib, HTN. patient lives alone, uses rwx at baseline, reports frequent falls at home due to feeling \"swimmy-headed\". Today, patient in supine, awake and alert, no c/o pain at rest but c/o nausea. Patient performed general strengthening exercises for hilda LEs in supine and sitting, required kate for bed mobility, sit to stand transfers deferred due to NWB status on LLE and current precautions for limited use of RUE due to cardiac cath yesterday. Pt demonstrates impairments consisting of generalized weakness, decreased activity tolerance and would benefit from skilled PT. d/c plans are SNU.     Time Calculation:    PT Charges     Row Name 04/14/23 1307             Time Calculation    Start Time 1140  -EM      Stop Time 1157  -EM      Time Calculation (min) 17 min  -EM      PT Received On 04/14/23  -EM      PT - Next Appointment 04/15/23  -EM      PT Goal Re-Cert Due Date 04/21/23  -EM         Time Calculation- PT    Total Timed Code Minutes- PT 10 minute(s)  -EM         Timed Charges    52856 - PT Therapeutic Exercise Minutes 5  -EM      79507 - PT Therapeutic Activity Minutes 5  -EM         Total Minutes    Timed Charges Total Minutes 10  -EM       Total Minutes 10  -EM            User Key  (r) = Recorded By, (t) = Taken By, (c) = Cosigned By    Initials Name Provider Type    EM Landy Chowdary PT Physical Therapist              Therapy Charges for Today     Code Description Service Date Service Provider Modifiers Qty    77380706088  PT THER PROC EA " 15 MIN 4/14/2023 Landy Chowdary, PT GP 1    [id number] HC PT EVAL MOD COMPLEXITY 2 4/14/2023 Landy Chowdary, PT GP 1          PT G-Codes  AM-PAC 6 Clicks Score (PT): 12  PT Discharge Summary  Anticipated Discharge Disposition (PT): skilled nursing facility    Landy Chowdary, PT  4/14/2023

## 2023-04-15 LAB
GLUCOSE BLDC GLUCOMTR-MCNC: 103 MG/DL (ref 70–130)
GLUCOSE BLDC GLUCOMTR-MCNC: 125 MG/DL (ref 70–130)
GLUCOSE BLDC GLUCOMTR-MCNC: 173 MG/DL (ref 70–130)
GLUCOSE BLDC GLUCOMTR-MCNC: 270 MG/DL (ref 70–130)
GLUCOSE BLDC GLUCOMTR-MCNC: 348 MG/DL (ref 70–130)

## 2023-04-15 PROCEDURE — 82962 GLUCOSE BLOOD TEST: CPT

## 2023-04-15 PROCEDURE — G0378 HOSPITAL OBSERVATION PER HR: HCPCS

## 2023-04-15 PROCEDURE — 63710000001 INSULIN LISPRO (HUMAN) PER 5 UNITS: Performed by: STUDENT IN AN ORGANIZED HEALTH CARE EDUCATION/TRAINING PROGRAM

## 2023-04-15 PROCEDURE — 99232 SBSQ HOSP IP/OBS MODERATE 35: CPT | Performed by: INTERNAL MEDICINE

## 2023-04-15 RX ADMIN — INSULIN GLARGINE-YFGN 12 UNITS: 100 INJECTION, SOLUTION SUBCUTANEOUS at 09:12

## 2023-04-15 RX ADMIN — ATORVASTATIN CALCIUM 40 MG: 20 TABLET, FILM COATED ORAL at 09:12

## 2023-04-15 RX ADMIN — LIOTHYRONINE SODIUM 5 MCG: 5 TABLET ORAL at 09:12

## 2023-04-15 RX ADMIN — APIXABAN 5 MG: 5 TABLET, FILM COATED ORAL at 22:01

## 2023-04-15 RX ADMIN — HYDROCODONE BITARTRATE AND ACETAMINOPHEN 1 TABLET: 5; 325 TABLET ORAL at 15:11

## 2023-04-15 RX ADMIN — INSULIN LISPRO 7 UNITS: 100 INJECTION, SOLUTION INTRAVENOUS; SUBCUTANEOUS at 17:39

## 2023-04-15 RX ADMIN — LEVOTHYROXINE SODIUM 75 MCG: 0.07 TABLET ORAL at 09:12

## 2023-04-15 RX ADMIN — GABAPENTIN 300 MG: 300 CAPSULE ORAL at 22:01

## 2023-04-15 RX ADMIN — APIXABAN 5 MG: 5 TABLET, FILM COATED ORAL at 09:12

## 2023-04-15 RX ADMIN — DOCUSATE SODIUM 50MG AND SENNOSIDES 8.6MG 2 TABLET: 8.6; 5 TABLET, FILM COATED ORAL at 09:12

## 2023-04-15 RX ADMIN — INSULIN GLARGINE-YFGN 8 UNITS: 100 INJECTION, SOLUTION SUBCUTANEOUS at 22:06

## 2023-04-15 RX ADMIN — METOPROLOL SUCCINATE 25 MG: 25 TABLET, EXTENDED RELEASE ORAL at 09:12

## 2023-04-15 RX ADMIN — CLOPIDOGREL BISULFATE 75 MG: 75 TABLET, FILM COATED ORAL at 09:12

## 2023-04-15 NOTE — PLAN OF CARE
Goal Outcome Evaluation:  Plan of Care Reviewed With: patient, family           Outcome Evaluation: A&O x 4. All vs stable.  Pain treated per MAR. Paced on the  monitor. Left foot has boot in place, K pad in use for neck pain, no other issues this shift.

## 2023-04-15 NOTE — PLAN OF CARE
Goal Outcome Evaluation:              Outcome Evaluation: @1600 Received patient from CVI unit, POD 2 left cardiac angiography, left ankle surgery to be planned after her OP f/u with PCI. AxOx,4 legally blind, on PRN pain meds, SCD and accumax,on, IV saline locked, needs attended. SNF placement pending.

## 2023-04-15 NOTE — PROGRESS NOTES
Dedicated to Hospital Care    [phone]   LOS: 3 days     Name: Roland Russell  Age/Sex: 88 y.o. female  :  1934        PCP: Shawanda Rosario MD  No chief complaint on file.     Subjective   No new complaints.  Pain controlled oral medication    apixaban, 5 mg, Oral, Q12H  atorvastatin, 40 mg, Oral, Daily  clopidogrel, 75 mg, Oral, Daily  gabapentin, 300 mg, Oral, Nightly  insulin glargine, 12 Units, Subcutaneous, Q12H  insulin lispro, 0-9 Units, Subcutaneous, TID With Meals  levothyroxine, 75 mcg, Oral, Daily  liothyronine, 5 mcg, Oral, Daily  losartan, 100 mg, Oral, Daily  metoprolol succinate XL, 25 mg, Oral, Q24H  NIFEdipine XL, 60 mg, Oral, Daily  sennosides-docusate, 2 tablet, Oral, Daily           Objective   Vital Signs  Temp:  [97.8 °F (36.6 °C)-98.5 °F (36.9 °C)] 98 °F (36.7 °C)  Heart Rate:  [60-64] 64  Resp:  [17-26] 26  BP: (105-156)/(47-52) 127/51  Body mass index is 27.54 kg/m².    Intake/Output Summary (Last 24 hours) at 4/15/2023 1537  Last data filed at 4/15/2023 1527  Gross per 24 hour   Intake 702 ml   Output 1450 ml   Net -748 ml       Physical Exam  Vitals and nursing note reviewed.   Constitutional:       General: She is not in acute distress.     Appearance: She is ill-appearing.   Cardiovascular:      Rate and Rhythm: Normal rate and regular rhythm.   Pulmonary:      Effort: No respiratory distress.      Breath sounds: Normal breath sounds.   Abdominal:      General: Bowel sounds are normal.      Palpations: Abdomen is soft.   Musculoskeletal:      Comments: Left ankle in a boot   Neurological:      General: No focal deficit present.      Mental Status: She is alert. Mental status is at baseline.           Results Review:       I reviewed the patient's new clinical results.  Results from last 7 days   Lab Units 23  0311 23  0512 23  0528   WBC 10*3/mm3 6.57 7.06 6.50   HEMOGLOBIN g/dL 12.2 11.1* 11.6*   PLATELETS 10*3/mm3 182 189 192     Results from last 7  days   Lab Units 04/14/23  0311 04/13/23  0512 04/11/23  0528   SODIUM mmol/L 141 138 142   POTASSIUM mmol/L 4.3 4.2 4.1   CHLORIDE mmol/L 105 102 107   CO2 mmol/L 29.3* 30.1* 28.9   BUN mg/dL 15 18 14   CREATININE mg/dL 0.71 0.98 0.67   CALCIUM mg/dL 9.3 9.2 9.5   PHOSPHORUS mg/dL 2.7  --   --    Estimated Creatinine Clearance: 45.7 mL/min (by C-G formula based on SCr of 0.71 mg/dL).  Lab Results   Component Value Date    HGBA1C 9.60 (H) 04/11/2023    HGBA1C 9.40 (H) 11/03/2022    HGBA1C 10.2 (H) 09/16/2022     Glucose   Date/Time Value Ref Range Status   04/15/2023 1130 270 (H) 70 - 130 mg/dL Final     Comment:     Meter: [id number] : [id number] Alfa Irina    04/15/2023 0608 103 70 - 130 mg/dL Final     Comment:     Meter: [id number] : [id number] Ryliemally Gupta NST   04/15/2023 0125 125 70 - 130 mg/dL Final     Comment:     Meter: [id number] : [id number] Rylie Candy NST   04/14/2023 2042 233 (H) 70 - 130 mg/dL Final     Comment:     Meter: [id number] : [id number] Rylie Candy NST   04/14/2023 1602 268 (H) 70 - 130 mg/dL Final     Comment:     Meter: [id number] : [id number] Katharine Braden NA   04/14/2023 1036 227 (H) 70 - 130 mg/dL Final     Comment:     Meter: [id number] : [id number] Alcantar Braden NA   04/14/2023 0556 207 (H) 70 - 130 mg/dL Final     Comment:     Meter: [id number] : [id number] Devin Elise NA   04/13/2023 2026 324 (H) 70 - 130 mg/dL Final     Comment:     Meter: [id number] : [id number] Beltran Eloissa NA         Assessment & Plan   Active Hospital Problems    Diagnosis  POA   • **Ankle fracture [J05.101B]  Yes   • Type 2 diabetes mellitus with hypoglycemia, with long-term current use of insulin [E11.649, Z79.4]  Not Applicable   • PAF (paroxysmal atrial fibrillation) (HCC) [I48.0]  Yes   • Essential hypertension [I10]  Yes      Resolved Hospital Problems   No resolved problems to display.       88-year-old lady with a history of type 2 diabetes paroxysmal  A-fib and hypertension that presented to the hospital after a fall and was found to have left leg pain with noted ankle fracture and left MCL sprain.    Plans noted for outpatient follow-up with PCI planned later this week and eventual surgery on her broken ankle.  Currently remains hospitalized by case management works on placement.  Needs SNU.  Eliquis has been restarted.  Medical problems stable.  Blood sugar slightly high.  Monitor.      · Eliquis (home med) for DVT prophylaxis.  · DNR.  · Discussed with patient.  · Anticipate discharge to SNU facility once precert has been obtained.      Gilbert Guaman MD  [facility]  04/15/23  15:37 EDT

## 2023-04-16 PROBLEM — R94.39 ABNORMAL NUCLEAR STRESS TEST: Status: ACTIVE | Noted: 2023-04-16

## 2023-04-16 LAB
GLUCOSE BLDC GLUCOMTR-MCNC: 176 MG/DL (ref 70–130)
GLUCOSE BLDC GLUCOMTR-MCNC: 282 MG/DL (ref 70–130)
GLUCOSE BLDC GLUCOMTR-MCNC: 321 MG/DL (ref 70–130)
GLUCOSE BLDC GLUCOMTR-MCNC: 330 MG/DL (ref 70–130)

## 2023-04-16 PROCEDURE — 63710000001 INSULIN LISPRO (HUMAN) PER 5 UNITS: Performed by: HOSPITALIST

## 2023-04-16 PROCEDURE — 99232 SBSQ HOSP IP/OBS MODERATE 35: CPT | Performed by: INTERNAL MEDICINE

## 2023-04-16 PROCEDURE — 97110 THERAPEUTIC EXERCISES: CPT

## 2023-04-16 PROCEDURE — 97530 THERAPEUTIC ACTIVITIES: CPT

## 2023-04-16 PROCEDURE — 82962 GLUCOSE BLOOD TEST: CPT

## 2023-04-16 PROCEDURE — 63710000001 INSULIN LISPRO (HUMAN) PER 5 UNITS: Performed by: STUDENT IN AN ORGANIZED HEALTH CARE EDUCATION/TRAINING PROGRAM

## 2023-04-16 RX ORDER — OLANZAPINE 10 MG/1
5 INJECTION, POWDER, LYOPHILIZED, FOR SOLUTION INTRAMUSCULAR EVERY 8 HOURS PRN
Status: DISCONTINUED | OUTPATIENT
Start: 2023-04-16 | End: 2023-04-17 | Stop reason: HOSPADM

## 2023-04-16 RX ORDER — AMOXICILLIN 250 MG
2 CAPSULE ORAL 2 TIMES DAILY
Status: DISCONTINUED | OUTPATIENT
Start: 2023-04-16 | End: 2023-04-17 | Stop reason: HOSPADM

## 2023-04-16 RX ORDER — INSULIN LISPRO 100 [IU]/ML
0-7 INJECTION, SOLUTION INTRAVENOUS; SUBCUTANEOUS
Status: DISCONTINUED | OUTPATIENT
Start: 2023-04-16 | End: 2023-04-17 | Stop reason: HOSPADM

## 2023-04-16 RX ORDER — BISACODYL 5 MG/1
5 TABLET, DELAYED RELEASE ORAL DAILY PRN
Status: DISCONTINUED | OUTPATIENT
Start: 2023-04-16 | End: 2023-04-17 | Stop reason: HOSPADM

## 2023-04-16 RX ORDER — POLYETHYLENE GLYCOL 3350 17 G/17G
17 POWDER, FOR SOLUTION ORAL DAILY PRN
Status: DISCONTINUED | OUTPATIENT
Start: 2023-04-16 | End: 2023-04-17 | Stop reason: HOSPADM

## 2023-04-16 RX ORDER — BISACODYL 10 MG
10 SUPPOSITORY, RECTAL RECTAL DAILY PRN
Status: DISCONTINUED | OUTPATIENT
Start: 2023-04-16 | End: 2023-04-17 | Stop reason: HOSPADM

## 2023-04-16 RX ADMIN — INSULIN GLARGINE-YFGN 8 UNITS: 100 INJECTION, SOLUTION SUBCUTANEOUS at 21:01

## 2023-04-16 RX ADMIN — DOCUSATE SODIUM 50MG AND SENNOSIDES 8.6MG 2 TABLET: 8.6; 5 TABLET, FILM COATED ORAL at 21:01

## 2023-04-16 RX ADMIN — APIXABAN 5 MG: 5 TABLET, FILM COATED ORAL at 08:45

## 2023-04-16 RX ADMIN — INSULIN LISPRO 2 UNITS: 100 INJECTION, SOLUTION INTRAVENOUS; SUBCUTANEOUS at 08:45

## 2023-04-16 RX ADMIN — METOPROLOL SUCCINATE 25 MG: 25 TABLET, EXTENDED RELEASE ORAL at 08:45

## 2023-04-16 RX ADMIN — ACETAMINOPHEN 650 MG: 325 TABLET, FILM COATED ORAL at 09:02

## 2023-04-16 RX ADMIN — INSULIN LISPRO 4 UNITS: 100 INJECTION, SOLUTION INTRAVENOUS; SUBCUTANEOUS at 11:39

## 2023-04-16 RX ADMIN — APIXABAN 5 MG: 5 TABLET, FILM COATED ORAL at 21:01

## 2023-04-16 RX ADMIN — LOSARTAN POTASSIUM 100 MG: 100 TABLET, FILM COATED ORAL at 08:45

## 2023-04-16 RX ADMIN — LEVOTHYROXINE SODIUM 75 MCG: 0.07 TABLET ORAL at 08:45

## 2023-04-16 RX ADMIN — NIFEDIPINE 60 MG: 60 TABLET, FILM COATED, EXTENDED RELEASE ORAL at 08:45

## 2023-04-16 RX ADMIN — ATORVASTATIN CALCIUM 40 MG: 20 TABLET, FILM COATED ORAL at 08:45

## 2023-04-16 RX ADMIN — LIOTHYRONINE SODIUM 5 MCG: 5 TABLET ORAL at 08:45

## 2023-04-16 RX ADMIN — INSULIN GLARGINE-YFGN 12 UNITS: 100 INJECTION, SOLUTION SUBCUTANEOUS at 08:45

## 2023-04-16 RX ADMIN — CLOPIDOGREL BISULFATE 75 MG: 75 TABLET, FILM COATED ORAL at 08:44

## 2023-04-16 RX ADMIN — DOCUSATE SODIUM 50MG AND SENNOSIDES 8.6MG 2 TABLET: 8.6; 5 TABLET, FILM COATED ORAL at 08:45

## 2023-04-16 RX ADMIN — INSULIN LISPRO 5 UNITS: 100 INJECTION, SOLUTION INTRAVENOUS; SUBCUTANEOUS at 16:38

## 2023-04-16 RX ADMIN — GABAPENTIN 300 MG: 300 CAPSULE ORAL at 21:01

## 2023-04-16 NOTE — PLAN OF CARE
Goal Outcome Evaluation:  Plan of Care Reviewed With: patient        Progress: no change  Outcome Evaluation: Pt remains stable. Boot to LLE, NWB. Unable to ambulate, bed mobility with PT. Voiding per PW, last BM 4/12. No pain meds requested, having minimal pain at rest. BG levels checked frequently, pt drops at night. Bedtime snacks given. 2L worn at night. Skin protection measures in place. Will require additional cardiac sx and ankle sx. Educated on diabetes management.

## 2023-04-16 NOTE — PLAN OF CARE
Goal Outcome Evaluation:  Plan of Care Reviewed With: patient, son        Progress: improving  Outcome Evaluation: Attempted standing on R LE with L NWB and RW, but patient unable to maintain standing within precautions. Performed seated and supine exercises to maintain mobility and muscle activation. Patient would benefit from continued acute PT as well as SNF on d/c to progress toward PLOF.

## 2023-04-16 NOTE — PROGRESS NOTES
Dedicated to Hospital Care    [phone]   LOS: 3 days     Name: Roland Russell  Age/Sex: 88 y.o. female  :  1934        PCP: Shawanda Rosario MD  No chief complaint on file.     Subjective   Family reports some increased confusion today.  They also report that she is taking very frequent naps.  Confusion tends to be worse upon awakening.  Initially stated she had some problems swallowing that something was choking her.  This was improved after I adjusted her nasal cannula.  Family witnessed her eating lunch today and so there were no obvious issues.    apixaban, 5 mg, Oral, Q12H  atorvastatin, 40 mg, Oral, Daily  clopidogrel, 75 mg, Oral, Daily  gabapentin, 300 mg, Oral, Nightly  insulin glargine, 12 Units, Subcutaneous, Q12H  insulin lispro, 0-9 Units, Subcutaneous, TID With Meals  levothyroxine, 75 mcg, Oral, Daily  liothyronine, 5 mcg, Oral, Daily  losartan, 100 mg, Oral, Daily  metoprolol succinate XL, 25 mg, Oral, Q24H  NIFEdipine XL, 60 mg, Oral, Daily  senna-docusate sodium, 2 tablet, Oral, BID      Objective   Vital Signs  Temp:  [97.1 °F (36.2 °C)-98.4 °F (36.9 °C)] 98 °F (36.7 °C)  Heart Rate:  [60-66] 65  Resp:  [16-17] 16  BP: (133-161)/(63-70) 133/67  Body mass index is 27.54 kg/m².    Intake/Output Summary (Last 24 hours) at 2023 1612  Last data filed at 2023 1325  Gross per 24 hour   Intake 670 ml   Output 1500 ml   Net -830 ml       Physical Exam  Vitals and nursing note reviewed.   Constitutional:       General: She is not in acute distress.     Appearance: She is ill-appearing.   Cardiovascular:      Rate and Rhythm: Normal rate and regular rhythm.   Pulmonary:      Effort: No respiratory distress.      Breath sounds: Normal breath sounds.   Abdominal:      General: Bowel sounds are normal.      Palpations: Abdomen is soft.   Musculoskeletal:      Comments: Left ankle in a boot   Neurological:      General: No focal deficit present.      Mental Status: She is alert. Mental  status is at baseline.           Results Review:       I reviewed the patient's new clinical results.  Results from last 7 days   Lab Units 04/14/23  0311 04/13/23  0512 04/11/23  0528   WBC 10*3/mm3 6.57 7.06 6.50   HEMOGLOBIN g/dL 12.2 11.1* 11.6*   PLATELETS 10*3/mm3 182 189 192     Results from last 7 days   Lab Units 04/14/23 0311 04/13/23  0512 04/11/23  0528   SODIUM mmol/L 141 138 142   POTASSIUM mmol/L 4.3 4.2 4.1   CHLORIDE mmol/L 105 102 107   CO2 mmol/L 29.3* 30.1* 28.9   BUN mg/dL 15 18 14   CREATININE mg/dL 0.71 0.98 0.67   CALCIUM mg/dL 9.3 9.2 9.5   PHOSPHORUS mg/dL 2.7  --   --    Estimated Creatinine Clearance: 45.7 mL/min (by C-G formula based on SCr of 0.71 mg/dL).  Lab Results   Component Value Date    HGBA1C 9.60 (H) 04/11/2023    HGBA1C 9.40 (H) 11/03/2022    HGBA1C 10.2 (H) 09/16/2022     Glucose   Date/Time Value Ref Range Status   04/16/2023 1528 330 (H) 70 - 130 mg/dL Final     Comment:     Meter: [id number] : [id number] Hurst Kalyn NA   04/16/2023 1106 321 (H) 70 - 130 mg/dL Final     Comment:     Meter: [id number] : [id number] Hurst Kalyn NA   04/16/2023 0620 176 (H) 70 - 130 mg/dL Final     Comment:     Meter: [id number] : [id number] Roland Debi NA   04/15/2023 2158 173 (H) 70 - 130 mg/dL Final     Comment:     Meter: [id number] : [id number] Roland Debi NA   04/15/2023 1627 348 (H) 70 - 130 mg/dL Final     Comment:     Meter: [id number] : [id number] Danial Thornton RN   04/15/2023 1130 270 (H) 70 - 130 mg/dL Final     Comment:     Meter: [id number] : [id number] Alfa Irina NA   04/15/2023 0608 103 70 - 130 mg/dL Final     Comment:     Meter: [id number] : [id number] Rylie ADAME   04/15/2023 0125 125 70 - 130 mg/dL Final     Comment:     Meter: [id number] : [id number] Rylie Gupta NST         Assessment & Plan   Active Hospital Problems    Diagnosis  POA   • **Ankle fracture [P26.179C]  Yes   • Type 2 diabetes mellitus with  "hypoglycemia, with long-term current use of insulin [E11.649, Z79.4]  Not Applicable   • PAF (paroxysmal atrial fibrillation) ([facility]) [I48.0]  Yes   • Essential hypertension [I10]  Yes      Resolved Hospital Problems   No resolved problems to display.       88-year-old lady with a history of type 2 diabetes paroxysmal A-fib and hypertension that presented to the hospital after a fall and was found to have left leg pain with noted ankle fracture and left MCL sprain.    Very long discussion with multiple family members and nursing about her blood sugars.  They state it is \"normal for her\" for her blood sugars to be over 300.  They are concerned about \"bottoming out\" when she gets less than 150.  She says this is okay with their PCP.  She had severe hyperglycemia here and has been receiving slightly increased doses of basal insulin plus sliding scale.  Family has been intermittently refusing some of the insulin.  Her A1c is almost 10%.    Add BM protocol  Delirium precautions  Advised family members to try to keep her awake during the day so that she can sleep better at night.      · Eliquis (home med) for DVT prophylaxis.  · DNR.  · Discussed with patient, family and nursing staff.  · Anticipate discharge to SNU facility once precert has been obtained.      Gilbert Guaman MD  [facility]  04/16/23  16:12 EDT    "

## 2023-04-16 NOTE — PLAN OF CARE
Goal Outcome Evaluation:              Outcome Evaluation: POD 3 left cardiac angiography, w/ LT ankle fx with boot on. Left ankle surgery to be planned after her atherectomy to be done after discharge. AxOx,4 legally blind, on PRN pain meds, on accuchecks,SCD and accumax,on, IV saline locked, voiding per PW, needs attended. SNF placement pending.

## 2023-04-16 NOTE — THERAPY TREATMENT NOTE
"Patient Name: Roland Russell  : 1934    MRN: [id number]                              Today's Date: 2023       Admit Date: 4/10/2023    Visit Dx:     ICD-10-CM ICD-9-CM   1. Abnormal nuclear stress test  R94.39 794.39   2. Follow-up exam  Z09 V67.9   3. Coronary artery disease involving native coronary artery of native heart, unspecified whether angina present  I25.10 414.01     Patient Active Problem List   Diagnosis   • Legal blindness   • Uncontrolled type 2 diabetes mellitus with hypoglycemia without coma ([facility])   • GERD (gastroesophageal reflux disease)   • Mixed hyperlipidemia   • Hypothyroidism, acquired   • Osteoarthritis, multiple sites   • Essential hypertension   • Senile osteoporosis   • Renal insufficiency   • T12 compression fracture   • Vitamin D deficiency   • Severe headache   • Carotid body tumor   • Glomus jugulare tumor   • Sleep apnea, obstructive   • Morbidly obese   • Abnormal weight gain   • Localized edema   • Compression fracture of T8 vertebra with delayed healing   • PAF (paroxysmal atrial fibrillation) ([facility])   • Dry scalp   • Deformity of both feet   • Acute encephalopathy   • Lactic acidosis   • Diverticulitis   • Ankle fracture   • Type 2 diabetes mellitus with hypoglycemia, with long-term current use of insulin     Past Medical History:   Diagnosis Date   • Abnormal vision     SEES \"KALEIDOSCOPE\"   • Allergy to adhesive tape    • Arthralgia    • AVB (atrioventricular block)    • Balance problem    • Carotid body tumor     LEFT   • Difficulty walking    • H/O complete eye exam 10/2016   • Headache     OFF AND ON BACK OF HEAD, DOWN NECK TO SHOULDER   • History of glaucoma    • History of poliomyelitis     AGE 9   • History of surgery on arm     left arm   • HLD (hyperlipidemia)    • Hypertension    • Hypothyroidism, acquired    • Legal blindness     SOME PERIPHERAL VISION ON LEFT, SOME VISION ON RIGHT   • Memory loss     SOME SHORT TERM   • Migraine    • Numbness     LEFT LEG "    • Osteoarthritis, multiple sites    • Osteoporosis    • Pacemaker    • Rectal bleeding     X1, WITH BM   • Sleep apnea     DOES NOT USE A MACHINE   • TIA (transient ischemic attack) 12/24/2018    ?, HAD SPELL UNABLE TO TALK   • Type 2 diabetes mellitus    • Type 2 diabetes mellitus, uncontrolled      Past Surgical History:   Procedure Laterality Date   • APPENDECTOMY  1989   • CARDIAC CATHETERIZATION N/A 4/13/2023    Procedure: Left Heart Cath;  Surgeon: Rikki Oconnor MD;  Location: [facility] CATH INVASIVE LOCATION;  Service: Cardiology;  Laterality: N/A;   • CARDIAC CATHETERIZATION N/A 4/13/2023    Procedure: Coronary angiography;  Surgeon: Rikki Oconnor MD;  Location: [facility] CATH INVASIVE LOCATION;  Service: Cardiology;  Laterality: N/A;   • CARDIAC CATHETERIZATION N/A 4/13/2023    Procedure: Left ventriculography;  Surgeon: Rikki Oconnor MD;  Location: [facility] CATH INVASIVE LOCATION;  Service: Cardiology;  Laterality: N/A;   • CARDIAC PACEMAKER PLACEMENT  11/25/2004   • CAROTID ENDARTERECTOMY Left 12/13/2019    Procedure: LEFT CAROTID BODY TUMOR RESECTION;  Surgeon: Bryan Severino MD;  Location: [facility] MAIN OR;  Service: Vascular   • CATARACT EXTRACTION Bilateral 1997   • CEREBRAL ANGIOGRAM Bilateral 12/6/2019    Procedure: CAROTID ARTERIOGRAM BILATERAL, RIGHT WRIST APPROACH;  Surgeon: Bryan Severino MD;  Location: [facility] HYBRID OR 18/19;  Service: Vascular   • CHOLECYSTECTOMY  1989   • COLONOSCOPY  2013    dr montes   • ELBOW PROCEDURE  2016   • ENDOSCOPY  12/06/2012    Dr. Montes; food in stomach, gastritis   • EXCISION LESION  1994    BACK CYST BENGIN   • EYE SURGERY  12/2012   • GLAUCOMA SURGERY Bilateral 1995    laser surgery   • HARDWARE REMOVAL Left     ELBOW   • HYSTERECTOMY  1986   • ORIF ELBOW FRACTURE Left    • PACEMAKER IMPLANTATION     • PACEMAKER REPLACEMENT  06/2013      General Information     Row Name 04/16/23 9159          Physical Therapy Time and Intention    Document Type therapy note (daily note)   -LW     Mode of Treatment physical therapy  -     Row Name 04/16/23 1456          General Information    Patient Profile Reviewed yes  -           User Key  (r) = Recorded By, (t) = Taken By, (c) = Cosigned By    Initials Name Provider Type    Georgia Corea, ZARA Physical Therapist               Mobility     Row Name 04/16/23 1456          Bed Mobility    Bed Mobility supine-sit;sit-supine  -LW     Supine-Sit [place] (Bed Mobility) minimum assist (75% patient effort)  -LW     Sit-Supine [place] (Bed Mobility) minimum assist (75% patient effort)  -     Assistive Device (Bed Mobility) bed rails;head of bed elevated  -     Row Name 04/16/23 1456          Sit-Stand Transfer    Sit-Stand [place] (Transfers) moderate assist (50% patient effort);1 person assist  -     Assistive Device (Sit-Stand Transfers) walker, front-wheeled  -[facility] Name 04/16/23 1456          Gait/Stairs (Locomotion)    Comment, (Gait/Stairs) Patient not able to stand while observing NWB status, so did not transfer.  -     Row Name 04/16/23 1456          Mobility    Extremity Weight-bearing Status left lower extremity  -LW     Left Lower Extremity (Weight-bearing Status) non weight-bearing (NWB)  -           User Key  (r) = Recorded By, (t) = Taken By, (c) = Cosigned By    Initials Name Provider Type    Georgia Corea, ZARA Physical Therapist               Obj/Interventions     [facility] Name 04/16/23 1459          Strength Comprehensive (MMT)    Comment, General Manual Muscle Testing (MMT) Assessment Generalized weakness.  -[facility] Name 04/16/23 1459          Motor Skills    Therapeutic Exercise --  B LE TE x10 ea: LAQ, seated march and abd/add, AP, heel slides  -           User Key  (r) = Recorded By, (t) = Taken By, (c) = Cosigned By    Initials Name Provider Type    Georgia Corea, ZARA Physical Therapist               Goals/Plan    No documentation.                Clinical Impression     [facility] Name 04/16/23 1500           Pain    Pre/Posttreatment Pain Comment Reports ankle pain throughout session and back pain when returning to bed. Did not rate.  -LW     Pain Intervention(s) Repositioned;Ambulation/increased activity  -     Row Name 04/16/23 1500          Plan of Care Review    Plan of Care Reviewed With patient;son  -LW     Progress improving  -LW     Outcome Evaluation Attempted standing on R LE with L NWB and RW, but patient unable to maintain standing within precautions. Performed seated and supine exercises to maintain mobility and muscle activation. Patient would benefit from continued acute PT as well as SNF on d/c to progress toward PLOF.  -     Row Name 04/16/23 1500          Therapy Assessment/Plan (PT)    Rehab Potential (PT) good, to achieve stated therapy goals  -     Criteria for Skilled Interventions Met (PT) yes  -LW     Therapy Frequency (PT) 6 times/wk  -     Row Name 04/16/23 1500          Vital Signs    Pretreatment Heart Rate (beats/min) 64  -LW     Posttreatment Heart Rate (beats/min) 60  -LW     Pre SpO2 (%) 95  -LW     O2 Delivery Pre Treatment supplemental O2  -LW     Intra SpO2 (%) 91  -LW     O2 Delivery Intra Treatment supplemental O2  -LW     Post SpO2 (%) 94  -LW     O2 Delivery Post Treatment supplemental O2  -LW     Pre Patient Position Supine  -LW     Intra Patient Position Sitting  -LW     Post Patient Position Supine  -LW     Row Name 04/16/23 1500          Positioning and Restraints    Pre-Treatment Position in bed  -LW     Post Treatment Position bed  -LW     In Bed notified nsg;supine;call light within reach;encouraged to call for assist;exit alarm on;with family/caregiver  -           User Key  (r) = Recorded By, (t) = Taken By, (c) = Cosigned By    Initials Name Provider Type    LW Georgia Marcano, PT Physical Therapist               Outcome Measures     Row Name 04/16/23 1500          How much help from another person do you currently need...    Turning from your back to your  side while in flat bed without using bedrails? 3  -LW     Moving from lying on back to sitting on the side of a flat bed without bedrails? 3  -LW     Moving to and from a bed to a chair (including a wheelchair)? 1  -LW     Standing up from a chair using your arms (e.g., wheelchair, bedside chair)? 2  -LW     Climbing 3-5 steps with a railing? 1  -LW     To walk in hospital room? 1  -LW     AM-PAC 6 Clicks Score (PT) 11  -LW     Highest level of mobility 4 --> Transferred to chair/commode  -LW     Row Name 04/16/23 1504          Functional Assessment    Outcome Measure Options AM-PAC 6 Clicks Basic Mobility (PT)  -LW           User Key  (r) = Recorded By, (t) = Taken By, (c) = Cosigned By    Initials Name Provider Type    LW Georgia Marcano, PT Physical Therapist                             Physical Therapy Education     Title: PT OT SLP Therapies (Done)     Topic: Physical Therapy (Done)     Point: Mobility training (Done)     Learning Progress Summary           Patient Acceptance, E, VU by  at 4/16/2023 1505    Acceptance, E, NR by  at 4/14/2023 1306   Family Acceptance, E, VU by  at 4/16/2023 1505                   Point: Home exercise program (Done)     Learning Progress Summary           Patient Acceptance, E, VU by  at 4/16/2023 1505   Family Acceptance, E, VU by  at 4/16/2023 1505                   Point: Body mechanics (Done)     Learning Progress Summary           Patient Acceptance, E, VU by  at 4/16/2023 1505   Family Acceptance, E, VU by  at 4/16/2023 1505                   Point: Precautions (Done)     Learning Progress Summary           Patient Acceptance, E, VU by  at 4/16/2023 1505    Acceptance, E, NR by EM at 4/14/2023 1306   Family Acceptance, E, VU by  at 4/16/2023 1505                               User Key     Initials Effective Dates Name Provider Type Discipline    EM 06/16/21 -  Landy Chowdary, PT Physical Therapist PT     06/10/22 -  Georgia Marcano, ZARA Physical  Therapist PT              PT Recommendation and Plan     Plan of Care Reviewed With: patient, son  Progress: improving  Outcome Evaluation: Attempted standing on R LE with L NWB and RW, but patient unable to maintain standing within precautions. Performed seated and supine exercises to maintain mobility and muscle activation. Patient would benefit from continued acute PT as well as SNF on d/c to progress toward PLOF.     Time Calculation:    PT Charges     Row Name 04/16/23 1505             Time Calculation    Start Time 1154  -LW      Stop Time 1222  -LW      Time Calculation (min) 28 min  -LW      PT Received On 04/16/23  -LW      PT - Next Appointment 04/17/23  -LW      PT Goal Re-Cert Due Date 04/21/23  -LW         Time Calculation- PT    Total Timed Code Minutes- PT 28 minute(s)  -LW         Timed Charges    50942 - PT Therapeutic Exercise Minutes 13  -LW      50201 - PT Therapeutic Activity Minutes 15  -LW         Total Minutes    Timed Charges Total Minutes 28  -LW       Total Minutes 28  -LW            User Key  (r) = Recorded By, (t) = Taken By, (c) = Cosigned By    Initials Name Provider Type    LW Georgia Marcano, PT Physical Therapist              Therapy Charges for Today     Code Description Service Date Service Provider Modifiers Qty    64688590803 HC PT THER PROC EA 15 MIN 4/16/2023 Georgia Marcano, PT GP 1    97237740108 HC PT THERAPEUTIC ACT EA 15 MIN 4/16/2023 Georgia Marcano, PT GP 1          PT G-Codes  Outcome Measure Options: AM-PAC 6 Clicks Basic Mobility (PT)  AM-PAC 6 Clicks Score (PT): 11  PT Discharge Summary  Anticipated Discharge Disposition (PT): skilled nursing facility    Georgia Marcano PT  4/16/2023

## 2023-04-16 NOTE — PROGRESS NOTES
[facility] Progress Note       Encounter Date:23  Patient:Roland Russell  :1934  MRN:[id number]      Chief Complaint: Follow-up chest pain      Subjective:        Patient doing about the same still little short of breath. Had low BS overnight    Review of Systems:  Review of Systems   Constitutional: Positive for malaise/fatigue.   Cardiovascular: Negative for chest pain.   Respiratory: Positive for shortness of breath.        Medications:  Scheduled Meds:  apixaban, 5 mg, Oral, Q12H  atorvastatin, 40 mg, Oral, Daily  clopidogrel, 75 mg, Oral, Daily  gabapentin, 300 mg, Oral, Nightly  insulin glargine, 12 Units, Subcutaneous, Q12H  insulin lispro, 0-9 Units, Subcutaneous, TID With Meals  levothyroxine, 75 mcg, Oral, Daily  liothyronine, 5 mcg, Oral, Daily  losartan, 100 mg, Oral, Daily  metoprolol succinate XL, 25 mg, Oral, Q24H  NIFEdipine XL, 60 mg, Oral, Daily  sennosides-docusate, 2 tablet, Oral, Daily    Continuous Infusions:   PRN Meds:  •  acetaminophen  •  dextrose  •  dextrose  •  glucagon (human recombinant)  •  hydrALAZINE  •  HYDROcodone-acetaminophen  •  melatonin  •  ondansetron **OR** ondansetron         Objective:       Vitals:    04/15/23 2151 23 0225 23 0614 23 0956   BP: 145/70 153/63 161/67 154/70   BP Location: Right arm Right arm Right arm Right arm   Patient Position: Lying Lying Lying Lying   Pulse: 60 60 60 65   Resp: 17 17 17 16   Temp: 97.2 °F (36.2 °C) 97.2 °F (36.2 °C) 97.1 °F (36.2 °C) 97.8 °F (36.6 °C)   TempSrc: Oral Oral Oral Oral   SpO2: 98% 99% 98% 95%   Weight:       Height:               Physical Exam:  Constitutional: Well appearing, frail, no acute distress   HENT: Oropharynx clear and membrane moist  Eyes: Normal conjunctiva, no sclera icterus.  Neck: Supple, no carotid bruit bilaterally.  Cardiovascular: Regular rate and rhythm, No Murmur, No bilateral lower extremity edema.  Pulmonary: Normal respiratory effort, normal lung  sounds, no wheezing.  Abdominal: Soft, nontender, no hepatosplenomegaly, liver is non-pulsatile.  Neurological: Alert and orient x 3.   Skin: Warm, dry, no ecchymosis, no rash.  Psych: Appropriate mood and affect. Normal judgment and insight.           Lab Review:   Results from last 7 days   Lab Units 04/14/23 0311 04/13/23 0512 04/11/23  0528   SODIUM mmol/L 141 138 142   POTASSIUM mmol/L 4.3 4.2 4.1   CHLORIDE mmol/L 105 102 107   CO2 mmol/L 29.3* 30.1* 28.9   BUN mg/dL 15 18 14   CREATININE mg/dL 0.71 0.98 0.67   GLUCOSE mg/dL 266* 213* 155*   CALCIUM mg/dL 9.3 9.2 9.5         Results from last 7 days   Lab Units 04/14/23 0311 04/13/23 0512 04/11/23  0528   WBC 10*3/mm3 6.57 7.06 6.50   HEMOGLOBIN g/dL 12.2 11.1* 11.6*   HEMATOCRIT % 39.0 35.6 37.6   PLATELETS 10*3/mm3 182 189 192             Results from last 7 days   Lab Units 04/14/23  0311   CHOLESTEROL mg/dL 104   TRIGLYCERIDES mg/dL 190*   HDL CHOL mg/dL 29*                    Assessment:          Diagnosis Plan   1. Abnormal nuclear stress test  Case Request Cath Lab: Percutaneous Coronary Intervention, Atherectomy-coronary    Case Request Cath Lab: Percutaneous Coronary Intervention, Atherectomy-coronary      2. Follow-up exam  XR Outside Films    XR Outside Films      3. Coronary artery disease involving native coronary artery of native heart, unspecified whether angina present  Case Request Cath Lab: Percutaneous Coronary Intervention, Atherectomy-coronary    Case Request Cath Lab: Percutaneous Coronary Intervention, Atherectomy-coronary             Plan:       Ms. Russell is a 88 y.o. woman with past medical history notable for sick sinus syndrome status post permanent pacemaker, paroxysmal atrial fibrillation on chronic anticoagulation who presented to the hospital after a fall and ankle fracture she was admitted to the hospital and we are asked to provide cardiac clearance 5 proceeding forward with surgery.  She had a stress test performed which  was abnormal prompting further evaluation with cardiac catheterization which identified a complex RCA heavily calcified lesion involving the proximal RCA.  LAD also had some disease but is small in caliber size.  She will need a complex atherectomy of her RCA with orbital atherectomy which is planned later this week.    Coronary artery disease:  · Plan for orbital atherectomy as a staged intervention  · Currently back on Eliquis and clopidogrel would stop have last dose of Eliquis 4/18  · Continue beta-blocker  · Continue statin           Telly Naranjo MD  [facility]  04/16/23  12:09 EDT

## 2023-04-17 VITALS
HEIGHT: 60 IN | SYSTOLIC BLOOD PRESSURE: 154 MMHG | DIASTOLIC BLOOD PRESSURE: 67 MMHG | WEIGHT: 141 LBS | RESPIRATION RATE: 16 BRPM | OXYGEN SATURATION: 100 % | TEMPERATURE: 97.4 F | BODY MASS INDEX: 27.68 KG/M2 | HEART RATE: 60 BPM

## 2023-04-17 PROBLEM — I25.10 CORONARY ARTERY DISEASE INVOLVING NATIVE CORONARY ARTERY OF NATIVE HEART: Status: ACTIVE | Noted: 2023-04-17

## 2023-04-17 LAB
ANION GAP SERPL CALCULATED.3IONS-SCNC: 2.7 MMOL/L (ref 5–15)
BUN SERPL-MCNC: 18 MG/DL (ref 8–23)
BUN/CREAT SERPL: 26.9 (ref 7–25)
CALCIUM SPEC-SCNC: 10 MG/DL (ref 8.6–10.5)
CHLORIDE SERPL-SCNC: 103 MMOL/L (ref 98–107)
CO2 SERPL-SCNC: 35.3 MMOL/L (ref 22–29)
CREAT SERPL-MCNC: 0.67 MG/DL (ref 0.57–1)
DEPRECATED RDW RBC AUTO: 40.9 FL (ref 37–54)
EGFRCR SERPLBLD CKD-EPI 2021: 84.2 ML/MIN/1.73
ERYTHROCYTE [DISTWIDTH] IN BLOOD BY AUTOMATED COUNT: 12.3 % (ref 12.3–15.4)
GLUCOSE BLDC GLUCOMTR-MCNC: 178 MG/DL (ref 70–130)
GLUCOSE BLDC GLUCOMTR-MCNC: 270 MG/DL (ref 70–130)
GLUCOSE BLDC GLUCOMTR-MCNC: 303 MG/DL (ref 70–130)
GLUCOSE SERPL-MCNC: 188 MG/DL (ref 65–99)
HCT VFR BLD AUTO: 36.6 % (ref 34–46.6)
HGB BLD-MCNC: 11.7 G/DL (ref 12–15.9)
MCH RBC QN AUTO: 28.9 PG (ref 26.6–33)
MCHC RBC AUTO-ENTMCNC: 32 G/DL (ref 31.5–35.7)
MCV RBC AUTO: 90.4 FL (ref 79–97)
PLATELET # BLD AUTO: 186 10*3/MM3 (ref 140–450)
PMV BLD AUTO: 11.2 FL (ref 6–12)
POTASSIUM SERPL-SCNC: 4 MMOL/L (ref 3.5–5.2)
RBC # BLD AUTO: 4.05 10*6/MM3 (ref 3.77–5.28)
SODIUM SERPL-SCNC: 141 MMOL/L (ref 136–145)
WBC NRBC COR # BLD: 7.73 10*3/MM3 (ref 3.4–10.8)

## 2023-04-17 PROCEDURE — 63710000001 INSULIN LISPRO (HUMAN) PER 5 UNITS: Performed by: HOSPITALIST

## 2023-04-17 PROCEDURE — 80048 BASIC METABOLIC PNL TOTAL CA: CPT | Performed by: HOSPITALIST

## 2023-04-17 PROCEDURE — 99232 SBSQ HOSP IP/OBS MODERATE 35: CPT | Performed by: INTERNAL MEDICINE

## 2023-04-17 PROCEDURE — 97530 THERAPEUTIC ACTIVITIES: CPT

## 2023-04-17 PROCEDURE — 82962 GLUCOSE BLOOD TEST: CPT

## 2023-04-17 PROCEDURE — 85027 COMPLETE CBC AUTOMATED: CPT | Performed by: HOSPITALIST

## 2023-04-17 RX ORDER — GABAPENTIN 300 MG/1
300 CAPSULE ORAL 2 TIMES DAILY
Qty: 6 CAPSULE | Refills: 0 | Status: SHIPPED | OUTPATIENT
Start: 2023-04-17

## 2023-04-17 RX ORDER — ACETAMINOPHEN 325 MG/1
650 TABLET ORAL EVERY 4 HOURS PRN
Start: 2023-04-17

## 2023-04-17 RX ORDER — NIFEDIPINE 60 MG/1
60 TABLET, EXTENDED RELEASE ORAL DAILY
Start: 2023-04-17

## 2023-04-17 RX ORDER — ATORVASTATIN CALCIUM 40 MG/1
40 TABLET, FILM COATED ORAL DAILY
Qty: 90 TABLET
Start: 2023-04-18

## 2023-04-17 RX ORDER — AMOXICILLIN 250 MG
2 CAPSULE ORAL 2 TIMES DAILY
Start: 2023-04-17

## 2023-04-17 RX ORDER — CLOPIDOGREL BISULFATE 75 MG/1
75 TABLET ORAL DAILY
Qty: 30 TABLET
Start: 2023-04-18

## 2023-04-17 RX ORDER — HYDROCODONE BITARTRATE AND ACETAMINOPHEN 5; 325 MG/1; MG/1
1 TABLET ORAL EVERY 4 HOURS PRN
Qty: 6 TABLET | Refills: 0 | Status: SHIPPED | OUTPATIENT
Start: 2023-04-17 | End: 2023-04-20

## 2023-04-17 RX ORDER — METOPROLOL SUCCINATE 25 MG/1
25 TABLET, EXTENDED RELEASE ORAL DAILY
Start: 2023-04-17

## 2023-04-17 RX ADMIN — INSULIN LISPRO 5 UNITS: 100 INJECTION, SOLUTION INTRAVENOUS; SUBCUTANEOUS at 11:23

## 2023-04-17 RX ADMIN — ACETAMINOPHEN 650 MG: 325 TABLET, FILM COATED ORAL at 14:22

## 2023-04-17 RX ADMIN — ATORVASTATIN CALCIUM 40 MG: 20 TABLET, FILM COATED ORAL at 08:26

## 2023-04-17 RX ADMIN — INSULIN LISPRO 2 UNITS: 100 INJECTION, SOLUTION INTRAVENOUS; SUBCUTANEOUS at 08:25

## 2023-04-17 RX ADMIN — LOSARTAN POTASSIUM 100 MG: 100 TABLET, FILM COATED ORAL at 08:26

## 2023-04-17 RX ADMIN — NIFEDIPINE 60 MG: 60 TABLET, FILM COATED, EXTENDED RELEASE ORAL at 08:26

## 2023-04-17 RX ADMIN — METOPROLOL SUCCINATE 25 MG: 25 TABLET, EXTENDED RELEASE ORAL at 08:26

## 2023-04-17 RX ADMIN — LEVOTHYROXINE SODIUM 75 MCG: 0.07 TABLET ORAL at 08:26

## 2023-04-17 RX ADMIN — CLOPIDOGREL BISULFATE 75 MG: 75 TABLET, FILM COATED ORAL at 08:26

## 2023-04-17 RX ADMIN — INSULIN GLARGINE-YFGN 8 UNITS: 100 INJECTION, SOLUTION SUBCUTANEOUS at 08:25

## 2023-04-17 RX ADMIN — LIOTHYRONINE SODIUM 5 MCG: 5 TABLET ORAL at 08:26

## 2023-04-17 RX ADMIN — INSULIN LISPRO 2 UNITS: 100 INJECTION, SOLUTION INTRAVENOUS; SUBCUTANEOUS at 17:36

## 2023-04-17 RX ADMIN — DOCUSATE SODIUM 50MG AND SENNOSIDES 8.6MG 2 TABLET: 8.6; 5 TABLET, FILM COATED ORAL at 08:26

## 2023-04-17 RX ADMIN — APIXABAN 5 MG: 5 TABLET, FILM COATED ORAL at 08:26

## 2023-04-17 NOTE — PLAN OF CARE
Goal Outcome Evaluation:  Plan of Care Reviewed With: patient, family        Progress: improving  Outcome Evaluation: Pt with improved adherance to WB restriction. Able to stand x 3 with mod A from therapist standing in front of pt and blocking L LE in extension to avoid WB. Reaches almost full stand - constant verbal and tactile cues for glut activation. Pt fatigues quickly with standing attempts. Facilitated seated scooting with R LE and supine boosting and scooting with R LE - mod A required for both, however improved carryover noted today. She continues to benefit from skilled PT to address balance, endurance, strength deficits. Recommend SNU at d/c.

## 2023-04-17 NOTE — THERAPY TREATMENT NOTE
"Patient Name: Roland Russell  : 1934    MRN: [id number]                              Today's Date: 2023       Admit Date: 4/10/2023    Visit Dx:     ICD-10-CM ICD-9-CM   1. Abnormal nuclear stress test  R94.39 794.39   2. Follow-up exam  Z09 V67.9   3. Coronary artery disease involving native coronary artery of native heart, unspecified whether angina present  I25.10 414.01   4. Closed fracture of left ankle, initial encounter  S82.892A 824.8     Patient Active Problem List   Diagnosis   • Legal blindness   • Uncontrolled type 2 diabetes mellitus with hypoglycemia without coma (HCC)   • GERD (gastroesophageal reflux disease)   • Mixed hyperlipidemia   • Hypothyroidism, acquired   • Osteoarthritis, multiple sites   • Essential hypertension   • Senile osteoporosis   • Renal insufficiency   • T12 compression fracture   • Vitamin D deficiency   • Severe headache   • Carotid body tumor   • Glomus jugulare tumor   • Sleep apnea, obstructive   • Morbidly obese   • Abnormal weight gain   • Localized edema   • Compression fracture of T8 vertebra with delayed healing   • PAF (paroxysmal atrial fibrillation) (HCC)   • Dry scalp   • Deformity of both feet   • Acute encephalopathy   • Lactic acidosis   • Diverticulitis   • Ankle fracture   • Type 2 diabetes mellitus with hypoglycemia, with long-term current use of insulin   • Abnormal nuclear stress test   • Coronary artery disease involving native coronary artery of native heart     Past Medical History:   Diagnosis Date   • Abnormal vision     SEES \"KALEIDOSCOPE\"   • Allergy to adhesive tape    • Arthralgia    • AVB (atrioventricular block)    • Balance problem    • Carotid body tumor     LEFT   • Difficulty walking    • H/O complete eye exam 10/2016   • Headache     OFF AND ON BACK OF HEAD, DOWN NECK TO SHOULDER   • History of glaucoma    • History of poliomyelitis     AGE 9   • History of surgery on arm     left arm   • HLD (hyperlipidemia)    • Hypertension  "   • Hypothyroidism, acquired    • Legal blindness     SOME PERIPHERAL VISION ON LEFT, SOME VISION ON RIGHT   • Memory loss     SOME SHORT TERM   • Migraine    • Numbness     LEFT LEG    • Osteoarthritis, multiple sites    • Osteoporosis    • Pacemaker    • Rectal bleeding     X1, WITH BM   • Sleep apnea     DOES NOT USE A MACHINE   • TIA (transient ischemic attack) 12/24/2018    ?, HAD SPELL UNABLE TO TALK   • Type 2 diabetes mellitus    • Type 2 diabetes mellitus, uncontrolled      Past Surgical History:   Procedure Laterality Date   • APPENDECTOMY  1989   • CARDIAC CATHETERIZATION N/A 4/13/2023    Procedure: Left Heart Cath;  Surgeon: Rikki Oconnor MD;  Location: [facility] CATH INVASIVE LOCATION;  Service: Cardiology;  Laterality: N/A;   • CARDIAC CATHETERIZATION N/A 4/13/2023    Procedure: Coronary angiography;  Surgeon: Rikki Oconnor MD;  Location: [facility] CATH INVASIVE LOCATION;  Service: Cardiology;  Laterality: N/A;   • CARDIAC CATHETERIZATION N/A 4/13/2023    Procedure: Left ventriculography;  Surgeon: Rikki Oconnor MD;  Location: [facility] CATH INVASIVE LOCATION;  Service: Cardiology;  Laterality: N/A;   • CARDIAC PACEMAKER PLACEMENT  11/25/2004   • CAROTID ENDARTERECTOMY Left 12/13/2019    Procedure: LEFT CAROTID BODY TUMOR RESECTION;  Surgeon: Bryan Severino MD;  Location: [facility] MAIN OR;  Service: Vascular   • CATARACT EXTRACTION Bilateral 1997   • CEREBRAL ANGIOGRAM Bilateral 12/6/2019    Procedure: CAROTID ARTERIOGRAM BILATERAL, RIGHT WRIST APPROACH;  Surgeon: Bryan Severino MD;  Location: [facility] HYBRID OR 18/19;  Service: Vascular   • CHOLECYSTECTOMY  1989   • COLONOSCOPY  2013    dr montes   • ELBOW PROCEDURE  2016   • ENDOSCOPY  12/06/2012    Dr. Montes; food in stomach, gastritis   • EXCISION LESION  1994    BACK CYST BENGIN   • EYE SURGERY  12/2012   • GLAUCOMA SURGERY Bilateral 1995    laser surgery   • HARDWARE REMOVAL Left     ELBOW   • HYSTERECTOMY  1986   • ORIF ELBOW FRACTURE Left    • PACEMAKER  IMPLANTATION     • PACEMAKER REPLACEMENT  06/2013      General Information     Row Name 04/17/23 1532          Physical Therapy Time and Intention    Document Type therapy note (daily note)  -ST     Mode of Treatment physical therapy  -     Row Name 04/17/23 1532          General Information    Patient Profile Reviewed yes  -ST     Existing Precautions/Restrictions fall;non-weight bearing;left  -ST     Row Name 04/17/23 1532          Cognition    Orientation Status (Cognition) oriented x 3  -ST     Row Name 04/17/23 1532          Safety Issues, Functional Mobility    Impairments Affecting Function (Mobility) balance;endurance/activity tolerance;strength;pain  -ST     Comment, Safety Issues/Impairments (Mobility) gait belt, nonskid socks donned, boot on LLE  -ST           User Key  (r) = Recorded By, (t) = Taken By, (c) = Cosigned By    Initials Name Provider Type    Annabel Jovel PT Physical Therapist               Mobility     Row Name 04/17/23 1532          Bed Mobility    Bed Mobility supine-sit;sit-supine  -ST     Supine-Sit Barton (Bed Mobility) minimum assist (75% patient effort)  -ST     Sit-Supine Barton (Bed Mobility) minimum assist (75% patient effort)  -ST     Assistive Device (Bed Mobility) bed rails;head of bed elevated  -ST     Comment, (Bed Mobility) increased efficiency today  -ST     Row Name 04/17/23 1532          Sit-Stand Transfer    Sit-Stand Barton (Transfers) moderate assist (50% patient effort);1 person assist  -ST     Comment, (Sit-Stand Transfer) x 3 trials, reaches almost full stand each trial. Therapist blocking L LE in extension to avoid breaking NWB precaution  -ST     Row Name 04/17/23 1532          Gait/Stairs (Locomotion)    Barton Level (Gait) not tested  -ST     Comment, (Gait/Stairs) not safe to progress to ambulation or pivot transfers at this time  -ST     Row Name 04/17/23 1532          Mobility    Extremity Weight-bearing Status left lower  extremity  -ST     Left Lower Extremity (Weight-bearing Status) non weight-bearing (NWB)  -ST           User Key  (r) = Recorded By, (t) = Taken By, (c) = Cosigned By    Initials Name Provider Type    Annabel Jovel, ZARA Physical Therapist               Obj/Interventions     Row Name 04/17/23 1531          Balance    Comment, Balance CGA with static sitting balance, min A with dynamic sitting balance for seated scooting practice  -ST           User Key  (r) = Recorded By, (t) = Taken By, (c) = Cosigned By    Initials Name Provider Type    Annabel Jovel, PT Physical Therapist               Goals/Plan    No documentation.                Clinical Impression     Row Name 04/17/23 1535          Pain    Pretreatment Pain Rating 0/10 - no pain  -ST     Pain Location - Side/Orientation Left  -ST     Pain Location - ankle  -ST     Pre/Posttreatment Pain Comment reports L leg pain with movement, does not rate  -ST     Pain Intervention(s) Repositioned;Ambulation/increased activity  -ST     Row Name 04/17/23 1534          Plan of Care Review    Plan of Care Reviewed With patient;family  -ST     Progress improving  -ST     Outcome Evaluation Pt with improved adherance to WB restriction. Able to stand x 3 with mod A from therapist standing in front of pt and blocking L LE in extension to avoid WB. Reaches almost full stand - constant verbal and tactile cues for glut activation. Pt fatigues quickly with standing attempts. Facilitated seated scooting with R LE and supine boosting and scooting with R LE - mod A required for both, however improved carryover noted today. She continues to benefit from skilled PT to address balance, endurance, strength deficits. Recommend SNU at d/c.  -ST     Row Name 04/17/23 6559          Therapy Assessment/Plan (PT)    Rehab Potential (PT) good, to achieve stated therapy goals  -ST     Criteria for Skilled Interventions Met (PT) yes  -ST     Therapy Frequency (PT) 6 times/wk  -ST      Row Name 04/17/23 1535          Positioning and Restraints    Pre-Treatment Position in bed  -ST     Post Treatment Position bed  -ST     In Bed supine;call light within reach;encouraged to call for assist;exit alarm on;with family/caregiver  -           User Key  (r) = Recorded By, (t) = Taken By, (c) = Cosigned By    Initials Name Provider Type    Annabel Jovel PT Physical Therapist               Outcome Measures     Row Name 04/17/23 1537 04/17/23 0820       How much help from another person do you currently need...    Turning from your back to your side while in flat bed without using bedrails? 3  -ST 3  -KM    Moving from lying on back to sitting on the side of a flat bed without bedrails? 3  -ST 3  -KM    Moving to and from a bed to a chair (including a wheelchair)? 2  -ST 1  -KM    Standing up from a chair using your arms (e.g., wheelchair, bedside chair)? 2  -ST 2  -KM    Climbing 3-5 steps with a railing? 1  -ST 1  -KM    To walk in hospital room? 1  -ST 1  -KM    AM-PAC 6 Clicks Score (PT) 12  -ST 11  -KM    Highest level of mobility 4 --> Transferred to chair/commode  -ST 4 --> Transferred to chair/commode  -KM    Row Name 04/17/23 1537          Functional Assessment    Outcome Measure Options AM-PAC 6 Clicks Basic Mobility (PT)  -ST           User Key  (r) = Recorded By, (t) = Taken By, (c) = Cosigned By    Initials Name Provider Type    Criss Mccracken, RN Registered Nurse    Annabel Jovel, ZARA Physical Therapist                             Physical Therapy Education     Title: PT OT SLP Therapies (Done)     Topic: Physical Therapy (Done)     Point: Mobility training (Done)     Learning Progress Summary           Patient Acceptance, E,TB, VU,NR by ST at 4/17/2023 1537    Acceptance, E, VU by LW at 4/16/2023 1505    Acceptance, E, NR by EM at 4/14/2023 1306   Family Acceptance, E, VU by LW at 4/16/2023 1505                   Point: Home exercise program (Done)     Learning Progress  Summary           Patient Acceptance, E,TB, VU,NR by ST at 4/17/2023 1537    Acceptance, E, VU by LW at 4/16/2023 1505   Family Acceptance, E, VU by LW at 4/16/2023 1505                   Point: Body mechanics (Done)     Learning Progress Summary           Patient Acceptance, E,TB, VU,NR by ST at 4/17/2023 1537    Acceptance, E, VU by LW at 4/16/2023 1505   Family Acceptance, E, VU by LW at 4/16/2023 1505                   Point: Precautions (Done)     Learning Progress Summary           Patient Acceptance, E,TB, VU,NR by ST at 4/17/2023 1537    Acceptance, E, VU by LW at 4/16/2023 1505    Acceptance, E, NR by  at 4/14/2023 1306   Family Acceptance, E, VU by LW at 4/16/2023 1505                               User Key     Initials Effective Dates Name Provider Type Discipline     06/16/21 -  Landy Chowdary, PT Physical Therapist PT     06/10/22 -  Georgia Marcano, PT Physical Therapist PT     09/22/22 -  Annabel Mina, PT Physical Therapist PT              PT Recommendation and Plan     Plan of Care Reviewed With: patient, family  Progress: improving  Outcome Evaluation: Pt with improved adherance to WB restriction. Able to stand x 3 with mod A from therapist standing in front of pt and blocking L LE in extension to avoid WB. Reaches almost full stand - constant verbal and tactile cues for glut activation. Pt fatigues quickly with standing attempts. Facilitated seated scooting with R LE and supine boosting and scooting with R LE - mod A required for both, however improved carryover noted today. She continues to benefit from skilled PT to address balance, endurance, strength deficits. Recommend SNU at d/c.     Time Calculation:    PT Charges     Row Name 04/17/23 1538             Time Calculation    Start Time 1247  -ST      Stop Time 1314  -ST      Time Calculation (min) 27 min  -ST      PT Received On 04/17/23  -ST      PT - Next Appointment 04/18/23  -ST         Time Calculation- PT    Total Timed  Code Minutes- PT 27 minute(s)  -ST         Timed Charges    11211 - PT Therapeutic Activity Minutes 27  -ST         Total Minutes    Timed Charges Total Minutes 27  -ST       Total Minutes 27  -ST            User Key  (r) = Recorded By, (t) = Taken By, (c) = Cosigned By    Initials Name Provider Type    Annabel Jovel, PT Physical Therapist              Therapy Charges for Today     Code Description Service Date Service Provider Modifiers Qty    [id number] HC PT THERAPEUTIC ACT EA 15 MIN 4/17/2023 Annabel Mina PT GP 2          PT G-Codes  Outcome Measure Options: AM-PAC 6 Clicks Basic Mobility (PT)  AM-PAC 6 Clicks Score (PT): 12  PT Discharge Summary  Anticipated Discharge Disposition (PT): skilled nursing facility    Annabel Mina PT  4/17/2023

## 2023-04-17 NOTE — DISCHARGE SUMMARY
Patient Name: Roland Russell  : 1934  MRN: [id number]    Date of Admission: 4/10/2023  Date of Discharge:  2023  Primary Care Physician: Shawanda Rosario MD      Chief Complaint:   No chief complaint on file.      Discharge Diagnoses     Active Hospital Problems    Diagnosis  POA   • **Ankle fracture [S82.899A]  Yes   • Abnormal nuclear stress test [R94.39]  Yes   • Type 2 diabetes mellitus with hypoglycemia, with long-term current use of insulin [E11.649, Z79.4]  Not Applicable   • PAF (paroxysmal atrial fibrillation) ([facility]) [I48.0]  Yes   • Essential hypertension [I10]  Yes      Resolved Hospital Problems   No resolved problems to display.        Hospital Course     Ms. Russell is a 88-year-old lady with history of paroxysmal A-fib, type 2 diabetes, HTN, DARI and CAD who presented to the hospital after a fall at home.  She had fallen several days prior to coming to the hospital but given persistent pain and impaired mobility they brought her in for evaluation.  She was found to have a minimally displaced left ankle fracture and an MCL sprain.  Orthopedic surgery evaluated the patient and recommended against operative intervention given the high risk nature with her coronary artery disease.  Cardiology evaluated the patient for perioperative risk assessment and she had a high risk stress test and was referred for left heart catheterization.  She underwent left heart cath on  which revealed severe calcified RCA disease and severe diffuse LAD disease that was not amenable to PCI.  Cardiology has recommended coming back to the hospital for planned staging PCI with atherectomy.  This is scheduled for Thursday, 2023.  Cardiology is recommended remaining on Toprol, nifedipine, losartan and pravastatin.  She has been restarted on Eliquis and will continue Plavix in preparation for PCI.  She will need to be off Eliquis 48 hours prior to repeat cardiac catheterization.  Orthopedic surgery's final  recommendations are to remain in the boot and follow-up in office in 7 to 10 days for repeat imaging and possible cast placement.  The plans been discussed with the patient and her family at the bedside all questions have been addressed and answered and at this point she stable to discharge to skilled nursing facility for ongoing care and evaluation.      Day of Discharge     Subjective:  No new c/o.    Physical Exam:  Temp:  [97.6 °F (36.4 °C)-98 °F (36.7 °C)] 97.8 °F (36.6 °C)  Heart Rate:  [60-64] 61  Resp:  [16] 16  BP: (112-167)/(56-79) 167/75  Body mass index is 27.54 kg/m².  Physical Exam  Vitals and nursing note reviewed.   Constitutional:       General: She is not in acute distress.     Appearance: She is ill-appearing.   Cardiovascular:      Rate and Rhythm: Normal rate and regular rhythm.   Pulmonary:      Effort: No respiratory distress.      Breath sounds: Normal breath sounds.   Abdominal:      General: Bowel sounds are normal.      Palpations: Abdomen is soft.   Musculoskeletal:      Comments: Left ankle in a boot   Neurological:      General: No focal deficit present.      Mental Status: She is alert. Mental status is at baseline.         Consultants     Consult Orders (all) (From admission, onward)     Start     Ordered    04/11/23 0000  Inpatient Case Management  Consult  Once        Provider:  (Not yet assigned)    04/10/23 1726    04/10/23 2025  Inpatient Cardiology Consult  Once        Specialty:  Cardiology  Provider:  Julieth Fagan MD    04/10/23 2024    04/10/23 1733  Inpatient Diabetes Educator Consult  Once        Provider:  (Not yet assigned)    04/10/23 1733    04/10/23 1726  Inpatient Orthopedic Surgery Consult  Once        Specialty:  Orthopedic Surgery  Provider:  Hoang Gonzales MD    04/10/23 1726              Procedures     Left Heart Cath, Coronary angiography, Left ventriculography      Imaging Results (All)     Procedure Component Value Units Date/Time    XR  Knee 3 View Left [[id number]] Collected: 04/12/23 1556     Updated: 04/12/23 1603    Narrative:      LEFT KNEE, 3 VIEWS     HISTORY: Fall, left knee pain     COMPARISON: None available     FINDINGS: There is no evidence for fracture or malalignment. No evidence  of joint effusion. Osteopenia. Mild generalized degenerative changes.  Vascular calcifications.       Impression:      No evidence for fracture     This report was finalized on 4/12/2023 4:00 PM by Dr. Yuniel Berumen M.D.       CT Outside Films [[id number]] Resulted: 04/12/23 1346     Updated: 04/12/23 1346    Narrative:      This procedure was auto-finalized with no dictation required.    XR Outside Films [[id number]] Resulted: 04/12/23 1338     Updated: 04/12/23 1338    Narrative:      This procedure was auto-finalized with no dictation required.    XR Ankle 3+ View Left [[id number]] Collected: 04/11/23 1859     Updated: 04/11/23 1938    Narrative:      LEFT ANKLE THREE VIEWS     HISTORY: Evaluate displacement of fractures.     COMPARISON: None.     FINDINGS: There is a distal fibular diametaphyseal fracture that begins  at the posterolateral cortex 5 cm above the tip of the lateral  malleolus. Fracture extends obliquely distally into the distal  tibiofibular articulation and is associated with 4 mm posterolateral  displacement. There is also an oblique transverse fracture through the  base of the medial malleolus with 2 mm separation of the medial cortex  though without displacement. There is widening of the joint space  between the medial malleolus and the medial aspect of the talus.  Tibiotalar joint effusion is present.  There is generalized soft tissue  swelling about the ankle. Vascular calcifications are evident. There is  a degenerative plantar calcaneal spur.       Impression:      1. Distal fibular diametaphyseal fracture with for 4 mm posterolateral  displacement.  2. Oblique transverse fracture through the base of the medial malleolus  with mild  separation though without displacement. Mild widening at the  medial talar malleolar articulation.  3. Soft tissues swelling.  Tibiotalar joint effusion. Vascular  calcifications. Degenerative heel spur.         Pertinent Labs     Results from last 7 days   Lab Units 04/17/23 0433 04/14/23 0311 04/13/23 0512 04/11/23 0528   WBC 10*3/mm3 7.73 6.57 7.06 6.50   HEMOGLOBIN g/dL 11.7* 12.2 11.1* 11.6*   PLATELETS 10*3/mm3 186 182 189 192     Results from last 7 days   Lab Units 04/17/23 0433 04/14/23 0311 04/13/23 0512 04/11/23  0528   SODIUM mmol/L 141 141 138 142   POTASSIUM mmol/L 4.0 4.3 4.2 4.1   CHLORIDE mmol/L 103 105 102 107   CO2 mmol/L 35.3* 29.3* 30.1* 28.9   BUN mg/dL 18 15 18 14   CREATININE mg/dL 0.67 0.71 0.98 0.67   GLUCOSE mg/dL 188* 266* 213* 155*   EGFR mL/min/1.73 84.2 81.9 55.6* 84.2     Results from last 7 days   Lab Units 04/14/23 0311   ALBUMIN g/dL 3.0*     Results from last 7 days   Lab Units 04/17/23 0433 04/14/23 0311 04/13/23 0512 04/11/23 0528   CALCIUM mg/dL 10.0 9.3 9.2 9.5   ALBUMIN g/dL  --  3.0*  --   --    PHOSPHORUS mg/dL  --  2.7  --   --            Results from last 7 days   Lab Units 04/14/23 0311   CHOLESTEROL mg/dL 104   TRIGLYCERIDES mg/dL 190*   HDL CHOL mg/dL 29*   LDL CHOL mg/dL 44             Test Results Pending at Discharge       Discharge Details        Discharge Medications      New Medications      Instructions Start Date   acetaminophen 325 MG tablet  Commonly known as: TYLENOL   650 mg, Oral, Every 4 Hours PRN      atorvastatin 40 MG tablet  Commonly known as: LIPITOR   40 mg, Oral, Daily   Start Date: April 18, 2023     clopidogrel 75 MG tablet  Commonly known as: PLAVIX   75 mg, Oral, Daily   Start Date: April 18, 2023     HYDROcodone-acetaminophen 5-325 MG per tablet  Commonly known as: NORCO   1 tablet, Oral, Every 4 Hours PRN      metoprolol succinate XL 25 MG 24 hr tablet  Commonly known as: TOPROL-XL   25 mg, Oral, Daily      sennosides-docusate  8.6-50 MG per tablet  Commonly known as: PERICOLACE   2 tablets, Oral, 2 Times Daily         Continue These Medications      Instructions Start Date   B-12 PO   1 tablet/day, Oral, 1000 mcg daily      B-D SINGLE USE SWABS REGULAR pads   USE AS DIRECTED 4 TIMES A  DAY.      Eliquis 5 MG tablet tablet  Generic drug: apixaban   5 mg, Oral, Every 12 Hours Scheduled      furosemide 20 MG tablet  Commonly known as: LASIX   Take 1 tablet by mouth Every Morning.      gabapentin 300 MG capsule  Commonly known as: NEURONTIN   300 mg, Oral, 2 Times Daily      insulin degludec 100 UNIT/ML solution pen-injector injection  Commonly known as: TRESIBA FLEXTOUCH   8 Units, Subcutaneous, 2 Times Daily      levothyroxine 75 MCG tablet  Commonly known as: SYNTHROID, LEVOTHROID   75 mcg, Oral, Daily      liothyronine 5 MCG tablet  Commonly known as: CYTOMEL   No dose, route, or frequency recorded.      losartan 100 MG tablet  Commonly known as: COZAAR   TAKE 1 TABLET DAILY      NIFEdipine XL 60 MG 24 hr tablet  Commonly known as: PROCARDIA XL   60 mg, Oral, Daily         Stop These Medications    ketoconazole 2 % shampoo  Commonly known as: NIZORAL     meloxicam 7.5 MG tablet  Commonly known as: MOBIC     pravastatin 20 MG tablet  Commonly known as: PRAVACHOL            Allergies   Allergen Reactions   • Adhesive Tape Other (See Comments)     REDNESS, SKIN BURN   • Latex Other (See Comments)     REDNESS, SKIN BURN   • Propoxyphene Itching       Discharge Disposition:  Skilled Nursing Facility (DC - External)      Discharge Diet:  Diet Order   Procedures   • Diet: Cardiac Diets, Diabetic Diets; Healthy Heart (2-3 Na+); Consistent Carbohydrate; Texture: Regular Texture (IDDSI 7); Fluid Consistency: Thin (IDDSI 0)       Discharge Activity:   Activity Instructions     Other Activity Instructions      Activity Instructions: strict nonweightbearing on the left lower extremity          CODE STATUS:    Code Status and Medical Interventions:    Ordered at: 04/12/23 1700     Medical Intervention Limits:    NO intubation (DNI)     Code Status (Patient has no pulse and is not breathing):    No CPR (Do Not Attempt to Resuscitate)     Medical Interventions (Patient has pulse or is breathing):    Limited Support       No future appointments.  Additional Instructions for the Follow-ups that You Need to Schedule     Discharge Follow-up with PCP   As directed       Currently Documented PCP:    Shawanda Rosaroi MD    PCP Phone Number:    [phone]     Follow Up Details: 1 to 2 weeks (or sooner if problems)            Contact information for follow-up providers     Shawanda Rosario MD .    Specialty: Internal Medicine  Contact information:  [address]  [place] [address]  [phone]             Hoang Gonzales MD. Schedule an appointment as soon as possible for a visit.    Specialty: Orthopedic Surgery  Why: needs cast  Contact information:  [address]  [place] [address]  [phone]             Shawanda Rosario MD .    Specialty: Internal Medicine  Why: 1 to 2 weeks (or sooner if problems)  Contact information:  [address]  [place] [address]  [phone]                   Contact information for after-discharge care     Destination     [facility] .    Service: Skilled Nursing  Contact information:  [address] [address] [phone]                             Additional Instructions for the Follow-ups that You Need to Schedule     Discharge Follow-up with PCP   As directed       Currently Documented PCP:    Shawanda Rosario MD    PCP Phone Number:    [phone]     Follow Up Details: 1 to 2 weeks (or sooner if problems)           Time Spent on Discharge:  Greater than 30 minutes      Gilbert Guaman MD  [facility]  04/17/23  12:12 EDT

## 2023-04-17 NOTE — PROGRESS NOTES
LOS: 5 days   Patient Care Team:  Shawanda Rosario MD as PCP - General (Internal Medicine)  Tevin Albrecht MD as Consulting Physician (Cardiac Electrophysiology)    Chief Complaint: Follow-up severe coronary artery disease.    Interval History: She was feeling poorly as she had not had a bowel movement this morning.  No chest pain or shortness of breath.    Vital Signs:  Temp:  [97.4 °F (36.3 °C)-97.9 °F (36.6 °C)] 97.4 °F (36.3 °C)  Heart Rate:  [60-64] 60  Resp:  [16] 16  BP: (112-167)/(56-79) 154/67    Intake/Output Summary (Last 24 hours) at 4/17/2023 1609  Last data filed at 4/17/2023 1246  Gross per 24 hour   Intake 800 ml   Output 1600 ml   Net -800 ml       Physical Exam:   General Appearance:    No acute distress, alert and oriented x4   Lungs:     Clear to auscultation bilaterally     Heart:    Regular rhythm and normal rate.  No murmurs, gallops, or       rubs.   Abdomen:     Soft, nontender, nondistended.    Extremities:   No clubbing, cyanosis, or edema.     Results Review:    Results from last 7 days   Lab Units 04/17/23  0433   SODIUM mmol/L 141   POTASSIUM mmol/L 4.0   CHLORIDE mmol/L 103   CO2 mmol/L 35.3*   BUN mg/dL 18   CREATININE mg/dL 0.67   GLUCOSE mg/dL 188*   CALCIUM mg/dL 10.0         Results from last 7 days   Lab Units 04/17/23  0433   WBC 10*3/mm3 7.73   HEMOGLOBIN g/dL 11.7*   HEMATOCRIT % 36.6   PLATELETS 10*3/mm3 186         Results from last 7 days   Lab Units 04/14/23  0311   CHOLESTEROL mg/dL 104         Results from last 7 days   Lab Units 04/14/23  0311   CHOLESTEROL mg/dL 104   TRIGLYCERIDES mg/dL 190*   HDL CHOL mg/dL 29*   LDL CHOL mg/dL 44       I reviewed the patient's new clinical results.        Assessment:  1.  Mechanical fall with left ankle fracture (distal fibula fracture with a nondisplaced medial malleolus fracture)  2.  Severe coronary artery disease by cath on 4/13/2023 by Dr. Silva.  Severe diffuse LAD disease not amenable to PCI.  90% highly calcified  proximal RCA stenosis (large vessel).  3.  Diabetes, longstanding and poorly controlled   4.  Paroxysmal atrial fibrillation  5.  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 Tevin Albrecht at [place])  6.  Normal ejection fraction 55% by echo on 2/19/2023  7.  Hypertension  8.  DARI, intolerant of CPAP    Plan:  -No chest pain or anginal symptoms.  Plan to discharge today to rehab with readmission on 4/20/2023 with Dr. Oconnor for orbital atherectomy and complex RCA intervention.  Last dose of Eliquis will need to be on 4/18/2023.    -Continue Plavix 75 mg/day.  Continue Lipitor, metoprolol, losartan given coronary artery disease.    -No plans for operative intervention for the ankle per orthopedics.    Sergei Cornell MD  04/17/23  16:09 EDT

## 2023-04-17 NOTE — CASE MANAGEMENT/SOCIAL WORK
Continued Stay Note  [facility]     Patient Name: Roland Russell  MRN: [id number]  Today's Date: 4/17/2023    Admit Date: 4/10/2023    Plan: [facility] SNF   Discharge Plan     Row Name 04/17/23 1500       Plan    Plan [facility]    Plan Comments Spoke with [facility] and pre-cert has been obtained. [facility] arranged for 1930 tonight. Patient and daughter updated at bedside. Transfer packet updated and dc summary faxed. Patient to have atherectomy 4/20 @ 0930. Nurse aware and will update facility when she calls report. Patient will also need to follow up with ortho for cast placement. Patient will dc to skilled bed at [facility] via [facility].               Discharge Codes    No documentation.               Expected Discharge Date and Time     Expected Discharge Date Expected Discharge Time    Apr 17, 2023             Elizabeth Membreno RN

## 2023-04-17 NOTE — CASE MANAGEMENT/SOCIAL WORK
"Physicians Statement of Medical Necessity for  Ambulance Transportation    GENERAL INFORMATION     Name: Roland Russell  YOB: 1934  Medicare #: [id number]  Transport Date: 4/17/23 (Valid for round trips this date, or for scheduled repetitive trips for 60 days from the date signed below.)  Origin: [facility]  Destination: [facility]  Is the Patient's stay covered under Medicare Part A (PPS/DRG?)Yes  Closest appropriate facility? Yes  If this a hosp-hosp transfer? No  Is this a hospice patient? No    MEDICAL NECESSITY QUESTIONAIRE    Ambulance Transportation is medically necessary only if other means of transportation are contraindicated or would be potentially harmful to the patient.  To meet this requirement, the patient must be either \"bed confined\" or suffer from a condition such that transport by means other than an ambulance is contraindicated by the patient's condition.  The following questions must be answered by the healthcare professional signing below for this form to be valid:     1) Describe the MEDICAL CONDITION (physical and/or mental) of this patient AT THE TIME OF AMBULANCE TRANSPORT that requires the patient to be transported in an ambulance, and why transport by other means is contraindicated by the patient's condition:   Past Medical History:   Diagnosis Date   • Abnormal vision     SEES \"KALEIDOSCOPE\"   • Allergy to adhesive tape    • Arthralgia    • AVB (atrioventricular block)    • Balance problem    • Carotid body tumor     LEFT   • Difficulty walking    • H/O complete eye exam 10/2016   • Headache     OFF AND ON BACK OF HEAD, DOWN NECK TO SHOULDER   • History of glaucoma    • History of poliomyelitis     AGE 9   • History of surgery on arm     left arm   • HLD (hyperlipidemia)    • Hypertension    • Hypothyroidism, acquired    • Legal blindness     SOME PERIPHERAL VISION ON LEFT, SOME VISION ON RIGHT   • Memory loss     SOME SHORT TERM   • Migraine    • Numbness " "    LEFT LEG    • Osteoarthritis, multiple sites    • Osteoporosis    • Pacemaker    • Rectal bleeding     X1, WITH BM   • Sleep apnea     DOES NOT USE A MACHINE   • TIA (transient ischemic attack) 12/24/2018    ?, HAD SPELL UNABLE TO TALK   • Type 2 diabetes mellitus    • Type 2 diabetes mellitus, uncontrolled       Past Surgical History:   Procedure Laterality Date   • APPENDECTOMY  1989   • CARDIAC CATHETERIZATION N/A 4/13/2023    Procedure: Left Heart Cath;  Surgeon: Rikki Oconnor MD;  Location: [facility] CATH INVASIVE LOCATION;  Service: Cardiology;  Laterality: N/A;   • CARDIAC CATHETERIZATION N/A 4/13/2023    Procedure: Coronary angiography;  Surgeon: Rikki Oconnor MD;  Location: [facility] CATH INVASIVE LOCATION;  Service: Cardiology;  Laterality: N/A;   • CARDIAC CATHETERIZATION N/A 4/13/2023    Procedure: Left ventriculography;  Surgeon: Rikki Oconnor MD;  Location: [facility] CATH INVASIVE LOCATION;  Service: Cardiology;  Laterality: N/A;   • CARDIAC PACEMAKER PLACEMENT  11/25/2004   • CAROTID ENDARTERECTOMY Left 12/13/2019    Procedure: LEFT CAROTID BODY TUMOR RESECTION;  Surgeon: Bryan Severino MD;  Location: [facility] MAIN OR;  Service: Vascular   • CATARACT EXTRACTION Bilateral 1997   • CEREBRAL ANGIOGRAM Bilateral 12/6/2019    Procedure: CAROTID ARTERIOGRAM BILATERAL, RIGHT WRIST APPROACH;  Surgeon: Bryan Severino MD;  Location: [facility] HYBRID OR 18/19;  Service: Vascular   • CHOLECYSTECTOMY  1989   • COLONOSCOPY  2013    dr montes   • ELBOW PROCEDURE  2016   • ENDOSCOPY  12/06/2012    Dr. Montes; food in stomach, gastritis   • EXCISION LESION  1994    BACK CYST BENGIN   • EYE SURGERY  12/2012   • GLAUCOMA SURGERY Bilateral 1995    laser surgery   • HARDWARE REMOVAL Left     ELBOW   • HYSTERECTOMY  1986   • ORIF ELBOW FRACTURE Left    • PACEMAKER IMPLANTATION     • PACEMAKER REPLACEMENT  06/2013      2) Is this patient \"bed confined\" as defined below?No    To be \"bed confined\" the patient must satisfy all three of the " following criteria:  (1) unable to get up from bed without assistance; AND (2) unable to ambulate;  AND (3) unable to sit in a chair or wheelchair.  3) Can this patient safely be transported by car or wheelchair van (I.e., may safely sit during transport, without an attendant or monitoring?)No   4. In addition to completing questions 1-3 above, please check any of the following conditions that apply*:          *Note: supporting documentation for any boxes checked must be maintained in the patient's medical records Requires oxygen - unable to self administer      SIGNATURE OF PHYSICIAN OR OTHER AUTHORIZED HEALTHCARE PROFESSIONAL    I certify that the above information is true and correct based on my evaluation of this patient, and represent that the patient requires transport by ambulance and that other forms of transport are contraindicated.  I understand that this information will be used by the Centers for Medicare and Medicaid Services (CMS) to support the determiniation of medical necessity for ambulance services, and I represent that I have personal knowledge of the patient's condition at the time of transport.       If this box is checked, I also certify that the patient is physically or mentally incapable of signing the ambulance service's claim form and that the institution with which I am affiliated has furnished care, services or assistance to the patient.  My signature below is made on behalf of the patient pursuant to 42 .36(b)(4). In accordance with 42 .37, the specific reason(s) that the patient is physically or mentally incapable of signing the claim for is as follows:     Signature of Physician or Healthcare Professional  Date/Time:        (For Scheduled repetitive transport, this form is not valid for transports performed more than 60 days after this date).                                                                                                                                             --------------------------------------------------------------------------------------------  Printed Name and Credentials of Physician or Authorized Healthcare Professional     *Form must be signed by patient's attending physician for scheduled, repetitive transports,.  For non-repetitive ambulance transports, if unable to obtain the signature of the attending physician, any of the following may sign (please select below):     Physician  Clinical Nurse Specialist  Registered Nurse     Physician Assistant  Discharge Planner  Licensed Practical Nurse     Nurse Practitioner

## 2023-04-17 NOTE — PLAN OF CARE
Goal Outcome Evaluation:  Plan of Care Reviewed With: patient        Progress: no change  Outcome Evaluation: patient remains confused throughout shift, voiding per purewick, family at bedside at beginning of shift, no s/s of pain, attempted to educated on b/p and glucose monitoring

## 2023-04-18 ENCOUNTER — TELEPHONE (OUTPATIENT)
Dept: CARDIOLOGY | Facility: CLINIC | Age: 88
End: 2023-04-18
Payer: MEDICARE

## 2023-04-18 NOTE — CASE MANAGEMENT/SOCIAL WORK
Case Management Discharge Note      Final Note: Dc'd to skilled bed at [facility] via [facility].         Selected Continued Care - Discharged on 4/17/2023 Admission date: 4/10/2023 - Discharge disposition: Skilled Nursing Facility (DC - External)    Destination Coordination complete.    Service Provider Selected Services Address Phone Fax Patient Preferred    [facility] [address] [address] [phone] --          Durable Medical Equipment    No services have been selected for the patient.              Dialysis/Infusion    No services have been selected for the patient.              Home Medical Care    No services have been selected for the patient.              Therapy    No services have been selected for the patient.              Community Resources    No services have been selected for the patient.              Community & DME    No services have been selected for the patient.                  Transportation Services  Ambulance: [facility]    Final Discharge Disposition Code: 03 - skilled nursing facility (SNF)

## 2023-04-18 NOTE — TELEPHONE ENCOUNTER
Got patient scheduled for 4/20/2023 like you asked for PCI. Her nursing home will need a med list faxed to them at [phone] it looks like changes were made. Thanks Hillary

## 2023-04-19 NOTE — TELEPHONE ENCOUNTER
Her medication list should be unchanged from the DC summary written on 4/17/23 other than the need to hold Eliquis.

## 2023-04-19 NOTE — PROGRESS NOTES
Case Management Discharge Note      Final Note: Dc'd to skilled bed at [facility] via [facility].         Selected Continued Care - Discharged on 4/17/2023 Admission date: 4/10/2023 - Discharge disposition: Skilled Nursing Facility (DC - External)    Destination Coordination complete.    Service Provider Selected Services Address Phone Fax Patient Preferred    [facility] [address] [facility] [id number] [phone] --          Durable Medical Equipment    No services have been selected for the patient.              Dialysis/Infusion    No services have been selected for the patient.              Home Medical Care    No services have been selected for the patient.              Therapy    No services have been selected for the patient.              Community Resources    No services have been selected for the patient.              Community & DME    No services have been selected for the patient.                  Transportation Services  Ambulance: [facility]    Final Discharge Disposition Code: 83 - SNF (DC-External) w/planned readmission

## 2023-04-20 ENCOUNTER — HOSPITAL ENCOUNTER (OUTPATIENT)
Facility: HOSPITAL | Age: 88
Discharge: NURSING, REHAB OR LONG-TERM CARE | End: 2023-04-21
Attending: STUDENT IN AN ORGANIZED HEALTH CARE EDUCATION/TRAINING PROGRAM | Admitting: STUDENT IN AN ORGANIZED HEALTH CARE EDUCATION/TRAINING PROGRAM
Payer: MEDICARE

## 2023-04-20 DIAGNOSIS — I25.10 CORONARY ARTERY DISEASE INVOLVING NATIVE CORONARY ARTERY OF NATIVE HEART, UNSPECIFIED WHETHER ANGINA PRESENT: ICD-10-CM

## 2023-04-20 DIAGNOSIS — Z95.5 S/P RIGHT CORONARY ARTERY (RCA) STENT PLACEMENT: Primary | ICD-10-CM

## 2023-04-20 DIAGNOSIS — S82.892A CLOSED FRACTURE OF LEFT ANKLE, INITIAL ENCOUNTER: ICD-10-CM

## 2023-04-20 DIAGNOSIS — R94.39 ABNORMAL NUCLEAR STRESS TEST: ICD-10-CM

## 2023-04-20 LAB
GLUCOSE BLDC GLUCOMTR-MCNC: 116 MG/DL (ref 70–130)
GLUCOSE BLDC GLUCOMTR-MCNC: 124 MG/DL (ref 70–130)
GLUCOSE BLDC GLUCOMTR-MCNC: 127 MG/DL (ref 70–130)

## 2023-04-20 PROCEDURE — 92978 ENDOLUMINL IVUS OCT C 1ST: CPT | Performed by: STUDENT IN AN ORGANIZED HEALTH CARE EDUCATION/TRAINING PROGRAM

## 2023-04-20 PROCEDURE — C1887 CATHETER, GUIDING: HCPCS | Performed by: STUDENT IN AN ORGANIZED HEALTH CARE EDUCATION/TRAINING PROGRAM

## 2023-04-20 PROCEDURE — 85347 COAGULATION TIME ACTIVATED: CPT

## 2023-04-20 PROCEDURE — C1874 STENT, COATED/COV W/DEL SYS: HCPCS | Performed by: STUDENT IN AN ORGANIZED HEALTH CARE EDUCATION/TRAINING PROGRAM

## 2023-04-20 PROCEDURE — 25010000002 HEPARIN (PORCINE) PER 1000 UNITS: Performed by: STUDENT IN AN ORGANIZED HEALTH CARE EDUCATION/TRAINING PROGRAM

## 2023-04-20 PROCEDURE — C1725 CATH, TRANSLUMIN NON-LASER: HCPCS | Performed by: STUDENT IN AN ORGANIZED HEALTH CARE EDUCATION/TRAINING PROGRAM

## 2023-04-20 PROCEDURE — C1769 GUIDE WIRE: HCPCS | Performed by: STUDENT IN AN ORGANIZED HEALTH CARE EDUCATION/TRAINING PROGRAM

## 2023-04-20 PROCEDURE — C1894 INTRO/SHEATH, NON-LASER: HCPCS | Performed by: STUDENT IN AN ORGANIZED HEALTH CARE EDUCATION/TRAINING PROGRAM

## 2023-04-20 PROCEDURE — S0260 H&P FOR SURGERY: HCPCS | Performed by: STUDENT IN AN ORGANIZED HEALTH CARE EDUCATION/TRAINING PROGRAM

## 2023-04-20 PROCEDURE — 99153 MOD SED SAME PHYS/QHP EA: CPT | Performed by: STUDENT IN AN ORGANIZED HEALTH CARE EDUCATION/TRAINING PROGRAM

## 2023-04-20 PROCEDURE — C1760 CLOSURE DEV, VASC: HCPCS | Performed by: STUDENT IN AN ORGANIZED HEALTH CARE EDUCATION/TRAINING PROGRAM

## 2023-04-20 PROCEDURE — 25010000002 FENTANYL CITRATE (PF) 50 MCG/ML SOLUTION: Performed by: STUDENT IN AN ORGANIZED HEALTH CARE EDUCATION/TRAINING PROGRAM

## 2023-04-20 PROCEDURE — 99152 MOD SED SAME PHYS/QHP 5/>YRS: CPT | Performed by: STUDENT IN AN ORGANIZED HEALTH CARE EDUCATION/TRAINING PROGRAM

## 2023-04-20 PROCEDURE — 25510000001 IOPAMIDOL PER 1 ML: Performed by: STUDENT IN AN ORGANIZED HEALTH CARE EDUCATION/TRAINING PROGRAM

## 2023-04-20 PROCEDURE — 92933 PRQ TRLML C ATHRC ST ANGIOP1: CPT | Performed by: STUDENT IN AN ORGANIZED HEALTH CARE EDUCATION/TRAINING PROGRAM

## 2023-04-20 PROCEDURE — G0378 HOSPITAL OBSERVATION PER HR: HCPCS

## 2023-04-20 PROCEDURE — C1724 CATH, TRANS ATHEREC,ROTATION: HCPCS | Performed by: STUDENT IN AN ORGANIZED HEALTH CARE EDUCATION/TRAINING PROGRAM

## 2023-04-20 PROCEDURE — C1753 CATH, INTRAVAS ULTRASOUND: HCPCS | Performed by: STUDENT IN AN ORGANIZED HEALTH CARE EDUCATION/TRAINING PROGRAM

## 2023-04-20 PROCEDURE — 25010000002 PHENYLEPHRINE 10 MG/ML SOLUTION: Performed by: STUDENT IN AN ORGANIZED HEALTH CARE EDUCATION/TRAINING PROGRAM

## 2023-04-20 PROCEDURE — C9602 PERC D-E COR STENT ATHER S: HCPCS | Performed by: STUDENT IN AN ORGANIZED HEALTH CARE EDUCATION/TRAINING PROGRAM

## 2023-04-20 PROCEDURE — 82962 GLUCOSE BLOOD TEST: CPT

## 2023-04-20 DEVICE — XIENCE SKYPOINT™ EVEROLIMUS ELUTING CORONARY STENT SYSTEM 3.50 MM X 38 MM / RAPID-EXCHANGE
Type: IMPLANTABLE DEVICE | Status: FUNCTIONAL
Brand: XIENCE SKYPOINT™

## 2023-04-20 DEVICE — XIENCE SKYPOINT™ EVEROLIMUS ELUTING CORONARY STENT SYSTEM 4.00 MM X 38 MM / RAPID-EXCHANGE
Type: IMPLANTABLE DEVICE | Status: FUNCTIONAL
Brand: XIENCE SKYPOINT™

## 2023-04-20 RX ORDER — INSULIN LISPRO 100 [IU]/ML
INJECTION, SOLUTION INTRAVENOUS; SUBCUTANEOUS
COMMUNITY

## 2023-04-20 RX ORDER — SODIUM CHLORIDE 0.9 % (FLUSH) 0.9 %
10 SYRINGE (ML) INJECTION AS NEEDED
Status: DISCONTINUED | OUTPATIENT
Start: 2023-04-20 | End: 2023-04-20

## 2023-04-20 RX ORDER — SODIUM CHLORIDE 0.9 % (FLUSH) 0.9 %
10 SYRINGE (ML) INJECTION EVERY 12 HOURS SCHEDULED
Status: DISCONTINUED | OUTPATIENT
Start: 2023-04-20 | End: 2023-04-20

## 2023-04-20 RX ORDER — PHENYLEPHRINE HYDROCHLORIDE 10 MG/ML
INJECTION INTRAVENOUS
Status: DISCONTINUED | OUTPATIENT
Start: 2023-04-20 | End: 2023-04-20 | Stop reason: HOSPADM

## 2023-04-20 RX ORDER — FENTANYL CITRATE 50 UG/ML
INJECTION, SOLUTION INTRAMUSCULAR; INTRAVENOUS
Status: DISCONTINUED | OUTPATIENT
Start: 2023-04-20 | End: 2023-04-20 | Stop reason: HOSPADM

## 2023-04-20 RX ORDER — ACETAMINOPHEN 325 MG/1
650 TABLET ORAL EVERY 4 HOURS PRN
Status: DISCONTINUED | OUTPATIENT
Start: 2023-04-20 | End: 2023-04-21 | Stop reason: HOSPADM

## 2023-04-20 RX ORDER — LIDOCAINE HYDROCHLORIDE 20 MG/ML
INJECTION, SOLUTION INFILTRATION; PERINEURAL
Status: DISCONTINUED | OUTPATIENT
Start: 2023-04-20 | End: 2023-04-20 | Stop reason: HOSPADM

## 2023-04-20 RX ORDER — CLOPIDOGREL BISULFATE 75 MG/1
TABLET ORAL
Status: DISCONTINUED | OUTPATIENT
Start: 2023-04-20 | End: 2023-04-20 | Stop reason: HOSPADM

## 2023-04-20 RX ORDER — SODIUM CHLORIDE 9 MG/ML
75 INJECTION, SOLUTION INTRAVENOUS CONTINUOUS
Status: DISCONTINUED | OUTPATIENT
Start: 2023-04-20 | End: 2023-04-21 | Stop reason: HOSPADM

## 2023-04-20 RX ORDER — HEPARIN SODIUM 1000 [USP'U]/ML
INJECTION, SOLUTION INTRAVENOUS; SUBCUTANEOUS
Status: DISCONTINUED | OUTPATIENT
Start: 2023-04-20 | End: 2023-04-20 | Stop reason: HOSPADM

## 2023-04-20 RX ORDER — ASPIRIN 325 MG
TABLET ORAL
Status: DISCONTINUED | OUTPATIENT
Start: 2023-04-20 | End: 2023-04-20 | Stop reason: HOSPADM

## 2023-04-20 RX ORDER — NICARDIPINE HYDROCHLORIDE 2.5 MG/ML
INJECTION INTRAVENOUS
Status: DISCONTINUED | OUTPATIENT
Start: 2023-04-20 | End: 2023-04-20 | Stop reason: HOSPADM

## 2023-04-20 RX ADMIN — SODIUM CHLORIDE 75 ML/HR: 9 INJECTION, SOLUTION INTRAVENOUS at 08:52

## 2023-04-20 RX ADMIN — ACETAMINOPHEN 650 MG: 325 TABLET, FILM COATED ORAL at 15:56

## 2023-04-20 NOTE — Clinical Note
First balloon inflation max pressure = 20 shila. First balloon inflation duration = 10 seconds. Second inflation of balloon - Max pressure = 24 shila. 2nd Inflation of balloon - Duration = 7 seconds. 2nd inflation was done at 12:28 EDT. The balloon is positioned in the Proximal segment of the vessel. Third inflation of balloon - Max pressure = 24 shila. 3rd Inflation of balloon - Duration = 8 seconds. 3rd inflation was done at 12:29 EDT. The  balloon is positioned in the Proximal segment of the vessel.

## 2023-04-20 NOTE — DISCHARGE PLACEMENT REQUEST
"Britney Russell (88 y.o. Female)     Date of Birth   1934    Social Security Number       Address   [address] [address]    Home Phone   [phone]    MRN   [id number]       Rastafarian   Yazidi    Marital Status                               Admission Date   4/20/23    Admission Type   Elective    Admitting Provider   Rikki Oconnor MD    Attending Provider   Rikki Oconnor MD    Department, Room/Bed   [address] CVI, 2201/1       Discharge Date       Discharge Disposition   Home or Self Care    Discharge Destination                               Attending Provider: Rikki Oconnor MD    Allergies: Adhesive Tape, Latex, Propoxyphene    Isolation: None   Infection: None   Code Status: Prior    Ht: 152.4 cm (60\")   Wt: 64 kg (141 lb)    Admission Cmt: None   Principal Problem: None                Active Insurance as of 4/20/2023     Primary Coverage     Payor Plan Insurance Group Employer/Plan Group    [facility] MEDICARE REPLACEMENT [facility] MEDICARE REPLACEMENT [id number]     Payor Plan Address Payor Plan Phone Number Payor Plan Fax Number Effective Dates    [address] [address]   1/1/2018 - None Entered    [facility] [id number]       Subscriber Name Subscriber Birth Date Member ID       BRITNEY RUSSELL 1934 [id number]                 Emergency Contacts      (Rel.) Home Phone Work Phone Mobile Phone    Yanet Wen DAUGHTER (Power of ) [phone] -- [phone]    Armaan Russell Sr (Son) [phone] -- [phone]    Cecil Russell (Son) [phone] -- [phone]              "

## 2023-04-20 NOTE — CONSULTS
Met with patient and her daughter, discussed benefits of cardiac rehab. Provided phase II information along with the contact information for cardiac rehab here at [facility]. Patient will not be able to attend until leg heals. Daughter states patient broke her leg when she fell.  They will call when she is able to attend.

## 2023-04-20 NOTE — DISCHARGE INSTRUCTIONS
[facility]  [address] [place] [address]    Coronary Angiogram with Stent (Femoral Approach) After Care    Refer to this sheet in the next few weeks. These instructions provide you with information on caring for yourself after your procedure. Your health care provider may also give you more specific instructions. Your treatment has been planned according to current medical practices, but problems sometimes occur. Call your health care provider if you have any problems or questions after your procedure.    WHAT TO EXPECT AFTER THE PROCEDURE    The insertion site may be tender for a few days after your procedure.  Minor discomfort or tenderness and a small bump at the catheter insertion site.  The bump should decrease in size and tenderness within 1 to 2 weeks.     HOME CARE INSTRUCTIONS      Take medicines only as directed by your health care provider. Blood thinners may be prescribed after your procedure to improve blood flow through the stent.  Metformin or any medications containing Metformin should not be taken for 48 hours after your procedure.    Cardiac Rehab may or may not be ordered.  Please consult with your physician.   Do not apply powder or lotion to the site.    Check your insertion site every day for redness, swelling, or fluid leaking from the insertion site.    Increase your fluid intake for the next 2 days.    Hold direct pressure over the site when you cough, sneeze, laugh or change positions.  Do this for the next 2 days.    Avoid strenuous activity as directed by your physician.  Follow instructions about when you can drive and return to work as directed by your physician.     Do not take baths, swim, or use a hot tub until your health care provider approves.   You may shower 24 hours after the procedure. Remove the bandage (dressing) and gently wash the site with plain soap and water.  Gently pat the site dry.  Do not rub the insertion area with a washcloth or towel.  You  may apply a band aid daily for 2 days if desired.   Limit your activity for the first 48 hours.  Do not bend, squat, or lift anything over 20lb. (9 kg) or as directed by your health care provider.  However, we recommend lifting nothing heavier than a gallon of milk.   Do not operate machinery or power tools for 24 hours.  A responsible adult should be with you for the first 24 hours after you arrive home. Do not make any important legal decisions or sign legal papers for 24 hours.  Do not drink alcohol for 24 hours.    Eat a heart-healthy diet. This should include plenty of fresh fruits and vegetables. Meat should be lean cuts. Avoid the following types of food:    Food that is high in salt.    Canned or highly processed food.    Food that is high in saturated fat or sugar.    Fried food.    Make any other lifestyle changes recommended by your health care provider. This may include:    Not using any tobacco products including cigarettes, chewing tobacco, or electronic cigarettes.   Managing your weight.    Getting regular exercise.    Managing your blood pressure.    Limiting your alcohol intake.    Managing other health problems, such as diabetes.    If you need an MRI after your heart stent was placed, be sure to tell the health care provider who orders the MRI that you have a heart stent.    Keep all follow-up visits as directed by your health care provider.      Call your doctor if:    You have heavy bleeding from the site, lie down flat, hold manual pressure and call 911 immediately.     You develop chest pain, shortness of breath, feel faint, or pass out.  You have bleeding, swelling larger than a walnut, or drainage from the catheter insertion site.  You develop pain, discoloration, coldness, numbness, tingling, or severe bruising in the leg that held the catheter.  You develop bleeding from any other place such as from the bowels.   You have a fever > 101 or chills.    Call Your Doctor If:     You have any  symptoms of a stroke.  Remember BE FAST  B-balance. Sudden trouble walking or loss of balance.  E-eyes.  Sudden changes in how you see or a sudden onset of a very bad headache.   F-face. Sudden weakness or loss of feeling of the face or facial droop on one side.   A-arms Sudden weakness or numbness in one arm.  One arm drifts down if they are both held out in front of you. This happens suddenly and usually on one side of the body.  S-speech.  Sudden trouble speaking, slurred speech or trouble understanding what people are saying.   T-time  Time to call emergency services.  Write down the symptoms and the time they started.    MAKE SURE YOU:  Understand these instructions.  Will watch your condition.  Will get help right away if you are not doing well or get worse.

## 2023-04-20 NOTE — Clinical Note
Atherectomy performed in the mid right coronary artery. Orbital atherectomy. 1st Pass rate = 80 RPM. 1st Pass duration = 30 seconds. 2nd Pass rate = 80 RPM. 2nd Pass duration = 30 seconds. 3rd Pass rate = 80 RPM. 3rd Pass duration = 30 seconds. 4th Pass rate = 80 RPM. 4th Pass duration = 30 seconds. 5th Pass duration = 30 seconds. 6th Pass rate = 80 RPM. 6th Pass duration = 30 seconds. 7th Pass rate = 80 RPM. 7th Pass duration = 30 seco nds.

## 2023-04-20 NOTE — Clinical Note
First balloon inflation max pressure = 20 shila. First balloon inflation duration = 10 seconds. Second inflation of balloon - Max pressure = 20 shila. 2nd Inflation of balloon - Duration = 8 seconds. 2nd inflation was done at 11:47 EDT. The balloon is positioned in the Proximal segment of the vessel. Third inflation of balloon - Max pressure = 20 shila. 3rd Inflation of balloon - Duration = 12 seconds. 3rd inflation was done at 11:47 EDT. The  balloon is positioned in the Proximal segment of the vessel.

## 2023-04-20 NOTE — Clinical Note
First balloon inflation max pressure = 14 shila. First balloon inflation duration = 10 seconds. Second inflation of balloon - Max pressure = 16 shila. 2nd Inflation of balloon - Duration = 10 seconds. 2nd inflation was done at 11:45 EDT. The balloon is positioned in the Mid segment of the vessel. Third inflation of balloon - Max pressure = 16 shila. 3rd Inflation of balloon - Duration = 14 seconds. 3rd inflation was done at 14:00 EDT. The bal loon is positioned in the Mid segment of the vessel. Fourth inflation of balloon - Max pressure = 18 shila. 4th Inflation of balloon - Duration = 16 seconds. 4th inflation was done at 11:46 EDT. The balloon is positioned in the Mid segment of the vessel.

## 2023-04-20 NOTE — PLAN OF CARE
Goal Outcome Evaluation:   Received pt from CL pt is legally blind awake alert and oriented. C/o headache and po tylenol given. Rt femoral site CDI with angio seal on no bleeding or hematoma noted vitals stable. Skin assessment done with nursing beverlyissnneka Minaya, stage 1 redness non blanchable noted to sacral  mepilex changed, also excoriation noted on rt groin upon assessment

## 2023-04-20 NOTE — H&P
"Date of Hospital Visit: 23  Encounter Provider: Rikki Oconnor MD  Place of Service: [facility] CARDIOLOGY  Patient Name: Roland Russell  :1934  [id number]    Chief complaint:  Staged PCI to RCA    History of Present Illness:  88-year-old with paroxysmal A-fib on liquids, diabetes, hypertension, DARI, high-grade block status post dual-chamber pacemaker who presented a couple of weeks ago with a left ankle fracture.  She underwent nuclear stress test as part of a preoperative work-up prior to possible left ankle fracture surgery and was referred for left heart catheterization for further evaluation. LHC revealed severe calcified RCA stenoses and she was planned for staged PCI.      Past Medical History:   Diagnosis Date   • Abnormal vision     SEES \"KALEIDOSCOPE\"   • Allergy to adhesive tape    • Arthralgia    • AVB (atrioventricular block)    • Balance problem    • Carotid body tumor     LEFT   • Difficulty walking    • H/O complete eye exam 10/2016   • Headache     OFF AND ON BACK OF HEAD, DOWN NECK TO SHOULDER   • History of glaucoma    • History of poliomyelitis     AGE 9   • History of surgery on arm     left arm   • HLD (hyperlipidemia)    • Hypertension    • Hypothyroidism, acquired    • Legal blindness     SOME PERIPHERAL VISION ON LEFT, SOME VISION ON RIGHT   • Memory loss     SOME SHORT TERM   • Migraine    • Numbness     LEFT LEG    • Osteoarthritis, multiple sites    • Osteoporosis    • Pacemaker    • Rectal bleeding     X1, WITH BM   • Sleep apnea     DOES NOT USE A MACHINE   • TIA (transient ischemic attack) 2018    ?, HAD SPELL UNABLE TO TALK   • Type 2 diabetes mellitus    • Type 2 diabetes mellitus, uncontrolled        Past Surgical History:   Procedure Laterality Date   • APPENDECTOMY     • CARDIAC CATHETERIZATION N/A 2023    Procedure: Left Heart Cath;  Surgeon: Rikki Oconnor MD;  Location: [facility] CATH INVASIVE LOCATION;  Service: Cardiology;  " Laterality: N/A;   • CARDIAC CATHETERIZATION N/A 4/13/2023    Procedure: Coronary angiography;  Surgeon: Rikki Oconnor MD;  [facility];  Service: Cardiology;  Laterality: N/A;   • CARDIAC CATHETERIZATION N/A 4/13/2023    Procedure: Left ventriculography;  Surgeon: Rikki Oconnor MD;  [facility];  Service: Cardiology;  Laterality: N/A;   • CARDIAC PACEMAKER PLACEMENT  11/25/2004   • CAROTID ENDARTERECTOMY Left 12/13/2019    Procedure: LEFT CAROTID BODY TUMOR RESECTION;  Surgeon: Bryan Severino MD;  Location: [facility] MAIN OR;  Service: Vascular   • CATARACT EXTRACTION Bilateral 1997   • CEREBRAL ANGIOGRAM Bilateral 12/6/2019    Procedure: CAROTID ARTERIOGRAM BILATERAL, RIGHT WRIST APPROACH;  Surgeon: Bryan Severino MD;  Location: [facility] HYBRID OR 18/19;  Service: Vascular   • CHOLECYSTECTOMY  1989   • COLONOSCOPY  2013    dr montes   • ELBOW PROCEDURE  2016   • ENDOSCOPY  12/06/2012    Dr. Montes; food in stomach, gastritis   • EXCISION LESION  1994    BACK CYST BENGIN   • EYE SURGERY  12/2012   • GLAUCOMA SURGERY Bilateral 1995    laser surgery   • HARDWARE REMOVAL Left     ELBOW   • HYSTERECTOMY  1986   • ORIF ELBOW FRACTURE Left    • PACEMAKER IMPLANTATION     • PACEMAKER REPLACEMENT  06/2013       Medications Prior to Admission   Medication Sig Dispense Refill Last Dose   • acetaminophen (TYLENOL) 325 MG tablet Take 2 tablets by mouth Every 4 (Four) Hours As Needed for Mild Pain.   Past Week   • atorvastatin (LIPITOR) 40 MG tablet Take 1 tablet by mouth Daily. 90 tablet  4/19/2023   • clopidogrel (PLAVIX) 75 MG tablet Take 1 tablet by mouth Daily. 30 tablet  4/19/2023   • Cyanocobalamin (B-12 PO) Take 1 tablet/day by mouth. 1000 mcg daily   4/19/2023   • furosemide (LASIX) 20 MG tablet Take 1 tablet by mouth Every Morning.   4/19/2023   • gabapentin (NEURONTIN) 300 MG capsule Take 1 capsule by mouth 2 (Two) Times a Day. 6 capsule 0 4/19/2023   •  HYDROcodone-acetaminophen (NORCO) 5-325 MG per tablet Take 1 tablet by mouth Every 4 (Four) Hours As Needed for Moderate Pain for up to 3 days. 6 tablet 0 4/19/2023   • insulin degludec (TRESIBA FLEXTOUCH) 100 UNIT/ML solution pen-injector injection Inject 8 Units under the skin into the appropriate area as directed 2 (Two) Times a Day.   4/19/2023   • Insulin Lispro (humaLOG) 100 UNIT/ML injection Inject  under the skin into the appropriate area as directed 3 (Three) Times a Day Before Meals. Sliding scale   4/19/2023   • levothyroxine (SYNTHROID, LEVOTHROID) 75 MCG tablet TAKE 1 TABLET BY MOUTH DAILY 30 tablet 3 4/19/2023   • liothyronine (CYTOMEL) 5 MCG tablet    4/19/2023   • losartan (COZAAR) 100 MG tablet TAKE 1 TABLET DAILY 30 tablet 3 4/19/2023   • metoprolol succinate XL (TOPROL-XL) 25 MG 24 hr tablet Take 1 tablet by mouth Daily.   4/19/2023   • NIFEdipine XL (PROCARDIA XL) 60 MG 24 hr tablet Take 1 tablet by mouth Daily.   4/19/2023   • sennosides-docusate (PERICOLACE) 8.6-50 MG per tablet Take 2 tablets by mouth 2 (Two) Times a Day.   4/19/2023   • Alcohol Swabs (B-D SINGLE USE SWABS REGULAR) pads USE AS DIRECTED 4 TIMES A  DAY. 400 each 3    • apixaban (ELIQUIS) 5 MG tablet tablet Take 1 tablet by mouth 2 (Two) Times a Day.   4/18/2023   • tuberculin (Aplisol) 5 UNIT/0.1ML injection Inject 5 Units into the appropriate area of the skin as directed by provider 1 (One) Time.   4/17/2023       Current Meds  No current facility-administered medications on file prior to encounter.     Current Outpatient Medications on File Prior to Encounter   Medication Sig Dispense Refill   • acetaminophen (TYLENOL) 325 MG tablet Take 2 tablets by mouth Every 4 (Four) Hours As Needed for Mild Pain.     • atorvastatin (LIPITOR) 40 MG tablet Take 1 tablet by mouth Daily. 90 tablet    • clopidogrel (PLAVIX) 75 MG tablet Take 1 tablet by mouth Daily. 30 tablet    • Cyanocobalamin (B-12 PO) Take 1 tablet/day by mouth. 1000 mcg  daily     • furosemide (LASIX) 20 MG tablet Take 1 tablet by mouth Every Morning.     • gabapentin (NEURONTIN) 300 MG capsule Take 1 capsule by mouth 2 (Two) Times a Day. 6 capsule 0   • HYDROcodone-acetaminophen (NORCO) 5-325 MG per tablet Take 1 tablet by mouth Every 4 (Four) Hours As Needed for Moderate Pain for up to 3 days. 6 tablet 0   • insulin degludec (TRESIBA FLEXTOUCH) 100 UNIT/ML solution pen-injector injection Inject 8 Units under the skin into the appropriate area as directed 2 (Two) Times a Day.     • Insulin Lispro (humaLOG) 100 UNIT/ML injection Inject  under the skin into the appropriate area as directed 3 (Three) Times a Day Before Meals. Sliding scale     • levothyroxine (SYNTHROID, LEVOTHROID) 75 MCG tablet TAKE 1 TABLET BY MOUTH DAILY 30 tablet 3   • liothyronine (CYTOMEL) 5 MCG tablet      • losartan (COZAAR) 100 MG tablet TAKE 1 TABLET DAILY 30 tablet 3   • metoprolol succinate XL (TOPROL-XL) 25 MG 24 hr tablet Take 1 tablet by mouth Daily.     • NIFEdipine XL (PROCARDIA XL) 60 MG 24 hr tablet Take 1 tablet by mouth Daily.     • sennosides-docusate (PERICOLACE) 8.6-50 MG per tablet Take 2 tablets by mouth 2 (Two) Times a Day.     • Alcohol Swabs (B-D SINGLE USE SWABS REGULAR) pads USE AS DIRECTED 4 TIMES A  DAY. 400 each 3   • apixaban (ELIQUIS) 5 MG tablet tablet Take 1 tablet by mouth 2 (Two) Times a Day.     • tuberculin (Aplisol) 5 UNIT/0.1ML injection Inject 5 Units into the appropriate area of the skin as directed by provider 1 (One) Time.         Social History     Socioeconomic History   • Marital status:    Tobacco Use   • Smoking status: Never   • Smokeless tobacco: Never   Vaping Use   • Vaping Use: Never used   Substance and Sexual Activity   • Alcohol use: No   • Drug use: No   • Sexual activity: Defer       Family Hx: Non-contributory    REVIEW OF SYSTEMS:   ROS was performed and is negative except as outlined in HPI     REVIEW OF SYSTEMS:   CONSTITUTIONAL: No weight  loss, fever, chills, weakness or fatigue.   HEENT: Eyes: No visual loss, blurred vision, double vision or yellow sclerae. Ears, Nose, Throat: No hearing loss, sneezing, congestion, runny nose or sore throat.   SKIN: No rash or itching.     RESPIRATORY: No shortness of breath, hemoptysis, cough or sputum.   GASTROINTESTINAL: No anorexia, nausea, vomiting or diarrhea. No abdominal pain, bright red blood per rectum or melena.  NEUROLOGICAL: No headache, dizziness, syncope, paralysis, numbness or tingling in the extremities.  MUSCULOSKELETAL: No muscle, back pain, joint pain or stiffness.   HEMATOLOGIC: No anemia, bleeding or bruising.   LYMPHATICS: No enlarged nodes.  PSYCHIATRIC: No history of depression, anxiety, hallucinations.   ENDOCRINOLOGIC: No reports of sweating, cold or heat intolerance. No polyuria or polydipsia.        Objective:     Vitals:    04/20/23 0836 04/20/23 0854   BP: 124/53    BP Location: Left arm    Patient Position: Lying    Pulse: 62    Resp: 18    Temp: 98.2 °F (36.8 °C)    TempSrc: Temporal    SpO2: (!) 87% 99%     There is no height or weight on file to calculate BMI.      GEN: no distress, alert and oriented  HEENT: NACT, EOMI, moist mucous membranes  Lungs: CTAB, no wheezes, rales or rhonchi  CV: normal rate, regular rhythm, normal S1, S2, no murmurs, +2 radial pulses b/l, no carotid bruit  Abdomen: soft, nontender, nondistended, NABS  Extremities: no edema  Skin: no rash, warm, dry  Heme/Lymph: no bruising  Psych: organized thought, normal behavior and affect    Results from last 7 days   Lab Units 04/17/23  0433   SODIUM mmol/L 141   POTASSIUM mmol/L 4.0   CHLORIDE mmol/L 103   CO2 mmol/L 35.3*   BUN mg/dL 18   CREATININE mg/dL 0.67   CALCIUM mg/dL 10.0   GLUCOSE mg/dL 188*         @LABRCNTbnp@  Results from last 7 days   Lab Units 04/17/23  0433 04/14/23  0311   WBC 10*3/mm3 7.73 6.57   HEMOGLOBIN g/dL 11.7* 12.2   HEMATOCRIT % 36.6 39.0   PLATELETS 10*3/mm3 186 182              @LABRCNTIP(chol,trig,hdl,ldl)      I personally viewed and interpreted the patient's EKG/Telemetry data      Assessment:  Active Hospital Problems    Diagnosis  POA   • Coronary artery disease involving native coronary artery of native heart [I25.10]  Unknown     Priority: High   • Abnormal nuclear stress test [R94.39]  Unknown      Resolved Hospital Problems   No resolved problems to display.       Plan:  PCI with orbital atherectomy to RCA        Rikki Oconnor MD  04/20/23  10:07 EDT.

## 2023-04-20 NOTE — Clinical Note
First balloon inflation max pressure = 20 shila. First balloon inflation duration = 7 seconds. Second inflation of balloon - Max pressure = 20 shila. 2nd Inflation of balloon - Duration = 8 seconds. 2nd inflation was done at 12:33 EDT. The balloon is positioned in the Mid segment of the vessel.

## 2023-04-20 NOTE — Clinical Note
A 6 fr sheath was  inserted using micropuncture technique with ultrasound guidance into the right femoral artery.

## 2023-04-20 NOTE — Clinical Note
Atherectomy performed in the proximal right coronary artery. Orbital atherectomy. 1st Pass rate = 80 RPM. 1st Pass duration = 25 seconds. 2nd Pass duration = 30 seconds. 3rd Pass rate = 80 RPM. 3rd Pass duration = 30 seconds. 4th Pass rate = 80 RPM. 4th Pass duration = 30 seconds. 5th Pass duration = 25 seconds. 6th Pass rate = 80 RPM. 6th Pass duration = 30 seconds. 7th Pass rate = 80 RPM. 7th Pass duration = 30 seconds. 8th Pass rate  = 80 RPM. 8th Pass duration = 30 seconds. 9th Pass rate = 80 RPM. 9th Pass duration = 30 seconds. 10th Pass rate = 80 RPM. 10th Pass duration = 30 seconds.

## 2023-04-20 NOTE — CASE MANAGEMENT/SOCIAL WORK
Discharge Planning Assessment  [facility]     Patient Name: Roland Russell  MRN: [id number]  Today's Date: 4/20/2023    Admit Date: 4/20/2023    Plan: Return to [facility] (awaiting PTerrace approval);  [facility] is scheduled for 1PM tomorrow (FRIDAY);   Discharge Needs Assessment    No documentation.                Discharge Plan     Row Name 04/20/23 1545       Plan    Plan Return to [facility] (awaiting PTerrace approval);  [facility] is scheduled for 1PM tomorrow (FRIDAY);    Plan Comments Received call from JACQUES Mayberry Mgr and she advised she has spoken with Pt daughter/POA, Yanet Wen [phone], and Pt is to transfer back to [facility] via stretcher (MD request).  Pt arrived to cath lab from [facility].  Pt is observation and being monitored overnight.  [facility] called [facility] to advise.  Kriss stated she will call CCP as soon as she can to advise if Pt needs another precert from her insurance to return to [facility] or not.  CCP went ahead and got Pt stretcher transport scheduled for 1PM tomorrow, 4/21.  NEEDS PACKET.............Jessica ZUNIGA/LORY FELDMAN              Continued Care and Services - Admitted Since 4/20/2023     Destination     Service Provider Request Status Selected Services Address Phone Fax Patient [facility] Pending - Request Sent N/A [address] [facility] [address] [facility] [address] [phone] --            Selected Continued Care - Prior Encounters Includes continued care and service providers with selected services from prior encounters from 1/20/2023 to 4/20/2023    Discharged on 4/17/2023 Admission date: 4/10/2023 - Discharge disposition: Skilled Nursing Facility (DC - External)    Destination     Service Provider Selected Services Address Phone Fax Patient [facility] Skilled Nursing [phone] [address] [facility] [address] [phone] --                     Expected Discharge Date and Time     Expected Discharge Date Expected Discharge Time    Apr 20, 2023          Demographic Summary     Row Name 04/20/23 1551       General Information    Admission Type observation    Arrived From --  cath lab    Reason for Consult discharge planning;transportation               Functional Status    No documentation.                Psychosocial    No documentation.                Abuse/Neglect    No documentation.                Legal    No documentation.                Substance Abuse    No documentation.                Patient Forms    No documentation.                   Jessica Prather RN

## 2023-04-20 NOTE — Clinical Note
First balloon inflation max pressure = 10 shila. First balloon inflation duration = 14 seconds. Second inflation of balloon - Max pressure = 18 shila. 2nd Inflation of balloon - Duration = 8 seconds. 2nd inflation was done at 12:22 EDT. The balloon is positioned in the Distal segment of the vessel. Third inflation of balloon - Max pressure = 20 shila. 3rd Inflation of balloon - Duration = 6 seconds. 3rd inflation was done at 12:23 EDT. The ba lloon is positioned in the Mid segment of the vessel. Fourth inflation of balloon - Max pressure = 20 shila. 4th Inflation of balloon - Duration = 10 seconds. 4th inflation was done at 12:23 EDT. The balloon is positioned in the Mid segment of the vessel.

## 2023-04-20 NOTE — Clinical Note
First balloon inflation max pressure = 20 shila. First balloon inflation duration = 8 seconds. Second inflation of balloon - Max pressure = 20 shila. 2nd Inflation of balloon - Duration = 12 seconds.

## 2023-04-20 NOTE — Clinical Note
Prepped: right groin. Prepped with: Hibiclens. The site was clipped. The patient was draped in a sterile fashion.

## 2023-04-21 VITALS
SYSTOLIC BLOOD PRESSURE: 110 MMHG | DIASTOLIC BLOOD PRESSURE: 51 MMHG | RESPIRATION RATE: 22 BRPM | HEART RATE: 74 BPM | TEMPERATURE: 99.2 F | OXYGEN SATURATION: 97 %

## 2023-04-21 LAB — GLUCOSE BLDC GLUCOMTR-MCNC: 170 MG/DL (ref 70–130)

## 2023-04-21 PROCEDURE — G0378 HOSPITAL OBSERVATION PER HR: HCPCS

## 2023-04-21 PROCEDURE — 99238 HOSP IP/OBS DSCHRG MGMT 30/<: CPT | Performed by: STUDENT IN AN ORGANIZED HEALTH CARE EDUCATION/TRAINING PROGRAM

## 2023-04-21 PROCEDURE — 82962 GLUCOSE BLOOD TEST: CPT

## 2023-04-21 RX ADMIN — ACETAMINOPHEN 650 MG: 325 TABLET, FILM COATED ORAL at 06:28

## 2023-04-21 NOTE — CASE MANAGEMENT/SOCIAL WORK
"Physicians Statement of Medical Necessity for  Ambulance Transportation    GENERAL INFORMATION     Name: Roland Russell  YOB: 1934  Medicare #: [id number]  Transport Date: 4/21/2023 (Valid for round trips this date, or for scheduled repetitive trips for 60 days from the date signed below.)  Origin: [facility] ([facility])  Destination:  [facility]  Is the Patient's stay covered under Medicare Part A (PPS/DRG?)Yes  Closest appropriate facility? Yes  If this a hosp-hosp transfer? No  Is this a hospice patient? No    MEDICAL NECESSITY QUESTIONAIRE    Ambulance Transportation is medically necessary only if other means of transportation are contraindicated or would be potentially harmful to the patient.  To meet this requirement, the patient must be either \"bed confined\" or suffer from a condition such that transport by means other than an ambulance is contraindicated by the patient's condition.  The following questions must be answered by the healthcare professional signing below for this form to be valid:     1) Describe the MEDICAL CONDITION (physical and/or mental) of this patient AT THE TIME OF AMBULANCE TRANSPORT that requires the patient to be transported in an ambulance, and why transport by other means is contraindicated by the patient's condition: Non wt bearing with Left ankle fracture.  Past Medical History:   Diagnosis Date   • Abnormal vision     SEES \"KALEIDOSCOPE\"   • Allergy to adhesive tape    • Arthralgia    • AVB (atrioventricular block)    • Balance problem    • Carotid body tumor     LEFT   • Difficulty walking    • H/O complete eye exam 10/2016   • Headache     OFF AND ON BACK OF HEAD, DOWN NECK TO SHOULDER   • History of glaucoma    • History of poliomyelitis     AGE 9   • History of surgery on arm     left arm   • HLD (hyperlipidemia)    • Hypertension    • Hypothyroidism, acquired    • Legal blindness     SOME PERIPHERAL VISION ON LEFT, SOME VISION " ON RIGHT   • Memory loss     SOME SHORT TERM   • Migraine    • Numbness     LEFT LEG    • Osteoarthritis, multiple sites    • Osteoporosis    • Pacemaker    • Rectal bleeding     X1, WITH BM   • Sleep apnea     DOES NOT USE A MACHINE   • TIA (transient ischemic attack) 12/24/2018    ?, HAD SPELL UNABLE TO TALK   • Type 2 diabetes mellitus    • Type 2 diabetes mellitus, uncontrolled       Past Surgical History:   Procedure Laterality Date   • APPENDECTOMY  1989   • CARDIAC CATHETERIZATION N/A 4/13/2023    Procedure: Left Heart Cath;  Surgeon: Rikki Oconnor MD;  [facility] C[facility]VASIVE LOCATION;  Service: Cardiology;  Laterality: N/A;   • CARDIAC CATHETERIZATION N/A 4/13/2023    Procedure: Coronary angiography;  Surgeon: Rikki Oconnor MD;  [facility] CATH INVASIVE LOCATION;  Service: Cardiology;  Laterality: N/A;   • CARDIAC CATHETERIZATION N/A 4/13/2023    Procedure: Left ventriculography;  Surgeon: Rikki Oconnor MD;  Location: [facility] CATH INVASIVE LOCATION;  Service: Cardiology;  Laterality: N/A;   • CARDIAC CATHETERIZATION N/A 4/20/2023    Procedure: Percutaneous Coronary Intervention;  Surgeon: Rikki Oconnor MD;  Location: [facility] CATH INVASIVE LOCATION;  Service: Cardiology;  Laterality: N/A;   • CARDIAC CATHETERIZATION N/A 4/20/2023    Procedure: Atherectomy-coronary;  Surgeon: Rikki Oconnor MD;  Location: [facility] CATH INVASIVE LOCATION;  Service: Cardiology;  Laterality: N/A;   • CARDIAC CATHETERIZATION N/A 4/20/2023    Procedure: Stent CAROL coronary;  Surgeon: Rikki Oconnor MD;  Location: [facility] CATH INVASIVE LOCATION;  Service: Cardiology;  Laterality: N/A;   • CARDIAC PACEMAKER PLACEMENT  11/25/2004   • CAROTID ENDARTERECTOMY Left 12/13/2019    Procedure: LEFT CAROTID BODY TUMOR RESECTION;  Surgeon: Bryan Severino MD;  Location: [facility] MAIN OR;  Service: Vascular   • CATARACT EXTRACTION Bilateral 1997   • CEREBRAL ANGIOGRAM Bilateral 12/6/2019    Procedure: CAROTID ARTERIOGRAM BILATERAL, RIGHT WRIST  "APPROACH;  Surgeon: Bryan Severino MD;  Location: [facility] HYBRID OR 18/19;  Service: Vascular   • CHOLECYSTECTOMY  1989   • COLONOSCOPY  2013    dr montes   • ELBOW PROCEDURE  2016   • ENDOSCOPY  12/06/2012    Dr. Montes; food in stomach, gastritis   • EXCISION LESION  1994    BACK CYST BENGIN   • EYE SURGERY  12/2012   • GLAUCOMA SURGERY Bilateral 1995    laser surgery   • HARDWARE REMOVAL Left     ELBOW   • HYSTERECTOMY  1986   • INTERVENTIONAL RADIOLOGY PROCEDURE N/A 4/20/2023    Procedure: Intravascular Ultrasound;  Surgeon: Rikki Oconnor MD;  Location: [facility] CATH INVASIVE LOCATION;  Service: Cardiology;  Laterality: N/A;   • ORIF ELBOW FRACTURE Left    • PACEMAKER IMPLANTATION     • PACEMAKER REPLACEMENT  06/2013      2) Is this patient \"bed confined\" as defined below?No    To be \"bed confined\" the patient must satisfy all three of the following criteria:  (1) unable to get up from bed without assistance; AND (2) unable to ambulate;  AND (3) unable to sit in a chair or wheelchair.  3) Can this patient safely be transported by car or wheelchair van (I.e., may safely sit during transport, without an attendant or monitoring?)No   4. In addition to completing questions 1-3 above, please check any of the following conditions that apply*:          *Note: supporting documentation for any boxes checked must be maintained in the patient's medical records Non-healed fractures      SIGNATURE OF PHYSICIAN OR OTHER AUTHORIZED HEALTHCARE PROFESSIONAL    I certify that the above information is true and correct based on my evaluation of this patient, and represent that the patient requires transport by ambulance and that other forms of transport are contraindicated.  I understand that this information will be used by the Centers for Medicare and Medicaid Services (CMS) to support the determiniation of medical necessity for ambulance services, and I represent that I have personal knowledge of the patient's condition at the time of " transport.    x   If this box is checked, I also certify that the patient is physically or mentally incapable of signing the ambulance service's claim form and that the institution with which I am affiliated has furnished care, services or assistance to the patient.  My signature below is made on behalf of the patient pursuant to 42 .36(b)(4). In accordance with 42 .37, the specific reason(s) that the patient is physically or mentally incapable of signing the claim for is as follows:     Signature of Physician or Healthcare Professional    Jessica Prather RN CM/  Date/Time:   4/21/2023     (For Scheduled repetitive transport, this form is not valid for transports performed more than 60 days after this date).                                                                                                                                            --------------------------------------------------------------------------------------------  Printed Name and Credentials of Physician or Authorized Healthcare Professional     *Form must be signed by patient's attending physician for scheduled, repetitive transports,.  For non-repetitive ambulance transports, if unable to obtain the signature of the attending physician, any of the following may sign (please select below):     Physician  Clinical Nurse Specialist  Registered Nurse     Physician Assistant x Discharge Planner  Licensed Practical Nurse     Nurse Practitioner x

## 2023-04-21 NOTE — DISCHARGE SUMMARY
[facility] Discharge Summary      Patient Name: Roland Russell  Age/Sex: 88 y.o. female  : 1934  MRN: [id number]    Encounter Provider: Rikki Silva MD  Referring Provider: Shawanda Rosario MD  Place of Service: [facility] CARDIOLOGY  Patient Care Team:  Shawanda Rosario MD as PCP - General (Internal Medicine)  Tevin Albrecht MD as Consulting Physician (Cardiac Electrophysiology)         Date of Discharge:  2023     Date of Admit: 2023      Discharge Diagnosis:   S/P right coronary artery (RCA) stent placement    Coronary artery disease involving native coronary artery of native heart    Abnormal nuclear stress test        Hospital Course:   88-year-old with paroxysmal A-fib on liquids, diabetes, hypertension, DARI, high-grade block status post dual-chamber pacemaker who presented a couple of weeks ago with a left ankle fracture.  She underwent nuclear stress test as part of a preoperative work-up prior to possible left ankle fracture surgery and was referred for left heart catheterization for further evaluation. She presented for staged PCI and underwent successful intravascular ultrasound-guided orbital atherectomy and PCI with two 4.0 x 38mm and one 3.5 x 38mm Xience Skypoint drug-eluting stents (postdilated with a 4.5 mm in the ostium to midportion and 4.0 mm NC in the mid to distal portion) of a severely calcified RCA. She tolerated the procedure well but was kept overnight for further monitoring given the complexity of the procedure.    With respect to her discharge medications, she should start her Eliquis this evening and continue the rest of her medications.        Vitals:    23 0757   BP: 110/51   Pulse: 74   Resp: 22   Temp: 99.2 °F (37.3 °C)   SpO2: 97%     Physical Exam:  GEN: no distress, alert and oriented  HEENT: NACT, EOMI, moist mucous membranes  Lungs: CTAB, no wheezes, rales or rhonchi  CV: normal rate, regular  rhythm, normal S1, S2, no murmurs, right groin site c/d/i, no hematoma, pulse 2+  Abdomen: soft, nontender, nondistended, NABS  Extremities: no edema  Skin: no rash, warm, dry  Heme/Lymph: no bruising  Psych: organized thought, normal behavior and affect     Procedures Performed:  Procedure(s):  Percutaneous Coronary Intervention  Atherectomy-coronary  Intravascular Ultrasound  Stent CAROL coronary         Consults:  Consults     Date and Time Order Name Status Description    4/10/2023  8:24 PM Inpatient Cardiology Consult Completed           Pertinent Test Results:    Results from last 7 days   Lab Units 04/17/23  0433   SODIUM mmol/L 141   POTASSIUM mmol/L 4.0   CHLORIDE mmol/L 103   CO2 mmol/L 35.3*   BUN mg/dL 18   CREATININE mg/dL 0.67   GLUCOSE mg/dL 188*   CALCIUM mg/dL 10.0           Results from last 7 days   Lab Units 04/17/23  0433   WBC 10*3/mm3 7.73   HEMOGLOBIN g/dL 11.7*   HEMATOCRIT % 36.6   PLATELETS 10*3/mm3 186                               Discharge Medications     Your medication list      CONTINUE taking these medications      Instructions Last Dose Given Next Dose Due   acetaminophen 325 MG tablet  Commonly known as: TYLENOL      Take 2 tablets by mouth Every 4 (Four) Hours As Needed for Mild Pain.       apixaban 5 MG tablet tablet  Commonly known as: ELIQUIS      Take 1 tablet by mouth 2 (Two) Times a Day.       Aplisol 5 UNIT/0.1ML injection  Generic drug: tuberculin      Inject 5 Units into the appropriate area of the skin as directed by provider 1 (One) Time.       atorvastatin 40 MG tablet  Commonly known as: LIPITOR      Take 1 tablet by mouth Daily.       B-12 PO      Take 1 tablet/day by mouth. 1000 mcg daily       B-D SINGLE USE SWABS REGULAR pads      USE AS DIRECTED 4 TIMES A  DAY.       clopidogrel 75 MG tablet  Commonly known as: PLAVIX      Take 1 tablet by mouth Daily.       furosemide 20 MG tablet  Commonly known as: LASIX      Take 1 tablet by mouth Every Morning.        gabapentin 300 MG capsule  Commonly known as: NEURONTIN      Take 1 capsule by mouth 2 (Two) Times a Day.       insulin degludec 100 UNIT/ML solution pen-injector injection  Commonly known as: TRESIBA FLEXTOUCH      Inject 8 Units under the skin into the appropriate area as directed 2 (Two) Times a Day.       Insulin Lispro 100 UNIT/ML injection  Commonly known as: humaLOG      Inject  under the skin into the appropriate area as directed 3 (Three) Times a Day Before Meals. Sliding scale       levothyroxine 75 MCG tablet  Commonly known as: SYNTHROID, LEVOTHROID      TAKE 1 TABLET BY MOUTH DAILY       liothyronine 5 MCG tablet  Commonly known as: CYTOMEL           losartan 100 MG tablet  Commonly known as: COZAAR      TAKE 1 TABLET DAILY       metoprolol succinate XL 25 MG 24 hr tablet  Commonly known as: TOPROL-XL      Take 1 tablet by mouth Daily.       NIFEdipine XL 60 MG 24 hr tablet  Commonly known as: PROCARDIA XL      Take 1 tablet by mouth Daily.       sennosides-docusate 8.6-50 MG per tablet  Commonly known as: PERICOLACE      Take 2 tablets by mouth 2 (Two) Times a Day.          ASK your doctor about these medications      Instructions Last Dose Given Next Dose Due   HYDROcodone-acetaminophen 5-325 MG per tablet  Commonly known as: NORCO  Ask about: Should I take this medication?      Take 1 tablet by mouth Every 4 (Four) Hours As Needed for Moderate Pain for up to 3 days.                Discharge Diet:   Dietary Orders (From admission, onward)     Start     Ordered    04/20/23 1251  Diet: Cardiac Diets; Healthy Heart (2-3 Na+); Texture: Regular Texture (IDDSI 7); Fluid Consistency: Thin (IDDSI 0)  Diet Effective Now        References:    Diet Order Crosswalk   Question Answer Comment   Diets: Cardiac Diets    Cardiac Diet: Healthy Heart (2-3 Na+)    Texture: Regular Texture (IDDSI 7)    Fluid Consistency: Thin (IDDSI 0)        04/20/23 1250                    Discharge disposition: SNF    Discharge  condition: Stable      Follow-up Appointments  Future Appointments   Date Time Provider Department Center   4/21/2023  1:00 PM EMS MED 2  [facility]      Contact information for follow-up providers     [facility] CARD REHAB .    Specialty: Cardiac Rehabilitation  Contact information:  [address]  [facility] [address] [phone]           Shawanda Rosario MD .    Specialty: Internal Medicine  Contact information:  [address]  [facility] [place] [id number] [phone]             Rikki Oconnor MD Follow up.    Specialty: Cardiology  Why: performed cardiac catheterization today  Contact information:  [address]  [address]  [place] [id number] [phone]                   Contact information for after-discharge care     Destination     [facility] .    Service: Skilled Nursing  Contact information:  [address] [facility] [address] [address] [phone]                               Test Results Pending at Discharge  Additional Instructions for the Follow-ups that You Need to Schedule     Ambulatory Referral to Cardiac Rehab   As directed               Time:   I spent  < 30  minutes on this discharge activity which included: face-to-face encounter with the patient, reviewing the data in the system, coordination of the care with the nursing staff as well as consultants, documentation, and entering orders.        Rikki Silva MD  [facility]  04/21/23  10:52 EDT

## 2023-04-21 NOTE — CASE MANAGEMENT/SOCIAL WORK
Case Management Discharge Note      Final Note: Pt discharged to [facility] via [facility] at 1PM today, 4/21/23......SRS/RN CM         Selected Continued Care - Discharged on 4/21/2023 Admission date: 4/20/2023 - Discharge disposition: Skilled Nursing Facility (DC - External)    Destination Coordination complete.    Service Provider Selected Services Address Phone Fax Patient Preferred    [facility]illed Nursing [address] [address] [phone] --          Durable Medical Equipment    No services have been selected for the patient.              Dialysis/Infusion    No services have been selected for the patient.              Home Medical Care    No services have been selected for the patient.              Therapy    No services have been selected for the patient.              Community Resources    No services have been selected for the patient.              Community & DME    No services have been selected for the patient.                Selected Continued Care - Prior Encounters Includes continued care and service providers with selected services from prior encounters from 1/20/2023 to 4/21/2023    Discharged on 4/17/2023 Admission date: 4/10/2023 - Discharge disposition: Skilled Nursing Facility (DC - External)    Destination     Service Provider Selected Services Address Phone Fax Patient Preferred    [facility] [address] [facility] [address] [phone] --                    Transportation Services  Ambulance: [facility]    Final Discharge Disposition Code: 03 - skilled nursing facility (SNF)

## 2023-04-21 NOTE — PROGRESS NOTES
[facility] Clinical Pharmacy Services: National Cardiology Data Registry (NCDR) Medication Review    Roland Russell is s/p PCI with drug-eluting stent placement for severe multivessel CAD. Pharmacy to review discharge medications to make sure appropriate medications have been prescribed.    Patient has been discharged on the following:  · Aspirin: none as on Eliquis as well. Risk of bleed > benefits  · High Intensity Statin:  atorvastatin 40 mg once a day  · Beta-blocker: metoprolol XL 25 mg once a day  · P2Y12 Inhibitor: clopidogrel 75 mg once a day  · LVEF <40: losartan 100 mg once a day     These medications meet the requirements for NCDR discharge medication for chest pain and MI.    G[facility]mez Bird [facility]  Clinical Pharmacist

## 2023-04-21 NOTE — CASE MANAGEMENT/SOCIAL WORK
Continued Stay Note  [facility]     Patient Name: Roland Russell  MRN: [id number]  Today's Date: 4/21/2023    Admit Date: 4/20/2023    Plan: [facility] SN[facility] via [facility] at 1PM today, 4/21;  PT NEEDS D/C SUMMARY COMPLETED.   Discharge Plan     Row Name 04/21/23 1000       Plan    Plan [facility] via [facility] at 1PM today, 4/21;  PT NEEDS D/C SUMMARY COMPLETED.    Plan Comments Per [facility], Pt may return as long as she dishcarges today.  Pt has d/c orders.  Pt needs a D/C SUMMARY completed.  [facility] updated Pt daughter Yanet Wen via phone [phone].  Yanet asked is she could ride in the ambulace with Pt back to [facility].  [facility] called [facility] and she advised, no one could ride with Pt.  [facility] relayed this information to Yanet/daughter.  Packet on [facility] desk........Jessica ZUNIGA/LORY CM               Discharge Codes    No documentation.               Expected Discharge Date and Time     Expected Discharge Date Expected Discharge Time    Apr 21, 2023             Jessica Prather RN

## 2023-04-22 LAB
ACT BLD: 251 SECONDS (ref 82–152)
ACT BLD: 269 SECONDS (ref 82–152)
ACT BLD: 281 SECONDS (ref 82–152)
ACT BLD: 347 SECONDS (ref 82–152)

## 2023-05-05 DIAGNOSIS — E78.2 MIXED HYPERLIPIDEMIA: ICD-10-CM

## 2023-05-08 RX ORDER — PRAVASTATIN SODIUM 20 MG
TABLET ORAL
Qty: 90 TABLET | Refills: 3 | OUTPATIENT
Start: 2023-05-08

## 2023-06-26 ENCOUNTER — TELEPHONE (OUTPATIENT)
Dept: CARDIOLOGY | Facility: CLINIC | Age: 88
End: 2023-06-26

## 2023-06-26 NOTE — TELEPHONE ENCOUNTER
Caller: Yanet Wen DAUGHTER    Relationship to patient: Emergency Contact    Best call back number: [phone]    Chief complaint: CARDIAC CATHETERIZATION WITH STENTS    Type of visit: HOSPITAL FOLLOW UP          Additional notes: PATIENT HAD A CARDIAC CATHETERIZATION WITH STENTS ON 04.20.23 AT [facility]. HUB UNABLE TO SCHEDULE DUE TO NO SCHEDULING TIMEFRAME. PLEASE CONTACT PATIENT WITH AN APPOINTMENT. THANK YOU.

## 2023-08-26 ENCOUNTER — APPOINTMENT (OUTPATIENT)
Dept: GENERAL RADIOLOGY | Facility: HOSPITAL | Age: 88
DRG: 291 | End: 2023-08-26
Payer: MEDICARE

## 2023-08-26 ENCOUNTER — HOSPITAL ENCOUNTER (INPATIENT)
Facility: HOSPITAL | Age: 88
LOS: 7 days | Discharge: HOME | DRG: 291 | End: 2023-09-02
Attending: EMERGENCY MEDICINE | Admitting: INTERNAL MEDICINE
Payer: MEDICARE

## 2023-08-26 DIAGNOSIS — R53.1 GENERALIZED WEAKNESS: Primary | ICD-10-CM

## 2023-08-26 DIAGNOSIS — S82.892A CLOSED FRACTURE OF LEFT ANKLE, INITIAL ENCOUNTER: ICD-10-CM

## 2023-08-26 DIAGNOSIS — I50.9 ACUTE CONGESTIVE HEART FAILURE, UNSPECIFIED HEART FAILURE TYPE: ICD-10-CM

## 2023-08-26 PROBLEM — E11.42 DIABETIC POLYNEUROPATHY ASSOCIATED WITH TYPE 2 DIABETES MELLITUS: Status: ACTIVE | Noted: 2023-08-26

## 2023-08-26 PROBLEM — E11.65 TYPE 2 DIABETES MELLITUS WITH HYPERGLYCEMIA: Status: ACTIVE | Noted: 2023-08-26

## 2023-08-26 PROBLEM — I50.31 DIASTOLIC CHF, ACUTE: Status: ACTIVE | Noted: 2023-08-26

## 2023-08-26 PROBLEM — R09.02 HYPOXIA: Status: ACTIVE | Noted: 2023-08-26

## 2023-08-26 PROBLEM — N30.01 ACUTE CYSTITIS WITH HEMATURIA: Status: ACTIVE | Noted: 2023-08-26

## 2023-08-26 PROBLEM — Z95.0 PRESENCE OF CARDIAC PACEMAKER: Status: ACTIVE | Noted: 2023-08-26

## 2023-08-26 LAB
ALBUMIN SERPL-MCNC: 3.9 G/DL (ref 3.5–5.2)
ALBUMIN/GLOB SERPL: 1.3 G/DL
ALP SERPL-CCNC: 197 U/L (ref 39–117)
ALT SERPL W P-5'-P-CCNC: 11 U/L (ref 1–33)
ANION GAP SERPL CALCULATED.3IONS-SCNC: 9 MMOL/L (ref 5–15)
AST SERPL-CCNC: 12 U/L (ref 1–32)
B PARAPERT DNA SPEC QL NAA+PROBE: NOT DETECTED
B PERT DNA SPEC QL NAA+PROBE: NOT DETECTED
BACTERIA UR QL AUTO: ABNORMAL /HPF
BASOPHILS # BLD AUTO: 0.04 10*3/MM3 (ref 0–0.2)
BASOPHILS NFR BLD AUTO: 0.3 % (ref 0–1.5)
BILIRUB SERPL-MCNC: 0.6 MG/DL (ref 0–1.2)
BILIRUB UR QL STRIP: NEGATIVE
BUN SERPL-MCNC: 20 MG/DL (ref 8–23)
BUN/CREAT SERPL: 19.6 (ref 7–25)
C PNEUM DNA NPH QL NAA+NON-PROBE: NOT DETECTED
CALCIUM SPEC-SCNC: 9.7 MG/DL (ref 8.6–10.5)
CHLORIDE SERPL-SCNC: 100 MMOL/L (ref 98–107)
CLARITY UR: ABNORMAL
CO2 SERPL-SCNC: 29 MMOL/L (ref 22–29)
COLOR UR: ABNORMAL
CREAT SERPL-MCNC: 1.02 MG/DL (ref 0.57–1)
DEPRECATED RDW RBC AUTO: 49.7 FL (ref 37–54)
EGFRCR SERPLBLD CKD-EPI 2021: 53 ML/MIN/1.73
EOSINOPHIL # BLD AUTO: 0.01 10*3/MM3 (ref 0–0.4)
EOSINOPHIL NFR BLD AUTO: 0.1 % (ref 0.3–6.2)
ERYTHROCYTE [DISTWIDTH] IN BLOOD BY AUTOMATED COUNT: 15.4 % (ref 12.3–15.4)
FLUAV SUBTYP SPEC NAA+PROBE: NOT DETECTED
FLUBV RNA ISLT QL NAA+PROBE: NOT DETECTED
GEN 5 2HR TROPONIN T REFLEX: 33 NG/L
GLOBULIN UR ELPH-MCNC: 3 GM/DL
GLUCOSE BLDC GLUCOMTR-MCNC: 178 MG/DL (ref 70–130)
GLUCOSE BLDC GLUCOMTR-MCNC: 329 MG/DL (ref 70–130)
GLUCOSE SERPL-MCNC: 209 MG/DL (ref 65–99)
GLUCOSE UR STRIP-MCNC: NEGATIVE MG/DL
HADV DNA SPEC NAA+PROBE: NOT DETECTED
HBA1C MFR BLD: 10.3 % (ref 4.8–5.6)
HCOV 229E RNA SPEC QL NAA+PROBE: NOT DETECTED
HCOV HKU1 RNA SPEC QL NAA+PROBE: NOT DETECTED
HCOV NL63 RNA SPEC QL NAA+PROBE: NOT DETECTED
HCOV OC43 RNA SPEC QL NAA+PROBE: NOT DETECTED
HCT VFR BLD AUTO: 40.3 % (ref 34–46.6)
HGB BLD-MCNC: 12.5 G/DL (ref 12–15.9)
HGB UR QL STRIP.AUTO: ABNORMAL
HMPV RNA NPH QL NAA+NON-PROBE: NOT DETECTED
HPIV1 RNA ISLT QL NAA+PROBE: NOT DETECTED
HPIV2 RNA SPEC QL NAA+PROBE: NOT DETECTED
HPIV3 RNA NPH QL NAA+PROBE: NOT DETECTED
HPIV4 P GENE NPH QL NAA+PROBE: NOT DETECTED
HYALINE CASTS UR QL AUTO: ABNORMAL /LPF
IMM GRANULOCYTES # BLD AUTO: 0.03 10*3/MM3 (ref 0–0.05)
IMM GRANULOCYTES NFR BLD AUTO: 0.2 % (ref 0–0.5)
KETONES UR QL STRIP: NEGATIVE
LEUKOCYTE ESTERASE UR QL STRIP.AUTO: ABNORMAL
LYMPHOCYTES # BLD AUTO: 1.14 10*3/MM3 (ref 0.7–3.1)
LYMPHOCYTES NFR BLD AUTO: 8.8 % (ref 19.6–45.3)
M PNEUMO IGG SER IA-ACNC: NOT DETECTED
MCH RBC QN AUTO: 27.2 PG (ref 26.6–33)
MCHC RBC AUTO-ENTMCNC: 31 G/DL (ref 31.5–35.7)
MCV RBC AUTO: 87.8 FL (ref 79–97)
MONOCYTES # BLD AUTO: 0.66 10*3/MM3 (ref 0.1–0.9)
MONOCYTES NFR BLD AUTO: 5.1 % (ref 5–12)
NEUTROPHILS NFR BLD AUTO: 11.02 10*3/MM3 (ref 1.7–7)
NEUTROPHILS NFR BLD AUTO: 85.5 % (ref 42.7–76)
NITRITE UR QL STRIP: POSITIVE
NRBC BLD AUTO-RTO: 0 /100 WBC (ref 0–0.2)
NT-PROBNP SERPL-MCNC: 4014 PG/ML (ref 0–1800)
PH UR STRIP.AUTO: 5.5 [PH] (ref 5–8)
PLATELET # BLD AUTO: 190 10*3/MM3 (ref 140–450)
PMV BLD AUTO: 11.7 FL (ref 6–12)
POTASSIUM SERPL-SCNC: 4.4 MMOL/L (ref 3.5–5.2)
PROT SERPL-MCNC: 6.9 G/DL (ref 6–8.5)
PROT UR QL STRIP: ABNORMAL
QT INTERVAL: 466 MS
QTC INTERVAL: 511 MS
RBC # BLD AUTO: 4.59 10*6/MM3 (ref 3.77–5.28)
RBC # UR STRIP: ABNORMAL /HPF
REF LAB TEST METHOD: ABNORMAL
RHINOVIRUS RNA SPEC NAA+PROBE: NOT DETECTED
RSV RNA NPH QL NAA+NON-PROBE: NOT DETECTED
SARS-COV-2 RNA NPH QL NAA+NON-PROBE: NOT DETECTED
SODIUM SERPL-SCNC: 138 MMOL/L (ref 136–145)
SP GR UR STRIP: 1.01 (ref 1–1.03)
SQUAMOUS #/AREA URNS HPF: ABNORMAL /HPF
TROPONIN T DELTA: -7 NG/L
TROPONIN T SERPL HS-MCNC: 40 NG/L
UROBILINOGEN UR QL STRIP: ABNORMAL
WBC # UR STRIP: ABNORMAL /HPF
WBC NRBC COR # BLD: 12.9 10*3/MM3 (ref 3.4–10.8)

## 2023-08-26 PROCEDURE — 84443 ASSAY THYROID STIM HORMONE: CPT | Performed by: INTERNAL MEDICINE

## 2023-08-26 PROCEDURE — 82948 REAGENT STRIP/BLOOD GLUCOSE: CPT

## 2023-08-26 PROCEDURE — 87040 BLOOD CULTURE FOR BACTERIA: CPT | Performed by: INTERNAL MEDICINE

## 2023-08-26 PROCEDURE — 25010000002 CEFTRIAXONE PER 250 MG: Performed by: INTERNAL MEDICINE

## 2023-08-26 PROCEDURE — 80053 COMPREHEN METABOLIC PANEL: CPT | Performed by: EMERGENCY MEDICINE

## 2023-08-26 PROCEDURE — 87088 URINE BACTERIA CULTURE: CPT | Performed by: INTERNAL MEDICINE

## 2023-08-26 PROCEDURE — 93005 ELECTROCARDIOGRAM TRACING: CPT | Performed by: EMERGENCY MEDICINE

## 2023-08-26 PROCEDURE — 83880 ASSAY OF NATRIURETIC PEPTIDE: CPT | Performed by: EMERGENCY MEDICINE

## 2023-08-26 PROCEDURE — 36415 COLL VENOUS BLD VENIPUNCTURE: CPT

## 2023-08-26 PROCEDURE — 87186 SC STD MICRODIL/AGAR DIL: CPT | Performed by: INTERNAL MEDICINE

## 2023-08-26 PROCEDURE — 71045 X-RAY EXAM CHEST 1 VIEW: CPT

## 2023-08-26 PROCEDURE — 0202U NFCT DS 22 TRGT SARS-COV-2: CPT | Performed by: EMERGENCY MEDICINE

## 2023-08-26 PROCEDURE — 81001 URINALYSIS AUTO W/SCOPE: CPT | Performed by: EMERGENCY MEDICINE

## 2023-08-26 PROCEDURE — 63710000001 INSULIN GLARGINE PER 5 UNITS: Performed by: INTERNAL MEDICINE

## 2023-08-26 PROCEDURE — 87086 URINE CULTURE/COLONY COUNT: CPT | Performed by: INTERNAL MEDICINE

## 2023-08-26 PROCEDURE — 99285 EMERGENCY DEPT VISIT HI MDM: CPT

## 2023-08-26 PROCEDURE — 93010 ELECTROCARDIOGRAM REPORT: CPT | Performed by: INTERNAL MEDICINE

## 2023-08-26 PROCEDURE — 83036 HEMOGLOBIN GLYCOSYLATED A1C: CPT | Performed by: INTERNAL MEDICINE

## 2023-08-26 PROCEDURE — 84484 ASSAY OF TROPONIN QUANT: CPT | Performed by: EMERGENCY MEDICINE

## 2023-08-26 PROCEDURE — 63710000001 INSULIN LISPRO (HUMAN) PER 5 UNITS: Performed by: INTERNAL MEDICINE

## 2023-08-26 PROCEDURE — 85025 COMPLETE CBC W/AUTO DIFF WBC: CPT | Performed by: EMERGENCY MEDICINE

## 2023-08-26 RX ORDER — LOSARTAN POTASSIUM 50 MG/1
50 TABLET ORAL DAILY
Status: DISCONTINUED | OUTPATIENT
Start: 2023-08-26 | End: 2023-08-27

## 2023-08-26 RX ORDER — GABAPENTIN 100 MG/1
200 CAPSULE ORAL NIGHTLY
Status: DISCONTINUED | OUTPATIENT
Start: 2023-08-26 | End: 2023-08-26

## 2023-08-26 RX ORDER — SODIUM CHLORIDE 9 MG/ML
40 INJECTION, SOLUTION INTRAVENOUS AS NEEDED
Status: DISCONTINUED | OUTPATIENT
Start: 2023-08-26 | End: 2023-09-02 | Stop reason: HOSPADM

## 2023-08-26 RX ORDER — INSULIN LISPRO 100 [IU]/ML
2 INJECTION, SOLUTION INTRAVENOUS; SUBCUTANEOUS
Status: DISCONTINUED | OUTPATIENT
Start: 2023-08-26 | End: 2023-08-26

## 2023-08-26 RX ORDER — PRAVASTATIN SODIUM 20 MG
20 TABLET ORAL NIGHTLY
Status: DISCONTINUED | OUTPATIENT
Start: 2023-08-26 | End: 2023-09-02 | Stop reason: HOSPADM

## 2023-08-26 RX ORDER — ENOXAPARIN SODIUM 100 MG/ML
40 INJECTION SUBCUTANEOUS DAILY
Status: DISCONTINUED | OUTPATIENT
Start: 2023-08-26 | End: 2023-08-26 | Stop reason: SDUPTHER

## 2023-08-26 RX ORDER — UREA 10 %
1 LOTION (ML) TOPICAL NIGHTLY
Status: DISCONTINUED | OUTPATIENT
Start: 2023-08-26 | End: 2023-09-02 | Stop reason: HOSPADM

## 2023-08-26 RX ORDER — BUMETANIDE 0.25 MG/ML
1 INJECTION INTRAMUSCULAR; INTRAVENOUS 2 TIMES DAILY
Status: DISCONTINUED | OUTPATIENT
Start: 2023-08-27 | End: 2023-08-28

## 2023-08-26 RX ORDER — BUMETANIDE 0.25 MG/ML
1 INJECTION INTRAMUSCULAR; INTRAVENOUS 2 TIMES DAILY
Status: DISCONTINUED | OUTPATIENT
Start: 2023-08-26 | End: 2023-08-26

## 2023-08-26 RX ORDER — CLOPIDOGREL BISULFATE 75 MG/1
75 TABLET ORAL DAILY
Status: DISCONTINUED | OUTPATIENT
Start: 2023-08-26 | End: 2023-09-02 | Stop reason: HOSPADM

## 2023-08-26 RX ORDER — GABAPENTIN 100 MG/1
100 CAPSULE ORAL NIGHTLY
Status: DISCONTINUED | OUTPATIENT
Start: 2023-08-26 | End: 2023-09-02 | Stop reason: HOSPADM

## 2023-08-26 RX ORDER — DEXTROSE MONOHYDRATE 25 G/50ML
25 INJECTION, SOLUTION INTRAVENOUS
Status: DISCONTINUED | OUTPATIENT
Start: 2023-08-26 | End: 2023-08-28

## 2023-08-26 RX ORDER — LEVOTHYROXINE SODIUM 0.07 MG/1
75 TABLET ORAL DAILY
Status: DISCONTINUED | OUTPATIENT
Start: 2023-08-26 | End: 2023-09-02 | Stop reason: HOSPADM

## 2023-08-26 RX ORDER — IBUPROFEN 600 MG/1
1 TABLET ORAL
Status: DISCONTINUED | OUTPATIENT
Start: 2023-08-26 | End: 2023-08-28

## 2023-08-26 RX ORDER — NICOTINE POLACRILEX 4 MG
15 LOZENGE BUCCAL
Status: DISCONTINUED | OUTPATIENT
Start: 2023-08-26 | End: 2023-08-28

## 2023-08-26 RX ORDER — ONDANSETRON 4 MG/1
4 TABLET, FILM COATED ORAL EVERY 6 HOURS PRN
Status: DISCONTINUED | OUTPATIENT
Start: 2023-08-26 | End: 2023-09-02 | Stop reason: HOSPADM

## 2023-08-26 RX ORDER — CHOLECALCIFEROL (VITAMIN D3) 125 MCG
500 CAPSULE ORAL DAILY
Status: DISCONTINUED | OUTPATIENT
Start: 2023-08-26 | End: 2023-09-02 | Stop reason: HOSPADM

## 2023-08-26 RX ORDER — ONDANSETRON 2 MG/ML
4 INJECTION INTRAMUSCULAR; INTRAVENOUS EVERY 6 HOURS PRN
Status: DISCONTINUED | OUTPATIENT
Start: 2023-08-26 | End: 2023-09-02 | Stop reason: HOSPADM

## 2023-08-26 RX ORDER — SODIUM CHLORIDE 0.9 % (FLUSH) 0.9 %
3-10 SYRINGE (ML) INJECTION AS NEEDED
Status: DISCONTINUED | OUTPATIENT
Start: 2023-08-26 | End: 2023-09-02 | Stop reason: HOSPADM

## 2023-08-26 RX ORDER — SODIUM CHLORIDE 0.9 % (FLUSH) 0.9 %
3 SYRINGE (ML) INJECTION EVERY 12 HOURS SCHEDULED
Status: DISCONTINUED | OUTPATIENT
Start: 2023-08-26 | End: 2023-09-02 | Stop reason: HOSPADM

## 2023-08-26 RX ORDER — ACETAMINOPHEN 325 MG/1
650 TABLET ORAL EVERY 4 HOURS PRN
Status: DISCONTINUED | OUTPATIENT
Start: 2023-08-26 | End: 2023-09-02 | Stop reason: HOSPADM

## 2023-08-26 RX ORDER — DOCUSATE SODIUM 100 MG/1
100 CAPSULE, LIQUID FILLED ORAL 2 TIMES DAILY PRN
Status: DISCONTINUED | OUTPATIENT
Start: 2023-08-26 | End: 2023-09-02 | Stop reason: HOSPADM

## 2023-08-26 RX ORDER — ACETAMINOPHEN 160 MG/5ML
650 SOLUTION ORAL EVERY 4 HOURS PRN
Status: DISCONTINUED | OUTPATIENT
Start: 2023-08-26 | End: 2023-09-02 | Stop reason: HOSPADM

## 2023-08-26 RX ORDER — INSULIN LISPRO 100 [IU]/ML
2-7 INJECTION, SOLUTION INTRAVENOUS; SUBCUTANEOUS
Status: DISCONTINUED | OUTPATIENT
Start: 2023-08-26 | End: 2023-08-28

## 2023-08-26 RX ORDER — ACETAMINOPHEN 650 MG/1
650 SUPPOSITORY RECTAL EVERY 4 HOURS PRN
Status: DISCONTINUED | OUTPATIENT
Start: 2023-08-26 | End: 2023-09-02 | Stop reason: HOSPADM

## 2023-08-26 RX ORDER — BUMETANIDE 0.25 MG/ML
1 INJECTION INTRAMUSCULAR; INTRAVENOUS ONCE
Status: COMPLETED | OUTPATIENT
Start: 2023-08-26 | End: 2023-08-26

## 2023-08-26 RX ORDER — FAMOTIDINE 20 MG/1
10 TABLET, FILM COATED ORAL 2 TIMES DAILY PRN
Status: DISCONTINUED | OUTPATIENT
Start: 2023-08-26 | End: 2023-09-02 | Stop reason: HOSPADM

## 2023-08-26 RX ADMIN — ACETAMINOPHEN 650 MG: 325 TABLET, FILM COATED ORAL at 18:09

## 2023-08-26 RX ADMIN — PRAVASTATIN SODIUM 20 MG: 20 TABLET ORAL at 20:14

## 2023-08-26 RX ADMIN — GABAPENTIN 100 MG: 100 CAPSULE ORAL at 20:13

## 2023-08-26 RX ADMIN — INSULIN GLARGINE 7 UNITS: 100 INJECTION, SOLUTION SUBCUTANEOUS at 15:33

## 2023-08-26 RX ADMIN — CLOPIDOGREL BISULFATE 75 MG: 75 TABLET, FILM COATED ORAL at 15:33

## 2023-08-26 RX ADMIN — Medication 3 ML: at 15:33

## 2023-08-26 RX ADMIN — Medication 500 MCG: at 16:55

## 2023-08-26 RX ADMIN — LOSARTAN POTASSIUM 50 MG: 50 TABLET, FILM COATED ORAL at 16:55

## 2023-08-26 RX ADMIN — Medication 1 MG: at 20:13

## 2023-08-26 RX ADMIN — Medication 3 ML: at 20:14

## 2023-08-26 RX ADMIN — APIXABAN 5 MG: 5 TABLET, FILM COATED ORAL at 20:13

## 2023-08-26 RX ADMIN — BUMETANIDE 1 MG: 0.25 INJECTION INTRAMUSCULAR; INTRAVENOUS at 16:53

## 2023-08-26 RX ADMIN — CEFTRIAXONE SODIUM 1000 MG: 1 INJECTION, POWDER, FOR SOLUTION INTRAMUSCULAR; INTRAVENOUS at 16:53

## 2023-08-26 RX ADMIN — INSULIN LISPRO 5 UNITS: 100 INJECTION, SOLUTION INTRAVENOUS; SUBCUTANEOUS at 22:05

## 2023-08-26 RX ADMIN — INSULIN LISPRO 2 UNITS: 100 INJECTION, SOLUTION INTRAVENOUS; SUBCUTANEOUS at 16:54

## 2023-08-26 NOTE — H&P
"[facility] Medicine Services  HISTORY AND PHYSICAL    Patient Name: Roland Russell  : 1934  MRN: [id number]  Primary Care Physician: Shawanda Rosario MD  Date of admission: 2023    Subjective   Subjective   Chief Complaint:  Weakness    HPI:  Roland Russell is a 88 y.o. female with past medical history as listed below presents to the emergency room with 1 day history of severe persistent weakness that is worse with exertion and improved slightly as rest.  Family notes that the weakness has been associated with some confusion.  Patient does note some urinary burning.  Patient is a somewhat poor historian and much of the history has been given by her family.  Patient was found to be hypoxic in the emergency room with room air saturation of 85%.  Her proBNP was elevated concerning for CHF.  She is being hospitalized for further medical management.    Review of Systems   Constitutional:  Positive for fatigue. Negative for fever.   HENT: Negative.     Eyes: Negative.    Respiratory:  Positive for shortness of breath. Negative for wheezing.    Cardiovascular:  Positive for leg swelling. Negative for chest pain.   Gastrointestinal: Negative.    Endocrine: Negative.    Genitourinary:  Positive for dysuria. Negative for hematuria.   Musculoskeletal: Negative.    Skin: Negative.    Neurological: Negative.    Hematological: Negative.    Psychiatric/Behavioral: Negative.        All other systems reviewed and are negative.     Personal History     Past Medical History:   Diagnosis Date    Abnormal vision     SEES \"KALEIDOSCOPE\"    Allergy to adhesive tape     Arthralgia     AVB (atrioventricular block)     Balance problem     Carotid body tumor     LEFT    Difficulty walking     H/O complete eye exam 10/2016    Headache     OFF AND ON BACK OF HEAD, DOWN NECK TO SHOULDER    History of glaucoma     History of poliomyelitis     AGE 9    History of surgery on arm     left arm    HLD (hyperlipidemia)     " Hypertension     Hypothyroidism, acquired     Legal blindness     SOME PERIPHERAL VISION ON LEFT, SOME VISION ON RIGHT    Memory loss     SOME SHORT TERM    Migraine     Numbness     LEFT LEG     Osteoarthritis, multiple sites     Osteoporosis     Pacemaker     Rectal bleeding     X1, WITH BM    Sleep apnea     DOES NOT USE A MACHINE    TIA (transient ischemic attack) 12/24/2018    ?, HAD SPELL UNABLE TO TALK    Type 2 diabetes mellitus     Type 2 diabetes mellitus, uncontrolled        Past Surgical History:   Procedure Laterality Date    APPENDECTOMY  1989    CARDIAC CATHETERIZATION N/A 4/13/2023    Procedure: Left Heart Cath;  Surgeon: Rikki Oconnor MD;  Location: [facility] C[facility] LOC[facility];  Service: Cardiology;  Laterality: N/A;    CARDIAC CATHETERIZATION N/A 4/13/2023    Procedure: Coronary angiography;  Surgeon: Rikki Oconnor MD;  Location:  [facility] CATH INVASIVE LOCATION;  Service: Cardiology;  Laterality: N/A;    CARDIAC CATHETERIZATION N/A 4/13/2023    Procedure: Left ventriculography;  Surgeon: Rikki Oconnor MD;  Location: [facility] C[facility] LOC[facility];  Service: Cardiology;  Laterality: N/A;    CARDIAC CATHETERIZATION N/A 4/20/2023    Procedure: Percutaneous Coronary Intervention;  Surgeon: Rikki Oconnor MD;  Location: [facility];  Service: Cardiology;  Laterality: N/A;    CARDIAC CATHETERIZATION N/A 4/20/2023    Procedure: Atherectomy-coronary;  Surgeon: Rikki Oconnor MD;  Location: [facility] C[facility] LOC[facility];  Service: Cardiology;  Laterality: N/A;    CARDIAC CATHETERIZATION N/A 4/20/2023    Procedure: Stent CAROL coronary;  Surgeon: Rikki Oconnor MD;  Location: [facility];  Service: Cardiology;  Laterality: N/A;    CARDIAC PACEMAKER PLACEMENT  11/25/2004    CAROTID ENDARTERECTOMY Left 12/13/2019    Procedure: LEFT CAROTID BODY TUMOR RESECTION;  Surgeon: Bryan Severino MD;  Location: [facility] MAIN OR;  Service: Vascular    CATARACT EXTRACTION Bilateral 1997    CEREBRAL  ANGIOGRAM Bilateral 12/6/2019    Procedure: CAROTID ARTERIOGRAM BILATERAL, RIGHT WRIST APPROACH;  Surgeon: Bryan Severino MD;  Location: [facility] HYBRID OR 18/19;  Service: Vascular    CHOLECYSTECTOMY  1989    COLONOSCOPY  2013    dr montes    ELBOW PROCEDURE  2016    ENDOSCOPY  12/06/2012    Dr. Montes; food in stomach, gastritis    EXCISION LESION  1994    BACK CYST BENGIN    EYE SURGERY  12/2012    GLAUCOMA SURGERY Bilateral 1995    laser surgery    HARDWARE REMOVAL Left     ELBOW    HYSTERECTOMY  1986    INTERVENTIONAL RADIOLOGY PROCEDURE N/A 4/20/2023    Procedure: Intravascular Ultrasound;  Surgeon: Rikki Oconnor MD;  Location: [facility] CATH INVASIVE LOCATION;  Service: Cardiology;  Laterality: N/A;    ORIF ELBOW FRACTURE Left     PACEMAKER IMPLANTATION      PACEMAKER REPLACEMENT  06/2013       Family History: family history includes Arthritis in her father, mother, and sister; Asthma in her sister; Cancer in her father and sister; Diabetes in her mother and another family member; Glaucoma in her father; Heart disease in her father; Heart failure in an other family member; Hypertension in an other family member; Stroke in her father, maternal grandmother, and another family member; Thyroid disease in her father, mother, and another family member. Other pertinent FHx was reviewed and unremarkable.     Social History:  reports that she has never smoked. She has never used smokeless tobacco. She reports that she does not drink alcohol and does not use drugs.        Medications:  Available home medication information reviewed.    Allergies   Allergen Reactions    Adhesive Tape Other (See Comments)     REDNESS, SKIN BURN    Latex Other (See Comments)     REDNESS, SKIN BURN    Propoxyphene Itching       Objective   Objective   Vital Signs:   Temp:  [97.5 °F (36.4 °C)-98.2 °F (36.8 °C)] 98.2 °F (36.8 °C)  Heart Rate:  [73-74] 74  Resp:  [16-18] 16  BP: (114-162)/(53-78) 162/63        Physical Exam   Constitutional: Awake,  alert, very elderly and frail in appearance  Eyes: Poor vision, PERRLA, sclerae anicteric, no conjunctival injection  HENT: NCAT, mucous membranes moist  Neck: Supple, no thyromegaly, no lymphadenopathy, trachea midline  Respiratory: Rare cough, no wheezes, moderate inspiration, currently on supplemental oxygen, breathing appears somewhat labored on my evaluation and she had some borderline tachypnea  Cardiovascular: Pulse rate is normal in the 70s, palpable radial pulses bilaterally  Gastrointestinal:  soft, nontender, nondistended  Musculoskeletal: Very elderly frail and chronically debilitated in appearance, some muscle wasting noted, mild bilateral ankle edema, no clubbing or cyanosis to extremities  Psychiatric: Appropriate affect, cooperative  Neurologic: Poor historian, strength symmetric in all extremities, Cranial Nerves grossly intact to confrontation, speech clear  Skin: No rashes or jaundice      Results from last 7 days   Lab Units 08/26/23  1007   WBC 10*3/mm3 12.90*   HEMOGLOBIN g/dL 12.5   HEMATOCRIT % 40.3   PLATELETS 10*3/mm3 190     Results from last 7 days   Lab Units 08/26/23  1356 08/26/23  1204 08/26/23  1007   SODIUM mmol/L  --  138  --    POTASSIUM mmol/L  --  4.4  --    CHLORIDE mmol/L  --  100  --    CO2 mmol/L  --  29.0  --    BUN mg/dL  --  20  --    CREATININE mg/dL  --  1.02*  --    GLUCOSE mg/dL  --  209*  --    CALCIUM mg/dL  --  9.7  --    ALT (SGPT) U/L  --  11  --    AST (SGOT) U/L  --  12  --    HSTROP T ng/L 33* 40*  --    PROBNP pg/mL  --   --  4,014.0*     CrCl cannot be calculated (Unknown ideal weight.).  Brief Urine Lab Results  (Last result in the past 365 days)        Color   Clarity   Blood   Leuk Est   Nitrite   Protein   CREAT   Urine HCG        08/26/23 1417 Red  Comment: Any Substance that causes an abnormal urine color can alter the accuracy of the chemical reactions.   Turbid   Large (3+)   Large (3+)   Positive   >=300 mg/dL (3+)                 Imaging Results  (Last 24 Hours)       Procedure Component Value Units Date/Time    XR Chest 1 View [[id number]] Collected: 08/26/23 1153     Updated: 08/26/23 1201    Narrative:      Portable chest radiograph     HISTORY: Shortness of breath     TECHNIQUE: Single AP portable radiograph of the chest     COMPARISON: Chest radiograph 11/2/2022       Impression:      FINDINGS AND IMPRESSION:  Left-sided pacemaker is present.     There is sequela of prior to limits disease. Cardiac silhouette is mild  to moderately enlarged. No pulmonary consolidation, pleural effusion or  pneumothorax is seen.     This report was finalized on 8/26/2023 11:58 AM by Dr. Ruslan Shipman M.D.             Results for orders placed during the hospital encounter of 01/31/23    Adult Transthoracic Echo Complete W/ Cont if Necessary Per Protocol    Interpretation Summary    Left ventricular systolic function is normal. Left ventricular ejection fraction appears to be 61 - 65%.    Left ventricular wall thickness is consistent with mild basal asymmetric hypertrophy.    Hyperdynamic right ventricular systolic function noted.    Mild mitral valve stenosis is present.  Mild to moderate mitral regurgitation.    Estimated right ventricular systolic pressure from tricuspid regurgitation is mildly elevated (35-45 mmHg).      Assessment & Plan   Assessment & Plan     Active Hospital Problems    Diagnosis  POA    **Diastolic CHF, acute [I50.31]  Yes    Acute CHF [I50.9]  Yes    Type 2 diabetes mellitus with hyperglycemia [E11.65]  Yes    Acute cystitis with hematuria [N30.01]  Yes    Diabetic polyneuropathy associated with type 2 diabetes mellitus [E11.42]  Yes    Presence of cardiac pacemaker [Z95.0]  Yes    S/P right coronary artery (RCA) stent placement [Z95.5]  Not Applicable    Coronary artery disease involving native coronary artery of native heart [I25.10]  Yes     Added automatically from request for surgery [id number]      PAF (paroxysmal atrial fibrillation) [I48.0]   Yes     Formatting of this note might be different from the original.  Device detected. Not symptomatic      Essential hypertension [I10]  Yes    Legal blindness [H54.8]  Yes    GERD (gastroesophageal reflux disease) [K21.9]  Yes     cough with swallowing    Overview:   Overview:   cough with swallowing      Mixed hyperlipidemia [E78.2]  Yes    Hypothyroidism, acquired [E03.9]  Yes    Osteoarthritis, multiple sites [M15.9]  Yes     right hip    Overview:   Overview:   right hip         88-year-old female presents the hospital with shortness of breath and weakness she was found to have hypoxia and elevated proBNP concerning for acute diastolic heart failure.  She also has acute cystitis.    CHF with hypoxia:  IV diuresis.  Careful monitoring of renal function and electrolytes.  Supportive care and symptom treatment.  Titrate supplemental oxygen as needed.  Consult cardiology.  Most recent echocardiogram reviewed.    Acute cystitis:  I spoken to microbiology lab and they will add culture onto urinalysis.  Urinalysis grossly abnormal.  Ceftriaxone initiated.  Patient with leukocytosis.  Check blood cultures and monitor clinically.  Patient with dysuria.     Diabetes with hyperglycemia and polyneuropathy:  A1c greater than 10.  Plan for basal decreased, and correction insulin.  Monitor glucose and adjust insulin as needed.  Patient needs improved diet control to avoid further episodes of cystitis.  With recent weakness I will decrease her gabapentin dose from 300 mg to 100 mg.    Visual impairment: Noted.    Essential hypertension: Continue appropriate cardiac medications and adjust as needed.    CAD:  History of stent noted.  Continue Plavix.  Minimal troponin elevation with negative delta.  No sign of ACS.    Atrial fibrillation:  History noted.  Continue Eliquis.  Current EKG reviewed and paced.    Physical debility: PT OT    Treatment plan discussed with the patient and family who are in agreement    DVT  prophylaxis: Eliquis    CODE STATUS:    Code Status and Medical Interventions:   Ordered at: 08/26/23 1318     Level Of Support Discussed With:    Patient     Code Status (Patient has no pulse and is not breathing):    CPR (Attempt to Resuscitate)     Medical Interventions (Patient has pulse or is breathing):    Full Support         Jj Navarrete MD  08/26/23

## 2023-08-26 NOTE — ED NOTES
"Nursing report ED to floor  Roland Russell  88 y.o.  female    HPI :   Chief Complaint   Patient presents with    Weakness - Generalized       Admitting doctor:   Jj Navarrete MD    Admitting diagnosis:   The primary encounter diagnosis was Generalized weakness. A diagnosis of Acute congestive heart failure, unspecified heart failure type was also pertinent to this visit.    Code status:   Current Code Status       Date Active Code Status Order ID Comments User Context       8/26/2023 1318 CPR (Attempt to Resuscitate) [id number]  Jj Navarrete MD ED        Question Answer    Code Status (Patient has no pulse and is not breathing) CPR (Attempt to Resuscitate)    Medical Interventions (Patient has pulse or is breathing) Full Support    Level Of Support Discussed With Patient                    Allergies:   Adhesive tape, Latex, and Propoxyphene    Isolation:   No active isolations    Intake and Output    Intake/Output Summary (Last 24 hours) at 8/26/2023 1342  Last data filed at 8/26/2023 0945  Gross per 24 hour   Intake 500 ml   Output --   Net 500 ml       Weight:   There were no vitals filed for this visit.    Most recent vitals:   Vitals:    08/26/23 0956 08/26/23 0957 08/26/23 0958 08/26/23 1000   BP:    114/53   Pulse:  74 73    Resp:       Temp:       TempSrc:       SpO2:  (!) 85% 94%    Height: 144.8 cm (57\")          Active LDAs/IV Access:   Lines, Drains & Airways       Active LDAs       Name Placement date Placement time Site Days    Peripheral IV 08/26/23 0946 Right Antecubital 08/26/23  0946  Antecubital  less than 1                    Labs (abnormal labs have a star):   Labs Reviewed   COMPREHENSIVE METABOLIC PANEL - Abnormal; Notable for the following components:       Result Value    Glucose 209 (*)     Creatinine 1.02 (*)     Alkaline Phosphatase 197 (*)     eGFR 53.0 (*)     All other components within normal limits    Narrative:     GFR Normal >60  Chronic Kidney Disease " <60  Kidney Failure <15    The GFR formula is only valid for adults with stable renal function between ages 18 and 70.   BNP (IN-HOUSE) - Abnormal; Notable for the following components:    proBNP 4,014.0 (*)     All other components within normal limits    Narrative:     Among patients with dyspnea, NT-proBNP is highly sensitive for the detection of acute congestive heart failure. In addition NT-proBNP of <300 pg/ml effectively rules out acute congestive heart failure with 99% negative predictive value.     TROPONIN - Abnormal; Notable for the following components:    HS Troponin T 40 (*)     All other components within normal limits    Narrative:     High Sensitive Troponin T Reference Range:  <10.0 ng/L- Negative Female for AMI  <15.0 ng/L- Negative Male for AMI  >=10 - Abnormal Female indicating possible myocardial injury.  >=15 - Abnormal Male indicating possible myocardial injury.   Clinicians would have to utilize clinical acumen, EKG, Troponin, and serial changes to determine if it is an Acute Myocardial Infarction or myocardial injury due to an underlying chronic condition.        CBC WITH AUTO DIFFERENTIAL - Abnormal; Notable for the following components:    WBC 12.90 (*)     MCHC 31.0 (*)     Neutrophil % 85.5 (*)     Lymphocyte % 8.8 (*)     Eosinophil % 0.1 (*)     Neutrophils, Absolute 11.02 (*)     All other components within normal limits   RESPIRATORY PANEL PCR W/ COVID-19 (SARS-COV-2) [facility]/BLANKA/BRODY/PAD/COR/MAD/KWADWO IN-HOUSE, NP SWAB IN [facility], 3-4 HR TAT - Normal    Narrative:     In the setting of a positive respiratory panel with a viral infection PLUS a negative procalcitonin without other underlying concern for bacterial infection, consider observing off antibiotics or discontinuation of antibiotics and continue supportive care. If the respiratory panel is positive for atypical bacterial infection (Bordetella pertussis, Chlamydophila pneumoniae, or Mycoplasma pneumoniae), consider antibiotic  de-escalation to target atypical bacterial infection.   BLOOD CULTURE   BLOOD CULTURE   URINALYSIS W/ MICROSCOPIC IF INDICATED (NO CULTURE)   HIGH SENSITIVITIY TROPONIN T 2HR   HEMOGLOBIN A1C   POCT GLUCOSE FINGERSTICK   POCT GLUCOSE FINGERSTICK   POCT GLUCOSE FINGERSTICK   POCT GLUCOSE FINGERSTICK   CBC AND DIFFERENTIAL    Narrative:     The following orders were created for panel order CBC & Differential.  Procedure                               Abnormality         Status                     ---------                               -----------         ------                     CBC Auto Differential[[id number]]        Abnormal            Final result                 Please view results for these tests on the individual orders.       EKG:   ECG 12 Lead Stroke Evaluation   Preliminary Result   HEART RATE= 72  bpm   RR Interval= 833  ms   WI Interval= 185  ms   P Horizontal Axis= 239  deg   P Front Axis= -43  deg   QRSD Interval= 149  ms   QT Interval= 466  ms   QTcB= 511  ms   QRS Axis= -85  deg   T Wave Axis= 98  deg   - ABNORMAL ECG -   Atrial-sensed ventricular-paced rhythm   No further analysis attempted due to paced rhythm   Electronically Signed By:    Date and Time of Study: 2023-08-26 10:10:04          Meds given in ED:   Medications   bumetanide (BUMEX) injection 1 mg (has no administration in time range)   sodium chloride 0.9 % flush 3 mL (has no administration in time range)   sodium chloride 0.9 % flush 3-10 mL (has no administration in time range)   sodium chloride 0.9 % infusion 40 mL (has no administration in time range)   acetaminophen (TYLENOL) tablet 650 mg (has no administration in time range)     Or   acetaminophen (TYLENOL) 160 MG/5ML solution 650 mg (has no administration in time range)     Or   acetaminophen (TYLENOL) suppository 650 mg (has no administration in time range)   famotidine (PEPCID) tablet 10 mg (has no administration in time range)   ondansetron (ZOFRAN) tablet 4 mg (has no  administration in time range)     Or   ondansetron (ZOFRAN) injection 4 mg (has no administration in time range)   melatonin tablet 1 mg (has no administration in time range)   Enoxaparin Sodium (LOVENOX) syringe 40 mg (has no administration in time range)   Potassium Replacement - Follow Nurse / BPA Driven Protocol (has no administration in time range)   Magnesium Standard Dose Replacement - Follow Nurse / BPA Driven Protocol (has no administration in time range)   Phosphorus Replacement - Follow Nurse / BPA Driven Protocol (has no administration in time range)   Calcium Replacement - Follow Nurse / BPA Driven Protocol (has no administration in time range)   docusate sodium (COLACE) capsule 100 mg (has no administration in time range)   apixaban (ELIQUIS) tablet 5 mg (has no administration in time range)   clopidogrel (PLAVIX) tablet 75 mg (has no administration in time range)   vitamin B-12 (CYANOCOBALAMIN) tablet 500 mcg (has no administration in time range)   gabapentin (NEURONTIN) capsule 200 mg (has no administration in time range)   levothyroxine (SYNTHROID, LEVOTHROID) tablet 75 mcg (has no administration in time range)   pravastatin (PRAVACHOL) tablet 20 mg (has no administration in time range)   losartan (COZAAR) tablet 50 mg (has no administration in time range)   dextrose (GLUTOSE) oral gel 15 g (has no administration in time range)   dextrose (D50W) (25 g/50 mL) IV injection 25 g (has no administration in time range)   glucagon (GLUCAGEN) injection 1 mg (has no administration in time range)   insulin glargine (LANTUS, SEMGLEE) injection 7 Units (has no administration in time range)   insulin lispro (HUMALOG/ADMELOG) injection 2-7 Units (has no administration in time range)   insulin lispro (HUMALOG/ADMELOG) injection 2 Units (has no administration in time range)       Imaging results:  XR Chest 1 View    Result Date: 8/26/2023  FINDINGS AND IMPRESSION: Left-sided pacemaker is present.  There is sequela  of prior to limits disease. Cardiac silhouette is mild to moderately enlarged. No pulmonary consolidation, pleural effusion or pneumothorax is seen.  This report was finalized on 8/26/2023 11:58 AM by Dr. Ruslan Shipman M.D.       Ambulatory status:   - bed rest    Social issues:   Social History     Socioeconomic History    Marital status:    Tobacco Use    Smoking status: Never    Smokeless tobacco: Never   Vaping Use    Vaping Use: Never used   Substance and Sexual Activity    Alcohol use: No    Drug use: No    Sexual activity: Defer       NIH Stroke Scale:       Graciela Akbar RN  08/26/23 13:42 EDT

## 2023-08-26 NOTE — Clinical Note
Level of Care: Telemetry [5]   Diagnosis: Diastolic CHF, acute [687075]   Admitting Physician: LUISA CLOUD [[id number]]   Attending Physician: LUISA CLOUD [[id number]]   Certification: I Certify That Inpatient Hospital Services Are Medically Necessary For Greater Than 2 Midnights

## 2023-08-26 NOTE — ED NOTES
"She got up to go to the bathroom this am.  When she walked back to her bad she lowered  herself to the ground.  She is weak.  She has had diarrhea x 2.  Bp on ems arrival 94/64.  She reports her legs and arms/shoulders have been \"sensitive\" x 2 months  "

## 2023-08-26 NOTE — PLAN OF CARE
Goal Outcome Evaluation:  Plan of Care Reviewed With: patient, family        Progress: no change  Outcome Evaluation: VSS, RA, V-paced, A+Ox4, assist x 1, strict Is & Os, daily weights, diurese with IV diuretics, will continue to monitor

## 2023-08-26 NOTE — ED PROVIDER NOTES
EMERGENCY DEPARTMENT ENCOUNTER    Room Number:  11/11  PCP: Shawanad Rosario MD  Historian: Patient      HPI:  Chief Complaint: Weakness  A complete HPI/ROS/PMH/PSH/SH/FH are unobtainable due to: None  Context: Roland Russell is a 88 y.o. female who presents to the ED c/o weakness.  Patient lives at home alone.  She is blind.  Patient states she was going to the bathroom and then on the way back to the bed she was unable to get into bed.  Patient sat herself down to the floor.  Could not get up.  Family members were around and states she was fairly confused.  Patient states symptoms persist.  Blood sugars have been high recently.  Patient did have some coughing with eating as well.  No chest pain.            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    Sleep apnea, obstructive 12/16/2019    Morbidly obese 12/20/2019    Abnormal weight gain 06/26/2020    Localized edema 06/26/2020    Compression fracture of T8 vertebra with delayed healing 02/16/2021    PAF (paroxysmal atrial fibrillation) 02/03/2020    Dry scalp 12/01/2021    Deformity of both feet 10/06/2022    Acute encephalopathy 11/02/2022    Lactic acidosis 11/03/2022    Diverticulitis 11/03/2022    Ankle fracture 04/10/2023    Type 2 diabetes mellitus with hypoglycemia, with long-term current use of insulin 04/11/2023    Abnormal nuclear stress test 04/16/2023    Coronary artery disease involving native coronary artery of native  heart 04/17/2023    S/P right coronary artery (RCA) stent placement 04/20/2023     Resolved Ambulatory Problems     Diagnosis Date Noted    Osteoporosis     GERD (gastroesophageal reflux disease) 09/27/2018    Osteoarthritis, multiple sites 09/27/2018     Past Medical History:   Diagnosis Date    Abnormal vision     Allergy to adhesive tape     Arthralgia     AVB (atrioventricular block)     Balance problem     Difficulty walking     H/O complete eye exam 10/2016    Headache     History of glaucoma     History of poliomyelitis     History of surgery on arm     HLD (hyperlipidemia)     Hypertension     Memory loss     Migraine     Numbness     Pacemaker     Rectal bleeding     Sleep apnea     TIA (transient ischemic attack) 12/24/2018    Type 2 diabetes mellitus     Type 2 diabetes mellitus, uncontrolled          PAST SURGICAL HISTORY  Past Surgical History:   Procedure Laterality Date    APPENDECTOMY  1989    CARDIAC CATHETERIZATION N/A 4/13/2023    Procedure: Left Heart Cath;  Surgeon: Rikki Oconnor MD;  Location:  [facility];  Service: Cardiology;  Laterality: N/A;    CARDIAC CATHETERIZATION N/A 4/13/2023    Procedure: Coronary angiography;  Surgeon: Rikki Oconnor MD;  Location:  [facility];  Service: Cardiology;  Laterality: N/A;    CARDIAC CATHETERIZATION N/A 4/13/2023    Procedure: Left ventriculography;  Surgeon: Rikki Oconnor MD;  Location:  [facility];  Service: Cardiology;  Laterality: N/A;    CARDIAC CATHETERIZATION N/A 4/20/2023    Procedure: Percutaneous Coronary Intervention;  Surgeon: Rikki Oconnor MD;  Location:  [facility];  Service: Cardiology;  Laterality: N/A;    CARDIAC CATHETERIZATION N/A 4/20/2023    Procedure: Atherectomy-coronary;  Surgeon: Rikki Oconnor MD;  Location:  [facility];  Service: Cardiology;  Laterality: N/A;    CARDIAC CATHETERIZATION N/A 4/20/2023    Procedure: Stent CAROL coronary;  Surgeon: Elvin  MD Rikki;  Location:  [facility];  Service: Cardiology;  Laterality: N/A;    CARDIAC PACEMAKER PLACEMENT  11/25/2004    CAROTID ENDARTERECTOMY Left 12/13/2019    Procedure: LEFT CAROTID BODY TUMOR RESECTION;  Surgeon: Bryan Severino MD;  Location: [facility] OR;  Service: Vascular    CATARACT EXTRACTION Bilateral 1997    CEREBRAL ANGIOGRAM Bilateral 12/6/2019    Procedure: CAROTID ARTERIOGRAM BILATERAL, RIGHT WRIST APPROACH;  Surgeon: Bryan Severino MD;  Location: [facility] OR 18/19;  Service: Vascular    CHOLECYSTECTOMY  1989    COLONOSCOPY  2013    dr montes    ELBOW PROCEDURE  2016    ENDOSCOPY  12/06/2012    Dr. Montes; food in stomach, gastritis    EXCISION LESION  1994    BACK CYST BENGIN    EYE SURGERY  12/2012    GLAUCOMA SURGERY Bilateral 1995    laser surgery    HARDWARE REMOVAL Left     ELBOW    HYSTERECTOMY  1986    INTERVENTIONAL RADIOLOGY PROCEDURE N/A 4/20/2023    Procedure: Intravascular Ultrasound;  Surgeon: Rikki Oconnor MD;  Location:  [facility];  Service: Cardiology;  Laterality: N/A;    ORIF ELBOW FRACTURE Left     PACEMAKER IMPLANTATION      PACEMAKER REPLACEMENT  06/2013         FAMILY HISTORY  Family History   Problem Relation Age of Onset    Cancer Father     Glaucoma Father         angular glycoma    Heart disease Father     Arthritis Father     Thyroid disease Father     Stroke Father     Asthma Sister     Arthritis Sister     Cancer Sister         breast    Stroke Maternal Grandmother     Heart failure Other     Diabetes Other     Hypertension Other     Stroke Other         aunt    Thyroid disease Other     Arthritis Mother     Diabetes Mother     Thyroid disease Mother     Malig Hyperthermia Neg Hx          SOCIAL HISTORY  Social History     Socioeconomic History    Marital status:    Tobacco Use    Smoking status: Never    Smokeless tobacco: Never   Vaping Use    Vaping Use: Never used   Substance and Sexual Activity    Alcohol use: No     Drug use: No    Sexual activity: Defer         ALLERGIES  Adhesive tape, Latex, and Propoxyphene        REVIEW OF SYSTEMS  Review of Systems   Weakness      PHYSICAL EXAM  ED Triage Vitals [08/26/23 0946]   Temp Heart Rate Resp BP SpO2   97.5 °F (36.4 °C) 74 18 145/78 96 %      Temp src Heart Rate Source Patient Position BP Location FiO2 (%)   Tympanic Monitor -- -- --       Physical Exam      GENERAL: no acute distress  HENT: nares patent  EYES: no scleral icterus. Blindness  CV: regular rhythm, normal rate  RESPIRATORY: normal effort  ABDOMEN: soft  MUSCULOSKELETAL: no deformity  NEURO: alert, moves all extremities, follows commands.  Generalized weakness can barely lift her legs off of the bed  PSYCH:  calm, cooperative  SKIN: warm, dry    Vital signs and nursing notes reviewed.          LAB RESULTS  Recent Results (from the past 24 hour(s))   BNP    Collection Time: 08/26/23 10:07 AM    Specimen: Blood   Result Value Ref Range    proBNP 4,014.0 (H) 0.0 - 1,800.0 pg/mL   CBC Auto Differential    Collection Time: 08/26/23 10:07 AM    Specimen: Blood   Result Value Ref Range    WBC 12.90 (H) 3.40 - 10.80 10*3/mm3    RBC 4.59 3.77 - 5.28 10*6/mm3    Hemoglobin 12.5 12.0 - 15.9 g/dL    Hematocrit 40.3 34.0 - 46.6 %    MCV 87.8 79.0 - 97.0 fL    MCH 27.2 26.6 - 33.0 pg    MCHC 31.0 (L) 31.5 - 35.7 g/dL    RDW 15.4 12.3 - 15.4 %    RDW-SD 49.7 37.0 - 54.0 fl    MPV 11.7 6.0 - 12.0 fL    Platelets 190 140 - 450 10*3/mm3    Neutrophil % 85.5 (H) 42.7 - 76.0 %    Lymphocyte % 8.8 (L) 19.6 - 45.3 %    Monocyte % 5.1 5.0 - 12.0 %    Eosinophil % 0.1 (L) 0.3 - 6.2 %    Basophil % 0.3 0.0 - 1.5 %    Immature Grans % 0.2 0.0 - 0.5 %    Neutrophils, Absolute 11.02 (H) 1.70 - 7.00 10*3/mm3    Lymphocytes, Absolute 1.14 0.70 - 3.10 10*3/mm3    Monocytes, Absolute 0.66 0.10 - 0.90 10*3/mm3    Eosinophils, Absolute 0.01 0.00 - 0.40 10*3/mm3    Basophils, Absolute 0.04 0.00 - 0.20 10*3/mm3    Immature Grans, Absolute 0.03 0.00 -  0.05 10*3/mm3    nRBC 0.0 0.0 - 0.2 /100 WBC   Hemoglobin A1c    Collection Time: 08/26/23 10:07 AM    Specimen: Blood   Result Value Ref Range    Hemoglobin A1C 10.30 (H) 4.80 - 5.60 %   ECG 12 Lead Stroke Evaluation    Collection Time: 08/26/23 10:10 AM   Result Value Ref Range    QT Interval 466 ms    QTC Interval 511 ms   Respiratory Panel PCR w/COVID-19(SARS-CoV-2) [facility] In-House, NP Swab in UTM/VTM, 3-4 HR TAT - Swab, Nasopharynx    Collection Time: 08/26/23 10:11 AM    Specimen: Nasopharynx; Swab   Result Value Ref Range    ADENOVIRUS, PCR Not Detected Not Detected    Coronavirus 229E Not Detected Not Detected    Coronavirus HKU1 Not Detected Not Detected    Coronavirus NL63 Not Detected Not Detected    Coronavirus OC43 Not Detected Not Detected    COVID19 Not Detected Not Detected - Ref. Range    Human Metapneumovirus Not Detected Not Detected    Human Rhinovirus/Enterovirus Not Detected Not Detected    Influenza A PCR Not Detected Not Detected    Influenza B PCR Not Detected Not Detected    Parainfluenza Virus 1 Not Detected Not Detected    Parainfluenza Virus 2 Not Detected Not Detected    Parainfluenza Virus 3 Not Detected Not Detected    Parainfluenza Virus 4 Not Detected Not Detected    RSV, PCR Not Detected Not Detected    Bordetella pertussis pcr Not Detected Not Detected    Bordetella parapertussis PCR Not Detected Not Detected    Chlamydophila pneumoniae PCR Not Detected Not Detected    Mycoplasma pneumo by PCR Not Detected Not Detected   Comprehensive Metabolic Panel    Collection Time: 08/26/23 12:04 PM    Specimen: Arm, Right; Blood   Result Value Ref Range    Glucose 209 (H) 65 - 99 mg/dL    BUN 20 8 - 23 mg/dL    Creatinine 1.02 (H) 0.57 - 1.00 mg/dL    Sodium 138 136 - 145 mmol/L    Potassium 4.4 3.5 - 5.2 mmol/L    Chloride 100 98 - 107 mmol/L    CO2 29.0 22.0 - 29.0 mmol/L    Calcium 9.7 8.6 - 10.5 mg/dL    Total Protein 6.9 6.0 - 8.5 g/dL    Albumin 3.9 3.5 - 5.2 g/dL     ALT (SGPT) 11 1 - 33 U/L    AST (SGOT) 12 1 - 32 U/L    Alkaline Phosphatase 197 (H) 39 - 117 U/L    Total Bilirubin 0.6 0.0 - 1.2 mg/dL    Globulin 3.0 gm/dL    A/G Ratio 1.3 g/dL    BUN/Creatinine Ratio 19.6 7.0 - 25.0    Anion Gap 9.0 5.0 - 15.0 mmol/L    eGFR 53.0 (L) >60.0 mL/min/1.73   High Sensitivity Troponin T    Collection Time: 08/26/23 12:04 PM    Specimen: Arm, Right; Blood   Result Value Ref Range    HS Troponin T 40 (H) <10 ng/L       Ordered the above labs and reviewed the results.        RADIOLOGY  XR Chest 1 View    Result Date: 8/26/2023  Portable chest radiograph  HISTORY: Shortness of breath  TECHNIQUE: Single AP portable radiograph of the chest  COMPARISON: Chest radiograph 11/2/2022      FINDINGS AND IMPRESSION: Left-sided pacemaker is present.  There is sequela of prior to limits disease. Cardiac silhouette is mild to moderately enlarged. No pulmonary consolidation, pleural effusion or pneumothorax is seen.  This report was finalized on 8/26/2023 11:58 AM by Dr. Ruslan Shipman M.D.       Ordered the above noted radiological studies.  Chest x-ray apparently interpreted by me and shows no obvious pneumonia          PROCEDURES  Procedures      EKG          EKG time: 1010  Rhythm/Rate: Paced rhythm rate 72    Interpreted Contemporaneously by me, independently viewed  Unchanged compared to prior 4/14/2023      MEDICATIONS GIVEN IN ER  Medications   bumetanide (BUMEX) injection 1 mg (has no administration in time range)   sodium chloride 0.9 % flush 3 mL (has no administration in time range)   sodium chloride 0.9 % flush 3-10 mL (has no administration in time range)   sodium chloride 0.9 % infusion 40 mL (has no administration in time range)   acetaminophen (TYLENOL) tablet 650 mg (has no administration in time range)     Or   acetaminophen (TYLENOL) 160 MG/5ML solution 650 mg (has no administration in time range)     Or   acetaminophen (TYLENOL) suppository 650 mg (has no administration in time  range)   famotidine (PEPCID) tablet 10 mg (has no administration in time range)   ondansetron (ZOFRAN) tablet 4 mg (has no administration in time range)     Or   ondansetron (ZOFRAN) injection 4 mg (has no administration in time range)   melatonin tablet 1 mg (has no administration in time range)   Enoxaparin Sodium (LOVENOX) syringe 40 mg (has no administration in time range)   Potassium Replacement - Follow Nurse / BPA Driven Protocol (has no administration in time range)   Magnesium Standard Dose Replacement - Follow Nurse / BPA Driven Protocol (has no administration in time range)   Phosphorus Replacement - Follow Nurse / BPA Driven Protocol (has no administration in time range)   Calcium Replacement - Follow Nurse / BPA Driven Protocol (has no administration in time range)   docusate sodium (COLACE) capsule 100 mg (has no administration in time range)   apixaban (ELIQUIS) tablet 5 mg (has no administration in time range)   clopidogrel (PLAVIX) tablet 75 mg (has no administration in time range)   vitamin B-12 (CYANOCOBALAMIN) tablet 500 mcg (has no administration in time range)   gabapentin (NEURONTIN) capsule 200 mg (has no administration in time range)   levothyroxine (SYNTHROID, LEVOTHROID) tablet 75 mcg (has no administration in time range)   pravastatin (PRAVACHOL) tablet 20 mg (has no administration in time range)   losartan (COZAAR) tablet 50 mg (has no administration in time range)   dextrose (GLUTOSE) oral gel 15 g (has no administration in time range)   dextrose (D50W) (25 g/50 mL) IV injection 25 g (has no administration in time range)   glucagon (GLUCAGEN) injection 1 mg (has no administration in time range)   insulin glargine (LANTUS, SEMGLEE) injection 7 Units (has no administration in time range)   insulin lispro (HUMALOG/ADMELOG) injection 2-7 Units (has no administration in time range)   insulin lispro (HUMALOG/ADMELOG) injection 2 Units (has no administration in time range)                    MEDICAL DECISION MAKING, PROGRESS, and CONSULTS    Discussion below represents my analysis of pertinent findings related to patient's condition, differential diagnosis, treatment plan and final disposition.      Additional sources:  - Discussed/ obtained information from independent historians: None    - External (non-ED) record review: Epic reviewed and patient was admitted here April 2023 for RCA stent    - Chronic or social conditions impacting care: None     - Shared decision making:  none      Orders placed during this visit:  Orders Placed This Encounter   Procedures    Respiratory Panel PCR w/COVID-19(SARS-CoV-2) [facility] In-House, NP Swab in UTM/VTM, 3-4 HR TAT - Swab, Nasopharynx    Blood Culture - Blood,    Blood Culture - Blood,    XR Chest 1 View    Comprehensive Metabolic Panel    BNP    High Sensitivity Troponin T    Urinalysis With Microscopic If Indicated (No Culture) - Urine, Clean Catch    CBC Auto Differential    High Sensitivity Troponin T 2Hr    CBC (No Diff)    Basic Metabolic Panel    Hemoglobin A1c    Diet: Diabetic Diets, Cardiac Diets; Healthy Heart (2-3 Na+); Consistent Carbohydrate; Texture: Regular Texture (IDDSI 7); Fluid Consistency: Thin (IDDSI 0)    Vital Signs    Notify Provider (With Default Parameters)    Intake & Output    Weigh Patient    Oral Care    Saline Lock & Maintain IV Access    Up With Assistance    Activity - Ad Angela    Code Status and Medical Interventions:    [facility] (on-call MD unless specified) Details    Inpatient Cardiology Consult    Inpatient Palliative Care Team Consult    OT Consult: Eval & Treat    PT Consult: Eval & Treat Discharge Placement Assessment    POC Glucose 4x Daily Before Meals & at Bedtime    ECG 12 Lead Stroke Evaluation    Insert Peripheral IV    Inpatient Admission    Inpatient Admission    CBC & Differential         Additional orders considered but not ordered:  None        Differential diagnosis includes but  is not limited to:    Congestive heart failure      Independent interpretation of labs, radiology studies, and discussions with consultants:  ED Course as of 08/26/23 1417   Sat Aug 26, 2023   1324 13:24 EDT  Patient presents for weakness.  Patient is hypoxic here at 85%.  94% on 2 L.  Patient most likely has congestive heart failure with BNP markedly elevated.  Patient has been discussed with Dr. Navarrete who will admit.  Given lasix [SL]      ED Course User Index  [SL] Venkat Leblanc MD                 DIAGNOSIS  Final diagnoses:   Generalized weakness   Acute congestive heart failure, unspecified heart failure type         DISPOSITION  admit            Latest Documented Vital Signs:  As of 14:17 EDT  BP- 114/53 HR- 73 Temp- 97.5 °F (36.4 °C) (Tympanic) O2 sat- 94%              --    Please note that portions of this were completed with a voice recognition program.       Note Disclaimer: At [facility], we believe that sharing information builds trust and better relationships. You are receiving this note because you are receiving care at [facility] or recently visited. It is possible you will see health information before a provider has talked with you about it. This kind of information can be easy to misunderstand. To help you fully understand what it means for your health, we urge you to discuss this note with your provider.            Venkat Leblanc MD  08/26/23 1416       Venkat Leblanc MD  08/26/23 1417

## 2023-08-27 LAB
ANION GAP SERPL CALCULATED.3IONS-SCNC: 7 MMOL/L (ref 5–15)
BUN SERPL-MCNC: 20 MG/DL (ref 8–23)
BUN/CREAT SERPL: 22 (ref 7–25)
CALCIUM SPEC-SCNC: 9.3 MG/DL (ref 8.6–10.5)
CHLORIDE SERPL-SCNC: 103 MMOL/L (ref 98–107)
CO2 SERPL-SCNC: 31 MMOL/L (ref 22–29)
CREAT SERPL-MCNC: 0.91 MG/DL (ref 0.57–1)
DEPRECATED RDW RBC AUTO: 49.2 FL (ref 37–54)
EGFRCR SERPLBLD CKD-EPI 2021: 60.8 ML/MIN/1.73
ERYTHROCYTE [DISTWIDTH] IN BLOOD BY AUTOMATED COUNT: 15.3 % (ref 12.3–15.4)
GLUCOSE BLDC GLUCOMTR-MCNC: 169 MG/DL (ref 70–130)
GLUCOSE BLDC GLUCOMTR-MCNC: 244 MG/DL (ref 70–130)
GLUCOSE BLDC GLUCOMTR-MCNC: 257 MG/DL (ref 70–130)
GLUCOSE BLDC GLUCOMTR-MCNC: 258 MG/DL (ref 70–130)
GLUCOSE SERPL-MCNC: 187 MG/DL (ref 65–99)
HCT VFR BLD AUTO: 39.9 % (ref 34–46.6)
HGB BLD-MCNC: 12.2 G/DL (ref 12–15.9)
MCH RBC QN AUTO: 26.8 PG (ref 26.6–33)
MCHC RBC AUTO-ENTMCNC: 30.6 G/DL (ref 31.5–35.7)
MCV RBC AUTO: 87.7 FL (ref 79–97)
PLATELET # BLD AUTO: 180 10*3/MM3 (ref 140–450)
PMV BLD AUTO: 11.8 FL (ref 6–12)
POTASSIUM SERPL-SCNC: 3.6 MMOL/L (ref 3.5–5.2)
POTASSIUM SERPL-SCNC: 4.9 MMOL/L (ref 3.5–5.2)
RBC # BLD AUTO: 4.55 10*6/MM3 (ref 3.77–5.28)
SODIUM SERPL-SCNC: 141 MMOL/L (ref 136–145)
TSH SERPL DL<=0.05 MIU/L-ACNC: 1.22 UIU/ML (ref 0.27–4.2)
WBC NRBC COR # BLD: 9.97 10*3/MM3 (ref 3.4–10.8)

## 2023-08-27 PROCEDURE — 84132 ASSAY OF SERUM POTASSIUM: CPT | Performed by: STUDENT IN AN ORGANIZED HEALTH CARE EDUCATION/TRAINING PROGRAM

## 2023-08-27 PROCEDURE — 82948 REAGENT STRIP/BLOOD GLUCOSE: CPT

## 2023-08-27 PROCEDURE — 97162 PT EVAL MOD COMPLEX 30 MIN: CPT

## 2023-08-27 PROCEDURE — 63710000001 INSULIN LISPRO (HUMAN) PER 5 UNITS: Performed by: INTERNAL MEDICINE

## 2023-08-27 PROCEDURE — 99222 1ST HOSP IP/OBS MODERATE 55: CPT | Performed by: INTERNAL MEDICINE

## 2023-08-27 PROCEDURE — 63710000001 INSULIN GLARGINE PER 5 UNITS: Performed by: INTERNAL MEDICINE

## 2023-08-27 PROCEDURE — 85027 COMPLETE CBC AUTOMATED: CPT | Performed by: INTERNAL MEDICINE

## 2023-08-27 PROCEDURE — 36415 COLL VENOUS BLD VENIPUNCTURE: CPT | Performed by: INTERNAL MEDICINE

## 2023-08-27 PROCEDURE — 80048 BASIC METABOLIC PNL TOTAL CA: CPT | Performed by: INTERNAL MEDICINE

## 2023-08-27 PROCEDURE — 25010000002 CEFTRIAXONE PER 250 MG: Performed by: INTERNAL MEDICINE

## 2023-08-27 RX ORDER — LABETALOL HYDROCHLORIDE 5 MG/ML
10 INJECTION, SOLUTION INTRAVENOUS
Status: DISCONTINUED | OUTPATIENT
Start: 2023-08-27 | End: 2023-09-02 | Stop reason: HOSPADM

## 2023-08-27 RX ORDER — LOSARTAN POTASSIUM 50 MG/1
50 TABLET ORAL ONCE
Status: COMPLETED | OUTPATIENT
Start: 2023-08-27 | End: 2023-08-27

## 2023-08-27 RX ORDER — NYSTATIN 100000 [USP'U]/G
POWDER TOPICAL EVERY 12 HOURS SCHEDULED
Status: DISCONTINUED | OUTPATIENT
Start: 2023-08-27 | End: 2023-09-02 | Stop reason: HOSPADM

## 2023-08-27 RX ORDER — LOSARTAN POTASSIUM 100 MG/1
100 TABLET ORAL
Status: DISCONTINUED | OUTPATIENT
Start: 2023-08-28 | End: 2023-09-02 | Stop reason: HOSPADM

## 2023-08-27 RX ORDER — POTASSIUM CHLORIDE 750 MG/1
40 TABLET, FILM COATED, EXTENDED RELEASE ORAL EVERY 4 HOURS
Status: COMPLETED | OUTPATIENT
Start: 2023-08-27 | End: 2023-08-27

## 2023-08-27 RX ADMIN — INSULIN LISPRO 4 UNITS: 100 INJECTION, SOLUTION INTRAVENOUS; SUBCUTANEOUS at 11:45

## 2023-08-27 RX ADMIN — BUMETANIDE 1 MG: 0.25 INJECTION INTRAMUSCULAR; INTRAVENOUS at 08:05

## 2023-08-27 RX ADMIN — NYSTATIN: 100000 POWDER TOPICAL at 21:54

## 2023-08-27 RX ADMIN — APIXABAN 5 MG: 5 TABLET, FILM COATED ORAL at 08:06

## 2023-08-27 RX ADMIN — GABAPENTIN 100 MG: 100 CAPSULE ORAL at 21:46

## 2023-08-27 RX ADMIN — INSULIN LISPRO 2 UNITS: 100 INJECTION, SOLUTION INTRAVENOUS; SUBCUTANEOUS at 08:05

## 2023-08-27 RX ADMIN — LOSARTAN POTASSIUM 50 MG: 50 TABLET, FILM COATED ORAL at 12:49

## 2023-08-27 RX ADMIN — INSULIN LISPRO 4 UNITS: 100 INJECTION, SOLUTION INTRAVENOUS; SUBCUTANEOUS at 21:48

## 2023-08-27 RX ADMIN — POTASSIUM CHLORIDE 40 MEQ: 750 TABLET, EXTENDED RELEASE ORAL at 13:53

## 2023-08-27 RX ADMIN — BUMETANIDE 1 MG: 0.25 INJECTION INTRAMUSCULAR; INTRAVENOUS at 21:50

## 2023-08-27 RX ADMIN — LEVOTHYROXINE SODIUM 75 MCG: 0.07 TABLET ORAL at 08:06

## 2023-08-27 RX ADMIN — Medication 3 ML: at 08:15

## 2023-08-27 RX ADMIN — CEFTRIAXONE SODIUM 1000 MG: 1 INJECTION, POWDER, FOR SOLUTION INTRAMUSCULAR; INTRAVENOUS at 17:40

## 2023-08-27 RX ADMIN — INSULIN LISPRO 3 UNITS: 100 INJECTION, SOLUTION INTRAVENOUS; SUBCUTANEOUS at 17:05

## 2023-08-27 RX ADMIN — CLOPIDOGREL BISULFATE 75 MG: 75 TABLET, FILM COATED ORAL at 08:06

## 2023-08-27 RX ADMIN — Medication 1 MG: at 21:47

## 2023-08-27 RX ADMIN — LOSARTAN POTASSIUM 50 MG: 50 TABLET, FILM COATED ORAL at 08:06

## 2023-08-27 RX ADMIN — Medication 500 MCG: at 08:06

## 2023-08-27 RX ADMIN — INSULIN GLARGINE 7 UNITS: 100 INJECTION, SOLUTION SUBCUTANEOUS at 08:05

## 2023-08-27 RX ADMIN — Medication 3 ML: at 21:53

## 2023-08-27 RX ADMIN — NYSTATIN: 100000 POWDER TOPICAL at 11:45

## 2023-08-27 RX ADMIN — INSULIN GLARGINE 7 UNITS: 100 INJECTION, SOLUTION SUBCUTANEOUS at 21:48

## 2023-08-27 RX ADMIN — PRAVASTATIN SODIUM 20 MG: 20 TABLET ORAL at 21:47

## 2023-08-27 RX ADMIN — APIXABAN 5 MG: 5 TABLET, FILM COATED ORAL at 21:46

## 2023-08-27 RX ADMIN — POTASSIUM CHLORIDE 40 MEQ: 750 TABLET, EXTENDED RELEASE ORAL at 09:06

## 2023-08-27 NOTE — THERAPY EVALUATION
"Patient Name: Roland Russell  : 1934    MRN: [id number]                              Today's Date: 2023       Admit Date: 2023    Visit Dx:     ICD-10-CM ICD-9-CM   1. Generalized weakness  R53.1 780.79   2. Acute congestive heart failure, unspecified heart failure type  I50.9 428.0     Patient Active Problem List   Diagnosis    Legal blindness    Uncontrolled type 2 diabetes mellitus with hypoglycemia without coma    GERD (gastroesophageal reflux disease)    Mixed hyperlipidemia    Hypothyroidism, acquired    Osteoarthritis, multiple sites    Essential hypertension    Senile osteoporosis    Renal insufficiency    T12 compression fracture    Vitamin D deficiency    Severe headache    Carotid body tumor    Glomus jugulare tumor    Sleep apnea, obstructive    Morbidly obese    Abnormal weight gain    Localized edema    Compression fracture of T8 vertebra with delayed healing    PAF (paroxysmal atrial fibrillation)    Dry scalp    Deformity of both feet    Acute encephalopathy    Lactic acidosis    Diverticulitis    Ankle fracture    Type 2 diabetes mellitus with hypoglycemia, with long-term current use of insulin    Abnormal nuclear stress test    Coronary artery disease involving native coronary artery of native heart    S/P right coronary artery (RCA) stent placement    Diastolic CHF, acute    Acute CHF    Type 2 diabetes mellitus with hyperglycemia    Acute cystitis with hematuria    Diabetic polyneuropathy associated with type 2 diabetes mellitus    Presence of cardiac pacemaker    Hypoxia     Past Medical History:   Diagnosis Date    Abnormal vision     SEES \"KALEIDOSCOPE\"    Allergy to adhesive tape     Arthralgia     AVB (atrioventricular block)     Balance problem     Carotid body tumor     LEFT    Difficulty walking     H/O complete eye exam 10/2016    Headache     OFF AND ON BACK OF HEAD, DOWN NECK TO SHOULDER    History of glaucoma     History of poliomyelitis     AGE 9    History of " surgery on arm     left arm    HLD (hyperlipidemia)     Hypertension     Hypothyroidism, acquired     Legal blindness     SOME PERIPHERAL VISION ON LEFT, SOME VISION ON RIGHT    Memory loss     SOME SHORT TERM    Migraine     Numbness     LEFT LEG     Osteoarthritis, multiple sites     Osteoporosis     Pacemaker     Rectal bleeding     X1, WITH BM    Sleep apnea     DOES NOT USE A MACHINE    TIA (transient ischemic attack) 12/24/2018    ?, HAD SPELL UNABLE TO TALK    Type 2 diabetes mellitus     Type 2 diabetes mellitus, uncontrolled      Past Surgical History:   Procedure Laterality Date    APPENDECTOMY  1989    CARDIAC CATHETERIZATION N/A 4/13/2023    Procedure: Left Heart Cath;  Surgeon: Rikki Oconnor MD;  Location: [facility] CATH INVASIVE LOCATION;  Service: Cardiology;  Laterality: N/A;    CARDIAC CATHETERIZATION N/A 4/13/2023    Procedure: Coronary angiography;  Surgeon: Rikki Oconnor MD;  Location: [facility] CATH INVASIVE LOCATION;  Service: Cardiology;  Laterality: N/A;    CARDIAC CATHETERIZATION N/A 4/13/2023    Procedure: Left ventriculography;  Surgeon: Rikki Oconnor MD;  Location: [facility] CATH INVASIVE LOCATION;  Service: Cardiology;  Laterality: N/A;    CARDIAC CATHETERIZATION N/A 4/20/2023    Procedure: Percutaneous Coronary Intervention;  Surgeon: Rikki Oconnor MD;  Location: [facility] CATH INVASIVE LOCATION;  Service: Cardiology;  Laterality: N/A;    CARDIAC CATHETERIZATION N/A 4/20/2023    Procedure: Atherectomy-coronary;  Surgeon: Rikki Oconnor MD;  Location: [facility] CATH INVASIVE LOCATION;  Service: Cardiology;  Laterality: N/A;    CARDIAC CATHETERIZATION N/A 4/20/2023    Procedure: Stent CAROL coronary;  Surgeon: Rikki Oconnor MD;  Location: [facility] CATH INVASIVE LOCATION;  Service: Cardiology;  Laterality: N/A;    CARDIAC PACEMAKER PLACEMENT  11/25/2004    CAROTID ENDARTERECTOMY Left 12/13/2019    Procedure: LEFT CAROTID BODY TUMOR RESECTION;  Surgeon: Bryan Severino MD;  Location: [facility] MAIN OR;  Service: Vascular     CATARACT EXTRACTION Bilateral 1997    CEREBRAL ANGIOGRAM Bilateral 12/6/2019    Procedure: CAROTID ARTERIOGRAM BILATERAL, RIGHT WRIST APPROACH;  Surgeon: Bryan Severino MD;  Location: [facility] HYBRID OR 18/19;  Service: Vascular    CHOLECYSTECTOMY  1989    COLONOSCOPY  2013    dr johnson    ELBOW PROCEDURE  2016    ENDOSCOPY  12/06/2012    Dr. Johnson; food in stomach, gastritis    EXCISION LESION  1994    BACK CYST BENGIN    EYE SURGERY  12/2012    GLAUCOMA SURGERY Bilateral 1995    laser surgery    HARDWARE REMOVAL Left     ELBOW    HYSTERECTOMY  1986    INTERVENTIONAL RADIOLOGY PROCEDURE N/A 4/20/2023    Procedure: Intravascular Ultrasound;  Surgeon: Rikki Oconnor MD;  Location: [facility] CATH INVASIVE LOCATION;  Service: Cardiology;  Laterality: N/A;    ORIF ELBOW FRACTURE Left     PACEMAKER IMPLANTATION      PACEMAKER REPLACEMENT  06/2013      General Information       Row Name 08/27/23 1435          Physical Therapy Time and Intention    Document Type evaluation  -SV     Mode of Treatment individual therapy;physical therapy  -SV       Row Name 08/27/23 1439          General Information    Patient Profile Reviewed yes  -SV     Prior Level of Function independent:  indep amb inside home,family provides supervision and asst with meals  -SV     Existing Precautions/Restrictions fall;oxygen therapy device and L/min  -SV     Barriers to Rehab medically complex;previous functional deficit  -SV       Row Name 08/27/23 1435          Living Environment    People in Home alone  -SV       Row Name 08/27/23 1435          Cognition    Orientation Status (Cognition) oriented x 3  -SV       Row Name 08/27/23 1435          Safety Issues, Functional Mobility    Impairments Affecting Function (Mobility) pain  -SV               User Key  (r) = Recorded By, (t) = Taken By, (c) = Cosigned By      Initials Name Provider Type    SV Milena Muñoz, PT Physical Therapist                   Mobility       Row Name 08/27/23 2319          Bed  Mobility    Bed Mobility rolling left;sidelying-sit;sit-sidelying  -SV     Rolling Left [place] (Bed Mobility) moderate assist (50% patient effort);2 person assist  -SV     Sidelying-Sit [place] (Bed Mobility) moderate assist (50% patient effort);2 person assist  -SV     Sit-Sidelying [place] (Bed Mobility) moderate assist (50% patient effort);2 person assist  -SV     Comment, (Bed Mobility) pt fearful of falling and reporting back pain during sup to sit toda: RN notfied  -       Row Name 08/27/23 1803          Sit-Stand Transfer    Comment, (Sit-Stand Transfer) declined  -               User Key  (r) = Recorded By, (t) = Taken By, (c) = Cosigned By      Initials Name Provider Type    Milena Lockwood, PT Physical Therapist                   Obj/Interventions       Row Name 08/27/23 1805          Range of Motion Comprehensive    General Range of Motion no range of motion deficits identified  except ankle DF  -SV     Comment, General Range of Motion hx of right foot fx: no longer in boot but right foot sensitive to touch,hilda ankle DF < neutral  -[facility] Name 08/27/23 1805          Strength Comprehensive (MMT)    Comment, General Manual Muscle Testing (MMT) Assessment BUE grossly > 3/5 today obs with ex and mobility, BLE 3-/5 grossly , reports pain BLE : no MMT  -SV       Row Name 08/27/23 1805          Motor Skills    Therapeutic Exercise --  laq x10, DF 5 h 5 reps  -[facility] Name 08/27/23 1805          Balance    Balance Assessment sitting static balance  -SV     Static Sitting Balance contact guard;minimal assist  -SV     Position, Sitting Balance supported;sitting edge of bed  -     Comment, Balance sat 3-4 minutes  -       Row Name 08/27/23 1805          Sensory Assessment (Somatosensory)    Sensory Assessment (Somatosensory) not tested  -               User Key  (r) = Recorded By, (t) = Taken By, (c) = Cosigned By      Initials Name Provider Type    Milena Lockwood, PT  Physical Therapist                   Goals/Plan       Row Name 08/27/23 1809          Bed Mobility Goal 1 (PT)    Activity/Assistive Device (Bed Mobility Goal 1, PT) sit to supine/supine to sit;sidelying to sit;sit to sidelying  -SV     [place] Level/Cues Needed (Bed Mobility Goal 1, PT) supervision required  -SV     Time Frame (Bed Mobility Goal 1, PT) 10 days  -SV       Row Name 08/27/23 1809          Transfer Goal 1 (PT)    Activity/Assistive Device (Transfer Goal 1, PT) sit-to-stand/stand-to-sit;bed-to-chair/chair-to-bed;walker, rolling  -SV     [place] Level/Cues Needed (Transfer Goal 1, PT) standby assist  -SV     Time Frame (Transfer Goal 1, PT) 10 days  -SV       Row Name 08/27/23 1809          Gait Training Goal 1 (PT)    Activity/Assistive Device (Gait Training Goal 1, PT) gait (walking locomotion);walker, rolling  -SV     [place] Level (Gait Training Goal 1, PT) standby assist  -SV     Distance (Gait Training Goal 1, PT) '  -SV     Time Frame (Gait Training Goal 1, PT) 10 days  -SV       Row Name 08/27/23 1809          Therapy Assessment/Plan (PT)    Planned Therapy Interventions (PT) balance training;bed mobility training;gait training;home exercise program;stretching;strengthening;patient/family education;stair training;transfer training;ROM (range of motion)  -SV               User Key  (r) = Recorded By, (t) = Taken By, (c) = Cosigned By      Initials Name Provider Type    SV Milena Muñoz, PT Physical Therapist                   Clinical Impression       Row Name 08/27/23 1807          Pain    Pain Location - back;knee;foot  -SV     Pre/Posttreatment Pain Comment multiple areas of pain and soreness  -SV     Pain Intervention(s) Repositioned  -SV       Row Name 08/27/23 1807          Plan of Care Review    Plan of Care Reviewed With patient;son  -       Row Name 08/27/23 1807          Therapy Assessment/Plan (PT)    Patient/Family Therapy Goals Statement (PT) return to indep  amb with rwx  -SV     Rehab Potential (PT) good, to achieve stated therapy goals  -SV     Criteria for Skilled Interventions Met (PT) yes  -SV     Therapy Frequency (PT) 6 times/wk  -SV     Predicted Duration of Therapy Intervention (PT) 2-4 weeks  -SV       Row Name 08/27/23 1807          Vital Signs    O2 Delivery Pre Treatment supplemental O2  -SV     O2 Delivery Intra Treatment supplemental O2  -SV     O2 Delivery Post Treatment supplemental O2  -SV     Pre Patient Position Supine  -SV     Intra Patient Position Sitting  -SV     Post Patient Position Supine  -SV       Row Name 08/27/23 1807          Positioning and Restraints    Pre-Treatment Position in bed  -SV     Post Treatment Position bed  -SV     In Bed notified nsg;call light within reach;encouraged to call for assist;exit alarm on;with family/caregiver  -SV               User Key  (r) = Recorded By, (t) = Taken By, (c) = Cosigned By      Initials Name Provider Type    Milena Lockwood, PT Physical Therapist                   Outcome Measures       Row Name 08/27/23 1810 08/27/23 0806       How much help from another person do you currently need...    Turning from your back to your side while in flat bed without using bedrails? 2  -SV 4  -JJ    Moving from lying on back to sitting on the side of a flat bed without bedrails? 2  -SV 3  -JJ    Moving to and from a bed to a chair (including a wheelchair)? 2  -SV 2  -JJ    Standing up from a chair using your arms (e.g., wheelchair, bedside chair)? 2  -SV 2  -JJ    Climbing 3-5 steps with a railing? 1  -SV 1  -JJ    To walk in hospital room? 1  -SV 2  -JJ    AM-PAC 6 Clicks Score (PT) 10  -SV 14  -JJ    Highest level of mobility 4 --> Transferred to chair/commode  -SV 4 --> Transferred to chair/commode  -JJ              User Key  (r) = Recorded By, (t) = Taken By, (c) = Cosigned By      Initials Name Provider Type    Milena Lockwood, PT Physical Therapist    Taylor Leos, RN Registered Nurse                                  Physical Therapy Education       Title: PT OT SLP Therapies (In Progress)       Topic: Physical Therapy (In Progress)       Point: Mobility training (In Progress)       Learning Progress Summary             Patient Acceptance, E, NR by SV at 8/27/2023 1811   Family Acceptance, E, NR by SV at 8/27/2023 1811                         Point: Home exercise program (In Progress)       Learning Progress Summary             Patient Acceptance, E, NR by SV at 8/27/2023 1811   Family Acceptance, E, NR by SV at 8/27/2023 1811                         Point: Body mechanics (In Progress)       Learning Progress Summary             Patient Acceptance, E, NR by SV at 8/27/2023 1811   Family Acceptance, E, NR by SV at 8/27/2023 1811                                         User Key       Initials Effective Dates Name Provider Type Discipline     07/11/23 -  Milena Muñoz, PT Physical Therapist PT                  PT Recommendation and Plan  Planned Therapy Interventions (PT): balance training, bed mobility training, gait training, home exercise program, stretching, strengthening, patient/family education, stair training, transfer training, ROM (range of motion)  Plan of Care Reviewed With: patient, son     Time Calculation:         PT Charges       Row Name 08/27/23 1820             Time Calculation    Start Time 1435  -      Stop Time 1451  -SV      Time Calculation (min) 16 min  -      PT Received On 08/27/23  -      PT - Next Appointment 08/28/23  -      PT Goal Re-Cert Due Date 09/06/23  -                User Key  (r) = Recorded By, (t) = Taken By, (c) = Cosigned By      Initials Name Provider Type     Milena Muñoz, PT Physical Therapist                  Therapy Charges for Today       Code Description Service Date Service Provider Modifiers Qty    64247306553  PT EVAL MOD COMPLEXITY 1 8/27/2023 Milena Muñoz, PT GP 1    59632316813 HC PT THER SUPP EA 15 MIN 8/27/2023 Toni  Milena BUTTS, PT GP 1            PT G-Codes  AM-PAC 6 Clicks Score (PT): 10  PT Discharge Summary  Anticipated Discharge Disposition (PT): home with assist, skilled nursing facility    Milena Muñoz, PT  8/27/2023

## 2023-08-27 NOTE — PLAN OF CARE
Goal Outcome Evaluation:  Plan of Care Reviewed With: patient        Progress: no change  Outcome Evaluation: VSS (ex incidence of HTN), RA-1L NC, V-paced, denies pain, diuresing well, will continue to monitor

## 2023-08-27 NOTE — PLAN OF CARE
Goal Outcome Evaluation:  Plan of Care Reviewed With: patient, son         Pt admit from home due to found down on the floor. Pt reported she was unable to stand so she lowered herself to the floor. Pt found to have CHF, UTI, hyperglycemia, hypoxia. Pt  lives alone with family support. She was just discharged from [facility] as indep amb with rwx. Family provides asst daily with meals. Pt is legally blind; Other PMH; right foot fx, osteoporosis, obese, PM, CAD, HTN , OA. Pt declined initially and then agreed to ex and bed mobility. Pt mod of 2 for log rolling left and sidelying to sit today. She reported  significant back paint today as well as lower leg and knee pain . But was able to sit at eob with cga/min asst  for several minutes. Pt demonstrated general weakness, impaired endurance and impaired mobility. She will likely benefit from cont skilled PT to progress as tolerated toward least asst. D/c planTBD depending on progress and available asst at home.          Anticipated Discharge Disposition (PT): home with assist, skilled nursing facility

## 2023-08-27 NOTE — PROGRESS NOTES
Name: Roland Russell ADMIT: 2023   : 1934  PCP: Shawanda Rosario MD    MRN: [id number] LOS: 1 days   AGE/SEX: 88 y.o. female  ROOM: [facility]     Subjective   Subjective   Resting in bed.  Discussed with son at bedside.    Objective   Objective   Vital Signs  Temp:  [98.2 °F (36.8 °C)-99.5 °F (37.5 °C)] 98.4 °F (36.9 °C)  Heart Rate:  [58-74] 66  Resp:  [16-18] 18  BP: (138-195)/(39-67) 195/67  SpO2:  [87 %-100 %] 97 %  on  Flow (L/min):  [1] 1;   Device (Oxygen Therapy): nasal cannula  Body mass index is 33.73 kg/m².  Physical Exam  Constitutional:       Appearance: Normal appearance.   Cardiovascular:      Rate and Rhythm: Normal rate.      Pulses: Normal pulses.   Pulmonary:      Effort: Pulmonary effort is normal. No respiratory distress.      Breath sounds: Normal breath sounds. No wheezing.   Abdominal:      General: Abdomen is flat. There is no distension.      Palpations: Abdomen is soft.   Musculoskeletal:      Right lower leg: No edema.      Left lower leg: No edema.   Skin:     General: Skin is warm.   Neurological:      General: No focal deficit present.      Mental Status: She is alert and oriented to person, place, and time.   Psychiatric:         Mood and Affect: Mood normal.         Behavior: Behavior normal.       Results Review     I reviewed the patient's new clinical results.  Results from last 7 days   Lab Units 23  0704 23  1007   WBC 10*3/mm3 9.97 12.90*   HEMOGLOBIN g/dL 12.2 12.5   PLATELETS 10*3/mm3 180 190     Results from last 7 days   Lab Units 23  0704 23  1204   SODIUM mmol/L 141 138   POTASSIUM mmol/L 3.6 4.4   CHLORIDE mmol/L 103 100   CO2 mmol/L 31.0* 29.0   BUN mg/dL 20 20   CREATININE mg/dL 0.91 1.02*   GLUCOSE mg/dL 187* 209*   EGFR mL/min/1.73 60.8 53.0*     Results from last 7 days   Lab Units 23  1204   ALBUMIN g/dL 3.9   BILIRUBIN mg/dL 0.6   ALK PHOS U/L 197*   AST (SGOT) U/L 12   ALT (SGPT) U/L 11     Results from last 7 days    Lab Units 08/27/23  0704 08/26/23  1204   CALCIUM mg/dL 9.3 9.7   ALBUMIN g/dL  --  3.9       Hemoglobin A1C   Date/Time Value Ref Range Status   08/26/2023 1007 10.30 (H) 4.80 - 5.60 % Final     Glucose   Date/Time Value Ref Range Status   08/27/2023 1130 258 (H) 70 - 130 mg/dL Final   08/27/2023 0616 169 (H) 70 - 130 mg/dL Final   08/26/2023 2103 329 (H) 70 - 130 mg/dL Final   08/26/2023 1600 178 (H) 70 - 130 mg/dL Final       XR Chest 1 View    Result Date: 8/26/2023  FINDINGS AND IMPRESSION: Left-sided pacemaker is present.  There is sequela of prior to limits disease. Cardiac silhouette is mild to moderately enlarged. No pulmonary consolidation, pleural effusion or pneumothorax is seen.  This report was finalized on 8/26/2023 11:58 AM by Dr. Ruslan Shipman M.D.     Scheduled Medications  apixaban, 5 mg, Oral, BID  bumetanide, 1 mg, Intravenous, BID  cefTRIAXone, 1,000 mg, Intravenous, Q24H  clopidogrel, 75 mg, Oral, Daily  gabapentin, 100 mg, Oral, Nightly  insulin glargine, 7 Units, Subcutaneous, Q12H  insulin lispro, 2-7 Units, Subcutaneous, 4x Daily AC & at Bedtime  levothyroxine, 75 mcg, Oral, Daily  [START ON 8/28/2023] losartan, 100 mg, Oral, Q24H  losartan, 50 mg, Oral, Once  melatonin, 1 mg, Oral, Nightly  nystatin, , Topical, Q12H  potassium chloride ER, 40 mEq, Oral, Q4H  pravastatin, 20 mg, Oral, Nightly  sodium chloride, 3 mL, Intravenous, Q12H  vitamin B-12, 500 mcg, Oral, Daily    Infusions   Diet  Diet: Diabetic Diets, Cardiac Diets; Healthy Heart (2-3 Na+); Consistent Carbohydrate; Texture: Regular Texture (IDDSI 7); Fluid Consistency: Thin (IDDSI 0)       Assessment/Plan     Active Hospital Problems    Diagnosis  POA    **Diastolic CHF, acute [I50.31]  Yes    Acute CHF [I50.9]  Yes    Type 2 diabetes mellitus with hyperglycemia [E11.65]  Yes    Acute cystitis with hematuria [N30.01]  Yes    Diabetic polyneuropathy associated with type 2 diabetes mellitus [E11.42]  Yes    Presence of cardiac  pacemaker [Z95.0]  Yes    Hypoxia [R09.02]  Yes    S/P right coronary artery (RCA) stent placement [Z95.5]  Not Applicable    Coronary artery disease involving native coronary artery of native heart [I25.10]  Yes    PAF (paroxysmal atrial fibrillation) [I48.0]  Yes    Essential hypertension [I10]  Yes    Legal blindness [H54.8]  Yes    GERD (gastroesophageal reflux disease) [K21.9]  Yes    Mixed hyperlipidemia [E78.2]  Yes    Hypothyroidism, acquired [E03.9]  Yes    Osteoarthritis, multiple sites [M15.9]  Yes      Resolved Hospital Problems   No resolved problems to display.       88 y.o. female admitted with Diastolic CHF, acute.      08/27/23  Wean O2 as tolerated.  Follow-up culture.  Continue Rocephin.    Acute cystitis  -urine cx pending  -cont Rocephin    Acute on chronic diastolic heart failure  Hypoxia  -Bumex 1 mg twice daily  -Cardiology following    DM2 with hyperglycemia and polyneuropathy  -A1c greater than 10  -Glargine 7 units twice daily, SSI  -Gabapentin decreased from 300 mg to 100 mg nightly     Essential hypertension  CAD  -Losartan 100 mg  -Plavix     Atrial fibrillation  -RC: PPM   -AC: apixaban      Eliquis (home med) for DVT prophylaxis.  Discussed with patient.  Anticipate discharge Pending PT/OT eval. TBKIM Gonzalez MD  [facility]  08/27/23  12:02 EDT

## 2023-08-27 NOTE — PLAN OF CARE
Goal Outcome Evaluation:  Plan of Care Reviewed With: patient        Progress: no change  Outcome Evaluation: Monitor pain,labs,and vitals. VSS. 1L NC. V-paced. No c/o pain on current shift. Will continue to monitor.

## 2023-08-27 NOTE — CONSULTS
Patient Name: Roland Russell  :1934  88 y.o.    Date of Admission: 2023  Date of Consultation:  23  Encounter Provider: Kota Olivera III, MD  Place of Service: [facility]  Referring Provider: Venkat Leblanc MD  Patient Care Team:  Shawanda Rosario MD as PCP - General (Internal Medicine)  Tevin Albrecht MD as Consulting Physician (Cardiac Electrophysiology)      Chief complaint: Weakness    History of Present Illness:     Roland Russell is an 88 year old who follows with Dr. Oconnor and has a past medical history that is significant for paroxysmal atrial fibrillation (on Eliquis), high grade heart block s/p dual-chamber pacemaker, CAD s/p stent placement, HLD, HTN, hypothyroidism, sleep apnea, and Type II DM. In April of this year she underwent nuclear stress test as part of her pre-operative workup for left ankle fracture surgery. She was referred for a left heart catheterization which revealed severely calcified RCA stenosis. She underwent staged PCI with successful intravascular ultrasound-guided orbital atherectomy and PCI of the RCA.     Patient presented to the emergency department with complaint of weakness.  She had gotten up to go the bathroom and then was unable to get back into bed.  She lowered her self to the floor.  Her family was home with her and noted that she was confused.  She is brought to the emergency room, was found to be hypoxic in the emergency room.  Oxygen saturation was 85% in the emergency room on room air and she was placed on nasal cannula, currently on 1 L nasal cannula NT proBNP was elevated at 4014 and she was given a dose of IV Bumex.  Urinalysis is abnormal consistent with UTI and she was started on ceftriaxone.  White count was elevated 12,900.  Chest x-ray did not demonstrate any abnormality.    Today she denies any chest pain, denies any shortness of breath.  Has not noticed any lower extremity  "edema.    ECHO 2/19/23  Left ventricular global and regional systolic function is normal.   Calculated left ventricular ejection fraction of 55 %     Stress Test with Myocardial Perfusion 4/11/23    Myocardial perfusion imaging indicates a moderate-sized, severe area of ischemia located in the anterior wall and apex.    Left ventricular ejection fraction is hyperdynamic (Calculated EF > 70%).    Impressions are consistent with a high risk study.    Cardiac Catheterization 4/20/23  Successful intravascular ultrasound-guided orbital atherectomy and PCI with two 4.0 x 38mm and one 3.5 x 38mm Xience Skypoint drug-eluting stents (postdilated with a 4.5 mm in the ostium to midportion and 4.0 mm NC in the mid to distal portion) of a severely calcified RCA.       Past Medical History:   Diagnosis Date    Abnormal vision     SEES \"KALEIDOSCOPE\"    Allergy to adhesive tape     Arthralgia     AVB (atrioventricular block)     Balance problem     Carotid body tumor     LEFT    Difficulty walking     H/O complete eye exam 10/2016    Headache     OFF AND ON BACK OF HEAD, DOWN NECK TO SHOULDER    History of glaucoma     History of poliomyelitis     AGE 9    History of surgery on arm     left arm    HLD (hyperlipidemia)     Hypertension     Hypothyroidism, acquired     Legal blindness     SOME PERIPHERAL VISION ON LEFT, SOME VISION ON RIGHT    Memory loss     SOME SHORT TERM    Migraine     Numbness     LEFT LEG     Osteoarthritis, multiple sites     Osteoporosis     Pacemaker     Rectal bleeding     X1, WITH BM    Sleep apnea     DOES NOT USE A MACHINE    TIA (transient ischemic attack) 12/24/2018    ?, HAD SPELL UNABLE TO TALK    Type 2 diabetes mellitus     Type 2 diabetes mellitus, uncontrolled        Past Surgical History:   Procedure Laterality Date    APPENDECTOMY  1989    CARDIAC CATHETERIZATION N/A 4/13/2023    Procedure: Left Heart Cath;  Surgeon: Rikki Oconnor MD;  Location: [facility] CATH INVASIVE LOCATION;  Service: " Cardiology;  Laterality: N/A;    CARDIAC CATHETERIZATION N/A 4/13/2023    Procedure: Coronary angiography;  Surgeon: Rikki Oconnor MD;  Location[facility] C[facility]ASIVE LOC[facility];  Service: Cardiology;  Laterality: N/A;    CARDIAC CATHETERIZATION N/A 4/13/2023    Procedure: Left ventriculography;  Surgeon: Rikki Oconnor MD;  [facility];  Service: Cardiology;  Laterality: N/A;    CARDIAC CATHETERIZATION N/A 4/20/2023    Procedure: Percutaneous Coronary Intervention;  Surgeon: Rikki Oconnor MD;  [facility];  Service: Cardiology;  Laterality: N/A;    CARDIAC CATHETERIZATION N/A 4/20/2023    Procedure: Atherectomy-coronary;  Surgeon: Rikki Oconnor MD;  [facility];  Service: Cardiology;  Laterality: N/A;    CARDIAC CATHETERIZATION N/A 4/20/2023    Procedure: Stent CAROL coronary;  Surgeon: Rikki Oconnor MD;  [facility];  Service: Cardiology;  Laterality: N/A;    CARDIAC PACEMAKER PLACEMENT  11/25/2004    CAROTID ENDARTERECTOMY Left 12/13/2019    Procedure: LEFT CAROTID BODY TUMOR RESECTION;  Surgeon: Bryan Severino MD;  Location: [facility] MAIN OR;  Service: Vascular    CATARACT EXTRACTION Bilateral 1997    CEREBRAL ANGIOGRAM Bilateral 12/6/2019    Procedure: CAROTID ARTERIOGRAM BILATERAL, RIGHT WRIST APPROACH;  Surgeon: Bryan Severino MD;  Location: [facility] HYBRID OR 18/19;  Service: Vascular    CHOLECYSTECTOMY  1989    COLONOSCOPY  2013    dr montes    ELBOW PROCEDURE  2016    ENDOSCOPY  12/06/2012    Dr. Montes; food in stomach, gastritis    EXCISION LESION  1994    BACK CYST BENGIN    EYE SURGERY  12/2012    GLAUCOMA SURGERY Bilateral 1995    laser surgery    HARDWARE REMOVAL Left     ELBOW    HYSTERECTOMY  1986    INTERVENTIONAL RADIOLOGY PROCEDURE N/A 4/20/2023    Procedure: Intravascular Ultrasound;  Surgeon: Rikki Oconnor MD;  [facility];  Service: Cardiology;  Laterality: N/A;    ORIF ELBOW  FRACTURE Left     PACEMAKER IMPLANTATION      PACEMAKER REPLACEMENT  06/2013         Prior to Admission medications    Medication Sig Start Date End Date Taking? Authorizing Provider   acetaminophen (TYLENOL) 325 MG tablet Take 2 tablets by mouth Every 4 (Four) Hours As Needed for Mild Pain. 4/17/23  Yes Gilbert Guaman MD   Alcohol Swabs (B-D SINGLE USE SWABS REGULAR) pads USE AS DIRECTED 4 TIMES A  DAY. 3/9/22  Yes Hipolito Downing MD   apixaban (ELIQUIS) 5 MG tablet tablet Take 1 tablet by mouth 2 (Two) Times a Day.   Yes Nancy Vivas MD   clopidogrel (PLAVIX) 75 MG tablet Take 1 tablet by mouth Daily. 6/20/23  Yes Inez Nix APRN   Cyanocobalamin (B-12 PO) Take 1 tablet/day by mouth. 1000 mcg daily   Yes Nancy Vivas MD   furosemide (LASIX) 20 MG tablet Take 1 tablet by mouth Every Morning. 7/9/21  Yes Nancy Vivas MD   gabapentin (NEURONTIN) 300 MG capsule Take 1 capsule by mouth 2 (Two) Times a Day. 4/17/23  Yes Gilbert Guaman MD   insulin degludec (TRESIBA FLEXTOUCH) 100 UNIT/ML solution pen-injector injection Inject 8 Units under the skin into the appropriate area as directed 2 (Two) Times a Day.   Yes Nancy Vivas MD   Insulin Lispro (humaLOG) 100 UNIT/ML injection Inject  under the skin into the appropriate area as directed 3 (Three) Times a Day Before Meals. Sliding scale   Yes Nancy Vivas MD   levothyroxine (SYNTHROID, LEVOTHROID) 75 MCG tablet TAKE 1 TABLET BY MOUTH DAILY 1/12/23  Yes Mariam Sanchez MD   losartan (COZAAR) 100 MG tablet TAKE 1 TABLET DAILY 3/10/23  Yes Anna Zhang MD       Allergies   Allergen Reactions    Adhesive Tape Other (See Comments)     REDNESS, SKIN BURN    Latex Other (See Comments)     REDNESS, SKIN BURN    Propoxyphene Itching       Social History     Socioeconomic History    Marital status:    Tobacco Use    Smoking status: Never    Smokeless tobacco: Never   Vaping Use    Vaping Use: Never used    Substance and Sexual Activity    Alcohol use: No    Drug use: Never    Sexual activity: Defer       Family History   Problem Relation Age of Onset    Cancer Father     Glaucoma Father         angular glycoma    Heart disease Father     Arthritis Father     Thyroid disease Father     Stroke Father     Asthma Sister     Arthritis Sister     Cancer Sister         breast    Stroke Maternal Grandmother     Heart failure Other     Diabetes Other     Hypertension Other     Stroke Other         aunt    Thyroid disease Other     Arthritis Mother     Diabetes Mother     Thyroid disease Mother     Malig Hyperthermia Neg Hx        REVIEW OF SYSTEMS:   All systems reviewed.  Pertinent positives identified in HPI.  All other systems are negative.      Objective:     Vitals:    08/26/23 1905 08/26/23 1909 08/26/23 2300 08/27/23 0700   BP:  143/57 (!) 138/39 165/53   BP Location:  Right arm Left arm Right arm   Patient Position:  Lying Lying Lying   Pulse:  58 71 66   Resp:  16 16 18   Temp:  99.5 °F (37.5 °C) 98.3 °F (36.8 °C) 98.2 °F (36.8 °C)   TempSrc:  Oral Oral Oral   SpO2: (!) 87% 94% 97% 97%   Weight:       Height:         Body mass index is 33.73 kg/m².    Physical Exam:  General Appearance:    Alert, cooperative, in no acute distress   Head:    Normocephalic, without obvious abnormality   Eyes:            Lids and lashes normal, conjunctivae and sclerae normal, no icterus, no pallor, corneas clear   Ears:    Ears appear intact with no abnormalities noted   Throat:   No oral lesions, oral mucosa moist   Neck:   No adenopathy, supple, trachea midline, no thyromegaly, no carotid bruit, no JVD   Back:     No kyphosis present, no erythema or scars, no tenderness to palpation    Lungs:     Clear to auscultation,respirations regular, even and unlabored    Heart:    Regular rhythm and normal rate, normal S1 and S2, no murmur, no gallop, no rub, no click   Chest Wall:    No abnormalities observed   Abdomen:     Normal bowel  sounds, no masses, no organomegaly, soft        non-tender, non-distended, no guarding   Extremities:   Moves all extremities well, no edema, no cyanosis, no redness   Pulses:  Bilateral carotids brisk   Skin:  Psychiatric:   No bleeding or rash    Alert and oriented, normal mood and affect         Lab Review:     Results from last 7 days   Lab Units 08/27/23  0704 08/26/23  1204   SODIUM mmol/L 141 138   POTASSIUM mmol/L 3.6 4.4   CHLORIDE mmol/L 103 100   CO2 mmol/L 31.0* 29.0   BUN mg/dL 20 20   CREATININE mg/dL 0.91 1.02*   CALCIUM mg/dL 9.3 9.7   BILIRUBIN mg/dL  --  0.6   ALK PHOS U/L  --  197*   ALT (SGPT) U/L  --  11   AST (SGOT) U/L  --  12   GLUCOSE mg/dL 187* 209*     Results from last 7 days   Lab Units 08/26/23  1356 08/26/23  1204   HSTROP T ng/L 33* 40*     Results from last 7 days   Lab Units 08/27/23  0704   WBC 10*3/mm3 9.97   HEMOGLOBIN g/dL 12.2   HEMATOCRIT % 39.9   PLATELETS 10*3/mm3 180                               I personally viewed and interpreted the patient's EKG/Telemetry data.      Current Facility-Administered Medications:     acetaminophen (TYLENOL) tablet 650 mg, 650 mg, Oral, Q4H PRN, 650 mg at 08/26/23 1809 **OR** acetaminophen (TYLENOL) 160 MG/5ML solution 650 mg, 650 mg, Oral, Q4H PRN **OR** acetaminophen (TYLENOL) suppository 650 mg, 650 mg, Rectal, Q4H PRN, Jj Navarrete MD    apixaban (ELIQUIS) tablet 5 mg, 5 mg, Oral, BID, Jj Navarrete MD, 5 mg at 08/27/23 0806    bumetanide (BUMEX) injection 1 mg, 1 mg, Intravenous, BID, Jj Navarrete MD, 1 mg at 08/27/23 0805    Calcium Replacement - Follow Nurse / BPA Driven Protocol, , Does not apply, PRN, Jj Navarrete MD    cefTRIAXone (ROCEPHIN) 1,000 mg in sodium chloride 0.9 % 100 mL IVPB-VTB, 1,000 mg, Intravenous, Q24H, Jj Navarrete MD, Last Rate: 200 mL/hr at 08/26/23 1653, 1,000 mg at 08/26/23 1653    clopidogrel (PLAVIX) tablet 75 mg, 75 mg, Oral, Daily, Jj Navarrete  MD Bryan, 75 mg at 08/27/23 0806    dextrose (D50W) (25 g/50 mL) IV injection 25 g, 25 g, Intravenous, Q15 Min PRN, Jj Navarrete MD    dextrose (GLUTOSE) oral gel 15 g, 15 g, Oral, Q15 Min PRN, Jj Navarrete MD    docusate sodium (COLACE) capsule 100 mg, 100 mg, Oral, BID PRN, Jj Navarrete MD    famotidine (PEPCID) tablet 10 mg, 10 mg, Oral, BID PRN, Jj Navarrete MD    gabapentin (NEURONTIN) capsule 100 mg, 100 mg, Oral, Nightly, Jj Navarrete MD, 100 mg at 08/26/23 2013    glucagon (GLUCAGEN) injection 1 mg, 1 mg, Intramuscular, Q15 Min PRN, Jj Navarrete MD    insulin glargine (LANTUS, SEMGLEE) injection 7 Units, 7 Units, Subcutaneous, Q12H, Jj Navarrete MD, 7 Units at 08/27/23 0805    insulin lispro (HUMALOG/ADMELOG) injection 2-7 Units, 2-7 Units, Subcutaneous, 4x Daily AC & at Bedtime, Jj Navarrete MD, 2 Units at 08/27/23 0805    levothyroxine (SYNTHROID, LEVOTHROID) tablet 75 mcg, 75 mcg, Oral, Daily, Jj Navarrete MD, 75 mcg at 08/27/23 0806    losartan (COZAAR) tablet 50 mg, 50 mg, Oral, Daily, Jj Navarrete MD, 50 mg at 08/27/23 0806    Magnesium Standard Dose Replacement - Follow Nurse / BPA Driven Protocol, , Does not apply, PRN, Jj Navarrete MD    melatonin tablet 1 mg, 1 mg, Oral, Nightly, Jj Navarrete MD, 1 mg at 08/26/23 2013    ondansetron (ZOFRAN) tablet 4 mg, 4 mg, Oral, Q6H PRN **OR** ondansetron (ZOFRAN) injection 4 mg, 4 mg, Intravenous, Q6H PRN, Jj Navarrete MD    Phosphorus Replacement - Follow Nurse / BPA Driven Protocol, , Does not apply, PRN, Jj Navarrete MD    potassium chloride (K-DUR,KLOR-CON) ER tablet 40 mEq, 40 mEq, Oral, Q4H, Andrew oGnzalez MD    Potassium Replacement - Follow Nurse / BPA Driven Protocol, , Does not apply, PRN, Jj Navarrete MD    pravastatin (PRAVACHOL) tablet 20 mg, 20 mg, Oral,  Nightly, Jj Navarrete MD, 20 mg at 08/26/23 2014    sodium chloride 0.9 % flush 3 mL, 3 mL, Intravenous, Q12H, Jj Navarrete MD, 3 mL at 08/27/23 0815    sodium chloride 0.9 % flush 3-10 mL, 3-10 mL, Intravenous, PRN, Jj Navarrete MD    sodium chloride 0.9 % infusion 40 mL, 40 mL, Intravenous, PRN, Jj Navarrete MD    vitamin B-12 (CYANOCOBALAMIN) tablet 500 mcg, 500 mcg, Oral, Daily, Jj Navarrete MD, 500 mcg at 08/27/23 0806    Assessment and Plan:       Active Hospital Problems    Diagnosis  POA    **Diastolic CHF, acute [I50.31]  Yes    Acute CHF [I50.9]  Yes    Type 2 diabetes mellitus with hyperglycemia [E11.65]  Yes    Acute cystitis with hematuria [N30.01]  Yes    Diabetic polyneuropathy associated with type 2 diabetes mellitus [E11.42]  Yes    Presence of cardiac pacemaker [Z95.0]  Yes    Hypoxia [R09.02]  Yes    S/P right coronary artery (RCA) stent placement [Z95.5]  Not Applicable    Coronary artery disease involving native coronary artery of native heart [I25.10]  Yes    PAF (paroxysmal atrial fibrillation) [I48.0]  Yes    Essential hypertension [I10]  Yes    Legal blindness [H54.8]  Yes    GERD (gastroesophageal reflux disease) [K21.9]  Yes    Mixed hyperlipidemia [E78.2]  Yes    Hypothyroidism, acquired [E03.9]  Yes    Osteoarthritis, multiple sites [M15.9]  Yes      Resolved Hospital Problems   No resolved problems to display.     1.  UTI-on ceftriaxone, cultures pending  2.  Hypoxia-on supplemental oxygen, oxygen saturation now 99%, looks like she can be weaned off  3.  Acute diastolic congestive heart failure-given IV Bumex and ER, no evidence of volume overload on current exam, continue to follow  4.  CAD, status post PCI to the RCA  5.  Paroxysmal atrial fibrillation-on chronic anticoagulation  6.  Sick sinus syndrome, status post dual-chamber pacemaker  7.  Diabetes mellitus with circulatory complication, continue risk factor  modification    Kota Olivera III, MD  08/27/23  08:50 EDT

## 2023-08-28 LAB
ANION GAP SERPL CALCULATED.3IONS-SCNC: 12.3 MMOL/L (ref 5–15)
BACTERIA SPEC AEROBE CULT: ABNORMAL
BUN SERPL-MCNC: 21 MG/DL (ref 8–23)
BUN/CREAT SERPL: 21.6 (ref 7–25)
CALCIUM SPEC-SCNC: 9.7 MG/DL (ref 8.6–10.5)
CHLORIDE SERPL-SCNC: 97 MMOL/L (ref 98–107)
CO2 SERPL-SCNC: 25.7 MMOL/L (ref 22–29)
CREAT SERPL-MCNC: 0.97 MG/DL (ref 0.57–1)
DEPRECATED RDW RBC AUTO: 47.4 FL (ref 37–54)
EGFRCR SERPLBLD CKD-EPI 2021: 56.3 ML/MIN/1.73
ERYTHROCYTE [DISTWIDTH] IN BLOOD BY AUTOMATED COUNT: 15 % (ref 12.3–15.4)
GLUCOSE BLDC GLUCOMTR-MCNC: 189 MG/DL (ref 70–130)
GLUCOSE BLDC GLUCOMTR-MCNC: 211 MG/DL (ref 70–130)
GLUCOSE BLDC GLUCOMTR-MCNC: 273 MG/DL (ref 70–130)
GLUCOSE BLDC GLUCOMTR-MCNC: 352 MG/DL (ref 70–130)
GLUCOSE BLDC GLUCOMTR-MCNC: 379 MG/DL (ref 70–130)
GLUCOSE SERPL-MCNC: 181 MG/DL (ref 65–99)
HCT VFR BLD AUTO: 42.9 % (ref 34–46.6)
HGB BLD-MCNC: 13.2 G/DL (ref 12–15.9)
MCH RBC QN AUTO: 26.5 PG (ref 26.6–33)
MCHC RBC AUTO-ENTMCNC: 30.8 G/DL (ref 31.5–35.7)
MCV RBC AUTO: 86.1 FL (ref 79–97)
PLATELET # BLD AUTO: 204 10*3/MM3 (ref 140–450)
PMV BLD AUTO: 12 FL (ref 6–12)
POTASSIUM SERPL-SCNC: 4.5 MMOL/L (ref 3.5–5.2)
RBC # BLD AUTO: 4.98 10*6/MM3 (ref 3.77–5.28)
SODIUM SERPL-SCNC: 135 MMOL/L (ref 136–145)
WBC NRBC COR # BLD: 8.34 10*3/MM3 (ref 3.4–10.8)

## 2023-08-28 PROCEDURE — 97166 OT EVAL MOD COMPLEX 45 MIN: CPT

## 2023-08-28 PROCEDURE — 63710000001 INSULIN GLARGINE PER 5 UNITS: Performed by: INTERNAL MEDICINE

## 2023-08-28 PROCEDURE — 82948 REAGENT STRIP/BLOOD GLUCOSE: CPT

## 2023-08-28 PROCEDURE — 63710000001 INSULIN LISPRO (HUMAN) PER 5 UNITS: Performed by: INTERNAL MEDICINE

## 2023-08-28 PROCEDURE — 85027 COMPLETE CBC AUTOMATED: CPT | Performed by: INTERNAL MEDICINE

## 2023-08-28 PROCEDURE — 80048 BASIC METABOLIC PNL TOTAL CA: CPT | Performed by: INTERNAL MEDICINE

## 2023-08-28 PROCEDURE — 97530 THERAPEUTIC ACTIVITIES: CPT

## 2023-08-28 PROCEDURE — 36415 COLL VENOUS BLD VENIPUNCTURE: CPT | Performed by: INTERNAL MEDICINE

## 2023-08-28 PROCEDURE — 99232 SBSQ HOSP IP/OBS MODERATE 35: CPT | Performed by: STUDENT IN AN ORGANIZED HEALTH CARE EDUCATION/TRAINING PROGRAM

## 2023-08-28 PROCEDURE — 97530 THERAPEUTIC ACTIVITIES: CPT | Performed by: PHYSICAL THERAPIST

## 2023-08-28 RX ORDER — NICOTINE POLACRILEX 4 MG
15 LOZENGE BUCCAL
Status: DISCONTINUED | OUTPATIENT
Start: 2023-08-28 | End: 2023-09-02 | Stop reason: HOSPADM

## 2023-08-28 RX ORDER — INSULIN LISPRO 100 [IU]/ML
2-9 INJECTION, SOLUTION INTRAVENOUS; SUBCUTANEOUS
Status: DISCONTINUED | OUTPATIENT
Start: 2023-08-28 | End: 2023-09-02 | Stop reason: HOSPADM

## 2023-08-28 RX ORDER — IBUPROFEN 600 MG/1
1 TABLET ORAL
Status: DISCONTINUED | OUTPATIENT
Start: 2023-08-28 | End: 2023-09-02 | Stop reason: HOSPADM

## 2023-08-28 RX ORDER — SULFAMETHOXAZOLE AND TRIMETHOPRIM 800; 160 MG/1; MG/1
1 TABLET ORAL EVERY 12 HOURS SCHEDULED
Status: DISCONTINUED | OUTPATIENT
Start: 2023-08-28 | End: 2023-08-30

## 2023-08-28 RX ORDER — BUMETANIDE 1 MG/1
1 TABLET ORAL DAILY
Status: DISCONTINUED | OUTPATIENT
Start: 2023-08-28 | End: 2023-08-30

## 2023-08-28 RX ORDER — DEXTROSE MONOHYDRATE 25 G/50ML
25 INJECTION, SOLUTION INTRAVENOUS
Status: DISCONTINUED | OUTPATIENT
Start: 2023-08-28 | End: 2023-09-02 | Stop reason: HOSPADM

## 2023-08-28 RX ADMIN — BUMETANIDE 1 MG: 0.25 INJECTION INTRAMUSCULAR; INTRAVENOUS at 08:00

## 2023-08-28 RX ADMIN — GABAPENTIN 100 MG: 100 CAPSULE ORAL at 20:33

## 2023-08-28 RX ADMIN — APIXABAN 5 MG: 5 TABLET, FILM COATED ORAL at 08:00

## 2023-08-28 RX ADMIN — INSULIN GLARGINE 7 UNITS: 100 INJECTION, SOLUTION SUBCUTANEOUS at 08:00

## 2023-08-28 RX ADMIN — INSULIN LISPRO 3 UNITS: 100 INJECTION, SOLUTION INTRAVENOUS; SUBCUTANEOUS at 20:40

## 2023-08-28 RX ADMIN — LOSARTAN POTASSIUM 100 MG: 100 TABLET, FILM COATED ORAL at 08:00

## 2023-08-28 RX ADMIN — LEVOTHYROXINE SODIUM 75 MCG: 0.07 TABLET ORAL at 08:00

## 2023-08-28 RX ADMIN — NYSTATIN: 100000 POWDER TOPICAL at 20:33

## 2023-08-28 RX ADMIN — ACETAMINOPHEN 650 MG: 325 TABLET, FILM COATED ORAL at 07:55

## 2023-08-28 RX ADMIN — INSULIN LISPRO 6 UNITS: 100 INJECTION, SOLUTION INTRAVENOUS; SUBCUTANEOUS at 17:17

## 2023-08-28 RX ADMIN — INSULIN GLARGINE 7 UNITS: 100 INJECTION, SOLUTION SUBCUTANEOUS at 20:40

## 2023-08-28 RX ADMIN — Medication 500 MCG: at 08:00

## 2023-08-28 RX ADMIN — SULFAMETHOXAZOLE AND TRIMETHOPRIM 1 TABLET: 800; 160 TABLET ORAL at 10:52

## 2023-08-28 RX ADMIN — Medication 3 ML: at 20:34

## 2023-08-28 RX ADMIN — Medication 1 MG: at 20:40

## 2023-08-28 RX ADMIN — INSULIN LISPRO 2 UNITS: 100 INJECTION, SOLUTION INTRAVENOUS; SUBCUTANEOUS at 07:55

## 2023-08-28 RX ADMIN — PRAVASTATIN SODIUM 20 MG: 20 TABLET ORAL at 20:33

## 2023-08-28 RX ADMIN — APIXABAN 5 MG: 5 TABLET, FILM COATED ORAL at 20:33

## 2023-08-28 RX ADMIN — CLOPIDOGREL BISULFATE 75 MG: 75 TABLET, FILM COATED ORAL at 08:00

## 2023-08-28 RX ADMIN — NYSTATIN 1 APPLICATION: 100000 POWDER TOPICAL at 08:00

## 2023-08-28 RX ADMIN — INSULIN LISPRO 4 UNITS: 100 INJECTION, SOLUTION INTRAVENOUS; SUBCUTANEOUS at 12:26

## 2023-08-28 RX ADMIN — SULFAMETHOXAZOLE AND TRIMETHOPRIM 1 TABLET: 800; 160 TABLET ORAL at 20:33

## 2023-08-28 NOTE — PROGRESS NOTES
Name: Roland Russell ADMIT: 2023   : 1934  PCP: Shawanda Rosario MD    MRN: [id number] LOS: 2 days   AGE/SEX: 88 y.o. female  ROOM: [facility]     Subjective   Subjective   Breathing improved.     Objective   Objective   Vital Signs  Temp:  [97.6 °F (36.4 °C)-98 °F (36.7 °C)] 97.7 °F (36.5 °C)  Heart Rate:  [61-70] 65  Resp:  [16-17] 16  BP: (125-179)/(49-70) 128/62  SpO2:  [91 %-94 %] 91 %  on  Flow (L/min):  [1-2] 1;   Device (Oxygen Therapy): room air  Body mass index is 33.73 kg/m².  Physical Exam  Constitutional:       Appearance: Normal appearance.   Cardiovascular:      Rate and Rhythm: Normal rate.      Pulses: Normal pulses.   Pulmonary:      Effort: Pulmonary effort is normal. No respiratory distress.      Breath sounds: Normal breath sounds. No wheezing.   Abdominal:      General: Abdomen is flat. There is no distension.      Palpations: Abdomen is soft.   Musculoskeletal:      Right lower leg: No edema.      Left lower leg: No edema.   Skin:     General: Skin is warm.   Neurological:      General: No focal deficit present.      Mental Status: She is alert and oriented to person, place, and time.   Psychiatric:         Mood and Affect: Mood normal.         Behavior: Behavior normal.       Results Review     I reviewed the patient's new clinical results.  Results from last 7 days   Lab Units 23  0557 23  0704 23  1007   WBC 10*3/mm3 8.34 9.97 12.90*   HEMOGLOBIN g/dL 13.2 12.2 12.5   PLATELETS 10*3/mm3 204 180 190     Results from last 7 days   Lab Units 23  0557 23  1741 23  0704 23  1204   SODIUM mmol/L 135*  --  141 138   POTASSIUM mmol/L 4.5 4.9 3.6 4.4   CHLORIDE mmol/L 97*  --  103 100   CO2 mmol/L 25.7  --  31.0* 29.0   BUN mg/dL 21  --  20 20   CREATININE mg/dL 0.97  --  0.91 1.02*   GLUCOSE mg/dL 181*  --  187* 209*   EGFR mL/min/1.73 56.3*  --  60.8 53.0*     Results from last 7 days   Lab Units 23  1204   ALBUMIN g/dL 3.9   BILIRUBIN  mg/dL 0.6   ALK PHOS U/L 197*   AST (SGOT) U/L 12   ALT (SGPT) U/L 11     Results from last 7 days   Lab Units 08/28/23  0557 08/27/23  0704 08/26/23  1204   CALCIUM mg/dL 9.7 9.3 9.7   ALBUMIN g/dL  --   --  3.9       Hemoglobin A1C   Date/Time Value Ref Range Status   08/26/2023 1007 10.30 (H) 4.80 - 5.60 % Final     Glucose   Date/Time Value Ref Range Status   08/28/2023 2034 211 (H) 70 - 130 mg/dL Final   08/28/2023 1606 379 (H) 70 - 130 mg/dL Final   08/28/2023 1556 352 (H) 70 - 130 mg/dL Final   08/28/2023 1218 273 (H) 70 - 130 mg/dL Final   08/28/2023 0559 189 (H) 70 - 130 mg/dL Final   08/27/2023 2030 257 (H) 70 - 130 mg/dL Final   08/27/2023 1533 244 (H) 70 - 130 mg/dL Final       No radiology results for the last day  Scheduled Medications  apixaban, 5 mg, Oral, BID  bumetanide, 1 mg, Oral, Daily  clopidogrel, 75 mg, Oral, Daily  gabapentin, 100 mg, Oral, Nightly  [START ON 8/29/2023] insulin glargine, 8 Units, Subcutaneous, BID  insulin lispro, 2-9 Units, Subcutaneous, 4x Daily AC & at Bedtime  levothyroxine, 75 mcg, Oral, Daily  losartan, 100 mg, Oral, Q24H  melatonin, 1 mg, Oral, Nightly  nystatin, , Topical, Q12H  pravastatin, 20 mg, Oral, Nightly  sodium chloride, 3 mL, Intravenous, Q12H  sulfamethoxazole-trimethoprim, 1 tablet, Oral, Q12H  vitamin B-12, 500 mcg, Oral, Daily    Infusions   Diet  Diet: Diabetic Diets, Cardiac Diets; Healthy Heart (2-3 Na+); Consistent Carbohydrate; Texture: Regular Texture (IDDSI 7); Fluid Consistency: Thin (IDDSI 0)       Assessment/Plan     Active Hospital Problems    Diagnosis  POA    **Diastolic CHF, acute [I50.31]  Yes    Acute CHF [I50.9]  Yes    Type 2 diabetes mellitus with hyperglycemia [E11.65]  Yes    Acute cystitis with hematuria [N30.01]  Yes    Diabetic polyneuropathy associated with type 2 diabetes mellitus [E11.42]  Yes    Presence of cardiac pacemaker [Z95.0]  Yes    Hypoxia [R09.02]  Yes    S/P right coronary artery (RCA) stent placement [Z95.5]  Not  Applicable    Coronary artery disease involving native coronary artery of native heart [I25.10]  Yes    PAF (paroxysmal atrial fibrillation) [I48.0]  Yes    Essential hypertension [I10]  Yes    Legal blindness [H54.8]  Yes    GERD (gastroesophageal reflux disease) [K21.9]  Yes    Mixed hyperlipidemia [E78.2]  Yes    Hypothyroidism, acquired [E03.9]  Yes    Osteoarthritis, multiple sites [M15.9]  Yes      Resolved Hospital Problems   No resolved problems to display.       88 y.o. female admitted with Diastolic CHF, acute.      08/28/23  Switch to PO diuretics per Cardiology. May need SNF pending progress. Increase insulin.    E. coli UTI  -urine cx > 100k E coli, pan sensitive  -transition Rocephin to Bactrim    Acute on chronic diastolic heart failure  Hypoxia, resolved  -Cardiology following  -transition to PO Bumex 1mg daily    DM2 with hyperglycemia and polyneuropathy  -A1c greater than 10  -Glargine 8 units BID, increase to medium SSI  -Gabapentin decreased from 300 mg to 100 mg nightly     Essential hypertension  CAD  -Losartan 100 mg  -Plavix     Atrial fibrillation  -RC: PPM   -AC: apixaban      Eliquis (home med) for DVT prophylaxis.  Discussed with patient.  Anticipate discharge Pending PT/OT eval. TBD      Andrew Gonzalez MD  [facility]  08/28/23  20:51 EDT

## 2023-08-28 NOTE — PLAN OF CARE
Goal Outcome Evaluation:  Plan of Care Reviewed With: patient, daughter           Outcome Evaluation: Pt is a 88 y.o female admitted to [facility] after episode of weakness attempting to get back to her bed in the middle of the night from the bathroom, she was unable to get back into bed and had to lower self to the floor. Also she is being treated for CHF with hypoxia, acute cystitis. She admits a hx of falls. Family stays with her during the day but she is alone at night. She is legally blind. Pt does participate with OT eval with some encourgment but she does report she just got back into bed from going to the bathroom with staff. Pt stood once and took a few steps from the bed. Offered other ADL engagement or encouraged sitting up in the chair today to increase activity/endurance. She remains a falls risk at this time. I feel SNF would be safest dc placement but pt declines and reports she will dc home. Family present during encounter. OT recommends BSC at night but family report her bathroom isnt far away.      Anticipated Discharge Disposition (OT): skilled nursing facility, home with home health, home with 24/7 care

## 2023-08-28 NOTE — THERAPY EVALUATION
"Patient Name: Roland Russell  : 1934    MRN: [id number]                              Today's Date: 2023       Admit Date: 2023    Visit Dx:     ICD-10-CM ICD-9-CM   1. Generalized weakness  R53.1 780.79   2. Acute congestive heart failure, unspecified heart failure type  I50.9 428.0     Patient Active Problem List   Diagnosis    Legal blindness    Uncontrolled type 2 diabetes mellitus with hypoglycemia without coma    GERD (gastroesophageal reflux disease)    Mixed hyperlipidemia    Hypothyroidism, acquired    Osteoarthritis, multiple sites    Essential hypertension    Senile osteoporosis    Renal insufficiency    T12 compression fracture    Vitamin D deficiency    Severe headache    Carotid body tumor    Glomus jugulare tumor    Sleep apnea, obstructive    Morbidly obese    Abnormal weight gain    Localized edema    Compression fracture of T8 vertebra with delayed healing    PAF (paroxysmal atrial fibrillation)    Dry scalp    Deformity of both feet    Acute encephalopathy    Lactic acidosis    Diverticulitis    Ankle fracture    Type 2 diabetes mellitus with hypoglycemia, with long-term current use of insulin    Abnormal nuclear stress test    Coronary artery disease involving native coronary artery of native heart    S/P right coronary artery (RCA) stent placement    Diastolic CHF, acute    Acute CHF    Type 2 diabetes mellitus with hyperglycemia    Acute cystitis with hematuria    Diabetic polyneuropathy associated with type 2 diabetes mellitus    Presence of cardiac pacemaker    Hypoxia     Past Medical History:   Diagnosis Date    Abnormal vision     SEES \"KALEIDOSCOPE\"    Allergy to adhesive tape     Arthralgia     AVB (atrioventricular block)     Balance problem     Carotid body tumor     LEFT    Difficulty walking     H/O complete eye exam 10/2016    Headache     OFF AND ON BACK OF HEAD, DOWN NECK TO SHOULDER    History of glaucoma     History of poliomyelitis     AGE 9    History of " surgery on arm     left arm    HLD (hyperlipidemia)     Hypertension     Hypothyroidism, acquired     Legal blindness     SOME PERIPHERAL VISION ON LEFT, SOME VISION ON RIGHT    Memory loss     SOME SHORT TERM    Migraine     Numbness     LEFT LEG     Osteoarthritis, multiple sites     Osteoporosis     Pacemaker     Rectal bleeding     X1, WITH BM    Sleep apnea     DOES NOT USE A MACHINE    TIA (transient ischemic attack) 12/24/2018    ?, HAD SPELL UNABLE TO TALK    Type 2 diabetes mellitus     Type 2 diabetes mellitus, uncontrolled      Past Surgical History:   Procedure Laterality Date    APPENDECTOMY  1989    CARDIAC CATHETERIZATION N/A 4/13/2023    Procedure: Left Heart Cath;  Surgeon: Rikki Oconnor MD;  [facility] C[facility]VASIVE LOC[facility];  Service: Cardiology;  Laterality: N/A;    CARDIAC CATHETERIZATION N/A 4/13/2023    Procedure: Coronary angiography;  Surgeon: Rikki Oconnor MD;  [facility] C[facility];  Service: Cardiology;  Laterality: N/A;    CARDIAC CATHETERIZATION N/A 4/13/2023    Procedure: Left ventriculography;  Surgeon: Rikki Oconnor MD;  [facility];  Service: Cardiology;  Laterality: N/A;    CARDIAC CATHETERIZATION N/A 4/20/2023    Procedure: Percutaneous Coronary Intervention;  Surgeon: Rikki Oconnor MD;  [facility]: [facility];  Service: Cardiology;  Laterality: N/A;    CARDIAC CATHETERIZATION N/A 4/20/2023    Procedure: Atherectomy-coronary;  Surgeon: Rikki Oconnor MD;  Location: [facility] CATH INVASIVE LOCATION;  Service: Cardiology;  Laterality: N/A;    CARDIAC CATHETERIZATION N/A 4/20/2023    Procedure: Stent CAROL coronary;  Surgeon: Rikki Oconnor MD;  Location: [facility] CATH INVASIVE LOCATION;  Service: Cardiology;  Laterality: N/A;    CARDIAC PACEMAKER PLACEMENT  11/25/2004    CAROTID ENDARTERECTOMY Left 12/13/2019    Procedure: LEFT CAROTID BODY TUMOR RESECTION;  Surgeon: Bryan Severino MD;  Location: [facility] MAIN OR;  Service: Vascular     CATARACT EXTRACTION Bilateral 1997    CEREBRAL ANGIOGRAM Bilateral 12/6/2019    Procedure: CAROTID ARTERIOGRAM BILATERAL, RIGHT WRIST APPROACH;  Surgeon: Bryan Severino MD;  Location: [facility] HYBRID OR 18/19;  Service: Vascular    CHOLECYSTECTOMY  1989    COLONOSCOPY  2013    dr montes    ELBOW PROCEDURE  2016    ENDOSCOPY  12/06/2012    Dr. oMntes; food in stomach, gastritis    EXCISION LESION  1994    BACK CYST BENGIN    EYE SURGERY  12/2012    GLAUCOMA SURGERY Bilateral 1995    laser surgery    HARDWARE REMOVAL Left     ELBOW    HYSTERECTOMY  1986    INTERVENTIONAL RADIOLOGY PROCEDURE N/A 4/20/2023    Procedure: Intravascular Ultrasound;  Surgeon: Rikki Oconnor MD;  Location: [facility] CATH INVASIVE LOCATION;  Service: Cardiology;  Laterality: N/A;    ORIF ELBOW FRACTURE Left     PACEMAKER IMPLANTATION      PACEMAKER REPLACEMENT  06/2013      General Information       Row Name 08/28/23 1532          OT Time and Intention    Document Type evaluation  -     Mode of Treatment occupational therapy;co-treatment  pt only able to tolerate sitting EOB yesterday X2 assistance 2/2 pain, pt much improved today.  -       Row Name 08/28/23 1532          General Information    Patient Profile Reviewed yes  -SM     Prior Level of Function --  independent with getting up to the bathroom, family is always there when she baths and during the day  -     Existing Precautions/Restrictions fall  -     Barriers to Rehab medically complex  -       Row Name 08/28/23 1532          Occupational Profile    Environmental Supports and Barriers (Occupational Profile) Home DME includes shower chair, rwx, 3 in 1 commode over toilet seat, grab bars, elevating/recliner bed. Pt recently dc from  OT and PT services  -       Row Name 08/28/23 1532          Living Environment    People in Home alone  -       Row Name 08/28/23 1532          Cognition    Orientation Status (Cognition) oriented x 3  -       Row Name 08/28/23 1534           Safety Issues, Functional Mobility    Safety Issues Affecting Function (Mobility) insight into deficits/self-awareness  -     Impairments Affecting Function (Mobility) balance;pain;strength;endurance/activity tolerance  -               User Key  (r) = Recorded By, (t) = Taken By, (c) = Cosigned By      Initials Name Provider Type     Farida Bedoya OT Occupational Therapist                     Mobility/ADL's       Row Name 08/28/23 1534          Bed Mobility    Rolling Left Edgar (Bed Mobility) minimum assist (75% patient effort);verbal cues  -     Sidelying-Sit Edgar (Bed Mobility) minimum assist (75% patient effort);verbal cues  -     Assistive Device (Bed Mobility) head of bed elevated;bed rails  -[facility] Name 08/28/23 1534          Transfers    Transfers sit-stand transfer  -     Comment, (Transfers) pt able to complete X1 rep today  -[facility] Name 08/28/23 1534          Sit-Stand Transfer    Sit-Stand Edgar (Transfers) contact guard  -     Assistive Device (Sit-Stand Transfers) walker, front-wheeled  -[facility] Name 08/28/23 1534          Functional Mobility    Functional Mobility- Ind. Level contact guard assist  -     Functional Mobility-Distance (Feet) --  3 ft forward 3 ft back  -[facility] Name 08/28/23 1534          Activities of Daily Living    BADL Assessment/Intervention --  pt declines ADLs with OT today, has been getting up to the bathroom with her RN  -               User Key  (r) = Recorded By, (t) = Taken By, (c) = Cosigned By      Initials Name Provider Type     Farida Bedoya OT Occupational Therapist                   Obj/Interventions       Row Name 08/28/23 1536          Range of Motion Comprehensive    General Range of Motion bilateral upper extremity ROM WFL  -[facility] Name 08/28/23 1536          Strength Comprehensive (MMT)    Comment, General Manual Muscle Testing (MMT) Assessment generalized weakness, shoulder flex 4-/5  MMT, elbow flex/ext 3+/5,  strength 3+/5  -SM       [facility] Name 08/28/23 1536          Motor Skills    Motor Skills functional endurance  -     Functional Endurance poor  -[facility] Name 08/28/23 1536          Balance    Balance Assessment standing static balance;standing dynamic balance  -SM     Static Sitting Balance standby assist  -SM     Static Standing Balance contact guard  -SM     Dynamic Standing Balance contact guard  -SM     Position/Device Used, Standing Balance supported;walker, rolling  -SM               User Key  (r) = Recorded By, (t) = Taken By, (c) = Cosigned By      Initials Name Provider Type    SM Farida Bedoya OT Occupational Therapist                   Goals/Plan       [facility] Name 08/28/23 1542          Transfer Goal 1 (OT)    Activity/Assistive Device (Transfer Goal 1, OT) toilet  -SM     [place] Level/Cues Needed (Transfer Goal 1, OT) supervision required  -SM     Time Frame (Transfer Goal 1, OT) short term goal (STG);2 weeks  -SM     Progress/Outcome (Transfer Goal 1, OT) goal ongoing  -SM       Row Name 08/28/23 1542          Dressing Goal 1 (OT)    Activity/Device (Dressing Goal 1, OT) dressing skills, all  -SM     [place]/Cues Needed (Dressing Goal 1, OT) supervision required  -SM     Time Frame (Dressing Goal 1, OT) short term goal (STG);2 weeks  -SM     Progress/Outcome (Dressing Goal 1, OT) goal ongoing  -SM       Row Name 08/28/23 1542          Toileting Goal 1 (OT)    Activity/Device (Toileting Goal 1, OT) toileting skills, all  -SM     [place] Level/Cues Needed (Toileting Goal 1, OT) supervision required  -SM     Time Frame (Toileting Goal 1, OT) short term goal (STG);2 weeks  -SM     Progress/Outcome (Toileting Goal 1, OT) goal ongoing  -[facility] Name 08/28/23 1542          Grooming Goal 1 (OT)    Activity/Device (Grooming Goal 1, OT) grooming skills, all  -SM     [place] (Grooming Goal 1, OT) supervision required  -SM     Time Frame (Grooming Goal  1, OT) short term goal (STG);2 weeks  -     Strategies/Barriers (Grooming Goal 1, OT) standing at sink > 4 minutes  -SM     Progress/Outcome (Grooming Goal 1, OT) goal ongoing  -       Row Name 08/28/23 3266          Therapy Assessment/Plan (OT)    Planned Therapy Interventions (OT) activity tolerance training;functional balance retraining;occupation/activity based interventions;patient/caregiver education/training;transfer/mobility retraining;strengthening exercise;ROM/therapeutic exercise  -               User Key  (r) = Recorded By, (t) = Taken By, (c) = Cosigned By      Initials Name Provider Type     Farida Bedoya OT Occupational Therapist                   Clinical Impression       Row Name 08/28/23 1695          Pain Assessment    Pre/Posttreatment Pain Comment reports pain in RLE (thigh/hip)  with movement  -     Pain Intervention(s) Rest;Repositioned  -     Additional Documentation Pain Scale: FACES Pre/Post-Treatment (Group)  -       Row Name 08/28/23 4445          Pain Scale: FACES Pre/Post-Treatment    Pain: FACES Scale, Pretreatment 2-->hurts little bit  -     Posttreatment Pain Rating 4-->hurts little more  -       Row Name 08/28/23 8766          Plan of Care Review    Plan of Care Reviewed With patient;daughter  -     Outcome Evaluation Pt is a 88 y.o female admitted to [facility] after episode of weakness attempting to get back to her bed in the middle of the night from the bathroom, she was unable to get back into bed and had to lower self to the floor. Also she is being treated for CHF with hypoxia, acute cystitis. She admits a hx of falls. Family stays with her during the day but she is alone at night. She is legally blind. Pt does participate with OT mitesh with some encourgment but she does report she just got back into bed from going to the bathroom with staff. Pt stood once and took a few steps from the bed. Offered other ADL engagement or encouraged sitting up in the chair  "today to increase activity/endurance. She remains a falls risk at this time. I feel SNF would be safest dc placement but pt declines and reports she will dc home. Family present during encounter. OT recommends BSC at night but family report her bathroom isnt far away.  -       Row Name 08/28/23 1537          Therapy Assessment/Plan (OT)    Patient/Family Therapy Goal Statement (OT) \"go home\"  -     Rehab Potential (OT) good, to achieve stated therapy goals  -     Criteria for Skilled Therapeutic Interventions Met (OT) yes;skilled treatment is necessary  -     Therapy Frequency (OT) 5 times/wk  -       Row Name 08/28/23 1537          Therapy Plan Review/Discharge Plan (OT)    Anticipated Discharge Disposition (OT) skilled nursing facility;home with home health;home with 24/7 care  -       Row Name 08/28/23 1537          Positioning and Restraints    Pre-Treatment Position in bed  -SM     Post Treatment Position bed  -SM     In Bed fowlers;call light within reach;encouraged to call for assist;exit alarm on;notified nsg;with family/caregiver  -               User Key  (r) = Recorded By, (t) = Taken By, (c) = Cosigned By      Initials Name Provider Type    Farida Tejada, OT Occupational Therapist                   Outcome Measures       Row Name 08/28/23 7139          How much help from another is currently needed...    Putting on and taking off regular lower body clothing? 2  -SM     Bathing (including washing, rinsing, and drying) 2  -SM     Toileting (which includes using toilet bed pan or urinal) 2  -SM     Putting on and taking off regular upper body clothing 3  -SM     Taking care of personal grooming (such as brushing teeth) 3  -SM     Eating meals 3  -SM     AM-PAC 6 Clicks Score (OT) 15  -       Row Name 08/28/23 1543          Functional Assessment    Outcome Measure Options AM-PAC 6 Clicks Daily Activity (OT)  -               User Key  (r) = Recorded By, (t) = Taken By, (c) = Cosigned " By      Initials Name Provider Type     Farida Bedoya OT Occupational Therapist                    Occupational Therapy Education       Title: PT OT SLP Therapies (In Progress)       Topic: Occupational Therapy (In Progress)       Point: ADL training (Done)       Description:   Instruct learner(s) on proper safety adaptation and remediation techniques during self care or transfers.   Instruct in proper use of assistive devices.                  Learning Progress Summary             Patient Acceptance, E, VU by  at 8/28/2023 4405    Comment: dc planning, ways to improve falls risk in the home and at night                         Point: Home exercise program (Not Started)       Description:   Instruct learner(s) on appropriate technique for monitoring, assisting and/or progressing therapeutic exercises/activities.                  Learner Progress:  Not documented in this visit.              Point: Precautions (Not Started)       Description:   Instruct learner(s) on prescribed precautions during self-care and functional transfers.                  Learner Progress:  Not documented in this visit.              Point: Body mechanics (Not Started)       Description:   Instruct learner(s) on proper positioning and spine alignment during self-care, functional mobility activities and/or exercises.                  Learner Progress:  Not documented in this visit.                              User Key       Initials Effective Dates Name Provider Type Discipline     04/02/20 -  Farida Bedoya OT Occupational Therapist OT                  OT Recommendation and Plan  Planned Therapy Interventions (OT): activity tolerance training, functional balance retraining, occupation/activity based interventions, patient/caregiver education/training, transfer/mobility retraining, strengthening exercise, ROM/therapeutic exercise  Therapy Frequency (OT): 5 times/wk  Plan of Care Review  Plan of Care Reviewed With: patient,  daughter  Outcome Evaluation: Pt is a 88 y.o female admitted to [facility] after episode of weakness attempting to get back to her bed in the middle of the night from the bathroom, she was unable to get back into bed and had to lower self to the floor. Also she is being treated for CHF with hypoxia, acute cystitis. She admits a hx of falls. Family stays with her during the day but she is alone at night. She is legally blind. Pt does participate with OT eval with some encourgment but she does report she just got back into bed from going to the bathroom with staff. Pt stood once and took a few steps from the bed. Offered other ADL engagement or encouraged sitting up in the chair today to increase activity/endurance. She remains a falls risk at this time. I feel SNF would be safest dc placement but pt declines and reports she will dc home. Family present during encounter. OT recommends BSC at night but family report her bathroom isnt far away.     Time Calculation:   Evaluation Complexity (OT)  Review Occupational Profile/Medical/Therapy History Complexity: expanded/moderate complexity  Assessment, Occupational Performance/Identification of Deficit Complexity: 3-5 performance deficits  Clinical Decision Making Complexity (OT): detailed assessment/moderate complexity  Overall Complexity of Evaluation (OT): moderate complexity     Time Calculation- OT       Row Name 08/28/23 1544             Time Calculation- OT    OT Start Time 1406  -      OT Stop Time 1421  -      OT Time Calculation (min) 15 min  -SM      Total Timed Code Minutes- OT 8 minute(s)  -SM      OT Received On 08/28/23  -      OT - Next Appointment 08/29/23  -      OT Goal Re-Cert Due Date 09/11/23  -         Timed Charges    71983 - OT Therapeutic Activity Minutes 8  -SM         Untimed Charges    OT Eval/Re-eval Minutes 7  -SM         Total Minutes    Timed Charges Total Minutes 8  -SM      Untimed Charges Total Minutes 7  -SM       Total Minutes 15   -                User Key  (r) = Recorded By, (t) = Taken By, (c) = Cosigned By      Initials Name Provider Type     Farida Bedoay OT Occupational Therapist                  Therapy Charges for Today       Code Description Service Date Service Provider Modifiers Qty    [id number]  OT THERAPEUTIC ACT EA 15 MIN 8/28/2023 Farida Bedoya OT GO 1    [id number]  OT EVAL MOD COMPLEXITY 2 8/28/2023 Farida Bedoya OT GO 1                 Farida Bedoya OT  8/28/2023

## 2023-08-28 NOTE — NURSING NOTE
"Diabetes Education  Assessment/Teaching    Patient Name:  Roland Russell  YOB: 1934  MRN: [id number]  Admit Date:  8/26/2023      Assessment Date:  8/28/2023  Flowsheet Row Most Recent Value   General Information     Referral From: Database. Meet with 89 y/o at bedside to assess needs for DM ed.    Height 144.8 cm (57\")   Height Method Stated   Weight 70.7 kg (155 lb 13.8 oz)   Weight Method Bed scale   Diabetes History    What type of diabetes do you have? Type 2   Do you test your blood sugar at home? yes -pt has the Freestyle Iibre CGM.   Have you had high blood sugar? (>140mg/dl) yes -recent a1c is 10.3   Do you have any diabetes complications? neuropathy   Education Preferences    Barriers to Learning -- pt is legally blind.   Nutrition Information Discuss.   Assessment Topics    Healthy Coping - Assessment Competent -supportive dtr here at bedside.   Monitoring - Assessment Competent   DM Goals    Contact Plan Follow-up medical care            Flowsheet Row Most Recent Value   DM Education Needs    Meter Has own   Meter Type Freestyle   Problem Solving Pt's dtr expressed concern about current BG level >300 on CGM. Hyperglycemia  -look at pt's BG trend inpt and discuss factors that can increase pt BGs. Pt reports awakening here at 0300 and unable to rest well after that. Discuss effect of stress on BGs.    Healthy Coping Appropriate   Discharge Plan Follow-up with PCP   Motivation Engaged   Teaching Method Discussion   Patient Response Verbalized understanding        Other Comments:    Electronically signed by:  Dariana Monaco RN, BSN, [facility]  08/28/23 16:40 EDT  "

## 2023-08-28 NOTE — PLAN OF CARE
Goal Outcome Evaluation:  Plan of Care Reviewed With: patient, daughter           Outcome Evaluation: Pt was just returning to bed when PT/OT arrived for cotreat. Pt was fatigued after being up to bathroom and just returning to bed, but agreeable to try therapy. Pt required min A to get to EOB. She stood and ambualted a few fet with CGA x2. Pt reported fear of falling backwards, but no overt LOB noted.      Anticipated Discharge Disposition (PT): home with assist, skilled nursing facility

## 2023-08-28 NOTE — PROGRESS NOTES
"    Patient Name: Roland Russell  :1934  88 y.o.      Patient Care Team:  Shawanda Rosario MD as PCP - General (Internal Medicine)  Tevin Albrecht MD as Consulting Physician (Cardiac Electrophysiology)    Chief Complaint:   Weakness    Interval History:   Complains of urinary urgency and frequency.    Objective   Vital Signs  Temp:  [97.6 °F (36.4 °C)-99.1 °F (37.3 °C)] 97.7 °F (36.5 °C)  Heart Rate:  [61-74] 61  Resp:  [16-18] 17  BP: (125-195)/(49-77) 179/70    Intake/Output Summary (Last 24 hours) at 2023 0826  Last data filed at 2023 0425  Gross per 24 hour   Intake 330 ml   Output 1900 ml   Net -1570 ml     Flowsheet Rows      Flowsheet Row First Filed Value   Admission Height 144.8 cm (57\") Documented at 2023 0956   Admission Weight 70.7 kg (155 lb 13.8 oz) Documented at 2023 1538            GEN: no distress, alert and oriented  HEENT: NACT, EOMI, moist mucous membranes  Lungs: CTAB, no wheezes, rales or rhonchi  CV: normal rate, regular rhythm, normal S1, S2, no murmurs, +2 radial pulses b/l, no carotid bruit  Abdomen: soft, nontender, nondistended, NABS  Extremities: no edema  Skin: no rash, warm, dry  Heme/Lymph: no bruising  Psych: organized thought, normal behavior and affect    Results Review:    Results from last 7 days   Lab Units 23  0557   SODIUM mmol/L 135*   POTASSIUM mmol/L 4.5   CHLORIDE mmol/L 97*   CO2 mmol/L 25.7   BUN mg/dL 21   CREATININE mg/dL 0.97   GLUCOSE mg/dL 181*   CALCIUM mg/dL 9.7     Results from last 7 days   Lab Units 23  1356 23  1204   HSTROP T ng/L 33* 40*     Results from last 7 days   Lab Units 23  0557   WBC 10*3/mm3 8.34   HEMOGLOBIN g/dL 13.2   HEMATOCRIT % 42.9   PLATELETS 10*3/mm3 204                           Medication Review:   apixaban, 5 mg, Oral, BID  bumetanide, 1 mg, Intravenous, BID  clopidogrel, 75 mg, Oral, Daily  gabapentin, 100 mg, Oral, Nightly  insulin glargine, 7 Units, Subcutaneous, " Q12H  insulin lispro, 2-7 Units, Subcutaneous, 4x Daily AC & at Bedtime  levothyroxine, 75 mcg, Oral, Daily  losartan, 100 mg, Oral, Q24H  melatonin, 1 mg, Oral, Nightly  nystatin, , Topical, Q12H  pravastatin, 20 mg, Oral, Nightly  sodium chloride, 3 mL, Intravenous, Q12H  sulfamethoxazole-trimethoprim, 1 tablet, Oral, Q12H  vitamin B-12, 500 mcg, Oral, Daily              Assessment & Plan   #UTI  #Acute HFpEF  #pAF  #SSS s/p ppm  #DM    88 y.o. female with paroxysmal A-fib on Eliquis, diabetes, hypertension, DARI, high-grade block status post dual-chamber pacemaker, and CAD s/p RCA PCI who scented with weakness and hypoxia, found to have likely UTI.  She was diuresed with IV Bumex and now is on room air without any shortness of breath.    Last echocardiogram was in January which revealed normal EF with indeterminate diastolic function.    - switch bumex to 1mg PO daily  - agree with losartan increase to 100mg daily  - continue Eliquis for AF and plavix for CAD    Rikki Oconnor MD  [facility]  08/28/23  08:26 EDT

## 2023-08-28 NOTE — THERAPY TREATMENT NOTE
"Patient Name: Roland Russell  : 1934    MRN: [id number]                              Today's Date: 2023       Admit Date: 2023    Visit Dx:     ICD-10-CM ICD-9-CM   1. Generalized weakness  R53.1 780.79   2. Acute congestive heart failure, unspecified heart failure type  I50.9 428.0     Patient Active Problem List   Diagnosis    Legal blindness    Uncontrolled type 2 diabetes mellitus with hypoglycemia without coma    GERD (gastroesophageal reflux disease)    Mixed hyperlipidemia    Hypothyroidism, acquired    Osteoarthritis, multiple sites    Essential hypertension    Senile osteoporosis    Renal insufficiency    T12 compression fracture    Vitamin D deficiency    Severe headache    Carotid body tumor    Glomus jugulare tumor    Sleep apnea, obstructive    Morbidly obese    Abnormal weight gain    Localized edema    Compression fracture of T8 vertebra with delayed healing    PAF (paroxysmal atrial fibrillation)    Dry scalp    Deformity of both feet    Acute encephalopathy    Lactic acidosis    Diverticulitis    Ankle fracture    Type 2 diabetes mellitus with hypoglycemia, with long-term current use of insulin    Abnormal nuclear stress test    Coronary artery disease involving native coronary artery of native heart    S/P right coronary artery (RCA) stent placement    Diastolic CHF, acute    Acute CHF    Type 2 diabetes mellitus with hyperglycemia    Acute cystitis with hematuria    Diabetic polyneuropathy associated with type 2 diabetes mellitus    Presence of cardiac pacemaker    Hypoxia     Past Medical History:   Diagnosis Date    Abnormal vision     SEES \"KALEIDOSCOPE\"    Allergy to adhesive tape     Arthralgia     AVB (atrioventricular block)     Balance problem     Carotid body tumor     LEFT    Difficulty walking     H/O complete eye exam 10/2016    Headache     OFF AND ON BACK OF HEAD, DOWN NECK TO SHOULDER    History of glaucoma     History of poliomyelitis     AGE 9    History of " surgery on arm     left arm    HLD (hyperlipidemia)     Hypertension     Hypothyroidism, acquired     Legal blindness     SOME PERIPHERAL VISION ON LEFT, SOME VISION ON RIGHT    Memory loss     SOME SHORT TERM    Migraine     Numbness     LEFT LEG     Osteoarthritis, multiple sites     Osteoporosis     Pacemaker     Rectal bleeding     X1, WITH BM    Sleep apnea     DOES NOT USE A MACHINE    TIA (transient ischemic attack) 12/24/2018    ?, HAD SPELL UNABLE TO TALK    Type 2 diabetes mellitus     Type 2 diabetes mellitus, uncontrolled      Past Surgical History:   Procedure Laterality Date    APPENDECTOMY  1989    CARDIAC CATHETERIZATION N/A 4/13/2023    Procedure: Left Heart Cath;  Surgeon: Rikki Oconnor MD;  [facility];  Service: Cardiology;  Laterality: N/A;    CARDIAC CATHETERIZATION N/A 4/13/2023    Procedure: Coronary angiography;  Surgeon: Rikki Oconnor MD;  [facility];  Service: Cardiology;  Laterality: N/A;    CARDIAC CATHETERIZATION N/A 4/13/2023    Procedure: Left ventriculography;  Surgeon: Rikki Oconnor MD;  [facility];  Service: Cardiology;  Laterality: N/A;    CARDIAC CATHETERIZATION N/A 4/20/2023    Procedure: Percutaneous Coronary Intervention;  Surgeon: Rikki Oconnor MD;  [facility];  Service: Cardiology;  Laterality: N/A;    CARDIAC CATHETERIZATION N/A 4/20/2023    Procedure: Atherectomy-coronary;  Surgeon: Rikki Oconnor MD;  Location: [facility] CATH INVASIVE LOCATION;  Service: Cardiology;  Laterality: N/A;    CARDIAC CATHETERIZATION N/A 4/20/2023    Procedure: Stent CAROL coronary;  Surgeon: Rikki Oconnor MD;  Location: [facility] CATH INVASIVE LOCATION;  Service: Cardiology;  Laterality: N/A;    CARDIAC PACEMAKER PLACEMENT  11/25/2004    CAROTID ENDARTERECTOMY Left 12/13/2019    Procedure: LEFT CAROTID BODY TUMOR RESECTION;  Surgeon: Bryan Severino MD;  Location: [facility] MAIN OR;  Service: Vascular     CATARACT EXTRACTION Bilateral 1997    CEREBRAL ANGIOGRAM Bilateral 12/6/2019    Procedure: CAROTID ARTERIOGRAM BILATERAL, RIGHT WRIST APPROACH;  Surgeon: Bryan Severino MD;  Location: [facility] HYBRID OR 18/19;  Service: Vascular    CHOLECYSTECTOMY  1989    COLONOSCOPY  2013    dr johnson    ELBOW PROCEDURE  2016    ENDOSCOPY  12/06/2012    Dr. Johnson; food in stomach, gastritis    EXCISION LESION  1994    BACK CYST BENGIN    EYE SURGERY  12/2012    GLAUCOMA SURGERY Bilateral 1995    laser surgery    HARDWARE REMOVAL Left     ELBOW    HYSTERECTOMY  1986    INTERVENTIONAL RADIOLOGY PROCEDURE N/A 4/20/2023    Procedure: Intravascular Ultrasound;  Surgeon: Rikki Oconnor MD;  Location: [facility] CATH INVASIVE LOCATION;  Service: Cardiology;  Laterality: N/A;    ORIF ELBOW FRACTURE Left     PACEMAKER IMPLANTATION      PACEMAKER REPLACEMENT  06/2013      General Information       Row Name 08/28/23 1629          Physical Therapy Time and Intention    Document Type therapy note (daily note)  -     Mode of Treatment co-treatment;physical therapy  -               User Key  (r) = Recorded By, (t) = Taken By, (c) = Cosigned By      Initials Name Provider Type     Beulah Reich, PT Physical Therapist                   Mobility       Row Name 08/28/23 1630          Bed Mobility    Sidelying-Sit [place] (Bed Mobility) minimum assist (75% patient effort);verbal cues  -       Row Name 08/28/23 1630          Sit-Stand Transfer    Sit-Stand [place] (Transfers) contact guard  -     Assistive Device (Sit-Stand Transfers) walker, front-wheeled  -       Row Name 08/28/23 1630          Gait/Stairs (Locomotion)    [place] Level (Gait) contact guard;2 person assist  -     Assistive Device (Gait) walker, front-wheeled  -     Distance in Feet (Gait) 3 steps forward/backward  -     Comment, (Gait/Stairs) unsteady, pt fearful of falling  -               User Key  (r) = Recorded By, (t) = Taken By, (c) = Cosigned  By      Initials Name Provider Type    Beulah Bernardo PT Physical Therapist                   Obj/Interventions       Row Name 08/28/23 1630          Motor Skills    Therapeutic Exercise --  BLE AP, LAQ, seated marching x 5 reps  -               User Key  (r) = Recorded By, (t) = Taken By, (c) = Cosigned By      Initials Name Provider Type    Beulah Bernardo PT Physical Therapist                   Goals/Plan    No documentation.                  Clinical Impression       Row Name 08/28/23 1631          Pain    Pre/Posttreatment Pain Comment R thigh pain with BLE exercises, no number given  -ILSA       Row Name 08/28/23 1631          Pain Scale: FACES Pre/Post-Treatment    Pain: FACES Scale, Pretreatment 2-->hurts little bit  -     Posttreatment Pain Rating 4-->hurts little more  -       Row Name 08/28/23 1631          Plan of Care Review    Plan of Care Reviewed With patient;daughter  -     Outcome Evaluation Pt was just returning to bed when PT/OT arrived for cotreat. Pt was fatigued after being up to bathroom and just returning to bed, but agreeable to try therapy. Pt required min A to get to EOB. She stood and ambualted a few fet with CGA x2. Pt reported fear of falling backwards, but no overt LOB noted.  -       Row Name 08/28/23 1631          Positioning and Restraints    Pre-Treatment Position in bed  -     Post Treatment Position bed  -KH     In Bed fowlers;call light within reach;encouraged to call for assist;exit alarm on;with family/caregiver;notified [facility]  -               User Key  (r) = Recorded By, (t) = Taken By, (c) = Cosigned By      Initials Name Provider Type    Beulah Bernardo, ZARA Physical Therapist                   Outcome Measures       Row Name 08/28/23 1636          How much help from another person do you currently need...    Turning from your back to your side while in flat bed without using bedrails? 3  -     Moving from lying on back to  sitting on the side of a flat bed without bedrails? 3  -KH     Moving to and from a bed to a chair (including a wheelchair)? 3  -KH     Standing up from a chair using your arms (e.g., wheelchair, bedside chair)? 3  -KH     Climbing 3-5 steps with a railing? 1  -KH     To walk in hospital room? 3  -KH     AM-PAC 6 Clicks Score (PT) 16  -KH     Highest level of mobility 5 --> Static standing  -       Row Name 08/28/23 1636 08/28/23 1543       Functional Assessment    Outcome Measure Options AM-PAC 6 Clicks Basic Mobility (PT)  - AM-PAC 6 Clicks Daily Activity (OT)  -              User Key  (r) = Recorded By, (t) = Taken By, (c) = Cosigned By      Initials Name Provider Type    Beulah Bernardo, PT Physical Therapist    Farida Tejada, OT Occupational Therapist                                 Physical Therapy Education       Title: PT OT SLP Therapies (In Progress)       Topic: Physical Therapy (In Progress)       Point: Mobility training (In Progress)       Learning Progress Summary             Patient Acceptance, E, NR by  at 8/27/2023 1811   Family Acceptance, E, NR by  at 8/27/2023 1811                         Point: Home exercise program (In Progress)       Learning Progress Summary             Patient Acceptance, E, NR by  at 8/27/2023 1811   Family Acceptance, E, NR by  at 8/27/2023 1811                         Point: Body mechanics (In Progress)       Learning Progress Summary             Patient Acceptance, E, NR by  at 8/27/2023 1811   Family Acceptance, E, NR by  at 8/27/2023 1811                                         User Key       Initials Effective Dates Name Provider Type Discipline     07/11/23 -  Milena Muñoz, PT Physical Therapist PT                  PT Recommendation and Plan     Plan of Care Reviewed With: patient, daughter  Outcome Evaluation: Pt was just returning to bed when PT/OT arrived for cotreat. Pt was fatigued after being up to bathroom and just  returning to bed, but agreeable to try therapy. Pt required min A to get to EOB. She stood and ambualted a few fet with CGA x2. Pt reported fear of falling backwards, but no overt LOB noted.     Time Calculation:         PT Charges       Row Name 08/28/23 1636             Time Calculation    Start Time 1406  -KH      Stop Time 1429  -KH      Time Calculation (min) 23 min  -KH      PT Received On 08/28/23  -KH      PT - Next Appointment 08/29/23  -KH         Time Calculation- PT    Total Timed Code Minutes- PT 23 minute(s)  -KH         Timed Charges    82068 - PT Therapeutic Activity Minutes 23  -KH         Total Minutes    Timed Charges Total Minutes 23  -KH       Total Minutes 23  -KH                User Key  (r) = Recorded By, (t) = Taken By, (c) = Cosigned By      Initials Name Provider Type    Beulah Bernardo, PT Physical Therapist                  Therapy Charges for Today       Code Description Service Date Service Provider Modifiers Qty    26970701849 HC PT THERAPEUTIC ACT EA 15 MIN 8/28/2023 Beulah Reich, PT GP 2            PT G-Codes  Outcome Measure Options: AM-PAC 6 Clicks Basic Mobility (PT)  AM-PAC 6 Clicks Score (PT): 16  AM-PAC 6 Clicks Score (OT): 15  PT Discharge Summary  Anticipated Discharge Disposition (PT): home with assist, skilled nursing facility    Beulah Reich PT  8/28/2023

## 2023-08-29 LAB
ANION GAP SERPL CALCULATED.3IONS-SCNC: 10 MMOL/L (ref 5–15)
BUN SERPL-MCNC: 31 MG/DL (ref 8–23)
BUN/CREAT SERPL: 23.5 (ref 7–25)
CALCIUM SPEC-SCNC: 10 MG/DL (ref 8.6–10.5)
CHLORIDE SERPL-SCNC: 98 MMOL/L (ref 98–107)
CO2 SERPL-SCNC: 29 MMOL/L (ref 22–29)
CREAT SERPL-MCNC: 1.32 MG/DL (ref 0.57–1)
DEPRECATED RDW RBC AUTO: 47.3 FL (ref 37–54)
EGFRCR SERPLBLD CKD-EPI 2021: 38.9 ML/MIN/1.73
ERYTHROCYTE [DISTWIDTH] IN BLOOD BY AUTOMATED COUNT: 15.2 % (ref 12.3–15.4)
GLUCOSE BLDC GLUCOMTR-MCNC: 132 MG/DL (ref 70–130)
GLUCOSE BLDC GLUCOMTR-MCNC: 235 MG/DL (ref 70–130)
GLUCOSE BLDC GLUCOMTR-MCNC: 333 MG/DL (ref 70–130)
GLUCOSE BLDC GLUCOMTR-MCNC: 358 MG/DL (ref 70–130)
GLUCOSE SERPL-MCNC: 225 MG/DL (ref 65–99)
HCT VFR BLD AUTO: 43.6 % (ref 34–46.6)
HGB BLD-MCNC: 13.4 G/DL (ref 12–15.9)
MCH RBC QN AUTO: 26.2 PG (ref 26.6–33)
MCHC RBC AUTO-ENTMCNC: 30.7 G/DL (ref 31.5–35.7)
MCV RBC AUTO: 85.2 FL (ref 79–97)
PLATELET # BLD AUTO: 211 10*3/MM3 (ref 140–450)
PMV BLD AUTO: 11.2 FL (ref 6–12)
POTASSIUM SERPL-SCNC: 4.3 MMOL/L (ref 3.5–5.2)
RBC # BLD AUTO: 5.12 10*6/MM3 (ref 3.77–5.28)
SODIUM SERPL-SCNC: 137 MMOL/L (ref 136–145)
WBC NRBC COR # BLD: 7.98 10*3/MM3 (ref 3.4–10.8)

## 2023-08-29 PROCEDURE — 99497 ADVNCD CARE PLAN 30 MIN: CPT | Performed by: NURSE PRACTITIONER

## 2023-08-29 PROCEDURE — 97530 THERAPEUTIC ACTIVITIES: CPT | Performed by: PHYSICAL THERAPIST

## 2023-08-29 PROCEDURE — 63710000001 INSULIN GLARGINE PER 5 UNITS: Performed by: STUDENT IN AN ORGANIZED HEALTH CARE EDUCATION/TRAINING PROGRAM

## 2023-08-29 PROCEDURE — 99222 1ST HOSP IP/OBS MODERATE 55: CPT | Performed by: NURSE PRACTITIONER

## 2023-08-29 PROCEDURE — 82948 REAGENT STRIP/BLOOD GLUCOSE: CPT

## 2023-08-29 PROCEDURE — 85027 COMPLETE CBC AUTOMATED: CPT | Performed by: INTERNAL MEDICINE

## 2023-08-29 PROCEDURE — 99232 SBSQ HOSP IP/OBS MODERATE 35: CPT | Performed by: STUDENT IN AN ORGANIZED HEALTH CARE EDUCATION/TRAINING PROGRAM

## 2023-08-29 PROCEDURE — 36415 COLL VENOUS BLD VENIPUNCTURE: CPT | Performed by: INTERNAL MEDICINE

## 2023-08-29 PROCEDURE — 63710000001 INSULIN LISPRO (HUMAN) PER 5 UNITS: Performed by: STUDENT IN AN ORGANIZED HEALTH CARE EDUCATION/TRAINING PROGRAM

## 2023-08-29 PROCEDURE — 97535 SELF CARE MNGMENT TRAINING: CPT

## 2023-08-29 PROCEDURE — 80048 BASIC METABOLIC PNL TOTAL CA: CPT | Performed by: INTERNAL MEDICINE

## 2023-08-29 RX ORDER — NIFEDIPINE 30 MG/1
30 TABLET, EXTENDED RELEASE ORAL
Status: DISCONTINUED | OUTPATIENT
Start: 2023-08-29 | End: 2023-09-02 | Stop reason: HOSPADM

## 2023-08-29 RX ADMIN — Medication 1 MG: at 20:57

## 2023-08-29 RX ADMIN — CLOPIDOGREL BISULFATE 75 MG: 75 TABLET, FILM COATED ORAL at 08:49

## 2023-08-29 RX ADMIN — LOSARTAN POTASSIUM 100 MG: 100 TABLET, FILM COATED ORAL at 08:49

## 2023-08-29 RX ADMIN — SULFAMETHOXAZOLE AND TRIMETHOPRIM 1 TABLET: 800; 160 TABLET ORAL at 20:57

## 2023-08-29 RX ADMIN — NIFEDIPINE 30 MG: 30 TABLET, FILM COATED, EXTENDED RELEASE ORAL at 09:51

## 2023-08-29 RX ADMIN — PRAVASTATIN SODIUM 20 MG: 20 TABLET ORAL at 20:57

## 2023-08-29 RX ADMIN — BUMETANIDE 1 MG: 1 TABLET ORAL at 08:49

## 2023-08-29 RX ADMIN — Medication 3 ML: at 20:58

## 2023-08-29 RX ADMIN — NYSTATIN: 100000 POWDER TOPICAL at 20:58

## 2023-08-29 RX ADMIN — INSULIN GLARGINE 12 UNITS: 100 INJECTION, SOLUTION SUBCUTANEOUS at 20:58

## 2023-08-29 RX ADMIN — APIXABAN 5 MG: 5 TABLET, FILM COATED ORAL at 08:50

## 2023-08-29 RX ADMIN — APIXABAN 5 MG: 5 TABLET, FILM COATED ORAL at 20:57

## 2023-08-29 RX ADMIN — INSULIN LISPRO 7 UNITS: 100 INJECTION, SOLUTION INTRAVENOUS; SUBCUTANEOUS at 17:18

## 2023-08-29 RX ADMIN — INSULIN GLARGINE 8 UNITS: 100 INJECTION, SOLUTION SUBCUTANEOUS at 08:50

## 2023-08-29 RX ADMIN — SULFAMETHOXAZOLE AND TRIMETHOPRIM 1 TABLET: 800; 160 TABLET ORAL at 08:49

## 2023-08-29 RX ADMIN — Medication 500 MCG: at 08:49

## 2023-08-29 RX ADMIN — LEVOTHYROXINE SODIUM 75 MCG: 0.07 TABLET ORAL at 08:49

## 2023-08-29 RX ADMIN — NYSTATIN: 100000 POWDER TOPICAL at 08:50

## 2023-08-29 RX ADMIN — INSULIN LISPRO 8 UNITS: 100 INJECTION, SOLUTION INTRAVENOUS; SUBCUTANEOUS at 13:16

## 2023-08-29 RX ADMIN — ACETAMINOPHEN 650 MG: 325 TABLET, FILM COATED ORAL at 05:52

## 2023-08-29 RX ADMIN — INSULIN LISPRO 4 UNITS: 100 INJECTION, SOLUTION INTRAVENOUS; SUBCUTANEOUS at 20:57

## 2023-08-29 RX ADMIN — GABAPENTIN 100 MG: 100 CAPSULE ORAL at 20:57

## 2023-08-29 NOTE — THERAPY TREATMENT NOTE
"Patient Name: Roland Russell  : 1934    MRN: [id number]                              Today's Date: 2023       Admit Date: 2023    Visit Dx:     ICD-10-CM ICD-9-CM   1. Generalized weakness  R53.1 780.79   2. Acute congestive heart failure, unspecified heart failure type  I50.9 428.0     Patient Active Problem List   Diagnosis    Legal blindness    Uncontrolled type 2 diabetes mellitus with hypoglycemia without coma    GERD (gastroesophageal reflux disease)    Mixed hyperlipidemia    Hypothyroidism, acquired    Osteoarthritis, multiple sites    Essential hypertension    Senile osteoporosis    Renal insufficiency    T12 compression fracture    Vitamin D deficiency    Severe headache    Carotid body tumor    Glomus jugulare tumor    Sleep apnea, obstructive    Morbidly obese    Abnormal weight gain    Localized edema    Compression fracture of T8 vertebra with delayed healing    PAF (paroxysmal atrial fibrillation)    Dry scalp    Deformity of both feet    Acute encephalopathy    Lactic acidosis    Diverticulitis    Ankle fracture    Type 2 diabetes mellitus with hypoglycemia, with long-term current use of insulin    Abnormal nuclear stress test    Coronary artery disease involving native coronary artery of native heart    S/P right coronary artery (RCA) stent placement    Diastolic CHF, acute    Acute CHF    Type 2 diabetes mellitus with hyperglycemia    Acute cystitis with hematuria    Diabetic polyneuropathy associated with type 2 diabetes mellitus    Presence of cardiac pacemaker    Hypoxia     Past Medical History:   Diagnosis Date    Abnormal vision     SEES \"KALEIDOSCOPE\"    Allergy to adhesive tape     Arthralgia     AVB (atrioventricular block)     Balance problem     Carotid body tumor     LEFT    Difficulty walking     H/O complete eye exam 10/2016    Headache     OFF AND ON BACK OF HEAD, DOWN NECK TO SHOULDER    History of glaucoma     History of poliomyelitis     AGE 9    History of " surgery on arm     left arm    HLD (hyperlipidemia)     Hypertension     Hypothyroidism, acquired     Legal blindness     SOME PERIPHERAL VISION ON LEFT, SOME VISION ON RIGHT    Memory loss     SOME SHORT TERM    Migraine     Numbness     LEFT LEG     Osteoarthritis, multiple sites     Osteoporosis     Pacemaker     Rectal bleeding     X1, WITH BM    Sleep apnea     DOES NOT USE A MACHINE    TIA (transient ischemic attack) 12/24/2018    ?, HAD SPELL UNABLE TO TALK    Type 2 diabetes mellitus     Type 2 diabetes mellitus, uncontrolled      Past Surgical History:   Procedure Laterality Date    APPENDECTOMY  1989    CARDIAC CATHETERIZATION N/A 4/13/2023    Procedure: Left Heart Cath;  Surgeon: Rikki Oconnor MD;  [facility];  Service: Cardiology;  Laterality: N/A;    CARDIAC CATHETERIZATION N/A 4/13/2023    Procedure: Coronary angiography;  Surgeon: Rikki Oconnor MD;  [facility];  Service: Cardiology;  Laterality: N/A;    CARDIAC CATHETERIZATION N/A 4/13/2023    Procedure: Left ventriculography;  Surgeon: Rikki Oconnor MD;  [facility];  Service: Cardiology;  Laterality: N/A;    CARDIAC CATHETERIZATION N/A 4/20/2023    Procedure: Percutaneous Coronary Intervention;  Surgeon: Rikki Oconnor MD;  [facility];  Service: Cardiology;  Laterality: N/A;    CARDIAC CATHETERIZATION N/A 4/20/2023    Procedure: Atherectomy-coronary;  Surgeon: Rikki Oconnor MD;  Location: [facility] CATH INVASIVE LOCATION;  Service: Cardiology;  Laterality: N/A;    CARDIAC CATHETERIZATION N/A 4/20/2023    Procedure: Stent CAROL coronary;  Surgeon: Rikki Oconnor MD;  Location: [facility] CATH INVASIVE LOCATION;  Service: Cardiology;  Laterality: N/A;    CARDIAC PACEMAKER PLACEMENT  11/25/2004    CAROTID ENDARTERECTOMY Left 12/13/2019    Procedure: LEFT CAROTID BODY TUMOR RESECTION;  Surgeon: Bryan Severino MD;  Location: [facility] MAIN OR;  Service: Vascular     CATARACT EXTRACTION Bilateral 1997    CEREBRAL ANGIOGRAM Bilateral 12/6/2019    Procedure: CAROTID ARTERIOGRAM BILATERAL, RIGHT WRIST APPROACH;  Surgeon: Bryan Severino MD;  Location: [facility] HYBRID OR 18/19;  Service: Vascular    CHOLECYSTECTOMY  1989    COLONOSCOPY  2013    dr johnson    ELBOW PROCEDURE  2016    ENDOSCOPY  12/06/2012    Dr. Johnson; food in stomach, gastritis    EXCISION LESION  1994    BACK CYST BENGIN    EYE SURGERY  12/2012    GLAUCOMA SURGERY Bilateral 1995    laser surgery    HARDWARE REMOVAL Left     ELBOW    HYSTERECTOMY  1986    INTERVENTIONAL RADIOLOGY PROCEDURE N/A 4/20/2023    Procedure: Intravascular Ultrasound;  Surgeon: Rikki Oconnor MD;  Location: [facility] CATH INVASIVE LOCATION;  Service: Cardiology;  Laterality: N/A;    ORIF ELBOW FRACTURE Left     PACEMAKER IMPLANTATION      PACEMAKER REPLACEMENT  06/2013      General Information       Row Name 08/29/23 1635          Physical Therapy Time and Intention    Document Type therapy note (daily note)  -     Mode of Treatment individual therapy;physical therapy  -               User Key  (r) = Recorded By, (t) = Taken By, (c) = Cosigned By      Initials Name Provider Type     Beulah Reich, PT Physical Therapist                   Mobility       Row Name 08/29/23 1635          Bed Mobility    Rolling Left [place] (Bed Mobility) minimum assist (75% patient effort)  -     Sidelying-Sit [place] (Bed Mobility) contact guard  -     Sit-Sidelying [place] (Bed Mobility) contact guard  -       Row Name 08/29/23 1635          Sit-Stand Transfer    Sit-Stand [place] (Transfers) contact guard  -     Assistive Device (Sit-Stand Transfers) walker, front-wheeled  -       Row Name 08/29/23 1635          Gait/Stairs (Locomotion)    [place] Level (Gait) 2 person assist  -     Assistive Device (Gait) walker, front-wheeled  -     Distance in Feet (Gait) 20 ft  -     Deviations/Abnormal Patterns (Gait)  antalgic;stride length decreased;gait speed decreased  -               User Key  (r) = Recorded By, (t) = Taken By, (c) = Cosigned By      Initials Name Provider Type    Beulah Bernardo PT Physical Therapist                   Obj/Interventions       Row Name 08/29/23 1636          Motor Skills    Therapeutic Exercise --  BLE AP, LAQ, marching, SLR x 10 reps  -               User Key  (r) = Recorded By, (t) = Taken By, (c) = Cosigned By      Initials Name Provider Type    Beulah Bernardo PT Physical Therapist                   Goals/Plan    No documentation.                  Clinical Impression       Row Name 08/29/23 1637          Pain    Pain Location - Side/Orientation Bilateral  -ILSA     Pain Location lower  -ILSA     Pain Location - extremity  -KH       Row Name 08/29/23 1637          Pain Scale: FACES Pre/Post-Treatment    Pain: FACES Scale, Pretreatment 0-->no hurt  -     Posttreatment Pain Rating 2-->hurts little bit  -       Row Name 08/29/23 1637          Plan of Care Review    Plan of Care Reviewed With patient  -     Outcome Evaluation Pt was in bed, family in room. Pt required less assistance for all mobiltiy today. She was able to ambulate a short distance in the room today with CGA and RWx. Ambualtion distance was limited by fatigue and BLE pain. Reccommend SNF, but pt plans home with HHPT and family to assist.  -[facility] Name 08/29/23 1637          Positioning and Restraints    Pre-Treatment Position in bed  -     Post Treatment Position bed  -     In Bed fowlers;call light within reach;encouraged to call for assist;exit alarm on;with family/caregiver  -               User Key  (r) = Recorded By, (t) = Taken By, (c) = Cosigned By      Initials Name Provider Type    Beulah Bernardo PT Physical Therapist                   Outcome Measures       Row Name 08/29/23 1639          How much help from another person do you currently need...    Turning from  your back to your side while in flat bed without using bedrails? 4  -KH     Moving from lying on back to sitting on the side of a flat bed without bedrails? 3  -KH     Moving to and from a bed to a chair (including a wheelchair)? 3  -KH     Standing up from a chair using your arms (e.g., wheelchair, bedside chair)? 3  -KH     Climbing 3-5 steps with a railing? 2  -KH     To walk in hospital room? 3  -KH     AM-PAC 6 Clicks Score (PT) 18  -KH     Highest level of mobility 6 --> Walked 10 steps or more  -       Row Name 08/29/23 1639 08/29/23 1504       Functional Assessment    Outcome Measure Options AM-PAC 6 Clicks Basic Mobility (PT)  - AM-PAC 6 Clicks Daily Activity (OT)  -              User Key  (r) = Recorded By, (t) = Taken By, (c) = Cosigned By      Initials Name Provider Type     Beulah Reich, PT Physical Therapist    Farida Tejada, OT Occupational Therapist                                 Physical Therapy Education       Title: PT OT SLP Therapies (In Progress)       Topic: Physical Therapy (In Progress)       Point: Mobility training (In Progress)       Learning Progress Summary             Patient Acceptance, E, NR by  at 8/27/2023 1811   Family Acceptance, E, NR by  at 8/27/2023 1811                         Point: Home exercise program (In Progress)       Learning Progress Summary             Patient Acceptance, E, NR by  at 8/27/2023 1811   Family Acceptance, E, NR by  at 8/27/2023 1811                         Point: Body mechanics (In Progress)       Learning Progress Summary             Patient Acceptance, E, NR by  at 8/27/2023 1811   Family Acceptance, E, NR by  at 8/27/2023 1811                                         User Key       Initials Effective Dates Name Provider Type Discipline     07/11/23 -  Milena Muñoz, PT Physical Therapist PT                  PT Recommendation and Plan     Plan of Care Reviewed With: patient  Outcome Evaluation: Pt was in  bed, family in room. Pt required less assistance for all mobiltiy today. She was able to ambulate a short distance in the room today with CGA and RWx. Ambualtion distance was limited by fatigue and BLE pain. Reccommend SNF, but pt plans home with HHPT and family to assist.     Time Calculation:         PT Charges       Row Name 08/29/23 1640             Time Calculation    Start Time 1521  -KH      Stop Time 1536  -KH      Time Calculation (min) 15 min  -KH      PT Received On 08/29/23  -KH      PT - Next Appointment 08/30/23  -KH         Time Calculation- PT    Total Timed Code Minutes- PT 15 minute(s)  -KH         Timed Charges    94688 - PT Therapeutic Activity Minutes 15  -KH         Total Minutes    Timed Charges Total Minutes 15  -KH       Total Minutes 15  -KH                User Key  (r) = Recorded By, (t) = Taken By, (c) = Cosigned By      Initials Name Provider Type    Beulah Bernardo PT Physical Therapist                  Therapy Charges for Today       Code Description Service Date Service Provider Modifiers Qty    15967300701 HC PT THERAPEUTIC ACT EA 15 MIN 8/28/2023 Beulah Reich, PT GP 2    93887146714 HC PT THERAPEUTIC ACT EA 15 MIN 8/29/2023 Beulah Reich, PT GP 1            PT G-Codes  Outcome Measure Options: AM-PAC 6 Clicks Basic Mobility (PT)  AM-PAC 6 Clicks Score (PT): 18  AM-PAC 6 Clicks Score (OT): 16  PT Discharge Summary  Anticipated Discharge Disposition (PT): home with assist, skilled nursing facility    Beulah Reich PT  8/29/2023

## 2023-08-29 NOTE — CONSULTS
.            [facility] Palliative Care Services    Palliative Care Initial Consult   Attending Physician: Ronn Collins MD  Referring Provider: Dr Ferreira    Reason for Referral: assistance with clarification of goals of care  Family/Support: children ( Yanet, Armaan and Cecil)    Code Status and Medical Interventions:   Ordered at: 08/26/23 1318     Level Of Support Discussed With:    Patient     Code Status (Patient has no pulse and is not breathing):    CPR (Attempt to Resuscitate)     Medical Interventions (Patient has pulse or is breathing):    Full Support     Goals of Care: To be treated for acute conditions and return to home setting.     HPI:   88 y.o. female with history of paroxysmal atrial fibrillation (on Eliquis), high grade heart block s/p dual-chamber pacemaker, CAD s/p stent placement, HLD, HTN, hypothyroidism, sleep apnea, diabetes mellitus type II, and legally blind. She resided at home prior to admission.   Patient presented to [facility] on 8/26/2023 related to severe persistent weakness and confusion. Workup in ER, found her hypoxic (oxygen saturation 85%), elevated proBNP concerning for congestive heart failure. Consults were placed cardiology.  She was initially given IV Bumex and switched to PO. In addition, urine micro RBC TNTC, WBC TNTC and 4+ bacteria. She was initially started on IV ceftriaxone. Urine culture confirmed e-coli and she was switched to oral bactrim. The palliative care team was consulted for support with goals of care conversation secondary to chronic illness, age and hospital admissions.   Palliative Care Spoke With: patient and family  Quality of life: good  Functional Status: Pt required min A to get to EOB. She stood and ambulated a few feet with CGA x2   Due to the Palliative Care Topics Discussed: palliative care, goals of care, care options, resuscitation status, and discharge options we will establish an advance care plan.   Advance  "Care Planning   Advance Care Planning Discussion: see below          Review of Systems   Constitutional: Positive for malaise/fatigue. Negative for decreased appetite.   Cardiovascular:  Positive for leg swelling. Negative for chest pain.   Respiratory:  Positive for shortness of breath (sometimes).    Gastrointestinal:  Negative for abdominal pain and nausea.   Neurological:  Positive for weakness.   Psychiatric/Behavioral:  Negative for depression. The patient is not nervous/anxious.      1- Pain Assessment  Nonverbal Indicators of Pain: nonverbal indicators absent  Pain Location: head  Pain Description: constant    Past Medical History:   Diagnosis Date    Abnormal vision     SEES \"KALEIDOSCOPE\"    Allergy to adhesive tape     Arthralgia     AVB (atrioventricular block)     Balance problem     Carotid body tumor     LEFT    Difficulty walking     H/O complete eye exam 10/2016    Headache     OFF AND ON BACK OF HEAD, DOWN NECK TO SHOULDER    History of glaucoma     History of poliomyelitis     AGE 9    History of surgery on arm     left arm    HLD (hyperlipidemia)     Hypertension     Hypothyroidism, acquired     Legal blindness     SOME PERIPHERAL VISION ON LEFT, SOME VISION ON RIGHT    Memory loss     SOME SHORT TERM    Migraine     Numbness     LEFT LEG     Osteoarthritis, multiple sites     Osteoporosis     Pacemaker     Rectal bleeding     X1, WITH BM    Sleep apnea     DOES NOT USE A MACHINE    TIA (transient ischemic attack) 12/24/2018    ?, HAD SPELL UNABLE TO TALK    Type 2 diabetes mellitus     Type 2 diabetes mellitus, uncontrolled      Past Surgical History:   Procedure Laterality Date    APPENDECTOMY  1989    CARDIAC CATHETERIZATION N/A 4/13/2023    Procedure: Left Heart Cath;  Surgeon: Rikki Oconnor MD;  Location: [facility] INVASIVE LOCATION;  Service: Cardiology;  Laterality: N/A;    CARDIAC CATHETERIZATION N/A 4/13/2023    Procedure: Coronary angiography;  Surgeon: Rikki Oconnor MD;  Location: [facility]" CATH INVASIVE LOCATION;  Service: Cardiology;  Laterality: N/A;    CARDIAC CATHETERIZATION N/A 4/13/2023    Procedure: Left ventriculography;  Surgeon: Rikki Oconnor MD;  Location: [facility] CATH INVASIVE LOCATION;  Service: Cardiology;  Laterality: N/A;    CARDIAC CATHETERIZATION N/A 4/20/2023    Procedure: Percutaneous Coronary Intervention;  Surgeon: Rikki Oconnor MD;  Location: [facility] CATH INVASIVE LOCATION;  Service: Cardiology;  Laterality: N/A;    CARDIAC CATHETERIZATION N/A 4/20/2023    Procedure: Atherectomy-coronary;  Surgeon: Rikki Oconnor MD;  Location: [facility] CATH INVASIVE LOCATION;  Service: Cardiology;  Laterality: N/A;    CARDIAC CATHETERIZATION N/A 4/20/2023    Procedure: Stent CAROL coronary;  Surgeon: Rikki Oconnor MD;  Location: [facility] CATH INVASIVE LOCATION;  Service: Cardiology;  Laterality: N/A;    CARDIAC PACEMAKER PLACEMENT  11/25/2004    CAROTID ENDARTERECTOMY Left 12/13/2019    Procedure: LEFT CAROTID BODY TUMOR RESECTION;  Surgeon: Bryan Severino MD;  Location: [facility] MAIN OR;  Service: Vascular    CATARACT EXTRACTION Bilateral 1997    CEREBRAL ANGIOGRAM Bilateral 12/6/2019    Procedure: CAROTID ARTERIOGRAM BILATERAL, RIGHT WRIST APPROACH;  Surgeon: Bryan Severino MD;  Location: [facility] HYBRID OR 18/19;  Service: Vascular    CHOLECYSTECTOMY  1989    COLONOSCOPY  2013    dr montes    ELBOW PROCEDURE  2016    ENDOSCOPY  12/06/2012    Dr. Montes; food in stomach, gastritis    EXCISION LESION  1994    BACK CYST BENGIN    EYE SURGERY  12/2012    GLAUCOMA SURGERY Bilateral 1995    laser surgery    HARDWARE REMOVAL Left     ELBOW    HYSTERECTOMY  1986    INTERVENTIONAL RADIOLOGY PROCEDURE N/A 4/20/2023    Procedure: Intravascular Ultrasound;  Surgeon: Rikki Oconnor MD;  Location: [facility] CATH INVASIVE LOCATION;  Service: Cardiology;  Laterality: N/A;    ORIF ELBOW FRACTURE Left     PACEMAKER IMPLANTATION      PACEMAKER REPLACEMENT  06/2013     Social History     Socioeconomic History    Marital status:     Tobacco Use    Smoking status: Never    Smokeless tobacco: Never   Vaping Use    Vaping Use: Never used   Substance and Sexual Activity    Alcohol use: No    Drug use: Never    Sexual activity: Defer       Current Facility-Administered Medications   Medication Dose Route Frequency Provider Last Rate Last Admin    acetaminophen (TYLENOL) tablet 650 mg  650 mg Oral Q4H PRN Jj Navarrete MD   650 mg at 08/29/23 0552    Or    acetaminophen (TYLENOL) 160 MG/5ML solution 650 mg  650 mg Oral Q4H PRN Jj Navarrete MD        Or    acetaminophen (TYLENOL) suppository 650 mg  650 mg Rectal Q4H PRN Jj Navarrete MD        apixaban (ELIQUIS) tablet 5 mg  5 mg Oral BID Jj Navarrete MD   5 mg at 08/29/23 0850    bumetanide (BUMEX) tablet 1 mg  1 mg Oral Daily Rikki Oconnor MD   1 mg at 08/29/23 0849    Calcium Replacement - Follow Nurse / BPA Driven Protocol   Does not apply PRN Jj Navarrete MD        clopidogrel (PLAVIX) tablet 75 mg  75 mg Oral Daily Jj Navarrete MD   75 mg at 08/29/23 0849    dextrose (D50W) (25 g/50 mL) IV injection 25 g  25 g Intravenous Q15 Min PRN Andrew Gonzalez MD        dextrose (GLUTOSE) oral gel 15 g  15 g Oral Q15 Min PRN Andrew Gonzalez MD        docusate sodium (COLACE) capsule 100 mg  100 mg Oral BID PRN Jj Navarrete MD        famotidine (PEPCID) tablet 10 mg  10 mg Oral BID PRN Jj Navarrete MD        gabapentin (NEURONTIN) capsule 100 mg  100 mg Oral Nightly Jj Navarrete MD   100 mg at 08/28/23 2033    glucagon (GLUCAGEN) injection 1 mg  1 mg Intramuscular Q15 Min PRN Andrew Gonzalez MD        insulin glargine (LANTUS, SEMGLEE) injection 8 Units  8 Units Subcutaneous BID HosAndrew dyson MD   8 Units at 08/29/23 0850    insulin lispro (HUMALOG/ADMELOG) injection 2-9 Units  2-9 Units Subcutaneous 4x Daily AC & at Bedtime Andrew Gonzalez MD        labetalol  (NORMODYNE,TRANDATE) injection 10 mg  10 mg Intravenous Q1H PRN Andrew Gonzalez MD        levothyroxine (SYNTHROID, LEVOTHROID) tablet 75 mcg  75 mcg Oral Daily Jj Navarrete MD   75 mcg at 08/29/23 0849    losartan (COZAAR) tablet 100 mg  100 mg Oral Q24H Andrew Gonzalez MD   100 mg at 08/29/23 0849    Magnesium Standard Dose Replacement - Follow Nurse / BPA Driven Protocol   Does not apply PRN Jj Navarrete MD        melatonin tablet 1 mg  1 mg Oral Nightly Jj Navarrete MD   1 mg at 08/28/23 2040    NIFEdipine XL (PROCARDIA XL) 24 hr tablet 30 mg  30 mg Oral Q24H Rikki Oconnor MD        nystatin (MYCOSTATIN) powder   Topical Q12H Andrew Gonzalez MD   Given at 08/29/23 0850    ondansetron (ZOFRAN) tablet 4 mg  4 mg Oral Q6H PRN Jj Navarrete MD        Or    ondansetron (ZOFRAN) injection 4 mg  4 mg Intravenous Q6H PRN Jj Navarrete MD        Phosphorus Replacement - Follow Nurse / BPA Driven Protocol   Does not apply PRN Jj Navarrete MD        Potassium Replacement - Follow Nurse / BPA Driven Protocol   Does not apply Jj Waggoner MD        pravastatin (PRAVACHOL) tablet 20 mg  20 mg Oral Nightly Jj Navarrete MD   20 mg at 08/28/23 2033    sodium chloride 0.9 % flush 3 mL  3 mL Intravenous Q12H Jj Navarrete MD   3 mL at 08/28/23 2034    sodium chloride 0.9 % flush 3-10 mL  3-10 mL Intravenous PRN Jj Navarrete MD        sodium chloride 0.9 % infusion 40 mL  40 mL Intravenous PRN Jj Navarrete MD        sulfamethoxazole-trimethoprim (BACTRIM DS,SEPTRA DS) 800-160 MG per tablet 1 tablet  1 tablet Oral Q12H Andrew Gonzalez MD   1 tablet at 08/29/23 0849    vitamin B-12 (CYANOCOBALAMIN) tablet 500 mcg  500 mcg Oral Daily Jj Navarrete MD   500 mcg at 08/29/23 0849          acetaminophen **OR** acetaminophen **OR** acetaminophen    Calcium Replacement -  "Follow Nurse / BPA Driven Protocol    dextrose    dextrose    docusate sodium    famotidine    glucagon (human recombinant)    labetalol    Magnesium Standard Dose Replacement - Follow Nurse / BPA Driven Protocol    ondansetron **OR** ondansetron    Phosphorus Replacement - Follow Nurse / BPA Driven Protocol    Potassium Replacement - Follow Nurse / BPA Driven Protocol    sodium chloride    sodium chloride    Allergies   Allergen Reactions    Adhesive Tape Other (See Comments)     REDNESS, SKIN BURN    Latex Other (See Comments)     REDNESS, SKIN BURN    Propoxyphene Itching     Attest that current medications reviewed  including but not limited to prescriptions, over-the counter, herbals and vitamin/mineral/dietary (nutritional) supplements for name, route of administration, type, dose and frequency and are current using all immediate resources available at time of dictation.      Intake/Output Summary (Last 24 hours) at 8/29/2023 0916  Last data filed at 8/29/2023 0900  Gross per 24 hour   Intake 240 ml   Output --   Net 240 ml       Physical Exam:    Diagnostics: Reviewed  /71 (BP Location: Right arm, Patient Position: Lying)   Pulse 67   Temp 98 °F (36.7 °C) (Oral)   Resp 16   Ht 144.8 cm (57\")   Wt 70.7 kg (155 lb 13.8 oz)   SpO2 98%   BMI 33.73 kg/m²     Constitutional:       Appearance: Not in distress. Chronically ill-appearing.      Comments: Elderly    Eyes:      Comments: Legally blind    Pulmonary:      Effort: Pulmonary effort is normal.      Breath sounds: Normal breath sounds.   Cardiovascular:      PMI at left midclavicular line. Normal rate. Regular rhythm.      Murmurs: There is no murmur.   Edema:     Peripheral edema absent.   Abdominal:      General: Bowel sounds are normal.      Palpations: Abdomen is soft.      Tenderness: There is no abdominal tenderness.   Neurological:      Mental Status: Alert and oriented to person, place, and time.   Psychiatric:         Attention and " Perception: Attention normal.         Mood and Affect: Mood and affect normal.         Speech: Speech normal.         Behavior: Behavior is cooperative.         Thought Content: Thought content normal.         Cognition and Memory: Cognition normal.         Judgment: Judgment normal.     Patient status: Disease state: Controlled with current treatments.  Functional status: Palliative Performance Scale Score: Performance 60% based on the following measures: Ambulation: Reduced, Activity and Evidence of Disease: Unable to do hobby or some work, significant evidence of disease, Self-Care: Occasional assist necessary,  Intake: Normal or reduced, LOC: Full or confusion   Nutritional status: Albumin 3.9 on 8/26/2023  Body mass index is 33.73 kg/m².    Family support: The patient receives support from her children..  Advance Directives: Advance Directive Status: Patient has advance directive, copy in chart   POA/Healthcare surrogate-daughterYanet.       Impression/Problem List:    Acute on chronic diastolic heart failure  E-coli UTI    Paroxysmal atrial fibrillation   Coronary artery disease s/p stents  Sick sinus syndrome s/p dual chamber pacemaker  Diabetes mellitus type II   Legally blind        Recommendations/Plan:  1. Provide support with goals of care  2. DNR/DNI         2.  Palliative care encounter  I spoke with patient and two of three children (Cecil and Yanet). Of note, Yanet was on the telephone and she is the patient HCS/POA. They all have a fairly good understanding of patient medical conditions, which we reviewed. The patient lives alone but her children share the task of being with every day from 9-5 to assist with medications, meals and bathing if warranted. She is legally blind. They also have a camera system set up to keep on eye on her during the night when they are not present. The plan at this time is for patient to return to home setting with continued support of her children. She is uncertain  if she wants home health (PT/OT) as she recently had them and felt like she was back to baseline. She uses a rolling walker with seat. I provided information regarding palliative care and [facility] as additional resources. They will review and let me know if they desires their services. Also provided information for clarity purposes regarding [facility], based on questions from patient daughter. We also discussed CODE status and medical interventions. We reviewed risk vs benefits. The patient wishes to be DNR/DNI and her family supports her decision as well. I updated chart to reflect her wishes and informed RN as well. The patient reports overall good quality of life. She denies pain or shortness of breath on examination. Also denies any spiritual concerns. I spoke with LORY Méndez.       Thank you for this consult and allowing us to participate in patient's plan of care. Palliative Care Team will sign off and see PRN.     Time spent:60 minutes spent reviewing medical and medication records, assessing and examining patient, discussing with family, answering questions, providing some guidance about a plan and documentation of care, and coordinating care with other healthcare members, with > 50% time spent face to face.   30  minutes spent on advance care planning.    Maryann Ponce, APRN  8/29/2023  09:16 EDT

## 2023-08-29 NOTE — PLAN OF CARE
Goal Outcome Evaluation:  Plan of Care Reviewed With: patient, family           Outcome Evaluation: Pt participated in OT today with session focus on up to the bathroom, toileting, standing grooming at sink. Pt with decreased endurance and requesting back to bed shortly following ADL engagmenent. OT encouraged pt up to the chair to spend some time out of bed today but she declines. Pt needed min A for toileting. Visual deficits limiting and she needs assist and cues for safely navigating her enviroment. Pt remains a falls risk and is home alone after 5pm until the morning. Family member also concerned with weakness but per chart pt and family plan to dc home and are declining HH or SNF. Pt unable to scoot up or reposition self up in the bed and requires X2 person assist with draw sheet. OT continues to recommend SNF vs caregiver assist 24/7.      Anticipated Discharge Disposition (OT): home with 24/7 care, skilled nursing facility

## 2023-08-29 NOTE — CASE MANAGEMENT/SOCIAL WORK
Discharge Planning Assessment  [facility]     Patient Name: Roland Russell  MRN: [id number]  Today's Date: 8/29/2023    Admit Date: 8/26/2023    Plan: Home with family   Discharge Needs Assessment       Row Name 08/29/23 1402       Living Environment    People in Home alone    Current Living Arrangements home    Potentially Unsafe Housing Conditions none    Primary Care Provided by self    Provides Primary Care For no one, unable/limited ability to care for self    Family Caregiver if Needed child(dima), adult    Quality of Family Relationships helpful;involved;supportive       Resource/Environmental Concerns    Resource/Environmental Concerns none       Transition Planning    Patient/Family Anticipates Transition to home with family    Patient/Family Anticipated Services at Transition none    Transportation Anticipated family or friend will provide       Discharge Needs Assessment    Readmission Within the Last 30 Days no previous admission in last 30 days    Equipment Currently Used at Home grab bar;shower chair;rollator    Concerns to be Addressed no discharge needs identified;denies needs/concerns at this time    Equipment Needed After Discharge none                   Discharge Plan       Row Name 08/29/23 1404       Plan    Plan Home with family    Plan Comments CCP met with patient and patient's son, Cecil at bedside. CCP role explained and discharge planning discussed. Face sheet verified and IMM noted. Patient's PCP is Dr. Rosario. Patient lives alone and her bedroom and bathroom are on the main level. Patient's family is in the home daily from 9:30-5:00 P.M. Patient has grab bar and shower chair present. Patient uses a rollator for mobility. Patient has used [facility] in the past and has been to [place] [facility]. Patient and family plan for patient to return home at discharge and do not feel like home health is needed at this time. CCP will follow for any needs to arise. China KANG                   Continued Care and Services - Admitted Since 8/26/2023    Coordination has not been started for this encounter.       Expected Discharge Date and Time       Expected Discharge Date Expected Discharge Time    Aug 31, 2023            Demographic Summary       Row Name 08/29/23 1400       General Information    Admission Type inpatient    Arrived From emergency department    Required Notices Provided Important Message from Medicare    Referral Source admission list    Reason for Consult discharge planning    Preferred Language English                   Functional Status       Row Name 08/29/23 1400       Functional Status    Usual Activity Tolerance moderate    Current Activity Tolerance moderate       Functional Status, IADL    Medications assistive equipment and person    Meal Preparation assistive equipment and person    Housekeeping assistive equipment and person    Laundry assistive equipment and person    Shopping assistive equipment and person       Mental Status    General Appearance WDL WDL       Mental Status Summary    Recent Changes in Mental Status/Cognitive Functioning no changes                   Psychosocial    No documentation.                  Abuse/Neglect    No documentation.                  Legal    No documentation.                  Substance Abuse    No documentation.                  Patient Forms    No documentation.                     PEARL Garcia

## 2023-08-29 NOTE — PLAN OF CARE
Goal Outcome Evaluation:              Outcome Evaluation: VSS. Alert and oriented x4. Pt c/o generalized soreness and weakness. Up to toilet x1 assist with walker. Will continue to provide supportive care.

## 2023-08-29 NOTE — THERAPY TREATMENT NOTE
"Patient Name: Roland Russell  : 1934    MRN: [id number]                              Today's Date: 2023       Admit Date: 2023    Visit Dx:     ICD-10-CM ICD-9-CM   1. Generalized weakness  R53.1 780.79   2. Acute congestive heart failure, unspecified heart failure type  I50.9 428.0     Patient Active Problem List   Diagnosis    Legal blindness    Uncontrolled type 2 diabetes mellitus with hypoglycemia without coma    GERD (gastroesophageal reflux disease)    Mixed hyperlipidemia    Hypothyroidism, acquired    Osteoarthritis, multiple sites    Essential hypertension    Senile osteoporosis    Renal insufficiency    T12 compression fracture    Vitamin D deficiency    Severe headache    Carotid body tumor    Glomus jugulare tumor    Sleep apnea, obstructive    Morbidly obese    Abnormal weight gain    Localized edema    Compression fracture of T8 vertebra with delayed healing    PAF (paroxysmal atrial fibrillation)    Dry scalp    Deformity of both feet    Acute encephalopathy    Lactic acidosis    Diverticulitis    Ankle fracture    Type 2 diabetes mellitus with hypoglycemia, with long-term current use of insulin    Abnormal nuclear stress test    Coronary artery disease involving native coronary artery of native heart    S/P right coronary artery (RCA) stent placement    Diastolic CHF, acute    Acute CHF    Type 2 diabetes mellitus with hyperglycemia    Acute cystitis with hematuria    Diabetic polyneuropathy associated with type 2 diabetes mellitus    Presence of cardiac pacemaker    Hypoxia     Past Medical History:   Diagnosis Date    Abnormal vision     SEES \"KALEIDOSCOPE\"    Allergy to adhesive tape     Arthralgia     AVB (atrioventricular block)     Balance problem     Carotid body tumor     LEFT    Difficulty walking     H/O complete eye exam 10/2016    Headache     OFF AND ON BACK OF HEAD, DOWN NECK TO SHOULDER    History of glaucoma     History of poliomyelitis     AGE 9    History of " surgery on arm     left arm    HLD (hyperlipidemia)     Hypertension     Hypothyroidism, acquired     Legal blindness     SOME PERIPHERAL VISION ON LEFT, SOME VISION ON RIGHT    Memory loss     SOME SHORT TERM    Migraine     Numbness     LEFT LEG     Osteoarthritis, multiple sites     Osteoporosis     Pacemaker     Rectal bleeding     X1, WITH BM    Sleep apnea     DOES NOT USE A MACHINE    TIA (transient ischemic attack) 12/24/2018    ?, HAD SPELL UNABLE TO TALK    Type 2 diabetes mellitus     Type 2 diabetes mellitus, uncontrolled      Past Surgical History:   Procedure Laterality Date    APPENDECTOMY  1989    CARDIAC CATHETERIZATION N/A 4/13/2023    Procedure: Left Heart Cath;  Surgeon: Rikki Oconnor MD;  [facility];  Service: Cardiology;  Laterality: N/A;    CARDIAC CATHETERIZATION N/A 4/13/2023    Procedure: Coronary angiography;  Surgeon: Rikki Oconnor MD;  [facility];  Service: Cardiology;  Laterality: N/A;    CARDIAC CATHETERIZATION N/A 4/13/2023    Procedure: Left ventriculography;  Surgeon: Rikki Oconnor MD;  [facility];  Service: Cardiology;  Laterality: N/A;    CARDIAC CATHETERIZATION N/A 4/20/2023    Procedure: Percutaneous Coronary Intervention;  Surgeon: Rikki Oconnor MD;  [facility];  Service: Cardiology;  Laterality: N/A;    CARDIAC CATHETERIZATION N/A 4/20/2023    Procedure: Atherectomy-coronary;  Surgeon: Rikki Oconnor MD;  Location: [facility] CATH INVASIVE LOCATION;  Service: Cardiology;  Laterality: N/A;    CARDIAC CATHETERIZATION N/A 4/20/2023    Procedure: Stent CAROL coronary;  Surgeon: Rikki Oconnor MD;  Location: [facility] CATH INVASIVE LOCATION;  Service: Cardiology;  Laterality: N/A;    CARDIAC PACEMAKER PLACEMENT  11/25/2004    CAROTID ENDARTERECTOMY Left 12/13/2019    Procedure: LEFT CAROTID BODY TUMOR RESECTION;  Surgeon: Bryan Severino MD;  Location: [facility] MAIN OR;  Service: Vascular     CATARACT EXTRACTION Bilateral 1997    CEREBRAL ANGIOGRAM Bilateral 12/6/2019    Procedure: CAROTID ARTERIOGRAM BILATERAL, RIGHT WRIST APPROACH;  Surgeon: Bryan Severino MD;  Location: [facility] HYBRID OR 18/19;  Service: Vascular    CHOLECYSTECTOMY  1989    COLONOSCOPY  2013    dr johnson    ELBOW PROCEDURE  2016    ENDOSCOPY  12/06/2012    Dr. Johnson; food in stomach, gastritis    EXCISION LESION  1994    BACK CYST BENGIN    EYE SURGERY  12/2012    GLAUCOMA SURGERY Bilateral 1995    laser surgery    HARDWARE REMOVAL Left     ELBOW    HYSTERECTOMY  1986    INTERVENTIONAL RADIOLOGY PROCEDURE N/A 4/20/2023    Procedure: Intravascular Ultrasound;  Surgeon: Rikki Oconnor MD;  Location: [facility] CATH INVASIVE LOCATION;  Service: Cardiology;  Laterality: N/A;    ORIF ELBOW FRACTURE Left     PACEMAKER IMPLANTATION      PACEMAKER REPLACEMENT  06/2013      General Information       Row Name 08/29/23 1456          OT Time and Intention    Document Type therapy note (daily note)  -     Mode of Treatment occupational therapy;individual therapy  -       Row Name 08/29/23 1456          General Information    Patient Profile Reviewed yes  -SM     Existing Precautions/Restrictions fall  -SM     Barriers to Rehab visual deficit  -SM       Row Name 08/29/23 1456          Cognition    Orientation Status (Cognition) oriented x 3  -SM       Row Name 08/29/23 1456          Safety Issues, Functional Mobility    Safety Issues Affecting Function (Mobility) insight into deficits/self-awareness  -SM     Impairments Affecting Function (Mobility) balance;endurance/activity tolerance;strength;visual/perceptual  -SM               User Key  (r) = Recorded By, (t) = Taken By, (c) = Cosigned By      Initials Name Provider Type    SM Farida Bedoya OT Occupational Therapist                     Mobility/ADL's       Row Name 08/29/23 1457          Bed Mobility    Bed Mobility scooting/bridging  -     Scooting/Bridging [place] (Bed Mobility) 2  person assist;moderate assist (50% patient effort)  -     Sidelying-Sit [place] (Bed Mobility) contact guard  -     Sit-Sidelying [place] (Bed Mobility) contact guard  -     Assistive Device (Bed Mobility) head of bed elevated;bed rails  -       Row Name 08/29/23 1457          Transfers    Transfers toilet transfer  -       Row Name 08/29/23 1457          Sit-Stand Transfer    Sit-Stand [place] (Transfers) contact guard  -     Assistive Device (Sit-Stand Transfers) walker, front-wheeled  -       Row Name 08/29/23 1457          Toilet Transfer    [place] Level (Toilet Transfer) contact guard  -     Assistive Device (Toilet Transfer) walker, front-wheeled;commode, bedside without drop arms  -[facility] Name 08/29/23 1457          Functional Mobility    Functional Mobility- Ind. Level contact guard assist  -     Functional Mobility-Distance (Feet) --  25  -[facility] Name 08/29/23 1457          Activities of Daily Living    BADL Assessment/Intervention toileting;grooming  -[facility] Name 08/29/23 1457          Toileting Assessment/Training    [place] Level (Toileting) minimum assist (75% patient effort);adjust/manage clothing  -     Assistive Devices (Toileting) raised toilet seat  -     Comment, (Toileting) pt unable to reach down and grab brief when it fell to the floor when she stood up, also she has difficulty pulling up pants  -[facility] Name 08/29/23 1457          Grooming Assessment/Training    [place] Level (Grooming) oral care regimen;wash face, hands;set up;standby assist  -     Position (Grooming) sink side;supported standing  -     Comment, (Grooming) 2/2 visual deficits  -               User Key  (r) = Recorded By, (t) = Taken By, (c) = Cosigned By      Initials Name Provider Type     Farida Bedoya OT Occupational Therapist                   Obj/Interventions       Row Name 08/29/23 1459          Motor Skills    Functional Endurance  poor  -       Row Name 08/29/23 1459          Balance    Static Sitting Balance supervision  -     Position, Sitting Balance sitting edge of bed  -     Static Standing Balance standby assist  -     Dynamic Standing Balance contact guard  -     Position/Device Used, Standing Balance supported;walker, rolling  -               User Key  (r) = Recorded By, (t) = Taken By, (c) = Cosigned By      Initials Name Provider Type     Farida Bedoya OT Occupational Therapist                   Goals/Plan    No documentation.                  Clinical Impression       Row Name 08/29/23 1501          Pain Assessment    Pre/Posttreatment Pain Comment reports pain in BLE with mobility  -     Pain Intervention(s) Repositioned;Rest  -       Row Name 08/29/23 1501          Pain Scale: FACES Pre/Post-Treatment    Pain: FACES Scale, Pretreatment 2-->hurts little bit  -     Posttreatment Pain Rating 2-->hurts little bit  -       Row Name 08/29/23 1501          Plan of Care Review    Plan of Care Reviewed With patient;family  -     Outcome Evaluation Pt participated in OT today with session focus on up to the bathroom, toileting, standing grooming at sink. Pt with decreased endurance and requesting back to bed shortly following ADL engagmenent. OT encouraged pt up to the chair to spend some time out of bed today but she declines. Pt needed min A for toileting. Visual deficits limiting and she needs assist and cues for safely navigating her enviroment. Pt remains a falls risk and is home alone after 5pm until the morning. Family member also concerned with weakness but per chart pt and family plan to dc home and are declining HH or SNF. Pt unable to scoot up or reposition self up in the bed and requires X2 person assist with draw sheet. OT continues to recommend SNF vs caregiver assist 24/7.  -       Row Name 08/29/23 1501          Therapy Plan Review/Discharge Plan (OT)    Anticipated Discharge Disposition (OT)  home with 24/7 care;skilled nursing facility  -       Row Name 08/29/23 1501          Positioning and Restraints    Pre-Treatment Position in bed  -     Post Treatment Position bed  -SM     In Bed fowlers;call light within reach;encouraged to call for assist;exit alarm on;notified nsg;with family/caregiver  -               User Key  (r) = Recorded By, (t) = Taken By, (c) = Cosigned By      Initials Name Provider Type    Farida Tejada OT Occupational Therapist                   Outcome Measures       Row Name 08/29/23 1504          How much help from another is currently needed...    Putting on and taking off regular lower body clothing? 2  -SM     Bathing (including washing, rinsing, and drying) 2  -SM     Toileting (which includes using toilet bed pan or urinal) 3  -SM     Putting on and taking off regular upper body clothing 3  -SM     Taking care of personal grooming (such as brushing teeth) 3  -SM     Eating meals 3  -SM     AM-PAC 6 Clicks Score (OT) 16  -       Row Name 08/29/23 1504          Functional Assessment    Outcome Measure Options AM-PAC 6 Clicks Daily Activity (OT)  -               User Key  (r) = Recorded By, (t) = Taken By, (c) = Cosigned By      Initials Name Provider Type    Farida Tejada OT Occupational Therapist                    Occupational Therapy Education       Title: PT OT SLP Therapies (In Progress)       Topic: Occupational Therapy (In Progress)       Point: ADL training (Done)       Description:   Instruct learner(s) on proper safety adaptation and remediation techniques during self care or transfers.   Instruct in proper use of assistive devices.                  Learning Progress Summary             Patient Acceptance, E, VU by  at 8/28/2023 1814    Comment: james planning, ways to improve falls risk in the home and at night                         Point: Home exercise program (Not Started)       Description:   Instruct learner(s) on appropriate technique  for monitoring, assisting and/or progressing therapeutic exercises/activities.                  Learner Progress:  Not documented in this visit.              Point: Precautions (Not Started)       Description:   Instruct learner(s) on prescribed precautions during self-care and functional transfers.                  Learner Progress:  Not documented in this visit.              Point: Body mechanics (Not Started)       Description:   Instruct learner(s) on proper positioning and spine alignment during self-care, functional mobility activities and/or exercises.                  Learner Progress:  Not documented in this visit.                              User Key       Initials Effective Dates Name Provider Type Discipline     04/02/20 -  Farida Bedoya OT Occupational Therapist OT                  OT Recommendation and Plan  Planned Therapy Interventions (OT): activity tolerance training, functional balance retraining, occupation/activity based interventions, patient/caregiver education/training, transfer/mobility retraining, strengthening exercise, ROM/therapeutic exercise  Therapy Frequency (OT): 5 times/wk  Plan of Care Review  Plan of Care Reviewed With: patient, family  Outcome Evaluation: Pt participated in OT today with session focus on up to the bathroom, toileting, standing grooming at sink. Pt with decreased endurance and requesting back to bed shortly following ADL engagmenent. OT encouraged pt up to the chair to spend some time out of bed today but she declines. Pt needed min A for toileting. Visual deficits limiting and she needs assist and cues for safely navigating her enviroment. Pt remains a falls risk and is home alone after 5pm until the morning. Family member also concerned with weakness but per chart pt and family plan to dc home and are declining HH or SNF. Pt unable to scoot up or reposition self up in the bed and requires X2 person assist with draw sheet. OT continues to recommend SNF vs  caregiver assist 24/7.     Time Calculation:   Evaluation Complexity (OT)  Review Occupational Profile/Medical/Therapy History Complexity: expanded/moderate complexity  Assessment, Occupational Performance/Identification of Deficit Complexity: 3-5 performance deficits  Clinical Decision Making Complexity (OT): detailed assessment/moderate complexity  Overall Complexity of Evaluation (OT): moderate complexity     Time Calculation- OT       Row Name 08/29/23 1505             Time Calculation- OT    OT Start Time 1330  -SM      OT Stop Time 1350  -SM      OT Time Calculation (min) 20 min  -SM      Total Timed Code Minutes- OT 20 minute(s)  -SM      OT Received On 08/29/23  -SM      OT - Next Appointment 08/30/23  -         Timed Charges    64590 - OT Self Care/Mgmt Minutes 20  -SM         Total Minutes    Timed Charges Total Minutes 20  -SM       Total Minutes 20  -SM                User Key  (r) = Recorded By, (t) = Taken By, (c) = Cosigned By      Initials Name Provider Type     Farida Bedoya, OT Occupational Therapist                  Therapy Charges for Today       Code Description Service Date Service Provider Modifiers Qty    83047552350  OT THERAPEUTIC ACT EA 15 MIN 8/28/2023 Farida Bedoya OT GO 1    [id number]  OT EVAL MOD COMPLEXITY 2 8/28/2023 Farida Bedoya OT GO 1    [id number]  OT SELF CARE/MGMT/TRAIN EA 15 MIN 8/29/2023 Farida Bedoya OT GO 1                 Farida Bedoya OT  8/29/2023

## 2023-08-29 NOTE — PROGRESS NOTES
"    Patient Name: Roland Russell  :1934  88 y.o.      Patient Care Team:  Shawanda Rosario MD as PCP - General (Internal Medicine)  Tevin Albrecht MD as Consulting Physician (Cardiac Electrophysiology)    Chief Complaint:   Weakness    Interval History:   Hypoxic during sleep, otherwise no complaints.    Objective   Vital Signs  Temp:  [97.7 °F (36.5 °C)-98 °F (36.7 °C)] 98 °F (36.7 °C)  Heart Rate:  [63-70] 67  Resp:  [16-17] 16  BP: (111-175)/(52-71) 175/71  No intake or output data in the 24 hours ending 23 0805    Flowsheet Rows      Flowsheet Row First Filed Value   Admission Height 144.8 cm (57\") Documented at 2023 0956   Admission Weight 70.7 kg (155 lb 13.8 oz) Documented at 2023 1538            GEN: no distress, alert and oriented  HEENT: NACT, EOMI, moist mucous membranes  Lungs: CTAB, no wheezes, rales or rhonchi  CV: normal rate, regular rhythm, normal S1, S2, no murmurs, +2 radial pulses b/l, no carotid bruit  Abdomen: soft, nontender, nondistended, NABS  Extremities: no edema  Skin: no rash, warm, dry  Heme/Lymph: no bruising  Psych: organized thought, normal behavior and affect    Results Review:    Results from last 7 days   Lab Units 23  0557   SODIUM mmol/L 135*   POTASSIUM mmol/L 4.5   CHLORIDE mmol/L 97*   CO2 mmol/L 25.7   BUN mg/dL 21   CREATININE mg/dL 0.97   GLUCOSE mg/dL 181*   CALCIUM mg/dL 9.7     Results from last 7 days   Lab Units 23  1356 23  1204   HSTROP T ng/L 33* 40*     Results from last 7 days   Lab Units 23  0542   WBC 10*3/mm3 7.98   HEMOGLOBIN g/dL 13.4   HEMATOCRIT % 43.6   PLATELETS 10*3/mm3 211                           Medication Review:   apixaban, 5 mg, Oral, BID  bumetanide, 1 mg, Oral, Daily  clopidogrel, 75 mg, Oral, Daily  gabapentin, 100 mg, Oral, Nightly  insulin glargine, 8 Units, Subcutaneous, BID  insulin lispro, 2-9 Units, Subcutaneous, 4x Daily AC & at Bedtime  levothyroxine, 75 mcg, Oral, " Daily  losartan, 100 mg, Oral, Q24H  melatonin, 1 mg, Oral, Nightly  nystatin, , Topical, Q12H  pravastatin, 20 mg, Oral, Nightly  sodium chloride, 3 mL, Intravenous, Q12H  sulfamethoxazole-trimethoprim, 1 tablet, Oral, Q12H  vitamin B-12, 500 mcg, Oral, Daily              Assessment & Plan   #UTI  #Acute HFpEF  #pAF  #SSS s/p ppm  #DM    88 y.o. female with paroxysmal A-fib on Eliquis, diabetes, hypertension, DARI, high-grade block status post dual-chamber pacemaker, and CAD s/p RCA PCI who scented with weakness and hypoxia, found to have likely UTI.  She was diuresed with IV Bumex and now is on room air without any shortness of breath.    Last echocardiogram was in January which revealed normal EF with indeterminate diastolic function.    Blood pressure intermittently uncontrolled. She was previously on nifedipine 60mg.    - start nifedipine 30mg daily  - continue bumex 1mg PO daily  - continue losartan 100mg daily  - continue Eliquis for AF and plavix for CAD    Rikki Oconnor MD  [facility]  08/29/23  08:05 EDT

## 2023-08-29 NOTE — PROGRESS NOTES
Name: Roland Russell ADMIT: 2023   : 1934  PCP: Shawanda Rosario MD    MRN: [id number] LOS: 3 days   AGE/SEX: 88 y.o. female  ROOM: [facility]     Subjective   Subjective   No new complaints. Examined at bedside. Scr did increase from 0.97 to 1.32.  Blood sugars remain uncontrolled  She does desat at night.    Objective   Objective   Vital Signs  Temp:  [97.7 °F (36.5 °C)-98 °F (36.7 °C)] 97.9 °F (36.6 °C)  Heart Rate:  [63-82] 67  Resp:  [16] 16  BP: (111-175)/(52-71) 169/70  SpO2:  [67 %-100 %] 100 %  on  Flow (L/min):  [1] 1;   Device (Oxygen Therapy): room air  Body mass index is 33.73 kg/m².  Physical Exam  Constitutional:       Appearance: Normal appearance.   Cardiovascular:      Rate and Rhythm: Normal rate.      Pulses: Normal pulses.   Pulmonary:      Effort: Pulmonary effort is normal. No respiratory distress.      Breath sounds: Normal breath sounds. No wheezing.   Abdominal:      General: Abdomen is flat. There is no distension.      Palpations: Abdomen is soft.   Musculoskeletal:      Right lower leg: No edema.      Left lower leg: No edema.   Skin:     General: Skin is warm.   Neurological:      General: No focal deficit present.      Mental Status: She is alert and oriented to person, place, and time.   Psychiatric:         Mood and Affect: Mood normal.         Behavior: Behavior normal.       Results Review     I reviewed the patient's new clinical results.  Results from last 7 days   Lab Units 23  0542 23  0557 23  0704 23  1007   WBC 10*3/mm3 7.98 8.34 9.97 12.90*   HEMOGLOBIN g/dL 13.4 13.2 12.2 12.5   PLATELETS 10*3/mm3 211 204 180 190       Results from last 7 days   Lab Units 23  0843 23  0557 23  1741 23  0704 23  1204   SODIUM mmol/L 137 135*  --  141 138   POTASSIUM mmol/L 4.3 4.5 4.9 3.6 4.4   CHLORIDE mmol/L 98 97*  --  103 100   CO2 mmol/L 29.0 25.7  --  31.0* 29.0   BUN mg/dL 31* 21  --  20 20   CREATININE mg/dL 1.32*  0.97  --  0.91 1.02*   GLUCOSE mg/dL 225* 181*  --  187* 209*   EGFR mL/min/1.73 38.9* 56.3*  --  60.8 53.0*       Results from last 7 days   Lab Units 08/26/23  1204   ALBUMIN g/dL 3.9   BILIRUBIN mg/dL 0.6   ALK PHOS U/L 197*   AST (SGOT) U/L 12   ALT (SGPT) U/L 11       Results from last 7 days   Lab Units 08/29/23  0843 08/28/23  0557 08/27/23  0704 08/26/23  1204   CALCIUM mg/dL 10.0 9.7 9.3 9.7   ALBUMIN g/dL  --   --   --  3.9         Glucose   Date/Time Value Ref Range Status   08/29/2023 1310 358 (H) 70 - 130 mg/dL Final   08/29/2023 0615 132 (H) 70 - 130 mg/dL Final   08/28/2023 2034 211 (H) 70 - 130 mg/dL Final   08/28/2023 1606 379 (H) 70 - 130 mg/dL Final   08/28/2023 1556 352 (H) 70 - 130 mg/dL Final   08/28/2023 1218 273 (H) 70 - 130 mg/dL Final   08/28/2023 0559 189 (H) 70 - 130 mg/dL Final       No radiology results for the last day  Scheduled Medications  apixaban, 5 mg, Oral, BID  bumetanide, 1 mg, Oral, Daily  clopidogrel, 75 mg, Oral, Daily  gabapentin, 100 mg, Oral, Nightly  insulin glargine, 8 Units, Subcutaneous, BID  insulin lispro, 2-9 Units, Subcutaneous, 4x Daily AC & at Bedtime  levothyroxine, 75 mcg, Oral, Daily  losartan, 100 mg, Oral, Q24H  melatonin, 1 mg, Oral, Nightly  NIFEdipine XL, 30 mg, Oral, Q24H  nystatin, , Topical, Q12H  pravastatin, 20 mg, Oral, Nightly  sodium chloride, 3 mL, Intravenous, Q12H  sulfamethoxazole-trimethoprim, 1 tablet, Oral, Q12H  vitamin B-12, 500 mcg, Oral, Daily    Infusions   Diet  Diet: Diabetic Diets, Cardiac Diets; Healthy Heart (2-3 Na+); Consistent Carbohydrate; Texture: Regular Texture (IDDSI 7); Fluid Consistency: Thin (IDDSI 0)       Assessment/Plan     Active Hospital Problems    Diagnosis  POA    **Diastolic CHF, acute [I50.31]  Yes    Acute CHF [I50.9]  Yes    Type 2 diabetes mellitus with hyperglycemia [E11.65]  Yes    Acute cystitis with hematuria [N30.01]  Yes    Diabetic polyneuropathy associated with type 2 diabetes mellitus [E11.42]   Yes    Presence of cardiac pacemaker [Z95.0]  Yes    Hypoxia [R09.02]  Yes    S/P right coronary artery (RCA) stent placement [Z95.5]  Not Applicable    Coronary artery disease involving native coronary artery of native heart [I25.10]  Yes    PAF (paroxysmal atrial fibrillation) [I48.0]  Yes    Essential hypertension [I10]  Yes    Legal blindness [H54.8]  Yes    GERD (gastroesophageal reflux disease) [K21.9]  Yes    Mixed hyperlipidemia [E78.2]  Yes    Hypothyroidism, acquired [E03.9]  Yes    Osteoarthritis, multiple sites [M15.9]  Yes      Resolved Hospital Problems   No resolved problems to display.       88 y.o. female admitted with Diastolic CHF, acute.          Elevated creatinine  Creatinine increasing 0.97-1.32.  Most likely due to bactrim    E. coli UTI  -urine cx > 100k E coli, pan sensitive  -transition Rocephin to Bactrim    Acute on chronic diastolic heart failure  Hypoxia, resolved  -Cardiology following  -transition to PO Bumex 1mg daily    DM2 with hyperglycemia and polyneuropathy  -A1c greater than 10  -Glargine 8 units BID, increase to medium SSI.  Increase glargine to 12 units twice daily  -Gabapentin decreased from 300 mg to 100 mg nightly     Essential hypertension  CAD  -Losartan 100 mg  -Plavix     Atrial fibrillation  -RC: PPM   -AC: apixaban      Eliquis (home med) for DVT prophylaxis.  Discussed with patient.  Anticipate discharge Pending PT/OT eval. MIKA Collins MD  [facility]  08/29/23  13:53 EDT

## 2023-08-29 NOTE — PLAN OF CARE
Problem: Adult Inpatient Plan of Care  Goal: Plan of Care Review  Outcome: Ongoing, Progressing  Flowsheets (Taken 8/29/2023 0449)  Progress: no change  Plan of Care Reviewed With: patient  Outcome Evaluation: pt resting on and off w eyes closed.  pt requiring o2 when asleep sats drop to 70% when asleep but quickly rebounds back to the 90's.  denies pain.   Goal Outcome Evaluation:  Plan of Care Reviewed With: patient        Progress: no change  Outcome Evaluation: pt resting on and off w eyes closed.  pt requiring o2 when asleep sats drop to 70% when asleep but quickly rebounds back to the 90's.  denies pain.

## 2023-08-29 NOTE — PLAN OF CARE
Goal Outcome Evaluation:  Plan of Care Reviewed With: patient           Outcome Evaluation: Pt was in bed, family in room. Pt required less assistance for all mobiltiy today. She was able to ambulate a short distance in the room today with CGA and RWx. Ambualtion distance was limited by fatigue and BLE pain. Reccommend SNF, but pt plans home with HHPT and family to assist.      Anticipated Discharge Disposition (PT): home with assist, skilled nursing facility

## 2023-08-30 LAB
ANION GAP SERPL CALCULATED.3IONS-SCNC: 11 MMOL/L (ref 5–15)
BUN SERPL-MCNC: 39 MG/DL (ref 8–23)
BUN/CREAT SERPL: 21 (ref 7–25)
CALCIUM SPEC-SCNC: 9.6 MG/DL (ref 8.6–10.5)
CHLORIDE SERPL-SCNC: 96 MMOL/L (ref 98–107)
CO2 SERPL-SCNC: 31 MMOL/L (ref 22–29)
CREAT SERPL-MCNC: 1.86 MG/DL (ref 0.57–1)
EGFRCR SERPLBLD CKD-EPI 2021: 25.8 ML/MIN/1.73
GLUCOSE BLDC GLUCOMTR-MCNC: 166 MG/DL (ref 70–130)
GLUCOSE BLDC GLUCOMTR-MCNC: 257 MG/DL (ref 70–130)
GLUCOSE BLDC GLUCOMTR-MCNC: 259 MG/DL (ref 70–130)
GLUCOSE BLDC GLUCOMTR-MCNC: 368 MG/DL (ref 70–130)
GLUCOSE SERPL-MCNC: 127 MG/DL (ref 65–99)
POTASSIUM SERPL-SCNC: 4.2 MMOL/L (ref 3.5–5.2)
SODIUM SERPL-SCNC: 138 MMOL/L (ref 136–145)

## 2023-08-30 PROCEDURE — 63710000001 INSULIN GLARGINE PER 5 UNITS: Performed by: STUDENT IN AN ORGANIZED HEALTH CARE EDUCATION/TRAINING PROGRAM

## 2023-08-30 PROCEDURE — 94799 UNLISTED PULMONARY SVC/PX: CPT

## 2023-08-30 PROCEDURE — 63710000001 INSULIN LISPRO (HUMAN) PER 5 UNITS: Performed by: STUDENT IN AN ORGANIZED HEALTH CARE EDUCATION/TRAINING PROGRAM

## 2023-08-30 PROCEDURE — 94760 N-INVAS EAR/PLS OXIMETRY 1: CPT

## 2023-08-30 PROCEDURE — 80048 BASIC METABOLIC PNL TOTAL CA: CPT | Performed by: STUDENT IN AN ORGANIZED HEALTH CARE EDUCATION/TRAINING PROGRAM

## 2023-08-30 PROCEDURE — 99232 SBSQ HOSP IP/OBS MODERATE 35: CPT | Performed by: STUDENT IN AN ORGANIZED HEALTH CARE EDUCATION/TRAINING PROGRAM

## 2023-08-30 PROCEDURE — 97530 THERAPEUTIC ACTIVITIES: CPT

## 2023-08-30 PROCEDURE — 94762 N-INVAS EAR/PLS OXIMTRY CONT: CPT

## 2023-08-30 PROCEDURE — 82948 REAGENT STRIP/BLOOD GLUCOSE: CPT

## 2023-08-30 RX ADMIN — PRAVASTATIN SODIUM 20 MG: 20 TABLET ORAL at 20:59

## 2023-08-30 RX ADMIN — NYSTATIN: 100000 POWDER TOPICAL at 12:14

## 2023-08-30 RX ADMIN — LOSARTAN POTASSIUM 100 MG: 100 TABLET, FILM COATED ORAL at 08:39

## 2023-08-30 RX ADMIN — INSULIN LISPRO 2 UNITS: 100 INJECTION, SOLUTION INTRAVENOUS; SUBCUTANEOUS at 08:40

## 2023-08-30 RX ADMIN — Medication 500 MCG: at 08:39

## 2023-08-30 RX ADMIN — Medication 3 ML: at 12:15

## 2023-08-30 RX ADMIN — INSULIN LISPRO 6 UNITS: 100 INJECTION, SOLUTION INTRAVENOUS; SUBCUTANEOUS at 12:14

## 2023-08-30 RX ADMIN — INSULIN LISPRO 8 UNITS: 100 INJECTION, SOLUTION INTRAVENOUS; SUBCUTANEOUS at 20:59

## 2023-08-30 RX ADMIN — INSULIN LISPRO 6 UNITS: 100 INJECTION, SOLUTION INTRAVENOUS; SUBCUTANEOUS at 17:49

## 2023-08-30 RX ADMIN — Medication 1 MG: at 20:59

## 2023-08-30 RX ADMIN — APIXABAN 5 MG: 5 TABLET, FILM COATED ORAL at 20:59

## 2023-08-30 RX ADMIN — ACETAMINOPHEN 650 MG: 325 TABLET, FILM COATED ORAL at 14:37

## 2023-08-30 RX ADMIN — Medication 3 ML: at 20:00

## 2023-08-30 RX ADMIN — NIFEDIPINE 30 MG: 30 TABLET, FILM COATED, EXTENDED RELEASE ORAL at 08:38

## 2023-08-30 RX ADMIN — APIXABAN 5 MG: 5 TABLET, FILM COATED ORAL at 08:39

## 2023-08-30 RX ADMIN — SULFAMETHOXAZOLE AND TRIMETHOPRIM 1 TABLET: 800; 160 TABLET ORAL at 08:38

## 2023-08-30 RX ADMIN — LEVOTHYROXINE SODIUM 75 MCG: 0.07 TABLET ORAL at 08:38

## 2023-08-30 RX ADMIN — NYSTATIN: 100000 POWDER TOPICAL at 21:00

## 2023-08-30 RX ADMIN — INSULIN GLARGINE 12 UNITS: 100 INJECTION, SOLUTION SUBCUTANEOUS at 08:39

## 2023-08-30 RX ADMIN — CLOPIDOGREL BISULFATE 75 MG: 75 TABLET, FILM COATED ORAL at 08:38

## 2023-08-30 RX ADMIN — GABAPENTIN 100 MG: 100 CAPSULE ORAL at 20:59

## 2023-08-30 RX ADMIN — INSULIN GLARGINE 15 UNITS: 100 INJECTION, SOLUTION SUBCUTANEOUS at 20:59

## 2023-08-30 NOTE — PLAN OF CARE
Goal Outcome Evaluation:  Plan of Care Reviewed With: patient, daughter           Outcome Evaluation: Pt participated in OT today with a little encouragement from OT and her daughter. Pt tolerates short distance mobility in room to continue to work up endurance needed for ADLs and a few UE exercises in the chair. OT encouraged pt to sit up in the chair for lunch as she has minimally been OOB since admission. Waffle cushion placed in chair as pt with concern for pressure injury on her bottom.      Anticipated Discharge Disposition (OT): home with 24/7 care, skilled nursing facility (pt declining SNF but would benefit from SNF)

## 2023-08-30 NOTE — PROGRESS NOTES
"    Patient Name: Roland Russell  :1934  88 y.o.      Patient Care Team:  Shawanda Rosario MD as PCP - General (Internal Medicine)  Tevin Alrbecht MD as Consulting Physician (Cardiac Electrophysiology)    Chief Complaint:   Weakness    Interval History:   Creatinine has increased.    Objective   Vital Signs  Temp:  [97.6 °F (36.4 °C)-97.9 °F (36.6 °C)] 97.7 °F (36.5 °C)  Heart Rate:  [64-87] 64  Resp:  [16] 16  BP: (104-169)/(53-70) 127/55    Intake/Output Summary (Last 24 hours) at 2023 0802  Last data filed at 2023 1700  Gross per 24 hour   Intake 720 ml   Output --   Net 720 ml       Flowsheet Rows      Flowsheet Row First Filed Value   Admission Height 144.8 cm (57\") Documented at 2023 0956   Admission Weight 70.7 kg (155 lb 13.8 oz) Documented at 2023 1538            GEN: no distress, alert and oriented  HEENT: NACT, EOMI, moist mucous membranes  Lungs: CTAB, no wheezes, rales or rhonchi  CV: normal rate, regular rhythm, normal S1, S2, no murmurs, +2 radial pulses b/l, no carotid bruit  Abdomen: soft, nontender, nondistended, NABS  Extremities: no edema  Skin: no rash, warm, dry  Heme/Lymph: no bruising  Psych: organized thought, normal behavior and affect    Results Review:    Results from last 7 days   Lab Units 23  0526   SODIUM mmol/L 138   POTASSIUM mmol/L 4.2   CHLORIDE mmol/L 96*   CO2 mmol/L 31.0*   BUN mg/dL 39*   CREATININE mg/dL 1.86*   GLUCOSE mg/dL 127*   CALCIUM mg/dL 9.6     Results from last 7 days   Lab Units 23  1356 23  1204   HSTROP T ng/L 33* 40*     Results from last 7 days   Lab Units 23  0542   WBC 10*3/mm3 7.98   HEMOGLOBIN g/dL 13.4   HEMATOCRIT % 43.6   PLATELETS 10*3/mm3 211                           Medication Review:   apixaban, 5 mg, Oral, BID  bumetanide, 1 mg, Oral, Daily  clopidogrel, 75 mg, Oral, Daily  gabapentin, 100 mg, Oral, Nightly  insulin glargine, 12 Units, Subcutaneous, BID  insulin lispro, 2-9 Units, " Subcutaneous, 4x Daily AC & at Bedtime  levothyroxine, 75 mcg, Oral, Daily  losartan, 100 mg, Oral, Q24H  melatonin, 1 mg, Oral, Nightly  NIFEdipine XL, 30 mg, Oral, Q24H  nystatin, , Topical, Q12H  pravastatin, 20 mg, Oral, Nightly  sodium chloride, 3 mL, Intravenous, Q12H  sulfamethoxazole-trimethoprim, 1 tablet, Oral, Q12H  vitamin B-12, 500 mcg, Oral, Daily              Assessment & Plan   #UTI  #Acute HFpEF  #pAF  #SSS s/p ppm  #DM    88 y.o. female with paroxysmal A-fib on Eliquis, diabetes, hypertension, DARI, high-grade block status post dual-chamber pacemaker, and CAD s/p RCA PCI who scented with weakness and hypoxia, found to have likely UTI.  She was diuresed with IV Bumex and now is on room air without any shortness of breath.    Last echocardiogram was in January which revealed normal EF with indeterminate diastolic function.    Blood pressures have improved.    Creatinine has increased.    - can hold diuresis for now  - continue nifedipine 30mg daily, losartan 100mg daily  - continue Eliquis for AF and plavix for CAD    Rikki Oconnor MD  [facility]  08/30/23  08:02 EDT

## 2023-08-30 NOTE — THERAPY TREATMENT NOTE
"Patient Name: Roland Russell  : 1934    MRN: [id number]                              Today's Date: 2023       Admit Date: 2023    Visit Dx:     ICD-10-CM ICD-9-CM   1. Generalized weakness  R53.1 780.79   2. Acute congestive heart failure, unspecified heart failure type  I50.9 428.0     Patient Active Problem List   Diagnosis    Legal blindness    Uncontrolled type 2 diabetes mellitus with hypoglycemia without coma    GERD (gastroesophageal reflux disease)    Mixed hyperlipidemia    Hypothyroidism, acquired    Osteoarthritis, multiple sites    Essential hypertension    Senile osteoporosis    Renal insufficiency    T12 compression fracture    Vitamin D deficiency    Severe headache    Carotid body tumor    Glomus jugulare tumor    Sleep apnea, obstructive    Morbidly obese    Abnormal weight gain    Localized edema    Compression fracture of T8 vertebra with delayed healing    PAF (paroxysmal atrial fibrillation)    Dry scalp    Deformity of both feet    Acute encephalopathy    Lactic acidosis    Diverticulitis    Ankle fracture    Type 2 diabetes mellitus with hypoglycemia, with long-term current use of insulin    Abnormal nuclear stress test    Coronary artery disease involving native coronary artery of native heart    S/P right coronary artery (RCA) stent placement    Diastolic CHF, acute    Acute CHF    Type 2 diabetes mellitus with hyperglycemia    Acute cystitis with hematuria    Diabetic polyneuropathy associated with type 2 diabetes mellitus    Presence of cardiac pacemaker    Hypoxia     Past Medical History:   Diagnosis Date    Abnormal vision     SEES \"KALEIDOSCOPE\"    Allergy to adhesive tape     Arthralgia     AVB (atrioventricular block)     Balance problem     Carotid body tumor     LEFT    Difficulty walking     H/O complete eye exam 10/2016    Headache     OFF AND ON BACK OF HEAD, DOWN NECK TO SHOULDER    History of glaucoma     History of poliomyelitis     AGE 9    History of " surgery on arm     left arm    HLD (hyperlipidemia)     Hypertension     Hypothyroidism, acquired     Legal blindness     SOME PERIPHERAL VISION ON LEFT, SOME VISION ON RIGHT    Memory loss     SOME SHORT TERM    Migraine     Numbness     LEFT LEG     Osteoarthritis, multiple sites     Osteoporosis     Pacemaker     Rectal bleeding     X1, WITH BM    Sleep apnea     DOES NOT USE A MACHINE    TIA (transient ischemic attack) 12/24/2018    ?, HAD SPELL UNABLE TO TALK    Type 2 diabetes mellitus     Type 2 diabetes mellitus, uncontrolled      Past Surgical History:   Procedure Laterality Date    APPENDECTOMY  1989    CARDIAC CATHETERIZATION N/A 4/13/2023    Procedure: Left Heart Cath;  Surgeon: Rikki Oconnor MD;  [facility];  Service: Cardiology;  Laterality: N/A;    CARDIAC CATHETERIZATION N/A 4/13/2023    Procedure: Coronary angiography;  Surgeon: Rikki Oconnor MD;  [facility];  Service: Cardiology;  Laterality: N/A;    CARDIAC CATHETERIZATION N/A 4/13/2023    Procedure: Left ventriculography;  Surgeon: Rikki Oconnor MD;  [facility];  Service: Cardiology;  Laterality: N/A;    CARDIAC CATHETERIZATION N/A 4/20/2023    Procedure: Percutaneous Coronary Intervention;  Surgeon: Rikki Oconnor MD;  [facility];  Service: Cardiology;  Laterality: N/A;    CARDIAC CATHETERIZATION N/A 4/20/2023    Procedure: Atherectomy-coronary;  Surgeon: Rikki Oconnor MD;  Location: [facility] CATH INVASIVE LOCATION;  Service: Cardiology;  Laterality: N/A;    CARDIAC CATHETERIZATION N/A 4/20/2023    Procedure: Stent CAROL coronary;  Surgeon: Rikki Oconnor MD;  Location: [facility] CATH INVASIVE LOCATION;  Service: Cardiology;  Laterality: N/A;    CARDIAC PACEMAKER PLACEMENT  11/25/2004    CAROTID ENDARTERECTOMY Left 12/13/2019    Procedure: LEFT CAROTID BODY TUMOR RESECTION;  Surgeon: Bryan Severino MD;  Location: [facility] MAIN OR;  Service: Vascular     CATARACT EXTRACTION Bilateral 1997    CEREBRAL ANGIOGRAM Bilateral 12/6/2019    Procedure: CAROTID ARTERIOGRAM BILATERAL, RIGHT WRIST APPROACH;  Surgeon: Bryan Severino MD;  Location: [facility] HYBRID OR 18/19;  Service: Vascular    CHOLECYSTECTOMY  1989    COLONOSCOPY  2013    dr johnson    ELBOW PROCEDURE  2016    ENDOSCOPY  12/06/2012    Dr. Johnson; food in stomach, gastritis    EXCISION LESION  1994    BACK CYST BENGIN    EYE SURGERY  12/2012    GLAUCOMA SURGERY Bilateral 1995    laser surgery    HARDWARE REMOVAL Left     ELBOW    HYSTERECTOMY  1986    INTERVENTIONAL RADIOLOGY PROCEDURE N/A 4/20/2023    Procedure: Intravascular Ultrasound;  Surgeon: Rikki Oconnor MD;  Location: [facility] CATH INVASIVE LOCATION;  Service: Cardiology;  Laterality: N/A;    ORIF ELBOW FRACTURE Left     PACEMAKER IMPLANTATION      PACEMAKER REPLACEMENT  06/2013      General Information       Row Name 08/30/23 1245          OT Time and Intention    Document Type therapy note (daily note)  -     Mode of Treatment occupational therapy;individual therapy  -       Row Name 08/30/23 1245          General Information    Patient Profile Reviewed yes  -SM     Existing Precautions/Restrictions fall  -SM       Row Name 08/30/23 1245          Cognition    Orientation Status (Cognition) oriented x 3  -SM       Row Name 08/30/23 1245          Safety Issues, Functional Mobility    Safety Issues Affecting Function (Mobility) insight into deficits/self-awareness  -     Impairments Affecting Function (Mobility) balance;endurance/activity tolerance;strength;visual/perceptual  -SM               User Key  (r) = Recorded By, (t) = Taken By, (c) = Cosigned By      Initials Name Provider Type    SM Farida Bedoya OT Occupational Therapist                     Mobility/ADL's       Row Name 08/30/23 1245          Bed Mobility    Sit-Sidelying Dougherty (Bed Mobility) minimum assist (75% patient effort);verbal cues  -     Assistive Device (Bed Mobility)  head of bed elevated;bed rails  -SM       Row Name 08/30/23 1245          Sit-Stand Transfer    Sit-Stand Hand (Transfers) contact guard  -     Assistive Device (Sit-Stand Transfers) walker, front-wheeled  -SM       Row Name 08/30/23 1245          Functional Mobility    Functional Mobility- Ind. Level contact guard assist  -     Functional Mobility- Device walker, front-wheeled  -     Functional Mobility-Distance (Feet) --  20  -SM       Row Name 08/30/23 1245          Activities of Daily Living    BADL Assessment/Intervention --  pt declines ADLs today but does report she has been getting up to the bathroom  -               User Key  (r) = Recorded By, (t) = Taken By, (c) = Cosigned By      Initials Name Provider Type    Farida Tejada OT Occupational Therapist                   Obj/Interventions       Row Name 08/30/23 1246          Shoulder (Therapeutic Exercise)    Shoulder (Therapeutic Exercise) AROM (active range of motion)  -     Shoulder AROM (Therapeutic Exercise) bilateral;flexion;extension;aBduction;aDduction;horizontal aBduction/aDduction;10 repetitions;sitting  forward punches  -SM       Row Name 08/30/23 1246          Motor Skills    Therapeutic Exercise shoulder  -SM       Row Name 08/30/23 1246          Balance    Static Sitting Balance supervision  -     Position, Sitting Balance sitting edge of bed  -     Static Standing Balance standby assist  -     Dynamic Standing Balance contact guard  -     Position/Device Used, Standing Balance supported;walker, rolling  -               User Key  (r) = Recorded By, (t) = Taken By, (c) = Cosigned By      Initials Name Provider Type    Farida Tejada OT Occupational Therapist                   Goals/Plan    No documentation.                  Clinical Impression       Row Name 08/30/23 1246          Pain Assessment    Pre/Posttreatment Pain Comment c/o of pain bilateral thighs and reports sensitive to touch  -     Pain  Intervention(s) Repositioned  -       Row Name 08/30/23 1246          Pain Scale: FACES Pre/Post-Treatment    Pain: FACES Scale, Pretreatment 2-->hurts little bit  -SM     Posttreatment Pain Rating 2-->hurts little bit  -SM       Row Name 08/30/23 1246          Plan of Care Review    Plan of Care Reviewed With patient;daughter  -     Outcome Evaluation Pt participated in OT today with a little encouragement from OT and her daughter. Pt tolerates short distance mobility in room to continue to work up endurance needed for ADLs and a few UE exercises in the chair. OT encouraged pt to sit up in the chair for lunch as she has minimally been OOB since admission. Waffle cushion placed in chair as pt with concern for pressure injury on her bottom.  -       Row Name 08/30/23 1246          Therapy Plan Review/Discharge Plan (OT)    Anticipated Discharge Disposition (OT) home with 24/7 care;skilled nursing facility  pt declining SNF but would benefit from SNF  -       Row Name 08/30/23 1246          Positioning and Restraints    Pre-Treatment Position in bed  -SM     Post Treatment Position chair  -SM     In Chair reclined;call light within reach;encouraged to call for assist;exit alarm on;notified nsg;with family/caregiver  -               User Key  (r) = Recorded By, (t) = Taken By, (c) = Cosigned By      Initials Name Provider Type    Farida Tejada OT Occupational Therapist                   Outcome Measures       Row Name 08/30/23 1246          How much help from another is currently needed...    Putting on and taking off regular lower body clothing? 2  -SM     Bathing (including washing, rinsing, and drying) 2  -SM     Toileting (which includes using toilet bed pan or urinal) 3  -SM     Putting on and taking off regular upper body clothing 3  -SM     Taking care of personal grooming (such as brushing teeth) 3  -SM     Eating meals 3  -SM     AM-PAC 6 Clicks Score (OT) 16  -SM       Row Name 08/30/23 1248           Functional Assessment    Outcome Measure Options AM-PAC 6 Clicks Daily Activity (OT)  -               User Key  (r) = Recorded By, (t) = Taken By, (c) = Cosigned By      Initials Name Provider Type     Farida Bedoya OT Occupational Therapist                    Occupational Therapy Education       Title: PT OT SLP Therapies (In Progress)       Topic: Occupational Therapy (In Progress)       Point: ADL training (Done)       Description:   Instruct learner(s) on proper safety adaptation and remediation techniques during self care or transfers.   Instruct in proper use of assistive devices.                  Learning Progress Summary             Patient Acceptance, E, VU by  at 8/28/2023 6929    Comment: dc planning, ways to improve falls risk in the home and at night                         Point: Home exercise program (Not Started)       Description:   Instruct learner(s) on appropriate technique for monitoring, assisting and/or progressing therapeutic exercises/activities.                  Learner Progress:  Not documented in this visit.              Point: Precautions (Not Started)       Description:   Instruct learner(s) on prescribed precautions during self-care and functional transfers.                  Learner Progress:  Not documented in this visit.              Point: Body mechanics (Not Started)       Description:   Instruct learner(s) on proper positioning and spine alignment during self-care, functional mobility activities and/or exercises.                  Learner Progress:  Not documented in this visit.                              User Key       Initials Effective Dates Name Provider Type Discipline     04/02/20 -  Farida Bedoya OT Occupational Therapist OT                  OT Recommendation and Plan  Planned Therapy Interventions (OT): activity tolerance training, functional balance retraining, occupation/activity based interventions, patient/caregiver education/training,  transfer/mobility retraining, strengthening exercise, ROM/therapeutic exercise  Therapy Frequency (OT): 5 times/wk  Plan of Care Review  Plan of Care Reviewed With: patient, daughter  Outcome Evaluation: Pt participated in OT today with a little encouragement from OT and her daughter. Pt tolerates short distance mobility in room to continue to work up endurance needed for ADLs and a few UE exercises in the chair. OT encouraged pt to sit up in the chair for lunch as she has minimally been OOB since admission. Waffle cushion placed in chair as pt with concern for pressure injury on her bottom.     Time Calculation:   Evaluation Complexity (OT)  Review Occupational Profile/Medical/Therapy History Complexity: expanded/moderate complexity  Assessment, Occupational Performance/Identification of Deficit Complexity: 3-5 performance deficits  Clinical Decision Making Complexity (OT): detailed assessment/moderate complexity  Overall Complexity of Evaluation (OT): moderate complexity     Time Calculation- OT       Row Name 08/30/23 1249             Time Calculation- OT    OT Start Time 1102  -SM      OT Stop Time 1117  -SM      OT Time Calculation (min) 15 min  -SM      Total Timed Code Minutes- OT 15 minute(s)  -SM      OT Received On 08/30/23  -SM      OT - Next Appointment 08/31/23  -SM         Timed Charges    37434 - OT Therapeutic Activity Minutes 15  -SM         Total Minutes    Timed Charges Total Minutes 15  -SM       Total Minutes 15  -SM                User Key  (r) = Recorded By, (t) = Taken By, (c) = Cosigned By      Initials Name Provider Type     Farida Bedoya OT Occupational Therapist                  Therapy Charges for Today       Code Description Service Date Service Provider Modifiers Qty    [id number]  OT SELF CARE/MGMT/TRAIN EA 15 MIN 8/29/2023 Farida Bedoya OT GO 1    [id number]  OT THERAPEUTIC ACT EA 15 MIN 8/30/2023 Farida Bedoya OT GO 1                 Farida Bedoya  OT  8/30/2023

## 2023-08-30 NOTE — PLAN OF CARE
Goal Outcome Evaluation:  Plan of Care Reviewed With: patient        Progress: no change  Outcome Evaluation: V-paced, room air, A/Ox4, Assist x2. AM Creat elevated from 1.56 to 1.86, PO Bumex and Bactrim d/c. Overnight Sleep Oximetry ordered. pt worked with PT/OT. d/c home tomorrow.

## 2023-08-30 NOTE — PLAN OF CARE
Problem: Adult Inpatient Plan of Care  Goal: Plan of Care Review  Outcome: Ongoing, Progressing  Flowsheets (Taken 8/30/2023 0411)  Progress: no change  Plan of Care Reviewed With: patient  Outcome Evaluation: pt resting on and off w eyes closed this shift.  conts to co being weak.  no distress noted this shift.   Goal Outcome Evaluation:  Plan of Care Reviewed With: patient        Progress: no change  Outcome Evaluation: pt resting on and off w eyes closed this shift.  conts to co being weak.  no distress noted this shift.

## 2023-08-30 NOTE — PROGRESS NOTES
Name: Roland Russell ADMIT: 2023   : 1934  PCP: Shawanda Rosario MD    MRN: [id number] LOS: 4 days   AGE/SEX: 88 y.o. female  ROOM: [facility]     Subjective   Subjective   Scr increased again to 1.9 today. Blood pressures have been stable.     Objective   Objective   Vital Signs  Temp:  [97.6 °F (36.4 °C)-97.9 °F (36.6 °C)] 97.9 °F (36.6 °C)  Heart Rate:  [64-87] 64  Resp:  [16] 16  BP: (104-133)/(53-57) 133/57  SpO2:  [90 %-99 %] 90 %  on  Flow (L/min):  [1] 1;   Device (Oxygen Therapy): room air  Body mass index is 33.73 kg/m².  Physical Exam  Constitutional:       Comments: Chronically ill appearing    Cardiovascular:      Rate and Rhythm: Normal rate.      Pulses: Normal pulses.   Pulmonary:      Effort: Pulmonary effort is normal. No respiratory distress.      Breath sounds: Normal breath sounds. No wheezing.   Abdominal:      General: Abdomen is flat. There is no distension.      Palpations: Abdomen is soft.   Musculoskeletal:      Right lower leg: No edema.      Left lower leg: No edema.   Skin:     General: Skin is warm.   Neurological:      General: No focal deficit present.      Mental Status: She is alert and oriented to person, place, and time.   Psychiatric:         Mood and Affect: Mood normal.         Behavior: Behavior normal.       Results Review     I reviewed the patient's new clinical results.  Results from last 7 days   Lab Units 23  0542 23  0557 23  0704 23  1007   WBC 10*3/mm3 7.98 8.34 9.97 12.90*   HEMOGLOBIN g/dL 13.4 13.2 12.2 12.5   PLATELETS 10*3/mm3 211 204 180 190       Results from last 7 days   Lab Units 23  0526 23  0843 23  0557 23  1741 23  0704   SODIUM mmol/L 138 137 135*  --  141   POTASSIUM mmol/L 4.2 4.3 4.5 4.9 3.6   CHLORIDE mmol/L 96* 98 97*  --  103   CO2 mmol/L 31.0* 29.0 25.7  --  31.0*   BUN mg/dL 39* 31* 21  --  20   CREATININE mg/dL 1.86* 1.32* 0.97  --  0.91   GLUCOSE mg/dL 127* 225* 181*  --   187*   EGFR mL/min/1.73 25.8* 38.9* 56.3*  --  60.8       Results from last 7 days   Lab Units 08/26/23  1204   ALBUMIN g/dL 3.9   BILIRUBIN mg/dL 0.6   ALK PHOS U/L 197*   AST (SGOT) U/L 12   ALT (SGPT) U/L 11       Results from last 7 days   Lab Units 08/30/23  0526 08/29/23  0843 08/28/23  0557 08/27/23  0704 08/26/23  1204   CALCIUM mg/dL 9.6 10.0 9.7 9.3 9.7   ALBUMIN g/dL  --   --   --   --  3.9         Glucose   Date/Time Value Ref Range Status   08/30/2023 1145 257 (H) 70 - 130 mg/dL Final   08/30/2023 0624 166 (H) 70 - 130 mg/dL Final   08/29/2023 2052 235 (H) 70 - 130 mg/dL Final   08/29/2023 1609 333 (H) 70 - 130 mg/dL Final   08/29/2023 1310 358 (H) 70 - 130 mg/dL Final   08/29/2023 0615 132 (H) 70 - 130 mg/dL Final   08/28/2023 2034 211 (H) 70 - 130 mg/dL Final       No radiology results for the last day  Scheduled Medications  apixaban, 5 mg, Oral, BID  clopidogrel, 75 mg, Oral, Daily  gabapentin, 100 mg, Oral, Nightly  insulin glargine, 12 Units, Subcutaneous, BID  insulin lispro, 2-9 Units, Subcutaneous, 4x Daily AC & at Bedtime  levothyroxine, 75 mcg, Oral, Daily  losartan, 100 mg, Oral, Q24H  melatonin, 1 mg, Oral, Nightly  NIFEdipine XL, 30 mg, Oral, Q24H  nystatin, , Topical, Q12H  pravastatin, 20 mg, Oral, Nightly  sodium chloride, 3 mL, Intravenous, Q12H  vitamin B-12, 500 mcg, Oral, Daily    Infusions   Diet  Diet: Diabetic Diets, Cardiac Diets; Healthy Heart (2-3 Na+); Consistent Carbohydrate; Texture: Regular Texture (IDDSI 7); Fluid Consistency: Thin (IDDSI 0)       Assessment/Plan     Active Hospital Problems    Diagnosis  POA    **Diastolic CHF, acute [I50.31]  Yes    Acute CHF [I50.9]  Yes    Type 2 diabetes mellitus with hyperglycemia [E11.65]  Yes    Acute cystitis with hematuria [N30.01]  Yes    Diabetic polyneuropathy associated with type 2 diabetes mellitus [E11.42]  Yes    Presence of cardiac pacemaker [Z95.0]  Yes    Hypoxia [R09.02]  Yes    S/P right coronary artery (RCA) stent  placement [Z95.5]  Not Applicable    Coronary artery disease involving native coronary artery of native heart [I25.10]  Yes    PAF (paroxysmal atrial fibrillation) [I48.0]  Yes    Essential hypertension [I10]  Yes    Legal blindness [H54.8]  Yes    GERD (gastroesophageal reflux disease) [K21.9]  Yes    Mixed hyperlipidemia [E78.2]  Yes    Hypothyroidism, acquired [E03.9]  Yes    Osteoarthritis, multiple sites [M15.9]  Yes      Resolved Hospital Problems   No resolved problems to display.       88 y.o. female admitted with Diastolic CHF, acute.    Elevated creatinine  Creatinine increasing 0.97-1.32.  Discontinue bactrim. Monitor creatinine     E. coli UTI  -urine cx > 100k E coli, pan sensitive  DC bactrim. Has had adequate course of Abx    Acute on chronic diastolic heart failure  Hypoxia, resolved  -Cardiology following  -Bumex held today due to elevated creatinine     DM2 with hyperglycemia and polyneuropathy  -A1c greater than 10   glargine to 12 units twice daily. Increase to 15u Bid due to high blood sugars  -Gabapentin decreased from 300 mg to 100 mg nightly     Essential hypertension  CAD  -Losartan 100 mg, nifedipine,   -Plavix     Atrial fibrillation  -RC: PPM   -AC: apixaban      Eliquis (home med) for DVT prophylaxis.  Discussed with patient.  Anticipate discharge home tomorrow       Ronn Collins MD  [facility]  08/30/23  12:11 EDT

## 2023-08-31 ENCOUNTER — APPOINTMENT (OUTPATIENT)
Dept: CARDIOLOGY | Facility: HOSPITAL | Age: 88
DRG: 291 | End: 2023-08-31
Payer: MEDICARE

## 2023-08-31 LAB
ANION GAP SERPL CALCULATED.3IONS-SCNC: 10.4 MMOL/L (ref 5–15)
BACTERIA SPEC AEROBE CULT: NORMAL
BACTERIA SPEC AEROBE CULT: NORMAL
BACTERIA UR QL AUTO: ABNORMAL /HPF
BH CV ECHO MEAS - EDV(CUBED): 32.6 ML
BH CV ECHO MEAS - EDV(MOD-SP2): 41 ML
BH CV ECHO MEAS - EDV(MOD-SP4): 72 ML
BH CV ECHO MEAS - EF(MOD-BP): 67.1 %
BH CV ECHO MEAS - EF(MOD-SP2): 68.3 %
BH CV ECHO MEAS - EF(MOD-SP4): 68.1 %
BH CV ECHO MEAS - ESV(CUBED): 9.8 ML
BH CV ECHO MEAS - ESV(MOD-SP2): 13 ML
BH CV ECHO MEAS - ESV(MOD-SP4): 23 ML
BH CV ECHO MEAS - FS: 33.1 %
BH CV ECHO MEAS - IVS/LVPW: 0.84 CM
BH CV ECHO MEAS - IVSD: 1.48 CM
BH CV ECHO MEAS - LV DIASTOLIC VOL/BSA (35-75): 44.6 CM2
BH CV ECHO MEAS - LV MASS(C)D: 194 GRAMS
BH CV ECHO MEAS - LV SYSTOLIC VOL/BSA (12-30): 14.3 CM2
BH CV ECHO MEAS - LVIDD: 3.2 CM
BH CV ECHO MEAS - LVIDS: 2.14 CM
BH CV ECHO MEAS - LVPWD: 1.75 CM
BH CV ECHO MEAS - RAP SYSTOLE: 3 MMHG
BH CV ECHO MEAS - RVSP: 36.1 MMHG
BH CV ECHO MEAS - SI(MOD-SP2): 17.4 ML/M2
BH CV ECHO MEAS - SI(MOD-SP4): 30.4 ML/M2
BH CV ECHO MEAS - SV(MOD-SP2): 28 ML
BH CV ECHO MEAS - SV(MOD-SP4): 49 ML
BH CV ECHO MEAS - TR MAX PG: 33.1 MMHG
BH CV ECHO MEAS - TR MAX VEL: 287.8 CM/SEC
BILIRUB UR QL STRIP: NEGATIVE
BUN SERPL-MCNC: 34 MG/DL (ref 8–23)
BUN/CREAT SERPL: 23 (ref 7–25)
CALCIUM SPEC-SCNC: 9.6 MG/DL (ref 8.6–10.5)
CHLORIDE SERPL-SCNC: 96 MMOL/L (ref 98–107)
CLARITY UR: CLEAR
CO2 SERPL-SCNC: 28.6 MMOL/L (ref 22–29)
COLOR UR: YELLOW
CREAT SERPL-MCNC: 1.48 MG/DL (ref 0.57–1)
EGFRCR SERPLBLD CKD-EPI 2021: 33.9 ML/MIN/1.73
GLUCOSE BLDC GLUCOMTR-MCNC: 122 MG/DL (ref 70–130)
GLUCOSE BLDC GLUCOMTR-MCNC: 125 MG/DL (ref 70–130)
GLUCOSE BLDC GLUCOMTR-MCNC: 205 MG/DL (ref 70–130)
GLUCOSE BLDC GLUCOMTR-MCNC: 244 MG/DL (ref 70–130)
GLUCOSE BLDC GLUCOMTR-MCNC: 307 MG/DL (ref 70–130)
GLUCOSE BLDC GLUCOMTR-MCNC: 52 MG/DL (ref 70–130)
GLUCOSE BLDC GLUCOMTR-MCNC: 71 MG/DL (ref 70–130)
GLUCOSE SERPL-MCNC: 240 MG/DL (ref 65–99)
GLUCOSE UR STRIP-MCNC: ABNORMAL MG/DL
HGB UR QL STRIP.AUTO: ABNORMAL
HYALINE CASTS UR QL AUTO: ABNORMAL /LPF
KETONES UR QL STRIP: NEGATIVE
LEUKOCYTE ESTERASE UR QL STRIP.AUTO: ABNORMAL
NITRITE UR QL STRIP: NEGATIVE
PH UR STRIP.AUTO: 7 [PH] (ref 5–8)
POTASSIUM SERPL-SCNC: 5 MMOL/L (ref 3.5–5.2)
PROT UR QL STRIP: NEGATIVE
QT INTERVAL: 524 MS
QTC INTERVAL: 524 MS
RBC # UR STRIP: ABNORMAL /HPF
REF LAB TEST METHOD: ABNORMAL
SODIUM SERPL-SCNC: 135 MMOL/L (ref 136–145)
SP GR UR STRIP: 1.01 (ref 1–1.03)
SQUAMOUS #/AREA URNS HPF: ABNORMAL /HPF
UROBILINOGEN UR QL STRIP: ABNORMAL
WBC # UR STRIP: ABNORMAL /HPF

## 2023-08-31 PROCEDURE — 82948 REAGENT STRIP/BLOOD GLUCOSE: CPT

## 2023-08-31 PROCEDURE — 93308 TTE F-UP OR LMTD: CPT | Performed by: INTERNAL MEDICINE

## 2023-08-31 PROCEDURE — 80048 BASIC METABOLIC PNL TOTAL CA: CPT | Performed by: STUDENT IN AN ORGANIZED HEALTH CARE EDUCATION/TRAINING PROGRAM

## 2023-08-31 PROCEDURE — 93005 ELECTROCARDIOGRAM TRACING: CPT | Performed by: STUDENT IN AN ORGANIZED HEALTH CARE EDUCATION/TRAINING PROGRAM

## 2023-08-31 PROCEDURE — 99232 SBSQ HOSP IP/OBS MODERATE 35: CPT | Performed by: STUDENT IN AN ORGANIZED HEALTH CARE EDUCATION/TRAINING PROGRAM

## 2023-08-31 PROCEDURE — 97530 THERAPEUTIC ACTIVITIES: CPT | Performed by: PHYSICAL THERAPIST

## 2023-08-31 PROCEDURE — 81001 URINALYSIS AUTO W/SCOPE: CPT | Performed by: STUDENT IN AN ORGANIZED HEALTH CARE EDUCATION/TRAINING PROGRAM

## 2023-08-31 PROCEDURE — 93321 DOPPLER ECHO F-UP/LMTD STD: CPT

## 2023-08-31 PROCEDURE — 93321 DOPPLER ECHO F-UP/LMTD STD: CPT | Performed by: INTERNAL MEDICINE

## 2023-08-31 PROCEDURE — 63710000001 INSULIN LISPRO (HUMAN) PER 5 UNITS: Performed by: STUDENT IN AN ORGANIZED HEALTH CARE EDUCATION/TRAINING PROGRAM

## 2023-08-31 PROCEDURE — 63710000001 INSULIN GLARGINE PER 5 UNITS: Performed by: STUDENT IN AN ORGANIZED HEALTH CARE EDUCATION/TRAINING PROGRAM

## 2023-08-31 PROCEDURE — 87086 URINE CULTURE/COLONY COUNT: CPT | Performed by: STUDENT IN AN ORGANIZED HEALTH CARE EDUCATION/TRAINING PROGRAM

## 2023-08-31 PROCEDURE — 25510000001 PERFLUTREN (DEFINITY) 8.476 MG IN SODIUM CHLORIDE (PF) 0.9 % 10 ML INJECTION: Performed by: STUDENT IN AN ORGANIZED HEALTH CARE EDUCATION/TRAINING PROGRAM

## 2023-08-31 PROCEDURE — 93308 TTE F-UP OR LMTD: CPT

## 2023-08-31 PROCEDURE — 93325 DOPPLER ECHO COLOR FLOW MAPG: CPT

## 2023-08-31 PROCEDURE — 93010 ELECTROCARDIOGRAM REPORT: CPT | Performed by: INTERNAL MEDICINE

## 2023-08-31 PROCEDURE — 93325 DOPPLER ECHO COLOR FLOW MAPG: CPT | Performed by: INTERNAL MEDICINE

## 2023-08-31 RX ADMIN — INSULIN GLARGINE 10 UNITS: 100 INJECTION, SOLUTION SUBCUTANEOUS at 21:46

## 2023-08-31 RX ADMIN — Medication 3 ML: at 21:50

## 2023-08-31 RX ADMIN — NYSTATIN: 100000 POWDER TOPICAL at 09:08

## 2023-08-31 RX ADMIN — INSULIN GLARGINE 10 UNITS: 100 INJECTION, SOLUTION SUBCUTANEOUS at 09:20

## 2023-08-31 RX ADMIN — INSULIN LISPRO 7 UNITS: 100 INJECTION, SOLUTION INTRAVENOUS; SUBCUTANEOUS at 12:27

## 2023-08-31 RX ADMIN — NYSTATIN: 100000 POWDER TOPICAL at 21:47

## 2023-08-31 RX ADMIN — Medication 1 MG: at 21:46

## 2023-08-31 RX ADMIN — LOSARTAN POTASSIUM 100 MG: 100 TABLET, FILM COATED ORAL at 09:06

## 2023-08-31 RX ADMIN — APIXABAN 5 MG: 5 TABLET, FILM COATED ORAL at 09:06

## 2023-08-31 RX ADMIN — LEVOTHYROXINE SODIUM 75 MCG: 0.07 TABLET ORAL at 09:06

## 2023-08-31 RX ADMIN — GABAPENTIN 100 MG: 100 CAPSULE ORAL at 21:46

## 2023-08-31 RX ADMIN — Medication 500 MCG: at 09:06

## 2023-08-31 RX ADMIN — APIXABAN 5 MG: 5 TABLET, FILM COATED ORAL at 21:46

## 2023-08-31 RX ADMIN — NIFEDIPINE 30 MG: 30 TABLET, FILM COATED, EXTENDED RELEASE ORAL at 09:06

## 2023-08-31 RX ADMIN — PERFLUTREN 2 ML: 6.52 INJECTION, SUSPENSION INTRAVENOUS at 17:16

## 2023-08-31 RX ADMIN — CLOPIDOGREL BISULFATE 75 MG: 75 TABLET, FILM COATED ORAL at 09:06

## 2023-08-31 RX ADMIN — Medication 3 ML: at 09:08

## 2023-08-31 RX ADMIN — PRAVASTATIN SODIUM 20 MG: 20 TABLET ORAL at 21:46

## 2023-08-31 NOTE — CASE MANAGEMENT/SOCIAL WORK
Continued Stay Note  [facility]     Patient Name: Roland Russell  MRN: [id number]  Today's Date: 8/31/2023    Admit Date: 8/26/2023    Plan: SNF   Discharge Plan       Row Name 08/31/23 1554       Plan    Plan SNF    Patient/Family in Agreement with Plan yes    Plan Comments Met with pt and son at bedside and discussed need for rehab.  Pt now agreeable for rehab.  Family requests referrals to [facility] and [facility].  Await determination from all.  POORNIMA Ribera RN                   Discharge Codes    No documentation.                 Expected Discharge Date and Time       Expected Discharge Date Expected Discharge Time    Aug 31, 2023               Dariana Ribera, RN

## 2023-08-31 NOTE — PROGRESS NOTES
Name: Roland Russell ADMIT: 2023   : 1934  PCP: Shawanda Rosario MD    MRN: [id number] LOS: 5 days   AGE/SEX: 88 y.o. female  ROOM: [facility]     Subjective   Subjective   Scr improving.  Blood sugar was low overnight and she became confused and lethargic.    Objective   Objective   Vital Signs  Temp:  [97.3 °F (36.3 °C)-97.9 °F (36.6 °C)] 97.3 °F (36.3 °C)  Heart Rate:  [60-68] 60  Resp:  [16] 16  BP: ()/(35-98) 128/98  SpO2:  [90 %-99 %] 99 %  on  Flow (L/min):  [2] 2;   Device (Oxygen Therapy): room air  Body mass index is 33.73 kg/m².  Physical Exam  Constitutional:       Comments: Chronically ill appearing    Cardiovascular:      Rate and Rhythm: Normal rate.      Pulses: Normal pulses.   Pulmonary:      Effort: Pulmonary effort is normal. No respiratory distress.      Breath sounds: Normal breath sounds. No wheezing.   Abdominal:      General: Abdomen is flat. There is no distension.      Palpations: Abdomen is soft.   Musculoskeletal:      Right lower leg: No edema.      Left lower leg: No edema.   Skin:     General: Skin is warm.   Neurological:      General: No focal deficit present.      Mental Status: She is alert and oriented to person, place, and time.   Psychiatric:         Mood and Affect: Mood normal.         Behavior: Behavior normal.     Physical exam reviewed and updated on     Results Review     I reviewed the patient's new clinical results.  Results from last 7 days   Lab Units 23  0542 23  0557 23  0704 23  1007   WBC 10*3/mm3 7.98 8.34 9.97 12.90*   HEMOGLOBIN g/dL 13.4 13.2 12.2 12.5   PLATELETS 10*3/mm3 211 204 180 190       Results from last 7 days   Lab Units 23  0924 23  0526 23  0843 23  0557   SODIUM mmol/L 135* 138 137 135*   POTASSIUM mmol/L 5.0 4.2 4.3 4.5   CHLORIDE mmol/L 96* 96* 98 97*   CO2 mmol/L 28.6 31.0* 29.0 25.7   BUN mg/dL 34* 39* 31* 21   CREATININE mg/dL 1.48* 1.86* 1.32* 0.97   GLUCOSE mg/dL 240*  127* 225* 181*   EGFR mL/min/1.73 33.9* 25.8* 38.9* 56.3*       Results from last 7 days   Lab Units 08/26/23  1204   ALBUMIN g/dL 3.9   BILIRUBIN mg/dL 0.6   ALK PHOS U/L 197*   AST (SGOT) U/L 12   ALT (SGPT) U/L 11       Results from last 7 days   Lab Units 08/31/23  0924 08/30/23  0526 08/29/23  0843 08/28/23  0557 08/27/23  0704 08/26/23  1204   CALCIUM mg/dL 9.6 9.6 10.0 9.7   < > 9.7   ALBUMIN g/dL  --   --   --   --   --  3.9    < > = values in this interval not displayed.         Glucose   Date/Time Value Ref Range Status   08/31/2023 0647 122 70 - 130 mg/dL Final   08/31/2023 0307 125 70 - 130 mg/dL Final   08/31/2023 0242 71 70 - 130 mg/dL Final   08/31/2023 0220 52 (L) 70 - 130 mg/dL Final   08/30/2023 2040 368 (H) 70 - 130 mg/dL Final   08/30/2023 1701 259 (H) 70 - 130 mg/dL Final   08/30/2023 1145 257 (H) 70 - 130 mg/dL Final       No radiology results for the last day  Scheduled Medications  apixaban, 5 mg, Oral, BID  clopidogrel, 75 mg, Oral, Daily  gabapentin, 100 mg, Oral, Nightly  insulin glargine, 15 Units, Subcutaneous, BID  insulin lispro, 2-9 Units, Subcutaneous, 4x Daily AC & at Bedtime  levothyroxine, 75 mcg, Oral, Daily  losartan, 100 mg, Oral, Q24H  melatonin, 1 mg, Oral, Nightly  NIFEdipine XL, 30 mg, Oral, Q24H  nystatin, , Topical, Q12H  pravastatin, 20 mg, Oral, Nightly  sodium chloride, 3 mL, Intravenous, Q12H  vitamin B-12, 500 mcg, Oral, Daily    Infusions   Diet  Diet: Diabetic Diets, Cardiac Diets; Healthy Heart (2-3 Na+); Consistent Carbohydrate; Texture: Regular Texture (IDDSI 7); Fluid Consistency: Thin (IDDSI 0)       Assessment/Plan     Active Hospital Problems    Diagnosis  POA    **Diastolic CHF, acute [I50.31]  Yes    Acute CHF [I50.9]  Yes    Type 2 diabetes mellitus with hyperglycemia [E11.65]  Yes    Acute cystitis with hematuria [N30.01]  Yes    Diabetic polyneuropathy associated with type 2 diabetes mellitus [E11.42]  Yes    Presence of cardiac pacemaker [Z95.0]  Yes     Hypoxia [R09.02]  Yes    S/P right coronary artery (RCA) stent placement [Z95.5]  Not Applicable    Coronary artery disease involving native coronary artery of native heart [I25.10]  Yes    PAF (paroxysmal atrial fibrillation) [I48.0]  Yes    Essential hypertension [I10]  Yes    Legal blindness [H54.8]  Yes    GERD (gastroesophageal reflux disease) [K21.9]  Yes    Mixed hyperlipidemia [E78.2]  Yes    Hypothyroidism, acquired [E03.9]  Yes    Osteoarthritis, multiple sites [M15.9]  Yes      Resolved Hospital Problems   No resolved problems to display.       88 y.o. female admitted with Diastolic CHF, acute.    Acute kidney injury  Creatinine went up as high as 1.9, with baseline around 1.0.  She was diuresed with Bumex and she was also started on Bactrim.  Bactrim was discontinued along with Bumex as she was euvolemic and had an adequate course of antibiotics.  Creatinine is improving today.    E. coli UTI  -urine cx > 100k E coli, pan sensitive  DC bactrim. Has had adequate course of Abx    Acute on chronic diastolic heart failure  Hypoxia, resolved  -Cardiology following  -Bumex held today due to elevated creatinine     DM2 with hyperglycemia and polyneuropathy  Hypoglycemia  -A1c greater than 10  -Decreased glargine dose to 10 units twice daily  -Gabapentin decreased from 300 mg to 100 mg nightly     Essential hypertension  CAD  -Losartan 100 mg, nifedipine,   -Plavix     Atrial fibrillation  -RC: PPM   -AC: apixaban      Eliquis (home med) for DVT prophylaxis.  Discussed with patient.  Anticipate discharge home tomorrow       Ronn Collins MD  [facility]  08/31/23  10:50 EDT

## 2023-08-31 NOTE — PLAN OF CARE
Goal Outcome Evaluation:  Plan of Care Reviewed With: patient        Progress: no change  Outcome Evaluation: VSS. Alert and oriented x4 but forgetful. Repeat UA without bacteria. Up to bedside commode this shift. Son remains at bedside. Awaiting AM labs. Will continue to provide supportive care.

## 2023-08-31 NOTE — DISCHARGE PLACEMENT REQUEST
"Britney Russell (88 y.o. Female)       Date of Birth   1934    Social Security Number       Address   [address] [id number]    Home Phone   [phone]    MRN   [id number]       [place]   [facility]    Marital Status                               Admission Date   8/26/23    Admission Type   Emergency    Admitting Provider   Jj Navarrete MD    Attending Provider   Ronn Collins MD    Department, Room/Bed   [address], [address]/1       Discharge Date       Discharge Disposition       Discharge Destination                                 Attending Provider: Ronn Collins MD    Allergies: Adhesive Tape, Latex, Propoxyphene    Isolation: None   Infection: None   Code Status: No CPR    Ht: 144.8 cm (57\")   Wt: 70.7 kg (155 lb 13.8 oz)    Admission Cmt: None   Principal Problem: Diastolic CHF, acute [I50.31]                   Active Insurance as of 8/26/2023       Primary Coverage       Payor Plan Insurance Group Employer/Plan Group    [facility] MEDICARE REPLACEMENT [facility] MEDICARE REPLACEMENT [id number]       Payor Plan Address Payor Plan Phone Number Payor Plan Fax Number Effective Dates    [address] [address]   1/1/2018 - None Entered    [facility] [id number]         Subscriber Name Subscriber Birth Date Member ID       BRITNEY RUSSELL 1934 [id number]                     Emergency Contacts        (Rel.) Home Phone Work Phone Mobile Phone    Yanet Wen (Power of ) [phone] -- [phone]    Armaan Russell Sr (Son) [phone] -- [phone]    DrewCecil (Son) [phone] -- [phone]              Emergency Contact Information       Name Relation Home Work Mobile    Yanet Wen Power of  [phone]    Armaan Russell Sr TANISHA Son [phone]    Cecil Russell Son [phone]          "

## 2023-08-31 NOTE — PROGRESS NOTES
"    Patient Name: Roland Russell  :1934  88 y.o.      Patient Care Team:  Shawanda Rosario MD as PCP - General (Internal Medicine)  Tevin Albrecht MD as Consulting Physician (Cardiac Electrophysiology)    Chief Complaint:   Weakness    Interval History:   Creatinine has improved slightly.    Objective   Vital Signs  Temp:  [97.3 °F (36.3 °C)-97.9 °F (36.6 °C)] 97.3 °F (36.3 °C)  Heart Rate:  [60-68] 60  Resp:  [16] 16  BP: ()/(35-98) 128/98    Intake/Output Summary (Last 24 hours) at 2023 1039  Last data filed at 2023 0550  Gross per 24 hour   Intake 600 ml   Output 250 ml   Net 350 ml       Flowsheet Rows      Flowsheet Row First Filed Value   Admission Height 144.8 cm (57\") Documented at 2023 0956   Admission Weight 70.7 kg (155 lb 13.8 oz) Documented at 2023 1538            GEN: no distress, alert and oriented  HEENT: NACT, EOMI, moist mucous membranes  Lungs: CTAB, no wheezes, rales or rhonchi  CV: normal rate, regular rhythm, normal S1, S2, no murmurs, +2 radial pulses b/l, no carotid bruit  Abdomen: soft, nontender, nondistended, NABS  Extremities: no edema  Skin: no rash, warm, dry  Heme/Lymph: no bruising  Psych: organized thought, normal behavior and affect    Results Review:    Results from last 7 days   Lab Units 23  0924   SODIUM mmol/L 135*   POTASSIUM mmol/L 5.0   CHLORIDE mmol/L 96*   CO2 mmol/L 28.6   BUN mg/dL 34*   CREATININE mg/dL 1.48*   GLUCOSE mg/dL 240*   CALCIUM mg/dL 9.6     Results from last 7 days   Lab Units 23  1356 23  1204   HSTROP T ng/L 33* 40*     Results from last 7 days   Lab Units 23  0542   WBC 10*3/mm3 7.98   HEMOGLOBIN g/dL 13.4   HEMATOCRIT % 43.6   PLATELETS 10*3/mm3 211                           Medication Review:   apixaban, 5 mg, Oral, BID  clopidogrel, 75 mg, Oral, Daily  gabapentin, 100 mg, Oral, Nightly  insulin glargine, 15 Units, Subcutaneous, BID  insulin lispro, 2-9 Units, Subcutaneous, 4x Daily " AC & at Bedtime  levothyroxine, 75 mcg, Oral, Daily  losartan, 100 mg, Oral, Q24H  melatonin, 1 mg, Oral, Nightly  NIFEdipine XL, 30 mg, Oral, Q24H  nystatin, , Topical, Q12H  pravastatin, 20 mg, Oral, Nightly  sodium chloride, 3 mL, Intravenous, Q12H  vitamin B-12, 500 mcg, Oral, Daily              Assessment & Plan   #UTI  #Acute HFpEF  #pAF  #SSS s/p ppm  #DM    88 y.o. female with paroxysmal A-fib on Eliquis, diabetes, hypertension, DARI, high-grade block status post dual-chamber pacemaker, and CAD s/p RCA PCI who scented with weakness and hypoxia, found to have likely UTI.  She was diuresed with IV Bumex and now is on room air without any shortness of breath.    Last echocardiogram was in January which revealed normal EF with indeterminate diastolic function.    Blood pressures have improved. Creatinine improving.    - continue to hold diuresis, she appears euvolemic  - if to remain inpatient, will do limited TTE to ensure her LV looks the same as prior  - continue nifedipine 30mg daily, losartan 100mg daily  - continue Eliquis for AF and plavix for CAD    Rikki Oconnor MD  [facility]  08/31/23  10:39 EDT

## 2023-08-31 NOTE — THERAPY TREATMENT NOTE
"Patient Name: Roland Russell  : 1934    MRN: [id number]                              Today's Date: 2023       Admit Date: 2023    Visit Dx:     ICD-10-CM ICD-9-CM   1. Generalized weakness  R53.1 780.79   2. Acute congestive heart failure, unspecified heart failure type  I50.9 428.0     Patient Active Problem List   Diagnosis    Legal blindness    Uncontrolled type 2 diabetes mellitus with hypoglycemia without coma    GERD (gastroesophageal reflux disease)    Mixed hyperlipidemia    Hypothyroidism, acquired    Osteoarthritis, multiple sites    Essential hypertension    Senile osteoporosis    Renal insufficiency    T12 compression fracture    Vitamin D deficiency    Severe headache    Carotid body tumor    Glomus jugulare tumor    Sleep apnea, obstructive    Morbidly obese    Abnormal weight gain    Localized edema    Compression fracture of T8 vertebra with delayed healing    PAF (paroxysmal atrial fibrillation)    Dry scalp    Deformity of both feet    Acute encephalopathy    Lactic acidosis    Diverticulitis    Ankle fracture    Type 2 diabetes mellitus with hypoglycemia, with long-term current use of insulin    Abnormal nuclear stress test    Coronary artery disease involving native coronary artery of native heart    S/P right coronary artery (RCA) stent placement    Diastolic CHF, acute    Acute CHF    Type 2 diabetes mellitus with hyperglycemia    Acute cystitis with hematuria    Diabetic polyneuropathy associated with type 2 diabetes mellitus    Presence of cardiac pacemaker    Hypoxia     Past Medical History:   Diagnosis Date    Abnormal vision     SEES \"KALEIDOSCOPE\"    Allergy to adhesive tape     Arthralgia     AVB (atrioventricular block)     Balance problem     Carotid body tumor     LEFT    Difficulty walking     H/O complete eye exam 10/2016    Headache     OFF AND ON BACK OF HEAD, DOWN NECK TO SHOULDER    History of glaucoma     History of poliomyelitis     AGE 9    History of " surgery on arm     left arm    HLD (hyperlipidemia)     Hypertension     Hypothyroidism, acquired     Legal blindness     SOME PERIPHERAL VISION ON LEFT, SOME VISION ON RIGHT    Memory loss     SOME SHORT TERM    Migraine     Numbness     LEFT LEG     Osteoarthritis, multiple sites     Osteoporosis     Pacemaker     Rectal bleeding     X1, WITH BM    Sleep apnea     DOES NOT USE A MACHINE    TIA (transient ischemic attack) 12/24/2018    ?, HAD SPELL UNABLE TO TALK    Type 2 diabetes mellitus     Type 2 diabetes mellitus, uncontrolled      Past Surgical History:   Procedure Laterality Date    APPENDECTOMY  1989    CARDIAC CATHETERIZATION N/A 4/13/2023    Procedure: Left Heart Cath;  Surgeon: Rikki Oconnor MD;  [facility];  Service: Cardiology;  Laterality: N/A;    CARDIAC CATHETERIZATION N/A 4/13/2023    Procedure: Coronary angiography;  Surgeon: Rikki Oconnor MD;  Location: [facility];  Service: Cardiology;  Laterality: N/A;    CARDIAC CATHETERIZATION N/A 4/13/2023    Procedure: Left ventriculography;  Surgeon: Rikki Oconnor MD;  [facility];  Service: Cardiology;  Laterality: N/A;    CARDIAC CATHETERIZATION N/A 4/20/2023    Procedure: Percutaneous Coronary Intervention;  Surgeon: Rikki Oconnor MD;  [facility]: [facility];  Service: Cardiology;  Laterality: N/A;    CARDIAC CATHETERIZATION N/A 4/20/2023    Procedure: Atherectomy-coronary;  Surgeon: Rikki Oconnor MD;  Location: [facility] CATH INVASIVE LOCATION;  Service: Cardiology;  Laterality: N/A;    CARDIAC CATHETERIZATION N/A 4/20/2023    Procedure: Stent CAROL coronary;  Surgeon: Rikki Oconnor MD;  Location: [facility] CATH INVASIVE LOCATION;  Service: Cardiology;  Laterality: N/A;    CARDIAC PACEMAKER PLACEMENT  11/25/2004    CAROTID ENDARTERECTOMY Left 12/13/2019    Procedure: LEFT CAROTID BODY TUMOR RESECTION;  Surgeon: Bryan Severino MD;  Location: [facility] MAIN OR;  Service: Vascular     CATARACT EXTRACTION Bilateral 1997    CEREBRAL ANGIOGRAM Bilateral 12/6/2019    Procedure: CAROTID ARTERIOGRAM BILATERAL, RIGHT WRIST APPROACH;  Surgeon: Bryan Severino MD;  [facility]: [facility] 18/19;  Service: Vascular    CHOLECYSTECTOMY  1989    COLONOSCOPY  2013    dr johnson    ELBOW PROCEDURE  2016    ENDOSCOPY  12/06/2012    Dr. Johnson; food in stomach, gastritis    EXCISION LESION  1994    BACK CYST BENGIN    EYE SURGERY  12/2012    GLAUCOMA SURGERY Bilateral 1995    laser surgery    HARDWARE REMOVAL Left     ELBOW    HYSTERECTOMY  1986    INTERVENTIONAL RADIOLOGY PROCEDURE N/A 4/20/2023    Procedure: Intravascular Ultrasound;  Surgeon: Rikki Oconnor MD;  Location: [facility] C[facility] INVASIVE LOCATION;  Service: Cardiology;  Laterality: N/A;    ORIF ELBOW FRACTURE Left     PACEMAKER IMPLANTATION      PACEMAKER REPLACEMENT  06/2013      General Information       Row Name 08/31/23 1246          Physical Therapy Time and Intention    Document Type therapy note (daily note)  -     Mode of Treatment individual therapy;physical therapy  -               User Key  (r) = Recorded By, (t) = Taken By, (c) = Cosigned By      Initials Name Provider Type     Beulah Reich, PT Physical Therapist                   Mobility       Row Name 08/31/23 1246          Bed Mobility    Comment, (Bed Mobility) up in chair  -       Row Name 08/31/23 1246          Sit-Stand Transfer    Sit-Stand [place] (Transfers) moderate assist (50% patient effort)  -     Assistive Device (Sit-Stand Transfers) walker, front-wheeled  -       Row Name 08/31/23 1246          Gait/Stairs (Locomotion)    [place] Level (Gait) minimum assist (75% patient effort)  -     Assistive Device (Gait) walker, front-wheeled  -     Distance in Feet (Gait) 15 ft  -     Deviations/Abnormal Patterns (Gait) antalgic;stride length decreased;gait speed decreased  -     Bilateral Gait Deviations forward flexed posture  -     Comment,  (Gait/Stairs) slow, very unsteady today  -ILSA               User Key  (r) = Recorded By, (t) = Taken By, (c) = Cosigned By      Initials Name Provider Type    Beulah Bernardo PT Physical Therapist                   Obj/Interventions       Row Name 08/31/23 1248          Motor Skills    Therapeutic Exercise --  BLE AP, LAQ, seated marching, hip abd/add x 10 reps  -ILSA               User Key  (r) = Recorded By, (t) = Taken By, (c) = Cosigned By      Initials Name Provider Type    Beulah Bernardo PT Physical Therapist                   Goals/Plan    No documentation.                  Clinical Impression       Row Name 08/31/23 1250          Pain    Pretreatment Pain Rating 0/10 - no pain  -ILSA     Posttreatment Pain Rating 0/10 - no pain  -ILSA       Row Name 08/31/23 1250          Plan of Care Review    Plan of Care Reviewed With patient  -ILSA     Outcome Evaluation Pt up in chair and agreeable to therapy. Pt reports increased weakness today. Demonstrated increased need for assist to stand. gait is slower and more unsteady today. Again, discussed SNF recommendation. Pt will need 24/7 care if she returns home. Discussed with son in the room and dgt was on speaker phone. Notified CCP to follow up as family is going to discuss rehab again.  -       Row Name 08/31/23 1250          Positioning and Restraints    Pre-Treatment Position sitting in chair/recliner  no alarm  -ILSA     Post Treatment Position chair  -KH     In Chair reclined;call light within reach;encouraged to call for assist;with family/caregiver  -ILSA               User Key  (r) = Recorded By, (t) = Taken By, (c) = Cosigned By      Initials Name Provider Type    Beulah Bernardo, ZARA Physical Therapist                   Outcome Measures       Row Name 08/31/23 1252          How much help from another person do you currently need...    Turning from your back to your side while in flat bed without using bedrails? 4  -ILSA     Moving  from lying on back to sitting on the side of a flat bed without bedrails? 3  -KH     Moving to and from a bed to a chair (including a wheelchair)? 3  -KH     Standing up from a chair using your arms (e.g., wheelchair, bedside chair)? 2  -KH     Climbing 3-5 steps with a railing? 1  -KH     To walk in hospital room? 3  -KH     AM-PAC 6 Clicks Score (PT) 16  -KH     Highest level of mobility 5 --> Static standing  -       Row Name 08/31/23 1252          Functional Assessment    Outcome Measure Options AM-PAC 6 Clicks Basic Mobility (PT)  -               User Key  (r) = Recorded By, (t) = Taken By, (c) = Cosigned By      Initials Name Provider Type     Beulah Reich, PT Physical Therapist                                 Physical Therapy Education       Title: PT OT SLP Therapies (In Progress)       Topic: Physical Therapy (In Progress)       Point: Mobility training (In Progress)       Learning Progress Summary             Patient Acceptance, E, NR by  at 8/27/2023 1811   Family Acceptance, E, NR by  at 8/27/2023 1811                         Point: Home exercise program (In Progress)       Learning Progress Summary             Patient Acceptance, E, NR by  at 8/27/2023 1811   Family Acceptance, E, NR by  at 8/27/2023 1811                         Point: Body mechanics (In Progress)       Learning Progress Summary             Patient Acceptance, E, NR by  at 8/27/2023 1811   Family Acceptance, E, NR by  at 8/27/2023 1811                                         User Key       Initials Effective Dates Name Provider Type Discipline     07/11/23 -  Milena Muñoz, PT Physical Therapist PT                  PT Recommendation and Plan     Plan of Care Reviewed With: patient  Outcome Evaluation: Pt up in chair and agreeable to therapy. Pt reports increased weakness today. Demonstrated increased need for assist to stand. gait is slower and more unsteady today. Again, discussed SNF recommendation.  Pt will need 24/7 care if she returns home. Discussed with son in the room and dgt was on speaker phone. Notified CCP to follow up as family is going to discuss rehab again.     Time Calculation:         PT Charges       Row Name 08/31/23 1252             Time Calculation    Start Time 1142  -KH      Stop Time 1205  -KH      Time Calculation (min) 23 min  -KH      PT Received On 08/31/23  -KH      PT - Next Appointment 09/01/23  -KH         Time Calculation- PT    Total Timed Code Minutes- PT 23 minute(s)  -KH         Timed Charges    79564 - PT Therapeutic Activity Minutes 23  -KH         Total Minutes    Timed Charges Total Minutes 23  -KH       Total Minutes 23  -KH                User Key  (r) = Recorded By, (t) = Taken By, (c) = Cosigned By      Initials Name Provider Type    Beulah Bernardo, PT Physical Therapist                  Therapy Charges for Today       Code Description Service Date Service Provider Modifiers Qty    [id number] HC PT THERAPEUTIC ACT EA 15 MIN 8/31/2023 Beulah Reich, PT GP 2            PT G-Codes  Outcome Measure Options: AM-PAC 6 Clicks Basic Mobility (PT)  AM-PAC 6 Clicks Score (PT): 16  AM-PAC 6 Clicks Score (OT): 16  PT Discharge Summary  Anticipated Discharge Disposition (PT): skilled nursing facility, home with 24/7 care, home with home health    Beulah Reich, PT  8/31/2023

## 2023-08-31 NOTE — NURSING NOTE
Pt called out stating she was feeling SOA and pressure everywhere. Pt currently undergoing overnight oximetry, 02 sats in the 70-80's at times. Oxygen reapplied. Pt noted to be clammy. STAT EKG ordered and blood glucose checked. BG noted to be 52. Pt A&O and able to drink, juice given. On recheck BG 71. Another cup of juice given. 3rd recheck .

## 2023-08-31 NOTE — PLAN OF CARE
Goal Outcome Evaluation:  Plan of Care Reviewed With: patient           Outcome Evaluation: Pt up in chair and agreeable to therapy. Pt reports increased weakness today. Demonstrated increased need for assist to stand. gait is slower and more unsteady today. Again, discussed SNF recommendation. Pt will need 24/7 care if she returns home. Discussed with son in the room and dgt was on speaker phone. Notified CCP to follow up as family is going to discuss rehab again.      Anticipated Discharge Disposition (PT): skilled nursing facility, home with 24/7 care, home with home health

## 2023-09-01 LAB
ANION GAP SERPL CALCULATED.3IONS-SCNC: 9.3 MMOL/L (ref 5–15)
BACTERIA SPEC AEROBE CULT: NORMAL
BUN SERPL-MCNC: 29 MG/DL (ref 8–23)
BUN/CREAT SERPL: 22.1 (ref 7–25)
CALCIUM SPEC-SCNC: 10 MG/DL (ref 8.6–10.5)
CHLORIDE SERPL-SCNC: 100 MMOL/L (ref 98–107)
CO2 SERPL-SCNC: 27.7 MMOL/L (ref 22–29)
CREAT SERPL-MCNC: 1.31 MG/DL (ref 0.57–1)
EGFRCR SERPLBLD CKD-EPI 2021: 39.3 ML/MIN/1.73
GLUCOSE BLDC GLUCOMTR-MCNC: 127 MG/DL (ref 70–130)
GLUCOSE BLDC GLUCOMTR-MCNC: 204 MG/DL (ref 70–130)
GLUCOSE BLDC GLUCOMTR-MCNC: 319 MG/DL (ref 70–130)
GLUCOSE BLDC GLUCOMTR-MCNC: 96 MG/DL (ref 70–130)
GLUCOSE SERPL-MCNC: 134 MG/DL (ref 65–99)
POTASSIUM SERPL-SCNC: 5 MMOL/L (ref 3.5–5.2)
SODIUM SERPL-SCNC: 137 MMOL/L (ref 136–145)

## 2023-09-01 PROCEDURE — 63710000001 INSULIN LISPRO (HUMAN) PER 5 UNITS: Performed by: STUDENT IN AN ORGANIZED HEALTH CARE EDUCATION/TRAINING PROGRAM

## 2023-09-01 PROCEDURE — 99232 SBSQ HOSP IP/OBS MODERATE 35: CPT | Performed by: STUDENT IN AN ORGANIZED HEALTH CARE EDUCATION/TRAINING PROGRAM

## 2023-09-01 PROCEDURE — 97110 THERAPEUTIC EXERCISES: CPT | Performed by: PHYSICAL THERAPIST

## 2023-09-01 PROCEDURE — 82948 REAGENT STRIP/BLOOD GLUCOSE: CPT

## 2023-09-01 PROCEDURE — 63710000001 INSULIN GLARGINE PER 5 UNITS: Performed by: STUDENT IN AN ORGANIZED HEALTH CARE EDUCATION/TRAINING PROGRAM

## 2023-09-01 PROCEDURE — 80048 BASIC METABOLIC PNL TOTAL CA: CPT | Performed by: STUDENT IN AN ORGANIZED HEALTH CARE EDUCATION/TRAINING PROGRAM

## 2023-09-01 RX ADMIN — LEVOTHYROXINE SODIUM 75 MCG: 0.07 TABLET ORAL at 09:18

## 2023-09-01 RX ADMIN — NYSTATIN: 100000 POWDER TOPICAL at 09:19

## 2023-09-01 RX ADMIN — CLOPIDOGREL BISULFATE 75 MG: 75 TABLET, FILM COATED ORAL at 09:18

## 2023-09-01 RX ADMIN — LOSARTAN POTASSIUM 100 MG: 100 TABLET, FILM COATED ORAL at 09:18

## 2023-09-01 RX ADMIN — Medication 500 MCG: at 09:18

## 2023-09-01 RX ADMIN — Medication 3 ML: at 20:23

## 2023-09-01 RX ADMIN — INSULIN LISPRO 4 UNITS: 100 INJECTION, SOLUTION INTRAVENOUS; SUBCUTANEOUS at 13:00

## 2023-09-01 RX ADMIN — Medication 1 MG: at 20:22

## 2023-09-01 RX ADMIN — NYSTATIN 1 APPLICATION: 100000 POWDER TOPICAL at 20:23

## 2023-09-01 RX ADMIN — APIXABAN 5 MG: 5 TABLET, FILM COATED ORAL at 20:22

## 2023-09-01 RX ADMIN — NIFEDIPINE 30 MG: 30 TABLET, FILM COATED, EXTENDED RELEASE ORAL at 09:19

## 2023-09-01 RX ADMIN — Medication 3 ML: at 09:19

## 2023-09-01 RX ADMIN — INSULIN LISPRO 7 UNITS: 100 INJECTION, SOLUTION INTRAVENOUS; SUBCUTANEOUS at 17:24

## 2023-09-01 RX ADMIN — INSULIN GLARGINE 10 UNITS: 100 INJECTION, SOLUTION SUBCUTANEOUS at 09:18

## 2023-09-01 RX ADMIN — PRAVASTATIN SODIUM 20 MG: 20 TABLET ORAL at 20:22

## 2023-09-01 RX ADMIN — APIXABAN 5 MG: 5 TABLET, FILM COATED ORAL at 09:18

## 2023-09-01 RX ADMIN — GABAPENTIN 100 MG: 100 CAPSULE ORAL at 20:22

## 2023-09-01 NOTE — PLAN OF CARE
Goal Outcome Evaluation:   VSS. A&OX4. No c/o pain or discomfort. 02 added overnight while sleeping. Up with x2 assist to bsdc. Pt declining SSI, long acting administered per order. Call light in reach.

## 2023-09-01 NOTE — PLAN OF CARE
Goal Outcome Evaluation:  Plan of Care Reviewed With: patient           Outcome Evaluation: Pt agreeable to bed exerciess but did no want to get back up again. She had just been up to bathroom before PT arrived. Encouraged her to get back up for meals. Will continue to progress as tolerated.      Anticipated Discharge Disposition (PT): skilled nursing facility, home with 24/7 care, home with home health

## 2023-09-01 NOTE — CASE MANAGEMENT/SOCIAL WORK
Continued Stay Note  [facility]     Patient Name: Roland Russell  MRN: [id number]  Today's Date: 9/1/2023    Admit Date: 8/26/2023    Plan: Home with family   Discharge Plan       Row Name 09/01/23 1542       Plan    Plan Home with family    Patient/Family in Agreement with Plan yes    Plan Comments CCP spoke with patient's JOHNNY/Yanet via phone regarding discharge plan. CCP informed POA/Yanet that per Kriss, patient denied at all [facility] facilities, [facility] has no female beds available, [facility] denied due to insurance, [facility] has no beds available, and patient declined referral sent to [facility]. Patient has been accepted at [facility]. Family refuses [facility] due to facility not being a five star CMS rating. Family refusing any additional referrals and state patient will return home. CCP provided private pay in-home caregivers list to bedside. Family discontinued phone conversation prior to consenting to  referrals. CCP to follow when family agreeble to discuss. Shagufta BUTTS LCSW                   Discharge Codes    No documentation.                 Expected Discharge Date and Time       Expected Discharge Date Expected Discharge Time    Sep 2, 2023               Shagufta BUTTS LCSW

## 2023-09-01 NOTE — SIGNIFICANT NOTE
09/01/23 1424   OTHER   Discipline occupational therapist   Rehab Time/Intention   Session Not Performed patient/family declined, not feeling well  (OT checked on pt this PM, she declines OT. She reports she has been getting up to BS. No family present to encourage her (they typically do). Per PT she declined this morning as well.)   Recommendation   OT - Next Appointment 09/05/23

## 2023-09-01 NOTE — CASE MANAGEMENT/SOCIAL WORK
Continued Stay Note  [facility]     Patient Name: Roland Russell  MRN: [id number]  Today's Date: 9/1/2023    Admit Date: 8/26/2023    Plan: [facility] pending [facility] pre-cert   Discharge Plan       Row Name 09/01/23 1137       Plan    Plan [facility] pending [facility] pre-cert    Patient/Family in Agreement with Plan yes    Plan Comments CCP spoke with Kriss who [facility] does have a bed available pending [employer] Medicare Replacement Pre-cert. CCP spoke with patient and patient's son at bedside to inform that [facility] available. Patient and son agreeable to [facility] SNF and agreeable to initiate pre-cert. [facility] sent request to start [facility] authorization today (pending). Patient will need transportation arranged at discharge. Pharmacy upated and packet on patient chart. Shagufta BUTTS LCSW                   Discharge Codes    No documentation.                 Expected Discharge Date and Time       Expected Discharge Date Expected Discharge Time    Aug 31, 2023               Shagufta BUTTS LCSW

## 2023-09-01 NOTE — PROGRESS NOTES
"    Patient Name: Roland Russell  :1934  88 y.o.      Patient Care Team:  Shawanda Rosario MD as PCP - General (Internal Medicine)  Tevin Albrecht MD as Consulting Physician (Cardiac Electrophysiology)    Chief Complaint:   Weakness    Interval History:   Creatinine has continued to improve. Feels improved.    Objective   Vital Signs  Temp:  [97.4 °F (36.3 °C)-98 °F (36.7 °C)] 97.8 °F (36.6 °C)  Heart Rate:  [62-73] 62  Resp:  [14-20] 18  BP: (122-170)/(50-67) 156/67    Intake/Output Summary (Last 24 hours) at 2023 0756  Last data filed at 2023 0539  Gross per 24 hour   Intake 960 ml   Output 1025 ml   Net -65 ml       Flowsheet Rows      Flowsheet Row First Filed Value   Admission Height 144.8 cm (57\") Documented at 2023 0956   Admission Weight 70.7 kg (155 lb 13.8 oz) Documented at 2023 1538            GEN: no distress, alert and oriented  HEENT: NACT, EOMI, moist mucous membranes  Lungs: CTAB, no wheezes, rales or rhonchi  CV: normal rate, regular rhythm, normal S1, S2, no murmurs, +2 radial pulses b/l, no carotid bruit  Abdomen: soft, nontender, nondistended, NABS  Extremities: no edema  Skin: no rash, warm, dry  Heme/Lymph: no bruising  Psych: organized thought, normal behavior and affect    Results Review:    Results from last 7 days   Lab Units 23  0924   SODIUM mmol/L 135*   POTASSIUM mmol/L 5.0   CHLORIDE mmol/L 96*   CO2 mmol/L 28.6   BUN mg/dL 34*   CREATININE mg/dL 1.48*   GLUCOSE mg/dL 240*   CALCIUM mg/dL 9.6     Results from last 7 days   Lab Units 23  1356 23  1204   HSTROP T ng/L 33* 40*     Results from last 7 days   Lab Units 23  0542   WBC 10*3/mm3 7.98   HEMOGLOBIN g/dL 13.4   HEMATOCRIT % 43.6   PLATELETS 10*3/mm3 211                           Medication Review:   apixaban, 5 mg, Oral, BID  clopidogrel, 75 mg, Oral, Daily  gabapentin, 100 mg, Oral, Nightly  insulin glargine, 10 Units, Subcutaneous, BID  insulin lispro, 2-9 Units, " Subcutaneous, 4x Daily AC & at Bedtime  levothyroxine, 75 mcg, Oral, Daily  losartan, 100 mg, Oral, Q24H  melatonin, 1 mg, Oral, Nightly  NIFEdipine XL, 30 mg, Oral, Q24H  nystatin, , Topical, Q12H  pravastatin, 20 mg, Oral, Nightly  sodium chloride, 3 mL, Intravenous, Q12H  vitamin B-12, 500 mcg, Oral, Daily            Assessment & Plan   #UTI  #Acute HFpEF  #pAF  #SSS s/p ppm  #DM    88 y.o. female with paroxysmal A-fib on Eliquis, diabetes, hypertension, DARI, high-grade block status post dual-chamber pacemaker, and CAD s/p RCA PCI who scented with weakness and hypoxia, found to have likely UTI.  She was diuresed with IV Bumex.    Last echocardiogram was in January which revealed normal EF with indeterminate diastolic function.    Limited echocardiogram 8/31/2023 with normal EF.    - continue nifedipine 30mg daily, losartan 100mg daily  - continue Eliquis for AF and plavix for CAD    Stable for discharge home from cardiac standpoint. She can follow up with me in 1-2 weeks.    Rikki Oconnor MD  [facility]  09/01/23  07:56 EDT

## 2023-09-01 NOTE — PROGRESS NOTES
Name: Roland Russell ADMIT: 2023   : 1934  PCP: Shawanda Rosario MD    MRN: [id number] LOS: 6 days   AGE/SEX: 88 y.o. female  ROOM: [facility]     Subjective   Subjective   SCR continues to improve. She does not have any new complaints today.     Objective   Objective   Vital Signs  Temp:  [97.8 °F (36.6 °C)-98 °F (36.7 °C)] 98 °F (36.7 °C)  Heart Rate:  [62-73] 70  Resp:  [14-20] 20  BP: (122-170)/(50-67) 149/56  SpO2:  [93 %-98 %] 94 %  on  Flow (L/min):  [1-2] 2;   Device (Oxygen Therapy): nasal cannula  Body mass index is 33.54 kg/m².  Physical Exam  Constitutional:       Comments: Chronically ill appearing    Cardiovascular:      Rate and Rhythm: Normal rate.      Pulses: Normal pulses.   Pulmonary:      Effort: Pulmonary effort is normal. No respiratory distress.      Breath sounds: Normal breath sounds. No wheezing.   Abdominal:      General: Abdomen is flat. There is no distension.      Palpations: Abdomen is soft.   Musculoskeletal:      Right lower leg: No edema.      Left lower leg: No edema.      Comments: BLE tender to palpation    Skin:     General: Skin is warm.   Neurological:      General: No focal deficit present.      Mental Status: She is alert and oriented to person, place, and time.   Psychiatric:         Mood and Affect: Mood normal.         Behavior: Behavior normal.         Results Review     I reviewed the patient's new clinical results.  Results from last 7 days   Lab Units 23  0542 23  0557 23  0704 23  1007   WBC 10*3/mm3 7.98 8.34 9.97 12.90*   HEMOGLOBIN g/dL 13.4 13.2 12.2 12.5   PLATELETS 10*3/mm3 211 204 180 190       Results from last 7 days   Lab Units 23  0851 23  0924 23  0526 23  0843   SODIUM mmol/L 137 135* 138 137   POTASSIUM mmol/L 5.0 5.0 4.2 4.3   CHLORIDE mmol/L 100 96* 96* 98   CO2 mmol/L 27.7 28.6 31.0* 29.0   BUN mg/dL 29* 34* 39* 31*   CREATININE mg/dL 1.31* 1.48* 1.86* 1.32*   GLUCOSE mg/dL 134* 240*  127* 225*   EGFR mL/min/1.73 39.3* 33.9* 25.8* 38.9*       Results from last 7 days   Lab Units 08/26/23  1204   ALBUMIN g/dL 3.9   BILIRUBIN mg/dL 0.6   ALK PHOS U/L 197*   AST (SGOT) U/L 12   ALT (SGPT) U/L 11       Results from last 7 days   Lab Units 09/01/23  0851 08/31/23  0924 08/30/23  0526 08/29/23  0843 08/27/23  0704 08/26/23  1204   CALCIUM mg/dL 10.0 9.6 9.6 10.0   < > 9.7   ALBUMIN g/dL  --   --   --   --   --  3.9    < > = values in this interval not displayed.         Glucose   Date/Time Value Ref Range Status   09/01/2023 1151 204 (H) 70 - 130 mg/dL Final   09/01/2023 0634 96 70 - 130 mg/dL Final   08/31/2023 2027 205 (H) 70 - 130 mg/dL Final   08/31/2023 1628 244 (H) 70 - 130 mg/dL Final   08/31/2023 1203 307 (H) 70 - 130 mg/dL Final   08/31/2023 0647 122 70 - 130 mg/dL Final   08/31/2023 0307 125 70 - 130 mg/dL Final       No radiology results for the last day  Scheduled Medications  apixaban, 5 mg, Oral, BID  clopidogrel, 75 mg, Oral, Daily  gabapentin, 100 mg, Oral, Nightly  insulin glargine, 10 Units, Subcutaneous, BID  insulin lispro, 2-9 Units, Subcutaneous, 4x Daily AC & at Bedtime  levothyroxine, 75 mcg, Oral, Daily  losartan, 100 mg, Oral, Q24H  melatonin, 1 mg, Oral, Nightly  NIFEdipine XL, 30 mg, Oral, Q24H  nystatin, , Topical, Q12H  pravastatin, 20 mg, Oral, Nightly  sodium chloride, 3 mL, Intravenous, Q12H  vitamin B-12, 500 mcg, Oral, Daily    Infusions   Diet  Diet: Diabetic Diets, Cardiac Diets; Healthy Heart (2-3 Na+); Consistent Carbohydrate; Texture: Regular Texture (IDDSI 7); Fluid Consistency: Thin (IDDSI 0)       Assessment/Plan     Active Hospital Problems    Diagnosis  POA    **Diastolic CHF, acute [I50.31]  Yes    Acute CHF [I50.9]  Yes    Type 2 diabetes mellitus with hyperglycemia [E11.65]  Yes    Acute cystitis with hematuria [N30.01]  Yes    Diabetic polyneuropathy associated with type 2 diabetes mellitus [E11.42]  Yes    Presence of cardiac pacemaker [Z95.0]  Yes     Hypoxia [R09.02]  Yes    S/P right coronary artery (RCA) stent placement [Z95.5]  Not Applicable    Coronary artery disease involving native coronary artery of native heart [I25.10]  Yes    PAF (paroxysmal atrial fibrillation) [I48.0]  Yes    Essential hypertension [I10]  Yes    Legal blindness [H54.8]  Yes    GERD (gastroesophageal reflux disease) [K21.9]  Yes    Mixed hyperlipidemia [E78.2]  Yes    Hypothyroidism, acquired [E03.9]  Yes    Osteoarthritis, multiple sites [M15.9]  Yes      Resolved Hospital Problems   No resolved problems to display.       88 y.o. female admitted with Diastolic CHF, acute.    Acute kidney injury  Creatinine went up as high as 1.9, with baseline around 1.0.  She was diuresed with Bumex and she was also started on Bactrim.  Bactrim was discontinued along with Bumex as she was euvolemic and had an adequate course of antibiotics.  Creatinine is improving today.    E. coli UTI  -urine cx > 100k E coli, pan sensitive  DC bactrim. Has had adequate course of Abx.  Repeated ucx per family request and culture with normal salena only     Acute on chronic diastolic heart failure  Hypoxia, resolved  -Cardiology following. Not curently on diuretics. Will need to follow up with cardiology in 1-2 weeks after discharge     DM2 with hyperglycemia and polyneuropathy  Hypoglycemia  -A1c greater than 10  -Decreased glargine dose to 10 units twice daily with labile blood sugars   -Gabapentin decreased from 300 mg to 100 mg nightly     Essential hypertension  CAD  -Losartan 100 mg, nifedipine 30  -Plavix     Atrial fibrillation  -RC: PPM   -AC: apixaban      Eliquis (home med) for DVT prophylaxis.  Discussed with patient.  Anticipate discharge to SNF when arrangements have been made    Ronn Collins MD  [facility]  09/01/23  13:22 EDT

## 2023-09-01 NOTE — CASE MANAGEMENT/SOCIAL WORK
Continued Stay Note  [facility]     Patient Name: Roland Russell  MRN: [id number]  Today's Date: 9/1/2023    Admit Date: 8/26/2023    Plan: SNF referrals pending   Discharge Plan       Row Name 09/01/23 1254       Plan    Plan SNF referrals pending    Patient/Family in Agreement with Plan yes    Plan Comments Received call from Kriss who states [facility] can no longer accept and none of the [facility] facilities cannot accept at this time. CCP spoke with patient and patient's son at bedside and patient's daughter via phone to inform that [facility] can longer accept. Patient and family agreeable to broad SNF referrals placed (pending). Patient will need [facility] MCR pre-cert and will need transportation arranged. CCP to follow. Shagufta BUTTS LCSW                                                Discharge Codes    No documentation.                 Expected Discharge Date and Time       Expected Discharge Date Expected Discharge Time    Aug 31, 2023               Shagufta BUTTS LCSW

## 2023-09-01 NOTE — THERAPY TREATMENT NOTE
"Patient Name: Roland Russell  : 1934    MRN: [id number]                              Today's Date: 2023       Admit Date: 2023    Visit Dx:     ICD-10-CM ICD-9-CM   1. Generalized weakness  R53.1 780.79   2. Acute congestive heart failure, unspecified heart failure type  I50.9 428.0     Patient Active Problem List   Diagnosis    Legal blindness    Uncontrolled type 2 diabetes mellitus with hypoglycemia without coma    GERD (gastroesophageal reflux disease)    Mixed hyperlipidemia    Hypothyroidism, acquired    Osteoarthritis, multiple sites    Essential hypertension    Senile osteoporosis    Renal insufficiency    T12 compression fracture    Vitamin D deficiency    Severe headache    Carotid body tumor    Glomus jugulare tumor    Sleep apnea, obstructive    Morbidly obese    Abnormal weight gain    Localized edema    Compression fracture of T8 vertebra with delayed healing    PAF (paroxysmal atrial fibrillation)    Dry scalp    Deformity of both feet    Acute encephalopathy    Lactic acidosis    Diverticulitis    Ankle fracture    Type 2 diabetes mellitus with hypoglycemia, with long-term current use of insulin    Abnormal nuclear stress test    Coronary artery disease involving native coronary artery of native heart    S/P right coronary artery (RCA) stent placement    Diastolic CHF, acute    Acute CHF    Type 2 diabetes mellitus with hyperglycemia    Acute cystitis with hematuria    Diabetic polyneuropathy associated with type 2 diabetes mellitus    Presence of cardiac pacemaker    Hypoxia     Past Medical History:   Diagnosis Date    Abnormal vision     SEES \"KALEIDOSCOPE\"    Allergy to adhesive tape     Arthralgia     AVB (atrioventricular block)     Balance problem     Carotid body tumor     LEFT    Difficulty walking     H/O complete eye exam 10/2016    Headache     OFF AND ON BACK OF HEAD, DOWN NECK TO SHOULDER    History of glaucoma     History of poliomyelitis     AGE 9    History of " surgery on arm     left arm    HLD (hyperlipidemia)     Hypertension     Hypothyroidism, acquired     Legal blindness     SOME PERIPHERAL VISION ON LEFT, SOME VISION ON RIGHT    Memory loss     SOME SHORT TERM    Migraine     Numbness     LEFT LEG     Osteoarthritis, multiple sites     Osteoporosis     Pacemaker     Rectal bleeding     X1, WITH BM    Sleep apnea     DOES NOT USE A MACHINE    TIA (transient ischemic attack) 12/24/2018    ?, HAD SPELL UNABLE TO TALK    Type 2 diabetes mellitus     Type 2 diabetes mellitus, uncontrolled      Past Surgical History:   Procedure Laterality Date    APPENDECTOMY  1989    CARDIAC CATHETERIZATION N/A 4/13/2023    Procedure: Left Heart Cath;  Surgeon: Rikki Oconnor MD;  [facility];  Service: Cardiology;  Laterality: N/A;    CARDIAC CATHETERIZATION N/A 4/13/2023    Procedure: Coronary angiography;  Surgeon: Rikki Oconnor MD;  [facility];  Service: Cardiology;  Laterality: N/A;    CARDIAC CATHETERIZATION N/A 4/13/2023    Procedure: Left ventriculography;  Surgeon: Rikki Oconnor MD;  [facility];  Service: Cardiology;  Laterality: N/A;    CARDIAC CATHETERIZATION N/A 4/20/2023    Procedure: Percutaneous Coronary Intervention;  Surgeon: Rikki Oconnor MD;  [facility];  Service: Cardiology;  Laterality: N/A;    CARDIAC CATHETERIZATION N/A 4/20/2023    Procedure: Atherectomy-coronary;  Surgeon: Rikki Oconnor MD;  Location: [facility] CATH INVASIVE LOCATION;  Service: Cardiology;  Laterality: N/A;    CARDIAC CATHETERIZATION N/A 4/20/2023    Procedure: Stent CAROL coronary;  Surgeon: Rikki Oconnor MD;  Location: [facility] CATH INVASIVE LOCATION;  Service: Cardiology;  Laterality: N/A;    CARDIAC PACEMAKER PLACEMENT  11/25/2004    CAROTID ENDARTERECTOMY Left 12/13/2019    Procedure: LEFT CAROTID BODY TUMOR RESECTION;  Surgeon: Bryan Severino MD;  Location: [facility] MAIN OR;  Service: Vascular     CATARACT EXTRACTION Bilateral 1997    CEREBRAL ANGIOGRAM Bilateral 12/6/2019    Procedure: CAROTID ARTERIOGRAM BILATERAL, RIGHT WRIST APPROACH;  Surgeon: Bryan Severino MD;  Location: [facility] HYBRID OR 18/19;  Service: Vascular    CHOLECYSTECTOMY  1989    COLONOSCOPY  2013    dr johnson    ELBOW PROCEDURE  2016    ENDOSCOPY  12/06/2012    Dr. Johnson; food in stomach, gastritis    EXCISION LESION  1994    BACK CYST BENGIN    EYE SURGERY  12/2012    GLAUCOMA SURGERY Bilateral 1995    laser surgery    HARDWARE REMOVAL Left     ELBOW    HYSTERECTOMY  1986    INTERVENTIONAL RADIOLOGY PROCEDURE N/A 4/20/2023    Procedure: Intravascular Ultrasound;  Surgeon: Rikki Oconnor MD;  Location: [facility] CATH INVASIVE LOCATION;  Service: Cardiology;  Laterality: N/A;    ORIF ELBOW FRACTURE Left     PACEMAKER IMPLANTATION      PACEMAKER REPLACEMENT  06/2013      General Information       Row Name 09/01/23 1451          Physical Therapy Time and Intention    Document Type therapy note (daily note)  -ILSA     Mode of Treatment individual therapy;physical therapy  -ILSA               User Key  (r) = Recorded By, (t) = Taken By, (c) = Cosigned By      Initials Name Provider Type    Beulah Bernardo PT Physical Therapist                   Mobility       Row Name 09/01/23 1451          Bed Mobility    Comment, (Bed Mobility) pt just back to bed after using the bathroom and wants to rest  -ILSA               User Key  (r) = Recorded By, (t) = Taken By, (c) = Cosigned By      Initials Name Provider Type    Beulah Bernardo PT Physical Therapist                   Obj/Interventions       Row Name 09/01/23 1451          Motor Skills    Therapeutic Exercise --  BLE AP, HS, SLR, hip abd/addx 10, BUE should flexion, elbow flex/ext x 10 reps  -ILSA               User Key  (r) = Recorded By, (t) = Taken By, (c) = Cosigned By      Initials Name Provider Type    Beulah Bernardo PT Physical Therapist                   Goals/Plan     No documentation.                  Clinical Impression       Row Name 09/01/23 1454          Pain    Pretreatment Pain Rating 3/10  -KH     Posttreatment Pain Rating 3/10  -KH     Pain Location generalized  -       Row Name 09/01/23 1455          Plan of Care Review    Outcome Evaluation Pt agreeable to bed exerciess but did no want to get back up again. She had just been up to bathroom before PT arrived. Encouraged her to get back up for meals. Will continue to progress as tolerated.  -       Row Name 09/01/23 1455          Positioning and Restraints    Pre-Treatment Position in bed  -KH     Post Treatment Position bed  -KH     In Bed fowlers;call light within reach;encouraged to call for assist;with family/caregiver  -               User Key  (r) = Recorded By, (t) = Taken By, (c) = Cosigned By      Initials Name Provider Type    Beulah Bernardo, PT Physical Therapist                   Outcome Measures       Row Name 09/01/23 1455          How much help from another person do you currently need...    Turning from your back to your side while in flat bed without using bedrails? 3  -KH     Moving from lying on back to sitting on the side of a flat bed without bedrails? 3  -KH     Moving to and from a bed to a chair (including a wheelchair)? 3  -KH     Standing up from a chair using your arms (e.g., wheelchair, bedside chair)? 2  -KH     Climbing 3-5 steps with a railing? 1  -KH     To walk in hospital room? 3  -KH     AM-PAC 6 Clicks Score (PT) 15  -KH     Highest level of mobility 4 --> Transferred to chair/commode  -       Row Name 09/01/23 1457 09/01/23 1455       Functional Assessment    Outcome Measure Options AM-PAC 6 Clicks Basic Mobility (PT)  - AM-PAC 6 Clicks Basic Mobility (PT)  -              User Key  (r) = Recorded By, (t) = Taken By, (c) = Cosigned By      Initials Name Provider Type    Beulah Bernardo, PT Physical Therapist                                 Physical  Therapy Education       Title: PT OT SLP Therapies (In Progress)       Topic: Physical Therapy (In Progress)       Point: Mobility training (In Progress)       Learning Progress Summary             Patient Acceptance, E, VU,NR by KT at 9/1/2023 1037    Acceptance, E, NR by SV at 8/27/2023 1811   Family Acceptance, E, NR by SV at 8/27/2023 1811                         Point: Home exercise program (In Progress)       Learning Progress Summary             Patient Acceptance, E, VU,NR by KT at 9/1/2023 1037    Acceptance, E, NR by SV at 8/27/2023 1811   Family Acceptance, E, NR by SV at 8/27/2023 1811                         Point: Body mechanics (In Progress)       Learning Progress Summary             Patient Acceptance, E, VU,NR by KT at 9/1/2023 1037    Acceptance, E, NR by SV at 8/27/2023 1811   Family Acceptance, E, NR by SV at 8/27/2023 1811                                         User Key       Initials Effective Dates Name Provider Type Discipline    KT 06/16/21 -  Elizabeth Goss RN Registered Nurse Nurse     07/11/23 -  Milena Muñoz, PT Physical Therapist PT                  PT Recommendation and Plan     Plan of Care Reviewed With: patient  Outcome Evaluation: Pt agreeable to bed exerciess but did no want to get back up again. She had just been up to bathroom before PT arrived. Encouraged her to get back up for meals. Will continue to progress as tolerated.     Time Calculation:         PT Charges       Row Name 09/01/23 1457             Time Calculation    Start Time 1541  -KH      Stop Time 1552  -KH      Time Calculation (min) 11 min  -KH      PT Received On 09/01/23  -      PT - Next Appointment 09/02/23  -         Time Calculation- PT    Total Timed Code Minutes- PT 11 minute(s)  -KH         Timed Charges    85393 - PT Therapeutic Exercise Minutes 11  -KH         Total Minutes    Timed Charges Total Minutes 11  -KH       Total Minutes 11  -KH                User Key  (r) = Recorded By, (t)  = Taken By, (c) = Cosigned By      Initials Name Provider Type    Beulah Bernardo, PT Physical Therapist                  Therapy Charges for Today       Code Description Service Date Service Provider Modifiers Qty    23810879205 HC PT THERAPEUTIC ACT EA 15 MIN 8/31/2023 Beulah Reich, PT GP 2    24177333045 HC PT THER PROC EA 15 MIN 9/1/2023 Beulah Reich, PT GP 1            PT G-Codes  Outcome Measure Options: AM-PAC 6 Clicks Basic Mobility (PT)  AM-PAC 6 Clicks Score (PT): 15  AM-PAC 6 Clicks Score (OT): 16  PT Discharge Summary  Anticipated Discharge Disposition (PT): skilled nursing facility, home with 24/7 care, home with home health    Beulah Reich, PT  9/1/2023

## 2023-09-02 ENCOUNTER — READMISSION MANAGEMENT (OUTPATIENT)
Dept: CALL CENTER | Facility: HOSPITAL | Age: 88
End: 2023-09-02
Payer: MEDICARE

## 2023-09-02 VITALS
HEART RATE: 72 BPM | WEIGHT: 155 LBS | OXYGEN SATURATION: 95 % | HEIGHT: 57 IN | DIASTOLIC BLOOD PRESSURE: 62 MMHG | SYSTOLIC BLOOD PRESSURE: 159 MMHG | BODY MASS INDEX: 33.44 KG/M2 | TEMPERATURE: 98.5 F | RESPIRATION RATE: 16 BRPM

## 2023-09-02 LAB
ANION GAP SERPL CALCULATED.3IONS-SCNC: 6.8 MMOL/L (ref 5–15)
BUN SERPL-MCNC: 24 MG/DL (ref 8–23)
BUN/CREAT SERPL: 25.8 (ref 7–25)
CALCIUM SPEC-SCNC: 10.2 MG/DL (ref 8.6–10.5)
CHLORIDE SERPL-SCNC: 104 MMOL/L (ref 98–107)
CO2 SERPL-SCNC: 29.2 MMOL/L (ref 22–29)
CREAT SERPL-MCNC: 0.93 MG/DL (ref 0.57–1)
EGFRCR SERPLBLD CKD-EPI 2021: 59.2 ML/MIN/1.73
GLUCOSE BLDC GLUCOMTR-MCNC: 289 MG/DL (ref 70–130)
GLUCOSE BLDC GLUCOMTR-MCNC: 94 MG/DL (ref 70–130)
GLUCOSE SERPL-MCNC: 101 MG/DL (ref 65–99)
POTASSIUM SERPL-SCNC: 4.6 MMOL/L (ref 3.5–5.2)
SODIUM SERPL-SCNC: 140 MMOL/L (ref 136–145)

## 2023-09-02 PROCEDURE — 63710000001 INSULIN LISPRO (HUMAN) PER 5 UNITS: Performed by: STUDENT IN AN ORGANIZED HEALTH CARE EDUCATION/TRAINING PROGRAM

## 2023-09-02 PROCEDURE — 82948 REAGENT STRIP/BLOOD GLUCOSE: CPT

## 2023-09-02 PROCEDURE — 97110 THERAPEUTIC EXERCISES: CPT

## 2023-09-02 PROCEDURE — 80048 BASIC METABOLIC PNL TOTAL CA: CPT | Performed by: STUDENT IN AN ORGANIZED HEALTH CARE EDUCATION/TRAINING PROGRAM

## 2023-09-02 PROCEDURE — 63710000001 INSULIN GLARGINE PER 5 UNITS: Performed by: STUDENT IN AN ORGANIZED HEALTH CARE EDUCATION/TRAINING PROGRAM

## 2023-09-02 RX ORDER — PRAVASTATIN SODIUM 20 MG
20 TABLET ORAL NIGHTLY
Qty: 90 TABLET | Refills: 0 | Status: SHIPPED | OUTPATIENT
Start: 2023-09-02

## 2023-09-02 RX ORDER — NIFEDIPINE 30 MG/1
30 TABLET, FILM COATED, EXTENDED RELEASE ORAL
Qty: 30 TABLET | Refills: 0 | Status: SHIPPED | OUTPATIENT
Start: 2023-09-03

## 2023-09-02 RX ORDER — BUMETANIDE 0.5 MG/1
0.5 TABLET ORAL DAILY
Qty: 30 TABLET | Refills: 0 | Status: SHIPPED | OUTPATIENT
Start: 2023-09-02

## 2023-09-02 RX ORDER — GABAPENTIN 100 MG/1
100 CAPSULE ORAL NIGHTLY
Qty: 30 CAPSULE | Refills: 0 | Status: SHIPPED | OUTPATIENT
Start: 2023-09-02

## 2023-09-02 RX ADMIN — Medication 3 ML: at 09:48

## 2023-09-02 RX ADMIN — LOSARTAN POTASSIUM 100 MG: 100 TABLET, FILM COATED ORAL at 09:44

## 2023-09-02 RX ADMIN — LEVOTHYROXINE SODIUM 75 MCG: 0.07 TABLET ORAL at 09:44

## 2023-09-02 RX ADMIN — INSULIN GLARGINE 10 UNITS: 100 INJECTION, SOLUTION SUBCUTANEOUS at 09:44

## 2023-09-02 RX ADMIN — NIFEDIPINE 30 MG: 30 TABLET, FILM COATED, EXTENDED RELEASE ORAL at 09:44

## 2023-09-02 RX ADMIN — INSULIN LISPRO 6 UNITS: 100 INJECTION, SOLUTION INTRAVENOUS; SUBCUTANEOUS at 12:00

## 2023-09-02 RX ADMIN — CLOPIDOGREL BISULFATE 75 MG: 75 TABLET, FILM COATED ORAL at 09:44

## 2023-09-02 RX ADMIN — NYSTATIN: 100000 POWDER TOPICAL at 09:47

## 2023-09-02 RX ADMIN — Medication 500 MCG: at 09:44

## 2023-09-02 RX ADMIN — APIXABAN 5 MG: 5 TABLET, FILM COATED ORAL at 09:44

## 2023-09-02 NOTE — PLAN OF CARE
Goal Outcome Evaluation:  Plan of Care Reviewed With: patient        Progress: improving  Outcome Evaluation: Patient alert, oriented x 4; VSS. Plan to discharge home today.

## 2023-09-02 NOTE — PLAN OF CARE
Goal Outcome Evaluation:  Plan of Care Reviewed With: patient, son        Progress: improving  Outcome Evaluation: Pt agreed to PT session, pt swati bed mob w/min assist 1 today, min assist to amb ~35ft , some assist to guide rwx, has all equipment and is going to have family take shifts to assist and HH to follow, pt would have benefitted from SNU , but either facility not right per family, or INS did not cover; pt educ on importance of keeping mobile once home w/assist, still below baseline function      Anticipated Discharge Disposition (PT): skilled nursing facility, home with 24/7 care, home with home health (fam will help as long as able)

## 2023-09-02 NOTE — CASE MANAGEMENT/SOCIAL WORK
Case Management Discharge Note      Final Note: dc home         Selected Continued Care - Discharged on 9/2/2023 Admission date: 8/26/2023 - Discharge disposition: Home or Self Care      Destination    No services have been selected for the patient.                Durable Medical Equipment    No services have been selected for the patient.                Dialysis/Infusion    No services have been selected for the patient.                Home Medical Care    No services have been selected for the patient.                Therapy    No services have been selected for the patient.                Community Resources    No services have been selected for the patient.                Community & DME    No services have been selected for the patient.                         Final Discharge Disposition Code: 01 - home or self-care

## 2023-09-02 NOTE — OUTREACH NOTE
Prep Survey      Flowsheet Row Responses   [facility] facility patient discharged from? [facility]   Is LACE score < 7 ? No   Eligibility Readm Mgmt   Discharge diagnosis Diastolic CHF, acute   Does the patient have one of the following disease processes/diagnoses(primary or secondary)? CHF   Does the patient have Home health ordered? No   Is there a DME ordered? No   Prep survey completed? Yes            Honey PETERSON - Registered Nurse

## 2023-09-02 NOTE — DISCHARGE SUMMARY
Patient Name: Roland Russell  : 1934  MRN: [id number]    Date of Admission: 2023  Date of Discharge:  2023  Primary Care Physician: Shawanda Rosario MD      Chief Complaint:   Weakness - Generalized      Discharge Diagnoses     Active Hospital Problems    Diagnosis  POA    **Diastolic CHF, acute [I50.31]  Yes    Acute CHF [I50.9]  Yes    Type 2 diabetes mellitus with hyperglycemia [E11.65]  Yes    Acute cystitis with hematuria [N30.01]  Yes    Diabetic polyneuropathy associated with type 2 diabetes mellitus [E11.42]  Yes    Presence of cardiac pacemaker [Z95.0]  Yes    Hypoxia [R09.02]  Yes    S/P right coronary artery (RCA) stent placement [Z95.5]  Not Applicable    Coronary artery disease involving native coronary artery of native heart [I25.10]  Yes    PAF (paroxysmal atrial fibrillation) [I48.0]  Yes    Essential hypertension [I10]  Yes    Legal blindness [H54.8]  Yes    GERD (gastroesophageal reflux disease) [K21.9]  Yes    Mixed hyperlipidemia [E78.2]  Yes    Hypothyroidism, acquired [E03.9]  Yes    Osteoarthritis, multiple sites [M15.9]  Yes      Resolved Hospital Problems   No resolved problems to display.        Hospital Course     This is an 88-year-old woman with type 2 diabetes with polyneuropathy, atrial fibrillation on Eliquis, hypertension, and heart failure with preserved ejection fraction who presents to the hospital with severe weakness and some confusion.  She was initially started on ceftriaxone for urinary tract infection with culture results showing sensitivity to Bactrim which she was subsequently transitioned to.  \She is started developing an acute kidney injury with creatinine elevation as high as 1.9, and bactrim was discontinued after 4 days of total antibiotic therapy.   Concurrently, cardiology saw the patient in consultation for her cardiac issues as well as exacerbation of heart failure with preserved ejection fraction.  She was initially started on IV Bumex,  and then transition to oral.  As she started exhibiting elevated creatinine, diuresis was held.  Her creatinine returned to baseline after discontinuation of both Bactrim and a diuretic.  She remained euvolemic after cessation of the diuretic.  She will follow-up with cardiology in the outpatient setting in 1 to 2 weeks after discharge.    Physical Exam:  Temp:  [97.4 °F (36.3 °C)-98.3 °F (36.8 °C)] 97.4 °F (36.3 °C)  Heart Rate:  [63-71] 71  Resp:  [14-20] 16  BP: (125-158)/(53-62) 152/62  Body mass index is 33.54 kg/m².  Physical Exam  Constitutional:       General: She is not in acute distress.  HENT:      Head: Normocephalic and atraumatic.   Cardiovascular:      Rate and Rhythm: Normal rate. Rhythm irregular.   Pulmonary:      Effort: Pulmonary effort is normal. No respiratory distress.   Abdominal:      General: There is no distension.      Palpations: Abdomen is soft.      Tenderness: There is no abdominal tenderness.   Musculoskeletal:      Right lower leg: No edema.      Left lower leg: No edema.   Neurological:      Mental Status: She is alert and oriented to person, place, and time.      Motor: Weakness present.       Consultants     Consult Orders (all) (From admission, onward)       Start     Ordered    08/26/23 1630  Inpatient Diabetes Educator Consult  Once        Provider:  (Not yet assigned)    08/26/23 1629    08/26/23 1628  Inpatient Case Management  Consult  Once        Provider:  (Not yet assigned)    08/26/23 1629    08/26/23 1326  Inpatient Palliative Care Team Consult  Once        Provider:  (Not yet assigned)    08/26/23 1325    08/26/23 1317  Inpatient Cardiology Consult  Once        Specialty:  Cardiology  Provider:  Rikki Oconnor MD    08/26/23 1317    08/26/23 1250  [facility] (on-call MD unless specified) Details  Once        Specialty:  Hospitalist  Provider:  Jj Navarrete MD    08/26/23 1249                  Procedures     Imaging Results (All)       Procedure  Component Value Units Date/Time    XR Chest 1 View [[id number]] Collected: 08/26/23 1153     Updated: 08/26/23 1201    Narrative:      Portable chest radiograph     HISTORY: Shortness of breath     TECHNIQUE: Single AP portable radiograph of the chest     COMPARISON: Chest radiograph 11/2/2022       Impression:      FINDINGS AND IMPRESSION:  Left-sided pacemaker is present.     There is sequela of prior to limits disease. Cardiac silhouette is mild  to moderately enlarged. No pulmonary consolidation, pleural effusion or  pneumothorax is seen.     This report was finalized on 8/26/2023 11:58 AM by Dr. Ruslan Shipman M.D.               Pertinent Labs     Results from last 7 days   Lab Units 08/29/23  0542 08/28/23  0557 08/27/23  0704   WBC 10*3/mm3 7.98 8.34 9.97   HEMOGLOBIN g/dL 13.4 13.2 12.2   PLATELETS 10*3/mm3 211 204 180     Results from last 7 days   Lab Units 09/02/23  0627 09/01/23 0851 08/31/23  0924 08/30/23  0526   SODIUM mmol/L 140 137 135* 138   POTASSIUM mmol/L 4.6 5.0 5.0 4.2   CHLORIDE mmol/L 104 100 96* 96*   CO2 mmol/L 29.2* 27.7 28.6 31.0*   BUN mg/dL 24* 29* 34* 39*   CREATININE mg/dL 0.93 1.31* 1.48* 1.86*   GLUCOSE mg/dL 101* 134* 240* 127*   Estimated Creatinine Clearance: 36.6 mL/min (by C-G formula based on SCr of 0.93 mg/dL).  Results from last 7 days   Lab Units 08/26/23  1204   ALBUMIN g/dL 3.9   BILIRUBIN mg/dL 0.6   ALK PHOS U/L 197*   AST (SGOT) U/L 12   ALT (SGPT) U/L 11     Results from last 7 days   Lab Units 09/02/23  0627 09/01/23  0851 08/31/23  0924 08/30/23  0526 08/27/23  0704 08/26/23  1204   CALCIUM mg/dL 10.2 10.0 9.6 9.6   < > 9.7   ALBUMIN g/dL  --   --   --   --   --  3.9    < > = values in this interval not displayed.       Results from last 7 days   Lab Units 08/26/23  1356 08/26/23  1204   HSTROP T ng/L 33* 40*           Invalid input(s): LDLCALC  Results from last 7 days   Lab Units 08/31/23  1411 08/26/23  1417 08/26/23  1356   BLOODCX   --  No growth at 5 days No  growth at 5 days   URINECX  25,000 CFU/mL Normal Urogenital Kerry >100,000 CFU/mL Escherichia coli*  --        Test Results Pending at Discharge       Discharge Details        Discharge Medications        New Medications        Instructions Start Date   bumetanide 0.5 MG tablet  Commonly known as: BUMEX   0.5 mg, Oral, Daily      NIFEdipine CC 30 MG 24 hr tablet  Commonly known as: ADALAT CC   30 mg, Oral, Every 24 Hours Scheduled   Start Date: September 3, 2023     pravastatin 20 MG tablet  Commonly known as: PRAVACHOL   20 mg, Oral, Nightly             Changes to Medications        Instructions Start Date   gabapentin 100 MG capsule  Commonly known as: NEURONTIN  What changed:   medication strength  how much to take  when to take this   100 mg, Oral, Nightly             Continue These Medications        Instructions Start Date   acetaminophen 325 MG tablet  Commonly known as: TYLENOL   650 mg, Oral, Every 4 Hours PRN      apixaban 5 MG tablet tablet  Commonly known as: ELIQUIS   5 mg, Oral, 2 Times Daily      B-12 PO   1 tablet/day, Oral, 1000 mcg daily      B-D SINGLE USE SWABS REGULAR pads   USE AS DIRECTED 4 TIMES A  DAY.      clopidogrel 75 MG tablet  Commonly known as: PLAVIX   75 mg, Oral, Daily      insulin degludec 100 UNIT/ML solution pen-injector injection  Commonly known as: TRESIBA FLEXTOUCH   8 Units, Subcutaneous, 2 Times Daily      Insulin Lispro 100 UNIT/ML injection  Commonly known as: humaLOG   Subcutaneous, 3 Times Daily Before Meals, Sliding scale      levothyroxine 75 MCG tablet  Commonly known as: SYNTHROID, LEVOTHROID   75 mcg, Oral, Daily      losartan 100 MG tablet  Commonly known as: COZAAR   TAKE 1 TABLET DAILY             Stop These Medications      furosemide 20 MG tablet  Commonly known as: LASIX              Allergies   Allergen Reactions    Adhesive Tape Other (See Comments)     REDNESS, SKIN BURN    Latex Other (See Comments)     REDNESS, SKIN BURN    Propoxyphene Itching          Discharge Disposition:  Home or Self Care    Discharge Diet:  Diet Order   Procedures    Diet: Diabetic Diets, Cardiac Diets; Healthy Heart (2-3 Na+); Consistent Carbohydrate; Texture: Regular Texture (IDDSI 7); Fluid Consistency: Thin (IDDSI 0)       Discharge Activity: as tolerated       CODE STATUS:    Code Status and Medical Interventions:   Ordered at: 08/29/23 1052     Medical Intervention Limits:    NO intubation (DNI)     Level Of Support Discussed With:    Patient    Health Care Surrogate     Code Status (Patient has no pulse and is not breathing):    No CPR (Do Not Attempt to Resuscitate)     Medical Interventions (Patient has pulse or is breathing):    Limited Support     Comments:    spoke with patient, Cecil (son), Yanet (daughter/POA)       Future Appointments   Date Time Provider Department Center   7/10/2024 11:30 AM Rikki Oconnor MD [facility]      Follow-up Information       Shawanda Rosario MD .    Specialty: Internal Medicine  Contact information:  [address] [place], [place] [id number] [phone]                             Time Spent on Discharge:  Greater than 30 minutes      Ronn Collins MD  [facility]  09/02/23  10:02 EDT

## 2023-09-02 NOTE — PLAN OF CARE
Goal Outcome Evaluation:  Plan of Care Reviewed With: patient        Progress: improving     Vital signs stable. Alert and oriented x 4. Forgetful at times. Assisted with max assistance of two to bedside commode. Unsteady and very weak when out of bed. Bed alarm on. Voiding frequently. Briefs worn for incontinence. On room air. Normal sinus cardiac rhythm. Tolerating consistent carbohydrate diet poorly. Appetite poor. Blood sugar monitoring done AC/HS. Turned every 2 hours. Pre - certification pending for rehab placement. Resting in bed watching television. Call light within patient's reach.

## 2023-09-02 NOTE — PLAN OF CARE
Goal Outcome Evaluation:  Plan of Care Reviewed With: patient        Progress: improving  Outcome Evaluation: Monitor pain,labs,and vitals. VSS. O2 2L NC. Purewick. No c/o pain pn current shift. Possible D/C home today. Will continue to monitor.

## 2023-09-02 NOTE — THERAPY TREATMENT NOTE
"Patient Name: Roland Russell  : 1934    MRN: [id number]                              Today's Date: 2023       Admit Date: 2023    Visit Dx:     ICD-10-CM ICD-9-CM   1. Generalized weakness  R53.1 780.79   2. Acute congestive heart failure, unspecified heart failure type  I50.9 428.0   3. Closed fracture of left ankle, initial encounter  S82.392A 824.8     Patient Active Problem List   Diagnosis    Legal blindness    Uncontrolled type 2 diabetes mellitus with hypoglycemia without coma    GERD (gastroesophageal reflux disease)    Mixed hyperlipidemia    Hypothyroidism, acquired    Osteoarthritis, multiple sites    Essential hypertension    Senile osteoporosis    Renal insufficiency    T12 compression fracture    Vitamin D deficiency    Severe headache    Carotid body tumor    Glomus jugulare tumor    Sleep apnea, obstructive    Morbidly obese    Abnormal weight gain    Localized edema    Compression fracture of T8 vertebra with delayed healing    PAF (paroxysmal atrial fibrillation)    Dry scalp    Deformity of both feet    Acute encephalopathy    Lactic acidosis    Diverticulitis    Ankle fracture    Type 2 diabetes mellitus with hypoglycemia, with long-term current use of insulin    Abnormal nuclear stress test    Coronary artery disease involving native coronary artery of native heart    S/P right coronary artery (RCA) stent placement    Diastolic CHF, acute    Acute CHF    Type 2 diabetes mellitus with hyperglycemia    Acute cystitis with hematuria    Diabetic polyneuropathy associated with type 2 diabetes mellitus    Presence of cardiac pacemaker    Hypoxia     Past Medical History:   Diagnosis Date    Abnormal vision     SEES \"KALEIDOSCOPE\"    Allergy to adhesive tape     Arthralgia     AVB (atrioventricular block)     Balance problem     Carotid body tumor     LEFT    Difficulty walking     H/O complete eye exam 10/2016    Headache     OFF AND ON BACK OF HEAD, DOWN NECK TO SHOULDER    History " of glaucoma     History of poliomyelitis     AGE 9    History of surgery on arm     left arm    HLD (hyperlipidemia)     Hypertension     Hypothyroidism, acquired     Legal blindness     SOME PERIPHERAL VISION ON LEFT, SOME VISION ON RIGHT    Memory loss     SOME SHORT TERM    Migraine     Numbness     LEFT LEG     Osteoarthritis, multiple sites     Osteoporosis     Pacemaker     Rectal bleeding     X1, WITH BM    Sleep apnea     DOES NOT USE A MACHINE    TIA (transient ischemic attack) 12/24/2018    ?, HAD SPELL UNABLE TO TALK    Type 2 diabetes mellitus     Type 2 diabetes mellitus, uncontrolled      Past Surgical History:   Procedure Laterality Date    APPENDECTOMY  1989    CARDIAC CATHETERIZATION N/A 4/13/2023    Procedure: Left Heart Cath;  Surgeon: Rikki Oconnor MD;  Location:  [facility];  Service: Cardiology;  Laterality: N/A;    CARDIAC CATHETERIZATION N/A 4/13/2023    Procedure: Coronary angiography;  Surgeon: Rikki Oconnor MD;  Location:  [facility];  Service: Cardiology;  Laterality: N/A;    CARDIAC CATHETERIZATION N/A 4/13/2023    Procedure: Left ventriculography;  Surgeon: Rikki Oconnor MD;  [facility];  Service: Cardiology;  Laterality: N/A;    CARDIAC CATHETERIZATION N/A 4/20/2023    Procedure: Percutaneous Coronary Intervention;  Surgeon: Rikki Oconnor MD;  Location:  [facility]ATH [facility]VASIVE [facility]ATION;  Service: Cardiology;  Laterality: N/A;    CARDIAC CATHETERIZATION N/A 4/20/2023    Procedure: Atherectomy-coronary;  Surgeon: Rikki Oconnor MD;  [facility];  Service: Cardiology;  Laterality: N/A;    CARDIAC CATHETERIZATION N/A 4/20/2023    Procedure: Stent CAROL coronary;  Surgeon: Rikki Oconnor MD;  [facility];  Service: Cardiology;  Laterality: N/A;    CARDIAC PACEMAKER PLACEMENT  11/25/2004    CAROTID ENDARTERECTOMY Left 12/13/2019    Procedure: LEFT CAROTID BODY TUMOR RESECTION;  Surgeon:  Bryan Severino MD;  Location: [facility] MAIN OR;  Service: Vascular    CATARACT EXTRACTION Bilateral 1997    CEREBRAL ANGIOGRAM Bilateral 12/6/2019    Procedure: CAROTID ARTERIOGRAM BILATERAL, RIGHT WRIST APPROACH;  Surgeon: Bryan Severino MD;  Location: [facility] HYBRID OR 18/19;  Service: Vascular    CHOLECYSTECTOMY  1989    COLONOSCOPY  2013    dr johnson    ELBOW PROCEDURE  2016    ENDOSCOPY  12/06/2012    Dr. Johnson; food in stomach, gastritis    EXCISION LESION  1994    BACK CYST BENGIN    EYE SURGERY  12/2012    GLAUCOMA SURGERY Bilateral 1995    laser surgery    HARDWARE REMOVAL Left     ELBOW    HYSTERECTOMY  1986    INTERVENTIONAL RADIOLOGY PROCEDURE N/A 4/20/2023    Procedure: Intravascular Ultrasound;  Surgeon: Rikki Oconnor MD;  Location: [facility] CATH INVASIVE LOCATION;  Service: Cardiology;  Laterality: N/A;    ORIF ELBOW FRACTURE Left     PACEMAKER IMPLANTATION      PACEMAKER REPLACEMENT  06/2013      General Information       Row Name 09/02/23 1503          Physical Therapy Time and Intention    Document Type therapy note (daily note)  -     Mode of Treatment individual therapy;physical therapy  -       Row Name 09/02/23 1503          General Information    Patient Profile Reviewed yes  -     Existing Precautions/Restrictions fall  -       Row Name 09/02/23 1503          Cognition    Orientation Status (Cognition) oriented x 3  -       Row Name 09/02/23 1503          Safety Issues, Functional Mobility    Impairments Affecting Function (Mobility) balance;endurance/activity tolerance;strength  -               User Key  (r) = Recorded By, (t) = Taken By, (c) = Cosigned By      Initials Name Provider Type    Julieth Monk PTA Physical Therapist Assistant                   Mobility       Row Name 09/02/23 1504          Bed Mobility    Bed Mobility supine-sit  -     Supine-Sit [place] (Bed Mobility) minimum assist (75% patient effort);verbal cues  -       Row Name 09/02/23 1501           Sit-Stand Transfer    Sit-Stand [place] (Transfers) minimum assist (75% patient effort);verbal cues  -     Assistive Device (Sit-Stand Transfers) walker, front-wheeled  -       Row Name 09/02/23 1504          Gait/Stairs (Locomotion)    [place] Level (Gait) minimum assist (75% patient effort);contact guard;verbal cues  -     Assistive Device (Gait) walker, front-wheeled  -     Distance in Feet (Gait) 35ft, some assist to guide rwx mainly on turns  -     Deviations/Abnormal Patterns (Gait) em decreased;festinating/shuffling;stride length decreased  -     Bilateral Gait Deviations forward flexed posture  some correction w/cues  -               User Key  (r) = Recorded By, (t) = Taken By, (c) = Cosigned By      Initials Name Provider Type    Julieth Monk PTA Physical Therapist Assistant                   Obj/Interventions    No documentation.                  Goals/Plan    No documentation.                  Clinical Impression       Row Name 09/02/23 1506          Pain    Pre/Posttreatment Pain Comment no c/o pain, just c/o having diff time scooting in bed  -[facility] Name 09/02/23 1506          Plan of Care Review    Plan of Care Reviewed With patient;son  -     Progress improving  -     Outcome Evaluation Pt agreed to PT session, pt swati bed mob w/min assist 1 today, min assist to amb ~35ft , some assist to guide rwx, has all equipment and is going to have family take shifts to assist and HH to follow, pt would have benefitted from SNU , but either facility not right per family, or INS did not cover; pt educ on importance of keeping mobile once home w/assist, still below baseline function  -       Row Name 09/02/23 1506          Therapy Assessment/Plan (PT)    Rehab Potential (PT) good, to achieve stated therapy goals  -     Criteria for Skilled Interventions Met (PT) yes  -       Row Name 09/02/23 1506          Vital Signs    Pre SpO2 (%) 94  -     O2 Delivery Pre  Treatment room air  -JM     Intra SpO2 (%) --  unable to get accurate reading  -JM     O2 Delivery Intra Treatment room air  -JM     Post SpO2 (%) 96  -JM     O2 Delivery Post Treatment room air  -JM       Row Name 09/02/23 1506          Positioning and Restraints    Pre-Treatment Position in bed  -JM     Post Treatment Position bed  -JM               User Key  (r) = Recorded By, (t) = Taken By, (c) = Cosigned By      Initials Name Provider Type    Julieth Monk PTA Physical Therapist Assistant                   Outcome Measures       Row Name 09/02/23 1513          How much help from another person do you currently need...    Turning from your back to your side while in flat bed without using bedrails? 3  -JM     Moving from lying on back to sitting on the side of a flat bed without bedrails? 3  -JM     Moving to and from a bed to a chair (including a wheelchair)? 3  -JM     Standing up from a chair using your arms (e.g., wheelchair, bedside chair)? 3  -JM     Climbing 3-5 steps with a railing? 2  -JM     To walk in hospital room? 3  -JM     AM-PAC 6 Clicks Score (PT) 17  -JM     Highest level of mobility 5 --> Static standing  -JM               User Key  (r) = Recorded By, (t) = Taken By, (c) = Cosigned By      Initials Name Provider Type    Julieth Monk PTA Physical Therapist Assistant                                 Physical Therapy Education       Title: PT OT SLP Therapies (Resolved)       Topic: Physical Therapy (Resolved)       Point: Mobility training (Resolved)       Learning Progress Summary             Patient Acceptance, E, VU,NR by KT at 9/1/2023 1037    Acceptance, E, NR by SV at 8/27/2023 1811   Family Acceptance, E, NR by SV at 8/27/2023 1811                         Point: Home exercise program (Resolved)       Learning Progress Summary             Patient Acceptance, E, VU,NR by KT at 9/1/2023 1037    Acceptance, E, NR by SV at 8/27/2023 1811   Family Acceptance, E, NR by SV at  8/27/2023 1811                         Point: Body mechanics (Resolved)       Learning Progress Summary             Patient Acceptance, E, VU,NR by KT at 9/1/2023 1037    Acceptance, E, NR by SV at 8/27/2023 1811   Family Acceptance, E, NR by SV at 8/27/2023 1811                         Point: Precautions (Resolved)       Learner Progress:  Not documented in this visit.                              User Key       Initials Effective Dates Name Provider Type Discipline    REJI 06/16/21 -  Elizabeth Goss, RN Registered Nurse Nurse     07/11/23 -  Milena Muñoz, PT Physical Therapist PT                  PT Recommendation and Plan     Plan of Care Reviewed With: patient, son  Progress: improving  Outcome Evaluation: Pt agreed to PT session, pt swati bed mob w/min assist 1 today, min assist to amb ~35ft , some assist to guide rwx, has all equipment and is going to have family take shifts to assist and HH to follow, pt would have benefitted from SNU , but either facility not right per family, or INS did not cover; pt educ on importance of keeping mobile once home w/assist, still below baseline function     Time Calculation:         PT Charges       Row Name 09/02/23 1513             Time Calculation    Start Time 1030  -      Stop Time 1047  -      Time Calculation (min) 17 min  -      PT Received On 09/02/23  -                User Key  (r) = Recorded By, (t) = Taken By, (c) = Cosigned By      Initials Name Provider Type    Julieth Monk PTA Physical Therapist Assistant                  Therapy Charges for Today       Code Description Service Date Service Provider Modifiers Qty    [id number] HC PT THER PROC EA 15 MIN 9/2/2023 Julieth Milan PTA GP 1            PT G-Codes  Outcome Measure Options: AM-PAC 6 Clicks Basic Mobility (PT)  AM-PAC 6 Clicks Score (PT): 17  AM-PAC 6 Clicks Score (OT): 16  PT Discharge Summary  Anticipated Discharge Disposition (PT): skilled nursing facility, home with 24/7  care, home with home health (fam will help as long as able)    Julieth Milan, PTA  9/2/2023

## 2023-09-06 ENCOUNTER — READMISSION MANAGEMENT (OUTPATIENT)
Dept: CALL CENTER | Facility: HOSPITAL | Age: 88
End: 2023-09-06
Payer: MEDICARE

## 2023-09-06 NOTE — OUTREACH NOTE
CHF Week 1 Survey      Flowsheet Row Responses   [facility] patient discharged from? [facility]   Does the patient have one of the following disease processes/diagnoses(primary or secondary)? CHF   CHF Week 1 attempt successful? Yes   Call start time 1117   Call end time 1142   Discharge diagnosis Diastolic CHF, acute   Is patient permission given to speak with other caregiver? Yes   List who call center can speak with Dtr   Person spoke with today (if not patient) and relationship Dtr- Yanet- and pt   Meds reviewed with patient/caregiver? Yes   Is the patient having any side effects they believe may be caused by any medication additions or changes? No   Does the patient have all medications ordered at discharge? Yes   Is the patient taking all medications as directed (includes completed medication regime)? Yes   Does the patient have a primary care provider?  Yes   Does the patient have an appointment with their PCP within 7 days of discharge? No   What is preventing the patient from scheduling follow up appointments within 7 days of discharge? Waiting on return call   Nursing Interventions Educated patient on importance of making appointment   Pulse Ox monitoring Intermittent   Pulse Ox device source Patient   O2 Sat comments They do not monitor frequently, during call RA- 95%   O2 Sat: education provided Sat levels, When to seek care   Psychosocial issues? No   Comments Per dtr the pt had loose stool this morning, LE edema improving, more on left. Weakness/confusion remains. Monitoring daily wts, per pt. Glucose coming down from 449 to 141 today, which is normal for her,per pt/dtr,she has SSI at home.Pt is not consistent w/ her diet,she reports. Pt denies dysuria. Dtr reports that the pt has sleep apnea and will not use CPAP.   Did the patient receive a copy of their discharge instructions? Yes   Nursing interventions Reviewed instructions with patient   What is the patient's perception of their health  status since discharge? Improving   Nursing interventions Nurse provided patient education   Is the patient able to teach back signs and symptoms of worsening condition? (i.e. weight gain, shortness of air, etc.) Yes   If the patient is a current smoker, are they able to teach back resources for cessation? Not a smoker   Is the patient/caregiver able to teach back the hierarchy of who to call/visit for symptoms/problems? PCP, Specialist, Home health nurse, Urgent Care, ED, 911 Yes   CHF Zone this Call Green Zone   Green Zone Patient reports doing well, No new or worsening shortness of breath, Weight check stable   Green Zone Interventions Daily weight check, Meds as directed, Follow up visits planned    CHF Week 1 call completed? Yes   Is the patient interested in additional calls from an ambulatory ? No   Call end time 1142            Manuela CARMONA - Registered Nurse

## 2023-09-14 ENCOUNTER — READMISSION MANAGEMENT (OUTPATIENT)
Dept: CALL CENTER | Facility: HOSPITAL | Age: 88
End: 2023-09-14
Payer: MEDICARE

## 2023-09-19 ENCOUNTER — TRANSCRIBE ORDERS (OUTPATIENT)
Dept: ADMINISTRATIVE | Facility: HOSPITAL | Age: 88
End: 2023-09-19
Payer: MEDICARE

## 2023-09-19 DIAGNOSIS — M79.662 PAIN IN LEFT LOWER LEG: Primary | ICD-10-CM

## 2023-09-19 DIAGNOSIS — R22.42 LOCALIZED SWELLING, MASS, OR LUMP OF LEFT LOWER EXTREMITY: ICD-10-CM

## 2023-09-20 ENCOUNTER — OFFICE VISIT (OUTPATIENT)
Dept: CARDIOLOGY | Facility: CLINIC | Age: 88
End: 2023-09-20
Payer: MEDICARE

## 2023-09-20 VITALS
HEART RATE: 70 BPM | SYSTOLIC BLOOD PRESSURE: 152 MMHG | BODY MASS INDEX: 32.36 KG/M2 | DIASTOLIC BLOOD PRESSURE: 80 MMHG | WEIGHT: 150 LBS | OXYGEN SATURATION: 96 % | HEIGHT: 57 IN

## 2023-09-20 DIAGNOSIS — Z95.5 S/P RIGHT CORONARY ARTERY (RCA) STENT PLACEMENT: ICD-10-CM

## 2023-09-20 DIAGNOSIS — I50.32 CHRONIC HEART FAILURE WITH PRESERVED EJECTION FRACTION (HFPEF): ICD-10-CM

## 2023-09-20 DIAGNOSIS — I48.0 PAF (PAROXYSMAL ATRIAL FIBRILLATION): Primary | ICD-10-CM

## 2023-09-20 DIAGNOSIS — I10 ESSENTIAL HYPERTENSION: ICD-10-CM

## 2023-09-20 PROBLEM — I63.9 CEREBROVASCULAR ACCIDENT (CVA): Status: ACTIVE | Noted: 2023-09-20

## 2023-09-20 RX ORDER — AMOXICILLIN 250 MG
CAPSULE ORAL
COMMUNITY
Start: 2023-02-28

## 2023-09-20 RX ORDER — NIFEDIPINE 60 MG/1
60 TABLET, EXTENDED RELEASE ORAL DAILY
Qty: 90 TABLET | Refills: 3 | Status: SHIPPED | OUTPATIENT
Start: 2023-09-20

## 2023-09-20 NOTE — PROGRESS NOTES
"    Subjective:     Encounter Date:09/20/2023      Patient ID: Roland Russell is a 88 y.o. female.    Chief Complaint:  Hospital follow up of HFpEF    HPI:   88 y.o. female with paroxysmal A-fib on Eliquis, diabetes, hypertension, DARI, high-grade block status post dual-chamber pacemaker, and CAD s/p RCA PCI who presents for follow up. Patient was admitted from 8/26/2023 to 9/2/2023 for generalized weakness secondary to UTI.  During admission she was noted to be mildly volume overloaded and was diuresed with IV Bumex.  This was transitioned to p.o. and later off as her creatinine had increased after initiation of Bactrim.  Discontinuation of Bactrim and diuretics led to a normalization of her creatinine and she was discharged home. Of note, during admission she underwent echocardiogram which was unchanged from prior and revealed an EF of 67% with moderate to severe concentric hypertrophy.    Currently, she has no new complaints.      The following portions of the patient's history were reviewed and updated as appropriate: allergies, current medications, past family history, past medical history, past social history, past surgical history and problem list.     REVIEW OF SYSTEMS:   All systems reviewed.  Pertinent positives identified in HPI.  All other systems are negative.    Past Medical History:   Diagnosis Date    Abnormal vision     SEES \"KALEIDOSCOPE\"    Allergy to adhesive tape     Arthralgia     AVB (atrioventricular block)     Balance problem     Carotid body tumor     LEFT    Difficulty walking     H/O complete eye exam 10/2016    Headache     OFF AND ON BACK OF HEAD, DOWN NECK TO SHOULDER    History of glaucoma     History of poliomyelitis     AGE 9    History of surgery on arm     left arm    HLD (hyperlipidemia)     Hypertension     Hypothyroidism, acquired     Legal blindness     SOME PERIPHERAL VISION ON LEFT, SOME VISION ON RIGHT    Memory loss     SOME SHORT TERM    Migraine     Numbness     LEFT LEG  "    Osteoarthritis, multiple sites     Osteoporosis     Pacemaker     Rectal bleeding     X1, WITH BM    Sleep apnea     DOES NOT USE A MACHINE    TIA (transient ischemic attack) 12/24/2018    ?, HAD SPELL UNABLE TO TALK    Type 2 diabetes mellitus     Type 2 diabetes mellitus, uncontrolled        Family History   Problem Relation Age of Onset    Cancer Father     Glaucoma Father         angular glycoma    Heart disease Father     Arthritis Father     Thyroid disease Father     Stroke Father     Asthma Sister     Arthritis Sister     Cancer Sister         breast    Stroke Maternal Grandmother     Heart failure Other     Diabetes Other     Hypertension Other     Stroke Other         aunt    Thyroid disease Other     Arthritis Mother     Diabetes Mother     Thyroid disease Mother     Malig Hyperthermia Neg Hx        Social History     Socioeconomic History    Marital status:    Tobacco Use    Smoking status: Never    Smokeless tobacco: Never   Vaping Use    Vaping Use: Never used   Substance and Sexual Activity    Alcohol use: No    Drug use: Never    Sexual activity: Defer       Allergies   Allergen Reactions    Adhesive Tape Other (See Comments)     REDNESS, SKIN BURN    Latex Other (See Comments)     REDNESS, SKIN BURN    Propoxyphene Itching       Past Surgical History:   Procedure Laterality Date    APPENDECTOMY  1989    CARDIAC CATHETERIZATION N/A 4/13/2023    Procedure: Left Heart Cath;  Surgeon: Rikki Oconnor MD;  [facility];  Service: Cardiology;  Laterality: N/A;    CARDIAC CATHETERIZATION N/A 4/13/2023    Procedure: Coronary angiography;  Surgeon: Rikki Oconnor MD;  Location:  [facility];  Service: Cardiology;  Laterality: N/A;    CARDIAC CATHETERIZATION N/A 4/13/2023    Procedure: Left ventriculography;  Surgeon: Rikki Oconnor MD;  Location:  [facility];  Service: Cardiology;  Laterality: N/A;    CARDIAC CATHETERIZATION N/A 4/20/2023     Procedure: Percutaneous Coronary Intervention;  Surgeon: Rikki Oconnor MD;  [facility];  Service: Cardiology;  Laterality: N/A;    CARDIAC CATHETERIZATION N/A 4/20/2023    Procedure: Atherectomy-coronary;  Surgeon: Rikki Oconnor MD;  [facility];  Service: Cardiology;  Laterality: N/A;    CARDIAC CATHETERIZATION N/A 4/20/2023    Procedure: Stent CAROL coronary;  Surgeon: Rikki Oconnor MD;  [facility];  Service: Cardiology;  Laterality: N/A;    CARDIAC PACEMAKER PLACEMENT  11/25/2004    CAROTID ENDARTERECTOMY Left 12/13/2019    Procedure: LEFT CAROTID BODY TUMOR RESECTION;  Surgeon: Bryan Severino MD;  Location: [facility] MAIN OR;  Service: Vascular    CATARACT EXTRACTION Bilateral 1997    CEREBRAL ANGIOGRAM Bilateral 12/6/2019    Procedure: CAROTID ARTERIOGRAM BILATERAL, RIGHT WRIST APPROACH;  Surgeon: Bryan Severino MD;  Location: [facility] HYBRID OR 18/19;  Service: Vascular    CHOLECYSTECTOMY  1989    COLONOSCOPY  2013    dr montes    ELBOW PROCEDURE  2016    ENDOSCOPY  12/06/2012    Dr. Montes; food in stomach, gastritis    EXCISION LESION  1994    BACK CYST BENGIN    EYE SURGERY  12/2012    GLAUCOMA SURGERY Bilateral 1995    laser surgery    HARDWARE REMOVAL Left     ELBOW    HYSTERECTOMY  1986    INTERVENTIONAL RADIOLOGY PROCEDURE N/A 4/20/2023    Procedure: Intravascular Ultrasound;  Surgeon: Rikki Oconnor MD;  [facility];  Service: Cardiology;  Laterality: N/A;    ORIF ELBOW FRACTURE Left     PACEMAKER IMPLANTATION      PACEMAKER REPLACEMENT  06/2013         ECG 12 Lead    Date/Time: 9/20/2023 11:38 AM  Performed by: Rikki Oconnor MD  Authorized by: Rikki Oconnor MD   Comparison: compared with previous ECG from 8/31/2023  Similar to previous ECG  Rhythm: sinus rhythm and paced  Rate: normal  QRS axis: normal  Pacing: atrial sensed rhythm and ventricular paced rhythm  Clinical impression: abnormal EKG            Objective:         Vitals:    09/20/23 1116   BP: 152/80   Pulse: 70   SpO2: 96%       PHYSICAL EXAM:  GEN: well appearing, in NAD   HEENT: NCAT, EOMI, moist mucus membranes   Respiratory: CTAB, no wheezes, rales or rhonchi  CV: normal rate, regular rhythm, normal S1, S2, no murmurs, rubs, gallops, +2 radial pulses b/l  GI: soft, nontender, nondistended  MSK: no edema  Skin: no rash, warm, dry  Heme/Lymph: no bruising or bleeding  Neuro: Alert and Oriented x 3, grossly normal motor function        Assessment:         (I48.0) PAF (paroxysmal atrial fibrillation)    (Z95.5) S/P right coronary artery (RCA) stent placement    (I10) Essential hypertension    (I50.32) Chronic heart failure with preserved ejection fraction (HFpEF)    88 y.o. female with paroxysmal A-fib on Eliquis, diabetes, hypertension, DARI, high-grade block status post dual-chamber pacemaker, and CAD s/p RCA PCI who presents for follow up.       Plan:       #HFpEF  Recent echocardiogram with preserved EF.  Currently euvolemic.  -Continue Bumex  -Continue blood pressure control as below    #Hypertension  Currently above goal at 152/80.  -Increase nifedipine to 60 mg daily  -Continue losartan 100 mg daily    #CAD  PCI to RCA with two 4.0 x 38mm and one 3.5 x 38mm Xience Skypoint drug-eluting stents (postdilated with a 4.5 mm in the ostium to midportion and 4.0 mm NC in the mid to distal portion)  - continue Eliquis, plavix 75mg daily, pravastatin, losartan     #pAF  - continue Eliquis 5mg BID    Dr. Rosario, thank you very much for referring this kind patient to me. Please call me with any questions or concerns. I will see the patient again in the office in 6 months or earlier as needed.         Rikki Oconnor MD  09/20/23  [facility]    Outpatient Encounter Medications as of 9/20/2023   Medication Sig Dispense Refill    acetaminophen (TYLENOL) 325 MG tablet Take 2 tablets by mouth Every 4 (Four) Hours As Needed for Mild Pain.      Alcohol  Swabs (B-D SINGLE USE SWABS REGULAR) pads USE AS DIRECTED 4 TIMES A  DAY. 400 each 3    apixaban (ELIQUIS) 5 MG tablet tablet Take 1 tablet by mouth 2 (Two) Times a Day.      bumetanide (BUMEX) 0.5 MG tablet Take 1 tablet by mouth Daily. 30 tablet 0    clopidogrel (PLAVIX) 75 MG tablet Take 1 tablet by mouth Daily. 90 tablet 1    Cyanocobalamin (B-12 PO) Take 1 tablet/day by mouth. 1000 mcg daily      gabapentin (NEURONTIN) 100 MG capsule Take 1 capsule by mouth Every Night. 30 capsule 0    insulin degludec (TRESIBA FLEXTOUCH) 100 UNIT/ML solution pen-injector injection Inject 8 Units under the skin into the appropriate area as directed 2 (Two) Times a Day.      Insulin Lispro (humaLOG) 100 UNIT/ML injection Inject  under the skin into the appropriate area as directed 3 (Three) Times a Day Before Meals. Sliding scale      levothyroxine (SYNTHROID, LEVOTHROID) 75 MCG tablet TAKE 1 TABLET BY MOUTH DAILY 30 tablet 3    losartan (COZAAR) 100 MG tablet TAKE 1 TABLET DAILY 30 tablet 3    pravastatin (PRAVACHOL) 20 MG tablet Take 1 tablet by mouth Every Night. 90 tablet 0    sennosides-docusate (PERICOLACE) 8.6-50 MG per tablet 2 Tab, Oral, Tab, Daily, 0 Refill(s)      vitamin D3 125 MCG (5000 UT) capsule capsule 125 mcg.      [DISCONTINUED] NIFEdipine CC (ADALAT CC) 30 MG 24 hr tablet Take 1 tablet by mouth Daily. 30 tablet 0    NIFEdipine XL (PROCARDIA XL) 60 MG 24 hr tablet Take 1 tablet by mouth Daily. 90 tablet 3     No facility-administered encounter medications on file as of 9/20/2023.

## 2023-09-25 ENCOUNTER — HOSPITAL ENCOUNTER (OUTPATIENT)
Dept: CARDIOLOGY | Facility: HOSPITAL | Age: 88
Discharge: HOME | End: 2023-09-25
Admitting: INTERNAL MEDICINE
Payer: MEDICARE

## 2023-09-25 DIAGNOSIS — R22.42 LOCALIZED SWELLING, MASS, OR LUMP OF LEFT LOWER EXTREMITY: ICD-10-CM

## 2023-09-25 DIAGNOSIS — M79.662 PAIN IN LEFT LOWER LEG: ICD-10-CM

## 2023-09-25 LAB
BH CV LOWER VASCULAR LEFT COMMON FEMORAL AUGMENT: NORMAL
BH CV LOWER VASCULAR LEFT COMMON FEMORAL COMPETENT: NORMAL
BH CV LOWER VASCULAR LEFT COMMON FEMORAL COMPRESS: NORMAL
BH CV LOWER VASCULAR LEFT COMMON FEMORAL PHASIC: NORMAL
BH CV LOWER VASCULAR LEFT COMMON FEMORAL SPONT: NORMAL
BH CV LOWER VASCULAR LEFT DISTAL FEMORAL COMPRESS: NORMAL
BH CV LOWER VASCULAR LEFT GASTRONEMIUS COMPRESS: NORMAL
BH CV LOWER VASCULAR LEFT GREATER SAPH AK COMPRESS: NORMAL
BH CV LOWER VASCULAR LEFT GREATER SAPH BK COMPRESS: NORMAL
BH CV LOWER VASCULAR LEFT LESSER SAPH COMPRESS: NORMAL
BH CV LOWER VASCULAR LEFT MID FEMORAL AUGMENT: NORMAL
BH CV LOWER VASCULAR LEFT MID FEMORAL COMPETENT: NORMAL
BH CV LOWER VASCULAR LEFT MID FEMORAL COMPRESS: NORMAL
BH CV LOWER VASCULAR LEFT MID FEMORAL PHASIC: NORMAL
BH CV LOWER VASCULAR LEFT MID FEMORAL SPONT: NORMAL
BH CV LOWER VASCULAR LEFT PERONEAL COMPRESS: NORMAL
BH CV LOWER VASCULAR LEFT POPLITEAL AUGMENT: NORMAL
BH CV LOWER VASCULAR LEFT POPLITEAL COMPETENT: NORMAL
BH CV LOWER VASCULAR LEFT POPLITEAL COMPRESS: NORMAL
BH CV LOWER VASCULAR LEFT POPLITEAL PHASIC: NORMAL
BH CV LOWER VASCULAR LEFT POPLITEAL SPONT: NORMAL
BH CV LOWER VASCULAR LEFT POSTERIOR TIBIAL COMPRESS: NORMAL
BH CV LOWER VASCULAR LEFT PROFUNDA FEMORAL COMPRESS: NORMAL
BH CV LOWER VASCULAR LEFT PROXIMAL FEMORAL COMPRESS: NORMAL
BH CV LOWER VASCULAR LEFT SAPHENOFEMORAL JUNCTION COMPRESS: NORMAL
BH CV LOWER VASCULAR RIGHT COMMON FEMORAL AUGMENT: NORMAL
BH CV LOWER VASCULAR RIGHT COMMON FEMORAL COMPETENT: NORMAL
BH CV LOWER VASCULAR RIGHT COMMON FEMORAL COMPRESS: NORMAL
BH CV LOWER VASCULAR RIGHT COMMON FEMORAL PHASIC: NORMAL
BH CV LOWER VASCULAR RIGHT COMMON FEMORAL SPONT: NORMAL

## 2023-09-25 PROCEDURE — 93971 EXTREMITY STUDY: CPT

## 2023-10-17 DIAGNOSIS — I10 ESSENTIAL HYPERTENSION: ICD-10-CM

## 2023-10-18 RX ORDER — LOSARTAN POTASSIUM 100 MG/1
TABLET ORAL
Qty: 30 TABLET | Refills: 3 | OUTPATIENT
Start: 2023-10-18

## 2023-10-18 RX ORDER — LEVOTHYROXINE, LIOTHYRONINE 38; 9 UG/1; UG/1
TABLET ORAL
COMMUNITY
Start: 2023-09-20

## 2023-10-18 RX ORDER — FLASH GLUCOSE SENSOR
KIT MISCELLANEOUS
COMMUNITY
Start: 2023-10-11

## 2023-10-18 RX ORDER — CETIRIZINE HYDROCHLORIDE 10 MG/1
10 TABLET ORAL
COMMUNITY
Start: 2023-10-04

## 2024-01-24 ENCOUNTER — TRANSCRIBE ORDERS (OUTPATIENT)
Dept: ADMINISTRATIVE | Facility: HOSPITAL | Age: 89
End: 2024-01-24
Payer: MEDICARE

## 2024-01-24 DIAGNOSIS — R52 PAIN: Primary | ICD-10-CM

## 2024-03-05 DIAGNOSIS — I65.22 CAROTID STENOSIS, LEFT: Primary | ICD-10-CM

## 2024-03-06 ENCOUNTER — HOSPITAL ENCOUNTER (OUTPATIENT)
Dept: CT IMAGING | Facility: HOSPITAL | Age: 89
Discharge: HOME | End: 2024-03-06
Admitting: INTERNAL MEDICINE
Payer: MEDICARE

## 2024-03-06 DIAGNOSIS — R52 PAIN: ICD-10-CM

## 2024-03-06 LAB — CREAT BLDA-MCNC: 0.9 MG/DL (ref 0.6–1.3)

## 2024-03-06 PROCEDURE — 25510000001 IOPAMIDOL 61 % SOLUTION: Performed by: INTERNAL MEDICINE

## 2024-03-06 PROCEDURE — 82565 ASSAY OF CREATININE: CPT

## 2024-03-06 PROCEDURE — 70491 CT SOFT TISSUE NECK W/DYE: CPT

## 2024-03-06 PROCEDURE — 73201 CT UPPER EXTREMITY W/DYE: CPT

## 2024-03-06 RX ADMIN — IOPAMIDOL 85 ML: 612 INJECTION, SOLUTION INTRAVENOUS at 16:23

## 2024-03-20 ENCOUNTER — OFFICE VISIT (OUTPATIENT)
Dept: CARDIOLOGY | Facility: CLINIC | Age: 89
End: 2024-03-20
Payer: MEDICARE

## 2024-03-20 VITALS
HEART RATE: 68 BPM | DIASTOLIC BLOOD PRESSURE: 58 MMHG | OXYGEN SATURATION: 95 % | SYSTOLIC BLOOD PRESSURE: 130 MMHG | WEIGHT: 144 LBS | BODY MASS INDEX: 31.07 KG/M2 | HEIGHT: 57 IN

## 2024-03-20 DIAGNOSIS — I50.32 CHRONIC HEART FAILURE WITH PRESERVED EJECTION FRACTION (HFPEF): Primary | ICD-10-CM

## 2024-03-20 DIAGNOSIS — E78.2 MIXED HYPERLIPIDEMIA: ICD-10-CM

## 2024-03-20 DIAGNOSIS — I10 ESSENTIAL HYPERTENSION: ICD-10-CM

## 2024-03-20 DIAGNOSIS — I48.0 PAF (PAROXYSMAL ATRIAL FIBRILLATION): ICD-10-CM

## 2024-03-20 RX ORDER — CEFDINIR 300 MG/1
1 CAPSULE ORAL EVERY 12 HOURS SCHEDULED
COMMUNITY
Start: 2024-01-29

## 2024-03-20 NOTE — PROGRESS NOTES
"    Subjective:     Encounter Date:09/20/2023      Patient ID: Roland Russell is a 89 y.o. female.    Chief Complaint:  Follow-up of HFpEF    HPI:   89 y.o. female with paroxysmal A-fib on Eliquis, diabetes, hypertension, DARI, high-grade block status post dual-chamber pacemaker, and CAD s/p RCA PCI who presents for follow up.  Patient was last seen in September after admission for UTI.  She was doing well and no medication changes were made at that time.  Since that visit, she has continued to feel well. She experiences intermittent lower extremity edema that is better in the mornings.  She had a CT of the neck about 2 weeks ago as part of workup for neck pain and this revealed left subclavian vein stenosis as well as a 2 cm structure within the right supraclavicular fossa suspected to be a partially thrombosed venous varix versus a pseudoaneurysm.  She is scheduled to follow-up with vascular surgery in a week or so.    Currently, she has no new complaints.      The following portions of the patient's history were reviewed and updated as appropriate: allergies, current medications, past family history, past medical history, past social history, past surgical history and problem list.     REVIEW OF SYSTEMS:   All systems reviewed.  Pertinent positives identified in HPI.  All other systems are negative.    Past Medical History:   Diagnosis Date    Abnormal vision     SEES \"KALEIDOSCOPE\"    Allergy to adhesive tape     Arthralgia     AVB (atrioventricular block)     Balance problem     Carotid body tumor     LEFT    Difficulty walking     H/O complete eye exam 10/2016    Headache     OFF AND ON BACK OF HEAD, DOWN NECK TO SHOULDER    History of glaucoma     History of poliomyelitis     AGE 9    History of surgery on arm     left arm    HLD (hyperlipidemia)     Hypertension     Hypothyroidism, acquired     Legal blindness     SOME PERIPHERAL VISION ON LEFT, SOME VISION ON RIGHT    Memory loss     SOME SHORT TERM    " Migraine     Numbness     LEFT LEG     Osteoarthritis, multiple sites     Osteoporosis     Pacemaker     Rectal bleeding     X1, WITH BM    Renal disorder     Sleep apnea     DOES NOT USE A MACHINE    TIA (transient ischemic attack) 12/24/2018    ?, HAD SPELL UNABLE TO TALK    Type 2 diabetes mellitus     Type 2 diabetes mellitus, uncontrolled        Family History   Problem Relation Age of Onset    Cancer Father     Glaucoma Father         angular glycoma    Heart disease Father     Arthritis Father     Thyroid disease Father     Stroke Father     Asthma Sister     Arthritis Sister     Cancer Sister         breast    Stroke Maternal Grandmother     Heart failure Other     Diabetes Other     Hypertension Other     Stroke Other         aunt    Thyroid disease Other     Arthritis Mother     Diabetes Mother     Thyroid disease Mother     Malig Hyperthermia Neg Hx        Social History     Socioeconomic History    Marital status:    Tobacco Use    Smoking status: Never    Smokeless tobacco: Never   Vaping Use    Vaping status: Never Used   Substance and Sexual Activity    Alcohol use: No    Drug use: Never    Sexual activity: Defer       Allergies   Allergen Reactions    Adhesive Tape Other (See Comments)     REDNESS, SKIN BURN    Latex Other (See Comments)     REDNESS, SKIN BURN    Propoxyphene Itching       Past Surgical History:   Procedure Laterality Date    APPENDECTOMY  1989    CARDIAC CATHETERIZATION N/A 4/13/2023    Procedure: Left Heart Cath;  Surgeon: Rikki Oconnor MD;  Location:  [facility];  Service: Cardiology;  Laterality: N/A;    CARDIAC CATHETERIZATION N/A 4/13/2023    Procedure: Coronary angiography;  Surgeon: Rikki Oconnor MD;  Location:  [facility];  Service: Cardiology;  Laterality: N/A;    CARDIAC CATHETERIZATION N/A 4/13/2023    Procedure: Left ventriculography;  Surgeon: Rikki Oconnor MD;  Location:  [facility];  Service: Cardiology;   Laterality: N/A;    CARDIAC CATHETERIZATION N/A 4/20/2023    Procedure: Percutaneous Coronary Intervention;  Surgeon: Rikki Oconnro MD;  Location: [facility] CATH INVASIVE LOCATION;  Service: Cardiology;  Laterality: N/A;    CARDIAC CATHETERIZATION N/A 4/20/2023    Procedure: Atherectomy-coronary;  Surgeon: Rikki Oconnor MD;  [facility];  Service: Cardiology;  Laterality: N/A;    CARDIAC CATHETERIZATION N/A 4/20/2023    Procedure: Stent CAROL coronary;  Surgeon: Rikki Oconnor MD;  [facility];  Service: Cardiology;  Laterality: N/A;    CARDIAC PACEMAKER PLACEMENT  11/25/2004    CAROTID ENDARTERECTOMY Left 12/13/2019    Procedure: LEFT CAROTID BODY TUMOR RESECTION;  Surgeon: Bryan Severino MD;  Location: [facility] MAIN OR;  Service: Vascular    CATARACT EXTRACTION Bilateral 1997    CEREBRAL ANGIOGRAM Bilateral 12/6/2019    Procedure: CAROTID ARTERIOGRAM BILATERAL, RIGHT WRIST APPROACH;  Surgeon: Bryan Severino MD;  Location: [facility] OR 18/19;  Service: Vascular    CHOLECYSTECTOMY  1989    COLONOSCOPY  2013    dr montes    ELBOW PROCEDURE  2016    ENDOSCOPY  12/06/2012    Dr. Montes; food in stomach, gastritis    EXCISION LESION  1994    BACK CYST BENGIN    EYE SURGERY  12/2012    GLAUCOMA SURGERY Bilateral 1995    laser surgery    HARDWARE REMOVAL Left     ELBOW    HYSTERECTOMY  1986    INTERVENTIONAL RADIOLOGY PROCEDURE N/A 4/20/2023    Procedure: Intravascular Ultrasound;  Surgeon: Rikki Oocnnor MD;  Location: [facility] CATH INVASIVE LOCATION;  Service: Cardiology;  Laterality: N/A;    ORIF ELBOW FRACTURE Left     PACEMAKER IMPLANTATION      PACEMAKER REPLACEMENT  06/2013       Procedures       Objective:         Vitals:    03/20/24 1143   BP: 130/58   Pulse: 68   SpO2: 95%       PHYSICAL EXAM:  GEN: well appearing, in NAD   HEENT: NCAT, EOMI, moist mucus membranes   Respiratory: CTAB, no wheezes, rales or rhonchi  CV: normal rate, regular rhythm, normal S1, S2, no  murmurs, rubs, gallops, +2 radial pulses b/l  GI: soft, nontender, nondistended  MSK: no edema  Skin: no rash, warm, dry  Heme/Lymph: no bruising or bleeding  Neuro: Alert and Oriented x 3, grossly normal motor function        Assessment:         (I50.32) Chronic heart failure with preserved ejection fraction (HFpEF)    (I10) Essential hypertension    (E78.2) Mixed hyperlipidemia    (I48.0) PAF (paroxysmal atrial fibrillation)    88 y.o. female with paroxysmal A-fib on Eliquis, diabetes, hypertension, DARI, high-grade block status post dual-chamber pacemaker, and CAD s/p RCA PCI who presents for follow up.       Plan:       #HFpEF  Echocardiogram with preserved EF.  Currently euvolemic.  -Continue Bumex  -Continue blood pressure control as below    #Hypertension  Currently well controlled.  - Continue nifedipine 60 mg daily, losartan 100 mg daily    #CAD  PCI to RCA with two 4.0 x 38mm and one 3.5 x 38mm Xience Skypoint drug-eluting stents (postdilated with a 4.5 mm in the ostium to midportion and 4.0 mm NC in the mid to distal portion)  - continue Eliquis, plavix 75mg daily, pravastatin, losartan     #pAF  - continue Eliquis 5mg BID    Dr. Rosario, thank you very much for referring this kind patient to me. Please call me with any questions or concerns. I will see the patient again in the office in 6 months or earlier as needed.         Rikki Oconnor MD  03/20/24  [facility]    Outpatient Encounter Medications as of 3/20/2024   Medication Sig Dispense Refill    acetaminophen (TYLENOL) 325 MG tablet Take 2 tablets by mouth Every 4 (Four) Hours As Needed for Mild Pain.      Alcohol Swabs (B-D SINGLE USE SWABS REGULAR) pads USE AS DIRECTED 4 TIMES A  DAY. 400 each 3    apixaban (ELIQUIS) 5 MG tablet tablet Take 1 tablet by mouth 2 (Two) Times a Day.      bumetanide (BUMEX) 0.5 MG tablet Take 1 tablet by mouth Daily. 30 tablet 0    cetirizine (zyrTEC) 10 MG tablet 1 tablet.      clopidogrel (PLAVIX) 75 MG  tablet TAKE 1 TABLET BY MOUTH DAILY 90 tablet 1    Continuous Blood Gluc Sensor (FreeStyle Jeanmarie 14 Day Sensor) [facility] USE AS DIRECTED AND CHANGE EVERY 14 DAYS      Cyanocobalamin (B-12 PO) Take 1 tablet/day by mouth. 1000 mcg daily      gabapentin (NEURONTIN) 100 MG capsule Take 1 capsule by mouth Every Night. 30 capsule 0    insulin degludec (TRESIBA FLEXTOUCH) 100 UNIT/ML solution pen-injector injection Inject 8 Units under the skin into the appropriate area as directed 2 (Two) Times a Day.      Insulin Lispro (humaLOG) 100 UNIT/ML injection Inject  under the skin into the appropriate area as directed 3 (Three) Times a Day Before Meals. Sliding scale      NIFEdipine XL (PROCARDIA XL) 60 MG 24 hr tablet Take 1 tablet by mouth Daily. 90 tablet 3    NP Thyroid 60 MG tablet       pravastatin (PRAVACHOL) 20 MG tablet Take 1 tablet by mouth Every Night. 90 tablet 0    vitamin D3 125 MCG (5000 UT) capsule capsule 125 mcg.      cefdinir (OMNICEF) 300 MG capsule Take 1 capsule by mouth Every 12 (Twelve) Hours.      levothyroxine (SYNTHROID, LEVOTHROID) 75 MCG tablet TAKE 1 TABLET BY MOUTH DAILY 30 tablet 3    losartan (COZAAR) 100 MG tablet TAKE 1 TABLET DAILY 30 tablet 3    sennosides-docusate (PERICOLACE) 8.6-50 MG per tablet 2 Tab, Oral, Tab, Daily, 0 Refill(s)       No facility-administered encounter medications on file as of 3/20/2024.

## 2024-03-26 ENCOUNTER — TRANSCRIBE ORDERS (OUTPATIENT)
Age: 89
End: 2024-03-26
Payer: MEDICARE

## 2024-03-26 DIAGNOSIS — I72.9 PSEUDOANEURYSM: ICD-10-CM

## 2024-03-26 DIAGNOSIS — I65.21 STENOSIS OF RIGHT CAROTID ARTERY: Primary | ICD-10-CM

## 2024-04-01 ENCOUNTER — OFFICE VISIT (OUTPATIENT)
Age: 89
End: 2024-04-01
Payer: MEDICARE

## 2024-04-01 ENCOUNTER — HOSPITAL ENCOUNTER (OUTPATIENT)
Facility: HOSPITAL | Age: 89
Discharge: HOME | End: 2024-04-01
Admitting: NURSE PRACTITIONER
Payer: MEDICARE

## 2024-04-01 VITALS
BODY MASS INDEX: 30.42 KG/M2 | SYSTOLIC BLOOD PRESSURE: 170 MMHG | DIASTOLIC BLOOD PRESSURE: 78 MMHG | HEIGHT: 57 IN | WEIGHT: 141 LBS

## 2024-04-01 DIAGNOSIS — I65.22 CAROTID STENOSIS, LEFT: ICD-10-CM

## 2024-04-01 DIAGNOSIS — I87.1 SUBCLAVIAN VEIN STENOSIS, RIGHT: ICD-10-CM

## 2024-04-01 DIAGNOSIS — D44.6 CAROTID BODY TUMOR: Primary | ICD-10-CM

## 2024-04-01 LAB
BH CV XLRA MEAS LEFT CAROTID BULB EDV: 10.6 CM/SEC
BH CV XLRA MEAS LEFT CAROTID BULB PSV: 51.5 CM/SEC
BH CV XLRA MEAS LEFT DIST CCA EDV: -14.1 CM/SEC
BH CV XLRA MEAS LEFT DIST CCA PSV: -79.4 CM/SEC
BH CV XLRA MEAS LEFT DIST ICA EDV: -22 CM/SEC
BH CV XLRA MEAS LEFT DIST ICA PSV: -98.2 CM/SEC
BH CV XLRA MEAS LEFT ICA/CCA RATIO: 1.24
BH CV XLRA MEAS LEFT MID CCA EDV: 14.1 CM/SEC
BH CV XLRA MEAS LEFT MID CCA PSV: 93.5 CM/SEC
BH CV XLRA MEAS LEFT MID ICA EDV: -16.5 CM/SEC
BH CV XLRA MEAS LEFT MID ICA PSV: -62.9 CM/SEC
BH CV XLRA MEAS LEFT PROX CCA EDV: 16.7 CM/SEC
BH CV XLRA MEAS LEFT PROX CCA PSV: 105.1 CM/SEC
BH CV XLRA MEAS LEFT PROX ECA PSV: -75.4 CM/SEC
BH CV XLRA MEAS LEFT PROX ICA EDV: -14.9 CM/SEC
BH CV XLRA MEAS LEFT PROX ICA PSV: -66.4 CM/SEC
BH CV XLRA MEAS LEFT PROX SCLA PSV: 156.2 CM/SEC
BH CV XLRA MEAS LEFT VERTEBRAL A EDV: 9 CM/SEC
BH CV XLRA MEAS LEFT VERTEBRAL A PSV: 59.7 CM/SEC
BH CV XLRA MEAS RIGHT CAROTID BULB EDV: 19.9 CM/SEC
BH CV XLRA MEAS RIGHT CAROTID BULB PSV: 83.3 CM/SEC
BH CV XLRA MEAS RIGHT DIST CCA EDV: -12.9 CM/SEC
BH CV XLRA MEAS RIGHT DIST CCA PSV: -53.4 CM/SEC
BH CV XLRA MEAS RIGHT DIST ICA EDV: -13.4 CM/SEC
BH CV XLRA MEAS RIGHT DIST ICA PSV: -53.4 CM/SEC
BH CV XLRA MEAS RIGHT ICA/CCA RATIO: 1.56
BH CV XLRA MEAS RIGHT MID CCA EDV: 12.9 CM/SEC
BH CV XLRA MEAS RIGHT MID CCA PSV: 75.6 CM/SEC
BH CV XLRA MEAS RIGHT MID ICA EDV: 15.8 CM/SEC
BH CV XLRA MEAS RIGHT MID ICA PSV: 73.3 CM/SEC
BH CV XLRA MEAS RIGHT PROX CCA EDV: 14.1 CM/SEC
BH CV XLRA MEAS RIGHT PROX CCA PSV: 72.1 CM/SEC
BH CV XLRA MEAS RIGHT PROX ECA PSV: 110.8 CM/SEC
BH CV XLRA MEAS RIGHT PROX ICA EDV: -16.4 CM/SEC
BH CV XLRA MEAS RIGHT PROX ICA PSV: -79.7 CM/SEC
BH CV XLRA MEAS RIGHT PROX SCLA PSV: 118.6 CM/SEC
BH CV XLRA MEAS RIGHT VERTEBRAL A EDV: 12.9 CM/SEC
BH CV XLRA MEAS RIGHT VERTEBRAL A PSV: 82.1 CM/SEC
LEFT ARM BP: NORMAL MMHG
RIGHT ARM BP: NORMAL MMHG

## 2024-04-01 PROCEDURE — 99214 OFFICE O/P EST MOD 30 MIN: CPT | Performed by: NURSE PRACTITIONER

## 2024-04-01 PROCEDURE — 93880 EXTRACRANIAL BILAT STUDY: CPT | Performed by: SURGERY

## 2024-04-01 PROCEDURE — G2211 COMPLEX E/M VISIT ADD ON: HCPCS | Performed by: NURSE PRACTITIONER

## 2024-04-01 PROCEDURE — 93880 EXTRACRANIAL BILAT STUDY: CPT

## 2024-04-01 NOTE — PROGRESS NOTES
Chief Complaint  Carotid Artery Disease    Subjective        Roland Russell presents to [facility] VASCULAR SURGERY  HPI   Roland Russell is a 89 y.o. female that has been followed in our office for carotid body tumor.  She had resection of this in 2019 with Dr. Severino.  She returns today in follow up along with a carotid duplex. She  reports she has been doing well without hospitalizations or surgeries. She denies any symptoms consistent with CVA, TIA, or amaurosis fugax.  He has been complaining of left-sided neck pain since her surgery 2019.  Her daughter reports that this is getting worse and is constant.  She spoke primary care provider about this who ordered a CT of her soft tissue of her neck.  She also was having right arm swelling therefore a CT of her right upper arm was done as well.  I reviewed those results. 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  A pseudoaneurysm is also possible, though it appears to drain directly into the vein.  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    Review of Systems   Constitutional:  Negative for fever.   Eyes:  Negative for visual disturbance.   Cardiovascular:  Negative for leg swelling.   Gastrointestinal:  Negative for abdominal pain.   Musculoskeletal:  Positive for neck pain. Negative for back pain.   Skin:  Negative for color change, pallor and wound.   Neurological:  Positive for headaches. Negative for dizziness, facial asymmetry, speech difficulty and weakness.        Roland Russell  reports that she has never smoked. She has never used smokeless tobacco..        Objective   Vital Signs:  Vitals:    04/01/24 1219   BP: 170/78      Body mass index is 30.51 kg/m².       Physical Exam  Vitals reviewed.   Constitutional:       Appearance: Normal appearance.   HENT:      Head: Normocephalic.   Cardiovascular:      Rate and Rhythm:  Normal rate and regular rhythm.      Pulses: Normal pulses.           Dorsalis pedis pulses are 3+ on the right side and 3+ on the left side.        Posterior tibial pulses are 3+ on the right side and 3+ on the left side.   Pulmonary:      Effort: Pulmonary effort is normal.   Skin:     General: Skin is warm.   Neurological:      General: No focal deficit present.      Mental Status: She is alert and oriented to person, place, and time.   Psychiatric:         Mood and Affect: Mood normal.          Result Review :  CT Soft Tissue Neck With Contrast (03/06/2024 16:25)   Duplex Carotid Ultrasound CAR (04/01/2024 12:12)   CT Upper Extremity Right With Contrast (03/06/2024 16:25)   Previous carotid duplex: Normal velocities bilaterally.  Resection of carotid body tumor    Carotid duplex from today: Normal velocities bilaterally.  Resection of carotid body tumor                   Assessment and Plan     Diagnoses and all orders for this visit:    1. Carotid body tumor (Primary)  Assessment & Plan:  2019: Resection of carotid body tumor with Dr. Severino.    Orders:  -     CT Angiogram Head; Future  -     CT Angiogram Neck; Future  -     CT Angiogram Chest; Future    2. Subclavian vein stenosis, right  -     CT Angiogram Chest; Future         Patient is status post resection of left carotid body tumor 2019.  She has complained of neck pain since that time, recently worsening.  Her primary care provider ordered a CT of the neck and right upper extremity due to swelling and the results are above.  It was suggested that she proceed with a CT angiogram.  I have recommended a CTA of the head, neck, and chest with triple phase contrast to assess her venous and arterial structure.  She will have this done and return to the office to discuss the results with Dr. Severino.  Follow Up     Return in about 4 weeks (around 4/29/2024) for See MTJ for CTA results .  Patient was given instructions and counseling regarding her condition or for  health maintenance advice. Please see specific information pulled into the AVS if appropriate.     NORI Horvath

## 2024-04-22 PROBLEM — I65.29 CAROTID ARTERY STENOSIS: Status: ACTIVE | Noted: 2024-04-22

## 2024-04-24 ENCOUNTER — HOSPITAL ENCOUNTER (OUTPATIENT)
Facility: HOSPITAL | Age: 89
Discharge: HOME | End: 2024-04-24
Admitting: NURSE PRACTITIONER
Payer: MEDICARE

## 2024-04-24 DIAGNOSIS — I87.1 SUBCLAVIAN VEIN STENOSIS, RIGHT: ICD-10-CM

## 2024-04-24 DIAGNOSIS — D44.6 CAROTID BODY TUMOR: ICD-10-CM

## 2024-04-24 PROCEDURE — 70496 CT ANGIOGRAPHY HEAD: CPT

## 2024-04-24 PROCEDURE — 70498 CT ANGIOGRAPHY NECK: CPT

## 2024-04-24 PROCEDURE — 25510000001 IOPAMIDOL PER 1 ML: Performed by: NURSE PRACTITIONER

## 2024-04-24 PROCEDURE — 71275 CT ANGIOGRAPHY CHEST: CPT

## 2024-04-24 RX ADMIN — IOPAMIDOL 150 ML: 755 INJECTION, SOLUTION INTRAVENOUS at 14:12

## 2024-05-01 NOTE — PROGRESS NOTES
Today's preliminary report for right lower extremity venous Doppler is positive for acute superficial venous thrombus. Report called to Dr. Merrill who said to send the patient to the ER. The patient understands.    none

## 2024-05-08 ENCOUNTER — OFFICE VISIT (OUTPATIENT)
Age: 89
End: 2024-05-08
Payer: MEDICARE

## 2024-05-08 VITALS
DIASTOLIC BLOOD PRESSURE: 80 MMHG | WEIGHT: 141 LBS | BODY MASS INDEX: 30.42 KG/M2 | HEIGHT: 57 IN | SYSTOLIC BLOOD PRESSURE: 122 MMHG

## 2024-05-08 DIAGNOSIS — R13.10 DYSPHAGIA, UNSPECIFIED TYPE: ICD-10-CM

## 2024-05-08 DIAGNOSIS — I87.1 SUBCLAVIAN VEIN STENOSIS, RIGHT: ICD-10-CM

## 2024-05-08 DIAGNOSIS — R49.0 DYSPHONIA: ICD-10-CM

## 2024-05-08 DIAGNOSIS — R60.0 LOCALIZED EDEMA: ICD-10-CM

## 2024-05-08 DIAGNOSIS — D44.6 CAROTID BODY TUMOR: Primary | ICD-10-CM

## 2024-05-08 DIAGNOSIS — R13.0 INABILITY TO SWALLOW: ICD-10-CM

## 2024-05-08 DIAGNOSIS — M54.2 NONSPECIFIC PAIN IN THE NECK REGION: ICD-10-CM

## 2024-05-08 PROBLEM — R22.31 LOCALIZED SWELLING OF RIGHT UPPER EXTREMITY: Status: ACTIVE | Noted: 2024-05-08

## 2024-05-29 ENCOUNTER — APPOINTMENT (OUTPATIENT)
Dept: CT IMAGING | Facility: HOSPITAL | Age: 89
End: 2024-05-29
Payer: MEDICARE

## 2024-05-29 ENCOUNTER — APPOINTMENT (OUTPATIENT)
Dept: GENERAL RADIOLOGY | Facility: HOSPITAL | Age: 89
End: 2024-05-29
Payer: MEDICARE

## 2024-05-29 ENCOUNTER — HOSPITAL ENCOUNTER (OUTPATIENT)
Facility: HOSPITAL | Age: 89
Setting detail: OBSERVATION
Discharge: NURSING, REHAB OR LONG-TERM CARE | End: 2024-05-31
Attending: EMERGENCY MEDICINE | Admitting: INTERNAL MEDICINE
Payer: MEDICARE

## 2024-05-29 DIAGNOSIS — S22.080A COMPRESSION FRACTURE OF T12 VERTEBRA, INITIAL ENCOUNTER: ICD-10-CM

## 2024-05-29 DIAGNOSIS — R10.12 LEFT UPPER QUADRANT ABDOMINAL PAIN: ICD-10-CM

## 2024-05-29 DIAGNOSIS — D68.9 COAGULOPATHY: ICD-10-CM

## 2024-05-29 DIAGNOSIS — M54.6 ACUTE LEFT-SIDED THORACIC BACK PAIN: Primary | ICD-10-CM

## 2024-05-29 DIAGNOSIS — S82.892A CLOSED FRACTURE OF LEFT ANKLE, INITIAL ENCOUNTER: ICD-10-CM

## 2024-05-29 PROBLEM — R10.9 ABDOMINAL PAIN: Status: ACTIVE | Noted: 2024-05-29

## 2024-05-29 PROBLEM — I16.0 HYPERTENSIVE URGENCY: Status: ACTIVE | Noted: 2024-05-29

## 2024-05-29 LAB
ANION GAP SERPL CALCULATED.3IONS-SCNC: 11.8 MMOL/L (ref 5–15)
BASOPHILS # BLD AUTO: 0.06 10*3/MM3 (ref 0–0.2)
BASOPHILS NFR BLD AUTO: 0.7 % (ref 0–1.5)
BUN SERPL-MCNC: 23 MG/DL (ref 8–23)
BUN/CREAT SERPL: 24 (ref 7–25)
CALCIUM SPEC-SCNC: 10.1 MG/DL (ref 8.6–10.5)
CHLORIDE SERPL-SCNC: 102 MMOL/L (ref 98–107)
CO2 SERPL-SCNC: 25.2 MMOL/L (ref 22–29)
CREAT SERPL-MCNC: 0.96 MG/DL (ref 0.57–1)
DEPRECATED RDW RBC AUTO: 41.1 FL (ref 37–54)
EGFRCR SERPLBLD CKD-EPI 2021: 56.7 ML/MIN/1.73
EOSINOPHIL # BLD AUTO: 0.24 10*3/MM3 (ref 0–0.4)
EOSINOPHIL NFR BLD AUTO: 2.9 % (ref 0.3–6.2)
ERYTHROCYTE [DISTWIDTH] IN BLOOD BY AUTOMATED COUNT: 12.5 % (ref 12.3–15.4)
GLUCOSE BLDC GLUCOMTR-MCNC: 104 MG/DL (ref 70–130)
GLUCOSE BLDC GLUCOMTR-MCNC: 148 MG/DL (ref 70–130)
GLUCOSE BLDC GLUCOMTR-MCNC: 209 MG/DL (ref 70–130)
GLUCOSE BLDC GLUCOMTR-MCNC: 90 MG/DL (ref 70–130)
GLUCOSE SERPL-MCNC: 156 MG/DL (ref 65–99)
HCT VFR BLD AUTO: 42.5 % (ref 34–46.6)
HGB BLD-MCNC: 13.7 G/DL (ref 12–15.9)
HOLD SPECIMEN: NORMAL
HOLD SPECIMEN: NORMAL
IMM GRANULOCYTES # BLD AUTO: 0.03 10*3/MM3 (ref 0–0.05)
IMM GRANULOCYTES NFR BLD AUTO: 0.4 % (ref 0–0.5)
LIPASE SERPL-CCNC: 24 U/L (ref 13–60)
LYMPHOCYTES # BLD AUTO: 2.29 10*3/MM3 (ref 0.7–3.1)
LYMPHOCYTES NFR BLD AUTO: 27.3 % (ref 19.6–45.3)
MCH RBC QN AUTO: 29 PG (ref 26.6–33)
MCHC RBC AUTO-ENTMCNC: 32.2 G/DL (ref 31.5–35.7)
MCV RBC AUTO: 90 FL (ref 79–97)
MONOCYTES # BLD AUTO: 0.69 10*3/MM3 (ref 0.1–0.9)
MONOCYTES NFR BLD AUTO: 8.2 % (ref 5–12)
NEUTROPHILS NFR BLD AUTO: 5.07 10*3/MM3 (ref 1.7–7)
NEUTROPHILS NFR BLD AUTO: 60.5 % (ref 42.7–76)
NRBC BLD AUTO-RTO: 0 /100 WBC (ref 0–0.2)
PLATELET # BLD AUTO: 190 10*3/MM3 (ref 140–450)
PMV BLD AUTO: 11.8 FL (ref 6–12)
POTASSIUM SERPL-SCNC: 4 MMOL/L (ref 3.5–5.2)
RBC # BLD AUTO: 4.72 10*6/MM3 (ref 3.77–5.28)
SODIUM SERPL-SCNC: 139 MMOL/L (ref 136–145)
TROPONIN T SERPL HS-MCNC: 21 NG/L
WBC NRBC COR # BLD AUTO: 8.38 10*3/MM3 (ref 3.4–10.8)
WHOLE BLOOD HOLD COAG: NORMAL
WHOLE BLOOD HOLD SPECIMEN: NORMAL

## 2024-05-29 PROCEDURE — 74177 CT ABD & PELVIS W/CONTRAST: CPT

## 2024-05-29 PROCEDURE — G0378 HOSPITAL OBSERVATION PER HR: HCPCS

## 2024-05-29 PROCEDURE — 82948 REAGENT STRIP/BLOOD GLUCOSE: CPT

## 2024-05-29 PROCEDURE — 72128 CT CHEST SPINE W/O DYE: CPT

## 2024-05-29 PROCEDURE — 25010000002 ONDANSETRON PER 1 MG: Performed by: EMERGENCY MEDICINE

## 2024-05-29 PROCEDURE — 85025 COMPLETE CBC W/AUTO DIFF WBC: CPT | Performed by: EMERGENCY MEDICINE

## 2024-05-29 PROCEDURE — 96376 TX/PRO/DX INJ SAME DRUG ADON: CPT

## 2024-05-29 PROCEDURE — 80048 BASIC METABOLIC PNL TOTAL CA: CPT | Performed by: EMERGENCY MEDICINE

## 2024-05-29 PROCEDURE — 25510000001 IOPAMIDOL 61 % SOLUTION: Performed by: EMERGENCY MEDICINE

## 2024-05-29 PROCEDURE — 25010000002 ENOXAPARIN PER 10 MG: Performed by: INTERNAL MEDICINE

## 2024-05-29 PROCEDURE — 63710000001 INSULIN LISPRO (HUMAN) PER 5 UNITS: Performed by: INTERNAL MEDICINE

## 2024-05-29 PROCEDURE — 83690 ASSAY OF LIPASE: CPT | Performed by: EMERGENCY MEDICINE

## 2024-05-29 PROCEDURE — 99285 EMERGENCY DEPT VISIT HI MDM: CPT

## 2024-05-29 PROCEDURE — 96375 TX/PRO/DX INJ NEW DRUG ADDON: CPT

## 2024-05-29 PROCEDURE — 63710000001 INSULIN GLARGINE PER 5 UNITS: Performed by: INTERNAL MEDICINE

## 2024-05-29 PROCEDURE — 93005 ELECTROCARDIOGRAM TRACING: CPT | Performed by: INTERNAL MEDICINE

## 2024-05-29 PROCEDURE — 96374 THER/PROPH/DIAG INJ IV PUSH: CPT

## 2024-05-29 PROCEDURE — 25010000002 FENTANYL CITRATE (PF) 50 MCG/ML SOLUTION: Performed by: EMERGENCY MEDICINE

## 2024-05-29 PROCEDURE — 93010 ELECTROCARDIOGRAM REPORT: CPT | Performed by: INTERNAL MEDICINE

## 2024-05-29 PROCEDURE — 84484 ASSAY OF TROPONIN QUANT: CPT | Performed by: EMERGENCY MEDICINE

## 2024-05-29 PROCEDURE — 96372 THER/PROPH/DIAG INJ SC/IM: CPT

## 2024-05-29 PROCEDURE — 71045 X-RAY EXAM CHEST 1 VIEW: CPT

## 2024-05-29 PROCEDURE — 25810000003 SODIUM CHLORIDE 0.9 % SOLUTION: Performed by: EMERGENCY MEDICINE

## 2024-05-29 RX ORDER — CETIRIZINE HYDROCHLORIDE 10 MG/1
10 TABLET ORAL DAILY
Status: DISCONTINUED | OUTPATIENT
Start: 2024-05-30 | End: 2024-05-31 | Stop reason: HOSPADM

## 2024-05-29 RX ORDER — THYROID 60 MG/1
60 TABLET ORAL
Status: DISCONTINUED | OUTPATIENT
Start: 2024-05-30 | End: 2024-05-31 | Stop reason: HOSPADM

## 2024-05-29 RX ORDER — NIFEDIPINE 60 MG/1
60 TABLET, EXTENDED RELEASE ORAL DAILY
Status: DISCONTINUED | OUTPATIENT
Start: 2024-05-30 | End: 2024-05-31 | Stop reason: HOSPADM

## 2024-05-29 RX ORDER — INSULIN LISPRO 100 [IU]/ML
2-7 INJECTION, SOLUTION INTRAVENOUS; SUBCUTANEOUS
Status: DISCONTINUED | OUTPATIENT
Start: 2024-05-29 | End: 2024-05-31 | Stop reason: HOSPADM

## 2024-05-29 RX ORDER — GABAPENTIN 100 MG/1
100 CAPSULE ORAL 2 TIMES DAILY PRN
Status: DISCONTINUED | OUTPATIENT
Start: 2024-05-29 | End: 2024-05-31 | Stop reason: HOSPADM

## 2024-05-29 RX ORDER — SODIUM CHLORIDE 0.9 % (FLUSH) 0.9 %
10 SYRINGE (ML) INJECTION AS NEEDED
Status: DISCONTINUED | OUTPATIENT
Start: 2024-05-29 | End: 2024-05-31 | Stop reason: HOSPADM

## 2024-05-29 RX ORDER — POLYETHYLENE GLYCOL 3350 17 G/17G
17 POWDER, FOR SOLUTION ORAL DAILY PRN
Status: DISCONTINUED | OUTPATIENT
Start: 2024-05-29 | End: 2024-05-30

## 2024-05-29 RX ORDER — ACETAMINOPHEN 650 MG/1
650 SUPPOSITORY RECTAL EVERY 4 HOURS PRN
Status: DISCONTINUED | OUTPATIENT
Start: 2024-05-29 | End: 2024-05-29

## 2024-05-29 RX ORDER — ONDANSETRON 2 MG/ML
4 INJECTION INTRAMUSCULAR; INTRAVENOUS EVERY 6 HOURS PRN
Status: DISCONTINUED | OUTPATIENT
Start: 2024-05-29 | End: 2024-05-31 | Stop reason: HOSPADM

## 2024-05-29 RX ORDER — ACETAMINOPHEN 325 MG/1
650 TABLET ORAL EVERY 4 HOURS PRN
Status: DISCONTINUED | OUTPATIENT
Start: 2024-05-31 | End: 2024-05-31 | Stop reason: HOSPADM

## 2024-05-29 RX ORDER — SODIUM CHLORIDE 9 MG/ML
125 INJECTION, SOLUTION INTRAVENOUS CONTINUOUS
Status: DISCONTINUED | OUTPATIENT
Start: 2024-05-29 | End: 2024-05-29

## 2024-05-29 RX ORDER — SODIUM CHLORIDE 0.9 % (FLUSH) 0.9 %
10 SYRINGE (ML) INJECTION EVERY 12 HOURS SCHEDULED
Status: DISCONTINUED | OUTPATIENT
Start: 2024-05-29 | End: 2024-05-31 | Stop reason: HOSPADM

## 2024-05-29 RX ORDER — ACETAMINOPHEN 160 MG/5ML
650 SOLUTION ORAL EVERY 4 HOURS PRN
Status: DISCONTINUED | OUTPATIENT
Start: 2024-05-31 | End: 2024-05-31 | Stop reason: HOSPADM

## 2024-05-29 RX ORDER — ONDANSETRON 4 MG/1
4 TABLET, ORALLY DISINTEGRATING ORAL EVERY 6 HOURS PRN
Status: DISCONTINUED | OUTPATIENT
Start: 2024-05-29 | End: 2024-05-31 | Stop reason: HOSPADM

## 2024-05-29 RX ORDER — PRAVASTATIN SODIUM 20 MG
20 TABLET ORAL NIGHTLY
Status: DISCONTINUED | OUTPATIENT
Start: 2024-05-29 | End: 2024-05-31 | Stop reason: HOSPADM

## 2024-05-29 RX ORDER — BISACODYL 10 MG
10 SUPPOSITORY, RECTAL RECTAL DAILY PRN
Status: DISCONTINUED | OUTPATIENT
Start: 2024-05-29 | End: 2024-05-30

## 2024-05-29 RX ORDER — ONDANSETRON 2 MG/ML
4 INJECTION INTRAMUSCULAR; INTRAVENOUS ONCE
Status: COMPLETED | OUTPATIENT
Start: 2024-05-29 | End: 2024-05-29

## 2024-05-29 RX ORDER — LIDOCAINE 4 G/G
1 PATCH TOPICAL
Status: DISCONTINUED | OUTPATIENT
Start: 2024-05-29 | End: 2024-05-31 | Stop reason: HOSPADM

## 2024-05-29 RX ORDER — NITROGLYCERIN 0.4 MG/1
0.4 TABLET SUBLINGUAL
Status: DISCONTINUED | OUTPATIENT
Start: 2024-05-29 | End: 2024-05-31 | Stop reason: HOSPADM

## 2024-05-29 RX ORDER — SODIUM CHLORIDE 0.9 % (FLUSH) 0.9 %
10 SYRINGE (ML) INJECTION AS NEEDED
Status: DISCONTINUED | OUTPATIENT
Start: 2024-05-29 | End: 2024-05-29

## 2024-05-29 RX ORDER — BISACODYL 5 MG/1
5 TABLET, DELAYED RELEASE ORAL DAILY PRN
Status: DISCONTINUED | OUTPATIENT
Start: 2024-05-29 | End: 2024-05-30

## 2024-05-29 RX ORDER — ACETAMINOPHEN 500 MG
1000 TABLET ORAL 3 TIMES DAILY
Status: DISCONTINUED | OUTPATIENT
Start: 2024-05-29 | End: 2024-05-31 | Stop reason: HOSPADM

## 2024-05-29 RX ORDER — GABAPENTIN 100 MG/1
200 CAPSULE ORAL NIGHTLY
Status: DISCONTINUED | OUTPATIENT
Start: 2024-05-29 | End: 2024-05-31 | Stop reason: HOSPADM

## 2024-05-29 RX ORDER — ACETAMINOPHEN 650 MG/1
650 SUPPOSITORY RECTAL EVERY 4 HOURS PRN
Status: DISCONTINUED | OUTPATIENT
Start: 2024-05-31 | End: 2024-05-31 | Stop reason: HOSPADM

## 2024-05-29 RX ORDER — AMOXICILLIN 250 MG
2 CAPSULE ORAL 2 TIMES DAILY PRN
Status: DISCONTINUED | OUTPATIENT
Start: 2024-05-29 | End: 2024-05-30

## 2024-05-29 RX ORDER — NICOTINE POLACRILEX 4 MG
15 LOZENGE BUCCAL
Status: DISCONTINUED | OUTPATIENT
Start: 2024-05-29 | End: 2024-05-31 | Stop reason: HOSPADM

## 2024-05-29 RX ORDER — IBUPROFEN 600 MG/1
1 TABLET ORAL
Status: DISCONTINUED | OUTPATIENT
Start: 2024-05-29 | End: 2024-05-31 | Stop reason: HOSPADM

## 2024-05-29 RX ORDER — TRAMADOL HYDROCHLORIDE 50 MG/1
25 TABLET ORAL EVERY 6 HOURS PRN
Status: DISCONTINUED | OUTPATIENT
Start: 2024-05-29 | End: 2024-05-31 | Stop reason: HOSPADM

## 2024-05-29 RX ORDER — ACETAMINOPHEN 325 MG/1
650 TABLET ORAL EVERY 4 HOURS PRN
Status: DISCONTINUED | OUTPATIENT
Start: 2024-05-29 | End: 2024-05-29

## 2024-05-29 RX ORDER — NALOXONE HCL 0.4 MG/ML
0.4 VIAL (ML) INJECTION
Status: DISCONTINUED | OUTPATIENT
Start: 2024-05-29 | End: 2024-05-31 | Stop reason: HOSPADM

## 2024-05-29 RX ORDER — FENTANYL CITRATE 50 UG/ML
25 INJECTION, SOLUTION INTRAMUSCULAR; INTRAVENOUS ONCE
Status: COMPLETED | OUTPATIENT
Start: 2024-05-29 | End: 2024-05-29

## 2024-05-29 RX ORDER — ENOXAPARIN SODIUM 100 MG/ML
40 INJECTION SUBCUTANEOUS 2 TIMES DAILY
Status: DISCONTINUED | OUTPATIENT
Start: 2024-05-29 | End: 2024-05-30

## 2024-05-29 RX ORDER — CALCIUM CARBONATE 500 MG/1
2 TABLET, CHEWABLE ORAL 2 TIMES DAILY PRN
Status: DISCONTINUED | OUTPATIENT
Start: 2024-05-29 | End: 2024-05-31 | Stop reason: HOSPADM

## 2024-05-29 RX ORDER — DEXTROSE MONOHYDRATE 25 G/50ML
25 INJECTION, SOLUTION INTRAVENOUS
Status: DISCONTINUED | OUTPATIENT
Start: 2024-05-29 | End: 2024-05-31 | Stop reason: HOSPADM

## 2024-05-29 RX ORDER — HYDROMORPHONE HYDROCHLORIDE 1 MG/ML
0.25 INJECTION, SOLUTION INTRAMUSCULAR; INTRAVENOUS; SUBCUTANEOUS
Status: DISCONTINUED | OUTPATIENT
Start: 2024-05-29 | End: 2024-05-30

## 2024-05-29 RX ORDER — ACETAMINOPHEN 160 MG/5ML
650 SOLUTION ORAL EVERY 4 HOURS PRN
Status: DISCONTINUED | OUTPATIENT
Start: 2024-05-29 | End: 2024-05-29

## 2024-05-29 RX ORDER — CLOPIDOGREL BISULFATE 75 MG/1
75 TABLET ORAL DAILY
Status: DISCONTINUED | OUTPATIENT
Start: 2024-05-29 | End: 2024-05-31 | Stop reason: HOSPADM

## 2024-05-29 RX ORDER — SODIUM CHLORIDE 9 MG/ML
40 INJECTION, SOLUTION INTRAVENOUS AS NEEDED
Status: DISCONTINUED | OUTPATIENT
Start: 2024-05-29 | End: 2024-05-31 | Stop reason: HOSPADM

## 2024-05-29 RX ORDER — BUMETANIDE 0.5 MG/1
0.5 TABLET ORAL DAILY
Status: DISCONTINUED | OUTPATIENT
Start: 2024-05-30 | End: 2024-05-31 | Stop reason: HOSPADM

## 2024-05-29 RX ADMIN — Medication 10 ML: at 20:18

## 2024-05-29 RX ADMIN — FENTANYL CITRATE 25 MCG: 50 INJECTION, SOLUTION INTRAMUSCULAR; INTRAVENOUS at 14:09

## 2024-05-29 RX ADMIN — GABAPENTIN 100 MG: 100 CAPSULE ORAL at 18:57

## 2024-05-29 RX ADMIN — PRAVASTATIN SODIUM 20 MG: 20 TABLET ORAL at 20:19

## 2024-05-29 RX ADMIN — ACETAMINOPHEN 1000 MG: 500 TABLET ORAL at 20:18

## 2024-05-29 RX ADMIN — ONDANSETRON 4 MG: 2 INJECTION INTRAMUSCULAR; INTRAVENOUS at 10:57

## 2024-05-29 RX ADMIN — GABAPENTIN 200 MG: 100 CAPSULE ORAL at 20:18

## 2024-05-29 RX ADMIN — INSULIN LISPRO 3 UNITS: 100 INJECTION, SOLUTION INTRAVENOUS; SUBCUTANEOUS at 21:59

## 2024-05-29 RX ADMIN — INSULIN GLARGINE 4 UNITS: 100 INJECTION, SOLUTION SUBCUTANEOUS at 21:59

## 2024-05-29 RX ADMIN — LIDOCAINE 1 PATCH: 4 PATCH TOPICAL at 20:19

## 2024-05-29 RX ADMIN — TRAMADOL HYDROCHLORIDE 25 MG: 50 TABLET ORAL at 18:52

## 2024-05-29 RX ADMIN — CLOPIDOGREL BISULFATE 75 MG: 75 TABLET, FILM COATED ORAL at 18:52

## 2024-05-29 RX ADMIN — CALCIUM CARBONATE-VITAMIN D TAB 500 MG-200 UNIT 2 TABLET: 500-200 TAB at 20:18

## 2024-05-29 RX ADMIN — FENTANYL CITRATE 25 MCG: 50 INJECTION, SOLUTION INTRAMUSCULAR; INTRAVENOUS at 10:57

## 2024-05-29 RX ADMIN — SODIUM CHLORIDE 125 ML/HR: 9 INJECTION, SOLUTION INTRAVENOUS at 14:09

## 2024-05-29 RX ADMIN — ENOXAPARIN SODIUM 40 MG: 100 INJECTION SUBCUTANEOUS at 20:17

## 2024-05-29 RX ADMIN — IOPAMIDOL 85 ML: 612 INJECTION, SOLUTION INTRAVENOUS at 13:55

## 2024-05-29 NOTE — ED PROVIDER NOTES
EMERGENCY DEPARTMENT ENCOUNTER    Room Number:  S518/1  Date of encounter:  5/29/2024  PCP: Shawanda Rosario MD  Historian: Patient and daughter  Relevant information and history provided by sources other than the patient will be included below and in the ED Course.  Review of pertinent past medical records may also be included in record below and ED Course.    HPI:  Chief Complaint: Back pain  A complete HPI/ROS/PMH/PSH/SH/FH are unobtainable due to: Not applicable  Context: Roland Russell is a 89 y.o. female who presents to the ED c/o this morning this patient wanted to move a rocking chair from her ground floor upstairs.  She lifted up the rocking chair put it on her head and then she twisted and was beginning to walk up stairs when she felt a sudden pain in her mid thoracic spine area.  It is about the bra line region and felt a pop.  This pain became sudden.  She then had to stop moving and went down.  EMS was called and they transported her here.  Her pain is worse when she goes to try to sit up twist or any sort of movement of her mid thoracic back.  When she lays still she is pain-free.  Denies any numbness or tingling to upper or lower extremities that is new.  Denies any urinary or fecal incontinence or saddle anesthesia.  She has a history of osteoporosis and it sounds like in description from the daughter she has had a history of compression fractures in the past.  She has been compliant with her medicines and her medicines do include Eliquis and Plavix per daughter.        Previous Episodes: No  Current Symptoms: See above    MEDICAL HISTORY REVIEWED  I reviewed the vascular surgeons note from Dr. Severino on May 8, 2024.  She has a history of a carotid body term tumor with resection in 2019 she has been doing well since the surgery.  Had complaining of some left sided ninth pain since the surgery in 2019.  Reports that this pain has been gradually getting worse she did have a recent CT scan of her soft  tissue neck ordered by her primary care physician because of this pain.  Also had complained of swelling to her right arm.  She had a CT scan to evaluate that as well which was concerning for a partially thrombosed venous varix she also had a CT angiogram to rule out pseudoaneurysm and that was nondiagnostic.  She has chronic dysphagia and inability to swallow.  Chronic neck pain also dysphonia.  Also appears as if the swallowing issue and the pain has been gradually worsening.  The vascular surgeon also referred the patient to ENT for potential direct visualization with laryngoscope.  Patient is also anticoagulated with Eliquis and daughter informs me.  She takes Eliquis and Plavix.    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    Sleep apnea, obstructive 12/16/2019    Morbidly obese 12/20/2019    Abnormal weight gain 06/26/2020    Localized edema 06/26/2020    Compression fracture of T8 vertebra with delayed healing 02/16/2021    PAF (paroxysmal atrial fibrillation) 02/03/2020    Dry scalp 12/01/2021    Deformity of both feet 10/06/2022    Acute encephalopathy 11/02/2022    Lactic acidosis 11/03/2022    Diverticulitis 11/03/2022    Ankle fracture 04/10/2023    Type 2 diabetes mellitus with hypoglycemia, with long-term current use of insulin 04/11/2023    Abnormal nuclear stress test 04/16/2023    Coronary artery disease involving native coronary artery of native heart 04/17/2023    S/P  right coronary artery (RCA) stent placement 04/20/2023    Diastolic CHF, acute 08/26/2023    Acute CHF 08/26/2023    Type 2 diabetes mellitus with hyperglycemia 08/26/2023    Acute cystitis with hematuria 08/26/2023    Diabetic polyneuropathy associated with type 2 diabetes mellitus 08/26/2023    Presence of cardiac pacemaker 08/26/2023    Hypoxia 08/26/2023    Cerebrovascular accident (CVA) 09/20/2023    Chronic heart failure with preserved ejection fraction (HFpEF) 09/20/2023    Subclavian vein stenosis, right 04/01/2024    Carotid artery stenosis 04/22/2024    Inability to swallow 05/08/2024    Nonspecific pain in the neck region 05/08/2024    Dysphagia 05/08/2024    Dysphonia 05/08/2024    Localized swelling of right upper extremity 05/08/2024     Resolved Ambulatory Problems     Diagnosis Date Noted    Osteoporosis     GERD (gastroesophageal reflux disease) 09/27/2018    Osteoarthritis, multiple sites 09/27/2018     Past Medical History:   Diagnosis Date    Abnormal vision     Allergy to adhesive tape     Arthralgia     AVB (atrioventricular block)     Back pain 01/28/2021    Balance problem     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    CAD (coronary artery disease)     Difficulty walking     H/O complete eye exam 10/2016    Headache     History of DVT (deep vein thrombosis)     History of glaucoma     History of poliomyelitis     History of surgery on arm     HLD (hyperlipidemia)     Hypertension     Left carotid artery stenosis 11/21/2019    Memory loss     Migraine     Neck mass 02/03/2022    Numbness     Pacemaker     Rectal bleeding     Renal disorder     Sleep apnea     TIA (transient ischemic attack) 12/24/2018    Type 2 diabetes mellitus     Type 2 diabetes mellitus without complications     Type 2 diabetes mellitus, uncontrolled          PAST SURGICAL HISTORY  Past Surgical History:   Procedure Laterality Date    APPENDECTOMY  1989    CARDIAC CATHETERIZATION N/A 4/13/2023     Procedure: Left Heart Cath;  Surgeon: Rikki Oconnor MD;  Location[facility]AS[facility];  Service: Cardiology;  Laterality: N/A;    CARDIAC CATHETERIZATION N/A 4/13/2023    Procedure: Coronary angiography;  Surgeon: Rikki Oconnor MD;  [facility];  Service: Cardiology;  Laterality: N/A;    CARDIAC CATHETERIZATION N/A 4/13/2023    Procedure: Left ventriculography;  Surgeon: Rikki Oconnor MD;  [facility];  Service: Cardiology;  Laterality: N/A;    CARDIAC CATHETERIZATION N/A 4/20/2023    Procedure: Percutaneous Coronary Intervention;  Surgeon: Rikki Oconnor MD;  [facility];  Service: Cardiology;  Laterality: N/A;    CARDIAC CATHETERIZATION N/A 4/20/2023    Procedure: Atherectomy-coronary;  Surgeon: Rikki Oconnor MD;  [facility];  Service: Cardiology;  Laterality: N/A;    CARDIAC CATHETERIZATION N/A 4/20/2023    Procedure: Stent CAROL coronary;  Surgeon: Rikki Oconnor MD;  [facility];  Service: Cardiology;  Laterality: N/A;    CARDIAC PACEMAKER PLACEMENT  11/25/2004    CAROTID ENDARTERECTOMY Left 12/13/2019    Procedure: LEFT CAROTID BODY TUMOR RESECTION;  Surgeon: Bryan Severino MD;  Location: [facility] OR;  Service: Vascular    CATARACT EXTRACTION Bilateral 1997    CEREBRAL ANGIOGRAM Bilateral 12/6/2019    Procedure: CAROTID ARTERIOGRAM BILATERAL, RIGHT WRIST APPROACH;  Surgeon: Bryan Severino MD;  Location: [facility] OR 18/19;  Service: Vascular    CHOLECYSTECTOMY  1989    COLONOSCOPY  2013    dr montes    ELBOW PROCEDURE  2016    ENDOSCOPY  12/06/2012    Dr. Montes; food in stomach, gastritis    EXCISION LESION  1994    BACK CYST BENGIN    EYE SURGERY  12/2012    GLAUCOMA SURGERY Bilateral 1995    laser surgery    HARDWARE REMOVAL Left     ELBOW    HYSTERECTOMY  1986    INTERVENTIONAL RADIOLOGY PROCEDURE N/A 4/20/2023    Procedure: Intravascular Ultrasound;  Surgeon: Elvin  MD Rikki;  Location: [facility] CATH INVASIVE LOCATION;  Service: Cardiology;  Laterality: N/A;    ORIF ELBOW FRACTURE Left     PACEMAKER IMPLANTATION      PACEMAKER REPLACEMENT  06/2013         FAMILY HISTORY  Family History   Problem Relation Age of Onset    Arthritis Mother     Diabetes Mother     Thyroid disease Mother     Heart disease Mother     Cancer Father         no details    Glaucoma Father         angular glycoma    Heart disease Father     Arthritis Father     Thyroid disease Father     Stroke Father     Asthma Sister     Arthritis Sister     Cancer Sister         breast    Stroke Maternal Grandmother     Heart failure Other     Diabetes Other     Hypertension Other     Stroke Other         aunt    Thyroid disease Other     Malig Hyperthermia Neg Hx          SOCIAL HISTORY  Social History     Socioeconomic History    Marital status:    Tobacco Use    Smoking status: Never    Smokeless tobacco: Never    Tobacco comments:     Patient does not smoke.   Vaping Use    Vaping status: Never Used   Substance and Sexual Activity    Alcohol use: No     Comment: Patient is non drinker    Drug use: Never     Comment: Drug Abuse: none    Sexual activity: Defer         ALLERGIES  Adhesive tape, Latex, and Propoxyphene        REVIEW OF SYSTEMS  Review of Systems     All systems reviewed and negative except for those discussed in HPI.       PHYSICAL EXAM    I have reviewed the triage vital signs and nursing notes.    ED Triage Vitals   Temp Heart Rate Resp BP SpO2   05/29/24 0957 05/29/24 0953 05/29/24 0953 05/29/24 0953 05/29/24 0953   97.2 °F (36.2 °C) 72 18 (!) 190/135 97 %      Temp src Heart Rate Source Patient Position BP Location FiO2 (%)   05/29/24 0957 05/29/24 0953 -- -- --   Tympanic Monitor          GENERAL: This is an elderly frail female.  No acute distress she lies still does not appear to be in any pain or distress.Vital signs on my initial evaluation have been reviewed  HENT: katheryn  patent  Head/neck/ face are symmetric without gross deformity, signs of trauma, or swelling  EYES: no scleral icterus, no conjunctival pallor.  NECK: Supple, no meningismus  CV: regular rhythm, regular rate with intact distal pulses.  RESPIRATORY: normal effort and no respiratory distress.  ABDOMEN: soft and nontender.  Back exam her location of the pain is at about the level of the bra line.  Normal inspection to her back.  When I try to roll over to examine her she has severe pain.  When I try to sit her up she has severe pain.  I can get her in a position of comfortable where she is leaning in the bed at about 30 degrees and she is pain-free.  MUSCULOSKELETAL: no deformity.  Intact distal pulses that are equal strong and symmetric.  NEURO: alert and appropriate, moves all extremities, follows commands.  No focal motor or sensory changes  SKIN: warm, dry    Vital signs and nursing notes reviewed.        LAB RESULTS  Recent Results (from the past 24 hour(s))   Green Top (Gel)    Collection Time: 05/29/24  9:58 AM   Result Value Ref Range    Extra Tube Hold for add-ons.    Lavender Top    Collection Time: 05/29/24  9:58 AM   Result Value Ref Range    Extra Tube hold for add-on    Gold Top - SST    Collection Time: 05/29/24  9:58 AM   Result Value Ref Range    Extra Tube Hold for add-ons.    Light Blue Top    Collection Time: 05/29/24  9:58 AM   Result Value Ref Range    Extra Tube Hold for add-ons.    Basic Metabolic Panel    Collection Time: 05/29/24  9:58 AM    Specimen: Blood   Result Value Ref Range    Glucose 156 (H) 65 - 99 mg/dL    BUN 23 8 - 23 mg/dL    Creatinine 0.96 0.57 - 1.00 mg/dL    Sodium 139 136 - 145 mmol/L    Potassium 4.0 3.5 - 5.2 mmol/L    Chloride 102 98 - 107 mmol/L    CO2 25.2 22.0 - 29.0 mmol/L    Calcium 10.1 8.6 - 10.5 mg/dL    BUN/Creatinine Ratio 24.0 7.0 - 25.0    Anion Gap 11.8 5.0 - 15.0 mmol/L    eGFR 56.7 (L) >60.0 mL/min/1.73   CBC Auto Differential    Collection Time: 05/29/24   9:58 AM    Specimen: Blood   Result Value Ref Range    WBC 8.38 3.40 - 10.80 10*3/mm3    RBC 4.72 3.77 - 5.28 10*6/mm3    Hemoglobin 13.7 12.0 - 15.9 g/dL    Hematocrit 42.5 34.0 - 46.6 %    MCV 90.0 79.0 - 97.0 fL    MCH 29.0 26.6 - 33.0 pg    MCHC 32.2 31.5 - 35.7 g/dL    RDW 12.5 12.3 - 15.4 %    RDW-SD 41.1 37.0 - 54.0 fl    MPV 11.8 6.0 - 12.0 fL    Platelets 190 140 - 450 10*3/mm3    Neutrophil % 60.5 42.7 - 76.0 %    Lymphocyte % 27.3 19.6 - 45.3 %    Monocyte % 8.2 5.0 - 12.0 %    Eosinophil % 2.9 0.3 - 6.2 %    Basophil % 0.7 0.0 - 1.5 %    Immature Grans % 0.4 0.0 - 0.5 %    Neutrophils, Absolute 5.07 1.70 - 7.00 10*3/mm3    Lymphocytes, Absolute 2.29 0.70 - 3.10 10*3/mm3    Monocytes, Absolute 0.69 0.10 - 0.90 10*3/mm3    Eosinophils, Absolute 0.24 0.00 - 0.40 10*3/mm3    Basophils, Absolute 0.06 0.00 - 0.20 10*3/mm3    Immature Grans, Absolute 0.03 0.00 - 0.05 10*3/mm3    nRBC 0.0 0.0 - 0.2 /100 WBC   High Sensitivity Troponin T    Collection Time: 05/29/24  9:58 AM    Specimen: Blood   Result Value Ref Range    HS Troponin T 21 (H) <14 ng/L   Lipase    Collection Time: 05/29/24  9:58 AM    Specimen: Blood   Result Value Ref Range    Lipase 24 13 - 60 U/L   POC Glucose Once    Collection Time: 05/29/24 10:07 AM    Specimen: Blood   Result Value Ref Range    Glucose 148 (H) 70 - 130 mg/dL   POC Glucose Once    Collection Time: 05/29/24  4:32 PM    Specimen: Blood   Result Value Ref Range    Glucose 90 70 - 130 mg/dL       Ordered the above labs and independently reviewed the results.        RADIOLOGY  CT Abdomen Pelvis With Contrast    Result Date: 5/29/2024  CT ABDOMEN PELVIS W CONTRAST-  HISTORY: 89 years of age, Female.  Patient with back pain consistent with compression fracture to her thoracic spine.  CT scan is unremarkable.  Has pain that radiates to her left upper quadrant that is reproducible  TECHNIQUE:  CT includes axial imaging from the lung bases to the trochanters with intravenous contrast  and without use of oral contrast. Data reconstructed in coronal and sagittal planes. Radiation dose reduction techniques were utilized, including automated exposure control and exposure modulation based on body size.  COMPARISON: CT abdomen and pelvis 11/02/2022, CT angiogram chest 04/24/2024.  FINDINGS: Cardiac pacer/defibrillator leads are noted. There is mild dependent basilar atelectasis.  Liver, spleen, adrenal glands, pancreas appear within normal limits. Stomach is mostly decompressed with equivocal generalized gastric wall thickening and this may represent mild gastritis.  Fluid-filled small bowel loops are present without evidence to suggest obstruction. There is no ascites. Diffuse atherosclerotic calcifications are present without aneurysm. Small bilateral renal cysts are present.  Chronic T12 compression fracture and chronic bony fusion of T11 and T12 are present. The bones appear osteopenic.      1. Equivocal gastric wall thickening may represent mild gastritis. There are fluid-filled small bowel loops without bowel dilatation to suggest obstruction. 2. Bilateral renal cysts. 3. Diffuse atherosclerotic disease. 4. Osteopenia. Chronic-appearing compression deformity at T12. Partial bony fusion at T11-12.  Radiation dose reduction techniques were utilized, including automated exposure control and exposure modulation based on body size.   This report was finalized on 5/29/2024 4:54 PM by Dr. Mik Mercado M.D on Workstation: [id number]      XR Chest 1 View    Result Date: 5/29/2024  XR CHEST 1 VW-  HISTORY: Female who is 89 years-old, trauma  TECHNIQUE: Frontal view of the chest  COMPARISON: 8/26/2023  FINDINGS: The heart size is normal. Aorta is tortuous, calcified. Pulmonary vasculature is unremarkable. Left-sided pacemaker and cardiac leads are seen. No focal pulmonary consolidation, pleural effusion, or pneumothorax. Old granulomatous disease is apparent. No acute osseous process.      No focal  pulmonary consolidation. Tortuous aorta. Follow-up as clinically indicated.    This report was finalized on 5/29/2024 3:25 PM by Dr. Feliberto Copeland M.D on Workstation: [id number]      CT Thoracic Spine Without Contrast    Result Date: 5/29/2024  CT OF THE THORACIC SPINE WITHOUT CONTRAST 05/29/2024  HISTORY: Back pain. Back injury.  Axial images were obtained through the thoracic spine without contrast. Sagittal and coronal reconstruction images were reviewed.  There is minimal loss of height of the anterior aspect of the T3 vertebral body but this does not appear acute. There is moderate compression deformity of T12 with approximately 30% to 40% loss of the mid to anterior vertebral body height. This appears similar to the 03/05/2021 study. Degenerative endplate changes with disc space narrowing are seen at T8-T9. There is minimal osteophytosis in several thoracic vertebrae.  No acute fractures are seen.  There is minimal anterolisthesis of T11 on T12 similar to the 03/05/2021 study. No other subluxation is seen. Small posterior central and right paracentral disc protrusion is seen at T6-7. Small posterior central and left paracentral disc protrusion is seen at T7-8. Moderate size suspected posterior central disc protrusion is seen at T8-T9 which appears slightly hyperdense and best seen on axial image 60. These were also seen on the 03/05/2021 study.  Small left paracentral disc protrusion is seen at T9-10 best seen on axial image 69 and also present on the previous study.      1. Old T12 compression fracture. This appears stable since the 03/05/2021 study. 2. No acute fractures or subluxation is seen. 3. Multilevel thoracic degenerative disc disease as discussed above.   Radiation dose reduction techniques were utilized, including automated exposure control and exposure modulation based on body size.        I ordered the above noted radiological studies. Reviewed by me and discussed with radiologist.  See  dictation for official radiology interpretation.      PROCEDURES    Procedures      MEDICATIONS GIVEN IN ER    Medications   sodium chloride 0.9 % flush 10 mL (has no administration in time range)   sodium chloride 0.9 % flush 10 mL (has no administration in time range)   sodium chloride 0.9 % infusion (125 mL/hr Intravenous New Bag 5/29/24 1409)   fentaNYL citrate (PF) (SUBLIMAZE) injection 25 mcg (25 mcg Intravenous Given 5/29/24 1057)   ondansetron (ZOFRAN) injection 4 mg (4 mg Intravenous Given 5/29/24 1057)   fentaNYL citrate (PF) (SUBLIMAZE) injection 25 mcg (25 mcg Intravenous Given 5/29/24 1409)   iopamidol (ISOVUE-300) 61 % injection 100 mL (85 mL Intravenous Given 5/29/24 1355)         All labs have been independently reviewed by me.  All radiology studies have been reviewed by me and I discussed with radiologist dictating the report when indicated below.  All EKG's independently viewed and interpreted by me.  Discussion below represents my analysis of pertinent findings related to patient's condition, differential diagnosis, treatment plan and final disposition.        PROGRESS, DATA ANALYSIS, CONSULTS, AND MEDICAL DECISION MAKING    This is a patient that very likely has a compression fracture of her thoracic spine.  Will get a check a CT scan of her thoracic spine.  She is elderly and frail.  I will give her a small dose of fentanyl for pain.  She is getting need to move to get the CT scan.  Informed the patient and daughter of my clinical concerns and the test that we will order.  All questions answered at this time.      ED Course as of 05/29/24 1704   Wed May 29, 2024   1336 I reviewed the CT scan report.  There is an old T12 compression fracture that appears stable since a study done on 3/5/2021.  There is no acute fracture or subluxation seen.  There is multilevel thoracic degenerative disc disease. [MM]   1423 I reviewed the CT scan of the abdomen and pelvis.  There is equivocal gastric wall  thickening which could resent mild gastritis there are some air-fluid small bowel loops without bowel dilatation to suggest small bowel obstruction.  Diffuse atherosclerotic disease.  Please see complete dictated report from radiologist. [MM]   3883 I did discuss the case with Dr. Stanley.  Informed her of the patient's presenting symptoms.  The sure seems something trauma related.  This all started when she placed a chair on her head and was starting to walk when she twisted and had the sudden pain and a pop sensation in her back.  Shahnaz agrees to admit the patient to the hospital would like me to check heart enzymes and a chest x-ray as well. [MM]      ED Course User Index  [MM] Joseph Hussein MD       AS OF 17:04 EDT VITALS:    BP - 126/58  HR - 62  TEMP - 97.2 °F (36.2 °C) (Tympanic)  02 SATS - 98%    SOCIAL DETERMINANTS OF HEALTH THAT IMPACT OR LIMIT CARE (For example..Homelessness,safe discharge, inability to obtain care, follow up, or prescriptions):      DIAGNOSIS  Final diagnoses:   Acute left-sided thoracic back pain   Left upper quadrant abdominal pain   Coagulopathy: Eliquis and Plavix induced         DISPOSITION  I have reviewed the test results with my patient and explained the current treatment plan.  I answered all of the patient's questions.  The patient will be admitted to monitor bed at this time.  The patient is not hypotensive and is tolerating their current disease condition well enough for a monitored bed at this time.  The patient's current condition does not require intensive care treatment at this time.            DICTATED UTILIZING DRAGON DICTATION    Note Disclaimer: At [facility], we believe that sharing information builds trust and better relationships. You are receiving this note because you recently visited [facility]. It is possible you will see health information before a provider has talked with you about it. This kind of information can be easy to misunderstand. To help  you fully understand what it means for your health, we urge you to discuss this note with your provider.       Joseph Hussein MD  05/29/24 1833

## 2024-05-29 NOTE — ED NOTES
Patient c/o right arm pain since waking up today, NKI. Patient c/o mid back pain that started today after moving a chair.

## 2024-05-29 NOTE — PROGRESS NOTES
Clinical Pharmacy Services: Medication History    Roland Russell is a 89 y.o. 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), CAD (coronary artery disease), Carotid body tumor, Difficulty walking, H/O complete eye exam (10/2016), Headache, History of DVT (deep vein thrombosis), History of glaucoma, History of poliomyelitis, History of surgery on arm, HLD (hyperlipidemia), Hypertension, Hypothyroidism, acquired, Left carotid artery stenosis (11/21/2019), Legal blindness, Memory loss, Migraine, Neck mass (02/03/2022), Numbness, Osteoarthritis, multiple sites, Osteoporosis, Pacemaker, Rectal bleeding, Renal disorder, Sleep apnea, TIA (transient ischemic attack) (12/24/2018), Type 2 diabetes mellitus, Type 2 diabetes mellitus without complications, and Type 2 diabetes mellitus, uncontrolled.    Allergies as of 05/29/2024 - Reviewed 05/29/2024   Allergen Reaction Noted    Adhesive tape Other (See Comments) 11/22/2019    Latex Other (See Comments) 05/23/2016    Propoxyphene Itching 01/10/2016       Medication information was obtained from: Patient   Pharmacy and Phone Number:     Prior to Admission Medications       Prescriptions Last Dose Informant Patient Reported? Taking?    acetaminophen (TYLENOL) 325 MG tablet 5/29/2024 Self No Yes    Take 2 tablets by mouth Every 4 (Four) Hours As Needed for Mild Pain.    apixaban (ELIQUIS) 5 MG tablet tablet 5/29/2024 Pharmacy, Self Yes Yes    Take 1 tablet by mouth 2 (Two) Times a Day.    bumetanide (BUMEX) 0.5 MG tablet 5/29/2024 Pharmacy No Yes    Take 1 tablet by mouth Daily.    cetirizine (zyrTEC) 10 MG tablet 5/28/2024 Self Yes Yes    Take 1 tablet by mouth  Daily.    clopidogrel (PLAVIX) 75 MG tablet 5/28/2024 Pharmacy, Self No Yes    TAKE 1 TABLET BY MOUTH DAILY    Cyanocobalamin (B-12 PO) 5/29/2024 Self Yes Yes    Take 1,000 mcg by mouth Daily.    gabapentin (NEURONTIN) 100 MG capsule 5/28/2024 Pharmacy, Self No Yes    Take 1 capsule by mouth Every Night.    Patient taking differently:  Take 2 capsules by mouth Every Night.    insulin degludec (TRESIBA FLEXTOUCH) 100 UNIT/ML solution pen-injector injection 5/29/2024 Pharmacy, Self Yes Yes    Inject 8 Units under the skin into the appropriate area as directed 2 (Two) Times a Day.    NIFEdipine XL (PROCARDIA XL) 60 MG 24 hr tablet 5/29/2024 Pharmacy, Self No Yes    Take 1 tablet by mouth Daily.    NP Thyroid 60 MG tablet 5/29/2024 Self Yes Yes    Take 1 tablet by mouth Daily.    pravastatin (PRAVACHOL) 20 MG tablet 5/28/2024 Pharmacy, Self No Yes    Take 1 tablet by mouth Every Night.    vitamin D3 125 MCG (5000 UT) capsule capsule 5/29/2024 Self Yes Yes    Take 125 mcg by mouth Daily.              Medication notes:     This medication list is complete to the best of my knowledge as of 5/29/2024    Please call if questions.    James Montoya  Medication History Technician  [phone]    5/29/2024 15:44 EDT

## 2024-05-29 NOTE — ED NOTES
Nursing report ED to floor  Roland Russell  89 y.o.  female    HPI :  HPI (Adult)  Stated Reason for Visit: Mid back and right arm pain  History Obtained From: EMS, patient  Precipitating Event(s): physical exertion  Onset of Symptoms: sudden    Chief Complaint  Chief Complaint   Patient presents with    Back Pain    Arm Pain     Right arm pain       Admitting doctor:   Shahnaz Robbins MD    Admitting diagnosis:   The primary encounter diagnosis was Acute left-sided thoracic back pain. Diagnoses of Left upper quadrant abdominal pain and Coagulopathy: Eliquis and Plavix induced were also pertinent to this visit.    Code status:   Current Code Status       Date Active Code Status Order ID Comments User Context       Prior            Allergies:   Adhesive tape, Latex, and Propoxyphene    Isolation:   No active isolations    Intake and Output  No intake or output data in the 24 hours ending 05/29/24 1542    Weight:   There were no vitals filed for this visit.    Most recent vitals:   Vitals:    05/29/24 0953 05/29/24 0957 05/29/24 1330 05/29/24 1531   BP: (!) 190/135  122/56 126/58   Pulse: 72  60 62   Resp: 18      Temp:  97.2 °F (36.2 °C)     TempSrc:  Tympanic     SpO2: 97%  98% 98%       Active LDAs/IV Access:   Lines, Drains & Airways       Active LDAs       Name Placement date Placement time Site Days    Peripheral IV Anterior;Right Forearm --  --  Forearm  --                    Labs (abnormal labs have a star):   Labs Reviewed   BASIC METABOLIC PANEL - Abnormal; Notable for the following components:       Result Value    Glucose 156 (*)     eGFR 56.7 (*)     All other components within normal limits    Narrative:     GFR Normal >60  Chronic Kidney Disease <60  Kidney Failure <15    The GFR formula is only valid for adults with stable renal function between ages 18 and 70.   TROPONIN - Abnormal; Notable for the following components:    HS Troponin T 21 (*)     All other components within normal limits     Narrative:     High Sensitive Troponin T Reference Range:  <14.0 ng/L- Negative Female for AMI  <22.0 ng/L- Negative Male for AMI  >=14 - Abnormal Female indicating possible myocardial injury.  >=22 - Abnormal Male indicating possible myocardial injury.   Clinicians would have to utilize clinical acumen, EKG, Troponin, and serial changes to determine if it is an Acute Myocardial Infarction or myocardial injury due to an underlying chronic condition.        POCT GLUCOSE FINGERSTICK - Abnormal; Notable for the following components:    Glucose 148 (*)     All other components within normal limits   CBC WITH AUTO DIFFERENTIAL - Normal   LIPASE - Normal   RAINBOW DRAW    Narrative:     The following orders were created for panel order [place] Draw.  Procedure                               Abnormality         Status                     ---------                               -----------         ------                     Green Top (Gel)[[id number]]                                  Final result               Lavender Top[[id number]]                                     Final result               Gold Top - SST[[id number]]                                   Final result               Light Blue Top[[id number]]                                   Final result                 Please view results for these tests on the individual orders.   HIGH SENSITIVITIY TROPONIN T 2HR   GREEN TOP   LAVENDER TOP   GOLD TOP - SST   LIGHT BLUE TOP   CBC AND DIFFERENTIAL    Narrative:     The following orders were created for panel order CBC & Differential.  Procedure                               Abnormality         Status                     ---------                               -----------         ------                     CBC Auto Differential[[id number]]        Normal              Final result                 Please view results for these tests on the individual orders.       EKG:   No orders to display       Meds given in ED:   Medications    sodium chloride 0.9 % flush 10 mL (has no administration in time range)   sodium chloride 0.9 % flush 10 mL (has no administration in time range)   sodium chloride 0.9 % infusion (125 mL/hr Intravenous New Bag 5/29/24 1409)   fentaNYL citrate (PF) (SUBLIMAZE) injection 25 mcg (25 mcg Intravenous Given 5/29/24 1057)   ondansetron (ZOFRAN) injection 4 mg (4 mg Intravenous Given 5/29/24 1057)   fentaNYL citrate (PF) (SUBLIMAZE) injection 25 mcg (25 mcg Intravenous Given 5/29/24 1409)   iopamidol (ISOVUE-300) 61 % injection 100 mL (85 mL Intravenous Given 5/29/24 1355)       Imaging results:  XR Chest 1 View    Result Date: 5/29/2024  No focal pulmonary consolidation. Tortuous aorta. Follow-up as clinically indicated.    This report was finalized on 5/29/2024 3:25 PM by Dr. Feliberto Copeland M.D on Workstation: [email or web address]      CT Abdomen Pelvis With Contrast    Result Date: 5/29/2024  1. Equivocal gastric wall thickening may represent mild gastritis. There are fluid-filled small bowel loops without bowel dilatation to suggest obstruction. 2. Bilateral renal cysts. 3. Diffuse atherosclerotic disease. 4. Osteopenia. Chronic-appearing compression deformity at T12. Partial bony fusion at T11-12.  Radiation dose reduction techniques were utilized, including automated exposure control and exposure modulation based on body size.       CT Thoracic Spine Without Contrast    Result Date: 5/29/2024  1. Old T12 compression fracture. This appears stable since the 03/05/2021 study. 2. No acute fractures or subluxation is seen. 3. Multilevel thoracic degenerative disc disease as discussed above.   Radiation dose reduction techniques were utilized, including automated exposure control and exposure modulation based on body size.        Ambulatory status:   -      Social issues:   Social History     Socioeconomic History    Marital status:    Tobacco Use    Smoking status: Never    Smokeless tobacco: Never    Tobacco comments:      Patient does not smoke.   Vaping Use    Vaping status: Never Used   Substance and Sexual Activity    Alcohol use: No     Comment: Patient is non drinker    Drug use: Never     Comment: Drug Abuse: none    Sexual activity: Defer       Peripheral Neurovascular  Peripheral Neurovascular (Adult)  Peripheral Neurovascular WDL: WDL    Neuro Cognitive  Neuro Cognitive (Adult)  Cognitive/Neuro/Behavioral WDL: WDL    Learning  Learning Assessment (Adult)  Learning Readiness and Ability: no barriers identified    Respiratory  Respiratory WDL  Respiratory WDL: WDL    Abdominal Pain       Pain Assessments  Pain (Adult)  (0-10) Pain Rating: Rest: 3  (0-10) Pain Rating: Activity: 3  Pain Location: back  Response to Pain Interventions: interventions effective per patient        Elizabeth Guidry RN  05/29/24 15:42 EDT

## 2024-05-29 NOTE — H&P
"PCP: Shawanda Rosario MD    Chief complaint   Chief Complaint   Patient presents with    Back Pain    Arm Pain     Right arm pain       HPI  Patient is a 89 y.o. female presents with h/o dm2, yannick, cad, ppm, osteoporosis, who presents to ER with acute back pain.  Pt was trying to move a chair, put It on her head and was trying to go up the stairs. Pt heard a pop and acute pain and unable to walk since.  Pt was given some fentanyl in the ER and had some decreased 02 sats.     Pt having acute on chronic neck pain and some swelling- had CT neck, f/u with vascular and then f/u ENT which was unrevealing after direct larngoscopy.   Pain worsening, constant, since neck surgery 2019 Carotid endardaectomy.     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    CAD (coronary artery disease)     unspecified    Carotid body tumor     LEFT    Difficulty walking     H/O complete eye exam 10/2016    Headache     OFF AND ON BACK OF HEAD, DOWN NECK TO SHOULDER    History of DVT (deep vein thrombosis)     History of glaucoma     History of poliomyelitis     AGE 9    History of surgery on arm     left arm    HLD (hyperlipidemia)     Hypertension     Hypothyroidism, acquired     Left carotid artery stenosis 11/21/2019    Legal blindness     SOME PERIPHERAL VISION ON LEFT, SOME VISION ON RIGHT    Memory loss     SOME SHORT TERM    Migraine     Neck mass 02/03/2022    Numbness     LEFT LEG     Osteoarthritis, multiple sites     Osteoporosis     Pacemaker     Rectal bleeding     X1, WITH BM    Renal disorder     Sleep apnea     DOES NOT USE A MACHINE; unspecified    TIA (transient ischemic attack) 12/24/2018    ?, HAD SPELL UNABLE TO TALK    Type 2 diabetes mellitus     Type 2 " diabetes mellitus without complications     Type 2 diabetes mellitus, uncontrolled        PAST SURGICAL HISTORY  Past Surgical History:   Procedure Laterality Date    APPENDECTOMY  1989    CARDIAC CATHETERIZATION N/A 4/13/2023    Procedure: Left Heart Cath;  Surgeon: Rikki Oconnor MD;  Location: [facility] CATH [facility]VASIVE LOC[facility];  Service: Cardiology;  Laterality: N/A;    CARDIAC CATHETERIZATION N/A 4/13/2023    Procedure: Coronary angiography;  Surgeon: Rikki Oconnor MD;  Location: [facility] CATH [facility] LOC[facility];  Service: Cardiology;  Laterality: N/A;    CARDIAC CATHETERIZATION N/A 4/13/2023    Procedure: Left ventriculography;  Surgeon: Rikki Oconnor MD;  Location: [facility] CATH [facility]IVE LOC[facility];  Service: Cardiology;  Laterality: N/A;    CARDIAC CATHETERIZATION N/A 4/20/2023    Procedure: Percutaneous Coronary Intervention;  Surgeon: Rikki Oconnor MD;  Location: [facility] CATH [facility]VASIVE LOC[facility];  Service: Cardiology;  Laterality: N/A;    CARDIAC CATHETERIZATION N/A 4/20/2023    Procedure: Atherectomy-coronary;  Surgeon: Rikki Oconnor MD;  Location: [facility] CATH [facility]ASIVE LOC[facility];  Service: Cardiology;  Laterality: N/A;    CARDIAC CATHETERIZATION N/A 4/20/2023    Procedure: Stent CAROL coronary;  Surgeon: Rikki Oconnor MD;  Location: [facility] CATH [facility]ASIVE LOC[facility];  Service: Cardiology;  Laterality: N/A;    CARDIAC PACEMAKER PLACEMENT  11/25/2004    CAROTID ENDARTERECTOMY Left 12/13/2019    Procedure: LEFT CAROTID BODY TUMOR RESECTION;  Surgeon: Bryan Severino MD;  Location: [facility] MAIN OR;  Service: Vascular    CATARACT EXTRACTION Bilateral 1997    CEREBRAL ANGIOGRAM Bilateral 12/6/2019    Procedure: CAROTID ARTERIOGRAM BILATERAL, RIGHT WRIST APPROACH;  Surgeon: Bryan Severino MD;  Location: [facility] OR 18/19;  Service: Vascular    CHOLECYSTECTOMY  1989    COLONOSCOPY  2013    dr montes    ELBOW PROCEDURE  2016    ENDOSCOPY  12/06/2012    Dr. Montes; food in stomach, gastritis    [facility]  [facility]ERY  12/2012    GLAUCOMA SURGERY Bilateral 1995    laser surgery    HARDWARE REMOVAL Left     ELBOW    HYSTERECTOMY  1986    INTERVENTIONAL RADIOLOGY PROCEDURE N/A 4/20/2023    Procedure: Intravascular Ultrasound;  Surgeon: Rikki Oconnor MD;  Location: [facility] INVASIVE LOCATION;  Service: Cardiology;  Laterality: N/A;    ORIF ELBOW FRACTURE Left     PACEMAKER IMPLANTATION      PACEMAKER REPLACEMENT  06/2013       FAMILY HISTORY  Family History   Problem Relation Age of Onset    Arthritis Mother     Diabetes Mother     Thyroid disease Mother     Heart disease Mother     Cancer Father         no details    Glaucoma Father         angular glycoma    Heart disease Father     Arthritis Father     Thyroid disease Father     Stroke Father     Asthma Sister     Arthritis Sister     Cancer Sister         breast    Stroke Maternal Grandmother     Heart failure Other     Diabetes Other     Hypertension Other     Stroke Other         aunt    Thyroid disease Other     Malig Hyperthermia Neg Hx        SOCIAL HISTORY  Social History     Tobacco Use    Smoking status: Never    Smokeless tobacco: Never    Tobacco comments:     Patient does not smoke.   Vaping Use    Vaping status: Never Used   Substance Use Topics    Alcohol use: No     Comment: Patient is non drinker    Drug use: Never     Comment: Drug Abuse: none       MEDICATIONS:  Medications Prior to Admission   Medication Sig Dispense Refill Last Dose    acetaminophen (TYLENOL) 325 MG tablet Take 2 tablets by mouth Every 4 (Four) Hours As Needed for Mild Pain.   5/29/2024    apixaban (ELIQUIS) 5 MG tablet tablet Take 1 tablet by mouth 2 (Two) Times a Day.   5/29/2024    bumetanide (BUMEX) 0.5 MG tablet Take 1 tablet by mouth Daily. 30 tablet 0 5/29/2024    cetirizine (zyrTEC) 10 MG tablet Take 1 tablet by mouth Daily.   5/28/2024    clopidogrel (PLAVIX) 75 MG tablet TAKE 1 TABLET BY MOUTH DAILY 90 tablet 1 5/28/2024    Cyanocobalamin (B-12 PO) Take 1,000 mcg by mouth  Daily.   5/29/2024    gabapentin (NEURONTIN) 100 MG capsule Take 1 capsule by mouth Every Night. (Patient taking differently: Take 2 capsules by mouth Every Night.) 30 capsule 0 5/28/2024    insulin degludec (TRESIBA FLEXTOUCH) 100 UNIT/ML solution pen-injector injection Inject 8 Units under the skin into the appropriate area as directed 2 (Two) Times a Day.   5/29/2024    NIFEdipine XL (PROCARDIA XL) 60 MG 24 hr tablet Take 1 tablet by mouth Daily. 90 tablet 3 5/29/2024    NP Thyroid 60 MG tablet Take 1 tablet by mouth Daily.   5/29/2024    pravastatin (PRAVACHOL) 20 MG tablet Take 1 tablet by mouth Every Night. 90 tablet 0 5/28/2024    vitamin D3 125 MCG (5000 UT) capsule capsule Take 125 mcg by mouth Daily.   5/29/2024       Allergies:  Adhesive tape, Latex, and Propoxyphene    Review of Systems:  Left neck pain, arthritis, on blood thinners, PPM, headache  Negative for following (except as per HPI):  Constitution: chills, fevers, fatigue   Eyes: change of vision, loss of vision and discharge  ENT: ear drainage, ear ringing and facial trauma  Respiratory: cough, pleuritic pain, shortness of air  Cardiovascular: chest pressure, pain, lower extremity edema, palpitations  Gastrointestinal: constipation, diarrhea, nausea, vomiting, pain    Integument: rash and wound  Hematologic / Lymphatic: excessive bleeding and easy bruising  Musculoskeletal: joint pain, joint stiffness, joint swelling and muscle pain  Neurological: headaches, numbness, seizures and tremors  Behavioral / Psych: anxiety, depression and hallucinations         Vital Signs  Temp:  [97.2 °F (36.2 °C)] 97.2 °F (36.2 °C)  Heart Rate:  [60-72] 62  Resp:  [18] 18  BP: (122-190)/() 126/58     There is no height or weight on file to calculate BMI.      Physical Exam:  General Appearance:    Alert, cooperative, in no acute distress   Head:    Normocephalic, without obvious abnormality, atraumatic   Eyes:         conjunctivae and sclerae normal, no  icterus, PERRLA   ENT:    Ears grossly intact, oral mucosa moist,   Neck:   No adenopathy, supple, trachea midline,        Lungs:     Clear to auscultation,respirations regular, even and                   unlabored    Heart:    Regular rhythm and normal rate,  no murmur, normal S1 and S2, PPM   Abdomen:     Normal bowel sounds, no masses,  soft non-tender, non-distended, obese   Extremities:   Moves all extremities well, no cyanosis, no edema,             Pulses:   Pulses palpable and equal bilaterally   Skin:   No bleeding, rash, + bruising ; abrasion   Neurologic:    Psych:   Cranial nerves 2 - 12 grossly intact, sensation grossly intact,     Moves all extremities well, equal bilateral strength    Alert and Oriented x 3, Normal Affect    I washed/sanitized my hands before entering the room and immediately upon leaving the room.    LABS:  Admission on 05/29/2024   Component Date Value Ref Range Status    Extra Tube 05/29/2024 Hold for add-ons.   Final    Auto resulted.    Extra Tube 05/29/2024 hold for add-on   Final    Auto resulted    Extra Tube 05/29/2024 Hold for add-ons.   Final    Auto resulted.    Extra Tube 05/29/2024 Hold for add-ons.   Final    Auto resulted    Glucose 05/29/2024 148 (H)  70 - 130 mg/dL Final    Glucose 05/29/2024 156 (H)  65 - 99 mg/dL Final    BUN 05/29/2024 23  8 - 23 mg/dL Final    Creatinine 05/29/2024 0.96  0.57 - 1.00 mg/dL Final    Sodium 05/29/2024 139  136 - 145 mmol/L Final    Potassium 05/29/2024 4.0  3.5 - 5.2 mmol/L Final    Slight hemolysis detected by analyzer. Result may be falsely elevated.    Chloride 05/29/2024 102  98 - 107 mmol/L Final    CO2 05/29/2024 25.2  22.0 - 29.0 mmol/L Final    Calcium 05/29/2024 10.1  8.6 - 10.5 mg/dL Final    BUN/Creatinine Ratio 05/29/2024 24.0  7.0 - 25.0 Final    Anion Gap 05/29/2024 11.8  5.0 - 15.0 mmol/L Final    eGFR 05/29/2024 56.7 (L)  >60.0 mL/min/1.73 Final    WBC 05/29/2024 8.38  3.40 - 10.80 10*3/mm3 Final    RBC 05/29/2024  4.72  3.77 - 5.28 10*6/mm3 Final    Hemoglobin 05/29/2024 13.7  12.0 - 15.9 g/dL Final    Hematocrit 05/29/2024 42.5  34.0 - 46.6 % Final    MCV 05/29/2024 90.0  79.0 - 97.0 fL Final    MCH 05/29/2024 29.0  26.6 - 33.0 pg Final    MCHC 05/29/2024 32.2  31.5 - 35.7 g/dL Final    RDW 05/29/2024 12.5  12.3 - 15.4 % Final    RDW-SD 05/29/2024 41.1  37.0 - 54.0 fl Final    MPV 05/29/2024 11.8  6.0 - 12.0 fL Final    Platelets 05/29/2024 190  140 - 450 10*3/mm3 Final    Neutrophil % 05/29/2024 60.5  42.7 - 76.0 % Final    Lymphocyte % 05/29/2024 27.3  19.6 - 45.3 % Final    Monocyte % 05/29/2024 8.2  5.0 - 12.0 % Final    Eosinophil % 05/29/2024 2.9  0.3 - 6.2 % Final    Basophil % 05/29/2024 0.7  0.0 - 1.5 % Final    Immature Grans % 05/29/2024 0.4  0.0 - 0.5 % Final    Neutrophils, Absolute 05/29/2024 5.07  1.70 - 7.00 10*3/mm3 Final    Lymphocytes, Absolute 05/29/2024 2.29  0.70 - 3.10 10*3/mm3 Final    Monocytes, Absolute 05/29/2024 0.69  0.10 - 0.90 10*3/mm3 Final    Eosinophils, Absolute 05/29/2024 0.24  0.00 - 0.40 10*3/mm3 Final    Basophils, Absolute 05/29/2024 0.06  0.00 - 0.20 10*3/mm3 Final    Immature Grans, Absolute 05/29/2024 0.03  0.00 - 0.05 10*3/mm3 Final    nRBC 05/29/2024 0.0  0.0 - 0.2 /100 WBC Final    HS Troponin T 05/29/2024 21 (H)  <14 ng/L Final    Lipase 05/29/2024 24  13 - 60 U/L Final    Glucose 05/29/2024 90  70 - 130 mg/dL Final    Glucose 05/29/2024 104  70 - 130 mg/dL Final         DIAGNOSTICS:  CT Abdomen Pelvis With Contrast  Result Date: 5/29/2024  1. Equivocal gastric wall thickening may represent mild gastritis. There are fluid-filled small bowel loops without bowel dilatation to suggest obstruction. 2. Bilateral renal cysts. 3. Diffuse atherosclerotic disease. 4. Osteopenia. Chronic-appearing compression deformity at T12. Partial bony fusion at T11-12.  Radiation dose reduction techniques were utilized, including automated exposure control and exposure modulation based on body size.    This report was finalized on 5/29/2024 4:54 PM by Dr. Mik Mercado M.D on Workstation: [id number]      XR Chest 1 View  Result Date: 5/29/2024  No focal pulmonary consolidation. Tortuous aorta. Follow-up as clinically indicated.    This report was finalized on 5/29/2024 3:25 PM by Dr. Feliberto Copeland M.D on Workstation: [id number]      CT Thoracic Spine Without Contrast  Result Date: 5/29/2024  1. Old T12 compression fracture. This appears stable since the 03/05/2021 study. 2. No acute fractures or subluxation is seen. 3. Multilevel thoracic degenerative disc disease as discussed above.   Radiation dose reduction techniques were utilized, including automated exposure control and exposure modulation based on body size.             Results Review:   I reviewed the patient's new clinical results.  Discussed with ER physician  Old records reviewed / Medical Decision Making High Complexity  I personally viewed and interpreted the patient's EKG/Telemetry data- AV paced    2023     Left ventricular systolic function is normal. Calculated left ventricular EF = 67.1%    Left ventricular wall thickness is consistent with moderate to severe concentric hypertrophy.    Estimated right ventricular systolic pressure from tricuspid regurgitation is mildly elevated (35-45 mmHg).    Mild pulmonary hypertension is present.    ASSESSMENT AND PLAN    Abdominal pain    Uncontrolled type 2 diabetes mellitus with hypoglycemia without coma    Essential hypertension    PAF (paroxysmal atrial fibrillation)    Diabetic polyneuropathy associated with type 2 diabetes mellitus    Presence of cardiac pacemaker    Chronic heart failure with preserved ejection fraction (HFpEF)    Nonspecific pain in the neck region    Dysphagia    Dysphonia    Localized swelling of right upper extremity    Hypertensive urgency    Acute on Chronic back pain with DDD and osteopenia    -schedule tylenol, lidoderm patch  -family states [facility] can't have a mri bc of  the leads     Hypoxia after fentanyl with h/o DARI   -oximetry        Uncontrolled type 2 diabetes mellitus with hypoglycemia without coma  --Accu-Cheks  -hypoglycemia protocol  -sliding scale insulin to cover outlier blood sugars  -  Hypertensive urgency   -pt given meds  -on procardia xl     Dysphagia/dysphonia/left neck pain/swelling  -get ST eval  -demonstrated a 2 cm ovoid structure in the right supraclavicular fossa suspected to represent a partially thrombosed venous varix.  A pseudoaneurysm was also considered and CTA was performed   -dr. Severino was planning on for the right upper extremity vein scan to see what it looks like and hopefull        Gastritis - start on acid reducers.   Chronic anticoagulation- on eliquis  Chronic medical conditions being monitored- stable, continue medical management  - Chronic heart failure with preserved ejection fraction (HFpEF)  -dari      +DVT proph:    on eliquis  + CODE STATUS: Full       I discussed the patient's findings and my recommendations with the patient and/or family.  Please reference all orders placed.    Shahnaz Robbins MD  05/29/24  17:42 EDT      This document is intended for medical expert use only. Reading of this document by patients and/or patient's family without participating in medical staff guidance may result in misinterpretation and unintended morbidity. Any interpretation of such data is the responsibility of the patient and/or family member responsible for the patient in concert with their primary or specialist providers, and NOT to be left for sources of online searches such as [employer], [employer] or similar queries. Relying on these approaches to knowledge may result in misinterpretation, misguided goals of care and even death should patients or family members try recommendations outside of the realm of professional medical care in a supervised way.    Dictated utilizing Voice dictation:  Parts of this note may be an electronic transcription/translation  of spoke language to printed text using Dragon dictation system.

## 2024-05-29 NOTE — PLAN OF CARE
Goal Outcome Evaluation:     Pt A&Ox4. Pt in 2L o2. Pt able to make needs known. VSS. NS on tele. Pt assist x2. Pt is sleep apneic, requires 2L O2 during sleep pt daughter states pt does not like C-paps and does not have c-pap at home to bring to hospital.Call light in reach.

## 2024-05-30 LAB
ALBUMIN SERPL-MCNC: 3.2 G/DL (ref 3.5–5.2)
ALBUMIN/GLOB SERPL: 1.2 G/DL
ALP SERPL-CCNC: 127 U/L (ref 39–117)
ALT SERPL W P-5'-P-CCNC: 13 U/L (ref 1–33)
ANION GAP SERPL CALCULATED.3IONS-SCNC: 9.5 MMOL/L (ref 5–15)
AST SERPL-CCNC: 18 U/L (ref 1–32)
BASOPHILS # BLD AUTO: 0.05 10*3/MM3 (ref 0–0.2)
BASOPHILS NFR BLD AUTO: 0.8 % (ref 0–1.5)
BILIRUB SERPL-MCNC: 0.4 MG/DL (ref 0–1.2)
BUN SERPL-MCNC: 23 MG/DL (ref 8–23)
BUN/CREAT SERPL: 18.1 (ref 7–25)
CALCIUM SPEC-SCNC: 9 MG/DL (ref 8.6–10.5)
CHLORIDE SERPL-SCNC: 104 MMOL/L (ref 98–107)
CO2 SERPL-SCNC: 28.5 MMOL/L (ref 22–29)
CREAT SERPL-MCNC: 1.27 MG/DL (ref 0.57–1)
DEPRECATED RDW RBC AUTO: 41.6 FL (ref 37–54)
EGFRCR SERPLBLD CKD-EPI 2021: 40.5 ML/MIN/1.73
EOSINOPHIL # BLD AUTO: 0.22 10*3/MM3 (ref 0–0.4)
EOSINOPHIL NFR BLD AUTO: 3.4 % (ref 0.3–6.2)
ERYTHROCYTE [DISTWIDTH] IN BLOOD BY AUTOMATED COUNT: 12.5 % (ref 12.3–15.4)
GLOBULIN UR ELPH-MCNC: 2.7 GM/DL
GLUCOSE BLDC GLUCOMTR-MCNC: 123 MG/DL (ref 70–130)
GLUCOSE BLDC GLUCOMTR-MCNC: 230 MG/DL (ref 70–130)
GLUCOSE BLDC GLUCOMTR-MCNC: 272 MG/DL (ref 70–130)
GLUCOSE BLDC GLUCOMTR-MCNC: 359 MG/DL (ref 70–130)
GLUCOSE BLDC GLUCOMTR-MCNC: 67 MG/DL (ref 70–130)
GLUCOSE SERPL-MCNC: 61 MG/DL (ref 65–99)
HCT VFR BLD AUTO: 39.9 % (ref 34–46.6)
HGB BLD-MCNC: 12.5 G/DL (ref 12–15.9)
IMM GRANULOCYTES # BLD AUTO: 0.02 10*3/MM3 (ref 0–0.05)
IMM GRANULOCYTES NFR BLD AUTO: 0.3 % (ref 0–0.5)
LYMPHOCYTES # BLD AUTO: 2.42 10*3/MM3 (ref 0.7–3.1)
LYMPHOCYTES NFR BLD AUTO: 37.3 % (ref 19.6–45.3)
MAGNESIUM SERPL-MCNC: 1.9 MG/DL (ref 1.6–2.4)
MCH RBC QN AUTO: 28.4 PG (ref 26.6–33)
MCHC RBC AUTO-ENTMCNC: 31.3 G/DL (ref 31.5–35.7)
MCV RBC AUTO: 90.7 FL (ref 79–97)
MONOCYTES # BLD AUTO: 0.66 10*3/MM3 (ref 0.1–0.9)
MONOCYTES NFR BLD AUTO: 10.2 % (ref 5–12)
NEUTROPHILS NFR BLD AUTO: 3.12 10*3/MM3 (ref 1.7–7)
NEUTROPHILS NFR BLD AUTO: 48 % (ref 42.7–76)
NRBC BLD AUTO-RTO: 0 /100 WBC (ref 0–0.2)
PLATELET # BLD AUTO: 165 10*3/MM3 (ref 140–450)
PMV BLD AUTO: 11.2 FL (ref 6–12)
POTASSIUM SERPL-SCNC: 3.6 MMOL/L (ref 3.5–5.2)
PROT SERPL-MCNC: 5.9 G/DL (ref 6–8.5)
RBC # BLD AUTO: 4.4 10*6/MM3 (ref 3.77–5.28)
SODIUM SERPL-SCNC: 142 MMOL/L (ref 136–145)
WBC NRBC COR # BLD AUTO: 6.49 10*3/MM3 (ref 3.4–10.8)

## 2024-05-30 PROCEDURE — G0378 HOSPITAL OBSERVATION PER HR: HCPCS

## 2024-05-30 PROCEDURE — 97162 PT EVAL MOD COMPLEX 30 MIN: CPT

## 2024-05-30 PROCEDURE — 63710000001 INSULIN GLARGINE PER 5 UNITS: Performed by: INTERNAL MEDICINE

## 2024-05-30 PROCEDURE — 99214 OFFICE O/P EST MOD 30 MIN: CPT | Performed by: NURSE PRACTITIONER

## 2024-05-30 PROCEDURE — 85025 COMPLETE CBC W/AUTO DIFF WBC: CPT | Performed by: INTERNAL MEDICINE

## 2024-05-30 PROCEDURE — 83735 ASSAY OF MAGNESIUM: CPT | Performed by: INTERNAL MEDICINE

## 2024-05-30 PROCEDURE — 25010000002 ENOXAPARIN PER 10 MG: Performed by: INTERNAL MEDICINE

## 2024-05-30 PROCEDURE — 25010000002 HYDROMORPHONE PER 4 MG: Performed by: INTERNAL MEDICINE

## 2024-05-30 PROCEDURE — 96375 TX/PRO/DX INJ NEW DRUG ADDON: CPT

## 2024-05-30 PROCEDURE — 97166 OT EVAL MOD COMPLEX 45 MIN: CPT

## 2024-05-30 PROCEDURE — 25810000003 SODIUM CHLORIDE 0.9 % SOLUTION: Performed by: STUDENT IN AN ORGANIZED HEALTH CARE EDUCATION/TRAINING PROGRAM

## 2024-05-30 PROCEDURE — 82948 REAGENT STRIP/BLOOD GLUCOSE: CPT

## 2024-05-30 PROCEDURE — 96372 THER/PROPH/DIAG INJ SC/IM: CPT

## 2024-05-30 PROCEDURE — 80053 COMPREHEN METABOLIC PANEL: CPT | Performed by: INTERNAL MEDICINE

## 2024-05-30 PROCEDURE — 63710000001 INSULIN LISPRO (HUMAN) PER 5 UNITS: Performed by: INTERNAL MEDICINE

## 2024-05-30 PROCEDURE — 92610 EVALUATE SWALLOWING FUNCTION: CPT

## 2024-05-30 RX ORDER — BISACODYL 10 MG
10 SUPPOSITORY, RECTAL RECTAL DAILY PRN
Status: DISCONTINUED | OUTPATIENT
Start: 2024-05-30 | End: 2024-05-31 | Stop reason: HOSPADM

## 2024-05-30 RX ORDER — OXYCODONE HYDROCHLORIDE 5 MG/1
5 TABLET ORAL EVERY 4 HOURS PRN
Status: DISCONTINUED | OUTPATIENT
Start: 2024-05-30 | End: 2024-05-31 | Stop reason: HOSPADM

## 2024-05-30 RX ORDER — POLYETHYLENE GLYCOL 3350 17 G/17G
17 POWDER, FOR SOLUTION ORAL DAILY PRN
Status: DISCONTINUED | OUTPATIENT
Start: 2024-05-30 | End: 2024-05-31 | Stop reason: HOSPADM

## 2024-05-30 RX ORDER — BISACODYL 5 MG/1
5 TABLET, DELAYED RELEASE ORAL DAILY PRN
Status: DISCONTINUED | OUTPATIENT
Start: 2024-05-30 | End: 2024-05-31 | Stop reason: HOSPADM

## 2024-05-30 RX ORDER — FAMOTIDINE 20 MG/1
20 TABLET, FILM COATED ORAL
Status: DISCONTINUED | OUTPATIENT
Start: 2024-05-30 | End: 2024-05-31 | Stop reason: HOSPADM

## 2024-05-30 RX ORDER — SODIUM CHLORIDE 9 MG/ML
100 INJECTION, SOLUTION INTRAVENOUS CONTINUOUS
Status: ACTIVE | OUTPATIENT
Start: 2024-05-30 | End: 2024-05-31

## 2024-05-30 RX ORDER — HYDROCODONE BITARTRATE AND ACETAMINOPHEN 5; 325 MG/1; MG/1
1 TABLET ORAL EVERY 6 HOURS PRN
Status: DISCONTINUED | OUTPATIENT
Start: 2024-05-30 | End: 2024-05-30

## 2024-05-30 RX ORDER — AMOXICILLIN 250 MG
2 CAPSULE ORAL 2 TIMES DAILY
Status: DISCONTINUED | OUTPATIENT
Start: 2024-05-30 | End: 2024-05-31 | Stop reason: HOSPADM

## 2024-05-30 RX ADMIN — INSULIN LISPRO 6 UNITS: 100 INJECTION, SOLUTION INTRAVENOUS; SUBCUTANEOUS at 17:20

## 2024-05-30 RX ADMIN — CLOPIDOGREL BISULFATE 75 MG: 75 TABLET, FILM COATED ORAL at 08:24

## 2024-05-30 RX ADMIN — CALCIUM CARBONATE-VITAMIN D TAB 500 MG-200 UNIT 2 TABLET: 500-200 TAB at 08:24

## 2024-05-30 RX ADMIN — CETIRIZINE HYDROCHLORIDE 10 MG: 10 TABLET ORAL at 08:24

## 2024-05-30 RX ADMIN — INSULIN GLARGINE 4 UNITS: 100 INJECTION, SOLUTION SUBCUTANEOUS at 08:21

## 2024-05-30 RX ADMIN — HYDROMORPHONE HYDROCHLORIDE 0.25 MG: 1 INJECTION, SOLUTION INTRAMUSCULAR; INTRAVENOUS; SUBCUTANEOUS at 10:35

## 2024-05-30 RX ADMIN — LEVOTHYROXINE, LIOTHYRONINE 60 MG: 38; 9 TABLET ORAL at 08:23

## 2024-05-30 RX ADMIN — SENNOSIDES AND DOCUSATE SODIUM 2 TABLET: 50; 8.6 TABLET ORAL at 23:17

## 2024-05-30 RX ADMIN — FAMOTIDINE 20 MG: 20 TABLET, FILM COATED ORAL at 08:26

## 2024-05-30 RX ADMIN — INSULIN GLARGINE 4 UNITS: 100 INJECTION, SOLUTION SUBCUTANEOUS at 23:18

## 2024-05-30 RX ADMIN — PRAVASTATIN SODIUM 20 MG: 20 TABLET ORAL at 23:17

## 2024-05-30 RX ADMIN — NIFEDIPINE 60 MG: 60 TABLET, FILM COATED, EXTENDED RELEASE ORAL at 08:24

## 2024-05-30 RX ADMIN — ACETAMINOPHEN 1000 MG: 500 TABLET ORAL at 23:16

## 2024-05-30 RX ADMIN — SODIUM CHLORIDE 100 ML/HR: 9 INJECTION, SOLUTION INTRAVENOUS at 15:35

## 2024-05-30 RX ADMIN — GABAPENTIN 200 MG: 100 CAPSULE ORAL at 23:17

## 2024-05-30 RX ADMIN — MICONAZOLE NITRATE: 2 POWDER TOPICAL at 23:15

## 2024-05-30 RX ADMIN — BUMETANIDE 0.5 MG: 0.5 TABLET ORAL at 08:24

## 2024-05-30 RX ADMIN — APIXABAN 5 MG: 5 TABLET, FILM COATED ORAL at 23:17

## 2024-05-30 RX ADMIN — INSULIN LISPRO 3 UNITS: 100 INJECTION, SOLUTION INTRAVENOUS; SUBCUTANEOUS at 12:35

## 2024-05-30 RX ADMIN — ACETAMINOPHEN 1000 MG: 500 TABLET ORAL at 08:26

## 2024-05-30 RX ADMIN — Medication 10 ML: at 08:25

## 2024-05-30 RX ADMIN — FAMOTIDINE 20 MG: 20 TABLET, FILM COATED ORAL at 17:20

## 2024-05-30 RX ADMIN — ACETAMINOPHEN 1000 MG: 500 TABLET ORAL at 15:34

## 2024-05-30 RX ADMIN — ENOXAPARIN SODIUM 40 MG: 100 INJECTION SUBCUTANEOUS at 08:22

## 2024-05-30 RX ADMIN — LIDOCAINE 1 PATCH: 4 PATCH TOPICAL at 08:25

## 2024-05-30 RX ADMIN — Medication 10 ML: at 23:22

## 2024-05-30 RX ADMIN — CALCIUM CARBONATE-VITAMIN D TAB 500 MG-200 UNIT 2 TABLET: 500-200 TAB at 23:15

## 2024-05-30 RX ADMIN — INSULIN LISPRO 3 UNITS: 100 INJECTION, SOLUTION INTRAVENOUS; SUBCUTANEOUS at 23:17

## 2024-05-30 NOTE — CONSULTS
"[facility] NEUROSURGERY CONSULT NOTE    Patient name: Roland Rsusell  Referring Provider: [facility]  Reason for Consultation: compression fracture    Patient Care Team:  Shawanda Rosario MD as PCP - General (Internal Medicine)  Tevin Albrecht MD as Consulting Physician (Cardiac Electrophysiology)    Chief complaint: back pain    Subjective .     History of present illness:    Patient is a 89 y.o. female with acute mid back pain after trying to move a chair in her home. A CT demonstrated an old T12. Given recent injury and acute back pain located around the level shown on CT and MRI was recommended however she has an incompatible pacemaker. She is also on Eliquis ASA/plavix. Not sure why she is on all 3- per daughter she takes asa for pain however had cardiac stents placed 4/2023.     Review of Systems  Review of Systems   Constitutional:  Positive for activity change.   Musculoskeletal:  Positive for back pain and gait problem.   Psychiatric/Behavioral:  Negative for confusion.        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    CAD (coronary artery disease)     unspecified    Carotid body tumor     LEFT    Difficulty walking     H/O complete eye exam 10/2016    Headache     OFF AND ON BACK OF HEAD, DOWN NECK TO SHOULDER    History of DVT (deep vein thrombosis)     History of glaucoma     History of poliomyelitis     AGE 9    History of surgery on arm     left arm    HLD (hyperlipidemia)     Hypertension     Hypothyroidism, acquired     Left carotid artery stenosis 11/21/2019    Legal blindness     SOME PERIPHERAL VISION ON LEFT, SOME VISION ON RIGHT    Memory loss     SOME SHORT TERM    Migraine     Neck mass 02/03/2022    Numbness     LEFT LEG     " Osteoarthritis, multiple sites     Osteoporosis     Pacemaker     Rectal bleeding     X1, WITH BM    Renal disorder     Sleep apnea     DOES NOT USE A MACHINE; unspecified    TIA (transient ischemic attack) 12/24/2018    ?, HAD SPELL UNABLE TO TALK    Type 2 diabetes mellitus     Type 2 diabetes mellitus without complications     Type 2 diabetes mellitus, uncontrolled        PAST SURGICAL HISTORY  Past Surgical History:   Procedure Laterality Date    APPENDECTOMY  1989    CARDIAC CATHETERIZATION N/A 4/13/2023    Procedure: Left Heart Cath;  Surgeon: Rikki Oconnor MD;  Location: [facility] CATH INVASIVE LOCATION;  Service: Cardiology;  Laterality: N/A;    CARDIAC CATHETERIZATION N/A 4/13/2023    Procedure: Coronary angiography;  Surgeon: Rikki Oconnor MD;  Location: [facility] CATH INVASIVE LOCATION;  Service: Cardiology;  Laterality: N/A;    CARDIAC CATHETERIZATION N/A 4/13/2023    Procedure: Left ventriculography;  Surgeon: Rikki Oconnor MD;  Location: [facility] CATH INVASIVE LOCATION;  Service: Cardiology;  Laterality: N/A;    CARDIAC CATHETERIZATION N/A 4/20/2023    Procedure: Percutaneous Coronary Intervention;  Surgeon: Rikki Oconnor MD;  Location: [facility] CATH INVASIVE LOCATION;  Service: Cardiology;  Laterality: N/A;    CARDIAC CATHETERIZATION N/A 4/20/2023    Procedure: Atherectomy-coronary;  Surgeon: Rikki Oconnor MD;  Location: [facility] CATH INVASIVE LOCATION;  Service: Cardiology;  Laterality: N/A;    CARDIAC CATHETERIZATION N/A 4/20/2023    Procedure: Stent CAROL coronary;  Surgeon: Rikki Oconnor MD;  Location: [facility] CATH INVASIVE LOCATION;  Service: Cardiology;  Laterality: N/A;    CARDIAC PACEMAKER PLACEMENT  11/25/2004    CAROTID ENDARTERECTOMY Left 12/13/2019    Procedure: LEFT CAROTID BODY TUMOR RESECTION;  Surgeon: Bryan Severino MD;  Location: [facility] OR;  Service: Vascular    CATARACT EXTRACTION Bilateral 1997    CEREBRAL ANGIOGRAM Bilateral 12/6/2019    Procedure: CAROTID ARTERIOGRAM BILATERAL, RIGHT WRIST APPROACH;   Surgeon: Bryan Severino MD;  Location: [facility] HYBRID OR 18/19;  Service: Vascular    CHOLECYSTECTOMY  1989    COLONOSCOPY  2013    dr montes    ELBOW PROCEDURE  2016    ENDOSCOPY  12/06/2012    Dr. Montes; food in stomach, gastritis    EXCISION LESION  1994    BACK CYST BENGIN    EYE SURGERY  12/2012    GLAUCOMA SURGERY Bilateral 1995    laser surgery    HARDWARE REMOVAL Left     ELBOW    HYSTERECTOMY  1986    INTERVENTIONAL RADIOLOGY PROCEDURE N/A 4/20/2023    Procedure: Intravascular Ultrasound;  Surgeon: Rikki Oconnor MD;  Location: [facility] CATH INVASIVE LOCATION;  Service: Cardiology;  Laterality: N/A;    ORIF ELBOW FRACTURE Left     PACEMAKER IMPLANTATION      PACEMAKER REPLACEMENT  06/2013       FAMILY HISTORY  Family History   Problem Relation Age of Onset    Arthritis Mother     Diabetes Mother     Thyroid disease Mother     Heart disease Mother     Cancer Father         no details    Glaucoma Father         angular glycoma    Heart disease Father     Arthritis Father     Thyroid disease Father     Stroke Father     Asthma Sister     Arthritis Sister     Cancer Sister         breast    Stroke Maternal Grandmother     Heart failure Other     Diabetes Other     Hypertension Other     Stroke Other         aunt    Thyroid disease Other     Malig Hyperthermia Neg Hx        SOCIAL HISTORY  Social History     Tobacco Use    Smoking status: Never    Smokeless tobacco: Never    Tobacco comments:     Patient does not smoke.   Vaping Use    Vaping status: Never Used   Substance Use Topics    Alcohol use: No     Comment: Patient is non drinker    Drug use: Never     Comment: Drug Abuse: none       Allergies:  Adhesive tape, Latex, and Propoxyphene    MEDICATIONS:  Medications Prior to Admission   Medication Sig Dispense Refill Last Dose    acetaminophen (TYLENOL) 325 MG tablet Take 2 tablets by mouth Every 4 (Four) Hours As Needed for Mild Pain.   5/29/2024    apixaban (ELIQUIS) 5 MG tablet tablet Take 1 tablet by mouth 2  (Two) Times a Day.   5/29/2024    bumetanide (BUMEX) 0.5 MG tablet Take 1 tablet by mouth Daily. 30 tablet 0 5/29/2024    cetirizine (zyrTEC) 10 MG tablet Take 1 tablet by mouth Daily.   5/28/2024    clopidogrel (PLAVIX) 75 MG tablet TAKE 1 TABLET BY MOUTH DAILY 90 tablet 1 5/28/2024    Cyanocobalamin (B-12 PO) Take 1,000 mcg by mouth Daily.   5/29/2024    gabapentin (NEURONTIN) 100 MG capsule Take 1 capsule by mouth Every Night. (Patient taking differently: Take 2 capsules by mouth Every Night.) 30 capsule 0 5/28/2024    insulin degludec (TRESIBA FLEXTOUCH) 100 UNIT/ML solution pen-injector injection Inject 8 Units under the skin into the appropriate area as directed 2 (Two) Times a Day.   5/29/2024    NIFEdipine XL (PROCARDIA XL) 60 MG 24 hr tablet Take 1 tablet by mouth Daily. 90 tablet 3 5/29/2024    NP Thyroid 60 MG tablet Take 1 tablet by mouth Daily.   5/29/2024    pravastatin (PRAVACHOL) 20 MG tablet Take 1 tablet by mouth Every Night. 90 tablet 0 5/28/2024    vitamin D3 125 MCG (5000 UT) capsule capsule Take 125 mcg by mouth Daily.   5/29/2024       Objective     Results Review:  LABS:    Admission on 05/29/2024   Component Date Value Ref Range Status    Extra Tube 05/29/2024 Hold for add-ons.   Final    Auto resulted.    Extra Tube 05/29/2024 hold for add-on   Final    Auto resulted    Extra Tube 05/29/2024 Hold for add-ons.   Final    Auto resulted.    Extra Tube 05/29/2024 Hold for add-ons.   Final    Auto resulted    Glucose 05/29/2024 148 (H)  70 - 130 mg/dL Final    Glucose 05/29/2024 156 (H)  65 - 99 mg/dL Final    BUN 05/29/2024 23  8 - 23 mg/dL Final    Creatinine 05/29/2024 0.96  0.57 - 1.00 mg/dL Final    Sodium 05/29/2024 139  136 - 145 mmol/L Final    Potassium 05/29/2024 4.0  3.5 - 5.2 mmol/L Final    Slight hemolysis detected by analyzer. Result may be falsely elevated.    Chloride 05/29/2024 102  98 - 107 mmol/L Final    CO2 05/29/2024 25.2  22.0 - 29.0 mmol/L Final    Calcium 05/29/2024  10.1  8.6 - 10.5 mg/dL Final    BUN/Creatinine Ratio 05/29/2024 24.0  7.0 - 25.0 Final    Anion Gap 05/29/2024 11.8  5.0 - 15.0 mmol/L Final    eGFR 05/29/2024 56.7 (L)  >60.0 mL/min/1.73 Final    WBC 05/29/2024 8.38  3.40 - 10.80 10*3/mm3 Final    RBC 05/29/2024 4.72  3.77 - 5.28 10*6/mm3 Final    Hemoglobin 05/29/2024 13.7  12.0 - 15.9 g/dL Final    Hematocrit 05/29/2024 42.5  34.0 - 46.6 % Final    MCV 05/29/2024 90.0  79.0 - 97.0 fL Final    MCH 05/29/2024 29.0  26.6 - 33.0 pg Final    MCHC 05/29/2024 32.2  31.5 - 35.7 g/dL Final    RDW 05/29/2024 12.5  12.3 - 15.4 % Final    RDW-SD 05/29/2024 41.1  37.0 - 54.0 fl Final    MPV 05/29/2024 11.8  6.0 - 12.0 fL Final    Platelets 05/29/2024 190  140 - 450 10*3/mm3 Final    Neutrophil % 05/29/2024 60.5  42.7 - 76.0 % Final    Lymphocyte % 05/29/2024 27.3  19.6 - 45.3 % Final    Monocyte % 05/29/2024 8.2  5.0 - 12.0 % Final    Eosinophil % 05/29/2024 2.9  0.3 - 6.2 % Final    Basophil % 05/29/2024 0.7  0.0 - 1.5 % Final    Immature Grans % 05/29/2024 0.4  0.0 - 0.5 % Final    Neutrophils, Absolute 05/29/2024 5.07  1.70 - 7.00 10*3/mm3 Final    Lymphocytes, Absolute 05/29/2024 2.29  0.70 - 3.10 10*3/mm3 Final    Monocytes, Absolute 05/29/2024 0.69  0.10 - 0.90 10*3/mm3 Final    Eosinophils, Absolute 05/29/2024 0.24  0.00 - 0.40 10*3/mm3 Final    Basophils, Absolute 05/29/2024 0.06  0.00 - 0.20 10*3/mm3 Final    Immature Grans, Absolute 05/29/2024 0.03  0.00 - 0.05 10*3/mm3 Final    nRBC 05/29/2024 0.0  0.0 - 0.2 /100 WBC Final    HS Troponin T 05/29/2024 21 (H)  <14 ng/L Final    Lipase 05/29/2024 24  13 - 60 U/L Final    Glucose 05/29/2024 90  70 - 130 mg/dL Final    Glucose 05/29/2024 104  70 - 130 mg/dL Final    QT Interval 05/29/2024 481  ms Preliminary    QTC Interval 05/29/2024 501  ms Preliminary    Glucose 05/29/2024 209 (H)  70 - 130 mg/dL Final    Glucose 05/30/2024 61 (L)  65 - 99 mg/dL Final    BUN 05/30/2024 23  8 - 23 mg/dL Final    Creatinine 05/30/2024  1.27 (H)  0.57 - 1.00 mg/dL Final    Sodium 05/30/2024 142  136 - 145 mmol/L Final    Potassium 05/30/2024 3.6  3.5 - 5.2 mmol/L Final    Chloride 05/30/2024 104  98 - 107 mmol/L Final    CO2 05/30/2024 28.5  22.0 - 29.0 mmol/L Final    Calcium 05/30/2024 9.0  8.6 - 10.5 mg/dL Final    Total Protein 05/30/2024 5.9 (L)  6.0 - 8.5 g/dL Final    Albumin 05/30/2024 3.2 (L)  3.5 - 5.2 g/dL Final    ALT (SGPT) 05/30/2024 13  1 - 33 U/L Final    AST (SGOT) 05/30/2024 18  1 - 32 U/L Final    Alkaline Phosphatase 05/30/2024 127 (H)  39 - 117 U/L Final    Total Bilirubin 05/30/2024 0.4  0.0 - 1.2 mg/dL Final    Globulin 05/30/2024 2.7  gm/dL Final    A/G Ratio 05/30/2024 1.2  g/dL Final    BUN/Creatinine Ratio 05/30/2024 18.1  7.0 - 25.0 Final    Anion Gap 05/30/2024 9.5  5.0 - 15.0 mmol/L Final    eGFR 05/30/2024 40.5 (L)  >60.0 mL/min/1.73 Final    Magnesium 05/30/2024 1.9  1.6 - 2.4 mg/dL Final    WBC 05/30/2024 6.49  3.40 - 10.80 10*3/mm3 Final    RBC 05/30/2024 4.40  3.77 - 5.28 10*6/mm3 Final    Hemoglobin 05/30/2024 12.5  12.0 - 15.9 g/dL Final    Hematocrit 05/30/2024 39.9  34.0 - 46.6 % Final    MCV 05/30/2024 90.7  79.0 - 97.0 fL Final    MCH 05/30/2024 28.4  26.6 - 33.0 pg Final    MCHC 05/30/2024 31.3 (L)  31.5 - 35.7 g/dL Final    RDW 05/30/2024 12.5  12.3 - 15.4 % Final    RDW-SD 05/30/2024 41.6  37.0 - 54.0 fl Final    MPV 05/30/2024 11.2  6.0 - 12.0 fL Final    Platelets 05/30/2024 165  140 - 450 10*3/mm3 Final    Neutrophil % 05/30/2024 48.0  42.7 - 76.0 % Final    Lymphocyte % 05/30/2024 37.3  19.6 - 45.3 % Final    Monocyte % 05/30/2024 10.2  5.0 - 12.0 % Final    Eosinophil % 05/30/2024 3.4  0.3 - 6.2 % Final    Basophil % 05/30/2024 0.8  0.0 - 1.5 % Final    Immature Grans % 05/30/2024 0.3  0.0 - 0.5 % Final    Neutrophils, Absolute 05/30/2024 3.12  1.70 - 7.00 10*3/mm3 Final    Lymphocytes, Absolute 05/30/2024 2.42  0.70 - 3.10 10*3/mm3 Final    Monocytes, Absolute 05/30/2024 0.66  0.10 - 0.90 10*3/mm3  Final    Eosinophils, Absolute 05/30/2024 0.22  0.00 - 0.40 10*3/mm3 Final    Basophils, Absolute 05/30/2024 0.05  0.00 - 0.20 10*3/mm3 Final    Immature Grans, Absolute 05/30/2024 0.02  0.00 - 0.05 10*3/mm3 Final    nRBC 05/30/2024 0.0  0.0 - 0.2 /100 WBC Final    Glucose 05/30/2024 67 (L)  70 - 130 mg/dL Final    Glucose 05/30/2024 123  70 - 130 mg/dL Final    Glucose 05/30/2024 272 (H)  70 - 130 mg/dL Final       DIAGNOSTICS:  CT Abdomen Pelvis With Contrast    Result Date: 5/29/2024  1. Equivocal gastric wall thickening may represent mild gastritis. There are fluid-filled small bowel loops without bowel dilatation to suggest obstruction. 2. Bilateral renal cysts. 3. Diffuse atherosclerotic disease. 4. Osteopenia. Chronic-appearing compression deformity at T12. Partial bony fusion at T11-12.  Radiation dose reduction techniques were utilized, including automated exposure control and exposure modulation based on body size.   This report was finalized on 5/29/2024 4:54 PM by Dr. Mik Mercado M.D on Workstation: [id number]      XR Chest 1 View    Result Date: 5/29/2024  No focal pulmonary consolidation. Tortuous aorta. Follow-up as clinically indicated.    This report was finalized on 5/29/2024 3:25 PM by Dr. Feliberto Copeland M.D on Workstation: [id number]      CT Thoracic Spine Without Contrast    Result Date: 5/29/2024  1. Old T12 compression fracture. This appears stable since the 03/05/2021 study. 2. No acute fractures or subluxation is seen. 3. Multilevel thoracic degenerative disc disease as discussed above.   Radiation dose reduction techniques were utilized, including automated exposure control and exposure modulation based on body size.          Results Review:   I reviewed the patient's new clinical results.  I personally viewed and interpreted the patient's chart.    Vital Signs   Temp:  [97.5 °F (36.4 °C)-98.6 °F (37 °C)] 97.5 °F (36.4 °C)  Heart Rate:  [60-65] 60  Resp:  [16-18] 16  BP:  ()/(42-63) 138/56    Physical exam  Awake, alert, oriented x3  Pupils equal round reactive to light  Extraocular muscles intact  Face symmetric  Speech is fluent and clear  Motor exam  Bilateral deltoids 5/5, bilateral biceps 5/5, bilateral triceps 5/5, bilateral wrist extension 5/5 bilateral hand  5/5  Bilateral hip flexion 5/5, bilateral knee extension 5/5, bilateral DF/PF 5/5  gait deferred  Able to detect  light touch in all 4 extremities      Assessment & Plan       Abdominal pain    Uncontrolled type 2 diabetes mellitus with hypoglycemia without coma    Essential hypertension    PAF (paroxysmal atrial fibrillation)    Diabetic polyneuropathy associated with type 2 diabetes mellitus    Presence of cardiac pacemaker    Chronic heart failure with preserved ejection fraction (HFpEF)    Nonspecific pain in the neck region    Dysphagia    Dysphonia    Localized swelling of right upper extremity    Hypertensive urgency    88 yo female with an old T12 compression fracture and recent injury.    PLAN:     unfortunately patient cannot undergo MRI due to incompatible pacemaker. The only option she has at this point is to wear a brace and give the fracture time to heal. she should follow up with us in 6-8 weeks with [facility].  Nothing further from a neurosurgical standpoint.  We will sign off but be available.  A TLSO brace has been ordered due to diagnosis of T12 compression fracture.  The purpose of the brace is to support weak muscles, stabilize and restrict movement of the trunk to aid in healing and pain relief of pain.    I discussed the patient's findings and my recommendations with patient, family, and nursing staff    I spent 35 minutes caring for Roland Russell on this date of service. This time includes time spent by me in the following activities: preparing for the visit, reviewing tests, obtaining and/or reviewing a separately obtained history, performing a medically appropriate examination and/or  "evaluation, counseling and educating the patient/family/caregiver, ordering medications, tests, or procedures, referring and communicating with other health care professionals, documenting information in the medical record, independently interpreting results and communicating that information with the patient/family/caregiver, and care coordination         Dayanara Solomon, APRN  05/30/24  12:07 EDT    \"Dictated utilizing Dragon dictation\".      "

## 2024-05-30 NOTE — PLAN OF CARE
Goal Outcome Evaluation:      This patient has remained alert, oriented and stable throughout this shift. Her family has remained at her bedside. They are unable to do her surgery because they are unable to do her MRI due to her pacemaker. She had a back brace delivered today. Case management is looking for rehab placement for this patient.

## 2024-05-30 NOTE — PLAN OF CARE
Goal Outcome Evaluation:  Plan of Care Reviewed With: patient, daughter        Progress: no change  Outcome Evaluation: Pt is an 89 year old F admiited with sharp back pain in thoracic spine area. Pt lives alone and is (I) will all ADLs and functional mobility using rwx. She is currently on 2L O2 and per daughter, pt uses supplemental O2 while sleeping. Pt resting in bed and agreeable to treatment. Per aide, pt completed toileting transfer/skills with min A prior to session. Pt reports 0/10 pain with no movement but 10/10 pain during activity. Pt demo'd min A x2 bed mobility and STS transfer at EOB and wasn't able to take any steps d/t pain. Pt presents with poor activity tolerance and endurance and currently requires assistance with most ADL's d/t uncontrolled pain. Pt would benefit from OT services to address stated deficits. Rec d/c SNF pending progress d/t pain limitations      Anticipated Discharge Disposition (OT): skilled nursing facility

## 2024-05-30 NOTE — DISCHARGE PLACEMENT REQUEST
"Britney Russell (89 y.o. Female)       Date of Birth   1934    Social Security Number       Address   [address] [id number]    Home Phone   [phone]    MRN   [id number]       [employer]   [employer]    Marital Status                               Admission Date   5/29/24    Admission Type   Emergency    Admitting Provider   Shahnaz Robbins MD    Attending Provider   Gunner Mclaughlin MD    Department, Room/Bed   [address], [address]       Discharge Date       Discharge Disposition       Discharge Destination                                 Attending Provider: Gunner Mclaughlin MD    Allergies: Adhesive Tape, Latex, Propoxyphene    Isolation: None   Infection: None   Code Status: CPR    Ht: 144.8 cm (57\")   Wt: 66.7 kg (147 lb)    Admission Cmt: None   Principal Problem: Abdominal pain [R10.9]                   Active Insurance as of 5/29/2024       Primary Coverage       Payor Plan Insurance Group Employer/Plan Group    HUMANA MEDICARE REPLACEMENT HU[employer] MED ADV GROUP [id number]       Payor Plan Address Payor Plan Phone Number Payor Plan Fax Number Effective Dates    [address] [address] [phone]  1/1/2024 - None Entered    [facility] [address]         Subscriber Name Subscriber Birth Date Member ID       BRITNEY RUSSELL 1934 [id number]                     Emergency Contacts        (Rel.) Home Phone Work Phone Mobile Phone    Yanet Wen DAUGHTER (Power of ) [phone] -- [phone]    Armaan Russell Sr (Son) [phone] -- [phone]    Cecil Russell (Son) [phone] -- [phone]            "

## 2024-05-30 NOTE — PLAN OF CARE
Goal Outcome Evaluation:  Plan of Care Reviewed With: patient             Problem: Adult Inpatient Plan of Care  Goal: Plan of Care Review  Flowsheets (Taken 5/30/2024 [id number])  Plan of Care Reviewed With: patient  Outcome Evaluation: Patient seen for clinical swallow assessment. Pt initially denied dysphagia, but then endorsed globus sensation with solids and pills. No report of dysphonia by patient or family. Pt's voice is soft and strained at times, but able to complete pitch glide without difficulty. Oral mech exam unremarkable. Daughter present reports unremarkable ENT evaluation recently. Some trouble with pills noted this admission. No overt s/s of aspiration with ice, thins via straw, puree, mixed, or regular solids. SLP recs regular and thins. Meds as swati. Will proceed with VFSS to assess patient c/o globus sensation.

## 2024-05-30 NOTE — NURSING NOTE
Cwon consult received.  We were asked to evaluate skin folds. Breast and abdominal skin folds are free from fungal dermal rash.  There is mild moisture and skin is pink to low transverse abdominal fold. I will recommend a light dusting of antifungal powder to this area to prevent candidiasis skin infection. Patient and friend at bedside are agreeable and appreciative of visit.

## 2024-05-30 NOTE — PLAN OF CARE
Problem: Adult Inpatient Plan of Care  Goal: Plan of Care Review  Outcome: Ongoing, Progressing  Flowsheets (Taken 5/30/2024 0352)  Plan of Care Reviewed With: patient  Goal: Patient-Specific Goal (Individualized)  Outcome: Ongoing, Progressing  Goal: Absence of Hospital-Acquired Illness or Injury  Outcome: Ongoing, Progressing  Intervention: Identify and Manage Fall Risk  Recent Flowsheet Documentation  Taken 5/30/2024 0347 by Kristal Duran RN  Safety Promotion/Fall Prevention:   activity supervised   assistive device/personal items within reach   clutter free environment maintained   safety round/check completed  Taken 5/30/2024 0157 by Kristal Duran RN  Safety Promotion/Fall Prevention:   activity supervised   assistive device/personal items within reach   clutter free environment maintained   safety round/check completed  Taken 5/30/2024 0000 by Kristal Duran RN  Safety Promotion/Fall Prevention:   activity supervised   assistive device/personal items within reach   clutter free environment maintained   safety round/check completed  Taken 5/29/2024 2200 by Kristal Duran RN  Safety Promotion/Fall Prevention:   activity supervised   assistive device/personal items within reach   clutter free environment maintained   safety round/check completed  Taken 5/29/2024 2000 by Kristal Duran RN  Safety Promotion/Fall Prevention: safety round/check completed  Intervention: Prevent Skin Injury  Recent Flowsheet Documentation  Taken 5/30/2024 0347 by Kristal Duran RN  Body Position: position changed independently  Taken 5/30/2024 0157 by Kristal Duran RN  Body Position: position changed independently  Taken 5/30/2024 0000 by Kristal Duran RN  Body Position: position changed independently  Taken 5/29/2024 2200 by Kristal Duran RN  Body Position: position changed independently  Taken 5/29/2024 2000 by Kristal Duran RN  Body Position: position changed independently  Skin Protection: adhesive use  limited  Intervention: Prevent and Manage VTE (Venous Thromboembolism) Risk  Recent Flowsheet Documentation  Taken 5/29/2024 2000 by Kristal Duran RN  VTE Prevention/Management: (lovanox) other (see comments)  Range of Motion: active ROM (range of motion) encouraged  Intervention: Prevent Infection  Recent Flowsheet Documentation  Taken 5/29/2024 2000 by Kristal Duran RN  Infection Prevention: single patient room provided  Goal: Optimal Comfort and Wellbeing  Outcome: Ongoing, Progressing  Intervention: Provide Person-Centered Care  Recent Flowsheet Documentation  Taken 5/29/2024 2000 by Kristal Duran RN  Trust Relationship/Rapport:   care explained   choices provided  Goal: Readiness for Transition of Care  Outcome: Ongoing, Progressing  Intervention: Mutually Develop Transition Plan  Recent Flowsheet Documentation  Taken 5/29/2024 1950 by Kristal Duran RN  Equipment Currently Used at Home: none  Transportation Anticipated: family or friend will provide  Patient/Family Anticipated Services at Transition: none  Patient/Family Anticipates Transition to: home     Problem: Pain Acute  Goal: Acceptable Pain Control and Functional Ability  Outcome: Ongoing, Progressing  Intervention: Prevent or Manage Pain  Recent Flowsheet Documentation  Taken 5/30/2024 0347 by Kristal Duran RN  Medication Review/Management: medications reviewed  Taken 5/30/2024 0157 by Kristal Duran RN  Medication Review/Management: medications reviewed  Taken 5/30/2024 0000 by Kristal Duran RN  Medication Review/Management: medications reviewed  Taken 5/29/2024 2200 by Kristal Duran RN  Medication Review/Management: medications reviewed  Taken 5/29/2024 2000 by Kristal Duran RN  Sensory Stimulation Regulation: care clustered  Medication Review/Management: medications reviewed  Intervention: Optimize Psychosocial Wellbeing  Recent Flowsheet Documentation  Taken 5/29/2024 2000 by Kristal Duran RN  Supportive Measures: verbalization  of feelings encouraged  Diversional Activities: television     Problem: Hypertension Comorbidity  Goal: Blood Pressure in Desired Range  Outcome: Ongoing, Progressing  Intervention: Maintain Blood Pressure Management  Recent Flowsheet Documentation  Taken 5/30/2024 0347 by Kristal Duran RN  Medication Review/Management: medications reviewed  Taken 5/30/2024 0157 by Kristal Duran RN  Medication Review/Management: medications reviewed  Taken 5/30/2024 0000 by Kristal Duran RN  Medication Review/Management: medications reviewed  Taken 5/29/2024 2200 by Kristal Duran RN  Medication Review/Management: medications reviewed  Taken 5/29/2024 2000 by Kristal Duran RN  Syncope Management: position changed slowly  Medication Review/Management: medications reviewed   Goal Outcome Evaluation:  Plan of Care Reviewed With: patient

## 2024-05-30 NOTE — THERAPY EVALUATION
Patient Name: Roland Russell  : 1934    MRN: [id number]                              Today's Date: 2024       Admit Date: 2024    Visit Dx:     ICD-10-CM ICD-9-CM   1. Acute left-sided thoracic back pain  M54.6 724.1   2. Left upper quadrant abdominal pain  R10.12 789.02   3. Coagulopathy: Eliquis and Plavix induced  D68.9 286.9     Patient Active Problem List   Diagnosis    Legal blindness    Uncontrolled type 2 diabetes mellitus with hypoglycemia without coma    GERD (gastroesophageal reflux disease)    Mixed hyperlipidemia    Hypothyroidism, acquired    Osteoarthritis, multiple sites    Essential hypertension    Senile osteoporosis    Renal insufficiency    T12 compression fracture    Vitamin D deficiency    Severe headache    Carotid body tumor    Glomus jugulare tumor    Sleep apnea, obstructive    Morbidly obese    Abnormal weight gain    Localized edema    Compression fracture of T8 vertebra with delayed healing    PAF (paroxysmal atrial fibrillation)    Dry scalp    Deformity of both feet    Acute encephalopathy    Lactic acidosis    Diverticulitis    Ankle fracture    Type 2 diabetes mellitus with hypoglycemia, with long-term current use of insulin    Abnormal nuclear stress test    Coronary artery disease involving native coronary artery of native heart    S/P right coronary artery (RCA) stent placement    Diastolic CHF, acute    Acute CHF    Type 2 diabetes mellitus with hyperglycemia    Acute cystitis with hematuria    Diabetic polyneuropathy associated with type 2 diabetes mellitus    Presence of cardiac pacemaker    Hypoxia    Cerebrovascular accident (CVA)    Chronic heart failure with preserved ejection fraction (HFpEF)    Subclavian vein stenosis, right    Carotid artery stenosis    Inability to swallow    Nonspecific pain in the neck region    Dysphagia    Dysphonia    Localized swelling of right upper extremity    Abdominal pain    Hypertensive urgency     Past Medical History:  "  Diagnosis Date    Abnormal vision     SEES \"KALEIDOSCOPE\"    Allergy to adhesive tape     Arthralgia     AVB (atrioventricular block)     Back pain 01/28/2021    Balance problem     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    CAD (coronary artery disease)     unspecified    Carotid body tumor     LEFT    Difficulty walking     H/O complete eye exam 10/2016    Headache     OFF AND ON BACK OF HEAD, DOWN NECK TO SHOULDER    History of DVT (deep vein thrombosis)     History of glaucoma     History of poliomyelitis     AGE 9    History of surgery on arm     left arm    HLD (hyperlipidemia)     Hypertension     Hypothyroidism, acquired     Left carotid artery stenosis 11/21/2019    Legal blindness     SOME PERIPHERAL VISION ON LEFT, SOME VISION ON RIGHT    Memory loss     SOME SHORT TERM    Migraine     Neck mass 02/03/2022    Numbness     LEFT LEG     Osteoarthritis, multiple sites     Osteoporosis     Pacemaker     Rectal bleeding     X1, WITH BM    Renal disorder     Sleep apnea     DOES NOT USE A MACHINE; unspecified    TIA (transient ischemic attack) 12/24/2018    ?, HAD SPELL UNABLE TO TALK    Type 2 diabetes mellitus     Type 2 diabetes mellitus without complications     Type 2 diabetes mellitus, uncontrolled      Past Surgical History:   Procedure Laterality Date    APPENDECTOMY  1989    CARDIAC CATHETERIZATION N/A 4/13/2023    Procedure: Left Heart Cath;  Surgeon: Rikki Oconnor MD;  Location:  [facility];  Service: Cardiology;  Laterality: N/A;    CARDIAC CATHETERIZATION N/A 4/13/2023    Procedure: Coronary angiography;  Surgeon: Rikki Oconnor MD;  Location:  [facility];  Service: Cardiology;  Laterality: N/A;    CARDIAC CATHETERIZATION N/A 4/13/2023    Procedure: Left ventriculography;  Surgeon: Rikki Oconnor MD;  Location:  [facility];  Service: Cardiology;  Laterality: N/A;    CARDIAC CATHETERIZATION N/A 4/20/2023    Procedure: " Percutaneous Coronary Intervention;  Surgeon: Rikki Oconnor MD;  [facility];  Service: Cardiology;  Laterality: N/A;    CARDIAC CATHETERIZATION N/A 4/20/2023    Procedure: Atherectomy-coronary;  Surgeon: Rikki Oconnor MD;  [facility];  Service: Cardiology;  Laterality: N/A;    CARDIAC CATHETERIZATION N/A 4/20/2023    Procedure: Stent CAROL coronary;  Surgeon: Rikki Oconnor MD;  [facility];  Service: Cardiology;  Laterality: N/A;    CARDIAC PACEMAKER PLACEMENT  11/25/2004    CAROTID ENDARTERECTOMY Left 12/13/2019    Procedure: LEFT CAROTID BODY TUMOR RESECTION;  Surgeon: Bryan Severino MD;  Location: [facility] MAIN OR;  Service: Vascular    CATARACT EXTRACTION Bilateral 1997    CEREBRAL ANGIOGRAM Bilateral 12/6/2019    Procedure: CAROTID ARTERIOGRAM BILATERAL, RIGHT WRIST APPROACH;  Surgeon: Bryan Severino MD;  Location: [facility] OR 18/19;  Service: Vascular    CHOLECYSTECTOMY  1989    COLONOSCOPY  2013    dr montes    ELBOW PROCEDURE  2016    ENDOSCOPY  12/06/2012    Dr. Montes; food in stomach, gastritis    EXCISION LESION  1994    BACK CYST BENGIN    EYE SURGERY  12/2012    GLAUCOMA SURGERY Bilateral 1995    laser surgery    HARDWARE REMOVAL Left     ELBOW    HYSTERECTOMY  1986    INTERVENTIONAL RADIOLOGY PROCEDURE N/A 4/20/2023    Procedure: Intravascular Ultrasound;  Surgeon: Rikki Oconnor MD;  Location: [facility] CATH INVASIVE LOCATION;  Service: Cardiology;  Laterality: N/A;    ORIF ELBOW FRACTURE Left     PACEMAKER IMPLANTATION      PACEMAKER REPLACEMENT  06/2013      General Information       Row Name 05/30/24 1012          Physical Therapy Time and Intention    Document Type evaluation  -EE     Mode of Treatment co-treatment;physical therapy;occupational therapy;other (see comments)  -EE       Row Name 05/30/24 1012          General Information    Patient Profile Reviewed yes  -EE     Prior Level of Function independent:;all  household mobility  rwx  -EE     Existing Precautions/Restrictions fall  -EE     Barriers to Rehab none identified  -EE       Row Name 05/30/24 1012          Living Environment    People in Home alone  -EE       Row Name 05/30/24 1012          Home Main Entrance    Number of Stairs, Main Entrance three  -EE       Row Name 05/30/24 1012          Cognition    Orientation Status (Cognition) oriented x 4  -EE       Row Name 05/30/24 1012          Safety Issues, Functional Mobility    Impairments Affecting Function (Mobility) endurance/activity tolerance;pain;strength  -EE     Comment, Safety Issues/Impairments (Mobility) Co treatment medically appropriate and necessary due to patient acuity level, activity tolerance and safety of patient and staff. Evaluation established to achieve all goals in POC.  -EE               User Key  (r) = Recorded By, (t) = Taken By, (c) = Cosigned By      Initials Name Provider Type    EE Ayala Ott, PT Physical Therapist                   Mobility       Row Name 05/30/24 1014          Bed Mobility    Bed Mobility rolling left;supine-sit;sit-supine  -EE     Rolling Left [place] (Bed Mobility) minimum assist (75% patient effort);2 person assist  -EE     Supine-Sit [place] (Bed Mobility) minimum assist (75% patient effort);moderate assist (50% patient effort)  -EE     Sit-Supine [place] (Bed Mobility) moderate assist (50% patient effort);2 person assist;verbal cues  -EE     Assistive Device (Bed Mobility) bed rails;draw sheet;head of bed elevated  -EE     Comment, (Bed Mobility) cues for sequencing, increased time required with pain limiting  -EE       Row Name 05/30/24 1014          Sit-Stand Transfer    Sit-Stand [place] (Transfers) minimum assist (75% patient effort);2 person assist;verbal cues  -EE     Assistive Device (Sit-Stand Transfers) walker, front-wheeled  -EE     Comment, (Sit-Stand Transfer) cues for hand placement  -EE       Row Name 05/30/24 1014           Gait/Stairs (Locomotion)    [place] Level (Gait) contact guard;minimum assist (75% patient effort);2 person assist;verbal cues  -EE     Assistive Device (Gait) walker, front-wheeled  -EE     Distance in Feet (Gait) --  3 side steps to HOB  -EE     Deviations/Abnormal Patterns (Gait) em decreased;antalgic;festinating/shuffling  -EE     Bilateral Gait Deviations forward flexed posture  -EE               User Key  (r) = Recorded By, (t) = Taken By, (c) = Cosigned By      Initials Name Provider Type     Ayala Ott, ZARA Physical Therapist                   Obj/Interventions       Row Name 05/30/24 1016          Range of Motion Comprehensive    General Range of Motion bilateral lower extremity ROM WFL  -EE       Row Name 05/30/24 1016          Strength Comprehensive (MMT)    General Manual Muscle Testing (MMT) Assessment lower extremity strength deficits identified  -EE     Comment, General Manual Muscle Testing (MMT) Assessment B LEs grossly 3+/5  -EE       Row Name 05/30/24 1016          Balance    Balance Assessment sitting static balance;standing static balance;standing dynamic balance  -EE     Static Sitting Balance standby assist  -EE     Position, Sitting Balance unsupported;sitting edge of bed  -EE     Static Standing Balance contact guard;2-person assist  -EE     Dynamic Standing Balance contact guard;minimal assist;2-person assist  -EE     Position/Device Used, Standing Balance walker, front-wheeled  -EE       Row Name 05/30/24 1016          Sensory Assessment (Somatosensory)    Sensory Assessment (Somatosensory) LE sensation intact  -EE               User Key  (r) = Recorded By, (t) = Taken By, (c) = Cosigned By      Initials Name Provider Type     Ayala Ott PT Physical Therapist                   Goals/Plan       Row Name 05/30/24 1019          Bed Mobility Goal 1 (PT)    Activity/Assistive Device (Bed Mobility Goal 1, PT) sit to supine/supine to sit  -EE     [place] Level/Cues Needed  (Bed Mobility Goal 1, PT) supervision required  -EE     Time Frame (Bed Mobility Goal 1, PT) 1 week;long term goal (LTG)  -EE     Progress/Outcomes (Bed Mobility Goal 1, PT) goal ongoing  -EE       Row Name 05/30/24 1019          Transfer Goal 1 (PT)    Activity/Assistive Device (Transfer Goal 1, PT) sit-to-stand/stand-to-sit;bed-to-chair/chair-to-bed;walker, rolling  -EE     [place] Level/Cues Needed (Transfer Goal 1, PT) supervision required  -EE     Time Frame (Transfer Goal 1, PT) 1 week;long term goal (LTG)  -EE     Progress/Outcome (Transfer Goal 1, PT) goal ongoing  -EE       Row Name 05/30/24 1019          Gait Training Goal 1 (PT)    Activity/Assistive Device (Gait Training Goal 1, PT) gait (walking locomotion);walker, rolling  -EE     [place] Level (Gait Training Goal 1, PT) standby assist  -EE     Distance (Gait Training Goal 1, PT) 100'  -EE     Time Frame (Gait Training Goal 1, PT) 1 week;long term goal (LTG)  -EE     Progress/Outcome (Gait Training Goal 1, PT) goal ongoing  -EE       Row Name 05/30/24 1019          Therapy Assessment/Plan (PT)    Planned Therapy Interventions (PT) balance training;bed mobility training;gait training;home exercise program;patient/family education;strengthening;stair training;ROM (range of motion);transfer training  -EE               User Key  (r) = Recorded By, (t) = Taken By, (c) = Cosigned By      Initials Name Provider Type    EE Ayala Ott, PT Physical Therapist                   Clinical Impression       Row Name 05/30/24 1017          Pain    Pretreatment Pain Rating --  did not rate prior to session  -EE     Posttreatment Pain Rating 10/10  -EE     Pain Location - back  -EE     Pain Intervention(s) Repositioned;Rest;Nursing Notified  -EE       Row Name 05/30/24 1017          Plan of Care Review    Plan of Care Reviewed With patient;daughter  -EE     Outcome Evaluation Pt is an 88 yo female who presents from home with acute back pain. Thoracic CT  positive for chronic T12 fracture. Prior to admission, pt was living at home alone and independent with mobility using rwx. Upon exam, pt demos generalized weakness, decreased endurance, and ongoing back pain limiting activity tolerance. Pt required min/mod A x2 for bed mobility and min A x2 to stand at EOB. Ambulation was limited to a few steps at EOB with min A x2 and rwx. Back pain limited overall activity tolerance. Pt will continue to benefit from PT to address impairments and increase independence with mobility. Recommend DC to SNF pending progress with pain and mobility.  -EE       Row Name 05/30/24 1017          Therapy Assessment/Plan (PT)    Rehab Potential (PT) good, to achieve stated therapy goals  -EE     Criteria for Skilled Interventions Met (PT) yes;meets criteria;skilled treatment is necessary  -EE     Therapy Frequency (PT) 5 times/wk  -EE       Row Name 05/30/24 1017          Positioning and Restraints    Pre-Treatment Position in bed  -EE     Post Treatment Position bed  -EE     In Bed fowlers;call light within reach;encouraged to call for assist;exit alarm on;notified nsg;with family/caregiver;legs elevated  -EE               User Key  (r) = Recorded By, (t) = Taken By, (c) = Cosigned By      Initials Name Provider Type    EE Ayala Ott, PT Physical Therapist                   Outcome Measures       Row Name 05/30/24 1020          How much help from another person do you currently need...    Turning from your back to your side while in flat bed without using bedrails? 2  -EE     Moving from lying on back to sitting on the side of a flat bed without bedrails? 2  -EE     Moving to and from a bed to a chair (including a wheelchair)? 2  -EE     Standing up from a chair using your arms (e.g., wheelchair, bedside chair)? 2  -EE     Climbing 3-5 steps with a railing? 1  -EE     To walk in hospital room? 2  -EE     AM-PAC 6 Clicks Score (PT) 11  -EE     Highest Level of Mobility Goal 4 --> Transfer to  chair/commode  -EE               User Key  (r) = Recorded By, (t) = Taken By, (c) = Cosigned By      Initials Name Provider Type    EE Ayala Ott PT Physical Therapist                                 Physical Therapy Education       Title: PT OT SLP Therapies (In Progress)       Topic: Physical Therapy (In Progress)       Point: Mobility training (Done)       Learning Progress Summary             Patient Acceptance, E, VU,NR by EE at 5/30/2024 1020                         Point: Home exercise program (Not Started)       Learner Progress:  Not documented in this visit.              Point: Body mechanics (Done)       Learning Progress Summary             Patient Acceptance, E, VU,NR by EE at 5/30/2024 1020                         Point: Precautions (Not Started)       Learner Progress:  Not documented in this visit.                              User Key       Initials Effective Dates Name Provider Type Discipline     06/16/21 -  Ayala Ott PT Physical Therapist PT                  PT Recommendation and Plan  Planned Therapy Interventions (PT): balance training, bed mobility training, gait training, home exercise program, patient/family education, strengthening, stair training, ROM (range of motion), transfer training  Plan of Care Reviewed With: patient, daughter  Outcome Evaluation: Pt is an 89 yo female who presents from home with acute back pain. Thoracic CT positive for chronic T12 fracture. Prior to admission, pt was living at home alone and independent with mobility using rwx. Upon exam, pt demos generalized weakness, decreased endurance, and ongoing back pain limiting activity tolerance. Pt required min/mod A x2 for bed mobility and min A x2 to stand at EOB. Ambulation was limited to a few steps at EOB with min A x2 and rwx. Back pain limited overall activity tolerance. Pt will continue to benefit from PT to address impairments and increase independence with mobility. Recommend DC to SNF pending  progress with pain and mobility.     Time Calculation:         PT Charges       Row Name 05/30/24 1021             Time Calculation    Start Time 0937  -EE      Stop Time 0948  -EE      Time Calculation (min) 11 min  -EE      PT Received On 05/30/24  -EE      PT - Next Appointment 05/31/24  -EE      PT Goal Re-Cert Due Date 06/06/24  -EE                User Key  (r) = Recorded By, (t) = Taken By, (c) = Cosigned By      Initials Name Provider Type    EE Ayala Ott, PT Physical Therapist                  Therapy Charges for Today       Code Description Service Date Service Provider Modifiers Qty    [id number] HC PT EVAL MOD COMPLEXITY 2 5/30/2024 Ayala Ott, PT GP 1            PT G-Codes  AM-PAC 6 Clicks Score (PT): 11  PT Discharge Summary  Anticipated Discharge Disposition (PT): skilled nursing facility    Ayala Ott PT  5/30/2024

## 2024-05-30 NOTE — THERAPY EVALUATION
Patient Name: Roland Russell  : 1934    MRN: [id number]                              Today's Date: 2024       Admit Date: 2024    Visit Dx:     ICD-10-CM ICD-9-CM   1. Acute left-sided thoracic back pain  M54.6 724.1   2. Left upper quadrant abdominal pain  R10.12 789.02   3. Coagulopathy: Eliquis and Plavix induced  D68.9 286.9     Patient Active Problem List   Diagnosis    Legal blindness    Uncontrolled type 2 diabetes mellitus with hypoglycemia without coma    GERD (gastroesophageal reflux disease)    Mixed hyperlipidemia    Hypothyroidism, acquired    Osteoarthritis, multiple sites    Essential hypertension    Senile osteoporosis    Renal insufficiency    T12 compression fracture    Vitamin D deficiency    Severe headache    Carotid body tumor    Glomus jugulare tumor    Sleep apnea, obstructive    Morbidly obese    Abnormal weight gain    Localized edema    Compression fracture of T8 vertebra with delayed healing    PAF (paroxysmal atrial fibrillation)    Dry scalp    Deformity of both feet    Acute encephalopathy    Lactic acidosis    Diverticulitis    Ankle fracture    Type 2 diabetes mellitus with hypoglycemia, with long-term current use of insulin    Abnormal nuclear stress test    Coronary artery disease involving native coronary artery of native heart    S/P right coronary artery (RCA) stent placement    Diastolic CHF, acute    Acute CHF    Type 2 diabetes mellitus with hyperglycemia    Acute cystitis with hematuria    Diabetic polyneuropathy associated with type 2 diabetes mellitus    Presence of cardiac pacemaker    Hypoxia    Cerebrovascular accident (CVA)    Chronic heart failure with preserved ejection fraction (HFpEF)    Subclavian vein stenosis, right    Carotid artery stenosis    Inability to swallow    Nonspecific pain in the neck region    Dysphagia    Dysphonia    Localized swelling of right upper extremity    Abdominal pain    Hypertensive urgency     Past Medical History:  "  Diagnosis Date    Abnormal vision     SEES \"KALEIDOSCOPE\"    Allergy to adhesive tape     Arthralgia     AVB (atrioventricular block)     Back pain 01/28/2021    Balance problem     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    CAD (coronary artery disease)     unspecified    Carotid body tumor     LEFT    Difficulty walking     H/O complete eye exam 10/2016    Headache     OFF AND ON BACK OF HEAD, DOWN NECK TO SHOULDER    History of DVT (deep vein thrombosis)     History of glaucoma     History of poliomyelitis     AGE 9    History of surgery on arm     left arm    HLD (hyperlipidemia)     Hypertension     Hypothyroidism, acquired     Left carotid artery stenosis 11/21/2019    Legal blindness     SOME PERIPHERAL VISION ON LEFT, SOME VISION ON RIGHT    Memory loss     SOME SHORT TERM    Migraine     Neck mass 02/03/2022    Numbness     LEFT LEG     Osteoarthritis, multiple sites     Osteoporosis     Pacemaker     Rectal bleeding     X1, WITH BM    Renal disorder     Sleep apnea     DOES NOT USE A MACHINE; unspecified    TIA (transient ischemic attack) 12/24/2018    ?, HAD SPELL UNABLE TO TALK    Type 2 diabetes mellitus     Type 2 diabetes mellitus without complications     Type 2 diabetes mellitus, uncontrolled      Past Surgical History:   Procedure Laterality Date    APPENDECTOMY  1989    CARDIAC CATHETERIZATION N/A 4/13/2023    Procedure: Left Heart Cath;  Surgeon: Rikki Oconnor MD;  Location:  [facility];  Service: Cardiology;  Laterality: N/A;    CARDIAC CATHETERIZATION N/A 4/13/2023    Procedure: Coronary angiography;  Surgeon: Rikki Oconnor MD;  Location:  [facility];  Service: Cardiology;  Laterality: N/A;    CARDIAC CATHETERIZATION N/A 4/13/2023    Procedure: Left ventriculography;  Surgeon: Rikki Oconnor MD;  Location:  [facility];  Service: Cardiology;  Laterality: N/A;    CARDIAC CATHETERIZATION N/A 4/20/2023    Procedure: " Percutaneous Coronary Intervention;  Surgeon: Rikki Oconnor MD;  Location:  [facility];  Service: Cardiology;  Laterality: N/A;    CARDIAC CATHETERIZATION N/A 4/20/2023    Procedure: Atherectomy-coronary;  Surgeon: Rikki Oconnor MD;  Location:  [facility];  Service: Cardiology;  Laterality: N/A;    CARDIAC CATHETERIZATION N/A 4/20/2023    Procedure: Stent CAROL coronary;  Surgeon: Rikki Oconnor MD;  Location:  [facility];  Service: Cardiology;  Laterality: N/A;    CARDIAC PACEMAKER PLACEMENT  11/25/2004    CAROTID ENDARTERECTOMY Left 12/13/2019    Procedure: LEFT CAROTID BODY TUMOR RESECTION;  Surgeon: Bryan Severino MD;  Location: [facility] MAIN OR;  Service: Vascular    CATARACT EXTRACTION Bilateral 1997    CEREBRAL ANGIOGRAM Bilateral 12/6/2019    Procedure: CAROTID ARTERIOGRAM BILATERAL, RIGHT WRIST APPROACH;  Surgeon: Bryan Severino MD;  Location: [facility] HYBRID OR 18/19;  Service: Vascular    CHOLECYSTECTOMY  1989    COLONOSCOPY  2013    dr montes    ELBOW PROCEDURE  2016    ENDOSCOPY  12/06/2012    Dr. Montes; food in stomach, gastritis    EXCISION LESION  1994    BACK CYST BENGIN    EYE SURGERY  12/2012    GLAUCOMA SURGERY Bilateral 1995    laser surgery    HARDWARE REMOVAL Left     ELBOW    HYSTERECTOMY  1986    INTERVENTIONAL RADIOLOGY PROCEDURE N/A 4/20/2023    Procedure: Intravascular Ultrasound;  Surgeon: Rikki Oconnor MD;  [facility];  Service: Cardiology;  Laterality: N/A;    ORIF ELBOW FRACTURE Left     PACEMAKER IMPLANTATION      PACEMAKER REPLACEMENT  06/2013      General Information       Row Name 05/30/24 1156          OT Time and Intention    Document Type evaluation  -MW (r) MF (t) MW (c)     Mode of Treatment co-treatment;physical therapy;occupational therapy  co treatment medically necessary d/t medical status of pt and overall safety  -MW (r) MF (t) MW (c)       Row Name 05/30/24 1158          General Information    Patient  Profile Reviewed yes  -MW (r) MF (t) MW (c)     Prior Level of Function independent:  -MW (r) MF (t) MW (c)     Existing Precautions/Restrictions fall  -MW (r) MF (t) MW (c)       Row Name 05/30/24 1156          Living Environment    People in Home alone  -MW (r) MF (t) MW (c)       Row Name 05/30/24 1156          Home Main Entrance    Number of Stairs, Main Entrance three  -MW (r) MF (t) MW (c)       Row Name 05/30/24 1156          Stairs Within Home, Primary    Number of Stairs, Within Home, Primary none  -MW (r) MF (t) MW (c)       Row Name 05/30/24 1156          Cognition    Orientation Status (Cognition) oriented x 4  -MW (r) MF (t) MW (c)       Row Name 05/30/24 1156          Safety Issues, Functional Mobility    Safety Issues Affecting Function (Mobility) at risk behavior observed;awareness of need for assistance;insight into deficits/self-awareness;judgment;safety precaution awareness;safety precautions follow-through/compliance  -MW (r) MF (t) MW (c)     Impairments Affecting Function (Mobility) endurance/activity tolerance;pain;strength  -MW (r) MF (t) MW (c)               User Key  (r) = Recorded By, (t) = Taken By, (c) = Cosigned By      Initials Name Provider Type    Makayla Kearney OT Occupational Therapist    Makayla Zuleta OT Student OT Student                     Mobility/ADL's       Row Name 05/30/24 1157          Bed Mobility    Bed Mobility rolling left;supine-sit;sit-supine  -MW (r) MF (t) MW (c)     Rolling Left [place] (Bed Mobility) minimum assist (75% patient effort);2 person assist  -MW (r) MF (t) MW (c)     Supine-Sit [place] (Bed Mobility) minimum assist (75% patient effort);moderate assist (50% patient effort)  -MW (r) MF (t) MW (c)     Sit-Supine [place] (Bed Mobility) moderate assist (50% patient effort);2 person assist;verbal cues  -MW (r) MF (t) MW (c)     Assistive Device (Bed Mobility) bed rails;draw sheet;head of bed elevated  -MW (r) MF (t) MW (c)      Comment, (Bed Mobility) limited d/t pain  -MW (r) MF (t) MW (c)       Row Name 05/30/24 1158          Transfers    Transfers sit-stand transfer;stand-sit transfer  -MW (r) MF (t) MW (c)       Row Name 05/30/24 1158          Sit-Stand Transfer    Sit-Stand [place] (Transfers) minimum assist (75% patient effort);2 person assist;verbal cues  -MW (r) MF (t) MW (c)     Assistive Device (Sit-Stand Transfers) walker, front-wheeled  -MW (r) MF (t) MW (c)       Row Name 05/30/24 1158          Stand-Sit Transfer    Stand-Sit [place] (Transfers) verbal cues;minimum assist (75% patient effort);2 person assist  -MW (r) MF (t) MW (c)     Assistive Device (Stand-Sit Transfers) walker, front-wheeled  -MW (r) MF (t) MW (c)       Row Name 05/30/24 1158          Functional Mobility    Patient was able to Ambulate no, other medical factors prevent ambulation  -MW (r) MF (t) MW (c)     Reason Patient was unable to Ambulate Uncontrolled Pain  -MW (r) MF (t) MW (c)       Row Name 05/30/24 1158          Activities of Daily Living    BADL Assessment/Intervention toileting  -MW (r) MF (t) MW (c)       Row Name 05/30/24 1158          Toileting Assessment/Training    [place] Level (Toileting) toileting skills;minimum assist (75% patient effort)  -MW (r) MF (t) MW (c)     Position (Toileting) unsupported sitting  -MW (r) MF (t) MW (c)     Comment, (Toileting) toileting demo'd prior to session with aide  -MW (r) MF (t) MW (c)               User Key  (r) = Recorded By, (t) = Taken By, (c) = Cosigned By      Initials Name Provider Type    Makayla Kearney OT Occupational Therapist    Makayla Zuleta OT Student OT Student                   Obj/Interventions       Row Name 05/30/24 1200          Sensory Assessment (Somatosensory)    Sensory Assessment (Somatosensory) sensation intact  -MW (r) MF (t) MW (c)       Row Name 05/30/24 1200          Vision Assessment/Intervention    Visual Impairment/Limitations other (see  comments)  -MW (r) MF (t) MW (c)     Vision Assessment Comment not tested - chart reads legally blind but no indication of visual impairments during session  -MW (r) MF (t) MW (c)       Row Name 05/30/24 1200          Range of Motion Comprehensive    General Range of Motion bilateral upper extremity ROM WFL  -MW (r) MF (t) MW (c)       Row Name 05/30/24 1200          Motor Skills    Motor Skills functional endurance  -MW (r) MF (t) MW (c)     Functional Endurance limited d/t uncontrolled pain  -MW (r) MF (t) MW (c)       Row Name 05/30/24 1200          Balance    Balance Assessment sitting static balance;sitting dynamic balance;sit to stand dynamic balance;standing static balance  -MW (r) MF (t) MW (c)     Static Sitting Balance standby assist;1-person assist  -MW (r) MF (t) MW (c)     Dynamic Sitting Balance standby assist;1-person assist  -MW (r) MF (t) MW (c)     Position, Sitting Balance unsupported;sitting edge of bed  -MW (r) MF (t) MW (c)     Static Standing Balance contact guard;2-person assist  -MW (r) MF (t) MW (c)     Dynamic Standing Balance minimal assist;2-person assist  -MW (r) MF (t) MW (c)     Position/Device Used, Standing Balance walker, front-wheeled  -MW (r) MF (t) MW (c)     Balance Interventions sitting;standing;supported;static;dynamic;moderate challenge  -MW (r) MF (t) MW (c)     Comment, Balance fair balance but limited d/t pain during movement  -MW (r) MF (t) MW (c)               User Key  (r) = Recorded By, (t) = Taken By, (c) = Cosigned By      Initials Name Provider Type    Makayla Kearney OT Occupational Therapist    Makayla Zuleta OT Student OT Student                   Goals/Plan       Row Name 05/30/24 1212          Bed Mobility Goal 1 (OT)    Activity/Assistive Device (Bed Mobility Goal 1, OT) bed mobility activities, all  -MW (r) MF (t) MW (c)     [place] Level/Cues Needed (Bed Mobility Goal 1, OT) standby assist  -MW (r) MF (t) MW (c)     Time Frame (Bed  Mobility Goal 1, OT) short term goal (STG);2 weeks  -MW (r) MF (t) MW (c)     Progress/Outcomes (Bed Mobility Goal 1, OT) goal ongoing  -MW (r) MF (t) MW (c)       Row Name 05/30/24 1212          Transfer Goal 1 (OT)    Activity/Assistive Device (Transfer Goal 1, OT) transfers, all  -MW (r) MF (t) MW (c)     [place] Level/Cues Needed (Transfer Goal 1, OT) standby assist  -MW (r) MF (t) MW (c)     Time Frame (Transfer Goal 1, OT) short term goal (STG);2 weeks  -MW (r) MF (t) MW (c)     Progress/Outcome (Transfer Goal 1, OT) goal ongoing  -MW (r) MF (t) MW (c)       Row Name 05/30/24 1212          Dressing Goal 1 (OT)    Activity/Device (Dressing Goal 1, OT) dressing skills, all  -MW (r) MF (t) MW (c)     [place]/Cues Needed (Dressing Goal 1, OT) standby assist  -MW (r) MF (t) MW (c)     Time Frame (Dressing Goal 1, OT) short term goal (STG);2 weeks  -MW (r) MF (t) MW (c)     Progress/Outcome (Dressing Goal 1, OT) goal ongoing  -MW (r) MF (t) MW (c)       Row Name 05/30/24 1212          Toileting Goal 1 (OT)    Activity/Device (Toileting Goal 1, OT) toileting skills, all  -MW (r) MF (t) MW (c)     [place] Level/Cues Needed (Toileting Goal 1, OT) standby assist  -MW (r) MF (t) MW (c)     Time Frame (Toileting Goal 1, OT) short term goal (STG);2 weeks  -MW (r) MF (t) MW (c)     Progress/Outcome (Toileting Goal 1, OT) goal ongoing  -MW (r) MF (t) MW (c)       Row Name 05/30/24 1212          Grooming Goal 1 (OT)    Activity/Device (Grooming Goal 1, OT) grooming skills, all  -MW (r) MF (t) MW (c)     [place] (Grooming Goal 1, OT) modified independence  -MW (r) MF (t) MW (c)     Time Frame (Grooming Goal 1, OT) short term goal (STG);2 weeks  -MW (r) MF (t) MW (c)     Progress/Outcome (Grooming Goal 1, OT) goal ongoing  -MW (r) MF (t) MW (c)       Row Name 05/30/24 1212          Therapy Assessment/Plan (OT)    Planned Therapy Interventions (OT) activity tolerance training;functional balance  retraining;occupation/activity based interventions;patient/caregiver education/training;strengthening exercise;transfer/mobility retraining  -MW (r) MF (t) MW (c)               User Key  (r) = Recorded By, (t) = Taken By, (c) = Cosigned By      Initials Name Provider Type    Makayla Kearney, VALENTINA Occupational Therapist    Makayla Zuleta, VALENTINA Student OT Student                   Clinical Impression       Row Name 05/30/24 1206          Pain Assessment    Pretreatment Pain Rating 10/10  -MW (r) MF (t) MW (c)     Posttreatment Pain Rating 10/10  -MW (r) MF (t) MW (c)     Pain Location - back  -MW (r) MF (t) MW (c)     Pre/Posttreatment Pain Comment pt tearful/nausous at times d/t 10/10 pain  -MW (r) MF (t) MW (c)     Pain Intervention(s) Nursing Notified  -MW (r) MF (t) MW (c)       Row Name 05/30/24 120          Plan of Care Review    Plan of Care Reviewed With patient;daughter  -MW (r) MF (t) MW (c)     Progress no change  -MW (r) MF (t) MW (c)     Outcome Evaluation Pt is an 89 year old F admiited with sharp back pain in thoracic spine area. Pt lives alone and is (I) will all ADLs and functional mobility using rwx. She is currently on 2L O2 and per daughter, pt uses supplemental O2 while sleeping. Pt resting in bed and agreeable to treatment. Per aide, pt completed toileting transfer/skills with min A prior to session. Pt reports 0/10 pain with no movement but 10/10 pain during activity. Pt demo'd min A x2 bed mobility and STS transfer at EOB and wasn't able to take any steps d/t pain. Pt presents with poor activity tolerance and endurance and currently requires assistance with most ADL's d/t uncontrolled pain. Pt would benefit from OT services to address stated deficits. Rec d/c SNF pending progress d/t pain limitations  -MW (r) MF (t) MW (c)       Row Name 05/30/24 7309          Therapy Assessment/Plan (OT)    Rehab Potential (OT) good, to achieve stated therapy goals  -MW (r) MF (t) MW (c)      Criteria for Skilled Therapeutic Interventions Met (OT) yes;skilled treatment is necessary  -MW (r) MF (t) MW (c)     Therapy Frequency (OT) 5 times/wk  -MW (r) MF (t) MW (c)       Row Name 05/30/24 1205          Therapy Plan Review/Discharge Plan (OT)    Anticipated Discharge Disposition (OT) skilled nursing facility  -MW (r) MF (t) MW (c)       Row Name 05/30/24 1205          Vital Signs    O2 Delivery Pre Treatment supplemental O2  -MW (r) MF (t) MW (c)     O2 Delivery Intra Treatment supplemental O2  -MW (r) MF (t) MW (c)     O2 Delivery Post Treatment supplemental O2  -MW (r) MF (t) MW (c)       Row Name 05/30/24 1205          Positioning and Restraints    Pre-Treatment Position in bed  -MW (r) MF (t) MW (c)     Post Treatment Position bed  -MW (r) MF (t) MW (c)     In Bed notified nsg;fowlers;call light within reach;exit alarm on;with family/caregiver  -MW (r) MF (t) MW (c)               User Key  (r) = Recorded By, (t) = Taken By, (c) = Cosigned By      Initials Name Provider Type    Makayla Kearney, OT Occupational Therapist    Makayla Zuleta, VALENTINA Student OT Student                   Outcome Measures       Row Name 05/30/24 1213          How much help from another is currently needed...    Putting on and taking off regular lower body clothing? 2  -MW (r) MF (t) MW (c)     Bathing (including washing, rinsing, and drying) 2  -MW (r) MF (t) MW (c)     Toileting (which includes using toilet bed pan or urinal) 3  -MW (r) MF (t) MW (c)     Putting on and taking off regular upper body clothing 2  -MW (r) MF (t) MW (c)     Taking care of personal grooming (such as brushing teeth) 3  -MW (r) MF (t) MW (c)     Eating meals 3  -MW (r) MF (t) MW (c)     AM-PAC 6 Clicks Score (OT) 15  -MW (r) MF (t)       Row Name 05/30/24 1020          How much help from another person do you currently need...    Turning from your back to your side while in flat bed without using bedrails? 2  -EE     Moving from lying on  back to sitting on the side of a flat bed without bedrails? 2  -EE     Moving to and from a bed to a chair (including a wheelchair)? 2  -EE     Standing up from a chair using your arms (e.g., wheelchair, bedside chair)? 2  -EE     Climbing 3-5 steps with a railing? 1  -EE     To walk in hospital room? 2  -EE     AM-PAC 6 Clicks Score (PT) 11  -EE     Highest Level of Mobility Goal 4 --> Transfer to chair/commode  -EE       Row Name 05/30/24 1213          Modified Cathy Scale    Modified [place] Scale 4 - Moderately severe disability.  Unable to walk without assistance, and unable to attend to own bodily needs without assistance.  -MW (r) MF (t) MW (c)       Row Name 05/30/24 1213          Functional Assessment    Outcome Measure Options AM-PAC 6 Clicks Daily Activity (OT);Modified Cathy  -MW (r) MF (t) MW (c)               User Key  (r) = Recorded By, (t) = Taken By, (c) = Cosigned By      Initials Name Provider Type    EE Ayala Ott, PT Physical Therapist    Makayla Kearney, OT Occupational Therapist    Makayla Zuleta, OT Student OT Student                    Occupational Therapy Education       Title: PT OT SLP Therapies (In Progress)       Topic: Occupational Therapy (Done)       Point: ADL training (Done)       Description:   Instruct learner(s) on proper safety adaptation and remediation techniques during self care or transfers.   Instruct in proper use of assistive devices.                  Learning Progress Summary             Patient ROGELIO Davison, VU by  at 5/30/2024 1215   Family ROGELIO Davison VU by  at 5/30/2024 1215                         Point: Home exercise program (Done)       Description:   Instruct learner(s) on appropriate technique for monitoring, assisting and/or progressing therapeutic exercises/activities.                  Learning Progress Summary             Patient ROGELIO Davison, VU by LOIS at 5/30/2024 1215   Family ROGELIO Davison VU by LOIS at 5/30/2024 1215                         Point:  Precautions (Done)       Description:   Instruct learner(s) on prescribed precautions during self-care and functional transfers.                  Learning Progress Summary             Patient ROGELIO Davison, VU by  at 5/30/2024 1215   Family ROGELIO Davison, VU by  at 5/30/2024 1215                         Point: Body mechanics (Done)       Description:   Instruct learner(s) on proper positioning and spine alignment during self-care, functional mobility activities and/or exercises.                  Learning Progress Summary             Patient ROGELIO Davison, VU by  at 5/30/2024 1215   Family ROGELIO Davison VU by  at 5/30/2024 1215                                         User Key       Initials Effective Dates Name Provider Type Discipline     04/30/24 -  Makayla Vanegas OT Student OT Student OT                  OT Recommendation and Plan  Planned Therapy Interventions (OT): activity tolerance training, functional balance retraining, occupation/activity based interventions, patient/caregiver education/training, strengthening exercise, transfer/mobility retraining  Therapy Frequency (OT): 5 times/wk  Plan of Care Review  Plan of Care Reviewed With: patient, daughter  Progress: no change  Outcome Evaluation: Pt is an 89 year old F admiited with sharp back pain in thoracic spine area. Pt lives alone and is (I) will all ADLs and functional mobility using rwx. She is currently on 2L O2 and per daughter, pt uses supplemental O2 while sleeping. Pt resting in bed and agreeable to treatment. Per aide, pt completed toileting transfer/skills with min A prior to session. Pt reports 0/10 pain with no movement but 10/10 pain during activity. Pt demo'd min A x2 bed mobility and STS transfer at EOB and wasn't able to take any steps d/t pain. Pt presents with poor activity tolerance and endurance and currently requires assistance with most ADL's d/t uncontrolled pain. Pt would benefit from OT services to address stated deficits. Rec d/c SNF  pending progress d/t pain limitations     Time Calculation:   Evaluation Complexity (OT)  Review Occupational Profile/Medical/Therapy History Complexity: expanded/moderate complexity  Assessment, Occupational Performance/Identification of Deficit Complexity: 3-5 performance deficits  Clinical Decision Making Complexity (OT): detailed assessment/moderate complexity  Overall Complexity of Evaluation (OT): moderate complexity     Time Calculation- OT       Row Name 05/30/24 1215             Time Calculation- OT    OT Start Time 0937  -MW (r) MF (t) MW (c)      OT Stop Time 0948  -MW (r) MF (t) MW (c)      OT Time Calculation (min) 11 min  -MW (r) MF (t)      OT Received On 05/30/24  -MW (r) MF (t) MW (c)      OT - Next Appointment 05/31/24  -MW (r) MF (t) MW (c)      OT Goal Re-Cert Due Date 06/13/24  -MW (r) MF (t) MW (c)         Untimed Charges    OT Eval/Re-eval Minutes 11  -MW (r) MF (t) MW (c)         Total Minutes    Untimed Charges Total Minutes 11  -MW (r) MF (t)       Total Minutes 11  -MW (r) MF (t)                User Key  (r) = Recorded By, (t) = Taken By, (c) = Cosigned By      Initials Name Provider Type    Makayla Kearney OT Occupational Therapist    Makayla Zlueta OT Student OT Student                  Therapy Charges for Today       Code Description Service Date Service Provider Modifiers Qty    [id number] HC OT EVAL MOD COMPLEXITY 3 5/30/2024 Makayla Vanegas OT Student GO 1                 VALENTINA Pang  5/30/2024

## 2024-05-30 NOTE — NURSING NOTE
Julieth JULIO notified of blood pressure of 88/42 manual. Patient is asymptomatic. Wants to recheck blood pressure in an hour and david if SBP under 90.

## 2024-05-30 NOTE — PROGRESS NOTES
Discharge Planning Assessment  [facility]     Patient Name: Roland Russell  MRN: [id number]  Today's Date: 5/30/2024    Admit Date: 5/29/2024    Plan: Accepted at [facility] skilled rehab; will start insurance precert today 5-   Discharge Needs Assessment       Row Name 05/30/24 1316       Living Environment    People in Home alone    Current Living Arrangements home    Primary Care Provided by self    Family Caregiver if Needed child(dima), adult    Family Caregiver Names POA/daughter Yanet Wen    Quality of Family Relationships supportive;involved;helpful    Able to Return to Prior Arrangements no       Transition Planning    Patient/Family Anticipated Services at Transition skilled nursing    Transportation Anticipated family or friend will provide       Discharge Needs Assessment    Equipment Currently Used at Home walker, rolling    Concerns to be Addressed discharge planning    Equipment Needed After Discharge none    Outpatient/Agency/Support Group Needs skilled nursing facility    Provided Post Acute Provider List? Yes    Post Acute Provider List Nursing Home    Provided Post Acute Provider Quality & Resource List? Yes    Delivered To Patient;Support Person    Support Person daughter Yanet    Method of Delivery In person                   Discharge Plan       Row Name 05/30/24 0731       Plan    Plan Accepted at [facility] skilled rehab; will start insurance precert today 5-    Plan Comments Spoke with patient and daughter Yanet Wen [phone] at bedside, face sheet verified. Patient lives alone and uses a walker. Daughter stated she is usually independent with ADL's. Has used [facility] and has been to [facility] for skilled rehab.  Patient would like to go to [facility] for skilled rehab. Referral to [facility] called to Kriss/Darlin she stated she has a bed and is ok to start precert. Insurance precert will be started today 5-. Daughter will transport to  rehab.  Talha HENLEY                  Continued Care and Services - Admitted Since 5/29/2024       Destination       Service Provider Request Status Selected Services Address Phone Fax Patient Preferred    [facility] Accepted N/A [address], [facility] [address] [phone] --                     Demographic Summary       Row Name 05/30/24 1315       General Information    Admission Type observation    Arrived From emergency department    Referral Source admission list    Reason for Consult discharge planning    Preferred Language English                   Functional Status       Row Name 05/30/24 1315       Functional Status    Usual Activity Tolerance moderate    Current Activity Tolerance poor       Functional Status, IADL    Medications independent    Meal Preparation independent    Housekeeping independent    Laundry independent    Shopping independent                   Psychosocial    No documentation.                  Abuse/Neglect    No documentation.                  Legal    No documentation.                  Substance Abuse    No documentation.                  Patient Forms    No documentation.                     Venecia Wolff, RN

## 2024-05-30 NOTE — THERAPY EVALUATION
Acute Care - Speech Language Pathology   Swallow Initial Evaluation [facility]     Patient Name: Roland Russell  : 1934  MRN: [id number]  Today's Date: 2024               Admit Date: 2024    Visit Dx:     ICD-10-CM ICD-9-CM   1. Acute left-sided thoracic back pain  M54.6 724.1   2. Left upper quadrant abdominal pain  R10.12 789.02   3. Coagulopathy: Eliquis and Plavix induced  D68.9 286.9   4. Compression fracture of T12 vertebra, initial encounter  S22.080A 805.2     Patient Active Problem List   Diagnosis    Legal blindness    Uncontrolled type 2 diabetes mellitus with hypoglycemia without coma    GERD (gastroesophageal reflux disease)    Mixed hyperlipidemia    Hypothyroidism, acquired    Osteoarthritis, multiple sites    Essential hypertension    Senile osteoporosis    Renal insufficiency    T12 compression fracture    Vitamin D deficiency    Severe headache    Carotid body tumor    Glomus jugulare tumor    Sleep apnea, obstructive    Morbidly obese    Abnormal weight gain    Localized edema    Compression fracture of T8 vertebra with delayed healing    PAF (paroxysmal atrial fibrillation)    Dry scalp    Deformity of both feet    Acute encephalopathy    Lactic acidosis    Diverticulitis    Ankle fracture    Type 2 diabetes mellitus with hypoglycemia, with long-term current use of insulin    Abnormal nuclear stress test    Coronary artery disease involving native coronary artery of native heart    S/P right coronary artery (RCA) stent placement    Diastolic CHF, acute    Acute CHF    Type 2 diabetes mellitus with hyperglycemia    Acute cystitis with hematuria    Diabetic polyneuropathy associated with type 2 diabetes mellitus    Presence of cardiac pacemaker    Hypoxia    Cerebrovascular accident (CVA)    Chronic heart failure with preserved ejection fraction (HFpEF)    Subclavian vein stenosis, right    Carotid artery stenosis    Inability to swallow    Nonspecific pain in the neck region  "   Dysphagia    Dysphonia    Localized swelling of right upper extremity    Abdominal pain    Hypertensive urgency     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    CAD (coronary artery disease)     unspecified    Carotid body tumor     LEFT    Difficulty walking     H/O complete eye exam 10/2016    Headache     OFF AND ON BACK OF HEAD, DOWN NECK TO SHOULDER    History of DVT (deep vein thrombosis)     History of glaucoma     History of poliomyelitis     AGE 9    History of surgery on arm     left arm    HLD (hyperlipidemia)     Hypertension     Hypothyroidism, acquired     Left carotid artery stenosis 11/21/2019    Legal blindness     SOME PERIPHERAL VISION ON LEFT, SOME VISION ON RIGHT    Memory loss     SOME SHORT TERM    Migraine     Neck mass 02/03/2022    Numbness     LEFT LEG     Osteoarthritis, multiple sites     Osteoporosis     Pacemaker     Rectal bleeding     X1, WITH BM    Renal disorder     Sleep apnea     DOES NOT USE A MACHINE; unspecified    TIA (transient ischemic attack) 12/24/2018    ?, HAD SPELL UNABLE TO TALK    Type 2 diabetes mellitus     Type 2 diabetes mellitus without complications     Type 2 diabetes mellitus, uncontrolled      Past Surgical History:   Procedure Laterality Date    APPENDECTOMY  1989    CARDIAC CATHETERIZATION N/A 4/13/2023    Procedure: Left Heart Cath;  Surgeon: Rikki Oconnor MD;  Location:  [facility];  Service: Cardiology;  Laterality: N/A;    CARDIAC CATHETERIZATION N/A 4/13/2023    Procedure: Coronary angiography;  Surgeon: Rikki Oconnor MD;  Location:  [facility];  Service: Cardiology;  Laterality: N/A;    CARDIAC CATHETERIZATION N/A 4/13/2023    Procedure: Left ventriculography;  Surgeon: Rikki Oconnor MD;  " Location: [facility] CATH INVASIVE LOCATION;  Service: Cardiology;  Laterality: N/A;    CARDIAC CATHETERIZATION N/A 4/20/2023    Procedure: Percutaneous Coronary Intervention;  Surgeon: Rikki Oconnor MD;  Location: [facility] CATH INVASIVE LOCATION;  Service: Cardiology;  Laterality: N/A;    CARDIAC CATHETERIZATION N/A 4/20/2023    Procedure: Atherectomy-coronary;  Surgeon: Rikki Oconnor MD;  Location: [facility] CATH INVASIVE LOCATION;  Service: Cardiology;  Laterality: N/A;    CARDIAC CATHETERIZATION N/A 4/20/2023    Procedure: Stent CAROL coronary;  Surgeon: Rikki Oconnor MD;  Location: [facility] CATH INVASIVE LOCATION;  Service: Cardiology;  Laterality: N/A;    CARDIAC PACEMAKER PLACEMENT  11/25/2004    CAROTID ENDARTERECTOMY Left 12/13/2019    Procedure: LEFT CAROTID BODY TUMOR RESECTION;  Surgeon: Bryan Severino MD;  Location: [facility] MAIN OR;  Service: Vascular    CATARACT EXTRACTION Bilateral 1997    CEREBRAL ANGIOGRAM Bilateral 12/6/2019    Procedure: CAROTID ARTERIOGRAM BILATERAL, RIGHT WRIST APPROACH;  Surgeon: Bryan Severino MD;  Location: [facility] OR 18/19;  Service: Vascular    CHOLECYSTECTOMY  1989    COLONOSCOPY  2013    dr montes    ELBOW PROCEDURE  2016    ENDOSCOPY  12/06/2012    Dr. Montes; food in stomach, gastritis    EXCISION LESION  1994    BACK CYST BENGIN    EYE SURGERY  12/2012    GLAUCOMA SURGERY Bilateral 1995    laser surgery    HARDWARE REMOVAL Left     ELBOW    HYSTERECTOMY  1986    INTERVENTIONAL RADIOLOGY PROCEDURE N/A 4/20/2023    Procedure: Intravascular Ultrasound;  Surgeon: Rikki Oconnor MD;  Location: [facility] CATH INVASIVE LOCATION;  Service: Cardiology;  Laterality: N/A;    ORIF ELBOW FRACTURE Left     PACEMAKER IMPLANTATION      PACEMAKER REPLACEMENT  06/2013       SLP Recommendation and Plan  SLP Swallowing Diagnosis: swallow WFL/no suspected pharyngeal impairment (05/30/24 1500)  SLP Diet Recommendation: regular textures, thin liquids (05/30/24 1500)  Recommended Precautions and Strategies:  "upright posture during/after eating, small bites of food and sips of liquid (05/30/24 1500)  SLP Rec. for Method of Medication Administration: meds whole, as tolerated (05/30/24 1500)     Monitor for Signs of Aspiration: yes, notify SLP if any concerns (05/30/24 1500)  Recommended Diagnostics: reassess via VFSS ([facility]) (05/30/24 1500)           Therapy Frequency (Swallow): PRN (05/30/24 1500)  Predicted Duration Therapy Intervention (Days): until discharge (05/30/24 1500)  Oral Care Recommendations: Oral Care BID/PRN (05/30/24 1500)                                        Plan of Care Reviewed With: patient  Outcome Evaluation: Patient seen for clinical swallow assessment. Pt initially denied dysphagia, but then endorsed globus sensation with solids and pills. No report of dysphonia by patient or family. Pt's voice is soft and strained at times, but able to complete pitch glide without difficulty. Oral mech exam unremarkable. Daughter present reports unremarkable ENT evaluation recently. Some trouble with pills noted this admission. No overt s/s of aspiration with ice, thins via straw, puree, mixed, or regular solids. Will proceed with VFSS to assess patient c/o globus sensation.      SWALLOW EVALUATION (Last 72 Hours)       SLP Adult Swallow Evaluation       Row Name 05/30/24 1500                   Rehab Evaluation    Document Type evaluation  -SH        Patient Effort excellent  -SH           General Information    Patient Profile Reviewed yes  -SH        Pertinent History Of Current Problem \"Patient is status post resection of left carotid body tumor 2019.  She has complained of neck pain since that time, recently worsening.  Her swallowing issues have actually gotten significantly worse as well.  Her primary care provider ordered a CT of the neck and right upper extremity due to swelling and the results are above.\". \"At this point in time my concern was that the actual incisional situation on the left does not have " "direct correlation to her dysphonia and dysphagia.  They are both getting worse and I really feel like this needs a direct evaluation by an ENT doctor in order to refer to Dr. Arcenio Lu.  Hopefully a direct laryngoscopy can shed some light on what is going on for this nice lady.  The pain is always going to be hard to tell as it has been present since her procedure and may actually be separate from her difficulty with swallowing and speaking.  At this point there is still no evidence of recurrence of tumor and I think continued aggressive evaluations are best answer.\" -Dr. Severino. ENT evaluation was unremarkable per family. Pt now admitted with abdominal pain and possible BRITNEY. As well as an old T12 compression fracture with recent injury. Now in brace.  -        Current Method of Nutrition regular textures;thin liquids  -        Precautions/Limitations, Vision other (see comments)  legally blind per HCS  -        Precautions/Limitations, Hearing WFL  -        Prior Level of Function-Communication WFL  -        Prior Level of Function-Swallowing other (see comments)  obstruction with solids  -        Plans/Goals Discussed with patient  -        Barriers to Rehab visual deficit  -           Pain    Additional Documentation Pain Scale: FACES Pre/Post-Treatment (Group)  -           Pain Scale: FACES Pre/Post-Treatment    Pain: FACES Scale, Pretreatment 0-->no hurt  -           Oral Musculature and Cranial Nerve Assessment    Oral Motor General Assessment WFL  -SH           Clinical Swallow Eval    Clinical Swallow Evaluation Summary Patient seen for clinical swallow assessment. Pt initially denied dysphagia, but then endorsed globus sensation with solids and pills. No report of dysphonia by patient or family. Pt's voice is soft and strained at times, but able to complete pitch glide without difficulty. Oral mech exam unremarkable. Daughter present reports unremarkable ENT evaluation recently. Some " trouble with pills noted this admission. No overt s/s of aspiration with ice, thins via straw, puree, mixed, or regular solids. SLP recs regular and thins. Meds as swati. Will proceed with VFSS to assess patient c/o globus sensation.  -           SLP Evaluation Clinical Impression    SLP Swallowing Diagnosis swallow WFL/no suspected pharyngeal impairment  -           Recommendations    Therapy Frequency (Swallow) PRN  -        Predicted Duration Therapy Intervention (Days) until discharge  -        SLP Diet Recommendation regular textures;thin liquids  -        Recommended Diagnostics reassess via VFSS ([facility])  -        Recommended Precautions and Strategies upright posture during/after eating;small bites of food and sips of liquid  -        Oral Care Recommendations Oral Care BID/PRN  -        SLP Rec. for Method of Medication Administration meds whole;as tolerated  -        Monitor for Signs of Aspiration yes;notify SLP if any concerns  -           Swallow Goals (SLP)    Swallow LTGs Patient will demonstrate functional swallow for  -SH           (LTG) Patient will demonstrate functional swallow for    Diet Texture (Demonstrate functional swallow) regular textures  -        Liquid viscosity (Demonstrate functional swallow) thin liquids  -        [place] (Demonstrate functional swallow) independently (over 90% accuracy)  -        Time Frame (Demonstrate functional swallow) by discharge  -                  User Key  (r) = Recorded By, (t) = Taken By, (c) = Cosigned By      Initials Name Effective Dates     Farida Woodward, SLP 01/05/24 -                     EDUCATION  The patient has been educated in the following areas:   Dysphagia (Swallowing Impairment) .        SLP GOALS       Row Name 05/30/24 1500             (LTG) Patient will demonstrate functional swallow for    Diet Texture (Demonstrate functional swallow) regular textures  -      Liquid viscosity (Demonstrate functional  swallow) thin liquids  -      [place] (Demonstrate functional swallow) independently (over 90% accuracy)  -      Time Frame (Demonstrate functional swallow) by discharge  -                User Key  (r) = Recorded By, (t) = Taken By, (c) = Cosigned By      Initials Name Provider Type     Farida Woodward SLP Speech and Language Pathologist                         Time Calculation:    Time Calculation- SLP       Row Name 05/30/24 1548             Time Calculation- [facility]    SLP Start Time 1445  -      SLP Received On 05/30/24  -                User Key  (r) = Recorded By, (t) = Taken By, (c) = Cosigned By      Initials Name Provider Type     Farida Woodward SLP Speech and Language Pathologist                    Therapy Charges for Today       Code Description Service Date Service Provider Modifiers Qty    [id number]  ST EVAL ORAL PHARYNG SWALLOW 4 5/30/2024 Farida Woodward SLP GN 1                 KAYLEEN Oliver  5/30/2024

## 2024-05-30 NOTE — PROGRESS NOTES
Name: Roland Russell ADMIT: 2024   : 1934  PCP: Shawanda Rosario MD    MRN: [id number] LOS: 0 days   AGE/SEX: 89 y.o. female  ROOM: [facility]     Subjective   Subjective     She was a little hypotensive this morning and her creatinine is up to 1.27. bp is better now and she feels fine except for a globus sensation which SLP is evaluating       Objective   Objective   Vital Signs  Temp:  [97.3 °F (36.3 °C)-98.6 °F (37 °C)] 97.3 °F (36.3 °C)  Heart Rate:  [60-66] 66  Resp:  [16-18] 16  BP: ()/(42-63) 157/61  SpO2:  [96 %-99 %] 98 %  on  Flow (L/min):  [2] 2;   Device (Oxygen Therapy): nasal cannula  Body mass index is 31.81 kg/m².  Physical Exam  Constitutional:       General: She is not in acute distress.     Appearance: She is obese. She is not toxic-appearing.   Cardiovascular:      Rate and Rhythm: Normal rate and regular rhythm.      Heart sounds: Normal heart sounds.   Pulmonary:      Effort: Pulmonary effort is normal.      Breath sounds: Normal breath sounds.   Abdominal:      General: Bowel sounds are normal.      Palpations: Abdomen is soft.   Musculoskeletal:         General: No tenderness.      Right lower leg: No edema.      Left lower leg: No edema.      Comments: TLSO brace in place   Neurological:      Mental Status: She is alert.   Psychiatric:         Mood and Affect: Mood normal.         Behavior: Behavior normal.         Results Review     I reviewed the patient's new clinical results.  Results from last 7 days   Lab Units 24  0703 24  0958   WBC 10*3/mm3 6.49 8.38   HEMOGLOBIN g/dL 12.5 13.7   PLATELETS 10*3/mm3 165 190     Results from last 7 days   Lab Units 24  0703 24  0958   SODIUM mmol/L 142 139   POTASSIUM mmol/L 3.6 4.0   CHLORIDE mmol/L 104 102   CO2 mmol/L 28.5 25.2   BUN mg/dL 23 23   CREATININE mg/dL 1.27* 0.96   GLUCOSE mg/dL 61* 156*   Estimated Creatinine Clearance: 25.6 mL/min (A) (by C-G formula based on SCr of 1.27 mg/dL  (H)).  Results from last 7 days   Lab Units 05/30/24  0703   ALBUMIN g/dL 3.2*   BILIRUBIN mg/dL 0.4   ALK PHOS U/L 127*   AST (SGOT) U/L 18   ALT (SGPT) U/L 13     Results from last 7 days   Lab Units 05/30/24  0703 05/29/24  0958   CALCIUM mg/dL 9.0 10.1   ALBUMIN g/dL 3.2*  --    MAGNESIUM mg/dL 1.9  --        COVID19   Date Value Ref Range Status   08/26/2023 Not Detected Not Detected - Ref. Range Final   11/03/2022 Not Detected Not Detected - Ref. Range Final   02/24/2021 Not Detected Not Detected - Ref. Range Final     Glucose   Date/Time Value Ref Range Status   05/30/2024 1150 272 (H) 70 - 130 mg/dL Final   05/30/2024 0726 123 70 - 130 mg/dL Final   05/30/2024 0634 67 (L) 70 - 130 mg/dL Final   05/29/2024 2148 209 (H) 70 - 130 mg/dL Final   05/29/2024 1713 104 70 - 130 mg/dL Final   05/29/2024 1632 90 70 - 130 mg/dL Final   05/29/2024 1007 148 (H) 70 - 130 mg/dL Final       CT Abdomen Pelvis With Contrast  Narrative: CT ABDOMEN PELVIS W CONTRAST-     HISTORY: 89 years of age, Female.  Patient with back pain consistent  with compression fracture to her thoracic spine.  CT scan is  unremarkable.  Has pain that radiates to her left upper quadrant that is  reproducible     TECHNIQUE:  CT includes axial imaging from the lung bases to the  trochanters with intravenous contrast and without use of oral contrast.  Data reconstructed in coronal and sagittal planes. Radiation dose  reduction techniques were utilized, including automated exposure control  and exposure modulation based on body size.     COMPARISON: CT abdomen and pelvis 11/02/2022, CT angiogram chest  04/24/2024.     FINDINGS: Cardiac pacer/defibrillator leads are noted. There is mild  dependent basilar atelectasis.     Liver, spleen, adrenal glands, pancreas appear within normal limits.  Stomach is mostly decompressed with equivocal generalized gastric wall  thickening and this may represent mild gastritis.     Fluid-filled small bowel loops are present  without evidence to suggest  obstruction. There is no ascites. Diffuse atherosclerotic calcifications  are present without aneurysm. Small bilateral renal cysts are present.     Chronic T12 compression fracture and chronic bony fusion of T11 and T12  are present. The bones appear osteopenic.     Impression: 1. Equivocal gastric wall thickening may represent mild gastritis. There  are fluid-filled small bowel loops without bowel dilatation to suggest  obstruction.  2. Bilateral renal cysts.  3. Diffuse atherosclerotic disease.  4. Osteopenia. Chronic-appearing compression deformity at T12. Partial  bony fusion at T11-12.     Radiation dose reduction techniques were utilized, including automated  exposure control and exposure modulation based on body size.        This report was finalized on 5/29/2024 4:54 PM by Dr. Mik Mercado M.D on Workstation: [id number]     XR Chest 1 View  Narrative: XR CHEST 1 VW-     HISTORY: Female who is 89 years-old, trauma     TECHNIQUE: Frontal view of the chest     COMPARISON: 8/26/2023     FINDINGS: The heart size is normal. Aorta is tortuous, calcified.  Pulmonary vasculature is unremarkable. Left-sided pacemaker and cardiac  leads are seen. No focal pulmonary consolidation, pleural effusion, or  pneumothorax. Old granulomatous disease is apparent. No acute osseous  process.     Impression: No focal pulmonary consolidation. Tortuous aorta. Follow-up  as clinically indicated.           This report was finalized on 5/29/2024 3:25 PM by Dr. Feliberto Copeland M.D on Workstation: [id number]     CT Thoracic Spine Without Contrast  Narrative: CT OF THE THORACIC SPINE WITHOUT CONTRAST 05/29/2024     HISTORY: Back pain. Back injury.     Axial images were obtained through the thoracic spine without contrast.  Sagittal and coronal reconstruction images were reviewed.     There is minimal loss of height of the anterior aspect of the T3  vertebral body but this does not appear acute. There  is moderate  compression deformity of T12 with approximately 30% to 40% loss of the  mid to anterior vertebral body height. This appears similar to the  03/05/2021 study. Degenerative endplate changes with disc space  narrowing are seen at T8-T9. There is minimal osteophytosis in several  thoracic vertebrae.     No acute fractures are seen.     There is minimal anterolisthesis of T11 on T12 similar to the 03/05/2021  study. No other subluxation is seen. Small posterior central and right  paracentral disc protrusion is seen at T6-7. Small posterior central and  left paracentral disc protrusion is seen at T7-8. Moderate size  suspected posterior central disc protrusion is seen at T8-T9 which  appears slightly hyperdense and best seen on axial image 60. These were  also seen on the 03/05/2021 study.     Small left paracentral disc protrusion is seen at T9-10 best seen on  axial image 69 and also present on the previous study.     Impression: 1. Old T12 compression fracture. This appears stable since the  03/05/2021 study.  2. No acute fractures or subluxation is seen.  3. Multilevel thoracic degenerative disc disease as discussed above.        Radiation dose reduction techniques were utilized, including automated  exposure control and exposure modulation based on body size.          Scheduled Medications  acetaminophen, 1,000 mg, Oral, TID  apixaban, 5 mg, Oral, Q12H  [Held by provider] bumetanide, 0.5 mg, Oral, Daily  calcium 500 mg vitamin D 5 mcg (200 UT), 2 tablet, Oral, BID  cetirizine, 10 mg, Oral, Daily  clopidogrel, 75 mg, Oral, Daily  famotidine, 20 mg, Oral, BID AC  gabapentin, 200 mg, Oral, Nightly  insulin glargine, 4 Units, Subcutaneous, Q12H  insulin lispro, 2-7 Units, Subcutaneous, 4x Daily AC & at Bedtime  Lidocaine, 1 patch, Transdermal, Q24H  miconazole, , Topical, Q12H  NIFEdipine XL, 60 mg, Oral, Daily  pravastatin, 20 mg, Oral, Nightly  sodium chloride, 10 mL, Intravenous, Q12H  Thyroid, 60 mg,  Oral, Q AM    Infusions   Diet  Diet: Cardiac; Healthy Heart (2-3 Na+); Fluid Consistency: Thin (IDDSI 0)       Assessment/Plan     Active Hospital Problems    Diagnosis  POA   • **Abdominal pain [R10.9]  Yes   • Hypertensive urgency [I16.0]  Yes   • Nonspecific pain in the neck region [M54.2]  Yes   • Dysphagia [R13.10]  Yes   • Dysphonia [R49.0]  Yes   • Localized swelling of right upper extremity [R22.31]  Yes   • Chronic heart failure with preserved ejection fraction (HFpEF) [I50.32]  Yes   • Presence of cardiac pacemaker [Z95.0]  Yes   • Diabetic polyneuropathy associated with type 2 diabetes mellitus [E11.42]  Yes   • PAF (paroxysmal atrial fibrillation) [I48.0]  Yes   • Essential hypertension [I10]  Yes   • Uncontrolled type 2 diabetes mellitus with hypoglycemia without coma [E11.649]  Yes      Resolved Hospital Problems   No resolved problems to display.       89 y.o. female admitted with Abdominal pain.    89 year old woman who presented with acute mid back pain after trying to move a chair in her home and was found to have an old T12 fracture on CT.  MRI was recommended however her pacemaker is incompatible.    T 12 compression fracture-discussed with Amanda Solomon, neurosurgery APRN. A TLSO brace has been fitted and she will need to wear this to allow her fracture time to heal. She will follow up with neurosurgery in 6-8 weeks for xrays. Pain control. Bowel regimen  BRITNEY vs. CKD 3-historically she has had some elevated creatinines in the range of CKD 3, but not since September 2023 per chart review. She was hypotensive this morning and I wonder if this is prerenal. I will hold her bumex and dilaudid and administer some fluids. Check a PVR.  Chronic afib with PPM-eliquis for stroke prophylaxis. 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 SLP evaluating   Eliquis (home med) for DVT prophylaxis.  Full code.  Discussed with patient, family, nursing staff, and consulting provider.  Anticipate discharge to SNU facility in 1-2 days.      Gunner Mclaughlin MD  [facility]  05/30/24  14:37 EDT    I wore protective equipment throughout this patient encounter including a face mask, gloves and protective eyewear.  Hand hygiene was performed before donning protective equipment and after removal when leaving the room.

## 2024-05-30 NOTE — PLAN OF CARE
Goal Outcome Evaluation:  Plan of Care Reviewed With: patient, daughter           Outcome Evaluation: Pt is an 88 yo female who presents from home with acute back pain. Thoracic CT positive for chronic T12 fracture. Prior to admission, pt was living at home alone and independent with mobility using rwx. Upon exam, pt demos generalized weakness, decreased endurance, and ongoing back pain limiting activity tolerance. Pt required min/mod A x2 for bed mobility and min A x2 to stand at EOB. Ambulation was limited to a few steps at EOB with min A x2 and rwx. Back pain limited overall activity tolerance. Pt will continue to benefit from PT to address impairments and increase independence with mobility. Recommend DC to SNF pending progress with pain and mobility.      Anticipated Discharge Disposition (PT): skilled nursing facility

## 2024-05-31 ENCOUNTER — APPOINTMENT (OUTPATIENT)
Dept: GENERAL RADIOLOGY | Facility: HOSPITAL | Age: 89
End: 2024-05-31
Payer: MEDICARE

## 2024-05-31 VITALS
OXYGEN SATURATION: 99 % | HEART RATE: 64 BPM | BODY MASS INDEX: 32.29 KG/M2 | SYSTOLIC BLOOD PRESSURE: 185 MMHG | TEMPERATURE: 97.7 F | HEIGHT: 57 IN | DIASTOLIC BLOOD PRESSURE: 67 MMHG | WEIGHT: 149.69 LBS | RESPIRATION RATE: 16 BRPM

## 2024-05-31 PROBLEM — R13.10 DYSPHAGIA: Status: RESOLVED | Noted: 2024-05-08 | Resolved: 2024-05-31

## 2024-05-31 PROBLEM — R49.0 DYSPHONIA: Status: RESOLVED | Noted: 2024-05-08 | Resolved: 2024-05-31

## 2024-05-31 LAB
ALBUMIN SERPL-MCNC: 3.5 G/DL (ref 3.5–5.2)
ALBUMIN/GLOB SERPL: 1.5 G/DL
ALP SERPL-CCNC: 130 U/L (ref 39–117)
ALT SERPL W P-5'-P-CCNC: 14 U/L (ref 1–33)
ANION GAP SERPL CALCULATED.3IONS-SCNC: 7 MMOL/L (ref 5–15)
AST SERPL-CCNC: 16 U/L (ref 1–32)
BASOPHILS # BLD AUTO: 0.05 10*3/MM3 (ref 0–0.2)
BASOPHILS NFR BLD AUTO: 0.8 % (ref 0–1.5)
BILIRUB SERPL-MCNC: 0.4 MG/DL (ref 0–1.2)
BUN SERPL-MCNC: 26 MG/DL (ref 8–23)
BUN/CREAT SERPL: 25.5 (ref 7–25)
CALCIUM SPEC-SCNC: 9.3 MG/DL (ref 8.6–10.5)
CHLORIDE SERPL-SCNC: 107 MMOL/L (ref 98–107)
CO2 SERPL-SCNC: 30 MMOL/L (ref 22–29)
CREAT SERPL-MCNC: 1.02 MG/DL (ref 0.57–1)
DEPRECATED RDW RBC AUTO: 40.5 FL (ref 37–54)
EGFRCR SERPLBLD CKD-EPI 2021: 52.7 ML/MIN/1.73
EOSINOPHIL # BLD AUTO: 0.25 10*3/MM3 (ref 0–0.4)
EOSINOPHIL NFR BLD AUTO: 4.1 % (ref 0.3–6.2)
ERYTHROCYTE [DISTWIDTH] IN BLOOD BY AUTOMATED COUNT: 12.4 % (ref 12.3–15.4)
GLOBULIN UR ELPH-MCNC: 2.4 GM/DL
GLUCOSE BLDC GLUCOMTR-MCNC: 208 MG/DL (ref 70–130)
GLUCOSE BLDC GLUCOMTR-MCNC: 88 MG/DL (ref 70–130)
GLUCOSE SERPL-MCNC: 70 MG/DL (ref 65–99)
HCT VFR BLD AUTO: 38.5 % (ref 34–46.6)
HGB BLD-MCNC: 12.3 G/DL (ref 12–15.9)
IMM GRANULOCYTES # BLD AUTO: 0.01 10*3/MM3 (ref 0–0.05)
IMM GRANULOCYTES NFR BLD AUTO: 0.2 % (ref 0–0.5)
LYMPHOCYTES # BLD AUTO: 2.13 10*3/MM3 (ref 0.7–3.1)
LYMPHOCYTES NFR BLD AUTO: 34.6 % (ref 19.6–45.3)
MCH RBC QN AUTO: 28.6 PG (ref 26.6–33)
MCHC RBC AUTO-ENTMCNC: 31.9 G/DL (ref 31.5–35.7)
MCV RBC AUTO: 89.5 FL (ref 79–97)
MONOCYTES # BLD AUTO: 0.68 10*3/MM3 (ref 0.1–0.9)
MONOCYTES NFR BLD AUTO: 11 % (ref 5–12)
NEUTROPHILS NFR BLD AUTO: 3.04 10*3/MM3 (ref 1.7–7)
NEUTROPHILS NFR BLD AUTO: 49.3 % (ref 42.7–76)
NRBC BLD AUTO-RTO: 0 /100 WBC (ref 0–0.2)
PLATELET # BLD AUTO: 165 10*3/MM3 (ref 140–450)
PMV BLD AUTO: 11.3 FL (ref 6–12)
POTASSIUM SERPL-SCNC: 3.7 MMOL/L (ref 3.5–5.2)
PROT SERPL-MCNC: 5.9 G/DL (ref 6–8.5)
QT INTERVAL: 481 MS
QTC INTERVAL: 501 MS
RBC # BLD AUTO: 4.3 10*6/MM3 (ref 3.77–5.28)
SODIUM SERPL-SCNC: 144 MMOL/L (ref 136–145)
WBC NRBC COR # BLD AUTO: 6.16 10*3/MM3 (ref 3.4–10.8)

## 2024-05-31 PROCEDURE — 63710000001 INSULIN LISPRO (HUMAN) PER 5 UNITS: Performed by: INTERNAL MEDICINE

## 2024-05-31 PROCEDURE — G0378 HOSPITAL OBSERVATION PER HR: HCPCS

## 2024-05-31 PROCEDURE — 97530 THERAPEUTIC ACTIVITIES: CPT

## 2024-05-31 PROCEDURE — 80053 COMPREHEN METABOLIC PANEL: CPT | Performed by: INTERNAL MEDICINE

## 2024-05-31 PROCEDURE — 92611 MOTION FLUOROSCOPY/SWALLOW: CPT

## 2024-05-31 PROCEDURE — 74230 X-RAY XM SWLNG FUNCJ C+: CPT

## 2024-05-31 PROCEDURE — 85025 COMPLETE CBC W/AUTO DIFF WBC: CPT | Performed by: INTERNAL MEDICINE

## 2024-05-31 PROCEDURE — 82948 REAGENT STRIP/BLOOD GLUCOSE: CPT

## 2024-05-31 RX ORDER — OXYCODONE HYDROCHLORIDE 5 MG/1
5 TABLET ORAL EVERY 6 HOURS PRN
Qty: 12 TABLET | Refills: 0 | Status: SHIPPED | OUTPATIENT
Start: 2024-05-31

## 2024-05-31 RX ORDER — LIDOCAINE 4 G/G
1 PATCH TOPICAL
Start: 2024-06-01

## 2024-05-31 RX ORDER — AMOXICILLIN 250 MG
2 CAPSULE ORAL 2 TIMES DAILY
Start: 2024-05-31 | End: 2024-06-07

## 2024-05-31 RX ORDER — BUMETANIDE 0.5 MG/1
0.5 TABLET ORAL DAILY
Start: 2024-06-01

## 2024-05-31 RX ORDER — GABAPENTIN 100 MG/1
200 CAPSULE ORAL NIGHTLY
Qty: 6 CAPSULE | Refills: 0 | Status: SHIPPED | OUTPATIENT
Start: 2024-05-31 | End: 2024-06-03

## 2024-05-31 RX ADMIN — OXYCODONE HYDROCHLORIDE 5 MG: 5 TABLET ORAL at 09:23

## 2024-05-31 RX ADMIN — BARIUM SULFATE 4 ML: 980 POWDER, FOR SUSPENSION ORAL at 08:28

## 2024-05-31 RX ADMIN — LEVOTHYROXINE, LIOTHYRONINE 60 MG: 38; 9 TABLET ORAL at 06:07

## 2024-05-31 RX ADMIN — OXYCODONE HYDROCHLORIDE 5 MG: 5 TABLET ORAL at 04:33

## 2024-05-31 RX ADMIN — CALCIUM CARBONATE-VITAMIN D TAB 500 MG-200 UNIT 2 TABLET: 500-200 TAB at 09:20

## 2024-05-31 RX ADMIN — BARIUM SULFATE 55 ML: 0.81 POWDER, FOR SUSPENSION ORAL at 08:27

## 2024-05-31 RX ADMIN — LIDOCAINE 1 PATCH: 4 PATCH TOPICAL at 09:20

## 2024-05-31 RX ADMIN — APIXABAN 5 MG: 5 TABLET, FILM COATED ORAL at 09:20

## 2024-05-31 RX ADMIN — BARIUM SULFATE 50 ML: 400 SUSPENSION ORAL at 08:28

## 2024-05-31 RX ADMIN — OXYCODONE HYDROCHLORIDE 5 MG: 5 TABLET ORAL at 14:59

## 2024-05-31 RX ADMIN — NIFEDIPINE 60 MG: 60 TABLET, FILM COATED, EXTENDED RELEASE ORAL at 09:20

## 2024-05-31 RX ADMIN — MICONAZOLE NITRATE: 2 POWDER TOPICAL at 09:31

## 2024-05-31 RX ADMIN — CETIRIZINE HYDROCHLORIDE 10 MG: 10 TABLET ORAL at 09:20

## 2024-05-31 RX ADMIN — Medication 10 ML: at 09:31

## 2024-05-31 RX ADMIN — BARIUM SULFATE 1 TEASPOON(S): 0.6 CREAM ORAL at 08:28

## 2024-05-31 RX ADMIN — INSULIN LISPRO 3 UNITS: 100 INJECTION, SOLUTION INTRAVENOUS; SUBCUTANEOUS at 12:37

## 2024-05-31 RX ADMIN — CLOPIDOGREL BISULFATE 75 MG: 75 TABLET, FILM COATED ORAL at 09:20

## 2024-05-31 RX ADMIN — FAMOTIDINE 20 MG: 20 TABLET, FILM COATED ORAL at 09:20

## 2024-05-31 NOTE — PLAN OF CARE
Goal Outcome Evaluation:  Plan of Care Reviewed With: patient           Outcome Evaluation: VFSS completed. Pt c/o choking before study began and was assessed by Triage RN. Pt recovered quickly. Of note, patient arrived without brace in place. RN notified over the phone and gave okay to proceed with assessment. Shana Martinez, Radiology APRN present. Silent aspiration with thins and soft solids. No aspiration with nectar, puree, or regular solids. SLP recs mech soft, no mixed, and nectar, straws okay. Meds whole or crushed with nectar or puree. Would benefit greatly from dysphagia therapy at next level of care. HCS notified of results over the phone and all questions answered.

## 2024-05-31 NOTE — PLAN OF CARE
Goal Outcome Evaluation:  Plan of Care Reviewed With: patient        Progress: improving  Outcome Evaluation: Pt demos good progress with strength and endurance, as evidenced by tolerance of increased activity today. Pt pre medicated prior to therapy session and appeared to have better pain control, as well. Pt performed bed mobility with min A. Pt required total assist for donning TLSO at EOB. Pt stood with min A and ambulated 25' with CGA and rwx. Pt will continue to benefit from PT to address impairments and increase independence with mobility. Rec DC to SNF.      Anticipated Discharge Disposition (PT): skilled nursing facility

## 2024-05-31 NOTE — THERAPY TREATMENT NOTE
Patient Name: Roland Russell  : 1934    MRN: [id number]                              Today's Date: 2024       Admit Date: 2024    Visit Dx:     ICD-10-CM ICD-9-CM   1. Acute left-sided thoracic back pain  M54.6 724.1   2. Left upper quadrant abdominal pain  R10.12 789.02   3. Coagulopathy: Eliquis and Plavix induced  D68.9 286.9   4. Compression fracture of T12 vertebra, initial encounter  S22.080A 805.2     Patient Active Problem List   Diagnosis    Legal blindness    Uncontrolled type 2 diabetes mellitus with hypoglycemia without coma    GERD (gastroesophageal reflux disease)    Mixed hyperlipidemia    Hypothyroidism, acquired    Osteoarthritis, multiple sites    Essential hypertension    Senile osteoporosis    Renal insufficiency    T12 compression fracture    Vitamin D deficiency    Severe headache    Carotid body tumor    Glomus jugulare tumor    Sleep apnea, obstructive    Morbidly obese    Abnormal weight gain    Localized edema    Compression fracture of T8 vertebra with delayed healing    PAF (paroxysmal atrial fibrillation)    Dry scalp    Deformity of both feet    Acute encephalopathy    Lactic acidosis    Diverticulitis    Ankle fracture    Type 2 diabetes mellitus with hypoglycemia, with long-term current use of insulin    Abnormal nuclear stress test    Coronary artery disease involving native coronary artery of native heart    S/P right coronary artery (RCA) stent placement    Diastolic CHF, acute    Acute CHF    Type 2 diabetes mellitus with hyperglycemia    Acute cystitis with hematuria    Diabetic polyneuropathy associated with type 2 diabetes mellitus    Presence of cardiac pacemaker    Hypoxia    Cerebrovascular accident (CVA)    Chronic heart failure with preserved ejection fraction (HFpEF)    Subclavian vein stenosis, right    Carotid artery stenosis    Inability to swallow    Nonspecific pain in the neck region    Dysphagia    Dysphonia    Localized swelling of right upper  "extremity    Abdominal pain    Hypertensive urgency     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    CAD (coronary artery disease)     unspecified    Carotid body tumor     LEFT    Difficulty walking     H/O complete eye exam 10/2016    Headache     OFF AND ON BACK OF HEAD, DOWN NECK TO SHOULDER    History of DVT (deep vein thrombosis)     History of glaucoma     History of poliomyelitis     AGE 9    History of surgery on arm     left arm    HLD (hyperlipidemia)     Hypertension     Hypothyroidism, acquired     Left carotid artery stenosis 11/21/2019    Legal blindness     SOME PERIPHERAL VISION ON LEFT, SOME VISION ON RIGHT    Memory loss     SOME SHORT TERM    Migraine     Neck mass 02/03/2022    Numbness     LEFT LEG     Osteoarthritis, multiple sites     Osteoporosis     Pacemaker     Rectal bleeding     X1, WITH BM    Renal disorder     Sleep apnea     DOES NOT USE A MACHINE; unspecified    TIA (transient ischemic attack) 12/24/2018    ?, HAD SPELL UNABLE TO TALK    Type 2 diabetes mellitus     Type 2 diabetes mellitus without complications     Type 2 diabetes mellitus, uncontrolled      Past Surgical History:   Procedure Laterality Date    APPENDECTOMY  1989    CARDIAC CATHETERIZATION N/A 4/13/2023    Procedure: Left Heart Cath;  Surgeon: Rikki Oconnor MD;  Location:  [facility];  Service: Cardiology;  Laterality: N/A;    CARDIAC CATHETERIZATION N/A 4/13/2023    Procedure: Coronary angiography;  Surgeon: Rikki Oconnor MD;  Location:  [facility];  Service: Cardiology;  Laterality: N/A;    CARDIAC CATHETERIZATION N/A 4/13/2023    Procedure: Left ventriculography;  Surgeon: Rikki Oconnor MD;  Location:  [facility];  Service: Cardiology;  " Laterality: N/A;    CARDIAC CATHETERIZATION N/A 4/20/2023    Procedure: Percutaneous Coronary Intervention;  Surgeon: Rikki Oconnor MD;  Location: [facility] CATH INVASIVE LOCATION;  Service: Cardiology;  Laterality: N/A;    CARDIAC CATHETERIZATION N/A 4/20/2023    Procedure: Atherectomy-coronary;  Surgeon: Rikki Oconnor MD;  [facility];  Service: Cardiology;  Laterality: N/A;    CARDIAC CATHETERIZATION N/A 4/20/2023    Procedure: Stent CAROL coronary;  Surgeon: Rikki Oconnor MD;  [facility];  Service: Cardiology;  Laterality: N/A;    CARDIAC PACEMAKER PLACEMENT  11/25/2004    CAROTID ENDARTERECTOMY Left 12/13/2019    Procedure: LEFT CAROTID BODY TUMOR RESECTION;  Surgeon: Bryan Severino MD;  Location: [facility] MAIN OR;  Service: Vascular    CATARACT EXTRACTION Bilateral 1997    CEREBRAL ANGIOGRAM Bilateral 12/6/2019    Procedure: CAROTID ARTERIOGRAM BILATERAL, RIGHT WRIST APPROACH;  Surgeon: Bryan Severino MD;  Location: [facility] OR 18/19;  Service: Vascular    CHOLECYSTECTOMY  1989    COLONOSCOPY  2013    dr montes    ELBOW PROCEDURE  2016    ENDOSCOPY  12/06/2012    Dr. Montes; food in stomach, gastritis    EXCISION LESION  1994    BACK CYST BENGIN    EYE SURGERY  12/2012    GLAUCOMA SURGERY Bilateral 1995    laser surgery    HARDWARE REMOVAL Left     ELBOW    HYSTERECTOMY  1986    INTERVENTIONAL RADIOLOGY PROCEDURE N/A 4/20/2023    Procedure: Intravascular Ultrasound;  Surgeon: Rikki Oconnor MD;  Location: [facility] CATH INVASIVE LOCATION;  Service: Cardiology;  Laterality: N/A;    ORIF ELBOW FRACTURE Left     PACEMAKER IMPLANTATION      PACEMAKER REPLACEMENT  06/2013      General Information       Row Name 05/31/24 0959          Physical Therapy Time and Intention    Document Type therapy note (daily note)  -EE     Mode of Treatment individual therapy;physical therapy  -EE       Row Name 05/31/24 0959          General Information    Existing Precautions/Restrictions  fall;spinal;TLSO;brace worn when out of bed  -EE     Barriers to Rehab none identified  -EE       Row Name 05/31/24 0959          Cognition    Orientation Status (Cognition) oriented x 4  -EE       Row Name 05/31/24 0959          Safety Issues, Functional Mobility    Impairments Affecting Function (Mobility) endurance/activity tolerance;strength;pain  -EE               User Key  (r) = Recorded By, (t) = Taken By, (c) = Cosigned By      Initials Name Provider Type    EE Ayala Ott PT Physical Therapist                   Mobility       Row Name 05/31/24 0959          Bed Mobility    Supine-Sit [place] (Bed Mobility) minimum assist (75% patient effort);verbal cues  -EE     Assistive Device (Bed Mobility) head of bed elevated;bed rails  -EE     Comment, (Bed Mobility) cues for log roll; increased time required due to pain  -EE       Row Name 05/31/24 0959          Sit-Stand Transfer    Sit-Stand [place] (Transfers) minimum assist (75% patient effort);verbal cues  -EE     Assistive Device (Sit-Stand Transfers) walker, front-wheeled  -EE     Comment, (Sit-Stand Transfer) STS x2 from EOB; cues for hand placement  -EE       Row Name 05/31/24 0959          Gait/Stairs (Locomotion)    [place] Level (Gait) contact guard;verbal cues  -EE     Assistive Device (Gait) walker, front-wheeled  -EE     Distance in Feet (Gait) 25  -EE     Deviations/Abnormal Patterns (Gait) em decreased;stride length decreased  -EE     Bilateral Gait Deviations forward flexed posture;heel strike decreased  -EE     Comment, (Gait/Stairs) Cues for upright posture and to stay close to rwx for safety. Pt took seated rest break at EOB after ambulation, then was able to ambulate 15' from bed to chair.  -EE               User Key  (r) = Recorded By, (t) = Taken By, (c) = Cosigned By      Initials Name Provider Type    Ayala Tucker PT Physical Therapist                   Obj/Interventions       Row Name 05/31/24 1001          Balance     Balance Assessment sitting static balance;standing static balance;standing dynamic balance  -EE     Static Sitting Balance supervision  -EE     Position, Sitting Balance unsupported;sitting edge of bed  -EE     Static Standing Balance standby assist;contact guard  -EE     Dynamic Standing Balance contact guard  -EE     Position/Device Used, Standing Balance supported;walker, front-wheeled  -EE               User Key  (r) = Recorded By, (t) = Taken By, (c) = Cosigned By      Initials Name Provider Type    Ayala Tucker PT Physical Therapist                   Goals/Plan    No documentation.                  Clinical Impression       Row Name 05/31/24 1001          Pain    Pretreatment Pain Rating 5/10  -EE     Posttreatment Pain Rating 5/10  -EE     Pain Location - back  -EE     Pain Intervention(s) Repositioned;Medication (See MAR)  -EE       Row Name 05/31/24 1001          Plan of Care Review    Plan of Care Reviewed With patient  -EE     Progress improving  -EE     Outcome Evaluation Pt demos good progress with strength and endurance, as evidenced by tolerance of increased activity today. Pt pre medicated prior to therapy session and appeared to have better pain control, as well. Pt performed bed mobility with min A. Pt required total assist for donning TLSO at EOB. Pt stood with min A and ambulated 25' with CGA and rwx. Pt will continue to benefit from PT to address impairments and increase independence with mobility. Rec DC to SNF.  -EE       Row Name 05/31/24 1001          Positioning and Restraints    Pre-Treatment Position in bed  -EE     Post Treatment Position chair  -EE     In Chair reclined;call light within reach;encouraged to call for assist;exit alarm on;with family/caregiver;notified nsg;legs elevated;with brace  -EE               User Key  (r) = Recorded By, (t) = Taken By, (c) = Cosigned By      Initials Name Provider Type    Ayala Tucker, PT Physical Therapist                   Outcome  Measures       Row Name 05/31/24 1003          How much help from another person do you currently need...    Turning from your back to your side while in flat bed without using bedrails? 3  -EE     Moving from lying on back to sitting on the side of a flat bed without bedrails? 2  -EE     Moving to and from a bed to a chair (including a wheelchair)? 3  -EE     Standing up from a chair using your arms (e.g., wheelchair, bedside chair)? 3  -EE     Climbing 3-5 steps with a railing? 3  -EE     To walk in hospital room? 3  -EE     AM-PAC 6 Clicks Score (PT) 17  -EE     Highest Level of Mobility Goal 5 --> Static standing  -EE               User Key  (r) = Recorded By, (t) = Taken By, (c) = Cosigned By      Initials Name Provider Type    EE Ayala Ott, ZARA Physical Therapist                                 Physical Therapy Education       Title: PT OT SLP Therapies (In Progress)       Topic: Physical Therapy (In Progress)       Point: Mobility training (Done)       Learning Progress Summary             Patient Acceptance, E,TB, VU,NR by EE at 5/31/2024 1003    Acceptance, E, VU,NR by EE at 5/30/2024 1020                         Point: Home exercise program (Not Started)       Learner Progress:  Not documented in this visit.              Point: Body mechanics (Done)       Learning Progress Summary             Patient Acceptance, E,TB, VU,NR by EE at 5/31/2024 1003    Acceptance, E, VU,NR by EE at 5/30/2024 1020                         Point: Precautions (Done)       Learning Progress Summary             Patient Acceptance, E,TB, VU,NR by EE at 5/31/2024 1003                                         User Key       Initials Effective Dates Name Provider Type Discipline    EE 06/16/21 -  Ayala Ott, PT Physical Therapist PT                  PT Recommendation and Plan  Planned Therapy Interventions (PT): balance training, bed mobility training, gait training, home exercise program, patient/family education, strengthening,  stair training, ROM (range of motion), transfer training  Plan of Care Reviewed With: patient  Progress: improving  Outcome Evaluation: Pt demos good progress with strength and endurance, as evidenced by tolerance of increased activity today. Pt pre medicated prior to therapy session and appeared to have better pain control, as well. Pt performed bed mobility with min A. Pt required total assist for donning TLSO at EOB. Pt stood with min A and ambulated 25' with CGA and rwx. Pt will continue to benefit from PT to address impairments and increase independence with mobility. Rec DC to SNF.     Time Calculation:         PT Charges       Row Name 05/31/24 1004             Time Calculation    Start Time 0930  -EE      Stop Time 0953  -EE      Time Calculation (min) 23 min  -EE      PT Received On 05/31/24  -EE      PT - Next Appointment 06/03/24  -EE         Time Calculation- PT    Total Timed Code Minutes- PT 23 minute(s)  -EE         Timed Charges    61546 - PT Therapeutic Activity Minutes 23  -EE         Total Minutes    Timed Charges Total Minutes 23  -EE       Total Minutes 23  -EE                User Key  (r) = Recorded By, (t) = Taken By, (c) = Cosigned By      Initials Name Provider Type    EE Ayala Ott, PT Physical Therapist                  Therapy Charges for Today       Code Description Service Date Service Provider Modifiers Qty    [id number]  PT EVAL MOD COMPLEXITY 2 5/30/2024 Ayala Ott, PT GP 1    [id number]  PT THERAPEUTIC ACT EA 15 MIN 5/31/2024 Ayala Ott, PT GP 2            PT G-Codes  Outcome Measure Options: AM-PAC 6 Clicks Daily Activity (OT), Modified Cathy  AM-PAC 6 Clicks Score (PT): 17  AM-PAC 6 Clicks Score (OT): 15  Modified Cathy Scale: 4 - Moderately severe disability.  Unable to walk without assistance, and unable to attend to own bodily needs without assistance.  PT Discharge Summary  Anticipated Discharge Disposition (PT): skilled nursing facility    Ayala Ott  PT  5/31/2024

## 2024-05-31 NOTE — PROGRESS NOTES
Discharge Planning Assessment  [facility]     Patient Name: Roland Russell  MRN: [id number]  Today's Date: 5/31/2024    Admit Date: 5/29/2024    Plan: Bed available today  at [facility] skilled rehab, [facility] Medicare approved skilled rehab   Discharge Needs Assessment    No documentation.                  Discharge Plan       Row Name 05/31/24 1225       Plan    Plan Bed available today  at [facility] skilled rehab, [facility] Medicare approved skilled rehab                  Continued Care and Services - Admitted Since 5/29/2024       Destination Coordination complete.      Service Provider Request Status Selected Services Address Phone Fax Patient Preferred    [facility]  Selected Skilled Nursing [address] [id number] [phone] --                     Demographic Summary    No documentation.                  Functional Status    No documentation.                  Psychosocial    No documentation.                  Abuse/Neglect    No documentation.                  Legal    No documentation.                  Substance Abuse    No documentation.                  Patient Forms    No documentation.                     Venecia Wolff RN

## 2024-05-31 NOTE — NURSING NOTE
Patient discharged with all belongings and paperwork IV and monitor removed, patient's son at bedside

## 2024-05-31 NOTE — PROGRESS NOTES
Discharge Planning Assessment  [facility]     Patient Name: Roland Russell  MRN: [id number]  Today's Date: 5/31/2024    Admit Date: 5/29/2024    Plan: [facility] skilled rehab today, family will transport to [facility]   Discharge Needs Assessment    No documentation.                  Discharge Plan       Row Name 05/31/24 1303       Plan    Plan [facility] skilled rehab today, family will transport to [facility]    Final Discharge Disposition Code 03 - skilled nursing facility (SNF)    Final Note Son will transport to [facility]. Talha HENLEY      Row Name 05/31/24 1225       Plan    Plan Bed available today  at [facility] rehab, [employer] Medicare approved skilled rehab                  Continued Care and Services - Admitted Since 5/29/2024       Destination Coordination complete.      Service Provider Request Status Selected Services Address Phone Fax Patient Preferred    [facility]  Selected Skilled Nursing [address] [phone] --                  Expected Discharge Date and Time       Expected Discharge Date Expected Discharge Time    May 31, 2024            Demographic Summary    No documentation.                  Functional Status    No documentation.                  Psychosocial    No documentation.                  Abuse/Neglect    No documentation.                  Legal    No documentation.                  Substance Abuse    No documentation.                  Patient Forms    No documentation.                     Venecia Wolff, RN

## 2024-05-31 NOTE — CASE MANAGEMENT/SOCIAL WORK
Post-Acute Authorization Submission      Post Acute Pre-Cert Documentation  Request Submitted by Facility - Type:: Hospital  Post-Acute Authorization Type Submitted:: SNF  Date Post Acute Pre-Cert Inititated per Facility: 05/31/24  Date Post Acute Pre-Cert Completed: 05/31/24  Accepting Facility: [facility] Discharge Date Requested: 05/31/24  All Clinicals Submitted?: Yes  Had Accepting Facility at Time of Submission: Yes  Response Received from Insurance?: Approval  Response Communicated to:: , Accepting Facility Liaison, Accepting Facility Auth Department  Authorization Number:: [id number]  Post Acute Pre-Cert Initiated Comment: VALID TO ADMIT UPTO 11:59PM ON 6/5/24.              Marcelino Toussaint, PCT

## 2024-05-31 NOTE — MBS/VFSS/FEES
Acute Care - Speech Language Pathology   Swallow Initial Evaluation [facility]     Patient Name: Roland Russell  : 1934  MRN: [id number]  Today's Date: 2024               Admit Date: 2024    Visit Dx:     ICD-10-CM ICD-9-CM   1. Acute left-sided thoracic back pain  M54.6 724.1   2. Left upper quadrant abdominal pain  R10.12 789.02   3. Coagulopathy: Eliquis and Plavix induced  D68.9 286.9   4. Compression fracture of T12 vertebra, initial encounter  S22.080A 805.2     Patient Active Problem List   Diagnosis    Legal blindness    Uncontrolled type 2 diabetes mellitus with hypoglycemia without coma    GERD (gastroesophageal reflux disease)    Mixed hyperlipidemia    Hypothyroidism, acquired    Osteoarthritis, multiple sites    Essential hypertension    Senile osteoporosis    Renal insufficiency    T12 compression fracture    Vitamin D deficiency    Severe headache    Carotid body tumor    Glomus jugulare tumor    Sleep apnea, obstructive    Morbidly obese    Abnormal weight gain    Localized edema    Compression fracture of T8 vertebra with delayed healing    PAF (paroxysmal atrial fibrillation)    Dry scalp    Deformity of both feet    Acute encephalopathy    Lactic acidosis    Diverticulitis    Ankle fracture    Type 2 diabetes mellitus with hypoglycemia, with long-term current use of insulin    Abnormal nuclear stress test    Coronary artery disease involving native coronary artery of native heart    S/P right coronary artery (RCA) stent placement    Diastolic CHF, acute    Acute CHF    Type 2 diabetes mellitus with hyperglycemia    Acute cystitis with hematuria    Diabetic polyneuropathy associated with type 2 diabetes mellitus    Presence of cardiac pacemaker    Hypoxia    Cerebrovascular accident (CVA)    Chronic heart failure with preserved ejection fraction (HFpEF)    Subclavian vein stenosis, right    Carotid artery stenosis    Inability to swallow    Nonspecific pain in the neck region  "   Dysphagia    Dysphonia    Localized swelling of right upper extremity    Abdominal pain    Hypertensive urgency     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    CAD (coronary artery disease)     unspecified    Carotid body tumor     LEFT    Difficulty walking     H/O complete eye exam 10/2016    Headache     OFF AND ON BACK OF HEAD, DOWN NECK TO SHOULDER    History of DVT (deep vein thrombosis)     History of glaucoma     History of poliomyelitis     AGE 9    History of surgery on arm     left arm    HLD (hyperlipidemia)     Hypertension     Hypothyroidism, acquired     Left carotid artery stenosis 11/21/2019    Legal blindness     SOME PERIPHERAL VISION ON LEFT, SOME VISION ON RIGHT    Memory loss     SOME SHORT TERM    Migraine     Neck mass 02/03/2022    Numbness     LEFT LEG     Osteoarthritis, multiple sites     Osteoporosis     Pacemaker     Rectal bleeding     X1, WITH BM    Renal disorder     Sleep apnea     DOES NOT USE A MACHINE; unspecified    TIA (transient ischemic attack) 12/24/2018    ?, HAD SPELL UNABLE TO TALK    Type 2 diabetes mellitus     Type 2 diabetes mellitus without complications     Type 2 diabetes mellitus, uncontrolled      Past Surgical History:   Procedure Laterality Date    APPENDECTOMY  1989    CARDIAC CATHETERIZATION N/A 4/13/2023    Procedure: Left Heart Cath;  Surgeon: Rikki Oconnor MD;  Location:  [facility];  Service: Cardiology;  Laterality: N/A;    CARDIAC CATHETERIZATION N/A 4/13/2023    Procedure: Coronary angiography;  Surgeon: Rikki Oconnor MD;  Location:  [facility];  Service: Cardiology;  Laterality: N/A;    CARDIAC CATHETERIZATION N/A 4/13/2023    Procedure: Left ventriculography;  Surgeon: Rikki Oconnor MD;  " Location: [facility] CATH INVASIVE LOCATION;  Service: Cardiology;  Laterality: N/A;    CARDIAC CATHETERIZATION N/A 4/20/2023    Procedure: Percutaneous Coronary Intervention;  Surgeon: Rikki Oconnor MD;  Location: [facility] CATH INVASIVE LOCATION;  Service: Cardiology;  Laterality: N/A;    CARDIAC CATHETERIZATION N/A 4/20/2023    Procedure: Atherectomy-coronary;  Surgeon: Rikki Oconnor MD;  Location: [facility] CATH INVASIVE LOCATION;  Service: Cardiology;  Laterality: N/A;    CARDIAC CATHETERIZATION N/A 4/20/2023    Procedure: Stent CAROL coronary;  Surgeon: Rikki Oconnor MD;  Location: [facility] CATH INVASIVE LOCATION;  Service: Cardiology;  Laterality: N/A;    CARDIAC PACEMAKER PLACEMENT  11/25/2004    CAROTID ENDARTERECTOMY Left 12/13/2019    Procedure: LEFT CAROTID BODY TUMOR RESECTION;  Surgeon: Bryan Severino MD;  Location: [facility] MAIN OR;  Service: Vascular    CATARACT EXTRACTION Bilateral 1997    CEREBRAL ANGIOGRAM Bilateral 12/6/2019    Procedure: CAROTID ARTERIOGRAM BILATERAL, RIGHT WRIST APPROACH;  Surgeon: Bryan Severino MD;  Location: [facility] OR 18/19;  Service: Vascular    CHOLECYSTECTOMY  1989    COLONOSCOPY  2013    dr montes    ELBOW PROCEDURE  2016    ENDOSCOPY  12/06/2012    Dr. Montes; food in stomach, gastritis    EXCISION LESION  1994    BACK CYST BENGIN    EYE SURGERY  12/2012    GLAUCOMA SURGERY Bilateral 1995    laser surgery    HARDWARE REMOVAL Left     ELBOW    HYSTERECTOMY  1986    INTERVENTIONAL RADIOLOGY PROCEDURE N/A 4/20/2023    Procedure: Intravascular Ultrasound;  Surgeon: Rikki Oconnor MD;  Location: [facility] CATH INVASIVE LOCATION;  Service: Cardiology;  Laterality: N/A;    ORIF ELBOW FRACTURE Left     PACEMAKER IMPLANTATION      PACEMAKER REPLACEMENT  06/2013       SLP Recommendation and Plan  SLP Swallowing Diagnosis: moderate, oral dysphagia, pharyngeal dysphagia (05/31/24 0800)  SLP Diet Recommendation: mechanical ground textures, no mixed consistencies, nectar thick liquids (05/31/24  "0800)  Recommended Precautions and Strategies: upright posture during/after eating, small bites of food and sips of liquid (05/31/24 0800)  SLP Rec. for Method of Medication Administration: meds whole, meds crushed, with thick liquids, with puree, as tolerated (05/31/24 0800)     Monitor for Signs of Aspiration: yes, notify SLP if any concerns (05/31/24 0800)  Recommended Diagnostics: reassess via VFSS (MBS) (05/31/24 0800)           Therapy Frequency (Swallow): PRN (05/31/24 0800)  Predicted Duration Therapy Intervention (Days): until discharge (05/31/24 0800)  Oral Care Recommendations: Oral Care BID/PRN (05/31/24 0800)                                        Plan of Care Reviewed With: patient  Outcome Evaluation: VFSS completed. Pt c/o choking before study began and was assessed by Triage RN. Pt recovered quickly. Of note, patient arrived without brace in place. RN notified over the phone and gave okay to proceed with assessment. Shana Martinez, Radiology APRN present. Silent aspiration with thins and soft solids. No aspiration with nectar, puree, or regular solids. SLP recs mech soft, no mixed, and nectar, straws okay. Meds whole or crushed with nectar or puree.      SWALLOW EVALUATION (Last 72 Hours)       SLP Adult Swallow Evaluation       Row Name 05/31/24 0800 05/30/24 1500       Rehab Evaluation    Document Type evaluation  -SH evaluation  -SH    Patient Effort adequate  -SH excellent  -SH       General Information    Patient Profile Reviewed yes  -SH yes  -SH    Pertinent History Of Current Problem Progressive dysphagia following carotid tumor surgery in 2019.  -SH \"Patient is status post resection of left carotid body tumor 2019.  She has complained of neck pain since that time, recently worsening.  Her swallowing issues have actually gotten significantly worse as well.  Her primary care provider ordered a CT of the neck and right upper extremity due to swelling and the results are above.\". \"At this point in " "time my concern was that the actual incisional situation on the left does not have direct correlation to her dysphonia and dysphagia.  They are both getting worse and I really feel like this needs a direct evaluation by an ENT doctor in order to refer to Dr. Arcenio Lu.  Hopefully a direct laryngoscopy can shed some light on what is going on for this nice lady.  The pain is always going to be hard to tell as it has been present since her procedure and may actually be separate from her difficulty with swallowing and speaking.  At this point there is still no evidence of recurrence of tumor and I think continued aggressive evaluations are best answer.\" -Dr. Severino. ENT evaluation was unremarkable per family. Pt now admitted with abdominal pain and possible BRITNEY. As well as an old T12 compression fracture with recent injury. Now in brace.  -SH    Current Method of Nutrition regular textures;thin liquids  -SH regular textures;thin liquids  -SH    Precautions/Limitations, Vision vision impairment, bilaterally  -SH other (see comments)  legally blind per HCS  -    Precautions/Limitations, Hearing WFL  -SH WFL  -SH    Prior Level of Function-Communication WFL  -SH WFL  -SH    Prior Level of Function-Swallowing other (see comments)  obstruction with solids  -SH other (see comments)  obstruction with solids  -SH    Plans/Goals Discussed with patient  -SH patient  -    Barriers to Rehab visual deficit  -SH visual deficit  -SH       Pain    Additional Documentation Pain Scale: FACES Pre/Post-Treatment (Group)  -SH Pain Scale: FACES Pre/Post-Treatment (Group)  -SH       Pain Scale: FACES Pre/Post-Treatment    Pain: FACES Scale, Pretreatment 4-->hurts little more  -SH 0-->no hurt  -SH       Oral Musculature and Cranial Nerve Assessment    Oral Motor General Assessment -- WFL  -SH       Clinical Swallow Eval    Clinical Swallow Evaluation Summary -- Patient seen for clinical swallow assessment. Pt initially denied dysphagia, but " "then endorsed globus sensation with solids and pills. No report of dysphonia by patient or family. Pt's voice is soft and strained at times, but able to complete pitch glide without difficulty. Oral mech exam unremarkable. Daughter present reports unremarkable ENT evaluation recently. Some trouble with pills noted this admission. No overt s/s of aspiration with ice, thins via straw, puree, mixed, or regular solids. SLP recs regular and thins. Meds as swati. Will proceed with VFSS to assess patient c/o globus sensation.  -       MBS/VFSS Interpretation    VFSS Summary Patient presents with moderate oropharyngeal dysphagia characterized by reduced laryngeal vestibule closure, swallow mistiming, and poor pharyngeal stripping wave. Post surgical changes observed from carotid tumor surgery. Normal swallow initiation with nectar via spoon and cup without penetration. Swallow elicited at the valleculae and pyriforms with nectar via straw without penetration with consecutive drinks. Non transient penetration with thin via cup and straw without aspiration initially. Silent aspiration during the swallow with thins via straw at the end of the study with deep penetration to vocal cords with thins via cup when fatigued. Swallow elicited at the valleculae with puree. Mild base of tongue and valleculae residue post swallow. Increased mastication with soft solids and regular. Piece meal swallow with soft solids. Spill to the pyriforms before the swallow with aspiration during the swallow with weak throat clear noted x1. Pt c/o \"that it didn't go down\" when aspiration occured. Mild residue post swallow at the valleculae and pyriforms with regular solids, which patient cleared with an additional swallow.  - --       SLP Communication to Radiology    Summary Statement Shana Martinez, Radiology APRN present. Silent aspiration with thins and soft solids. No aspiration with nectar, puree, or regular solids.  - --       SLP Evaluation " Clinical Impression    SLP Swallowing Diagnosis moderate;oral dysphagia;pharyngeal dysphagia  - swallow WFL/no suspected pharyngeal impairment  -       Recommendations    Therapy Frequency (Swallow) PRN  - PRN  -    Predicted Duration Therapy Intervention (Days) until discharge  - until discharge  -    SLP Diet Recommendation mechanical ground textures;no mixed consistencies;nectar thick liquids  - regular textures;thin liquids  -    Recommended Diagnostics reassess via VFSS (MBS)  - reassess via VFSS (MBS)  -    Recommended Precautions and Strategies upright posture during/after eating;small bites of food and sips of liquid  - upright posture during/after eating;small bites of food and sips of liquid  -    Oral Care Recommendations Oral Care BID/PRN  - Oral Care BID/PRN  -    SLP Rec. for Method of Medication Administration meds whole;meds crushed;with thick liquids;with puree;as tolerated  - meds whole;as tolerated  -    Monitor for Signs of Aspiration yes;notify SLP if any concerns  - yes;notify SLP if any concerns  -       Swallow Goals (SLP)    Swallow LTGs Patient will demonstrate functional swallow for  - Patient will demonstrate functional swallow for  -       (LTG) Patient will demonstrate functional swallow for    Diet Texture (Demonstrate functional swallow) mechanical ground textures  - regular textures  -    Liquid viscosity (Demonstrate functional swallow) nectar/ mildly thick liquids  - thin liquids  -    Lane (Demonstrate functional swallow) independently (over 90% accuracy)  - independently (over 90% accuracy)  -    Time Frame (Demonstrate functional swallow) by discharge  - by discharge  -    Progress/Outcomes (Demonstrate functional swallow) goal revised this date  - --              User Key  (r) = Recorded By, (t) = Taken By, (c) = Cosigned By      Initials Name Effective Dates     Farida Woodward SLP 01/05/24 -                      EDUCATION  The patient has been educated in the following areas:   Dysphagia (Swallowing Impairment).        SLP GOALS       Row Name 05/31/24 0800 05/30/24 1500          (LTG) Patient will demonstrate functional swallow for    Diet Texture (Demonstrate functional swallow) mechanical ground textures  - regular textures  -     Liquid viscosity (Demonstrate functional swallow) nectar/ mildly thick liquids  - thin liquids  -     [place] (Demonstrate functional swallow) independently (over 90% accuracy)  - independently (over 90% accuracy)  -     Time Frame (Demonstrate functional swallow) by discharge  - by discharge  -     Progress/Outcomes (Demonstrate functional swallow) goal revised this date  - --               User Key  (r) = Recorded By, (t) = Taken By, (c) = Cosigned By      Initials Name Provider Type    Farida Villafuerte SLP Speech and Language Pathologist                         Time Calculation:    Time Calculation- SLP       Row Name 05/31/24 0918             Time Calculation- [facility]    SLP Start Time 0800  -      SLP Received On 05/31/24  -                User Key  (r) = Recorded By, (t) = Taken By, (c) = Cosigned By      Initials Name Provider Type    Farida Villafuerte SLP Speech and Language Pathologist                    Therapy Charges for Today       Code Description Service Date Service Provider Modifiers Qty    [id number] HC ST EVAL ORAL PHARYNG SWALLOW 4 5/30/2024 Farida Woodward SLP GN 1    [id number] HC ST MOTION FLUORO EVAL SWALLOW 5 5/31/2024 Farida Woodward SLP GN 1                 KAYLEEN Oliver  5/31/2024

## 2024-05-31 NOTE — DISCHARGE SUMMARY
Patient Name: Roland Russell  : 1934  MRN: [id number]    Date of Admission: 2024  Date of Discharge:  2024  Primary Care Physician: Shawanda Rosario MD      Chief Complaint:   Back Pain and Arm Pain (Right arm pain)      Discharge Diagnoses     Active Hospital Problems    Diagnosis  POA    **Abdominal pain [R10.9]  Yes    Hypertensive urgency [I16.0]  Yes    Nonspecific pain in the neck region [M54.2]  Yes    Localized swelling of right upper extremity [R22.31]  Yes    Chronic heart failure with preserved ejection fraction (HFpEF) [I50.32]  Yes    Presence of cardiac pacemaker [Z95.0]  Yes    Diabetic polyneuropathy associated with type 2 diabetes mellitus [E11.42]  Yes    PAF (paroxysmal atrial fibrillation) [I48.0]  Yes    Essential hypertension [I10]  Yes    Uncontrolled type 2 diabetes mellitus with hypoglycemia without coma [E11.649]  Yes      Resolved Hospital Problems    Diagnosis Date Resolved POA    Dysphagia [R13.10] 2024 Yes    Dysphonia [R49.0] 2024 Yes        Hospital Course     Ms. Russell is a 89 y.o. 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  Please see the admitting history and physical for further details.  She was found to have an old T12 fracture on CT and an BRITNEY and was admitted to the hospital for further evaluation and treatment.      She was admitted and her diuretics  were held and she was given some IV fluids and pain control.  She was seen in consultation by neurosurgery.  Unfortunately an MRI was not be able to be performed because her pacemaker leads were not compatible.  Neurosurgery ordered a TLSO brace and recommend that she wear this until the fracture is healed.  She will need to follow-up with neurosurgery in 6 to 8 weeks for x-rays in the outpatient setting.  Her BRTINEY resolved with the aforementioned treatment.  I would recommend that she hold her Bumex another day before it being restarted at rehab, and that she get a BMP in rehab in a couple of days to reassess her kidney function.    Day of Discharge     Subjective:  Her pain is well-controlled and her creatinine is better today.  Her video swallow did show silent aspiration with thins and soft solids, but none with nectar, purée, regular solids.      Physical Exam:  Temp:  [97.5 °F (36.4 °C)-97.9 °F (36.6 °C)] 97.7 °F (36.5 °C)  Heart Rate:  [62-69] 64  Resp:  [16] 16  BP: (110-185)/(49-70) 185/67  Body mass index is 32.39 kg/m².  Physical Exam  Constitutional:       General: She is not in acute distress.     Appearance: She is not toxic-appearing.   Cardiovascular:      Rate and Rhythm: Normal rate and regular rhythm.      Heart sounds: Normal heart sounds.   Pulmonary:      Effort: Pulmonary effort is normal.      Breath sounds: Normal breath sounds.   Abdominal:      General: Bowel sounds are normal.      Palpations: Abdomen is soft.   Musculoskeletal:         General: No tenderness.      Right lower leg: No edema.      Left lower leg: No edema.   Neurological:      Mental Status: She is alert.   Psychiatric:         Mood and Affect: Mood normal.         Behavior: Behavior normal.         Consultants     Consult Orders (all) (From admission, onward)       Start     Ordered    05/29/24 1643  Inpatient Neurosurgery Consult  Once        Specialty:  Neurosurgery  Provider:  Ke Landon MD    05/29/24 3747     05/29/24 1730  Inpatient Consult to Case Management   Once        Provider:  (Not yet assigned)    05/29/24 1737    05/29/24 1431  [facility] (on-call MD unless specified) Details  Once        Specialty:  Hospitalist  Provider:  Shahnaz Robbins MD    05/29/24 1430                  Procedures     Imaging Results (All)       Procedure Component Value Units Date/Time    FL Video Swallow Single Contrast [[id number]] Resulted: 05/31/24 0756     Updated: 05/31/24 0828    CT Abdomen Pelvis With Contrast [[id number]] Collected: 05/29/24 1425     Updated: 05/29/24 1657    Narrative:      CT ABDOMEN PELVIS W CONTRAST-     HISTORY: 89 years of age, Female.  Patient with back pain consistent  with compression fracture to her thoracic spine.  CT scan is  unremarkable.  Has pain that radiates to her left upper quadrant that is  reproducible     TECHNIQUE:  CT includes axial imaging from the lung bases to the  trochanters with intravenous contrast and without use of oral contrast.  Data reconstructed in coronal and sagittal planes. Radiation dose  reduction techniques were utilized, including automated exposure control  and exposure modulation based on body size.     COMPARISON: CT abdomen and pelvis 11/02/2022, CT angiogram chest  04/24/2024.     FINDINGS: Cardiac pacer/defibrillator leads are noted. There is mild  dependent basilar atelectasis.     Liver, spleen, adrenal glands, pancreas appear within normal limits.  Stomach is mostly decompressed with equivocal generalized gastric wall  thickening and this may represent mild gastritis.     Fluid-filled small bowel loops are present without evidence to suggest  obstruction. There is no ascites. Diffuse atherosclerotic calcifications  are present without aneurysm. Small bilateral renal cysts are present.     Chronic T12 compression fracture and chronic bony fusion of T11 and T12  are present. The bones appear osteopenic.       Impression:      1. Equivocal gastric  wall thickening may represent mild gastritis. There  are fluid-filled small bowel loops without bowel dilatation to suggest  obstruction.  2. Bilateral renal cysts.  3. Diffuse atherosclerotic disease.  4. Osteopenia. Chronic-appearing compression deformity at T12. Partial  bony fusion at T11-12.     Radiation dose reduction techniques were utilized, including automated  exposure control and exposure modulation based on body size.        This report was finalized on 5/29/2024 4:54 PM by Dr. Mik Mercado M.D on Workstation: [id number]       XR Chest 1 View [[id number]] Collected: 05/29/24 1522     Updated: 05/29/24 1528    Narrative:      XR CHEST 1 VW-     HISTORY: Female who is 89 years-old, trauma     TECHNIQUE: Frontal view of the chest     COMPARISON: 8/26/2023     FINDINGS: The heart size is normal. Aorta is tortuous, calcified.  Pulmonary vasculature is unremarkable. Left-sided pacemaker and cardiac  leads are seen. No focal pulmonary consolidation, pleural effusion, or  pneumothorax. Old granulomatous disease is apparent. No acute osseous  process.       Impression:      No focal pulmonary consolidation. Tortuous aorta. Follow-up  as clinically indicated.           This report was finalized on 5/29/2024 3:25 PM by Dr. Feliberto Copeland M.D on Workstation: [id number]       CT Thoracic Spine Without Contrast [[id number]] Collected: 05/29/24 1208     Updated: 05/29/24 1208    Narrative:      CT OF THE THORACIC SPINE WITHOUT CONTRAST 05/29/2024     HISTORY: Back pain. Back injury.     Axial images were obtained through the thoracic spine without contrast.  Sagittal and coronal reconstruction images were reviewed.     There is minimal loss of height of the anterior aspect of the T3  vertebral body but this does not appear acute. There is moderate  compression deformity of T12 with approximately 30% to 40% loss of the  mid to anterior vertebral body height. This appears similar to the  03/05/2021 study.  Degenerative endplate changes with disc space  narrowing are seen at T8-T9. There is minimal osteophytosis in several  thoracic vertebrae.     No acute fractures are seen.     There is minimal anterolisthesis of T11 on T12 similar to the 03/05/2021  study. No other subluxation is seen. Small posterior central and right  paracentral disc protrusion is seen at T6-7. Small posterior central and  left paracentral disc protrusion is seen at T7-8. Moderate size  suspected posterior central disc protrusion is seen at T8-T9 which  appears slightly hyperdense and best seen on axial image 60. These were  also seen on the 03/05/2021 study.     Small left paracentral disc protrusion is seen at T9-10 best seen on  axial image 69 and also present on the previous study.       Impression:      1. Old T12 compression fracture. This appears stable since the  03/05/2021 study.  2. No acute fractures or subluxation is seen.  3. Multilevel thoracic degenerative disc disease as discussed above.        Radiation dose reduction techniques were utilized, including automated  exposure control and exposure modulation based on body size.                  Pertinent Labs     Results from last 7 days   Lab Units 05/31/24  0439 05/30/24  0703 05/29/24  0958   WBC 10*3/mm3 6.16 6.49 8.38   HEMOGLOBIN g/dL 12.3 12.5 13.7   PLATELETS 10*3/mm3 165 165 190     Results from last 7 days   Lab Units 05/31/24  0439 05/30/24  0703 05/29/24  0958   SODIUM mmol/L 144 142 139   POTASSIUM mmol/L 3.7 3.6 4.0   CHLORIDE mmol/L 107 104 102   CO2 mmol/L 30.0* 28.5 25.2   BUN mg/dL 26* 23 23   CREATININE mg/dL 1.02* 1.27* 0.96   GLUCOSE mg/dL 70 61* 156*   Estimated Creatinine Clearance: 32.2 mL/min (A) (by C-G formula based on SCr of 1.02 mg/dL (H)).  Results from last 7 days   Lab Units 05/31/24  0439 05/30/24  0703   ALBUMIN g/dL 3.5 3.2*   BILIRUBIN mg/dL 0.4 0.4   ALK PHOS U/L 130* 127*   AST (SGOT) U/L 16 18   ALT (SGPT) U/L 14 13     Results from last 7 days  "  Lab Units 05/31/24  0439 05/30/24  0703 05/29/24  0958   CALCIUM mg/dL 9.3 9.0 10.1   ALBUMIN g/dL 3.5 3.2*  --    MAGNESIUM mg/dL  --  1.9  --      Results from last 7 days   Lab Units 05/29/24  0958   LIPASE U/L 24     Results from last 7 days   Lab Units 05/29/24  0958   HSTROP T ng/L 21*           Invalid input(s): \"LDLCALC\"        Test Results Pending at Discharge       Discharge Details        Discharge Medications        New Medications        Instructions Start Date   Lidocaine 4 %   1 patch, Transdermal, Every 24 Hours Scheduled, Remove & Discard patch within 12 hours or as directed by MD   Start Date: June 1, 2024     oxyCODONE 5 MG immediate release tablet  Commonly known as: ROXICODONE   5 mg, Oral, Every 6 Hours PRN      sennosides-docusate 8.6-50 MG per tablet  Commonly known as: PERICOLACE   2 tablets, Oral, 2 Times Daily             Continue These Medications        Instructions Start Date   acetaminophen 325 MG tablet  Commonly known as: TYLENOL   650 mg, Oral, Every 4 Hours PRN      apixaban 5 MG tablet tablet  Commonly known as: ELIQUIS   5 mg, Oral, 2 Times Daily      B-12 PO   1,000 mcg, Oral, Daily      bumetanide 0.5 MG tablet  Commonly known as: BUMEX   0.5 mg, Oral, Daily   Start Date: June 1, 2024     cetirizine 10 MG tablet  Commonly known as: zyrTEC   10 mg, Oral, Daily      clopidogrel 75 MG tablet  Commonly known as: PLAVIX   75 mg, Oral, Daily      gabapentin 100 MG capsule  Commonly known as: NEURONTIN   200 mg, Oral, Nightly      insulin degludec 100 UNIT/ML solution pen-injector injection  Commonly known as: TRESIBA FLEXTOUCH   8 Units, Subcutaneous, 2 Times Daily      NIFEdipine XL 60 MG 24 hr tablet  Commonly known as: PROCARDIA XL   60 mg, Oral, Daily      NP Thyroid 60 MG tablet  Generic drug: Thyroid   Take 1 tablet by mouth Daily.      pravastatin 20 MG tablet  Commonly known as: PRAVACHOL   20 mg, Oral, Nightly      vitamin D3 125 MCG (5000 UT) capsule capsule   125 mcg, " Oral, Daily               Allergies   Allergen Reactions    Adhesive Tape Other (See Comments)     REDNESS, SKIN BURN    Latex Other (See Comments)     REDNESS, SKIN BURN    Propoxyphene Itching         Discharge Disposition:      Discharge Diet:  Diet Order   Procedures    Diet: Cardiac; Healthy Heart (2-3 Na+); No Mixed Consistencies; Texture: Mechanical Ground (NDD 2); Fluid Consistency: Nectar Thick       Discharge Activity:   Activity Instructions       Activity as Tolerated              CODE STATUS:    Code Status and Medical Interventions:   Ordered at: 05/29/24 3904     Code Status (Patient has no pulse and is not breathing):    CPR (Attempt to Resuscitate)     Medical Interventions (Patient has pulse or is breathing):    Full       Future Appointments   Date Time Provider Department Center   6/19/2024  3:00 PM [facility] OP VAS RM 2 BH [facility]   6/19/2024  3:45 PM Bryan Severino MD [facility]   10/2/2024 11:30 AM Rikki Oconnor MD [facility]     Additional Instructions for the Follow-ups that You Need to Schedule       Call MD With Problems / Concerns   As directed      Instructions: return to the hospital if you experience chest pain, shortness of breath, abdominal pain, nausea, vomiting, fevers, sweats, chills, or worsening of your symptoms    Order Comments: Instructions: return to the hospital if you experience chest pain, shortness of breath, abdominal pain, nausea, vomiting, fevers, sweats, chills, or worsening of your symptoms         Discharge Follow-up with PCP   As directed       Currently Documented PCP:    Shawanda Rosario MD    PCP Phone Number:    [phone]     Follow Up Details: 1-2 weeks        Discharge Follow-up with Specialty: neurosurgery 6-8 weeks with xrays   As directed      Specialty: neurosurgery 6-8 weeks with xrays        XR Spine Thoracic 2 View   Jul 30, 2024      Exam reason: Evaluate T12 VCF   Release to patient: Routine Release        XR Spine Lumbar 2 or 3 View    Aug 30, 2024      Exam reason: T12 VCF   Release to patient: Routine Release               Contact information for follow-up providers       Shawanda Rosario MD .    Specialty: Internal Medicine  Why: 1-2 weeks  Contact information:  [address]  [facility] [address]  [phone]                       Contact information for after-discharge care       Destination       [facility] .    Service: Skilled Nursing  Contact information:  [address] [facility] [address] [phone]                                   Additional Instructions for the Follow-ups that You Need to Schedule       Call MD With Problems / Concerns   As directed      Instructions: return to the hospital if you experience chest pain, shortness of breath, abdominal pain, nausea, vomiting, fevers, sweats, chills, or worsening of your symptoms    Order Comments: Instructions: return to the hospital if you experience chest pain, shortness of breath, abdominal pain, nausea, vomiting, fevers, sweats, chills, or worsening of your symptoms         Discharge Follow-up with PCP   As directed       Currently Documented PCP:    Shawanda Rosario MD    PCP Phone Number:    [phone]     Follow Up Details: 1-2 weeks        Discharge Follow-up with Specialty: neurosurgery 6-8 weeks with xrays   As directed      Specialty: neurosurgery 6-8 weeks with xrays        XR Spine Thoracic 2 View   Jul 30, 2024      Exam reason: Evaluate T12 VCF   Release to patient: Routine Release        XR Spine Lumbar 2 or 3 View   Aug 30, 2024      Exam reason: T12 VCF   Release to patient: Routine Release            Time Spent on Discharge:  Greater than 30 minutes      Gunner Mclaughlin MD  [facility]  05/31/24  12:52 EDT

## 2024-06-04 ENCOUNTER — TELEPHONE (OUTPATIENT)
Dept: NEUROSURGERY | Facility: CLINIC | Age: 89
End: 2024-06-04
Payer: MEDICARE

## 2024-06-04 NOTE — TELEPHONE ENCOUNTER
----- Message from Dayanara Solomon sent at 5/30/2024  2:25 PM EDT -----  Regarding: Follow-up  Please have patient follow-up in 8 weeks with thoracic and lumbar x-rays.  She can follow-up with me.

## 2024-06-19 ENCOUNTER — OFFICE VISIT (OUTPATIENT)
Age: 89
End: 2024-06-19
Payer: MEDICARE

## 2024-06-19 ENCOUNTER — HOSPITAL ENCOUNTER (OUTPATIENT)
Facility: HOSPITAL | Age: 89
Discharge: HOME | End: 2024-06-19
Admitting: SURGERY
Payer: MEDICARE

## 2024-06-19 VITALS
DIASTOLIC BLOOD PRESSURE: 72 MMHG | SYSTOLIC BLOOD PRESSURE: 124 MMHG | HEIGHT: 57 IN | BODY MASS INDEX: 32.15 KG/M2 | WEIGHT: 149 LBS

## 2024-06-19 DIAGNOSIS — R60.0 LOCALIZED EDEMA: ICD-10-CM

## 2024-06-19 DIAGNOSIS — I87.1 SUBCLAVIAN VEIN STENOSIS, RIGHT: ICD-10-CM

## 2024-06-19 DIAGNOSIS — I87.1 SUBCLAVIAN VEIN STENOSIS, RIGHT: Primary | ICD-10-CM

## 2024-06-19 DIAGNOSIS — I65.22 STENOSIS OF LEFT CAROTID ARTERY: ICD-10-CM

## 2024-06-19 PROBLEM — D44.6 CAROTID BODY TUMOR: Status: ACTIVE | Noted: 2024-06-19

## 2024-06-19 LAB
BH CV UPPER VENOUS LEFT INTERNAL JUGULAR AUGMENT: NORMAL
BH CV UPPER VENOUS LEFT INTERNAL JUGULAR COMPRESS: NORMAL
BH CV UPPER VENOUS LEFT INTERNAL JUGULAR PHASIC: NORMAL
BH CV UPPER VENOUS LEFT INTERNAL JUGULAR SPONT: NORMAL
BH CV UPPER VENOUS LEFT SUBCLAVIAN AUGMENT: NORMAL
BH CV UPPER VENOUS LEFT SUBCLAVIAN COMPRESS: NORMAL
BH CV UPPER VENOUS LEFT SUBCLAVIAN PHASIC: NORMAL
BH CV UPPER VENOUS LEFT SUBCLAVIAN SPONT: NORMAL
BH CV UPPER VENOUS RIGHT AXILLARY AUGMENT: NORMAL
BH CV UPPER VENOUS RIGHT AXILLARY COMPRESS: NORMAL
BH CV UPPER VENOUS RIGHT AXILLARY PHASIC: NORMAL
BH CV UPPER VENOUS RIGHT AXILLARY SPONT: NORMAL
BH CV UPPER VENOUS RIGHT BASILIC FOREARM COMPRESS: NORMAL
BH CV UPPER VENOUS RIGHT BASILIC UPPER COMPRESS: NORMAL
BH CV UPPER VENOUS RIGHT BRACHIAL COMPRESS: NORMAL
BH CV UPPER VENOUS RIGHT CEPHALIC FOREARM COMPRESS: NORMAL
BH CV UPPER VENOUS RIGHT CEPHALIC UPPER COMPRESS: NORMAL
BH CV UPPER VENOUS RIGHT INTERNAL JUGULAR AUGMENT: NORMAL
BH CV UPPER VENOUS RIGHT INTERNAL JUGULAR COMPRESS: NORMAL
BH CV UPPER VENOUS RIGHT INTERNAL JUGULAR PHASIC: NORMAL
BH CV UPPER VENOUS RIGHT INTERNAL JUGULAR SPONT: NORMAL
BH CV UPPER VENOUS RIGHT RADIAL COMPRESS: NORMAL
BH CV UPPER VENOUS RIGHT SUBCLAVIAN AUGMENT: NORMAL
BH CV UPPER VENOUS RIGHT SUBCLAVIAN COMPRESS: NORMAL
BH CV UPPER VENOUS RIGHT SUBCLAVIAN PHASIC: NORMAL
BH CV UPPER VENOUS RIGHT SUBCLAVIAN SPONT: NORMAL
BH CV UPPER VENOUS RIGHT ULNAR COMPRESS: NORMAL

## 2024-06-19 PROCEDURE — 93971 EXTREMITY STUDY: CPT

## 2024-06-19 NOTE — PROGRESS NOTES
Chief Complaint  Follow-up (Follow up with US. )    Subjective        Roland Russell presents to [facility] VASCULAR SURGERY  HPI   Roland Russell is a 89 y.o. female that has been followed in our office for carotid body tumor.  She had resection of this in 2019 with Dr. Severino.  She returns today in follow up along with a carotid duplex. She  reports she has been doing well without hospitalizations or surgeries. She denies any symptoms consistent with CVA, TIA, or amaurosis fugax.  He has been complaining of left-sided neck pain since her surgery 2019.  Her daughter reports that this is getting worse and is constant.  She spoke primary care provider about this who ordered a CT of her soft tissue of her neck.  She also was having right arm swelling therefore a CT of her right upper arm was done as well.  I reviewed those results. 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  A pseudoaneurysm was also considered and CTA was performed.  At this point in time the CTA was really not on diagnostic as they were on willing to decide whether this area had just flow dynamic picture or was clotted.  Dedicated upper extremity venous duplex scan has been recommended.      6/9/2024 right upper extremity venous scan fails to show any abnormality on her duplex.  I do not believe this to be a pseudoaneurysm or thrombosed varix.  I think this is an artifact on the first CT that could not be seen on the second.  At this juncture I am not inclined to pursue it especially as she is still on full dose Eliquis.  From our standpoint she could be transitioned to Eliquis 2.5 mg dosing if Dr. Oconnor yet her cardiologist feels its appropriate.  Short of that I wish I had a better answer for why her throat feels like it is closing up when she talks.  She has been seen by Dr. Arcenio Lu who did not find anything on his evaluation.  I have  offered to send her to other specialist such as 's ENT service but at this point in time she is understandably not interested.      Review of Systems   Constitutional:  Negative for fever.   Eyes:  Negative for visual disturbance.   Cardiovascular:  Negative for leg swelling.   Gastrointestinal:  Negative for abdominal pain.   Musculoskeletal:  Positive for neck pain. Negative for back pain.   Skin:  Negative for color change, pallor and wound.   Neurological:  Positive for headaches. Negative for dizziness, facial asymmetry, speech difficulty and weakness.        Roland Russell  reports that she has never smoked. She has never used smokeless tobacco..        Objective   Vital Signs:  Vitals:    06/19/24 1519   BP: 124/72      Body mass index is 32.23 kg/m².       Physical Exam  Vitals reviewed.   Constitutional:       Appearance: Normal appearance.   HENT:      Head: Normocephalic.   Cardiovascular:      Rate and Rhythm: Normal rate and regular rhythm.      Pulses: Normal pulses.           Dorsalis pedis pulses are 3+ on the right side and 3+ on the left side.        Posterior tibial pulses are 3+ on the right side and 3+ on the left side.   Pulmonary:      Effort: Pulmonary effort is normal.   Skin:     General: Skin is warm.   Neurological:      General: No focal deficit present.      Mental Status: She is alert and oriented to person, place, and time.   Psychiatric:         Mood and Affect: Mood normal.          Result Review :  CT Soft Tissue Neck With Contrast (03/06/2024 16:25)   Duplex Carotid Ultrasound CAR (04/01/2024 12:12)   CT Upper Extremity Right With Contrast (03/06/2024 16:25)   Previous carotid duplex: Normal velocities bilaterally.  Resection of carotid body tumor    Carotid duplex from today: Normal velocities bilaterally.  Resection of carotid body tumor                   Assessment and Plan     Diagnoses and all orders for this visit:    1. Subclavian vein stenosis, right (Primary)    2.  Stenosis of left carotid artery  -     Duplex Carotid Ultrasound CAR; Future         Patient is status post resection of left carotid body tumor 2019.  She has complained of neck pain since that time, recently worsening.  Her swallowing issues have actually gotten significantly worse as well.  Right arm testing negative for venous findings.  Unfortunately I do not have an answer for her swallowing and speech issues but I do believe that further ENT evaluation would be reasonable if we can find someone to give her a second opinion.  We will discuss further sending her possibly over to the [place]'s evaluation area      Follow Up     Return if symptoms worsen or fail to improve.  Patient was given instructions and counseling regarding her condition or for health maintenance advice. Please see specific information pulled into the AVS if appropriate.     Bryan Severino MD

## 2024-07-29 NOTE — PROGRESS NOTES
"Subjective   Patient ID: Roland Russell is an 89 y.o. female here today for follow-up with thoracic and lumbar xray's done on 07/02/24 for follow-up on recent compression fractures.  She has been doing well from a neurosurgical standpoint since discharge home from the hospital.  She has been unable to tolerate a brace in the past.  She was unable to go an MRI due to pacemaker compatibility and therefore we were unable to offer a kyphoplasty.      The following portions of the patient's history were reviewed and updated as appropriate: allergies, current medications, past family history, past medical history, past social history, past surgical history, and problem list.      Review of Systems   Constitutional:  Negative for fever.   Musculoskeletal:  Positive for back pain and gait problem.   Neurological:  Positive for dizziness, weakness and light-headedness. Negative for numbness and headaches.   All other systems reviewed and are negative.      Objective     Vitals:    07/30/24 1053   BP: 120/62   BP Location: Left arm   Patient Position: Sitting   Cuff Size: Adult   Resp: 20   Weight: 67.6 kg (149 lb)   Height: 144.9 cm (57.04\")   PainSc: 10-Worst pain ever   PainLoc: Buttocks     Body mass index is 32.2 kg/m².    Tobacco Use: Low Risk  (7/30/2024)    Patient History     Smoking Tobacco Use: Never     Smokeless Tobacco Use: Never     Passive Exposure: Not on file      Physical exam  Awake, alert, oriented x3, frail  Pupils equal round reactive to light  Extraocular muscles intact  Face symmetric  Speech is fluent and clear  Motor exam  Bilateral deltoids 5/5, bilateral biceps 5/5, bilateral triceps 5/5, bilateral wrist extension 5/5 bilateral hand  5/5  Bilateral hip flexion 5/5, bilateral knee extension 5/5, bilateral DF/PF 5/5  gait deferred  Able to detect  light touch in all 4 extremities      Assessment & Plan   Independent Review of Radiographic Studies:      I personally reviewed the images from the " following studies.    Thoracic/lumbar x-rays:  1. Compression deformities in the thoracic spine at T8 and T12 appear chronic with 15% and 50% height loss respectively. Osteopenia.  2. Levoscoliotic curvature thoracic spine.  3. Facet arthritis lower lumbar spine.    Medical Decision Makin-year-old female here for follow-up today status post compression fracture    She has been doing pretty well since being discharged home from the hospital from a neurosurgical standpoint.  She is still having discomfort in her buttock area from where she fell.  She is unable to tolerate a brace and does not want physical therapy at this time.    There is nothing further to add from a neurosurgical standpoint, she can follow-up with us as needed.    Diagnoses and all orders for this visit:    1. Compression fracture of T12 vertebra with routine healing, subsequent encounter (Primary)      Return if symptoms worsen or fail to improve.    I spent 25 minutes caring for Roland Russell on this date of service. This time includes time spent by me in the following activities: preparing for the visit, reviewing tests, obtaining and/or reviewing a separately obtained history, performing a medically appropriate examination and/or evaluation, counseling and educating the patient/family/caregiver, ordering medications, tests, or procedures, referring and communicating with other health care professionals, documenting information in the medical record, independently interpreting results and communicating that information with the patient/family/caregiver, and care coordination

## 2024-07-30 ENCOUNTER — OFFICE VISIT (OUTPATIENT)
Dept: NEUROSURGERY | Facility: CLINIC | Age: 89
End: 2024-07-30
Payer: MEDICARE

## 2024-07-30 VITALS
WEIGHT: 149 LBS | SYSTOLIC BLOOD PRESSURE: 120 MMHG | DIASTOLIC BLOOD PRESSURE: 62 MMHG | BODY MASS INDEX: 32.15 KG/M2 | RESPIRATION RATE: 20 BRPM | HEIGHT: 57 IN

## 2024-07-30 DIAGNOSIS — S22.080D COMPRESSION FRACTURE OF T12 VERTEBRA WITH ROUTINE HEALING, SUBSEQUENT ENCOUNTER: Primary | ICD-10-CM

## 2024-10-02 ENCOUNTER — OFFICE VISIT (OUTPATIENT)
Dept: CARDIOLOGY | Facility: CLINIC | Age: 89
End: 2024-10-02
Payer: MEDICARE

## 2024-10-02 VITALS
OXYGEN SATURATION: 96 % | SYSTOLIC BLOOD PRESSURE: 126 MMHG | DIASTOLIC BLOOD PRESSURE: 72 MMHG | HEIGHT: 57 IN | HEART RATE: 68 BPM | BODY MASS INDEX: 30.42 KG/M2 | WEIGHT: 141 LBS

## 2024-10-02 DIAGNOSIS — I48.0 PAF (PAROXYSMAL ATRIAL FIBRILLATION): ICD-10-CM

## 2024-10-02 DIAGNOSIS — Z95.5 S/P RIGHT CORONARY ARTERY (RCA) STENT PLACEMENT: ICD-10-CM

## 2024-10-02 DIAGNOSIS — I25.10 CORONARY ARTERY DISEASE INVOLVING NATIVE CORONARY ARTERY OF NATIVE HEART, UNSPECIFIED WHETHER ANGINA PRESENT: ICD-10-CM

## 2024-10-02 DIAGNOSIS — E78.2 MIXED HYPERLIPIDEMIA: ICD-10-CM

## 2024-10-02 DIAGNOSIS — I50.32 CHRONIC HEART FAILURE WITH PRESERVED EJECTION FRACTION (HFPEF): Primary | ICD-10-CM

## 2024-10-02 PROCEDURE — 1159F MED LIST DOCD IN RCRD: CPT | Performed by: STUDENT IN AN ORGANIZED HEALTH CARE EDUCATION/TRAINING PROGRAM

## 2024-10-02 PROCEDURE — 99214 OFFICE O/P EST MOD 30 MIN: CPT | Performed by: STUDENT IN AN ORGANIZED HEALTH CARE EDUCATION/TRAINING PROGRAM

## 2024-10-02 PROCEDURE — 1160F RVW MEDS BY RX/DR IN RCRD: CPT | Performed by: STUDENT IN AN ORGANIZED HEALTH CARE EDUCATION/TRAINING PROGRAM

## 2024-10-02 NOTE — PROGRESS NOTES
"    Subjective:     Encounter Date:10/02/2024      Patient ID: Roland Russell is a 89 y.o. female.    Chief Complaint:  Follow-up of HFpEF    HPI:   89 y.o. female with paroxysmal A-fib on Eliquis, diabetes, hypertension, DARI, high-grade block status post dual-chamber pacemaker, and CAD s/p RCA PCI who presents for follow up. I last saw the patient in March and at the time she felt well.  She followed up with vascular surgery regarding a possible pseudoaneurysm in the right supraclavicular fossa.  She underwent ultrasound of this area which was normal.  She denies any dyspnea, chest pressure, palpitations.  She is intermittent minimal leg swelling.    Her main complaint is that she feels left-sided neck swelling after prolonged talking.  She denies any dysphagia.      The following portions of the patient's history were reviewed and updated as appropriate: allergies, current medications, past family history, past medical history, past social history, past surgical history and problem list.     REVIEW OF SYSTEMS:   All systems reviewed.  Pertinent positives identified in HPI.  All other systems are negative.    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    CAD (coronary artery disease)     unspecified    Carotid body tumor     LEFT    Difficulty walking     H/O complete eye exam 10/2016    Headache     OFF AND ON BACK OF HEAD, DOWN NECK TO SHOULDER    History of DVT (deep vein thrombosis)     History of glaucoma     History of poliomyelitis     AGE 9    History of surgery on arm     left arm    HLD (hyperlipidemia)     Hypertension     Hypothyroidism, acquired     Left carotid artery stenosis 11/21/2019    Legal blindness     SOME PERIPHERAL VISION ON LEFT, SOME VISION ON RIGHT    Memory loss  "    SOME SHORT TERM    Migraine     Neck mass 02/03/2022    Numbness     LEFT LEG     Osteoarthritis, multiple sites     Osteoporosis     Pacemaker     Rectal bleeding     X1, WITH BM    Renal disorder     Sleep apnea     DOES NOT USE A MACHINE; unspecified    TIA (transient ischemic attack) 12/24/2018    ?, HAD SPELL UNABLE TO TALK    Type 2 diabetes mellitus     Type 2 diabetes mellitus without complications     Type 2 diabetes mellitus, uncontrolled        Family History   Problem Relation Age of Onset    Arthritis Mother     Diabetes Mother     Thyroid disease Mother     Heart disease Mother     Cancer Father         no details    Glaucoma Father         angular glycoma    Heart disease Father     Arthritis Father     Thyroid disease Father     Stroke Father     Asthma Sister     Arthritis Sister     Cancer Sister         breast    Stroke Maternal Grandmother     Heart failure Other     Diabetes Other     Hypertension Other     Stroke Other         aunt    Thyroid disease Other     Malig Hyperthermia Neg Hx        Social History     Socioeconomic History    Marital status:    Tobacco Use    Smoking status: Never    Smokeless tobacco: Never    Tobacco comments:     Patient does not smoke.   Vaping Use    Vaping status: Never Used   Substance and Sexual Activity    Alcohol use: No     Comment: Patient is non drinker    Drug use: Never     Comment: Drug Abuse: none    Sexual activity: Defer       Allergies   Allergen Reactions    Adhesive Tape Other (See Comments)     REDNESS, SKIN BURN    Latex Other (See Comments)     REDNESS, SKIN BURN    Propoxyphene Itching       Past Surgical History:   Procedure Laterality Date    APPENDECTOMY  1989    CARDIAC CATHETERIZATION N/A 4/13/2023    Procedure: Left Heart Cath;  Surgeon: Rikki Oconnor MD;  Location: [facility] CATH INVASIVE LOCATION;  Service: Cardiology;  Laterality: N/A;    CARDIAC CATHETERIZATION N/A 4/13/2023    Procedure: Coronary angiography;  Surgeon: Elvin  MD Rikki;  Location: [facility] CATH INVASIVE LOCATION;  Service: Cardiology;  Laterality: N/A;    CARDIAC CATHETERIZATION N/A 4/13/2023    Procedure: Left ventriculography;  Surgeon: Rikki Oconnor MD;  Location: [facility] CATH INVASIVE LOCATION;  Service: Cardiology;  Laterality: N/A;    CARDIAC CATHETERIZATION N/A 4/20/2023    Procedure: Percutaneous Coronary Intervention;  Surgeon: Rikki Oconnor MD;  Location: [facility] CATH INVASIVE LOCATION;  Service: Cardiology;  Laterality: N/A;    CARDIAC CATHETERIZATION N/A 4/20/2023    Procedure: Atherectomy-coronary;  Surgeon: Rikki Oconnor MD;  Location: [facility] CATH INVASIVE LOCATION;  Service: Cardiology;  Laterality: N/A;    CARDIAC CATHETERIZATION N/A 4/20/2023    Procedure: Stent CAROL coronary;  Surgeon: Rikki Oconnor MD;  Location: [facility] CATH INVASIVE LOCATION;  Service: Cardiology;  Laterality: N/A;    CARDIAC PACEMAKER PLACEMENT  11/25/2004    CAROTID ENDARTERECTOMY Left 12/13/2019    Procedure: LEFT CAROTID BODY TUMOR RESECTION;  Surgeon: Bryan Severino MD;  Location: [facility] MAIN OR;  Service: Vascular    CATARACT EXTRACTION Bilateral 1997    CEREBRAL ANGIOGRAM Bilateral 12/6/2019    Procedure: CAROTID ARTERIOGRAM BILATERAL, RIGHT WRIST APPROACH;  Surgeon: Bryan Severino MD;  Location: [facility] OR 18/19;  Service: Vascular    CHOLECYSTECTOMY  1989    COLONOSCOPY  2013    dr montes    ELBOW PROCEDURE  2016    ENDOSCOPY  12/06/2012    Dr. Montes; food in stomach, gastritis    EXCISION LESION  1994    BACK CYST BENGIN    EYE SURGERY  12/2012    GLAUCOMA SURGERY Bilateral 1995    laser surgery    HARDWARE REMOVAL Left     ELBOW    HYSTERECTOMY  1986    INTERVENTIONAL RADIOLOGY PROCEDURE N/A 4/20/2023    Procedure: Intravascular Ultrasound;  Surgeon: Rikki Oconnor MD;  Location: [facility] CATH INVASIVE LOCATION;  Service: Cardiology;  Laterality: N/A;    ORIF ELBOW FRACTURE Left     PACEMAKER IMPLANTATION      PACEMAKER REPLACEMENT  06/2013       Procedures       Objective:          Vitals:    10/02/24 0954   BP: 126/72   Pulse: 68   SpO2: 96%       PHYSICAL EXAM:  GEN: well appearing, in NAD   HEENT: NCAT, EOMI, moist mucus membranes   Respiratory: CTAB, no wheezes, rales or rhonchi  CV: normal rate, regular rhythm, normal S1, S2, no murmurs, rubs, gallops, +2 radial pulses b/l  GI: soft, nontender, nondistended  MSK: no edema  Skin: no rash, warm, dry  Heme/Lymph: no bruising or bleeding  Neuro: Alert and Oriented x 3, grossly normal motor function        Assessment:         (I50.32) Chronic heart failure with preserved ejection fraction (HFpEF)    (I25.10) Coronary artery disease involving native coronary artery of native heart, unspecified whether angina present    (E78.2) Mixed hyperlipidemia    (I48.0) PAF (paroxysmal atrial fibrillation)    (Z95.5) S/P right coronary artery (RCA) stent placement    89 y.o. female with paroxysmal A-fib on Eliquis, diabetes, hypertension, DARI, high-grade block status post dual-chamber pacemaker, and CAD s/p RCA PCI who presents for follow up.        Plan:       #HFpEF  Echocardiogram with preserved EF.  Currently euvolemic.  -Continue Bumex  -Continue blood pressure control as below    #Hypertension  Currently well controlled.  - Continue nifedipine 60 mg daily, losartan 100 mg daily    #CAD  PCI to RCA with two 4.0 x 38mm and one 3.5 x 38mm Xience Skypoint drug-eluting stents (postdilated with a 4.5 mm in the ostium to midportion and 4.0 mm NC in the mid to distal portion)  - continue Eliquis, plavix 75mg daily, pravastatin, losartan     #pAF  - continue Eliquis 5mg BID    Dr. Rosario, thank you very much for referring this kind patient to me. Please call me with any questions or concerns. I will see the patient again in the office in one year or earlier as needed.         Rikki Oconnor MD  10/02/24  [facility]    Outpatient Encounter Medications as of 10/2/2024   Medication Sig Dispense Refill    acetaminophen (TYLENOL) 325 MG tablet Take  2 tablets by mouth Every 4 (Four) Hours As Needed for Mild Pain.      apixaban (ELIQUIS) 5 MG tablet tablet Take 1 tablet by mouth 2 (Two) Times a Day.      bumetanide (BUMEX) 0.5 MG tablet Take 1 tablet by mouth Daily.      cetirizine (zyrTEC) 10 MG tablet Take 1 tablet by mouth Daily.      clopidogrel (PLAVIX) 75 MG tablet TAKE 1 TABLET BY MOUTH DAILY 90 tablet 1    insulin degludec (TRESIBA FLEXTOUCH) 100 UNIT/ML solution pen-injector injection Inject 8 Units under the skin into the appropriate area as directed 2 (Two) Times a Day.      Lidocaine 4 % Place 1 patch on the skin as directed by provider Daily. Remove & Discard patch within 12 hours or as directed by MD      NIFEdipine XL (PROCARDIA XL) 60 MG 24 hr tablet Take 1 tablet by mouth Daily. 90 tablet 3    NP Thyroid 60 MG tablet Take 1 tablet by mouth Daily.      oxyCODONE (ROXICODONE) 5 MG immediate release tablet Take 1 tablet by mouth Every 6 (Six) Hours As Needed for Moderate Pain. 12 tablet 0    pravastatin (PRAVACHOL) 20 MG tablet Take 1 tablet by mouth Every Night. 90 tablet 0    vitamin D3 125 MCG (5000 UT) capsule capsule Take 1 capsule by mouth Daily.      gabapentin (NEURONTIN) 100 MG capsule Take 2 capsules by mouth Every Night for 3 days. 6 capsule 0     No facility-administered encounter medications on file as of 10/2/2024.

## 2025-05-06 ENCOUNTER — TRANSCRIBE ORDERS (OUTPATIENT)
Dept: ADMINISTRATIVE | Facility: HOSPITAL | Age: OVER 89
End: 2025-05-06
Payer: MEDICARE

## 2025-05-06 DIAGNOSIS — R19.5 ABNORMAL FECES: Primary | ICD-10-CM

## 2025-05-27 ENCOUNTER — HOSPITAL ENCOUNTER (OUTPATIENT)
Dept: CT IMAGING | Facility: HOSPITAL | Age: OVER 89
Discharge: HOME | End: 2025-05-27
Admitting: INTERNAL MEDICINE
Payer: MEDICARE

## 2025-05-27 DIAGNOSIS — R19.5 ABNORMAL FECES: ICD-10-CM

## 2025-05-27 PROCEDURE — 82565 ASSAY OF CREATININE: CPT

## 2025-05-27 PROCEDURE — 74177 CT ABD & PELVIS W/CONTRAST: CPT

## 2025-05-27 PROCEDURE — 25510000001 IOPAMIDOL 61 % SOLUTION: Performed by: INTERNAL MEDICINE

## 2025-05-27 RX ORDER — IOPAMIDOL 612 MG/ML
100 INJECTION, SOLUTION INTRAVASCULAR
Status: COMPLETED | OUTPATIENT
Start: 2025-05-27 | End: 2025-05-27

## 2025-05-27 RX ADMIN — IOPAMIDOL 85 ML: 612 INJECTION, SOLUTION INTRAVENOUS at 10:38

## 2025-05-29 LAB — CREAT BLDA-MCNC: 1.1 MG/DL (ref 0.6–1.3)

## 2025-06-30 ENCOUNTER — OFFICE VISIT (OUTPATIENT)
Dept: GASTROENTEROLOGY | Facility: CLINIC | Age: OVER 89
End: 2025-06-30
Payer: MEDICARE

## 2025-06-30 VITALS
WEIGHT: 143.4 LBS | TEMPERATURE: 97.7 F | DIASTOLIC BLOOD PRESSURE: 69 MMHG | HEART RATE: 65 BPM | BODY MASS INDEX: 30.94 KG/M2 | SYSTOLIC BLOOD PRESSURE: 125 MMHG | HEIGHT: 57 IN

## 2025-06-30 DIAGNOSIS — K59.1 FUNCTIONAL DIARRHEA: ICD-10-CM

## 2025-06-30 DIAGNOSIS — R13.12 OROPHARYNGEAL DYSPHAGIA: Primary | ICD-10-CM

## 2025-06-30 DIAGNOSIS — R49.0 DYSPHONIA: ICD-10-CM

## 2025-06-30 DIAGNOSIS — K62.5 RECTAL BLEEDING: ICD-10-CM

## 2025-06-30 PROCEDURE — 1160F RVW MEDS BY RX/DR IN RCRD: CPT | Performed by: PHYSICIAN ASSISTANT

## 2025-06-30 PROCEDURE — 1159F MED LIST DOCD IN RCRD: CPT | Performed by: PHYSICIAN ASSISTANT

## 2025-06-30 PROCEDURE — 99204 OFFICE O/P NEW MOD 45 MIN: CPT | Performed by: PHYSICIAN ASSISTANT

## 2025-06-30 RX ORDER — HYDROCORTISONE 25 MG/G
1 CREAM TOPICAL 2 TIMES DAILY
Qty: 30 G | Refills: 1 | Status: SHIPPED | OUTPATIENT
Start: 2025-06-30 | End: 2025-06-30

## 2025-06-30 RX ORDER — HYDROCORTISONE 25 MG/G
1 CREAM TOPICAL 2 TIMES DAILY
Qty: 30 G | Refills: 1 | Status: SHIPPED | OUTPATIENT
Start: 2025-06-30

## 2025-06-30 NOTE — PROGRESS NOTES
Chief Complaint  Difficulty Swallowing    Subjective          History Of Present Illness:    Roland Russell is a  89 y.o. female patient of Dr. Morton who presents as a follow up for difficulty with her voice.     Her biggest complaint is she has discomfort in her throat after speaking for long periods of time.  She feels like her throat is closing up.  Her daughter joins her and reports she often has to and phone calls because of speaking too long resulting in hoarseness or raspiness.  She does report some issues with swallowing pills.  Reportedly has had some issues with swallowing foods and liquids in the past but denies today.  She did undergo a swallowing evaluation while hospitalized back in 2024 which showed moderate oropharyngeal dysphagia.  At that time it was recommended she eat a mechanical ground diet with thickened liquids.  She is currently eating a normal diet.  She never salt speech-language pathology again.  She reportedly saw ear nose and throat approximately 1 month ago.  She did undergo laryngoscope with no obvious findings.  No further recommendations were made at that point per patient and her daughter.    She denies any nausea, vomiting.  No odynophagia.  She does report a longstanding history of diarrhea.  Currently uses Imodium which is helpful.  She does have incontinence.  Notably over the last few months she has had some rectal bleeding.  Per the patient and her daughter her labs are within normal limits.  She was given    History of cholecystectomy.    Additional data reviewed:   COLONOSCOPY (12/17/2010)     CT Abdomen Pelvis With Contrast (05/27/2025 10:35) -normal    FL Video Swallow Single Contrast (05/31/2024 08:28) - moderate oropharyngeal dysphagia characterized by reduced laryngeal vestibule closure, swallow mistiming, and poor pharyngeal stripping wave. Post surgical changes observed from carotid tumor surgery. Normal swallow initiation with nectar via spoon and cup without  "penetration. Swallow elicited at the valleculae and pyriforms with nectar via straw without penetration with consecutive drinks. Non transient penetration with thin via cup and straw without aspiration initially. Silent aspiration during the swallow with thins via straw at the end of the study with deep penetration to vocal cords with thins via cup when fatigued. Swallow elicited at the valleculae with puree. Mild base of tongue and valleculae residue post swallow. Increased mastication with soft solids and regular. Piece meal swallow with soft solids. Spill to the pyriforms before the swallow with aspiration during the swallow with weak throat clear noted x1. Pt c/o \"that it didn't go down\" when aspiration occured. Mild residue post swallow at the valleculae and pyriforms with regular solids, which patient cleared with an additional swallow.     Recommendation - mechanical ground textures;no mixed consistencies;nectar thick liquids     Objective   Vital Signs:   /69 (BP Location: Left arm, Patient Position: Sitting, Cuff Size: Adult)   Pulse 65   Temp 97.7 °F (36.5 °C) (Temporal)   Ht 144.9 cm (57.04\")   Wt 65 kg (143 lb 6.4 oz)   BMI 30.99 kg/m²       Physical Exam  Vitals reviewed.   Constitutional:       General: She is not in acute distress.     Appearance: Normal appearance. She is not ill-appearing.   HENT:      Head: Normocephalic and atraumatic.      Nose: Nose normal.      Mouth/Throat:      Pharynx: Oropharynx is clear.   Eyes:      Conjunctiva/sclera: Conjunctivae normal.   Pulmonary:      Effort: Pulmonary effort is normal.   Abdominal:      General: There is no distension.      Palpations: Abdomen is soft. There is no mass.      Tenderness: There is no abdominal tenderness.   Musculoskeletal:         General: No swelling. Normal range of motion.      Cervical back: Normal range of motion.   Skin:     General: Skin is warm and dry.      Findings: No bruising or rash.   Neurological:      " General: No focal deficit present.      Mental Status: She is alert and oriented to person, place, and time.      Motor: No weakness.      Gait: Gait normal.   Psychiatric:         Mood and Affect: Mood normal.          Result Review :   The following data was reviewed by: Meg Savage PA-C on 06/30/2025:  CMP          5/27/2025    10:34   CMP   Creatinine 1.10              Assessment and Plan    Diagnoses and all orders for this visit:    1. Oropharyngeal dysphagia (Primary)  -     Ambulatory Referral to Speech Therapy for Evaluation & Treatment    2. Dysphonia  -     Ambulatory Referral to Speech Therapy for Evaluation & Treatment    3. Functional diarrhea    4. Rectal bleeding  -     Discontinue: hydrocortisone 2.5 % cream; Apply 1 Application topically to the appropriate area as directed 2 (Two) Times a Day. Apply to anus and rectum twice daily for two weeks.  Dispense: 30 g; Refill: 1  -     hydrocortisone 2.5 % cream; Apply 1 Application topically to the appropriate area as directed 2 (Two) Times a Day. Apply to anus and rectum twice daily for two weeks.  Dispense: 30 g; Refill: 1       Recommend reevaluation by SLP  We did discuss that an endoscopy would not be helpful for her primary complaint which is dysphonia.  Patient with a longstanding history of diarrhea with fecal incontinence.  Likely related to age.  We did discuss the risk versus benefits of colonoscopy at her age.  Patient's blood counts appear to be within normal limits.  Recommend continuation of hydrocortisone cream twice daily for 2 weeks and as needed thereafter.  Will have her follow-up in a couple months and if she has ongoing occurrence of rectal bleeding, we can discuss colonoscopy with Dr. Morton. She certainly would be considered high risk with her comorbidities and her age    Follow Up   Return in about 3 months (around 9/30/2025) for Meg Vargas PA-C.    Dragon dictation used throughout this note.            Meg  SARA Vargas   [facility], [place]  [facility] [place] [address]  Office: [phone]

## 2025-07-18 ENCOUNTER — HOSPITAL ENCOUNTER (OUTPATIENT)
Dept: SPEECH THERAPY | Facility: HOSPITAL | Age: OVER 89
Setting detail: THERAPIES SERIES
Discharge: HOME | End: 2025-07-18
Payer: MEDICARE

## 2025-07-18 DIAGNOSIS — R13.12 OROPHARYNGEAL DYSPHAGIA: Primary | ICD-10-CM

## 2025-07-18 PROCEDURE — 92524 BEHAVRAL QUALIT ANALYS VOICE: CPT | Performed by: SPEECH-LANGUAGE PATHOLOGIST

## 2025-07-18 PROCEDURE — 92610 EVALUATE SWALLOWING FUNCTION: CPT | Performed by: SPEECH-LANGUAGE PATHOLOGIST

## 2025-07-18 NOTE — THERAPY EVALUATION
"Outpatient Speech Language Pathology   Adult Swallow Initial Evaluation  [facility]     Patient Name: Roland Russell  : 1934  MRN: [id number]  Today's Date: 2025         Visit Date: 2025   Patient Active Problem List   Diagnosis    Legal blindness    Uncontrolled type 2 diabetes mellitus with hypoglycemia without coma    GERD (gastroesophageal reflux disease)    Mixed hyperlipidemia    Hypothyroidism, acquired    Osteoarthritis, multiple sites    Essential hypertension    Senile osteoporosis    Renal insufficiency    T12 compression fracture    Vitamin D deficiency    Severe headache    Carotid body tumor    Glomus jugulare tumor    Sleep apnea, obstructive    Morbidly obese    Abnormal weight gain    Localized edema    Compression fracture of T8 vertebra with delayed healing    PAF (paroxysmal atrial fibrillation)    Dry scalp    Deformity of both feet    Acute encephalopathy    Lactic acidosis    Diverticulitis    Ankle fracture    Type 2 diabetes mellitus with hypoglycemia, with long-term current use of insulin    Abnormal nuclear stress test    Coronary artery disease involving native coronary artery of native heart    S/P right coronary artery (RCA) stent placement    Diastolic CHF, acute    Acute CHF    Type 2 diabetes mellitus with hyperglycemia    Acute cystitis with hematuria    Diabetic polyneuropathy associated with type 2 diabetes mellitus    Presence of cardiac pacemaker    Hypoxia    Cerebrovascular accident (CVA)    Chronic heart failure with preserved ejection fraction (HFpEF)    Subclavian vein stenosis, right    Carotid artery stenosis    Inability to swallow    Nonspecific pain in the neck region    Localized swelling of right upper extremity    Abdominal pain    Hypertensive urgency    Carotid body tumor        Past Medical History:   Diagnosis Date    Abnormal ECG     Abnormal vision     SEES \"KALEIDOSCOPE\"    Allergy to adhesive tape     Arthralgia     Asthma     " Cannot lay flat    AVB (atrioventricular block)     Back pain 01/28/2021    Balance problem     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    CAD (coronary artery disease)     unspecified    Carotid body tumor     LEFT    Cholelithiasis Removed    Clotting disorder     Deep vein thrombosis Legs    Difficulty walking     H/O complete eye exam 10/2016    Headache     OFF AND ON BACK OF HEAD, DOWN NECK TO SHOULDER    History of DVT (deep vein thrombosis)     History of glaucoma     History of poliomyelitis     AGE 9    History of surgery on arm     left arm    HLD (hyperlipidemia)     Hypertension     Hypothyroidism, acquired     Left carotid artery stenosis 11/21/2019    Legal blindness     SOME PERIPHERAL VISION ON LEFT, SOME VISION ON RIGHT    Memory loss     SOME SHORT TERM    Migraine     Neck mass 02/03/2022    Numbness     LEFT LEG     Osteoarthritis, multiple sites     Osteoporosis     Pacemaker     Rectal bleeding     X1, WITH BM    Renal disorder     Sleep apnea     DOES NOT USE A MACHINE; unspecified    Stroke     Thyroid nodule Removed 12/2019    TIA (transient ischemic attack) 12/24/2018    ?, HAD SPELL UNABLE TO TALK    Type 2 diabetes mellitus     Type 2 diabetes mellitus without complications     Type 2 diabetes mellitus, uncontrolled         Past Surgical History:   Procedure Laterality Date    APPENDECTOMY  1989    CARDIAC CATHETERIZATION N/A 04/13/2023    Procedure: Left Heart Cath;  Surgeon: Rikki Oconnor MD;  Location:  [facility];  Service: Cardiology;  Laterality: N/A;    CARDIAC CATHETERIZATION N/A 04/13/2023    Procedure: Coronary angiography;  Surgeon: Rikki Oconnor MD;  Location:  [facility];  Service: Cardiology;  Laterality: N/A;    CARDIAC CATHETERIZATION N/A 04/13/2023    Procedure: Left ventriculography;  Surgeon: Rikki Oconnor MD;  Location:  [facility];  Service: Cardiology;  Laterality: N/A;    CARDIAC  CATHETERIZATION N/A 04/20/2023    Procedure: Percutaneous Coronary Intervention;  Surgeon: Rikki Oconnor MD;  [facility];  Service: Cardiology;  Laterality: N/A;    CARDIAC CATHETERIZATION N/A 04/20/2023    Procedure: Atherectomy-coronary;  Surgeon: Rikki Oconnor MD;  [facility];  Service: Cardiology;  Laterality: N/A;    CARDIAC CATHETERIZATION N/A 04/20/2023    Procedure: Stent CAROL coronary;  Surgeon: Rikki Oconnor MD;  [facility];  Service: Cardiology;  Laterality: N/A;    CARDIAC PACEMAKER PLACEMENT  11/25/2004    CAROTID ENDARTERECTOMY Left 12/13/2019    Procedure: LEFT CAROTID BODY TUMOR RESECTION;  Surgeon: Bryan Severino MD;  Location: [facility] MAIN OR;  Service: Vascular    CATARACT EXTRACTION Bilateral 1997    CEREBRAL ANGIOGRAM Bilateral 12/06/2019    Procedure: CAROTID ARTERIOGRAM BILATERAL, RIGHT WRIST APPROACH;  Surgeon: Bryan Severino MD;  Location: [facility] HYBRID OR 18/19;  Service: Vascular    CHOLECYSTECTOMY  1989    COLONOSCOPY  2013    dr montes    ELBOW PROCEDURE  2016    ENDOSCOPY  12/06/2012    Dr. Montes; food in stomach, gastritis    EXCISION LESION  1994    BACK CYST BENGIN    EYE SURGERY  12/2012    GLAUCOMA SURGERY Bilateral 1995    laser surgery    HARDWARE REMOVAL Left     ELBOW    HYSTERECTOMY  1986    INTERVENTIONAL RADIOLOGY PROCEDURE N/A 04/20/2023    Procedure: Intravascular Ultrasound;  Surgeon: Rikki Oconnor MD;  Location: [facility] CATH INVASIVE LOCATION;  Service: Cardiology;  Laterality: N/A;    ORIF ELBOW FRACTURE Left     PACEMAKER IMPLANTATION      PACEMAKER REPLACEMENT  06/2013    VASCULAR SURGERY  12/2018         Visit Dx:     ICD-10-CM ICD-9-CM   1. Oropharyngeal dysphagia  R13.12 787.22            OP SLP Assessment/Plan - 07/18/25 1645          SLP Assessment    Functional Problems Swallowing;Voice  -KA    Impact on Function: Swallowing Risk of aspiration;Risk of pneumonia  -KA    Clinical  "Impression: Swallowing Mild:;oropharyngeal phase dysphagia  -KA    Clinical Impression- Voice WFL  -KA              User Key  (r) = Recorded By, (t) = Taken By, (c) = Cosigned By      Initials Name Provider Type    León Larry, SLP Speech and Language Pathologist                     SLP Adult Swallow Evaluation       Row Name 07/18/25 1600       Rehab Evaluation    Document Type evaluation  -KA    Subjective Information no complaints  -KA    Patient Observations alert;cooperative  -KA    Patient Effort good  -KA    Symptoms Noted During/After Treatment none  -KA       General Information    Patient Profile Reviewed yes  -KA    Pertinent History Of Current Problem Patient referred for  dysphagia and dysphonia. Patient had recent hospitalization at [facility] in May 2024 for  acute left sided thoracic back pain. Patient has history of resection of left carotid body tumor 2019. Patient has complained of difficulty swallowing and voice changes for several years. Patient reports she occasionally has difficulty swallowing, however not often. In regards to patient voice she states \"the more I talk the more my neck swells.\" Patient frequently used the word \"swell\" today to describe her voice and neck issues. She reports when this happens her voice becomes more fatigued and has to rest and stop talking, SHe denies throat tightness or SOB. VFSS on 5/29/25 recommended mechanical soft no mixed and NTL. Pt currently is consuming regular solids and thin liquids with no history of PNA. Daughter reports pt will not drink the thickened liquids.  -KA    Current Method of Nutrition regular textures;thin liquids  -KA    Precautions/Limitations, Vision vision impairment, bilaterally  -KA    Precautions/Limitations, Hearing hearing impairment, bilaterally  -KA    Prior Level of Function-Swallowing regular textures;thin liquids  -KA    Plans/Goals Discussed with patient and family  -KA    Barriers to Rehab none " "identified  -KA    Patient's Goals for Discharge --  improve \"neck swelling\" with talking  -    Family Goals for Discharge family did not state  -KA       Oral Motor Structure and Function    Dentition Assessment natural, present and adequate  -KA    Secretion Management WNL/WFL  -KA    Mucosal Quality moist, healthy  -KA       Oral Musculature and Cranial Nerve Assessment    Oral Motor General Assessment WFL  -KA       Clinical Swallow Eval    Clinical Swallow Evaluation Summary Patient consumed puree trials and thins via cup without overt s/s of pen/asp. Reviewed results of VFSS with pt and daughter including showing images and discussed risks for aspiration and PNA. Discussed safe swallow precautions including small bites and sips, and slow rate. Discussed s/s of PNA. Patient and daughter voiced understanding of education completed.  -KA       SLP Evaluation Clinical Impression    SLP Swallowing Diagnosis suspected pharyngeal dysphagia  -KA    Functional Impact risk of aspiration/pneumonia  -    Swallow Criteria for Skilled Therapeutic Interventions Met no problems identified which require skilled intervention;baseline status;other (see comments)  all education completed  -KA       Recommendations    Therapy Frequency (Swallow) evaluation only  -KA    SLP Diet Recommendation regular textures;thin liquids;other (see comments)  pt wishes to continue regular diet and thin liquids  -KA    Recommended Precautions and Strategies upright posture during/after eating;small bites of food and sips of liquid  -KA    Oral Care Recommendations Oral Care BID/PRN  -KA    SLP Rec. for Method of Medication Administration meds whole;as tolerated  -    Monitor for Signs of Aspiration yes;notify SLP if any concerns  -KA    Anticipated Discharge Disposition (SLP) home  -              User Key  (r) = Recorded By, (t) = Taken By, (c) = Cosigned By      Initials Name Provider Type    León Larry, SLP Speech and Language " Pathologist                                   OP SLP Education       Row Name 25 1645       Education    Barriers to Learning No barriers identified  -PRADIP    Education Provided Described results of evaluation;Patient expressed understanding of evaluation  -PRADIP    Assessed Learning needs;Learning motivation  -PRADIP    Learning Motivation Strong  -PRADIP    Learning Method Explanation  -PRADIP    Teaching Response Verbalized understanding  -PRADIP              User Key  (r) = Recorded By, (t) = Taken By, (c) = Cosigned By      Initials Name Effective Dates    KA León Luis SLP 24 -                                Time Calculation:   SLP Start Time: 1245  SLP Stop Time: 1330  SLP Time Calculation (min): 45 min    Therapy Charges for Today       Code Description Service Date Service Provider Modifiers Qty    24423308952 HC ST EVAL ORAL PHARYNG SWALLOW 2 2025 León Luis SLP GN 1    [id number] HC ST BEHAV QUALT VOICE AND RESONC 1 2025 León Luis SLP GN 1                     KAYLEEN Lambert  2025   and Outpatient Speech Language Pathology   Adult/Peds Voice Initial Evaluation  [facility]     Patient Name: Roland Russell  : 1934  MRN: [id number]  Today's Date: 2025         Visit Date: 2025   Patient Active Problem List   Diagnosis    Legal blindness    Uncontrolled type 2 diabetes mellitus with hypoglycemia without coma    GERD (gastroesophageal reflux disease)    Mixed hyperlipidemia    Hypothyroidism, acquired    Osteoarthritis, multiple sites    Essential hypertension    Senile osteoporosis    Renal insufficiency    T12 compression fracture    Vitamin D deficiency    Severe headache    Carotid body tumor    Glomus jugulare tumor    Sleep apnea, obstructive    Morbidly obese    Abnormal weight gain    Localized edema    Compression fracture of T8 vertebra with delayed healing    PAF (paroxysmal atrial fibrillation)    Dry scalp    Deformity of both feet    Acute  "encephalopathy    Lactic acidosis    Diverticulitis    Ankle fracture    Type 2 diabetes mellitus with hypoglycemia, with long-term current use of insulin    Abnormal nuclear stress test    Coronary artery disease involving native coronary artery of native heart    S/P right coronary artery (RCA) stent placement    Diastolic CHF, acute    Acute CHF    Type 2 diabetes mellitus with hyperglycemia    Acute cystitis with hematuria    Diabetic polyneuropathy associated with type 2 diabetes mellitus    Presence of cardiac pacemaker    Hypoxia    Cerebrovascular accident (CVA)    Chronic heart failure with preserved ejection fraction (HFpEF)    Subclavian vein stenosis, right    Carotid artery stenosis    Inability to swallow    Nonspecific pain in the neck region    Localized swelling of right upper extremity    Abdominal pain    Hypertensive urgency    Carotid body tumor        Past Medical History:   Diagnosis Date    Abnormal ECG 2023    Abnormal vision     SEES \"KALEIDOSCOPE\"    Allergy to adhesive tape     Arthralgia     Asthma     Cannot lay flat    AVB (atrioventricular block)     Back pain 01/28/2021    Balance problem     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    CAD (coronary artery disease)     unspecified    Carotid body tumor     LEFT    Cholelithiasis Removed    Clotting disorder     Deep vein thrombosis Legs    Difficulty walking     H/O complete eye exam 10/2016    Headache     OFF AND ON BACK OF HEAD, DOWN NECK TO SHOULDER    History of DVT (deep vein thrombosis)     History of glaucoma     History of poliomyelitis     AGE 9    History of surgery on arm     left arm    HLD (hyperlipidemia)     Hypertension     Hypothyroidism, acquired     Left carotid artery stenosis 11/21/2019    Legal blindness     SOME PERIPHERAL VISION ON LEFT, SOME VISION ON RIGHT    Memory loss     SOME SHORT TERM    Migraine     Neck mass 02/03/2022    Numbness     LEFT LEG     Osteoarthritis, multiple " sites     Osteoporosis     Pacemaker     Rectal bleeding     X1, WITH BM    Renal disorder     Sleep apnea     DOES NOT USE A MACHINE; unspecified    Stroke     Thyroid nodule Removed 12/2019    TIA (transient ischemic attack) 12/24/2018    ?, HAD SPELL UNABLE TO TALK    Type 2 diabetes mellitus     Type 2 diabetes mellitus without complications     Type 2 diabetes mellitus, uncontrolled         Past Surgical History:   Procedure Laterality Date    APPENDECTOMY  1989    CARDIAC CATHETERIZATION N/A 04/13/2023    Procedure: Left Heart Cath;  Surgeon: Rikki Oconnor MD;  [facility];  Service: Cardiology;  Laterality: N/A;    CARDIAC CATHETERIZATION N/A 04/13/2023    Procedure: Coronary angiography;  Surgeon: Rikki Oconnor MD;  [facility];  Service: Cardiology;  Laterality: N/A;    CARDIAC CATHETERIZATION N/A 04/13/2023    Procedure: Left ventriculography;  Surgeon: Rikki Oconnor MD;  [facility];  Service: Cardiology;  Laterality: N/A;    CARDIAC CATHETERIZATION N/A 04/20/2023    Procedure: Percutaneous Coronary Intervention;  Surgeon: Rikki Oconnor MD;  Location: [facility] CATH INVASIVE LOCATION;  Service: Cardiology;  Laterality: N/A;    CARDIAC CATHETERIZATION N/A 04/20/2023    Procedure: Atherectomy-coronary;  Surgeon: Rikki Oconnor MD;  [facility];  Service: Cardiology;  Laterality: N/A;    CARDIAC CATHETERIZATION N/A 04/20/2023    Procedure: Stent CAROL coronary;  Surgeon: Rikki Oconnor MD;  Location: [facility] CATH INVASIVE LOCATION;  Service: Cardiology;  Laterality: N/A;    CARDIAC PACEMAKER PLACEMENT  11/25/2004    CAROTID ENDARTERECTOMY Left 12/13/2019    Procedure: LEFT CAROTID BODY TUMOR RESECTION;  Surgeon: Bryan Severino MD;  Location: [facility] OR;  Service: Vascular    CATARACT EXTRACTION Bilateral 1997    CEREBRAL ANGIOGRAM Bilateral 12/06/2019    Procedure: CAROTID ARTERIOGRAM BILATERAL, RIGHT WRIST  APPROACH;  Surgeon: Bryan Severino MD;  Location: [facility] HYBRID OR 18/19;  Service: Vascular    CHOLECYSTECTOMY  1989    COLONOSCOPY  2013    dr johnson    ELBOW PROCEDURE  2016    ENDOSCOPY  12/06/2012    Dr. Johnson; food in stomach, gastritis    EXCISION LESION  1994    BACK CYST BENGIN    EYE SURGERY  12/2012    GLAUCOMA SURGERY Bilateral 1995    laser surgery    HARDWARE REMOVAL Left     ELBOW    HYSTERECTOMY  1986    INTERVENTIONAL RADIOLOGY PROCEDURE N/A 04/20/2023    Procedure: Intravascular Ultrasound;  Surgeon: Rikki Oconnor MD;  Location: [facility] CATH INVASIVE LOCATION;  Service: Cardiology;  Laterality: N/A;    ORIF ELBOW FRACTURE Left     PACEMAKER IMPLANTATION      PACEMAKER REPLACEMENT  06/2013    VASCULAR SURGERY  12/2018         Visit Dx:    ICD-10-CM ICD-9-CM   1. Oropharyngeal dysphagia  R13.12 787.22            OP SLP Assessment/Plan - 07/18/25 1645          SLP Assessment    Functional Problems Swallowing;Voice  -KA    Impact on Function: Swallowing Risk of aspiration;Risk of pneumonia  -KA    Clinical Impression: Swallowing Mild:;oropharyngeal phase dysphagia  -KA    Clinical Impression- Voice WFL  -KA              User Key  (r) = Recorded By, (t) = Taken By, (c) = Cosigned By      Initials Name Provider Type    León Larry, SLP Speech and Language Pathologist                      VOICE ASSESSMENT (Last 72 Hours)       Voice Assessment       Row Name 07/18/25 1600                   Vocal Hygiene    Daily Water Intake 3-4 glasses (17-32 oz.)  -KA        Daily Caffeine Intake Coffee  -KA        Daily Alcohol Servings 0  -KA        Smoking History Nonsmoker  -KA        Vocal Abuses None reported  -KA        Environmental Issues None reported  -KA        Reflux History None reported  -KA        Typical Voice Usage/Activities Uses voice for ADLs  -KA           Voice Assessment/Intervention    Quality and Resonance (Voice) Hoarse  -KA        Respiration/Breath Support Maximum phonation time;During  "Conversation  -KA        Maximum phonation time (MPT) averages --  18 seconds WNL  -KA        Pitch Sims WFL  -KA        Pitch Range reduced loudness  -KA           SLP Clinical Impression    SLP Voice Diagnosis WFL  -KA        SLP Voice Diagnosis Comments Patient demonstrates mildly hoarse vocal quality however appears WNL for age. Patient demonstrated adequate coordination of breath support to sustain phonation at the sentence to multi-sentence level. Education completed on vocal hygiene including increasing water intake, not talking excessively or loudly and taking rest breaks. SLP educated pt on vocal function exercises with pt performing x5 sets of each. After pt vocalized and performed her exercises she stated \"my neck is swelling\" SLP did not visualize any swelling of the neck and educated pt on neck anatomy including thyroid notch.  -KA        SLP Criteria for Skilled Therapy Intervention baseline status  -KA        Functional Impact no impact on fuction  -KA           SLP Voice Recommendations    SLP Voice Therapy Frequency evaluation only  -KA                  User Key  (r) = Recorded By, (t) = Taken By, (c) = Cosigned By      Initials Name Effective Dates    KA León Luis, KAYLEEN 01/05/24 -                   SLP asked if she would be interested in seeking a second opinion for ENT, per daughter patients ENT appointment was WNL and unremarkable. Pt and daughter do not recall location or time frame of appointment. Patient was not interested in SLP providing information for ENT at [facility] center.      SLP Adult Swallow Evaluation       Row Name 07/18/25 1600       Rehab Evaluation    Document Type evaluation  -KA    Subjective Information no complaints  -KA    Patient Observations alert;cooperative  -KA    Patient Effort good  -KA    Symptoms Noted During/After Treatment none  -KA       General Information    Patient Profile Reviewed yes  -KA    Pertinent History Of Current Problem Patient referred for  " "dysphagia and dysphonia. Patient had recent hospitalization at [facility] in May 2024 for  acute left sided thoracic back pain. Patient has history of resection of left carotid body tumor 2019. Patient has complained of difficulty swallowing and voice changes for several years. Patient reports she occasionally has difficulty swallowing, however not often. In regards to patient voice she states \"the more I talk the more my neck swells.\" Patient frequently used the word \"swell\" today to describe her voice and neck issues. She reports when this happens her voice becomes more fatigued and has to rest and stop talking, SHe denies throat tightness or SOB. VFSS on 5/29/25 recommended mechanical soft no mixed and NTL. Pt currently is consuming regular solids and thin liquids with no history of PNA. Daughter reports pt will not drink the thickened liquids.  -KA    Current Method of Nutrition regular textures;thin liquids  -KA    Precautions/Limitations, Vision vision impairment, bilaterally  -KA    Precautions/Limitations, Hearing hearing impairment, bilaterally  -KA    Prior Level of Function-Swallowing regular textures;thin liquids  -KA    Plans/Goals Discussed with patient and family  -KA    Barriers to Rehab none identified  -KA    Patient's Goals for Discharge --  improve \"neck swelling\" with talking  -KA    Family Goals for Discharge family did not state  -KA       Oral Motor Structure and Function    Dentition Assessment natural, present and adequate  -KA    Secretion Management WNL/WFL  -KA    Mucosal Quality moist, healthy  -KA       Oral Musculature and Cranial Nerve Assessment    Oral Motor General Assessment WFL  -KA       Clinical Swallow Eval    Clinical Swallow Evaluation Summary Patient consumed puree trials and thins via cup without overt s/s of pen/asp. Reviewed results of VFSS with pt and daughter including showing images and discussed risks for aspiration and PNA. Discussed safe swallow " precautions including small bites and sips, and slow rate. Discussed s/s of PNA. Patient and daughter voiced understanding of education completed.  -KA       SLP Evaluation Clinical Impression    SLP Swallowing Diagnosis suspected pharyngeal dysphagia  -KA    Functional Impact risk of aspiration/pneumonia  -KA    Swallow Criteria for Skilled Therapeutic Interventions Met no problems identified which require skilled intervention;baseline status;other (see comments)  all education completed  -KA       Recommendations    Therapy Frequency (Swallow) evaluation only  -KA    SLP Diet Recommendation regular textures;thin liquids;other (see comments)  pt wishes to continue regular diet and thin liquids  -KA    Recommended Precautions and Strategies upright posture during/after eating;small bites of food and sips of liquid  -KA    Oral Care Recommendations Oral Care BID/PRN  -KA    SLP Rec. for Method of Medication Administration meds whole;as tolerated  -KA    Monitor for Signs of Aspiration yes;notify SLP if any concerns  -KA    Anticipated Discharge Disposition (SLP) home  -KA              User Key  (r) = Recorded By, (t) = Taken By, (c) = Cosigned By      Initials Name Provider Type    León Larry SLP Speech and Language Pathologist                             OP SLP Education       Row Name 07/18/25 5503       Education    Barriers to Learning No barriers identified  -KA    Education Provided Described results of evaluation;Patient expressed understanding of evaluation  -KA    Assessed Learning needs;Learning motivation  -KA    Learning Motivation Strong  -KA    Learning Method Explanation  -KA    Teaching Response Verbalized understanding  -              User Key  (r) = Recorded By, (t) = Taken By, (c) = Cosigned By      Initials Name Effective Dates    León Larry SLP 01/05/24 -                              Time Calculation:   SLP Start Time: 1245  SLP Stop Time: 1330  SLP Time Calculation (min): 45  min    Therapy Charges for Today       Code Description Service Date Service Provider Modifiers Qty    42887972573  ST EVAL ORAL PHARYNG SWALLOW 2 7/18/2025 León Luis, SLP GN 1    [id number]  ST BEHAV QUALT VOICE AND RESONC 1 7/18/2025 León Luis, SLP GN 1                       León Luis, KAYLEEN  7/18/2025

## (undated) DEVICE — CATH DIAG DXTERITY ULTRA TRANSRADIAL 4.0 5F 100CM 0/SH

## (undated) DEVICE — TBG SXN PERFUS W TP

## (undated) DEVICE — INTRO SHEATH ART/FEM ENGAGE .035 6F12CM

## (undated) DEVICE — SUT SILK 3/0 TIES 18IN A184H

## (undated) DEVICE — 3M™ IOBAN™ 2 ANTIMICROBIAL INCISE DRAPE 6650EZ: Brand: IOBAN™ 2

## (undated) DEVICE — DISPOSABLE BIPOLAR FORCEPS 7 3/4" (19.7CM) SCOVILLE BAYONET, 1.5MM TIP AND 12 FT. (3.6M) CABLE: Brand: KIRWAN

## (undated) DEVICE — GUIDEWIRE, VIPERWIRE ADVANCE CORONARY FLEX TIP .014: DIA, .012" 1 PACK 325 CM: Brand: DIAMONDBACK CORONARY

## (undated) DEVICE — CATH JB 5FR

## (undated) DEVICE — HI-TORQUE SUPRA CORE .035 PERIPHERAL GUIDE WIRE .035 X 190 CM: Brand: HI-TORQUE SUPRA CORE

## (undated) DEVICE — NC TREK NEO™ CORONARY DILATATION CATHETER 3.00 MM X 20 MM / RAPID-EXCHANGE: Brand: NC TREK NEO™

## (undated) DEVICE — GUIDE CATHETER: Brand: MACH1™

## (undated) DEVICE — PK CATH CARD 40

## (undated) DEVICE — GLV SURG BIOGEL LTX PF 7 1/2

## (undated) DEVICE — KT MANIFLD CARDIAC

## (undated) DEVICE — 6F .070 AL.75 100CM: Brand: CORDIS

## (undated) DEVICE — ANTIBACTERIAL UNDYED BRAIDED (POLYGLACTIN 910), SYNTHETIC ABSORBABLE SUTURE: Brand: COATED VICRYL

## (undated) DEVICE — Device

## (undated) DEVICE — ADHS SKIN DERMABOND TOP ADVANCED

## (undated) DEVICE — SOL NACL 0.9PCT 1000ML

## (undated) DEVICE — APPL CHLORAPREP W/TINT 26ML ORNG

## (undated) DEVICE — CATH GUIDE SOFTVU FLUSH HT PIG .038 5F 110CM

## (undated) DEVICE — INTENDED FOR TISSUE SEPARATION, AND OTHER PROCEDURES THAT REQUIRE A SHARP SURGICAL BLADE TO PUNCTURE OR CUT.: Brand: BARD-PARKER ® CARBON RIB-BACK BLADES

## (undated) DEVICE — PK ATS CUST W CARDIOTOMY RESEVOIR

## (undated) DEVICE — DIAMONDBACK CORONARY, CLASSIC CROWN, 1.25MM, 135CM SHAFT: Brand: DIAMONDBACK CORONARY

## (undated) DEVICE — RADIFOCUS GLIDEWIRE: Brand: GLIDEWIRE

## (undated) DEVICE — SYRINGE KIT,PACKAGED,,150FT,MK 7(ANGIO-ARTERION, 150ML SYR KIT W/QFT,MC)(60729385): Brand: MEDRAD® MARK 7 ARTERION DISPOSABLE SYRINGE 150 ML WITH QUICK FILL TUBE

## (undated) DEVICE — Device: Brand: ASAHI SION BLUE

## (undated) DEVICE — ANGIOGRAPHIC CATHETER: Brand: IMAGER™ II

## (undated) DEVICE — PK ENDART CARTOID 40

## (undated) DEVICE — PINNACLE INTRODUCER SHEATH: Brand: PINNACLE

## (undated) DEVICE — SUT SILK 3/0 SH CR5 18IN C0135

## (undated) DEVICE — NC TREK NEO™ CORONARY DILATATION CATHETER 4.50 MM X 20 MM / RAPID-EXCHANGE: Brand: NC TREK NEO™

## (undated) DEVICE — SUT SILK 4/0 TIES 18IN A183H

## (undated) DEVICE — GLIDESHEATH BASIC HYDROPHILIC COATED INTRODUCER SHEATH: Brand: GLIDESHEATH

## (undated) DEVICE — CVR PROB 96IN LF STRL

## (undated) DEVICE — CATH SOS OMNI 5FRX80CM SZ2

## (undated) DEVICE — NDL PERC 1PRT THNWALL W/BASEPLT 18G 7CM

## (undated) DEVICE — CORONARY IMAGING CATHETER: Brand: OPTICROSS™ 6 HD

## (undated) DEVICE — NC TREK NEO™ CORONARY DILATATION CATHETER 4.00 MM X 20 MM / RAPID-EXCHANGE: Brand: NC TREK NEO™

## (undated) DEVICE — SOL NS 500ML

## (undated) DEVICE — PK ANGIO CERBRL RAD 40

## (undated) DEVICE — TREK CORONARY DILATATION CATHETER 4.0 MM X 20 MM / RAPID-EXCHANGE: Brand: TREK

## (undated) DEVICE — DEV INDEFLATOR P/N 580289

## (undated) DEVICE — SUT PROLN 6/0 BV1 D/A 30IN 8709H

## (undated) DEVICE — GW EMR FIX EXCHG J STD .035 3MM 260CM

## (undated) DEVICE — GUIDELINER CATHETERS ARE INTENDED TO BE USED IN CONJUNCTION WITH GUIDE CATHETERS TO ACCESS DISCRETE REGIONS OF THE CORONARY AND/OR PERIPHERAL VASCULATURE, AND TO FACILITATE PLACEMENT OF INTERVENTIONAL DEVICES.: Brand: GUIDELINER® V3 CATHETER

## (undated) DEVICE — GOWN,NON-REINFORCED,SIRUS,SET IN SLV,XXL: Brand: MEDLINE

## (undated) DEVICE — CATH ANGIO TRCN NB BCN .035 5F 100CM SM1

## (undated) DEVICE — STPLR SKIN VISISTAT WD 35CT

## (undated) DEVICE — SUT VIC 4/0 SH 27IN J415H

## (undated) DEVICE — WIPE INST MEROCEL

## (undated) DEVICE — ANGIO-SEAL VIP VASCULAR CLOSURE DEVICE: Brand: ANGIO-SEAL

## (undated) DEVICE — TOWEL,OR,DSP,ST,BLUE,STD,4/PK,20PK/CS: Brand: MEDLINE

## (undated) DEVICE — GLIDESHEATH SLENDER STAINLESS STEEL KIT: Brand: GLIDESHEATH SLENDER

## (undated) DEVICE — SUT SILK 2/0 TIES 18IN A185H

## (undated) DEVICE — TR BAND RADIAL ARTERY COMPRESSION DEVICE: Brand: TR BAND

## (undated) DEVICE — SYS PERFUS SEP PLATLT W TIPS CUST

## (undated) DEVICE — STERILE COTTON TIP 6IN 10PK: Brand: MEDLINE